# Patient Record
Sex: FEMALE | Race: WHITE | URBAN - METROPOLITAN AREA
[De-identification: names, ages, dates, MRNs, and addresses within clinical notes are randomized per-mention and may not be internally consistent; named-entity substitution may affect disease eponyms.]

---

## 2014-10-09 RX ORDER — FUROSEMIDE 40 MG
1 TABLET ORAL
Qty: 0 | Refills: 0 | COMMUNITY
Start: 2014-10-09

## 2014-10-09 RX ORDER — SPIRONOLACTONE 25 MG/1
1 TABLET, FILM COATED ORAL
Qty: 0 | Refills: 0 | COMMUNITY
Start: 2014-10-09

## 2017-02-17 NOTE — H&P ADULT - NSHPREVIEWOFSYSTEMS_GEN_ALL_CORE
sobREVIEW OF SYSTEMS:    CONSTITUTIONAL: No weakness, fevers or chills  EYES/ENT: No visual changes;  No vertigo or throat pain   NECK: No pain or stiffness  RESPIRATORY: No cough, wheezing, hemoptysis; SOB with exertion   CARDIOVASCULAR: No chest pain or palpitations  GASTROINTESTINAL: No abdominal or epigastric pain. No nausea, vomiting, or hematemesis; No diarrhea or constipation. No melena or hematochezia.  GENITOURINARY: No dysuria, frequency or hematuria  NEUROLOGICAL: No numbness or weakness  SKIN: No itching, burning, rashes, or lesions   All other review of systems is negative unless indicated above.

## 2017-02-17 NOTE — H&P ADULT - NSHPPHYSICALEXAM_GEN_ALL_CORE
PHYSICAL EXAM:    Constitutional: NAD, well-groomed, well-developed  Neuro: Alert and oriented x 3 Gait steady Speech clear No focal deficits  Neck: No JVD No bruit  Respiratory: CTAB  Cardiovascular: S1 and S2, Irregular Rhythm + radha 2/6   Gastrointestinal: BS+, soft, NT/ND  Extremities: No clubbing cyanosis + 1 edema , + varicose veins   Vascular: 2+ peripheral pulses  Psychiatric: Normal mood, normal affect  Musculoskeletal: 5/5 strength b/l upper and lower extremities

## 2017-02-17 NOTE — H&P ADULT - HISTORY OF PRESENT ILLNESS
89yr old female PMH HTN,  HLD, afib (on AC), CAD, chronic diastolic heart failure, T2DM, 89yr old female PMH HTN,  HLD, afib (on AC ELIquis ), CAD/ stent 10/11, chronic diastolic heart failure, T2DM,TAVR 10/14,pt reports increasing SOB with minimal exertion To Cardiologist Pet scan revealed moderate ischemia in inferolateral wall recommended to have a Cardiac cath  NAD at present

## 2017-02-17 NOTE — H&P ADULT - ASSESSMENT
A/P CAD with multiple risks factors and SOB with exertion     PRE-PROCEDURE ASSESSMENT    -Patient seen and examined  -Labs reviewed  -Pre-procedure teaching completed with patient and family( son at bedside )   -Informed consent witnessed  -Questions answered

## 2017-02-17 NOTE — H&P ADULT - PMH
Afib    CAD (coronary artery disease)    Diabetes Mellitus Type II    Dyslipidemia    GERD (Gastroesophageal Reflux Disease)    History of Atrial Fibrillation    History of Osteoarthritis    Hypertension

## 2017-02-21 ENCOUNTER — OUTPATIENT (OUTPATIENT)
Dept: OUTPATIENT SERVICES | Facility: HOSPITAL | Age: 82
LOS: 1 days | Discharge: HOME | End: 2017-02-21
Payer: MEDICARE

## 2017-02-21 VITALS
HEIGHT: 64 IN | WEIGHT: 179.02 LBS | TEMPERATURE: 97 F | SYSTOLIC BLOOD PRESSURE: 133 MMHG | RESPIRATION RATE: 16 BRPM | HEART RATE: 73 BPM | DIASTOLIC BLOOD PRESSURE: 80 MMHG | OXYGEN SATURATION: 98 %

## 2017-02-21 LAB
ALBUMIN SERPL ELPH-MCNC: 3.3 G/DL — SIGNIFICANT CHANGE UP (ref 3.3–5)
ALP SERPL-CCNC: 71 U/L — SIGNIFICANT CHANGE UP (ref 40–120)
ALT FLD-CCNC: 17 U/L — SIGNIFICANT CHANGE UP (ref 12–78)
ANION GAP SERPL CALC-SCNC: 7 MMOL/L — SIGNIFICANT CHANGE UP (ref 5–17)
AST SERPL-CCNC: 15 U/L — SIGNIFICANT CHANGE UP (ref 15–37)
BILIRUB SERPL-MCNC: 0.5 MG/DL — SIGNIFICANT CHANGE UP (ref 0.2–1.2)
BUN SERPL-MCNC: 12 MG/DL — SIGNIFICANT CHANGE UP (ref 7–23)
CALCIUM SERPL-MCNC: 9.2 MG/DL — SIGNIFICANT CHANGE UP (ref 8.5–10.1)
CHLORIDE SERPL-SCNC: 105 MMOL/L — SIGNIFICANT CHANGE UP (ref 96–108)
CO2 SERPL-SCNC: 28 MMOL/L — SIGNIFICANT CHANGE UP (ref 22–31)
CREAT SERPL-MCNC: 0.67 MG/DL — SIGNIFICANT CHANGE UP (ref 0.5–1.3)
GLUCOSE SERPL-MCNC: 117 MG/DL — HIGH (ref 70–99)
HCT VFR BLD CALC: 36.9 % — SIGNIFICANT CHANGE UP (ref 34.5–45)
HGB BLD-MCNC: 12.2 G/DL — SIGNIFICANT CHANGE UP (ref 11.5–15.5)
MCHC RBC-ENTMCNC: 30.7 PG — SIGNIFICANT CHANGE UP (ref 27–34)
MCHC RBC-ENTMCNC: 33.1 GM/DL — SIGNIFICANT CHANGE UP (ref 32–36)
MCV RBC AUTO: 92.7 FL — SIGNIFICANT CHANGE UP (ref 80–100)
PLATELET # BLD AUTO: 183 K/UL — SIGNIFICANT CHANGE UP (ref 150–400)
POTASSIUM SERPL-MCNC: 4.6 MMOL/L — SIGNIFICANT CHANGE UP (ref 3.5–5.3)
POTASSIUM SERPL-SCNC: 4.6 MMOL/L — SIGNIFICANT CHANGE UP (ref 3.5–5.3)
PROT SERPL-MCNC: 7.5 GM/DL — SIGNIFICANT CHANGE UP (ref 6–8.3)
RBC # BLD: 3.98 M/UL — SIGNIFICANT CHANGE UP (ref 3.8–5.2)
RBC # FLD: 14.6 % — HIGH (ref 10.3–14.5)
SODIUM SERPL-SCNC: 140 MMOL/L — SIGNIFICANT CHANGE UP (ref 135–145)
WBC # BLD: 7.8 K/UL — SIGNIFICANT CHANGE UP (ref 3.8–10.5)
WBC # FLD AUTO: 7.8 K/UL — SIGNIFICANT CHANGE UP (ref 3.8–10.5)

## 2017-02-21 PROCEDURE — 93010 ELECTROCARDIOGRAM REPORT: CPT

## 2017-02-21 RX ORDER — ACETAMINOPHEN 500 MG
650 TABLET ORAL EVERY 6 HOURS
Qty: 0 | Refills: 0 | Status: DISCONTINUED | OUTPATIENT
Start: 2017-02-21 | End: 2017-02-22

## 2017-02-21 RX ORDER — APIXABAN 2.5 MG/1
5 TABLET, FILM COATED ORAL
Qty: 0 | Refills: 0 | Status: DISCONTINUED | OUTPATIENT
Start: 2017-02-22 | End: 2017-02-22

## 2017-02-21 RX ORDER — GLUCAGON INJECTION, SOLUTION 0.5 MG/.1ML
1 INJECTION, SOLUTION SUBCUTANEOUS ONCE
Qty: 0 | Refills: 0 | Status: DISCONTINUED | OUTPATIENT
Start: 2017-02-21 | End: 2017-02-22

## 2017-02-21 RX ORDER — INSULIN LISPRO 100/ML
VIAL (ML) SUBCUTANEOUS AT BEDTIME
Qty: 0 | Refills: 0 | Status: DISCONTINUED | OUTPATIENT
Start: 2017-02-21 | End: 2017-02-22

## 2017-02-21 RX ORDER — DEXTROSE 50 % IN WATER 50 %
25 SYRINGE (ML) INTRAVENOUS ONCE
Qty: 0 | Refills: 0 | Status: DISCONTINUED | OUTPATIENT
Start: 2017-02-21 | End: 2017-02-22

## 2017-02-21 RX ORDER — INSULIN LISPRO 100/ML
VIAL (ML) SUBCUTANEOUS
Qty: 0 | Refills: 0 | Status: DISCONTINUED | OUTPATIENT
Start: 2017-02-21 | End: 2017-02-22

## 2017-02-21 RX ORDER — METOPROLOL TARTRATE 50 MG
25 TABLET ORAL DAILY
Qty: 0 | Refills: 0 | Status: DISCONTINUED | OUTPATIENT
Start: 2017-02-21 | End: 2017-02-22

## 2017-02-21 RX ORDER — DEXTROSE 50 % IN WATER 50 %
1 SYRINGE (ML) INTRAVENOUS ONCE
Qty: 0 | Refills: 0 | Status: DISCONTINUED | OUTPATIENT
Start: 2017-02-21 | End: 2017-02-22

## 2017-02-21 RX ORDER — SIMVASTATIN 20 MG/1
40 TABLET, FILM COATED ORAL AT BEDTIME
Qty: 0 | Refills: 0 | Status: DISCONTINUED | OUTPATIENT
Start: 2017-02-21 | End: 2017-02-22

## 2017-02-21 RX ORDER — DEXTROSE 50 % IN WATER 50 %
12.5 SYRINGE (ML) INTRAVENOUS ONCE
Qty: 0 | Refills: 0 | Status: DISCONTINUED | OUTPATIENT
Start: 2017-02-21 | End: 2017-02-22

## 2017-02-21 RX ORDER — ESCITALOPRAM OXALATE 10 MG/1
10 TABLET, FILM COATED ORAL DAILY
Qty: 0 | Refills: 0 | Status: DISCONTINUED | OUTPATIENT
Start: 2017-02-21 | End: 2017-02-22

## 2017-02-21 RX ORDER — FUROSEMIDE 40 MG
40 TABLET ORAL DAILY
Qty: 0 | Refills: 0 | Status: DISCONTINUED | OUTPATIENT
Start: 2017-02-21 | End: 2017-02-22

## 2017-02-21 RX ORDER — ASPIRIN/CALCIUM CARB/MAGNESIUM 324 MG
81 TABLET ORAL DAILY
Qty: 0 | Refills: 0 | Status: DISCONTINUED | OUTPATIENT
Start: 2017-02-21 | End: 2017-02-22

## 2017-02-21 RX ORDER — PANTOPRAZOLE SODIUM 20 MG/1
40 TABLET, DELAYED RELEASE ORAL
Qty: 0 | Refills: 0 | Status: DISCONTINUED | OUTPATIENT
Start: 2017-02-21 | End: 2017-02-22

## 2017-02-21 RX ORDER — DOCUSATE SODIUM 100 MG
100 CAPSULE ORAL THREE TIMES A DAY
Qty: 0 | Refills: 0 | Status: DISCONTINUED | OUTPATIENT
Start: 2017-02-21 | End: 2017-02-22

## 2017-02-21 RX ORDER — SODIUM CHLORIDE 9 MG/ML
1000 INJECTION, SOLUTION INTRAVENOUS
Qty: 0 | Refills: 0 | Status: DISCONTINUED | OUTPATIENT
Start: 2017-02-21 | End: 2017-02-22

## 2017-02-21 RX ORDER — SODIUM CHLORIDE 9 MG/ML
3 INJECTION INTRAMUSCULAR; INTRAVENOUS; SUBCUTANEOUS EVERY 8 HOURS
Qty: 0 | Refills: 0 | Status: DISCONTINUED | OUTPATIENT
Start: 2017-02-21 | End: 2017-02-22

## 2017-02-21 RX ADMIN — PANTOPRAZOLE SODIUM 40 MILLIGRAM(S): 20 TABLET, DELAYED RELEASE ORAL at 18:49

## 2017-02-21 RX ADMIN — SODIUM CHLORIDE 3 MILLILITER(S): 9 INJECTION INTRAMUSCULAR; INTRAVENOUS; SUBCUTANEOUS at 23:11

## 2017-02-21 RX ADMIN — Medication 40 MILLIGRAM(S): at 18:49

## 2017-02-21 RX ADMIN — Medication 100 MILLIGRAM(S): at 23:03

## 2017-02-21 RX ADMIN — Medication 25 MILLIGRAM(S): at 18:49

## 2017-02-21 RX ADMIN — ESCITALOPRAM OXALATE 10 MILLIGRAM(S): 10 TABLET, FILM COATED ORAL at 23:03

## 2017-02-21 RX ADMIN — Medication 81 MILLIGRAM(S): at 18:23

## 2017-02-21 RX ADMIN — SIMVASTATIN 40 MILLIGRAM(S): 20 TABLET, FILM COATED ORAL at 23:11

## 2017-02-21 NOTE — PATIENT PROFILE ADULT. - FALL HARM RISK
age(85 years old or older)/coagulation(Bleeding disorder R/T clinical cond/anti-coags)/bones(Osteoporosis,prev fx,steroid use,metastatic bone ca

## 2017-02-21 NOTE — ASU PREOP CHECKLIST - DNR CLARIFICATION FORM COMPLETED
Fever in Children 4 Years and Older: Care Instructions  Your Care Instructions    A fever is a high body temperature. Fever is the body's normal reaction to infection and other illnesses, both minor and serious. Fevers help the body fight infection. In most cases, fever means your child has a minor illness. Often you must look at your child's other symptoms to determine how serious the illness is. Children with a fever often have an infection caused by a virus, such as a cold or the flu. Infections caused by bacteria, such as strep throat or an ear infection, also can cause a fever. Follow-up care is a key part of your child's treatment and safety. Be sure to make and go to all appointments, and call your doctor if your child is having problems. It's also a good idea to know your child's test results and keep a list of the medicines your child takes. How can you care for your child at home? · Don't use temperature alone to  how sick your child is. Instead, look at how your child acts. Care at home is often all that is needed if your child is:  ¨ Comfortable and alert. ¨ Eating well. ¨ Drinking enough fluid. ¨ Urinating as usual.  ¨ Starting to feel better. · Give your child extra fluids or flavored ice pops to suck on. This will help prevent dehydration. · Dress your child in light clothes or pajamas. Don't wrap your child in blankets. · If your child has a fever and is uncomfortable, give an over-the-counter medicine such as acetaminophen (Tylenol) or ibuprofen (Advil, Motrin). Be safe with medicines. Read and follow all instructions on the label. Do not give aspirin to anyone younger than 20. It has been linked to Reye syndrome, a serious illness. · Be careful when giving your child over-the-counter cold or flu medicines and Tylenol at the same time. Many of these medicines have acetaminophen, which is Tylenol.  Read the labels to make sure that you are not giving your child more than the recommended dose. Too much acetaminophen (Tylenol) can be harmful. When should you call for help? Call 911 anytime you think your child may need emergency care. For example, call if:  · Your child seems very sick or is hard to wake up. Call your doctor now or seek immediate medical care if:  · Your child seems to be getting sicker. · The fever gets much higher. · There are new or worse symptoms along with the fever. These may include a cough, a rash, or ear pain. Watch closely for changes in your child's health, and be sure to contact your doctor if:  · The fever hasn't gone down after 48 hours. · Your child does not get better as expected. Where can you learn more? Go to http://woo-morgan.info/. Enter H618 in the search box to learn more about \"Fever in Children 4 Years and Older: Care Instructions. \"  Current as of: May 27, 2016  Content Version: 11.1  © 1024-6861 [employer]. Care instructions adapted under license by [employer] (which disclaims liability or warranty for this information). If you have questions about a medical condition or this instruction, always ask your healthcare professional. [address] any warranty or liability for your use of this information. n/a

## 2017-02-21 NOTE — PROGRESS NOTE ADULT - ASSESSMENT
s/p cath    admit to Tele   -observe overnight on tele  -vitals signs, labs, diet and activity per post procedure orders  -ambulate ad rebeca post bed rest  _ restart eliquis on 2/22 in am   -encouraged PO fluids  -plan of care discussed with patient, family and MD  -likely d/c in AM  -post procedure and d/c instructions reviewed  -follow up with MD in 1-2 weeks Dr Hernandez s/p cath    Normal LV systolic function. Estimated LV ejection fraction is 65 %.   One Vessel coronary artery disease (LAD) .   Patent bare metal stent in the mid LAD.   Elevated left ventricular end-diastolic pressure.   Hemodynamic status is abnormal.   Severe pulmonary artery hypertension.   No aortic valve stenosis.No gradient across Spaien Valve.   No aortic valve regurgitation.     Recommendations     Manage with medical therapy.   Consider Phosphodiesterase Inhibitors for PHT.    admit to Tele   -observe overnight on tele  -vitals signs, labs, diet and activity per post procedure orders  -ambulate ad rebeca post bed rest  _ restart eliquis on 2/22 in am   hold Janumet for 2 day post procedure   sliding scale   -encouraged PO fluids  -plan of care discussed with patient, family and MD  -likely d/c in AM  -post procedure and d/c instructions reviewed  -follow up with MD in 1-2 weeks Dr Perez

## 2017-02-21 NOTE — PROGRESS NOTE ADULT - SUBJECTIVE AND OBJECTIVE BOX
House Medicine NP    Patient is a 89y old  Female who presents with a chief complaint of shortness of breath (21 Feb 2017 11:39)      HPI:  89yr old female PMH HTN,  HLD, afib (on AC ELIquis ), CAD/ stent 10/11, chronic diastolic heart failure, T2DM,TAVR 10/14,pt reports increasing SOB with minimal exertion To Cardiologist Pet scan revealed moderate ischemia in inferolateral wall recommended to have a Cardiac cath  NAD at present   s/p Cath revealed     PAST MEDICAL & SURGICAL HISTORY:  Afib  CAD (coronary artery disease)  Hypertension  History of Osteoarthritis  History of Atrial Fibrillation  GERD (Gastroesophageal Reflux Disease)  Dyslipidemia  Diabetes Mellitus Type II  Chronic Atrial Fibrillation  H/O: Knee Surgery- right meniscus  H/O: Hysterectomy      MEDICATIONS  (STANDING):  aspirin enteric coated 81milliGRAM(s) Oral daily  escitalopram 10milliGRAM(s) Oral daily  furosemide    Tablet 40milliGRAM(s) Oral daily  metoprolol succinate ER 25milliGRAM(s) Oral daily  simvastatin 40milliGRAM(s) Oral at bedtime  docusate sodium 100milliGRAM(s) Oral three times a day  pantoprazole    Tablet 40milliGRAM(s) Oral before breakfast  sodium chloride 0.9% lock flush 3milliLiter(s) IV Push every 8 hours    MEDICATIONS  (PRN):  acetaminophen   Tablet 650milliGRAM(s) Oral every 6 hours PRN Mild Pain      Allergies    lactose (Other)  penicillin (Rash)  penicillins (Other)  sulfa drugs (Rash; Other)    Intolerances        REVIEW OF SYSTEMS:  CONSTITUTIONAL: No fever, weight loss, or fatigue  EYES: No eye pain, visual disturbances, or discharge  ENMT:  No difficulty hearing, tinnitus, vertigo; No sinus or throat pain  NECK: No pain or stiffness  BREASTS: No pain, masses, or nipple discharge  RESPIRATORY: No cough, wheezing, chills or hemoptysis; No shortness of breath  CARDIOVASCULAR: No chest pain, palpitations, dizziness, or leg swelling  GASTROINTESTINAL: No abdominal or epigastric pain. No nausea, vomiting, or hematemesis; No diarrhea or constipation. No melena or hematochezia.  GENITOURINARY: No dysuria, frequency, hematuria, or incontinence  NEUROLOGICAL: No headaches, memory loss, loss of strength, numbness, or tremors  SKIN: No itching, burning, rashes, or lesions   LYMPH NODES: No enlarged glands  ENDOCRINE: No heat or cold intolerance; No hair loss  MUSCULOSKELETAL: No joint pain or swelling; No muscle, back, or extremity pain  PSYCHIATRIC: No depression, anxiety, mood swings, or difficulty sleeping  HEME/LYMPH: No easy bruising, or bleeding gums  ALLERGY AND IMMUNOLOGIC: No hives or eczema    Vital Signs Last 24 Hrs  T(C): 36.2, Max: 36.2 (02-21 @ 14:30)  T(F): 97.2, Max: 97.2 (02-21 @ 14:30)  HR: 62 (62 - 75)  BP: 126/57 (116/59 - 146/63)  BP(mean): --  RR: 16 (13 - 16)  SpO2: 97% (93% - 98%)    PHYSICAL EXAM:  GENERAL: NAD, well-groomed, well-developed  HEAD:  Atraumatic, Normocephalic  EYES: EOMI, PERRLA, conjunctiva and sclera clear  ENMT: No tonsillar erythema, exudates, or enlargement; Moist mucous membranes, Good dentition, No lesions  NECK: Supple, No JVD, Normal thyroid  NERVOUS SYSTEM:  Alert & Oriented X3, Good concentration; Motor Strength 5/5 B/L upper and lower extremities; DTRs 2+ intact and symmetric  CHEST/LUNG: Clear to percussion bilaterally; No rales, rhonchi, wheezing, or rubs  HEART: Regular rate and rhythm; No murmurs, rubs, or gallops  ABDOMEN: Soft, Nontender, Nondistended; Bowel sounds present  EXTREMITIES:  2+ Peripheral Pulses, No clubbing, cyanosis, or edema  LYMPH: No lymphadenopathy noted  SKIN: No rashes or lesions    LABS:                        12.2   7.8   )-----------( 183      ( 21 Feb 2017 11:45 )             36.9     21 Feb 2017 11:45    140    |  105    |  12     ----------------------------<  117    4.6     |  28     |  0.67     Ca    9.2        21 Feb 2017 11:45    TPro  7.5    /  Alb  3.3    /  TBili  0.5    /  DBili  x      /  AST  15     /  ALT  17     /  AlkPhos  71     21 Feb 2017 11:45        CAPILLARY BLOOD GLUCOSE      RADIOLOGY & ADDITIONAL TESTS:    Assessment:       Plan:      Care Discussed with Consultants/Other Providers [ ] YES  [ ] NO HPI:  89yr old female PMH HTN,  HLD, afib (on AC ELIquis ), CAD/ stent 10/11, chronic diastolic heart failure, T2DM,TAVR 10/14,pt reports increasing SOB with minimal exertion To Cardiologist Pet scan revealed moderate ischemia in inferolateral wall recommended to have a Cardiac cath  NAD at present     s/p Cath revealed   Normal LV systolic function. Estimated LV ejection fraction is 65 %.   One Vessel coronary artery disease (LAD) .   Patent bare metal stent in the mid LAD.   Elevated left ventricular end-diastolic pressure.   Hemodynamic status is abnormal.   Severe pulmonary artery hypertension.   No aortic valve stenosis.No gradient across Spaien Valve.   No aortic valve regurgitation.     Recommendations     Manage with medical therapy.   Consider Phosphodiesterase Inhibitors for PHT.  admit for observation   monitor groin site vss I&O   medication adjust ment     PAST MEDICAL & SURGICAL HISTORY:  Afib  CAD (coronary artery disease)  Hypertension  History of Osteoarthritis  History of Atrial Fibrillation  GERD (Gastroesophageal Reflux Disease)  Dyslipidemia  Diabetes Mellitus Type II  Chronic Atrial Fibrillation  H/O: Knee Surgery- right meniscus  H/O: Hysterectomy      MEDICATIONS  (STANDING):  aspirin enteric coated 81milliGRAM(s) Oral daily  escitalopram 10milliGRAM(s) Oral daily  furosemide    Tablet 40milliGRAM(s) Oral daily  metoprolol succinate ER 25milliGRAM(s) Oral daily  simvastatin 40milliGRAM(s) Oral at bedtime  docusate sodium 100milliGRAM(s) Oral three times a day  pantoprazole    Tablet 40milliGRAM(s) Oral before breakfast  sodium chloride 0.9% lock flush 3milliLiter(s) IV Push every 8 hours    MEDICATIONS  (PRN):  acetaminophen   Tablet 650milliGRAM(s) Oral every 6 hours PRN Mild Pain      Allergies    lactose (Other)  penicillin (Rash)  penicillins (Other)  sulfa drugs (Rash; Other)    Intolerances        REVIEW OF SYSTEMS:  CONSTITUTIONAL: No fever, weight loss, or fatigue  EYES: No eye pain, visual disturbances, or discharge  ENMT:  No difficulty hearing, tinnitus, vertigo; No sinus or throat pain  NECK: No pain or stiffness  BREASTS: No pain, masses, or nipple discharge  RESPIRATORY: No cough, wheezing, chills or hemoptysis; No shortness of breath  CARDIOVASCULAR: No chest pain, palpitations, dizziness, or leg swelling  GASTROINTESTINAL: No abdominal or epigastric pain. No nausea, vomiting, or hematemesis; No diarrhea or constipation. No melena or hematochezia.  GENITOURINARY: No dysuria, frequency, hematuria, or incontinence  NEUROLOGICAL: No headaches, memory loss, loss of strength, numbness, or tremors  SKIN: No itching, burning, rashes, or lesions   LYMPH NODES: No enlarged glands  ENDOCRINE: No heat or cold intolerance; No hair loss  MUSCULOSKELETAL: No joint pain or swelling; No muscle, back, or extremity pain  PSYCHIATRIC: No depression, anxiety, mood swings, or difficulty sleeping  HEME/LYMPH: No easy bruising, or bleeding gums  ALLERGY AND IMMUNOLOGIC: No hives or eczema    Vital Signs Last 24 Hrs  T(C): 36.2, Max: 36.2 (02-21 @ 14:30)  T(F): 97.2, Max: 97.2 (02-21 @ 14:30)  HR: 62 (62 - 75)  BP: 126/57 (116/59 - 146/63)  BP(mean): --  RR: 16 (13 - 16)  SpO2: 97% (93% - 98%)                     12.2   7.8   )-----------( 183      ( 21 Feb 2017 11:45 )             36.9     21 Feb 2017 11:45    140    |  105    |  12     ----------------------------<  117    4.6     |  28     |  0.67     Ca    9.2        21 Feb 2017 11:45    TPro  7.5    /  Alb  3.3    /  TBili  0.5    /  DBili  x      /  AST  15     /  ALT  17     /  AlkPhos  71     21 Feb 2017 11:45        CAPILLARY BLOOD GLUCOSE      RADIOLOGY & ADDITIONAL TESTS:    Assessment:       Plan:      Care Discussed with Consultants/Other Providers [ ] YES  [ ] NO

## 2017-02-21 NOTE — PACU DISCHARGE NOTE - COMMENTS
pt to [facility].  report to lonnie newman by anshul monahan
pt verbalizes understanding of limitations in activity and discharge instructions/ Right groin without bleeding or hematoma

## 2017-02-22 ENCOUNTER — TRANSCRIPTION ENCOUNTER (OUTPATIENT)
Age: 82
End: 2017-02-22

## 2017-02-22 VITALS
DIASTOLIC BLOOD PRESSURE: 51 MMHG | OXYGEN SATURATION: 93 % | RESPIRATION RATE: 14 BRPM | HEART RATE: 73 BPM | SYSTOLIC BLOOD PRESSURE: 109 MMHG

## 2017-02-22 LAB
ALBUMIN SERPL ELPH-MCNC: 2.9 G/DL — LOW (ref 3.3–5)
ALP SERPL-CCNC: 65 U/L — SIGNIFICANT CHANGE UP (ref 40–120)
ALT FLD-CCNC: 8 U/L — LOW (ref 12–78)
ANION GAP SERPL CALC-SCNC: 8 MMOL/L — SIGNIFICANT CHANGE UP (ref 5–17)
AST SERPL-CCNC: 16 U/L — SIGNIFICANT CHANGE UP (ref 15–37)
BILIRUB SERPL-MCNC: 0.7 MG/DL — SIGNIFICANT CHANGE UP (ref 0.2–1.2)
BUN SERPL-MCNC: 13 MG/DL — SIGNIFICANT CHANGE UP (ref 7–23)
CALCIUM SERPL-MCNC: 8.7 MG/DL — SIGNIFICANT CHANGE UP (ref 8.5–10.1)
CHLORIDE SERPL-SCNC: 104 MMOL/L — SIGNIFICANT CHANGE UP (ref 96–108)
CO2 SERPL-SCNC: 29 MMOL/L — SIGNIFICANT CHANGE UP (ref 22–31)
CREAT SERPL-MCNC: 0.56 MG/DL — SIGNIFICANT CHANGE UP (ref 0.5–1.3)
GLUCOSE SERPL-MCNC: 122 MG/DL — HIGH (ref 70–99)
HCT VFR BLD CALC: 33.3 % — LOW (ref 34.5–45)
HCT VFR BLD CALC: 37.3 % — SIGNIFICANT CHANGE UP (ref 34.5–45)
HGB BLD-MCNC: 10.7 G/DL — LOW (ref 11.5–15.5)
HGB BLD-MCNC: 12.1 G/DL — SIGNIFICANT CHANGE UP (ref 11.5–15.5)
MCHC RBC-ENTMCNC: 30 PG — SIGNIFICANT CHANGE UP (ref 27–34)
MCHC RBC-ENTMCNC: 30.1 PG — SIGNIFICANT CHANGE UP (ref 27–34)
MCHC RBC-ENTMCNC: 32.2 GM/DL — SIGNIFICANT CHANGE UP (ref 32–36)
MCHC RBC-ENTMCNC: 32.5 GM/DL — SIGNIFICANT CHANGE UP (ref 32–36)
MCV RBC AUTO: 92.7 FL — SIGNIFICANT CHANGE UP (ref 80–100)
MCV RBC AUTO: 93.2 FL — SIGNIFICANT CHANGE UP (ref 80–100)
PLATELET # BLD AUTO: 168 K/UL — SIGNIFICANT CHANGE UP (ref 150–400)
PLATELET # BLD AUTO: 202 K/UL — SIGNIFICANT CHANGE UP (ref 150–400)
POTASSIUM SERPL-MCNC: 4.1 MMOL/L — SIGNIFICANT CHANGE UP (ref 3.5–5.3)
POTASSIUM SERPL-SCNC: 4.1 MMOL/L — SIGNIFICANT CHANGE UP (ref 3.5–5.3)
PROT SERPL-MCNC: 6.7 GM/DL — SIGNIFICANT CHANGE UP (ref 6–8.3)
RBC # BLD: 3.57 M/UL — LOW (ref 3.8–5.2)
RBC # BLD: 4.03 M/UL — SIGNIFICANT CHANGE UP (ref 3.8–5.2)
RBC # FLD: 14.4 % — SIGNIFICANT CHANGE UP (ref 10.3–14.5)
RBC # FLD: 14.7 % — HIGH (ref 10.3–14.5)
SODIUM SERPL-SCNC: 141 MMOL/L — SIGNIFICANT CHANGE UP (ref 135–145)
WBC # BLD: 7.3 K/UL — SIGNIFICANT CHANGE UP (ref 3.8–10.5)
WBC # BLD: 8.4 K/UL — SIGNIFICANT CHANGE UP (ref 3.8–10.5)
WBC # FLD AUTO: 7.3 K/UL — SIGNIFICANT CHANGE UP (ref 3.8–10.5)
WBC # FLD AUTO: 8.4 K/UL — SIGNIFICANT CHANGE UP (ref 3.8–10.5)

## 2017-02-22 PROCEDURE — 93010 ELECTROCARDIOGRAM REPORT: CPT

## 2017-02-22 RX ORDER — PANTOPRAZOLE SODIUM 20 MG/1
1 TABLET, DELAYED RELEASE ORAL
Qty: 0 | Refills: 0 | COMMUNITY
Start: 2017-02-22

## 2017-02-22 RX ORDER — SPIRONOLACTONE 25 MG/1
25 TABLET, FILM COATED ORAL
Qty: 0 | Refills: 0 | Status: DISCONTINUED | OUTPATIENT
Start: 2017-02-22 | End: 2017-02-22

## 2017-02-22 RX ADMIN — SPIRONOLACTONE 25 MILLIGRAM(S): 25 TABLET, FILM COATED ORAL at 11:51

## 2017-02-22 RX ADMIN — SODIUM CHLORIDE 3 MILLILITER(S): 9 INJECTION INTRAMUSCULAR; INTRAVENOUS; SUBCUTANEOUS at 06:14

## 2017-02-22 RX ADMIN — ESCITALOPRAM OXALATE 10 MILLIGRAM(S): 10 TABLET, FILM COATED ORAL at 11:52

## 2017-02-22 RX ADMIN — Medication 100 MILLIGRAM(S): at 06:57

## 2017-02-22 RX ADMIN — Medication 2: at 12:37

## 2017-02-22 RX ADMIN — Medication 100 MILLIGRAM(S): at 13:42

## 2017-02-22 RX ADMIN — Medication 25 MILLIGRAM(S): at 06:58

## 2017-02-22 RX ADMIN — Medication 40 MILLIGRAM(S): at 06:58

## 2017-02-22 RX ADMIN — Medication 81 MILLIGRAM(S): at 11:51

## 2017-02-22 RX ADMIN — APIXABAN 5 MILLIGRAM(S): 2.5 TABLET, FILM COATED ORAL at 06:58

## 2017-02-22 RX ADMIN — PANTOPRAZOLE SODIUM 40 MILLIGRAM(S): 20 TABLET, DELAYED RELEASE ORAL at 06:58

## 2017-02-22 NOTE — DISCHARGE NOTE ADULT - PLAN OF CARE
no symptoms of chest pain low salt diet daily weights   maintain one liter of fluids daily   if weight increases call your cardiologist maintain sugar between 80 -120 no concentrated sweets   check your glucose on a regular basis if runs too high or low notify your primary doctor maintain your cholesterol level low fat low cholesterol diet   have your blood tested on a regular basis free from palpitations and rapid heart rate continue your cardiac medication   continue your blood thinner as directed if any sign  of bleeding notify your cardiologist

## 2017-02-22 NOTE — DISCHARGE NOTE ADULT - HOSPITAL COURSE
HPI:  89yr old female PMH HTN,  HLD, afib (on AC ELIquis ), CAD/ stent 10/11, chronic diastolic heart failure, T2DM,TAVR 10/14,pt reports increasing SOB with minimal exertion To Cardiologist Pet scan revealed moderate ischemia in inferolateral wall recommended to have a Cardiac cath  NAD at present (17 Feb 2017 19:58)  s/p cardiac cath   Diagnostic Conclusions     Normal LV systolic function. Estimated LV ejection fraction is 65 %.   One Vessel coronary artery disease (LAD) .   Patent bare metal stent in the mid LAD.   Elevated left ventricular end-diastolic pressure.   Hemodynamic status is abnormal.   Severe pulmonary artery hypertension.   No aortic valve stenosis.   No gradient across Spaien Valve.   No aortic valve regurgitation.   Recommendations     Manage with medical therapy.   Consider Phosphodiesterase Inhibitors for PHT.  restart janumet on 2/24    discharge home today   spoke to both her sons about plan of care   follow up with Dr Hernandez 1 week   and agree with discharge

## 2017-02-22 NOTE — DISCHARGE NOTE ADULT - CARE PLAN
Principal Discharge DX:	CAD (coronary artery disease)  Goal:	no symptoms of chest pain  Instructions for follow-up, activity and diet:	low salt diet daily weights   maintain one liter of fluids daily   if weight increases call your cardiologist  Secondary Diagnosis:	Diabetes mellitus type II, controlled  Goal:	maintain sugar between 80 -120  Instructions for follow-up, activity and diet:	no concentrated sweets   check your glucose on a regular basis if runs too high or low notify your primary doctor  Secondary Diagnosis:	Dyslipidemia  Goal:	maintain your cholesterol level  Instructions for follow-up, activity and diet:	low fat low cholesterol diet   have your blood tested on a regular basis  Secondary Diagnosis:	History of atrial fibrillation  Goal:	free from palpitations and rapid heart rate  Instructions for follow-up, activity and diet:	continue your cardiac medication   continue your blood thinner as directed if any sign  of bleeding notify your cardiologist

## 2017-02-22 NOTE — DISCHARGE NOTE ADULT - MEDICATION SUMMARY - MEDICATIONS TO TAKE
I will START or STAY ON the medications listed below when I get home from the hospital:    spironolactone 25 mg oral tablet  -- 1 tab(s) by mouth 2 times a day  -- Indication: For water pill     aspirin 81 mg oral tablet  -- 1 tab(s) by mouth once a day  -- Indication: For Afib    acetaminophen 325 mg oral tablet  -- 2 tab(s) by mouth every 6 hours, As needed, Mild Pain  -- Indication: For pain     Eliquis 5 mg oral tablet  -- 1 tab(s) by mouth 2 times a day  -- Indication: For Afib    Lexapro 10 mg oral tablet  -- 1 tab(s) by mouth once a day  -- Indication: For mood    Janumet 50 mg-500 mg oral tablet  -- 1 tab(s) by mouth 2 times a day  -- Re-start on Frriday 2/24/17  -- Indication: For Diabetes mellitus type II, controlled    Zocor 40 mg oral tablet  -- 1 tab(s) by mouth once a day (at bedtime)  -- Indication: For Dyslipidemia    Toprol-XL 25 mg oral tablet, extended release  -- 25 milligram(s) by mouth 2 times a day  -- Indication: For Blood pressure     furosemide 40 mg oral tablet  -- 1 tab(s) by mouth once a day  -- Indication: For water pill     docusate sodium 100 mg oral capsule  -- 1 cap(s) by mouth 3 times a day  -- Indication: For stool softner    NexIUM 40 mg oral delayed release capsule  -- 1 cap(s) by mouth once a day  -- Indication: For stomach

## 2017-02-22 NOTE — DISCHARGE NOTE ADULT - PATIENT PORTAL LINK FT
“You can access the [facility] Patient Portal, offered by [facility], by registering with the following website: [email or web address]”

## 2017-02-22 NOTE — DISCHARGE NOTE ADULT - CARE PROVIDER_API CALL
Florentin Hernandez (AMINATA), Cardiovascular Disease; Critical Care Medicine; Internal Medicine; Interventional Cardiology  [address] [place] [address]  Phone: [phone]  Fax: [phone]

## 2017-02-25 DIAGNOSIS — Z95.2 PRESENCE OF PROSTHETIC HEART VALVE: ICD-10-CM

## 2017-02-25 DIAGNOSIS — I27.2 OTHER SECONDARY PULMONARY HYPERTENSION: ICD-10-CM

## 2017-02-25 DIAGNOSIS — I11.0 HYPERTENSIVE HEART DISEASE WITH HEART FAILURE: ICD-10-CM

## 2017-02-25 DIAGNOSIS — I50.32 CHRONIC DIASTOLIC (CONGESTIVE) HEART FAILURE: ICD-10-CM

## 2017-02-25 DIAGNOSIS — M19.90 UNSPECIFIED OSTEOARTHRITIS, UNSPECIFIED SITE: ICD-10-CM

## 2017-02-25 DIAGNOSIS — K21.9 GASTRO-ESOPHAGEAL REFLUX DISEASE WITHOUT ESOPHAGITIS: ICD-10-CM

## 2017-02-25 DIAGNOSIS — Z95.5 PRESENCE OF CORONARY ANGIOPLASTY IMPLANT AND GRAFT: ICD-10-CM

## 2017-02-25 DIAGNOSIS — I48.91 UNSPECIFIED ATRIAL FIBRILLATION: ICD-10-CM

## 2017-02-25 DIAGNOSIS — Z79.84 LONG TERM (CURRENT) USE OF ORAL HYPOGLYCEMIC DRUGS: ICD-10-CM

## 2017-02-25 DIAGNOSIS — E66.9 OBESITY, UNSPECIFIED: ICD-10-CM

## 2017-02-25 DIAGNOSIS — R06.02 SHORTNESS OF BREATH: ICD-10-CM

## 2017-02-25 DIAGNOSIS — E78.5 HYPERLIPIDEMIA, UNSPECIFIED: ICD-10-CM

## 2017-02-25 DIAGNOSIS — I25.10 ATHEROSCLEROTIC HEART DISEASE OF NATIVE CORONARY ARTERY WITHOUT ANGINA PECTORIS: ICD-10-CM

## 2017-02-25 DIAGNOSIS — E11.9 TYPE 2 DIABETES MELLITUS WITHOUT COMPLICATIONS: ICD-10-CM

## 2017-02-25 DIAGNOSIS — Z79.01 LONG TERM (CURRENT) USE OF ANTICOAGULANTS: ICD-10-CM

## 2017-03-23 PROBLEM — I48.91 UNSPECIFIED ATRIAL FIBRILLATION: Chronic | Status: ACTIVE | Noted: 2017-02-17

## 2017-03-23 PROBLEM — I25.10 ATHEROSCLEROTIC HEART DISEASE OF NATIVE CORONARY ARTERY WITHOUT ANGINA PECTORIS: Chronic | Status: ACTIVE | Noted: 2017-02-17

## 2017-04-05 ENCOUNTER — APPOINTMENT (OUTPATIENT)
Dept: INTERNAL MEDICINE | Facility: CLINIC | Age: 82
End: 2017-04-05

## 2017-04-13 ENCOUNTER — MEDICATION RENEWAL (OUTPATIENT)
Age: 82
End: 2017-04-13

## 2017-04-18 LAB
ANION GAP SERPL CALC-SCNC: 11 MMOL/L
BASOPHILS # BLD AUTO: 0.04 K/UL
BASOPHILS NFR BLD AUTO: 0.4 %
BUN SERPL-MCNC: 12 MG/DL
CALCIUM SERPL-MCNC: 8.6 MG/DL
CHLORIDE SERPL-SCNC: 97 MMOL/L
CO2 SERPL-SCNC: 22 MMOL/L
CREAT SERPL-MCNC: 0.69 MG/DL
EOSINOPHIL # BLD AUTO: 0.32 K/UL
EOSINOPHIL NFR BLD AUTO: 3.5 %
GLUCOSE SERPL-MCNC: 153 MG/DL
HCT VFR BLD CALC: 28.7 %
HGB BLD-MCNC: 9 G/DL
IMM GRANULOCYTES NFR BLD AUTO: 0.2 %
LYMPHOCYTES # BLD AUTO: 1.27 K/UL
LYMPHOCYTES NFR BLD AUTO: 13.9 %
MAN DIFF?: NORMAL
MCHC RBC-ENTMCNC: 29.3 PG
MCHC RBC-ENTMCNC: 31.4 GM/DL
MCV RBC AUTO: 93.5 FL
MONOCYTES # BLD AUTO: 1.34 K/UL
MONOCYTES NFR BLD AUTO: 14.6 %
NEUTROPHILS # BLD AUTO: 6.16 K/UL
NEUTROPHILS NFR BLD AUTO: 67.4 %
PLATELET # BLD AUTO: 226 K/UL
POTASSIUM SERPL-SCNC: 4.9 MMOL/L
RBC # BLD: 3.07 M/UL
RBC # FLD: 16.4 %
SODIUM SERPL-SCNC: 130 MMOL/L
WBC # FLD AUTO: 9.15 K/UL

## 2017-05-01 DIAGNOSIS — Z00.00 ENCOUNTER FOR GENERAL ADULT MEDICAL EXAMINATION W/OUT ABNORMAL FINDINGS: ICD-10-CM

## 2017-05-04 ENCOUNTER — OTHER (OUTPATIENT)
Age: 82
End: 2017-05-04

## 2017-05-12 LAB — GLUCOSE SERPL-MCNC: 174

## 2017-05-18 ENCOUNTER — OTHER (OUTPATIENT)
Age: 82
End: 2017-05-18

## 2017-05-18 DIAGNOSIS — R35.0 FREQUENCY OF MICTURITION: ICD-10-CM

## 2017-05-22 ENCOUNTER — MEDICATION RENEWAL (OUTPATIENT)
Age: 82
End: 2017-05-22

## 2017-05-22 ENCOUNTER — OTHER (OUTPATIENT)
Age: 82
End: 2017-05-22

## 2017-05-22 LAB — GLUCOSE SERPL-MCNC: 117

## 2017-05-24 ENCOUNTER — INPATIENT (INPATIENT)
Facility: HOSPITAL | Age: 82
LOS: 4 days | Discharge: HOME HEALTH | End: 2017-05-29
Attending: INTERNAL MEDICINE | Admitting: INTERNAL MEDICINE
Payer: MEDICARE

## 2017-05-24 VITALS
HEART RATE: 66 BPM | HEIGHT: 64 IN | WEIGHT: 134.92 LBS | SYSTOLIC BLOOD PRESSURE: 110 MMHG | RESPIRATION RATE: 16 BRPM | DIASTOLIC BLOOD PRESSURE: 60 MMHG | OXYGEN SATURATION: 100 % | TEMPERATURE: 98 F

## 2017-05-24 DIAGNOSIS — Z90.49 ACQUIRED ABSENCE OF OTHER SPECIFIED PARTS OF DIGESTIVE TRACT: Chronic | ICD-10-CM

## 2017-05-24 LAB
ADD ON TEST-SPECIMEN IN LAB: SIGNIFICANT CHANGE UP
ALBUMIN SERPL ELPH-MCNC: 3 G/DL — LOW (ref 3.3–5)
ALP SERPL-CCNC: 106 U/L — SIGNIFICANT CHANGE UP (ref 40–120)
ALT FLD-CCNC: 35 U/L — SIGNIFICANT CHANGE UP (ref 12–78)
ANION GAP SERPL CALC-SCNC: 9 MMOL/L — SIGNIFICANT CHANGE UP (ref 5–17)
APPEARANCE UR: CLEAR — SIGNIFICANT CHANGE UP
AST SERPL-CCNC: 39 U/L — HIGH (ref 15–37)
BASOPHILS # BLD AUTO: 0.1 K/UL — SIGNIFICANT CHANGE UP (ref 0–0.2)
BASOPHILS NFR BLD AUTO: 0.6 % — SIGNIFICANT CHANGE UP (ref 0–2)
BILIRUB SERPL-MCNC: 0.9 MG/DL — SIGNIFICANT CHANGE UP (ref 0.2–1.2)
BILIRUB UR-MCNC: NEGATIVE — SIGNIFICANT CHANGE UP
BUN SERPL-MCNC: 13 MG/DL — SIGNIFICANT CHANGE UP (ref 7–23)
CALCIUM SERPL-MCNC: 8.7 MG/DL — SIGNIFICANT CHANGE UP (ref 8.5–10.1)
CHLORIDE SERPL-SCNC: 101 MMOL/L — SIGNIFICANT CHANGE UP (ref 96–108)
CO2 SERPL-SCNC: 27 MMOL/L — SIGNIFICANT CHANGE UP (ref 22–31)
COLOR SPEC: YELLOW — SIGNIFICANT CHANGE UP
CREAT SERPL-MCNC: 0.72 MG/DL — SIGNIFICANT CHANGE UP (ref 0.5–1.3)
DIFF PNL FLD: NEGATIVE — SIGNIFICANT CHANGE UP
ELLIPTOCYTES BLD QL SMEAR: SLIGHT — SIGNIFICANT CHANGE UP
EOSINOPHIL # BLD AUTO: 0.2 K/UL — SIGNIFICANT CHANGE UP (ref 0–0.5)
EOSINOPHIL NFR BLD AUTO: 2 % — SIGNIFICANT CHANGE UP (ref 0–6)
GLUCOSE SERPL-MCNC: 123 MG/DL — HIGH (ref 70–99)
GLUCOSE UR QL: NEGATIVE MG/DL — SIGNIFICANT CHANGE UP
HCT VFR BLD CALC: 34.9 % — SIGNIFICANT CHANGE UP (ref 34.5–45)
HGB BLD-MCNC: 11.4 G/DL — LOW (ref 11.5–15.5)
KETONES UR-MCNC: NEGATIVE — SIGNIFICANT CHANGE UP
LACTATE SERPL-SCNC: 1.8 MMOL/L — SIGNIFICANT CHANGE UP (ref 0.7–2)
LEUKOCYTE ESTERASE UR-ACNC: NEGATIVE — SIGNIFICANT CHANGE UP
LIDOCAIN IGE QN: 1420 U/L — HIGH (ref 73–393)
LYMPHOCYTES # BLD AUTO: 1.2 K/UL — SIGNIFICANT CHANGE UP (ref 1–3.3)
LYMPHOCYTES # BLD AUTO: 12.2 % — LOW (ref 13–44)
MANUAL DIF COMMENT BLD-IMP: SIGNIFICANT CHANGE UP
MCHC RBC-ENTMCNC: 30.5 PG — SIGNIFICANT CHANGE UP (ref 27–34)
MCHC RBC-ENTMCNC: 32.8 GM/DL — SIGNIFICANT CHANGE UP (ref 32–36)
MCV RBC AUTO: 93.1 FL — SIGNIFICANT CHANGE UP (ref 80–100)
MONOCYTES # BLD AUTO: 1.3 K/UL — HIGH (ref 0–0.9)
MONOCYTES NFR BLD AUTO: 13 % — SIGNIFICANT CHANGE UP (ref 2–14)
NEUTROPHILS # BLD AUTO: 7 K/UL — SIGNIFICANT CHANGE UP (ref 1.8–7.4)
NEUTROPHILS NFR BLD AUTO: 72.1 % — SIGNIFICANT CHANGE UP (ref 43–77)
NITRITE UR-MCNC: NEGATIVE — SIGNIFICANT CHANGE UP
PH UR: 8 — SIGNIFICANT CHANGE UP (ref 5–8)
PLAT MORPH BLD: NORMAL — SIGNIFICANT CHANGE UP
PLATELET # BLD AUTO: 158 K/UL — SIGNIFICANT CHANGE UP (ref 150–400)
POIKILOCYTOSIS BLD QL AUTO: SLIGHT — SIGNIFICANT CHANGE UP
POTASSIUM SERPL-MCNC: 3.4 MMOL/L — LOW (ref 3.5–5.3)
POTASSIUM SERPL-SCNC: 3.4 MMOL/L — LOW (ref 3.5–5.3)
PROT SERPL-MCNC: 7.4 GM/DL — SIGNIFICANT CHANGE UP (ref 6–8.3)
PROT UR-MCNC: NEGATIVE MG/DL — SIGNIFICANT CHANGE UP
RBC # BLD: 3.75 M/UL — LOW (ref 3.8–5.2)
RBC # FLD: 14.6 % — HIGH (ref 10.3–14.5)
RBC BLD AUTO: SIGNIFICANT CHANGE UP
SODIUM SERPL-SCNC: 137 MMOL/L — SIGNIFICANT CHANGE UP (ref 135–145)
SP GR SPEC: 1.01 — SIGNIFICANT CHANGE UP (ref 1.01–1.02)
UROBILINOGEN FLD QL: NEGATIVE MG/DL — SIGNIFICANT CHANGE UP
WBC # BLD: 9.7 K/UL — SIGNIFICANT CHANGE UP (ref 3.8–10.5)
WBC # FLD AUTO: 9.7 K/UL — SIGNIFICANT CHANGE UP (ref 3.8–10.5)

## 2017-05-24 PROCEDURE — 99285 EMERGENCY DEPT VISIT HI MDM: CPT

## 2017-05-24 PROCEDURE — 93010 ELECTROCARDIOGRAM REPORT: CPT

## 2017-05-24 PROCEDURE — 74177 CT ABD & PELVIS W/CONTRAST: CPT | Mod: 26

## 2017-05-24 RX ORDER — CIPROFLOXACIN LACTATE 400MG/40ML
400 VIAL (ML) INTRAVENOUS EVERY 12 HOURS
Qty: 0 | Refills: 0 | Status: DISCONTINUED | OUTPATIENT
Start: 2017-05-25 | End: 2017-05-28

## 2017-05-24 RX ORDER — SODIUM CHLORIDE 9 MG/ML
1000 INJECTION INTRAMUSCULAR; INTRAVENOUS; SUBCUTANEOUS
Qty: 0 | Refills: 0 | Status: DISCONTINUED | OUTPATIENT
Start: 2017-05-24 | End: 2017-05-24

## 2017-05-24 RX ORDER — INSULIN LISPRO 100/ML
VIAL (ML) SUBCUTANEOUS EVERY 6 HOURS
Qty: 0 | Refills: 0 | Status: DISCONTINUED | OUTPATIENT
Start: 2017-05-24 | End: 2017-05-28

## 2017-05-24 RX ORDER — ESCITALOPRAM OXALATE 10 MG/1
10 TABLET, FILM COATED ORAL DAILY
Qty: 0 | Refills: 0 | Status: DISCONTINUED | OUTPATIENT
Start: 2017-05-24 | End: 2017-05-29

## 2017-05-24 RX ORDER — CIPROFLOXACIN LACTATE 400MG/40ML
VIAL (ML) INTRAVENOUS
Qty: 0 | Refills: 0 | Status: DISCONTINUED | OUTPATIENT
Start: 2017-05-24 | End: 2017-05-28

## 2017-05-24 RX ORDER — METOPROLOL TARTRATE 50 MG
25 TABLET ORAL
Qty: 0 | Refills: 0 | Status: DISCONTINUED | OUTPATIENT
Start: 2017-05-24 | End: 2017-05-29

## 2017-05-24 RX ORDER — ALPRAZOLAM 0.25 MG
0.25 TABLET ORAL ONCE
Qty: 0 | Refills: 0 | Status: DISCONTINUED | OUTPATIENT
Start: 2017-05-24 | End: 2017-05-24

## 2017-05-24 RX ORDER — MORPHINE SULFATE 50 MG/1
1 CAPSULE, EXTENDED RELEASE ORAL EVERY 4 HOURS
Qty: 0 | Refills: 0 | Status: DISCONTINUED | OUTPATIENT
Start: 2017-05-24 | End: 2017-05-29

## 2017-05-24 RX ORDER — ACETAMINOPHEN 500 MG
650 TABLET ORAL EVERY 6 HOURS
Qty: 0 | Refills: 0 | Status: DISCONTINUED | OUTPATIENT
Start: 2017-05-24 | End: 2017-05-29

## 2017-05-24 RX ORDER — GLUCAGON INJECTION, SOLUTION 0.5 MG/.1ML
1 INJECTION, SOLUTION SUBCUTANEOUS ONCE
Qty: 0 | Refills: 0 | Status: DISCONTINUED | OUTPATIENT
Start: 2017-05-24 | End: 2017-05-28

## 2017-05-24 RX ORDER — DEXTROSE 50 % IN WATER 50 %
12.5 SYRINGE (ML) INTRAVENOUS ONCE
Qty: 0 | Refills: 0 | Status: DISCONTINUED | OUTPATIENT
Start: 2017-05-24 | End: 2017-05-29

## 2017-05-24 RX ORDER — APIXABAN 2.5 MG/1
5 TABLET, FILM COATED ORAL
Qty: 0 | Refills: 0 | Status: DISCONTINUED | OUTPATIENT
Start: 2017-05-24 | End: 2017-05-29

## 2017-05-24 RX ORDER — DEXTROSE 50 % IN WATER 50 %
1 SYRINGE (ML) INTRAVENOUS ONCE
Qty: 0 | Refills: 0 | Status: DISCONTINUED | OUTPATIENT
Start: 2017-05-24 | End: 2017-05-29

## 2017-05-24 RX ORDER — POTASSIUM CHLORIDE 20 MEQ
40 PACKET (EA) ORAL ONCE
Qty: 0 | Refills: 0 | Status: COMPLETED | OUTPATIENT
Start: 2017-05-24 | End: 2017-05-24

## 2017-05-24 RX ORDER — SIMVASTATIN 20 MG/1
40 TABLET, FILM COATED ORAL AT BEDTIME
Qty: 0 | Refills: 0 | Status: DISCONTINUED | OUTPATIENT
Start: 2017-05-24 | End: 2017-05-29

## 2017-05-24 RX ORDER — CIPROFLOXACIN LACTATE 400MG/40ML
400 VIAL (ML) INTRAVENOUS ONCE
Qty: 0 | Refills: 0 | Status: COMPLETED | OUTPATIENT
Start: 2017-05-24 | End: 2017-05-24

## 2017-05-24 RX ORDER — SODIUM CHLORIDE 9 MG/ML
3 INJECTION INTRAMUSCULAR; INTRAVENOUS; SUBCUTANEOUS ONCE
Qty: 0 | Refills: 0 | Status: COMPLETED | OUTPATIENT
Start: 2017-05-24 | End: 2017-05-24

## 2017-05-24 RX ORDER — DEXTROSE 50 % IN WATER 50 %
25 SYRINGE (ML) INTRAVENOUS ONCE
Qty: 0 | Refills: 0 | Status: DISCONTINUED | OUTPATIENT
Start: 2017-05-24 | End: 2017-05-28

## 2017-05-24 RX ORDER — FUROSEMIDE 40 MG
40 TABLET ORAL ONCE
Qty: 0 | Refills: 0 | Status: COMPLETED | OUTPATIENT
Start: 2017-05-24 | End: 2017-05-24

## 2017-05-24 RX ORDER — PANTOPRAZOLE SODIUM 20 MG/1
40 TABLET, DELAYED RELEASE ORAL EVERY 12 HOURS
Qty: 0 | Refills: 0 | Status: DISCONTINUED | OUTPATIENT
Start: 2017-05-24 | End: 2017-05-29

## 2017-05-24 RX ORDER — METRONIDAZOLE 500 MG
500 TABLET ORAL ONCE
Qty: 0 | Refills: 0 | Status: COMPLETED | OUTPATIENT
Start: 2017-05-24 | End: 2017-05-24

## 2017-05-24 RX ORDER — UBIDECARENONE 100 MG
1 CAPSULE ORAL
Qty: 0 | Refills: 0 | COMMUNITY

## 2017-05-24 RX ORDER — ONDANSETRON 8 MG/1
4 TABLET, FILM COATED ORAL EVERY 6 HOURS
Qty: 0 | Refills: 0 | Status: DISCONTINUED | OUTPATIENT
Start: 2017-05-24 | End: 2017-05-29

## 2017-05-24 RX ORDER — SODIUM CHLORIDE 9 MG/ML
1000 INJECTION INTRAMUSCULAR; INTRAVENOUS; SUBCUTANEOUS
Qty: 0 | Refills: 0 | Status: DISCONTINUED | OUTPATIENT
Start: 2017-05-24 | End: 2017-05-25

## 2017-05-24 RX ORDER — METRONIDAZOLE 500 MG
500 TABLET ORAL EVERY 8 HOURS
Qty: 0 | Refills: 0 | Status: DISCONTINUED | OUTPATIENT
Start: 2017-05-24 | End: 2017-05-28

## 2017-05-24 RX ORDER — SODIUM CHLORIDE 9 MG/ML
1000 INJECTION, SOLUTION INTRAVENOUS
Qty: 0 | Refills: 0 | Status: DISCONTINUED | OUTPATIENT
Start: 2017-05-24 | End: 2017-05-28

## 2017-05-24 RX ORDER — SODIUM CHLORIDE 9 MG/ML
1000 INJECTION INTRAMUSCULAR; INTRAVENOUS; SUBCUTANEOUS ONCE
Qty: 0 | Refills: 0 | Status: COMPLETED | OUTPATIENT
Start: 2017-05-24 | End: 2017-05-24

## 2017-05-24 RX ORDER — METRONIDAZOLE 500 MG
TABLET ORAL
Qty: 0 | Refills: 0 | Status: DISCONTINUED | OUTPATIENT
Start: 2017-05-24 | End: 2017-05-28

## 2017-05-24 RX ADMIN — Medication 40 MILLIGRAM(S): at 15:32

## 2017-05-24 RX ADMIN — Medication 25 MILLIGRAM(S): at 18:01

## 2017-05-24 RX ADMIN — Medication 40 MILLIEQUIVALENT(S): at 18:00

## 2017-05-24 RX ADMIN — SODIUM CHLORIDE 1000 MILLILITER(S): 9 INJECTION INTRAMUSCULAR; INTRAVENOUS; SUBCUTANEOUS at 12:09

## 2017-05-24 RX ADMIN — Medication 100 MILLIGRAM(S): at 19:37

## 2017-05-24 RX ADMIN — Medication 200 MILLIGRAM(S): at 19:38

## 2017-05-24 RX ADMIN — Medication 0.25 MILLIGRAM(S): at 16:08

## 2017-05-24 RX ADMIN — APIXABAN 5 MILLIGRAM(S): 2.5 TABLET, FILM COATED ORAL at 18:01

## 2017-05-24 RX ADMIN — SIMVASTATIN 40 MILLIGRAM(S): 20 TABLET, FILM COATED ORAL at 22:16

## 2017-05-24 RX ADMIN — PANTOPRAZOLE SODIUM 40 MILLIGRAM(S): 20 TABLET, DELAYED RELEASE ORAL at 18:00

## 2017-05-24 RX ADMIN — SODIUM CHLORIDE 100 MILLILITER(S): 9 INJECTION INTRAMUSCULAR; INTRAVENOUS; SUBCUTANEOUS at 17:48

## 2017-05-24 RX ADMIN — Medication 20 MILLIGRAM(S): at 19:38

## 2017-05-24 RX ADMIN — SODIUM CHLORIDE 3 MILLILITER(S): 9 INJECTION INTRAMUSCULAR; INTRAVENOUS; SUBCUTANEOUS at 12:06

## 2017-05-24 RX ADMIN — Medication 100 MILLIGRAM(S): at 22:16

## 2017-05-24 NOTE — CONSULT NOTE ADULT - ASSESSMENT
Imp:  Presentation would be c/w pancreatitis based on moderately elevated lipase and location of pain but CT much more c/w diverticulitis at the hepatic flexure    Rec:  NPO  IV fluids  Abx - cipro and flagyl  Cont Apixaban for A-fib  Potentially start clears as pain subsides

## 2017-05-24 NOTE — ED ADULT NURSE REASSESSMENT NOTE - COMFORT CARE
wait time explained
plan of care explained/wait time explained/warm blanket provided/assisted to bathroom

## 2017-05-24 NOTE — ED PROVIDER NOTE - DETAILS:
I, Saeed Hi, performed the initial face to face bedside interview with this patient regarding history of present illness, review of symptoms and relevant past medical, social and family history.  I completed an independent physical examination.  I was the initial provider who evaluated this patient.  The history, relevant review of systems, past medical and surgical history, medical decision making, and physical examination was documented by the scribe in my presence and I attest to the accuracy of the documentation.

## 2017-05-24 NOTE — H&P ADULT - NSHPPHYSICALEXAM_GEN_ALL_CORE
HEENT:  pupils equal and reactive, EOMI, no oropharyngeal lesions, erythema, exudates, oral thrush    NECK:   supple, no carotid bruits, no palpable lymph nodes, no thyromegaly    CV:  +S1, +S2, regular, no murmurs or rubs    RESP:  lungs with bilateral rales at the bases, no wheezing or rhonchi    BREAST:  not examined    GI:  abdomen soft, + tenderness in the epigastric area, RUQ and right flank, non-distended, normal BS, no bruits, no abdominal masses, no palpable masses, + abdominal scars    RECTAL:  not examined    :  not examined    MSK:   normal muscle tone, no atrophy, no rigidity, no contractions    EXT:   1-2+ LE edema, + varicose veins, no cyanosis or clubbing    VASCULAR:  pulses equal and symmetric in the upper and lower extremities    NEURO:  AAOX3, no focal neurological deficits, follows all commands, able to move extremities spontaneously    SKIN:  no ulcers, lesions, rashes, no vesicles or rash over the RUQ or right flank area HEENT:  pupils equal and reactive, EOMI, no oropharyngeal lesions, erythema, exudates, oral thrush    NECK:   supple, no carotid bruits, no palpable lymph nodes, no thyromegaly    CV:  +S1, +S2, irregular, no murmurs or rubs    RESP:  lungs with bilateral rales at the bases, no wheezing or rhonchi    BREAST:  not examined    GI:  abdomen soft, + tenderness in the epigastric area, RUQ and right flank, non-distended, normal BS, no bruits, no abdominal masses, no palpable masses, + abdominal scars    RECTAL:  not examined    :  not examined    MSK:   normal muscle tone, no atrophy, no rigidity, no contractions    EXT:   1-2+ LE edema, + varicose veins, no cyanosis or clubbing    VASCULAR:  pulses equal and symmetric in the upper and lower extremities    NEURO:  AAOX3, no focal neurological deficits, follows all commands, able to move extremities spontaneously    SKIN:  no ulcers, lesions, rashes, no vesicles or rash over the RUQ or right flank area

## 2017-05-24 NOTE — ED ADULT NURSE NOTE - OBJECTIVE STATEMENT
Pt is a 89y female, A & O x 3, VSS, presents to ED w/ lower right abdominal pain, sx's began yesterday, pt denies nausea, vomiting, chest pain, SOB, fever, chills, diarrhea. Pt states last BM this morning, normal, + bilateral pitting edema lower extremities, pt in no apparent distress, will continue to monitor, bed rails up.

## 2017-05-24 NOTE — H&P ADULT - NSHPLABSRESULTS_GEN_ALL_CORE
11.4   9.7   )-----------( 158      ( 24 May 2017 11:39 )             34.9         137  |  101  |  13  ----------------------------<  123<H>  3.4<L>   |  27  |  0.72    Ca    8.7      24 May 2017 11:39    TPro  7.4  /  Alb  3.0<L>  /  TBili  0.9  /  DBili  x   /  AST  39<H>  /  ALT  35  /  AlkPhos  106      LIVER FUNCTIONS - ( 24 May 2017 11:39 )  Alb: 3.0 g/dL / Pro: 7.4 gm/dL / ALK PHOS: 106 U/L / ALT: 35 U/L / AST: 39 U/L / GGT: x           Urinalysis Basic - ( 24 May 2017 11:39 )    Color: Yellow / Appearance: Clear / S.010 / pH: x  Gluc: x / Ketone: Negative  / Bili: Negative / Urobili: Negative mg/dL   Blood: x / Protein: Negative mg/dL / Nitrite: Negative   Leuk Esterase: Negative / RBC: x / WBC x   Sq Epi: x / Non Sq Epi: x / Bacteria: x    Lactate, Blood: 1.8 mmol/L ( @ 11:39)    Blood, Urine: Negative ( @ 11:39)    Lipase - 1492    EKG -  Atrial Fibrillation at 67 bpm, LAD, IVCD, no acute ischemic changes, no change from prior EKG    RADIOLOGY:    CT A/P:  IMPRESSION:     Mild inflammatory stranding and fluid in the right upper quadrant   centered within the gallbladder fossa may be related to mild right-sided   diverticulitis versus biliary pathology. No extraluminal air or drainable   fluid collection. Correlation with LFTs is advised.    Cirrhotic morphology of the liver.

## 2017-05-24 NOTE — H&P ADULT - HISTORY OF PRESENT ILLNESS
89 F with Hx of CAD, s/p stent, Chronic Atrial Fibrillation on A/C with Eliquis, Chronic Diastolic CHF, Type 2 Diabetes Mellitus presents to the ED with the sudden onset of the epigastric pain in which started last evening.  The patient radiated to the lower abdominal quadrants and then radiated to the right upper quadrant.  Patient has a prior Hx of cholecystectomy.  No prior symptoms like this in the past.  No recent travel.  No associated nausea, vomiting, diarrhea.  No constipation.  No fevers or chills.  Pain worsened and she came to the ED.  In the ED, treated with IVFs.  Initial labs were essentially unremarkable except for a Lipase of 1492.  CT A/P was done which showed some mild inflammatory changes in the gallbladder fossa, no fluid collection and no radiographic evidence of acute pancreatitis.  There was a concern for possible right sided diverticulitis.  No recent trauma or injury to the right side.  She fell about 6 weeks ago and sustained a gluteal hematoma which is stable on CT.  She also has pleuritic pain on the same side and has difficulty laying flat due to the pain.    PAST MEDICAL HISTORY:  Chronic A-fib on A/C with Eliquis  HTN  GERD  Osteoarthritis  Type 2 Diabetes Mellitus  Hyperlipidemia  Pulmonary HTN  Chronic Diastolic CHF, last ECHO was in 2016 with EF 55-60%  CAD, s/p BMS to LAD 2011  Hx of Gluteal Hematoma after a fall  Varicose Veins    PAST SURGICAL HISTORY:  s/p appendectomy  s/p cholecystectomy  s/p left knee surgery  s/p hysterectomy  s/p TAVR    FAMILY HISTORY:   non-contributory to the patient's current presentation 89 F with Hx of CAD, s/p stent, Chronic Atrial Fibrillation on A/C with Eliquis, Chronic Diastolic CHF, Type 2 Diabetes Mellitus presents to the ED with the sudden onset of the epigastric pain in which started last evening.  The patient radiated to the lower abdominal quadrants and then radiated to the right upper quadrant.  Patient has a prior Hx of cholecystectomy.  No prior symptoms like this in the past.  No recent travel.  No associated nausea, vomiting, diarrhea.  No constipation.  No fevers or chills.  Pain worsened and she came to the ED.  In the ED, treated with IVFs.  Initial labs were essentially unremarkable except for a Lipase of 1492.  CT A/P was done which showed some mild inflammatory changes in the gallbladder fossa, no fluid collection and no radiographic evidence of acute pancreatitis.  There was a concern for possible right sided diverticulitis.  No recent trauma or injury to the right side.  She fell about 6 weeks ago and sustained a gluteal hematoma which is stable on CT.  She also has pleuritic pain on the same side and has difficulty laying flat due to the pain.  Pain also radiated to the right shoulder this morning.    PAST MEDICAL HISTORY:  Chronic A-fib on A/C with Eliquis  HTN  GERD  Osteoarthritis  Type 2 Diabetes Mellitus  Hyperlipidemia  Pulmonary HTN  Chronic Diastolic CHF, last ECHO was in 2016 with EF 55-60%  CAD, s/p BMS to LAD 2011  Hx of Gluteal Hematoma after a fall  Varicose Veins    PAST SURGICAL HISTORY:  s/p appendectomy  s/p cholecystectomy  s/p left knee surgery  s/p hysterectomy  s/p TAVR    FAMILY HISTORY:   non-contributory to the patient's current presentation

## 2017-05-24 NOTE — ED PROVIDER NOTE - OBJECTIVE STATEMENT
88 y/o M with PMHx of Afib, CAD, DM, HLD, GERD, HTN, stent was BIBA for diffuse abd pain x1 day. Last BM was normal this morning. Pt states she has trouble breathing secondary to pain. No fever, N/V/D, HA, CP, diaphoresis, BRBPR, constipation. Allergic to penicillin and sulfur.

## 2017-05-24 NOTE — ED PROVIDER NOTE - MUSCULOSKELETAL, MLM
Spine appears normal, range of motion is not limited, no muscle or joint tenderness. B/l LE pitting edema.

## 2017-05-24 NOTE — ED PROVIDER NOTE - NS ED MD SCRIBE ATTENDING SCRIBE SECTIONS
REVIEW OF SYSTEMS/PHYSICAL EXAM/DISPOSITION/PAST MEDICAL/SURGICAL/SOCIAL HISTORY/HISTORY OF PRESENT ILLNESS

## 2017-05-24 NOTE — ED PROVIDER NOTE - PSH
H/O: Hysterectomy    H/O: Knee Surgery- right meniscus    History of appendectomy    History of cholecystectomy

## 2017-05-24 NOTE — ED ADULT NURSE NOTE - CHPI ED SYMPTOMS NEG
no diarrhea/no blood in stool/no nausea/no vomiting/no chills/no hematuria/no dysuria/no fever/no burning urination

## 2017-05-24 NOTE — CONSULT NOTE ADULT - SUBJECTIVE AND OBJECTIVE BOX
HPI:  89 F with Hx of CAD, s/p stent, Chronic Atrial Fibrillation on A/C with Eliquis, Chronic Diastolic CHF, Type 2 Diabetes Mellitus presents to the ED with the sudden onset of the epigastric pain in which started last evening.  The patient radiated to the lower abdominal quadrants and then radiated to the right upper quadrant.  Patient has a prior Hx of cholecystectomy.  No prior symptoms like this in the past.  No recent travel.  No associated nausea, vomiting, diarrhea.  No constipation.  No fevers or chills.  Pain worsened and she came to the ED.  In the ED, treated with IVFs.  Initial labs were essentially unremarkable except for a Lipase of 1492.  CT A/P was done which showed some mild inflammatory changes in the gallbladder fossa, no fluid collection and no radiographic evidence of acute pancreatitis.  There was a concern for possible right sided diverticulitis.  No recent trauma or injury to the right side.    PAST MEDICAL HISTORY:  Chronic A-fib on A/C with Eliquis  HTN  GERD  Osteoarthritis  Type 2 Diabetes Mellitus  Hyperlipidemia  Pulmonary HTN  Chronic Diastolic CHF, last ECHO was in 2016 with EF 55-60%  CAD, s/p BMS to LAD 2011  Hx of Gluteal Hematoma after a fall  Varicose Veins    PAST SURGICAL HISTORY:  s/p appendectomy  s/p cholecystectomy  s/p left knee surgery  s/p hysterectomy  s/p TAVR    FAMILY HISTORY:   non-contributory to the patient's current presentation (24 May 2017 15:27)      PAST MEDICAL & SURGICAL HISTORY:  Afib  CAD (coronary artery disease)  Hypertension  History of Osteoarthritis  GERD (Gastroesophageal Reflux Disease)  Dyslipidemia  Diabetes Mellitus Type II  Chronic Atrial Fibrillation  History of appendectomy  History of cholecystectomy  H/O: Knee Surgery- right meniscus  H/O: Hysterectomy      MEDICATIONS  (STANDING):  sildenafil (REVATIO) 20milliGRAM(s) Oral two times a day  apixaban 5milliGRAM(s) Oral two times a day  escitalopram 10milliGRAM(s) Oral daily  simvastatin 40milliGRAM(s) Oral at bedtime  metoprolol succinate ER 25milliGRAM(s) Oral two times a day  insulin lispro (HumaLOG) corrective regimen sliding scale  SubCutaneous every 6 hours  dextrose 5%. 1000milliLiter(s) IV Continuous <Continuous>  dextrose 50% Injectable 12.5Gram(s) IV Push once  dextrose 50% Injectable 25Gram(s) IV Push once  dextrose 50% Injectable 25Gram(s) IV Push once  pantoprazole  Injectable 40milliGRAM(s) IV Push every 12 hours  potassium chloride    Tablet ER 40milliEquivalent(s) Oral once  sodium chloride 0.9%. 1000milliLiter(s) IV Continuous <Continuous>  metroNIDAZOLE  IVPB  IV Intermittent   ciprofloxacin   IVPB  IV Intermittent   ciprofloxacin   IVPB 400milliGRAM(s) IV Intermittent once  metroNIDAZOLE  IVPB 500milliGRAM(s) IV Intermittent once  metroNIDAZOLE  IVPB 500milliGRAM(s) IV Intermittent every 8 hours    MEDICATIONS  (PRN):  acetaminophen   Tablet 650milliGRAM(s) Oral every 6 hours PRN For Temp greater than 38 C (100.4 F)  ondansetron Injectable 4milliGRAM(s) IV Push every 6 hours PRN Nausea and/or Vomiting  dextrose Gel 1Dose(s) Oral once PRN Blood Glucose LESS THAN 70 milliGRAM(s)/deciliter  glucagon  Injectable 1milliGRAM(s) IntraMuscular once PRN Glucose LESS THAN 70 milligrams/deciliter  morphine  - Injectable 1milliGRAM(s) IV Push every 4 hours PRN Moderate Pain (4 - 6)      Allergies    lactose (Other)  penicillin (Rash)  penicillins (Other)  sulfa drugs (Rash; Other)      ROS  As above  Otherwise unremarkable    Vital Signs Last 24 Hrs  T(C): 36.4, Max: 36.8 (05-24 @ 10:53)  T(F): 97.6, Max: 98.3 (05-24 @ 10:53)  HR: 72 (65 - 72)  BP: 134/69 (110/60 - 134/69)  BP(mean): --  RR: 18 (16 - 18)  SpO2: 99% (96% - 100%)    Constitutional: NAD, well-developed  Respiratory: CTAB  Cardiovascular: irregular  Gastrointestinal: BS+, soft, mod epig-RUQ tenderness, no G/R  Extremities: No peripheral edema  Psychiatric: Normal mood, normal affect  Skin: No rashes    LABS:                        11.4   9.7   )-----------( 158      ( 24 May 2017 11:39 )             34.9     05-24    137  |  101  |  13  ----------------------------<  123<H>  3.4<L>   |  27  |  0.72    Ca    8.7      24 May 2017 11:39    TPro  7.4  /  Alb  3.0<L>  /  TBili  0.9  /  DBili  x   /  AST  39<H>  /  ALT  35  /  AlkPhos  106  05-24      LIVER FUNCTIONS - ( 24 May 2017 11:39 )  Alb: 3.0 g/dL / Pro: 7.4 gm/dL / ALK PHOS: 106 U/L / ALT: 35 U/L / AST: 39 U/L / GGT: x

## 2017-05-24 NOTE — H&P ADULT - ASSESSMENT
ACUTE EPIGASTRIC PAIN, RUQ PAIN AND RIGHT UPPER BACK PAIN WITHOUT NAUSEA OR VOMITING ASSOCIATED WITH PLEURITIC PAIN BUT NO BACK PAIN.  LIPASE IS ELEVATED SUGGESTED OF ACUTE PANCREATITIS, HOWEVER NO RADIOGRAPHIC EVIDENCE FOR PANCREATITIS.  DIFFERENTIAL INCLUDES POSSIBLE PUD, POSSIBLE RIGHT SIDED DIVERTICULITIS.  LOW SUSPICION FOR ACUTE PE ON ELIQUIS, NO EVIDENCE OF RASH TO SUGGEST SHINGLES, R/O CBD SLUDGE/STONES DESPITE CHOLECYSTECTOMY ALTHOUGH LIVER ENZYMES ARE NORMAL.  R/O OTHER CAUSES OF ACUTE PANCREATITIS SUCH AS HYPERTRIGLYCERIDEMIA, OR MEDICATION-INDUCED (JANUVIA).  ACUTE GI PATHOLOGY CAN RESULT IN MILDLY ELEVATED LIPASE LEVELS BUT USUALLY NOT TO THE EXTENT OF THE LEVEL UPON ADMISSION    CHRONIC DIASTOLIC CHF    TYPE 2 DIABETES MELLITUS    PULMONARY HTN    CAD, S/P BMS STENT    S/P TAVR    CHRONIC ATRIAL FIBRILLATION    HYPOKALEMIA      PLAN:  -  admit to inpatient, med-surg, hospitalist service  -  IVFs, NPO except meds, pain control  -  repeat labs in am  -  check MRCP  -  check triglyceride levels  -  check sugars q6 hours, Humalog SSI  -  d/c Metformin and Januvia  -  DVT prophylaxis - on venodynes and Eliquis  -  GI consult - Dr. Mobley  -  IV PPI  -  Review CT results with radiology  -  may consider initiation of IV ABXs after review   -  replete potassium  -  Full Code    d/w patient, Dr. Hernandez, Dr. Camacho, Dr. Mojica and patient's family at the bedside ACUTE EPIGASTRIC PAIN, RUQ PAIN AND RIGHT UPPER BACK PAIN WITHOUT NAUSEA OR VOMITING ASSOCIATED WITH PLEURITIC PAIN BUT NO BACK PAIN.  LIPASE IS ELEVATED SUGGESTIVE OF ACUTE PANCREATITIS, HOWEVER NO RADIOGRAPHIC EVIDENCE FOR PANCREATITIS.  CT DOES SHOW INFLAMMATORY CHANGES IN THE GALLBLADDER FOSSA RAISING THE CONCERN FOR POSSIBLE RIGHT SIDED DIVERTICULITIS.  DIFFERENTIAL ALSO INCLUDES POSSIBLE PUD, POSSIBLE CBD SLUDGE/STONES DESPITE CHOLECYSTECTOMY, ALTHOUGH LIVER ENZYMES ARE NORMAL.  LOW SUSPICION FOR ACUTE PE ON ELIQUIS, NO EVIDENCE OF RASH TO SUGGEST SHINGLES.  R/O OTHER CAUSES OF ACUTE PANCREATITIS SUCH AS HYPERTRIGLYCERIDEMIA, OR MEDICATION-INDUCED (JANUVIA).  ACUTE GI PATHOLOGY CAN RESULT IN MILDLY ELEVATED LIPASE LEVELS BUT USUALLY NOT TO THE EXTENT OF THE LEVEL SEEN HERE ON ADMISSION.    CHRONIC DIASTOLIC CHF    TYPE 2 DIABETES MELLITUS    PULMONARY HTN    CAD, S/P BMS STENT    S/P TAVR    CHRONIC ATRIAL FIBRILLATION    HYPOKALEMIA      PLAN:  -  admit to inpatient, med-surg, hospitalist service  -  IVFs, NPO except meds, pain control  -  repeat labs in am  -  check MRCP  -  check triglyceride levels  -  check sugars q6 hours, Humalog SSI  -  d/c Metformin and Januvia  -  DVT prophylaxis - on venodynes and Eliquis  -  GI consult - Dr. Mobley  -  IV PPI  -  review CT imaging with radiology  -  will start empiric ABXs to cover diverticulitis with Cipro and Flagyl (has PCN and sulfa allergies)  -  replete potassium  -  Full Code    d/w patient, Dr. Hernandez, Dr. Camacho, Dr. Mojica and patient's family at the bedside

## 2017-05-25 LAB
ALBUMIN SERPL ELPH-MCNC: 2.6 G/DL — LOW (ref 3.3–5)
ALP SERPL-CCNC: 122 U/L — HIGH (ref 40–120)
ALT FLD-CCNC: 33 U/L — SIGNIFICANT CHANGE UP (ref 12–78)
AMYLASE P1 CFR SERPL: 256 U/L — HIGH (ref 25–115)
ANION GAP SERPL CALC-SCNC: 7 MMOL/L — SIGNIFICANT CHANGE UP (ref 5–17)
AST SERPL-CCNC: 42 U/L — HIGH (ref 15–37)
BILIRUB DIRECT SERPL-MCNC: 0.4 MG/DL — HIGH (ref 0–0.2)
BILIRUB INDIRECT FLD-MCNC: 0.4 MG/DL — SIGNIFICANT CHANGE UP (ref 0.2–1)
BILIRUB SERPL-MCNC: 0.8 MG/DL — SIGNIFICANT CHANGE UP (ref 0.2–1.2)
BUN SERPL-MCNC: 10 MG/DL — SIGNIFICANT CHANGE UP (ref 7–23)
CALCIUM SERPL-MCNC: 8.3 MG/DL — LOW (ref 8.5–10.1)
CHLORIDE SERPL-SCNC: 106 MMOL/L — SIGNIFICANT CHANGE UP (ref 96–108)
CO2 SERPL-SCNC: 27 MMOL/L — SIGNIFICANT CHANGE UP (ref 22–31)
CREAT SERPL-MCNC: 0.6 MG/DL — SIGNIFICANT CHANGE UP (ref 0.5–1.3)
CULTURE RESULTS: NO GROWTH — SIGNIFICANT CHANGE UP
GLUCOSE SERPL-MCNC: 128 MG/DL — HIGH (ref 70–99)
HBA1C BLD-MCNC: 6.1 % — HIGH (ref 4–5.6)
HCT VFR BLD CALC: 32.9 % — LOW (ref 34.5–45)
HGB BLD-MCNC: 11 G/DL — LOW (ref 11.5–15.5)
LIDOCAIN IGE QN: 2632 U/L — HIGH (ref 73–393)
MAGNESIUM SERPL-MCNC: 1.9 MG/DL — SIGNIFICANT CHANGE UP (ref 1.6–2.6)
MCHC RBC-ENTMCNC: 31.4 PG — SIGNIFICANT CHANGE UP (ref 27–34)
MCHC RBC-ENTMCNC: 33.4 GM/DL — SIGNIFICANT CHANGE UP (ref 32–36)
MCV RBC AUTO: 94.1 FL — SIGNIFICANT CHANGE UP (ref 80–100)
PHOSPHATE SERPL-MCNC: 2.9 MG/DL — SIGNIFICANT CHANGE UP (ref 2.5–4.5)
PLATELET # BLD AUTO: 124 K/UL — LOW (ref 150–400)
POTASSIUM SERPL-MCNC: 4 MMOL/L — SIGNIFICANT CHANGE UP (ref 3.5–5.3)
POTASSIUM SERPL-SCNC: 4 MMOL/L — SIGNIFICANT CHANGE UP (ref 3.5–5.3)
PROT SERPL-MCNC: 6.7 GM/DL — SIGNIFICANT CHANGE UP (ref 6–8.3)
RBC # BLD: 3.49 M/UL — LOW (ref 3.8–5.2)
RBC # FLD: 14.8 % — HIGH (ref 10.3–14.5)
SODIUM SERPL-SCNC: 140 MMOL/L — SIGNIFICANT CHANGE UP (ref 135–145)
SPECIMEN SOURCE: SIGNIFICANT CHANGE UP
TRIGL SERPL-MCNC: 55 MG/DL — SIGNIFICANT CHANGE UP (ref 10–149)
WBC # BLD: 6.7 K/UL — SIGNIFICANT CHANGE UP (ref 3.8–10.5)
WBC # FLD AUTO: 6.7 K/UL — SIGNIFICANT CHANGE UP (ref 3.8–10.5)

## 2017-05-25 RX ORDER — SODIUM CHLORIDE 0.65 %
1 AEROSOL, SPRAY (ML) NASAL THREE TIMES A DAY
Qty: 0 | Refills: 0 | Status: DISCONTINUED | OUTPATIENT
Start: 2017-05-25 | End: 2017-05-29

## 2017-05-25 RX ORDER — SODIUM CHLORIDE 9 MG/ML
1000 INJECTION INTRAMUSCULAR; INTRAVENOUS; SUBCUTANEOUS
Qty: 0 | Refills: 0 | Status: DISCONTINUED | OUTPATIENT
Start: 2017-05-25 | End: 2017-05-25

## 2017-05-25 RX ORDER — SIMETHICONE 80 MG/1
80 TABLET, CHEWABLE ORAL THREE TIMES A DAY
Qty: 0 | Refills: 0 | Status: DISCONTINUED | OUTPATIENT
Start: 2017-05-25 | End: 2017-05-29

## 2017-05-25 RX ADMIN — Medication 100 MILLIGRAM(S): at 15:34

## 2017-05-25 RX ADMIN — ESCITALOPRAM OXALATE 10 MILLIGRAM(S): 10 TABLET, FILM COATED ORAL at 15:34

## 2017-05-25 RX ADMIN — Medication 100 MILLIGRAM(S): at 06:34

## 2017-05-25 RX ADMIN — APIXABAN 5 MILLIGRAM(S): 2.5 TABLET, FILM COATED ORAL at 18:00

## 2017-05-25 RX ADMIN — Medication 25 MILLIGRAM(S): at 18:00

## 2017-05-25 RX ADMIN — Medication 650 MILLIGRAM(S): at 01:56

## 2017-05-25 RX ADMIN — PANTOPRAZOLE SODIUM 40 MILLIGRAM(S): 20 TABLET, DELAYED RELEASE ORAL at 06:41

## 2017-05-25 RX ADMIN — Medication 100 MILLIGRAM(S): at 21:45

## 2017-05-25 RX ADMIN — PANTOPRAZOLE SODIUM 40 MILLIGRAM(S): 20 TABLET, DELAYED RELEASE ORAL at 18:00

## 2017-05-25 RX ADMIN — Medication 25 MILLIGRAM(S): at 06:31

## 2017-05-25 RX ADMIN — Medication 20 MILLIGRAM(S): at 18:00

## 2017-05-25 RX ADMIN — APIXABAN 5 MILLIGRAM(S): 2.5 TABLET, FILM COATED ORAL at 06:31

## 2017-05-25 RX ADMIN — Medication 20 MILLIGRAM(S): at 06:34

## 2017-05-25 RX ADMIN — Medication 1 SPRAY(S): at 21:44

## 2017-05-25 RX ADMIN — Medication 650 MILLIGRAM(S): at 08:34

## 2017-05-25 RX ADMIN — Medication 200 MILLIGRAM(S): at 18:00

## 2017-05-25 RX ADMIN — SIMVASTATIN 40 MILLIGRAM(S): 20 TABLET, FILM COATED ORAL at 21:44

## 2017-05-25 RX ADMIN — Medication 200 MILLIGRAM(S): at 06:34

## 2017-05-26 LAB
ADD ON TEST-SPECIMEN IN LAB: SIGNIFICANT CHANGE UP
AFP-TM SERPL-MCNC: 1.7 NG/ML — SIGNIFICANT CHANGE UP
ALBUMIN SERPL ELPH-MCNC: 2.9 G/DL — LOW (ref 3.3–5)
ALP SERPL-CCNC: 150 U/L — HIGH (ref 40–120)
ALT FLD-CCNC: 34 U/L — SIGNIFICANT CHANGE UP (ref 12–78)
AMYLASE P1 CFR SERPL: 219 U/L — HIGH (ref 25–115)
ANION GAP SERPL CALC-SCNC: 9 MMOL/L — SIGNIFICANT CHANGE UP (ref 5–17)
AST SERPL-CCNC: 36 U/L — SIGNIFICANT CHANGE UP (ref 15–37)
BILIRUB SERPL-MCNC: 0.8 MG/DL — SIGNIFICANT CHANGE UP (ref 0.2–1.2)
BUN SERPL-MCNC: 7 MG/DL — SIGNIFICANT CHANGE UP (ref 7–23)
CALCIUM SERPL-MCNC: 8.7 MG/DL — SIGNIFICANT CHANGE UP (ref 8.5–10.1)
CHLORIDE SERPL-SCNC: 104 MMOL/L — SIGNIFICANT CHANGE UP (ref 96–108)
CO2 SERPL-SCNC: 25 MMOL/L — SIGNIFICANT CHANGE UP (ref 22–31)
CREAT SERPL-MCNC: 0.61 MG/DL — SIGNIFICANT CHANGE UP (ref 0.5–1.3)
FERRITIN SERPL-MCNC: 82 NG/ML — SIGNIFICANT CHANGE UP (ref 15–150)
GLUCOSE SERPL-MCNC: 123 MG/DL — HIGH (ref 70–99)
HAV IGM SER-ACNC: SIGNIFICANT CHANGE UP
HBV CORE IGM SER-ACNC: SIGNIFICANT CHANGE UP
HBV SURFACE AG SER-ACNC: SIGNIFICANT CHANGE UP
HCV AB S/CO SERPL IA: 0.12 S/CO — SIGNIFICANT CHANGE UP
HCV AB SERPL-IMP: SIGNIFICANT CHANGE UP
IRON SATN MFR SERPL: 28 UG/DL — LOW (ref 30–160)
IRON SATN MFR SERPL: 9 % — LOW (ref 14–50)
LIDOCAIN IGE QN: 1925 U/L — HIGH (ref 73–393)
POTASSIUM SERPL-MCNC: 4.3 MMOL/L — SIGNIFICANT CHANGE UP (ref 3.5–5.3)
POTASSIUM SERPL-SCNC: 4.3 MMOL/L — SIGNIFICANT CHANGE UP (ref 3.5–5.3)
PROT SERPL-MCNC: 7.2 GM/DL — SIGNIFICANT CHANGE UP (ref 6–8.3)
SODIUM SERPL-SCNC: 138 MMOL/L — SIGNIFICANT CHANGE UP (ref 135–145)
TIBC SERPL-MCNC: 304 UG/DL — SIGNIFICANT CHANGE UP (ref 220–430)
UIBC SERPL-MCNC: 276 UG/DL — SIGNIFICANT CHANGE UP (ref 110–370)

## 2017-05-26 RX ADMIN — PANTOPRAZOLE SODIUM 40 MILLIGRAM(S): 20 TABLET, DELAYED RELEASE ORAL at 18:28

## 2017-05-26 RX ADMIN — Medication 25 MILLIGRAM(S): at 05:44

## 2017-05-26 RX ADMIN — Medication 20 MILLIGRAM(S): at 05:49

## 2017-05-26 RX ADMIN — Medication 200 MILLIGRAM(S): at 18:28

## 2017-05-26 RX ADMIN — APIXABAN 5 MILLIGRAM(S): 2.5 TABLET, FILM COATED ORAL at 18:28

## 2017-05-26 RX ADMIN — PANTOPRAZOLE SODIUM 40 MILLIGRAM(S): 20 TABLET, DELAYED RELEASE ORAL at 05:44

## 2017-05-26 RX ADMIN — ESCITALOPRAM OXALATE 10 MILLIGRAM(S): 10 TABLET, FILM COATED ORAL at 14:16

## 2017-05-26 RX ADMIN — SIMVASTATIN 40 MILLIGRAM(S): 20 TABLET, FILM COATED ORAL at 21:59

## 2017-05-26 RX ADMIN — Medication 100 MILLIGRAM(S): at 05:44

## 2017-05-26 RX ADMIN — Medication 100 MILLIGRAM(S): at 21:58

## 2017-05-26 RX ADMIN — Medication 20 MILLIGRAM(S): at 18:28

## 2017-05-26 RX ADMIN — APIXABAN 5 MILLIGRAM(S): 2.5 TABLET, FILM COATED ORAL at 05:44

## 2017-05-26 RX ADMIN — MORPHINE SULFATE 1 MILLIGRAM(S): 50 CAPSULE, EXTENDED RELEASE ORAL at 05:44

## 2017-05-26 RX ADMIN — MORPHINE SULFATE 1 MILLIGRAM(S): 50 CAPSULE, EXTENDED RELEASE ORAL at 06:46

## 2017-05-26 RX ADMIN — Medication 25 MILLIGRAM(S): at 18:28

## 2017-05-26 RX ADMIN — Medication 100 MILLIGRAM(S): at 14:16

## 2017-05-26 RX ADMIN — Medication 200 MILLIGRAM(S): at 05:44

## 2017-05-27 DIAGNOSIS — I27.2 OTHER SECONDARY PULMONARY HYPERTENSION: ICD-10-CM

## 2017-05-27 DIAGNOSIS — I48.2 CHRONIC ATRIAL FIBRILLATION: ICD-10-CM

## 2017-05-27 DIAGNOSIS — K57.92 DIVERTICULITIS OF INTESTINE, PART UNSPECIFIED, WITHOUT PERFORATION OR ABSCESS WITHOUT BLEEDING: ICD-10-CM

## 2017-05-27 DIAGNOSIS — K85.90 ACUTE PANCREATITIS WITHOUT NECROSIS OR INFECTION, UNSPECIFIED: ICD-10-CM

## 2017-05-27 DIAGNOSIS — E11.9 TYPE 2 DIABETES MELLITUS WITHOUT COMPLICATIONS: ICD-10-CM

## 2017-05-27 DIAGNOSIS — I10 ESSENTIAL (PRIMARY) HYPERTENSION: ICD-10-CM

## 2017-05-27 LAB
ALBUMIN SERPL ELPH-MCNC: 2.6 G/DL — LOW (ref 3.3–5)
ALP SERPL-CCNC: 119 U/L — SIGNIFICANT CHANGE UP (ref 40–120)
ALT FLD-CCNC: 25 U/L — SIGNIFICANT CHANGE UP (ref 12–78)
ANION GAP SERPL CALC-SCNC: 6 MMOL/L — SIGNIFICANT CHANGE UP (ref 5–17)
AST SERPL-CCNC: 23 U/L — SIGNIFICANT CHANGE UP (ref 15–37)
BILIRUB SERPL-MCNC: 0.5 MG/DL — SIGNIFICANT CHANGE UP (ref 0.2–1.2)
BUN SERPL-MCNC: 9 MG/DL — SIGNIFICANT CHANGE UP (ref 7–23)
CALCIUM SERPL-MCNC: 8.3 MG/DL — LOW (ref 8.5–10.1)
CHLORIDE SERPL-SCNC: 106 MMOL/L — SIGNIFICANT CHANGE UP (ref 96–108)
CO2 SERPL-SCNC: 25 MMOL/L — SIGNIFICANT CHANGE UP (ref 22–31)
CREAT SERPL-MCNC: 0.58 MG/DL — SIGNIFICANT CHANGE UP (ref 0.5–1.3)
GGT SERPL-CCNC: 69 U/L — HIGH (ref 8–40)
GLUCOSE SERPL-MCNC: 146 MG/DL — HIGH (ref 70–99)
LIDOCAIN IGE QN: 602 U/L — HIGH (ref 73–393)
POTASSIUM SERPL-MCNC: 3.5 MMOL/L — SIGNIFICANT CHANGE UP (ref 3.5–5.3)
POTASSIUM SERPL-SCNC: 3.5 MMOL/L — SIGNIFICANT CHANGE UP (ref 3.5–5.3)
PROT SERPL-MCNC: 6.4 GM/DL — SIGNIFICANT CHANGE UP (ref 6–8.3)
SODIUM SERPL-SCNC: 137 MMOL/L — SIGNIFICANT CHANGE UP (ref 135–145)

## 2017-05-27 RX ADMIN — Medication 200 MILLIGRAM(S): at 05:27

## 2017-05-27 RX ADMIN — Medication 20 MILLIGRAM(S): at 05:27

## 2017-05-27 RX ADMIN — PANTOPRAZOLE SODIUM 40 MILLIGRAM(S): 20 TABLET, DELAYED RELEASE ORAL at 18:16

## 2017-05-27 RX ADMIN — Medication 25 MILLIGRAM(S): at 05:27

## 2017-05-27 RX ADMIN — APIXABAN 5 MILLIGRAM(S): 2.5 TABLET, FILM COATED ORAL at 05:27

## 2017-05-27 RX ADMIN — Medication 20 MILLIGRAM(S): at 18:15

## 2017-05-27 RX ADMIN — APIXABAN 5 MILLIGRAM(S): 2.5 TABLET, FILM COATED ORAL at 18:15

## 2017-05-27 RX ADMIN — PANTOPRAZOLE SODIUM 40 MILLIGRAM(S): 20 TABLET, DELAYED RELEASE ORAL at 05:27

## 2017-05-27 RX ADMIN — SIMVASTATIN 40 MILLIGRAM(S): 20 TABLET, FILM COATED ORAL at 21:00

## 2017-05-27 RX ADMIN — ESCITALOPRAM OXALATE 10 MILLIGRAM(S): 10 TABLET, FILM COATED ORAL at 12:15

## 2017-05-27 RX ADMIN — Medication 25 MILLIGRAM(S): at 18:15

## 2017-05-27 RX ADMIN — Medication 100 MILLIGRAM(S): at 05:37

## 2017-05-27 RX ADMIN — Medication 100 MILLIGRAM(S): at 21:00

## 2017-05-27 RX ADMIN — Medication 2: at 12:15

## 2017-05-27 RX ADMIN — Medication 200 MILLIGRAM(S): at 18:16

## 2017-05-27 RX ADMIN — Medication 100 MILLIGRAM(S): at 14:10

## 2017-05-27 NOTE — CONSULT NOTE ADULT - ASSESSMENT
89F with extensive above hx p/w pancreatitis/diverticulitis.     1. Diverticulitis- as per CT- IVF, abx, as per GI.    2. Afib- pt rate controlled and asymptomatic. Cont eliquis for CVA proph.     3. CAD s/p PCI 2011- cont medical mgmt. Cont Bbl/statin. No active CP or signs of decompensation currently.     4. Chronic diastolic HF- may need to start low dose PO diuretics. Would hold for now in setting of mild pancreatitis, active diverticulitis. Will follow closely. Check daily wts, I/Os.     5. DVT proph, replete lytes as needed.

## 2017-05-27 NOTE — CONSULT NOTE ADULT - SUBJECTIVE AND OBJECTIVE BOX
Cardiology Consultation    HPI: 89 F with Hx of CAD, s/p stent, Chronic Atrial Fibrillation on A/C with Eliquis, Chronic Diastolic CHF, Type 2 Diabetes Mellitus presents to the ED with the sudden onset of the epigastric pain in which started last evening.  The patient radiated to the lower abdominal quadrants and then radiated to the right upper quadrant.  Patient has a prior Hx of cholecystectomy.  No prior symptoms like this in the past.  No recent travel.  No associated nausea, vomiting, diarrhea.  No constipation.  No fevers or chills.  Pain worsened and she came to the ED.  In the ED, treated with IVFs.  Initial labs were essentially unremarkable except for a Lipase of 1492.  CT A/P was done which showed some mild inflammatory changes in the gallbladder fossa, no fluid collection and no radiographic evidence of acute pancreatitis.  There was a concern for possible right sided diverticulitis.  No recent trauma or injury to the right side.  She fell about 6 weeks ago and sustained a gluteal hematoma which is stable on CT.  She also has pleuritic pain on the same side and has difficulty laying flat due to the pain.  Pain also radiated to the right shoulder this morning.    5/27- Not on tele. No CP/SOB. Mild, diffuse abdominal pain.     PAST MEDICAL HISTORY:  Chronic A-fib on A/C with Eliquis  HTN  GERD  Osteoarthritis  Type 2 Diabetes Mellitus  Hyperlipidemia  Pulmonary HTN  Chronic Diastolic CHF, last ECHO was in 2016 with EF 55-60%  CAD, s/p BMS to LAD 2011  Hx of Gluteal Hematoma after a fall  Varicose Veins    PAST SURGICAL HISTORY:  s/p appendectomy  s/p cholecystectomy  s/p left knee surgery  s/p hysterectomy  s/p TAVR    FAMILY HISTORY:   non-contributory to the patient's current presentation (24 May 2017 15:27)    Allergies    lactose (Other)  penicillin (Rash)  penicillins (Other)  sulfa drugs (Rash; Other)    SOCIAL HISTORY: Denies tobacco, etoh abuse or illicit drug use    MEDICATIONS  (STANDING):  sildenafil (REVATIO) 20milliGRAM(s) Oral two times a day  apixaban 5milliGRAM(s) Oral two times a day  escitalopram 10milliGRAM(s) Oral daily  simvastatin 40milliGRAM(s) Oral at bedtime  metoprolol succinate ER 25milliGRAM(s) Oral two times a day  insulin lispro (HumaLOG) corrective regimen sliding scale  SubCutaneous every 6 hours  dextrose 5%. 1000milliLiter(s) IV Continuous <Continuous>  dextrose 50% Injectable 12.5Gram(s) IV Push once  dextrose 50% Injectable 25Gram(s) IV Push once  dextrose 50% Injectable 25Gram(s) IV Push once  pantoprazole  Injectable 40milliGRAM(s) IV Push every 12 hours  metroNIDAZOLE  IVPB  IV Intermittent   ciprofloxacin   IVPB  IV Intermittent   ciprofloxacin   IVPB 400milliGRAM(s) IV Intermittent every 12 hours  metroNIDAZOLE  IVPB 500milliGRAM(s) IV Intermittent every 8 hours    MEDICATIONS  (PRN):  acetaminophen   Tablet 650milliGRAM(s) Oral every 6 hours PRN For Temp greater than 38 C (100.4 F)  ondansetron Injectable 4milliGRAM(s) IV Push every 6 hours PRN Nausea and/or Vomiting  dextrose Gel 1Dose(s) Oral once PRN Blood Glucose LESS THAN 70 milliGRAM(s)/deciliter  glucagon  Injectable 1milliGRAM(s) IntraMuscular once PRN Glucose LESS THAN 70 milligrams/deciliter  morphine  - Injectable 1milliGRAM(s) IV Push every 4 hours PRN Moderate Pain (4 - 6)  LORazepam     Tablet 1milliGRAM(s) Oral once PRN MRCP sedation  sodium chloride 0.65% Nasal 1Spray(s) Left Nostril three times a day PRN Congestion  simethicone 80milliGRAM(s) Chew three times a day PRN Gas    Vital Signs Last 24 Hrs  T(C): 36.3, Max: 36.9 (05-26 @ 17:12)  T(F): 97.3, Max: 98.5 (05-26 @ 17:12)  HR: 82 (70 - 82)  BP: 119/65 (105/58 - 119/65)  BP(mean): --  RR: --  SpO2: 92% (92% - 95%)    REVIEW OF SYSTEMS:    CONSTITUTIONAL:  As per HPI.  HEENT:  Eyes:  No diplopia or blurred vision. ENT:  No earache, sore throat or runny nose.  CARDIOVASCULAR:  No pressure, squeezing, strangling, tightness, heaviness or aching about the chest, neck, axilla or epigastrium.  RESPIRATORY:  No cough, shortness of breath, PND or orthopnea.  GASTROINTESTINAL:  No nausea, vomiting or diarrhea.  GENITOURINARY:  No dysuria, frequency or urgency.  MUSCULOSKELETAL:  As per HPI.  SKIN:  No change in skin, hair or nails.  NEUROLOGIC:  No paresthesias, fasciculations, seizures or weakness.  PSYCHIATRIC:  No disorder of thought or mood.  ENDOCRINE:  No heat or cold intolerance, polyuria or polydipsia.  HEMATOLOGICAL:  No easy bruising or bleedings.     PHYSICAL EXAMINATION:    GENERAL APPEARANCE:  Pt. is not currently dyspneic, in no distress. Pt. is alert, oriented, and pleasant.  HEENT:  Pupils are normal and react normally. No icterus. Mucous membranes well colored.  NECK:  Supple. No lymphadenopathy. Jugular venous pressure not elevated. Carotids equal.   HEART:   The cardiac impulse has a normal quality. There are no murmurs, rubs or gallops noted  CHEST:  Chest is clear to auscultation. Normal respiratory effort.  ABDOMEN:  Soft and nontender.   EXTREMITIES:  There is no edema.   SKIN:  No rash or significant lesions are noted.    LABS:    05-27    137  |  106  |  9   ----------------------------<  146<H>  3.5   |  25  |  0.58    Ca    8.3<L>      27 May 2017 06:54    TPro  6.4  /  Alb  2.6<L>  /  TBili  0.5  /  DBili  x   /  AST  23  /  ALT  25  /  AlkPhos  119  05-27    LIVER FUNCTIONS - ( 27 May 2017 06:54 )  Alb: 2.6 g/dL / Pro: 6.4 gm/dL / ALK PHOS: 119 U/L / ALT: 25 U/L / AST: 23 U/L / GGT: x           EKG: Afib @ 67.     TELEMETRY: Not on tele.     CARDIAC TESTS: pending    RADIOLOGY & ADDITIONAL STUDIES: CT A/P- Mild inflammatory stranding and fluid in the right upper quadrant   centered within the gallbladder fossa may be related to mild right-sided   diverticulitis versus biliary pathology. No extraluminal air or drainable   fluid collection. Correlation with LFTs is advised.  Cirrhotic morphology of the liver.    ASSESSMENT & PLAN:

## 2017-05-27 NOTE — PROVIDER CONTACT NOTE (OTHER) - SITUATION
called answering service, consulted by Dr. Mobley right heart failure
Acute Pancreatitis  Office aware
Service called, spoke with Cami.

## 2017-05-28 ENCOUNTER — TRANSCRIPTION ENCOUNTER (OUTPATIENT)
Age: 82
End: 2017-05-28

## 2017-05-28 DIAGNOSIS — I25.10 ATHEROSCLEROTIC HEART DISEASE OF NATIVE CORONARY ARTERY WITHOUT ANGINA PECTORIS: ICD-10-CM

## 2017-05-28 LAB
AMYLASE P1 CFR SERPL: 51 U/L — SIGNIFICANT CHANGE UP (ref 25–115)
ANION GAP SERPL CALC-SCNC: 5 MMOL/L — SIGNIFICANT CHANGE UP (ref 5–17)
BUN SERPL-MCNC: 10 MG/DL — SIGNIFICANT CHANGE UP (ref 7–23)
CALCIUM SERPL-MCNC: 8.2 MG/DL — LOW (ref 8.5–10.1)
CHLORIDE SERPL-SCNC: 107 MMOL/L — SIGNIFICANT CHANGE UP (ref 96–108)
CO2 SERPL-SCNC: 28 MMOL/L — SIGNIFICANT CHANGE UP (ref 22–31)
CREAT SERPL-MCNC: 0.58 MG/DL — SIGNIFICANT CHANGE UP (ref 0.5–1.3)
GLUCOSE SERPL-MCNC: 160 MG/DL — HIGH (ref 70–99)
HBA1C BLD-MCNC: 6.2 % — HIGH (ref 4–5.6)
LIDOCAIN IGE QN: 381 U/L — SIGNIFICANT CHANGE UP (ref 73–393)
POTASSIUM SERPL-MCNC: 3.5 MMOL/L — SIGNIFICANT CHANGE UP (ref 3.5–5.3)
POTASSIUM SERPL-SCNC: 3.5 MMOL/L — SIGNIFICANT CHANGE UP (ref 3.5–5.3)
SODIUM SERPL-SCNC: 140 MMOL/L — SIGNIFICANT CHANGE UP (ref 135–145)

## 2017-05-28 RX ORDER — LACTOBACILLUS ACIDOPHILUS 100MM CELL
1 CAPSULE ORAL
Qty: 0 | Refills: 0 | Status: DISCONTINUED | OUTPATIENT
Start: 2017-05-28 | End: 2017-05-29

## 2017-05-28 RX ORDER — ACETAMINOPHEN 500 MG
2 TABLET ORAL
Qty: 0 | Refills: 0 | COMMUNITY
Start: 2017-05-28

## 2017-05-28 RX ORDER — GLUCAGON INJECTION, SOLUTION 0.5 MG/.1ML
1 INJECTION, SOLUTION SUBCUTANEOUS ONCE
Qty: 0 | Refills: 0 | Status: DISCONTINUED | OUTPATIENT
Start: 2017-05-28 | End: 2017-05-29

## 2017-05-28 RX ORDER — BENZOCAINE AND MENTHOL 5; 1 G/100ML; G/100ML
1 LIQUID ORAL EVERY 6 HOURS
Qty: 0 | Refills: 0 | Status: DISCONTINUED | OUTPATIENT
Start: 2017-05-28 | End: 2017-05-29

## 2017-05-28 RX ORDER — INSULIN LISPRO 100/ML
VIAL (ML) SUBCUTANEOUS
Qty: 0 | Refills: 0 | Status: DISCONTINUED | OUTPATIENT
Start: 2017-05-28 | End: 2017-05-29

## 2017-05-28 RX ORDER — INSULIN LISPRO 100/ML
VIAL (ML) SUBCUTANEOUS AT BEDTIME
Qty: 0 | Refills: 0 | Status: DISCONTINUED | OUTPATIENT
Start: 2017-05-28 | End: 2017-05-29

## 2017-05-28 RX ORDER — METFORMIN HYDROCHLORIDE 850 MG/1
1 TABLET ORAL
Qty: 60 | Refills: 0 | OUTPATIENT
Start: 2017-05-28 | End: 2017-06-27

## 2017-05-28 RX ORDER — SODIUM CHLORIDE 9 MG/ML
1000 INJECTION, SOLUTION INTRAVENOUS
Qty: 0 | Refills: 0 | Status: DISCONTINUED | OUTPATIENT
Start: 2017-05-28 | End: 2017-05-29

## 2017-05-28 RX ADMIN — PANTOPRAZOLE SODIUM 40 MILLIGRAM(S): 20 TABLET, DELAYED RELEASE ORAL at 05:11

## 2017-05-28 RX ADMIN — Medication 20 MILLIGRAM(S): at 05:10

## 2017-05-28 RX ADMIN — Medication 1: at 12:06

## 2017-05-28 RX ADMIN — Medication 25 MILLIGRAM(S): at 17:52

## 2017-05-28 RX ADMIN — Medication 1 TABLET(S): at 18:04

## 2017-05-28 RX ADMIN — Medication 200 MILLIGRAM(S): at 05:22

## 2017-05-28 RX ADMIN — Medication 1: at 08:39

## 2017-05-28 RX ADMIN — Medication 100 MILLIGRAM(S): at 05:14

## 2017-05-28 RX ADMIN — SIMVASTATIN 40 MILLIGRAM(S): 20 TABLET, FILM COATED ORAL at 21:13

## 2017-05-28 RX ADMIN — Medication 20 MILLIGRAM(S): at 17:52

## 2017-05-28 RX ADMIN — Medication 1: at 17:51

## 2017-05-28 RX ADMIN — BENZOCAINE AND MENTHOL 1 LOZENGE: 5; 1 LIQUID ORAL at 02:48

## 2017-05-28 RX ADMIN — PANTOPRAZOLE SODIUM 40 MILLIGRAM(S): 20 TABLET, DELAYED RELEASE ORAL at 17:52

## 2017-05-28 RX ADMIN — BENZOCAINE AND MENTHOL 1 LOZENGE: 5; 1 LIQUID ORAL at 17:52

## 2017-05-28 RX ADMIN — Medication 25 MILLIGRAM(S): at 05:10

## 2017-05-28 RX ADMIN — APIXABAN 5 MILLIGRAM(S): 2.5 TABLET, FILM COATED ORAL at 17:52

## 2017-05-28 RX ADMIN — APIXABAN 5 MILLIGRAM(S): 2.5 TABLET, FILM COATED ORAL at 05:10

## 2017-05-28 RX ADMIN — ESCITALOPRAM OXALATE 10 MILLIGRAM(S): 10 TABLET, FILM COATED ORAL at 12:06

## 2017-05-28 NOTE — DISCHARGE NOTE ADULT - CARE PLAN
Principal Discharge DX:	Acute pancreatitis, unspecified complication status, unspecified pancreatitis type  Goal:	see Dr. Camacho  Instructions for follow-up, activity and diet:	diabetic diet  Secondary Diagnosis:	Diabetes mellitus, type II  Secondary Diagnosis:	Chronic atrial fibrillation  Secondary Diagnosis:	Diverticulitis  Secondary Diagnosis:	Pulmonary HTN  Secondary Diagnosis:	Essential hypertension

## 2017-05-28 NOTE — DISCHARGE NOTE ADULT - MEDICATION SUMMARY - MEDICATIONS TO TAKE
I will START or STAY ON the medications listed below when I get home from the hospital:    sildenafil 20 mg oral tablet  -- 1 tab(s) by mouth 2 times a day  -- Indication: For Pulmonary HTN    aspirin 81 mg oral tablet  -- 1 tab(s) by mouth once a day  -- Indication: For CAD (coronary artery disease)    acetaminophen 325 mg oral tablet  -- 2 tab(s) by mouth every 6 hours, As needed, For Temp greater than 38 C (100.4 F)  -- Indication: For Pain    Eliquis 5 mg oral tablet  -- 1 tab(s) by mouth 2 times a day  -- Indication: For Chronic atrial fibrillation    Lexapro 10 mg oral tablet  -- 1 tab(s) by mouth once a day (at bedtime)  -- Indication: For Acute pancreatitis, unspecified complication status, unspecified pancreatitis type    metFORMIN 500 mg oral tablet  -- 1 tab(s) by mouth 2 times a day  -- Check with your doctor before becoming pregnant.  Do not drink alcoholic beverages when taking this medication.  It is very important that you take or use this exactly as directed.  Do not skip doses or discontinue unless directed by your doctor.  Obtain medical advice before taking any non-prescription drugs as some may affect the action of this medication.  Take with food or milk.    -- Indication: For Diabetes mellitus, type II    Zocor 40 mg oral tablet  -- 1 tab(s) by mouth once a day (at bedtime)  -- Indication: For Cholesterol    Toprol-XL 25 mg oral tablet, extended release  -- 25 milligram(s) by mouth 2 times a day  -- Indication: For Hypertension    furosemide 40 mg oral tablet  -- 1 tab(s) by mouth once a day  -- Indication: For Hypertension    garlic oral tablet  --  by mouth   -- Indication: For Hypertension    docusate sodium 100 mg oral capsule  -- 2 cap(s) by mouth once a day (at bedtime)  -- Indication: For Constipation    potassium chloride 10 mEq oral tablet, extended release  -- 1 tab(s) by mouth once a day  -- TWO TABLETS ON WEDNESDAY WITH METOLAZONE  -- Indication: For CAD (coronary artery disease)    CoQ10 300 mg oral capsule  -- 1 cap(s) by mouth once a day (at bedtime)  -- Indication: For Cholesterol    glucosamine-chondroitin 250 mg-200 mg oral tablet  -- 3 tab(s) by mouth once a day  -- Indication: For Arthritis    Acidophilus oral capsule  -- 1 cap(s) by mouth once a day  -- Indication: For Acute pancreatitis, unspecified complication status, unspecified pancreatitis type    NexIUM 40 mg oral delayed release capsule  -- 1 cap(s) by mouth once a day  -- Indication: For GERD    PreserVision oral capsule  -- 2 cap(s) by mouth once a day  -- Indication: For eyes    multivitamin  --     -- Indication: For Home    cholecalciferol 1000 intl units oral tablet  -- 1 tab(s) by mouth once a day  -- Indication: For vitamin    biotin 10 mg oral tablet  -- 1 tab(s) by mouth once a day  -- Indication: For vitamin

## 2017-05-28 NOTE — DISCHARGE NOTE ADULT - SECONDARY DIAGNOSIS.
Diabetes mellitus, type II Chronic atrial fibrillation Diverticulitis Pulmonary HTN Essential hypertension

## 2017-05-28 NOTE — DISCHARGE NOTE ADULT - PATIENT PORTAL LINK FT
“You can access the [employer] Patient Portal, offered by [facility], by registering with the following website: [email or web address]”

## 2017-05-28 NOTE — DISCHARGE NOTE ADULT - CARE PROVIDER_API CALL
Samy Camacho), Internal Medicine; Pulmonary Disease  [address] [place] [address]  Phone: [phone]  Fax: [phone]

## 2017-05-29 VITALS
RESPIRATION RATE: 17 BRPM | HEART RATE: 79 BPM | SYSTOLIC BLOOD PRESSURE: 118 MMHG | TEMPERATURE: 97 F | OXYGEN SATURATION: 94 % | DIASTOLIC BLOOD PRESSURE: 62 MMHG

## 2017-05-29 RX ADMIN — Medication 4: at 12:05

## 2017-05-29 RX ADMIN — Medication 25 MILLIGRAM(S): at 06:14

## 2017-05-29 RX ADMIN — ESCITALOPRAM OXALATE 10 MILLIGRAM(S): 10 TABLET, FILM COATED ORAL at 12:07

## 2017-05-29 RX ADMIN — APIXABAN 5 MILLIGRAM(S): 2.5 TABLET, FILM COATED ORAL at 06:14

## 2017-05-29 RX ADMIN — Medication 1 TABLET(S): at 08:59

## 2017-05-29 RX ADMIN — PANTOPRAZOLE SODIUM 40 MILLIGRAM(S): 20 TABLET, DELAYED RELEASE ORAL at 06:22

## 2017-05-29 RX ADMIN — Medication 20 MILLIGRAM(S): at 06:14

## 2017-05-29 NOTE — PROGRESS NOTE ADULT - ASSESSMENT
Imp:  Hepatic flexure diverticulitis  Elevated lipase, but unlikely pancreatitis  Cirrhosis, probably MEZA related    Rec:  Clears  Drop fluids to 50 cc/hr  check hep b/c, iron studies and AFP
Imp:  Pancreatitis - perhaps metolazone or januvia induced - (vs diverticulitis) - improving    Rec:  Complete 10 days abx for ?diverticulitis  Stop januvia and metolazone  Dr. Mojica requests cardiology (Mary) and endocrine (Fernando) eval prior to d/c
Imp:  Upper abdominal pain. High lipase but imaging c/w diverticulitis. Disconnect between two is difficult to explain.    Rec:  Cont clears  Cont Abx  MRCP today will hopefully clarify above. If cannot tolerate, would consider pancreatic protocol CT of abdomen.
much improved  having diarrhea - will d/c abx  Janumet d/c - will take metformin at home until f/u with Dr. GARCIA d/w Dr. Nam  no metalozone  discharge med rec done    5/29:  no clinical change in past 24hrs  for discharge today
lipase coming down  GI/cardio f/u noted  tolerating diet  Endo eval re diabetes  on anticoag
much improved  having diarrhea - will d/c abx  Janumet d/c - will take metformin at home until f/u with Dr. GARCIA d/w Dr. Nam  no metalozone  discharge med rec done

## 2017-05-29 NOTE — PROGRESS NOTE ADULT - SUBJECTIVE AND OBJECTIVE BOX
CHIEF COMPLAINT:  abdominal pain    SUBJECTIVE:   admant about not having MRI without being "knocked out" tolerating liquids. ambulating with assistance. also with gas pains.    REVIEW OF SYSTEMS:  All other review of systems is negative unless indicated above    Vital Signs Last 24 Hrs  T(C): 36.7, Max: 36.7 (05-26 @ 10:20)  T(F): 98.1, Max: 98.1 (05-26 @ 10:20)  HR: 83 (72 - 83)  BP: 120/59 (120/59 - 140/77)  BP(mean): --  RR: 18 (18 - 18)  SpO2: 200% (99% - 200%)    I&O's Summary    I & Os for current day (as of 26 May 2017 17:07)  =============================================  IN: 400 ml / OUT: 0 ml / NET: 400 ml      CAPILLARY BLOOD GLUCOSE  110 (26 May 2017 12:19)  109 (26 May 2017 06:35)  108 (25 May 2017 22:19)      PHYSICAL EXAM:    Constitutional: NAD, awake and alert, well-developed  HEENT: PERR, EOMI, Normal Hearing, MMM  Neck: Soft and supple, No LAD, No JVD  Respiratory: Breath sounds are clear   Cardiovascular: S1 and S2, regular rate   Gastrointestinal: Bowel Sounds present, soft  Extremities:  peripheral edema  Neurological: A/O x 3, no focal deficits  Musculoskeletal:moves all extrem  Skin: No rashes    MEDICATIONS:  MEDICATIONS  (STANDING):  sildenafil (REVATIO) 20milliGRAM(s) Oral two times a day  apixaban 5milliGRAM(s) Oral two times a day  escitalopram 10milliGRAM(s) Oral daily  simvastatin 40milliGRAM(s) Oral at bedtime  metoprolol succinate ER 25milliGRAM(s) Oral two times a day  insulin lispro (HumaLOG) corrective regimen sliding scale  SubCutaneous every 6 hours  dextrose 5%. 1000milliLiter(s) IV Continuous <Continuous>  dextrose 50% Injectable 12.5Gram(s) IV Push once  dextrose 50% Injectable 25Gram(s) IV Push once  dextrose 50% Injectable 25Gram(s) IV Push once  pantoprazole  Injectable 40milliGRAM(s) IV Push every 12 hours  metroNIDAZOLE  IVPB  IV Intermittent   ciprofloxacin   IVPB  IV Intermittent   ciprofloxacin   IVPB 400milliGRAM(s) IV Intermittent every 12 hours  metroNIDAZOLE  IVPB 500milliGRAM(s) IV Intermittent every 8 hours      LABS: All Labs Reviewed:                        11.0   6.7   )-----------( 124      ( 25 May 2017 06:53 )             32.9     05-26    138  |  104  |  7   ----------------------------<  123<H>  4.3   |  25  |  0.61    Ca    8.7      26 May 2017 12:28  Phos  2.9     05-25  Mg     1.9     05-25    TPro  7.2  /  Alb  2.9<L>  /  TBili  0.8  /  DBili  x   /  AST  36  /  ALT  34  /  AlkPhos  150<H>  05-26        89 F with Hx of CAD, s/p stent, Chronic Atrial Fibrillation on A/C with Eliquis, Chronic Diastolic CHF, Type 2 Diabetes Mellitus presents to the ED with the sudden onset of the epigastric pain in which started last evening.  The patient radiated to the lower abdominal quadrants and then radiated to the right upper quadrant.  Patient has a prior Hx of cholecystectomy.  No prior symptoms like this in the past.  No recent travel.  No associated nausea, vomiting, diarrhea.  No constipation.  No fevers or chills.  Pain worsened and she came to the ED.  In the ED, treated with IVFs.  Initial labs were essentially unremarkable except for a Lipase of 1492.  CT A/P was done which showed some mild inflammatory changes in the gallbladder fossa, no fluid collection and no radiographic evidence of acute pancreatitis.  There was a concern for possible right sided diverticulitis.  No recent trauma or injury to the right side.  She fell about 6 weeks ago and sustained a gluteal hematoma which is stable on CT.  She also has pleuritic pain on the same side and has difficulty laying flat due to the pain.  Pain also radiated to the right shoulder this morning.    Pt was admitted for further evaluation of her abdominal pain. She was treated for R diverticulitis vs pancreatitis (drug induced). She was able to tolerate clears and her diet was advanced. MRCP was ordered, but pt unable to tolerate without sedation.     Anticipate discharge in AM if tolerating PO. Home care has been arranged.      Assessment and Plan:     ACUTE ABDOMINAL PAIN due to R diverticulitis vs pancreatitis  -CT with Mild inflammatory stranding and fluid in RUQ within the gallbladder fossa may be related to mild right-sided diverticulitis versus biliary pathology. No extraluminal air or drainable   fluid collection. Cirrhotic morphology of the liver.  -lipase 1426 on admission, then 2630, now down to 1925 today  -triglycerides WNL  -mild elevated AST on admission now WNL. GGT pending  -medication list reviewed for possible pancreatitis triggers: class 1a lasix, zocor. class III metolazone, metformin. Also on januvia.  -Tolerating clears, diet to be advanced today  -abx: cipro flagyl day 3  -MRCP under consideration, but pt claustrophobic and will require sedation. Also complicated by pulm HTN with PAP 70s per son by [facility]. Open MRI also under consideration, but pt does not drive and reports transportation is difficult. She reports her children take turns transporting her to appointments, but she can not get to an open MRI. Discussed private pay medical transport (ie [employer]) but patient reports she can not afford it and will not ask her children to pay for it. She will consider IV ativan after thinking on it tonight.  -appreciate GI help      TYPE 2 DIABETES MELLITUS  -A1C 6.2  -on metformin, januvia PTA  -may not need tight glycemic control given age and comorbidities. likely can stop all medication  -holding oral medications in house with glucose at medical goals    CHRONIC DIASTOLIC CHF  PULMONARY HTN  -PAP 70s  -revatio as PTA. currently diuresis on hold.  -no oxygen PTA per patient    CAD, S/P BMS STENT S/P TAVR  -statin, BBLocker    CHRONIC ATRIAL FIBRILLATION  -rate control with metoprol  -CHADS2=3. On eliquis for stroke risk reduction    HYPOKALEMIA  -replete    MEZA  -cirrhosis noted on CT  ACUTE EPIGASTRIC PAIN, RUQ PAIN AND RIGHT UPPER BACK PAIN WITHOUT NAUSEA OR VOMITING ASSOCIATED WITH PLEURITIC PAIN BUT NO BACK PAIN.  LIPASE IS ELEVATED SUGGESTED OF ACUTE PANCREATITIS, HOWEVER NO RADIOGRAPHIC EVIDENCE FOR PANCREATITIS.  DIFFERENTIAL INCLUDES POSSIBLE PUD, POSSIBLE RIGHT SIDED DIVERTICULITIS.  LOW SUSPICION FOR ACUTE PE ON ELIQUIS, NO EVIDENCE OF RASH TO SUGGEST SHINGLES, R/O CBD SLUDGE/STONES DESPITE CHOLECYSTECTOMY ALTHOUGH LIVER ENZYMES ARE NORMAL.  R/O OTHER CAUSES OF ACUTE PANCREATITIS SUCH AS HYPERTRIGLYCERIDEMIA, OR MEDICATION-INDUCED (JANUVIA).  ACUTE GI PATHOLOGY CAN RESULT IN MILDLY ELEVATED LIPASE LEVELS BUT USUALLY NOT TO THE EXTENT OF THE LEVEL UPON ADMISSION    CHRONIC DIASTOLIC CHF    TYPE 2 DIABETES MELLITUS    PULMONARY HTN    CAD, S/P BMS STENT    S/P TAVR    CHRONIC ATRIAL FIBRILLATION    HYPOKALEMIA      PLAN:  -  admit to inpatient, med-surg, hospitalist service  -  IVFs, NPO except meds, pain control  -  repeat labs in am  -  check MRCP  -  check triglyceride levels  -  check sugars q6 hours, Humalog SSI  -  d/c Metformin and Januvia  -  DVT prophylaxis - on venodynes and Eliquis  -  GI consult - Dr. Mobley  -  IV PPI  -  Review CT results with radiology  -  may consider initiation of IV ABXs after review   -  replete potassium  -  Full Code    d/w patient, Dr. Hernandez, Dr. Camacho, Dr. Mojica and patient's family at the bedside      Anticipate discharge in AM if pt continues to improve and tolerate advanced diet.
CHIEF COMPLAINT:  abdominal pain    SUBJECTIVE:   feels better today. tolerating clears. nose bleed on oxygen today.    REVIEW OF SYSTEMS:  All other review of systems is negative unless indicated above    Vital Signs Last 24 Hrs  T(C): 36.4, Max: 37.1 (05-25 @ 06:34)  T(F): 97.6, Max: 98.7 (05-25 @ 06:34)  HR: 69 (64 - 69)  BP: 118/59 (114/56 - 118/59)  BP(mean): --  RR: 18 (18 - 18)  SpO2: 99% (97% - 99%)    I&O's Summary      CAPILLARY BLOOD GLUCOSE  119 (25 May 2017 06:34)  144 (24 May 2017 23:03)  113 (24 May 2017 18:55)      PHYSICAL EXAM:    Constitutional: NAD, awake and alert, well-developed  HEENT: PERR, EOMI, Normal Hearing, MMM  Neck: Soft and supple, No LAD, No JVD  Respiratory: Breath sounds are clear bilaterally, No wheezing, rales or rhonchi  Cardiovascular: S1 and S2, regular rate and rhythm, no Murmurs, gallops or rubs  Gastrointestinal: Bowel Sounds present, soft, nontender, nondistended, no guarding, no rebound  Extremities: No peripheral edema  Vascular: 2+ peripheral pulses  Neurological: A/O x 3, no focal deficits  Musculoskeletal: 5/5 strength b/l upper and lower extremities  Skin: No rashes    MEDICATIONS:  MEDICATIONS  (STANDING):  sildenafil (REVATIO) 20milliGRAM(s) Oral two times a day  apixaban 5milliGRAM(s) Oral two times a day  escitalopram 10milliGRAM(s) Oral daily  simvastatin 40milliGRAM(s) Oral at bedtime  metoprolol succinate ER 25milliGRAM(s) Oral two times a day  insulin lispro (HumaLOG) corrective regimen sliding scale  SubCutaneous every 6 hours  dextrose 5%. 1000milliLiter(s) IV Continuous <Continuous>  dextrose 50% Injectable 12.5Gram(s) IV Push once  dextrose 50% Injectable 25Gram(s) IV Push once  dextrose 50% Injectable 25Gram(s) IV Push once  pantoprazole  Injectable 40milliGRAM(s) IV Push every 12 hours  metroNIDAZOLE  IVPB  IV Intermittent   ciprofloxacin   IVPB  IV Intermittent   ciprofloxacin   IVPB 400milliGRAM(s) IV Intermittent every 12 hours  metroNIDAZOLE  IVPB 500milliGRAM(s) IV Intermittent every 8 hours      LABS: All Labs Reviewed:                        11.0   6.7   )-----------( 124      ( 25 May 2017 06:53 )             32.9     05-25    140  |  106  |  10  ----------------------------<  128<H>  4.0   |  27  |  0.60    Ca    8.3<L>      25 May 2017 06:53  Phos  2.9     05-25  Mg     1.9     05-25    TPro  6.7  /  Alb  2.6<L>  /  TBili  0.8  /  DBili  0.4<H>  /  AST  42<H>  /  ALT  33  /  AlkPhos  122<H>  05-25      89 F with Hx of CAD, s/p stent, Chronic Atrial Fibrillation on A/C with Eliquis, Chronic Diastolic CHF, Type 2 Diabetes Mellitus presents to the ED with the sudden onset of the epigastric pain in which started last evening.  The patient radiated to the lower abdominal quadrants and then radiated to the right upper quadrant.  Patient has a prior Hx of cholecystectomy.  No prior symptoms like this in the past.  No recent travel.  No associated nausea, vomiting, diarrhea.  No constipation.  No fevers or chills.  Pain worsened and she came to the ED.  In the ED, treated with IVFs.  Initial labs were essentially unremarkable except for a Lipase of 1492.  CT A/P was done which showed some mild inflammatory changes in the gallbladder fossa, no fluid collection and no radiographic evidence of acute pancreatitis.  There was a concern for possible right sided diverticulitis.  No recent trauma or injury to the right side.  She fell about 6 weeks ago and sustained a gluteal hematoma which is stable on CT.  She also has pleuritic pain on the same side and has difficulty laying flat due to the pain.  Pain also radiated to the right shoulder this morning.      Assessment and Plan:     ACUTE ABDOMINAL PAIN due to suspected R diverticululitis  -CT with Mild inflammatory stranding and fluid in RUQ within the gallbladder fossa may be related to mild right-sided diverticulitis versus biliary pathology. No extraluminal air or drainable   fluid collection. Correlation with LFTs is advised. Cirrhotic morphology of the liver.  -lipase slowly trending up  -triglycerides WNL  -mild elevated LFTs  -Tolerating clears  -abx: cipro flagyl day 2  -appreciate GI help  -MRCP under consideration, but pt claustrophobic and will require sedation. Open MRI also under consideration as outpatient if she continues to improve. Complicated by Pulm HTN (unknown PAP, will need to chart review)    CHRONIC DIASTOLIC CHF    TYPE 2 DIABETES MELLITUS  -A1C 6.2  -on metformin, januvia PTA  -may not need tight glycemic control given age and comorbidities  -holding oral medications in house; may need to hold januvia permanently    PULMONARY HTN  -revatio  -no lasix, or oxygen PTA per patient    CAD, S/P BMS STENT S/P TAVR  -statin, BBLocker    CHRONIC ATRIAL FIBRILLATION  -rate control with metoprol  -CHADS2=3. On eliquis for stroke risk reduction    HYPOKALEMIA  -replete    MEZA  -cirrhosis noted on CT  -Follow LFTs as above  -May need to temporarily hold statin, but currently only mildly elevated  ACUTE EPIGASTRIC PAIN, RUQ PAIN AND RIGHT UPPER BACK PAIN WITHOUT NAUSEA OR VOMITING ASSOCIATED WITH PLEURITIC PAIN BUT NO BACK PAIN.  LIPASE IS ELEVATED SUGGESTED OF ACUTE PANCREATITIS, HOWEVER NO RADIOGRAPHIC EVIDENCE FOR PANCREATITIS.  DIFFERENTIAL INCLUDES POSSIBLE PUD, POSSIBLE RIGHT SIDED DIVERTICULITIS.  LOW SUSPICION FOR ACUTE PE ON ELIQUIS, NO EVIDENCE OF RASH TO SUGGEST SHINGLES, R/O CBD SLUDGE/STONES DESPITE CHOLECYSTECTOMY ALTHOUGH LIVER ENZYMES ARE NORMAL.  R/O OTHER CAUSES OF ACUTE PANCREATITIS SUCH AS HYPERTRIGLYCERIDEMIA, OR MEDICATION-INDUCED (JANUVIA).  ACUTE GI PATHOLOGY CAN RESULT IN MILDLY ELEVATED LIPASE LEVELS BUT USUALLY NOT TO THE EXTENT OF THE LEVEL UPON ADMISSION    CHRONIC DIASTOLIC CHF    TYPE 2 DIABETES MELLITUS    PULMONARY HTN    CAD, S/P BMS STENT    S/P TAVR    CHRONIC ATRIAL FIBRILLATION    HYPOKALEMIA      PLAN:  -  admit to inpatient, med-surg, hospitalist service  -  IVFs, NPO except meds, pain control  -  repeat labs in am  -  check MRCP  -  check triglyceride levels  -  check sugars q6 hours, Humalog SSI  -  d/c Metformin and Januvia  -  DVT prophylaxis - on venodynes and Eliquis  -  GI consult - Dr. Mobley  -  IV PPI  -  Review CT results with radiology  -  may consider initiation of IV ABXs after review   -  replete potassium  -  Full Code    d/w patient, Dr. Hernandez, Dr. Camacho, Dr. Mojica and patient's family at the bedside        Anticipate discharge in 1-2 days if pt continues to improve and tolerate advanced diet.
Patient is a 89y old  Female who presents with a chief complaint of Abdominal pain (24 May 2017 17:19)      Subective:  Feels good now but had epigastric pain last night. No N/V.    PAST MEDICAL & SURGICAL HISTORY:  Afib  CAD (coronary artery disease)  Hypertension  History of Osteoarthritis  History of Atrial Fibrillation  GERD (Gastroesophageal Reflux Disease)  Dyslipidemia  Diabetes Mellitus Type II  Chronic Atrial Fibrillation  History of appendectomy  History of cholecystectomy  H/O: Knee Surgery- right meniscus  H/O: Hysterectomy      MEDICATIONS  (STANDING):  sildenafil (REVATIO) 20milliGRAM(s) Oral two times a day  apixaban 5milliGRAM(s) Oral two times a day  escitalopram 10milliGRAM(s) Oral daily  simvastatin 40milliGRAM(s) Oral at bedtime  metoprolol succinate ER 25milliGRAM(s) Oral two times a day  insulin lispro (HumaLOG) corrective regimen sliding scale  SubCutaneous every 6 hours  dextrose 5%. 1000milliLiter(s) IV Continuous <Continuous>  dextrose 50% Injectable 12.5Gram(s) IV Push once  dextrose 50% Injectable 25Gram(s) IV Push once  dextrose 50% Injectable 25Gram(s) IV Push once  pantoprazole  Injectable 40milliGRAM(s) IV Push every 12 hours  metroNIDAZOLE  IVPB  IV Intermittent   ciprofloxacin   IVPB  IV Intermittent   ciprofloxacin   IVPB 400milliGRAM(s) IV Intermittent every 12 hours  metroNIDAZOLE  IVPB 500milliGRAM(s) IV Intermittent every 8 hours    MEDICATIONS  (PRN):  acetaminophen   Tablet 650milliGRAM(s) Oral every 6 hours PRN For Temp greater than 38 C (100.4 F)  ondansetron Injectable 4milliGRAM(s) IV Push every 6 hours PRN Nausea and/or Vomiting  dextrose Gel 1Dose(s) Oral once PRN Blood Glucose LESS THAN 70 milliGRAM(s)/deciliter  glucagon  Injectable 1milliGRAM(s) IntraMuscular once PRN Glucose LESS THAN 70 milligrams/deciliter  morphine  - Injectable 1milliGRAM(s) IV Push every 4 hours PRN Moderate Pain (4 - 6)  LORazepam     Tablet 1milliGRAM(s) Oral once PRN MRCP sedation  sodium chloride 0.65% Nasal 1Spray(s) Left Nostril three times a day PRN Congestion  simethicone 80milliGRAM(s) Chew three times a day PRN Gas      REVIEW OF SYSTEMS:    RESPIRATORY: No shortness of breath  CARDIOVASCULAR: No chest pain  All other review of systems is negative unless indicated above.    Vital Signs Last 24 Hrs  T(C): 36.3, Max: 36.4 (05-25 @ 10:49)  T(F): 97.4, Max: 97.6 (05-25 @ 10:49)  HR: 72 (69 - 74)  BP: 140/77 (118/59 - 140/77)  BP(mean): --  RR: 18 (18 - 18)  SpO2: 99% (95% - 99%)    PHYSICAL EXAM:    Constitutional: NAD, well-developed  Respiratory: CTAB  Cardiovascular: S1 and S2, RRR  Gastrointestinal: BS+, soft, minimal upper abdominal tenderness  Extremities: No peripheral edema  Psychiatric: Normal mood, normal affect    LABS:                        11.0   6.7   )-----------( 124      ( 25 May 2017 06:53 )             32.9     05-25    140  |  106  |  10  ----------------------------<  128<H>  4.0   |  27  |  0.60    Ca    8.3<L>      25 May 2017 06:53  Phos  2.9     05-25  Mg     1.9     05-25    TPro  6.7  /  Alb  2.6<L>  /  TBili  0.8  /  DBili  0.4<H>  /  AST  42<H>  /  ALT  33  /  AlkPhos  122<H>  05-25      LIVER FUNCTIONS - ( 25 May 2017 06:53 )  Alb: 2.6 g/dL / Pro: 6.7 gm/dL / ALK PHOS: 122 U/L / ALT: 33 U/L / AST: 42 U/L / GGT: x
Patient is a 89y old  Female who presents with a chief complaint of Abdominal pain (24 May 2017 17:19)      Subective:  Feels good. No pain. However, complains of LE edema    PAST MEDICAL & SURGICAL HISTORY:  Afib  CAD (coronary artery disease)  Hypertension  History of Osteoarthritis  History of Atrial Fibrillation  GERD (Gastroesophageal Reflux Disease)  Dyslipidemia  Diabetes Mellitus Type II  Chronic Atrial Fibrillation  History of appendectomy  History of cholecystectomy  H/O: Knee Surgery- right meniscus  H/O: Hysterectomy      MEDICATIONS  (STANDING):  sildenafil (REVATIO) 20milliGRAM(s) Oral two times a day  apixaban 5milliGRAM(s) Oral two times a day  escitalopram 10milliGRAM(s) Oral daily  simvastatin 40milliGRAM(s) Oral at bedtime  metoprolol succinate ER 25milliGRAM(s) Oral two times a day  insulin lispro (HumaLOG) corrective regimen sliding scale  SubCutaneous every 6 hours  dextrose 5%. 1000milliLiter(s) IV Continuous <Continuous>  dextrose 50% Injectable 12.5Gram(s) IV Push once  dextrose 50% Injectable 25Gram(s) IV Push once  dextrose 50% Injectable 25Gram(s) IV Push once  pantoprazole  Injectable 40milliGRAM(s) IV Push every 12 hours  metroNIDAZOLE  IVPB  IV Intermittent   ciprofloxacin   IVPB  IV Intermittent   ciprofloxacin   IVPB 400milliGRAM(s) IV Intermittent every 12 hours  metroNIDAZOLE  IVPB 500milliGRAM(s) IV Intermittent every 8 hours    MEDICATIONS  (PRN):  acetaminophen   Tablet 650milliGRAM(s) Oral every 6 hours PRN For Temp greater than 38 C (100.4 F)  ondansetron Injectable 4milliGRAM(s) IV Push every 6 hours PRN Nausea and/or Vomiting  dextrose Gel 1Dose(s) Oral once PRN Blood Glucose LESS THAN 70 milliGRAM(s)/deciliter  glucagon  Injectable 1milliGRAM(s) IntraMuscular once PRN Glucose LESS THAN 70 milligrams/deciliter  morphine  - Injectable 1milliGRAM(s) IV Push every 4 hours PRN Moderate Pain (4 - 6)  LORazepam     Tablet 1milliGRAM(s) Oral once PRN MRCP sedation  sodium chloride 0.65% Nasal 1Spray(s) Left Nostril three times a day PRN Congestion  simethicone 80milliGRAM(s) Chew three times a day PRN Gas      REVIEW OF SYSTEMS:    RESPIRATORY: No shortness of breath  CARDIOVASCULAR: No chest pain  All other review of systems is negative unless indicated above.    Vital Signs Last 24 Hrs  T(C): 36.3, Max: 36.9 (05-26 @ 17:12)  T(F): 97.3, Max: 98.5 (05-26 @ 17:12)  HR: 82 (70 - 83)  BP: 119/65 (105/58 - 120/59)  BP(mean): --  RR: 18 (18 - 18)  SpO2: 92% (92% - 200%)    PHYSICAL EXAM:    Constitutional: NAD, well-developed  Respiratory: CTAB  Cardiovascular: S1 and S2, RRR  Gastrointestinal: BS+, soft, NT/ND  Extremities: Mod B LE edema  Psychiatric: Normal mood, normal affect    LABS:    05-27    137  |  106  |  9   ----------------------------<  146<H>  3.5   |  25  |  0.58    Ca    8.3<L>      27 May 2017 06:54    TPro  6.4  /  Alb  2.6<L>  /  TBili  0.5  /  DBili  x   /  AST  23  /  ALT  25  /  AlkPhos  119  05-27      LIVER FUNCTIONS - ( 27 May 2017 06:54 )  Alb: 2.6 g/dL / Pro: 6.4 gm/dL / ALK PHOS: 119 U/L / ALT: 25 U/L / AST: 23 U/L / GGT: x
Subjective:  no distress   tolerating diet  no abdominal pain      MEDICATIONS  (STANDING):  sildenafil (REVATIO) 20milliGRAM(s) Oral two times a day  apixaban 5milliGRAM(s) Oral two times a day  escitalopram 10milliGRAM(s) Oral daily  simvastatin 40milliGRAM(s) Oral at bedtime  metoprolol succinate ER 25milliGRAM(s) Oral two times a day  insulin lispro (HumaLOG) corrective regimen sliding scale  SubCutaneous every 6 hours  dextrose 5%. 1000milliLiter(s) IV Continuous <Continuous>  dextrose 50% Injectable 12.5Gram(s) IV Push once  dextrose 50% Injectable 25Gram(s) IV Push once  dextrose 50% Injectable 25Gram(s) IV Push once  pantoprazole  Injectable 40milliGRAM(s) IV Push every 12 hours  metroNIDAZOLE  IVPB  IV Intermittent   ciprofloxacin   IVPB  IV Intermittent   ciprofloxacin   IVPB 400milliGRAM(s) IV Intermittent every 12 hours  metroNIDAZOLE  IVPB 500milliGRAM(s) IV Intermittent every 8 hours    MEDICATIONS  (PRN):  acetaminophen   Tablet 650milliGRAM(s) Oral every 6 hours PRN For Temp greater than 38 C (100.4 F)  ondansetron Injectable 4milliGRAM(s) IV Push every 6 hours PRN Nausea and/or Vomiting  dextrose Gel 1Dose(s) Oral once PRN Blood Glucose LESS THAN 70 milliGRAM(s)/deciliter  glucagon  Injectable 1milliGRAM(s) IntraMuscular once PRN Glucose LESS THAN 70 milligrams/deciliter  morphine  - Injectable 1milliGRAM(s) IV Push every 4 hours PRN Moderate Pain (4 - 6)  LORazepam     Tablet 1milliGRAM(s) Oral once PRN MRCP sedation  sodium chloride 0.65% Nasal 1Spray(s) Left Nostril three times a day PRN Congestion  simethicone 80milliGRAM(s) Chew three times a day PRN Gas  benzocaine 15 mG/menthol 3.6 mG Lozenge 1Lozenge Oral every 6 hours PRN Sore Throat      Allergies    lactose (Other)  penicillin (Rash)  penicillins (Other)  sulfa drugs (Rash; Other)    Intolerances        REVIEW OF SYSTEMS:    CONSTITUTIONAL:  As per HPI.  HEENT:  Eyes:  No diplopia or blurred vision. ENT:  No earache, sore throat or runny nose.  CARDIOVASCULAR:  No pressure, squeezing, tightness, heaviness or aching about the chest, neck, axilla or epigastrium.  RESPIRATORY:  No cough, shortness of breath, PND or orthopnea.  GASTROINTESTINAL:  No nausea, vomiting or diarrhea.  GENITOURINARY:  No dysuria, frequency or urgency.  MUSCULOSKELETAL:  no joint pain, deformity, tenderness  EXTREMITIES: no clubbing cyanosis,edema  SKIN:  No change in skin, hair or nails.  NEUROLOGIC:  No paresthesias, fasciculations, seizures or weakness.  PSYCHIATRIC:  No disorder of thought or mood.  ENDOCRINE:  No heat or cold intolerance, polyuria or polydipsia.  HEMATOLOGICAL:  No easy bruising or bleedings:    Vital Signs Last 24 Hrs  T(C): 36.4, Max: 36.7 (05-27 @ 16:49)  T(F): 97.5, Max: 98 (05-27 @ 16:49)  HR: 73 (73 - 88)  BP: 116/59 (110/60 - 124/67)  BP(mean): --  RR: 19 (17 - 19)  SpO2: 97% (97% - 100%)    PHYSICAL EXAMINATION:  SKIN: no rashes  HEAD: NC/AT  EYES: PERRLA, EOMI  EARS: TM's intact  NOSE: no abnormalities  NECK:  Supple. No lymphadenopathy. Jugular venous pressure not elevated. Carotids equal.   HEART:   The cardiac impulse has a normal quality. Reg., Nl S1 and S2.  2/6 systolic murmur  CHEST:  Chest is clear to auscultation. Normal respiratory effort.  ABDOMEN:  Soft and nontender.   EXTREMITIES:  no C/C/E  NEURO: AAO x 3, no focal deficts       LABS:    05-28    140  |  107  |  10  ----------------------------<  160<H>  3.5   |  28  |  0.58    Ca    8.2<L>      28 May 2017 07:11    TPro  6.4  /  Alb  2.6<L>  /  TBili  0.5  /  DBili  x   /  AST  23  /  ALT  25  /  AlkPhos  119  05-27          RADIOLOGY & ADDITIONAL TESTS:
Subjective:  no distress or abdominal pain  tolerating diet      MEDICATIONS  (STANDING):  sildenafil (REVATIO) 20milliGRAM(s) Oral two times a day  apixaban 5milliGRAM(s) Oral two times a day  escitalopram 10milliGRAM(s) Oral daily  simvastatin 40milliGRAM(s) Oral at bedtime  metoprolol succinate ER 25milliGRAM(s) Oral two times a day  insulin lispro (HumaLOG) corrective regimen sliding scale  SubCutaneous every 6 hours  dextrose 5%. 1000milliLiter(s) IV Continuous <Continuous>  dextrose 50% Injectable 12.5Gram(s) IV Push once  dextrose 50% Injectable 25Gram(s) IV Push once  dextrose 50% Injectable 25Gram(s) IV Push once  pantoprazole  Injectable 40milliGRAM(s) IV Push every 12 hours  metroNIDAZOLE  IVPB  IV Intermittent   ciprofloxacin   IVPB  IV Intermittent   ciprofloxacin   IVPB 400milliGRAM(s) IV Intermittent every 12 hours  metroNIDAZOLE  IVPB 500milliGRAM(s) IV Intermittent every 8 hours    MEDICATIONS  (PRN):  acetaminophen   Tablet 650milliGRAM(s) Oral every 6 hours PRN For Temp greater than 38 C (100.4 F)  ondansetron Injectable 4milliGRAM(s) IV Push every 6 hours PRN Nausea and/or Vomiting  dextrose Gel 1Dose(s) Oral once PRN Blood Glucose LESS THAN 70 milliGRAM(s)/deciliter  glucagon  Injectable 1milliGRAM(s) IntraMuscular once PRN Glucose LESS THAN 70 milligrams/deciliter  morphine  - Injectable 1milliGRAM(s) IV Push every 4 hours PRN Moderate Pain (4 - 6)  LORazepam     Tablet 1milliGRAM(s) Oral once PRN MRCP sedation  sodium chloride 0.65% Nasal 1Spray(s) Left Nostril three times a day PRN Congestion  simethicone 80milliGRAM(s) Chew three times a day PRN Gas      Allergies    lactose (Other)  penicillin (Rash)  penicillins (Other)  sulfa drugs (Rash; Other)    Intolerances        REVIEW OF SYSTEMS:    CONSTITUTIONAL:  As per HPI.  HEENT:  Eyes:  No diplopia or blurred vision. ENT:  No earache, sore throat or runny nose.  CARDIOVASCULAR:  No pressure, squeezing, tightness, heaviness or aching about the chest, neck, axilla or epigastrium.  RESPIRATORY:  No cough, shortness of breath, PND or orthopnea.  GASTROINTESTINAL:  No nausea, vomiting or diarrhea.  GENITOURINARY:  No dysuria, frequency or urgency.  MUSCULOSKELETAL:  no joint pain, deformity, tenderness  EXTREMITIES: no clubbing cyanosis,edema  SKIN:  No change in skin, hair or nails.  NEUROLOGIC:  No paresthesias, fasciculations, seizures or weakness.  PSYCHIATRIC:  No disorder of thought or mood.  ENDOCRINE:  No heat or cold intolerance, polyuria or polydipsia.  HEMATOLOGICAL:  No easy bruising or bleedings:    Vital Signs Last 24 Hrs  T(C): 36.4, Max: 36.9 (05-26 @ 17:12)  T(F): 97.5, Max: 98.5 (05-26 @ 17:12)  HR: 77 (70 - 82)  BP: 101/57 (101/57 - 119/65)  BP(mean): --  RR: 18 (18 - 18)  SpO2: 94% (92% - 95%)    PHYSICAL EXAMINATION:  SKIN: no rashes  HEAD: NC/AT  EYES: PERRLA, EOMI  EARS: TM's intact  NOSE: no abnormalities  NECK:  Supple. No lymphadenopathy. Jugular venous pressure not elevated. Carotids equal.   HEART:   The cardiac impulse has a normal quality. Reg., Nl S1 and S2. 2/6 systolic murmur  CHEST:  Chest is clear to auscultation. Normal respiratory effort.  ABDOMEN:  Soft and nontender.   EXTREMITIES:  no C/C/E  NEURO: AAO x 3, no focal deficts       LABS:    05-27    137  |  106  |  9   ----------------------------<  146<H>  3.5   |  25  |  0.58    Ca    8.3<L>      27 May 2017 06:54    TPro  6.4  /  Alb  2.6<L>  /  TBili  0.5  /  DBili  x   /  AST  23  /  ALT  25  /  AlkPhos  119  05-27          RADIOLOGY & ADDITIONAL TESTS:
Subjective:  no distress tolerating diet  no abdominal pain      MEDICATIONS  (STANDING):  sildenafil (REVATIO) 20milliGRAM(s) Oral two times a day  apixaban 5milliGRAM(s) Oral two times a day  escitalopram 10milliGRAM(s) Oral daily  simvastatin 40milliGRAM(s) Oral at bedtime  metoprolol succinate ER 25milliGRAM(s) Oral two times a day  insulin lispro (HumaLOG) corrective regimen sliding scale  SubCutaneous every 6 hours  dextrose 5%. 1000milliLiter(s) IV Continuous <Continuous>  dextrose 50% Injectable 12.5Gram(s) IV Push once  dextrose 50% Injectable 25Gram(s) IV Push once  dextrose 50% Injectable 25Gram(s) IV Push once  pantoprazole  Injectable 40milliGRAM(s) IV Push every 12 hours  metroNIDAZOLE  IVPB  IV Intermittent   ciprofloxacin   IVPB  IV Intermittent   ciprofloxacin   IVPB 400milliGRAM(s) IV Intermittent every 12 hours  metroNIDAZOLE  IVPB 500milliGRAM(s) IV Intermittent every 8 hours    MEDICATIONS  (PRN):  acetaminophen   Tablet 650milliGRAM(s) Oral every 6 hours PRN For Temp greater than 38 C (100.4 F)  ondansetron Injectable 4milliGRAM(s) IV Push every 6 hours PRN Nausea and/or Vomiting  dextrose Gel 1Dose(s) Oral once PRN Blood Glucose LESS THAN 70 milliGRAM(s)/deciliter  glucagon  Injectable 1milliGRAM(s) IntraMuscular once PRN Glucose LESS THAN 70 milligrams/deciliter  morphine  - Injectable 1milliGRAM(s) IV Push every 4 hours PRN Moderate Pain (4 - 6)  LORazepam     Tablet 1milliGRAM(s) Oral once PRN MRCP sedation  sodium chloride 0.65% Nasal 1Spray(s) Left Nostril three times a day PRN Congestion  simethicone 80milliGRAM(s) Chew three times a day PRN Gas  benzocaine 15 mG/menthol 3.6 mG Lozenge 1Lozenge Oral every 6 hours PRN Sore Throat      Allergies    lactose (Other)  penicillin (Rash)  penicillins (Other)  sulfa drugs (Rash; Other)    Intolerances        REVIEW OF SYSTEMS:    CONSTITUTIONAL:  As per HPI.  HEENT:  Eyes:  No diplopia or blurred vision. ENT:  No earache, sore throat or runny nose.  CARDIOVASCULAR:  No pressure, squeezing, tightness, heaviness or aching about the chest, neck, axilla or epigastrium.  RESPIRATORY:  No cough, shortness of breath, PND or orthopnea.  GASTROINTESTINAL:  No nausea, vomiting or diarrhea.  GENITOURINARY:  No dysuria, frequency or urgency.  MUSCULOSKELETAL:  no joint pain, deformity, tenderness  EXTREMITIES: no clubbing cyanosis,edema  SKIN:  No change in skin, hair or nails.  NEUROLOGIC:  No paresthesias, fasciculations, seizures or weakness.  PSYCHIATRIC:  No disorder of thought or mood.  ENDOCRINE:  No heat or cold intolerance, polyuria or polydipsia.  HEMATOLOGICAL:  No easy bruising or bleedings:    Vital Signs Last 24 Hrs  T(C): 36.4, Max: 36.7 (05-27 @ 16:49)  T(F): 97.5, Max: 98 (05-27 @ 16:49)  HR: 73 (73 - 88)  BP: 116/59 (110/60 - 124/67)  BP(mean): --  RR: 19 (17 - 19)  SpO2: 97% (97% - 100%)    PHYSICAL EXAMINATION:  SKIN: no rashes  HEAD: NC/AT  EYES: PERRLA, EOMI  EARS: TM's intact  NOSE: no abnormalities  NECK:  Supple. No lymphadenopathy. Jugular venous pressure not elevated. Carotids equal.   HEART:   The cardiac impulse has a normal quality. Reg., Nl S1 and S2.  2/6 systolic murmur  CHEST:  Chest is clear to auscultation. Normal respiratory effort.  ABDOMEN:  Soft and nontender.   EXTREMITIES:  no C/C/E  NEURO: AAO x 3, no focal deficts       LABS:    05-28    140  |  107  |  10  ----------------------------<  160<H>  3.5   |  28  |  0.58    Ca    8.2<L>      28 May 2017 07:11    TPro  6.4  /  Alb  2.6<L>  /  TBili  0.5  /  DBili  x   /  AST  23  /  ALT  25  /  AlkPhos  119  05-27          RADIOLOGY & ADDITIONAL TESTS:
Patient is a 89y old  Female who presents with a chief complaint of Abdominal pain (24 May 2017 17:19)      Subective:  Feels much better. No pain. Breathing comfortable.    PAST MEDICAL & SURGICAL HISTORY:  Afib  CAD (coronary artery disease)  Hypertension  History of Osteoarthritis  History of Atrial Fibrillation  GERD (Gastroesophageal Reflux Disease)  Dyslipidemia  Diabetes Mellitus Type II  Chronic Atrial Fibrillation  History of appendectomy  History of cholecystectomy  H/O: Knee Surgery- right meniscus  H/O: Hysterectomy      MEDICATIONS  (STANDING):  sildenafil (REVATIO) 20milliGRAM(s) Oral two times a day  apixaban 5milliGRAM(s) Oral two times a day  escitalopram 10milliGRAM(s) Oral daily  simvastatin 40milliGRAM(s) Oral at bedtime  metoprolol succinate ER 25milliGRAM(s) Oral two times a day  insulin lispro (HumaLOG) corrective regimen sliding scale  SubCutaneous every 6 hours  dextrose 5%. 1000milliLiter(s) IV Continuous <Continuous>  dextrose 50% Injectable 12.5Gram(s) IV Push once  dextrose 50% Injectable 25Gram(s) IV Push once  dextrose 50% Injectable 25Gram(s) IV Push once  pantoprazole  Injectable 40milliGRAM(s) IV Push every 12 hours  metroNIDAZOLE  IVPB  IV Intermittent   ciprofloxacin   IVPB  IV Intermittent   ciprofloxacin   IVPB 400milliGRAM(s) IV Intermittent every 12 hours  metroNIDAZOLE  IVPB 500milliGRAM(s) IV Intermittent every 8 hours  sodium chloride 0.9%. 1000milliLiter(s) IV Continuous <Continuous>    MEDICATIONS  (PRN):  acetaminophen   Tablet 650milliGRAM(s) Oral every 6 hours PRN For Temp greater than 38 C (100.4 F)  ondansetron Injectable 4milliGRAM(s) IV Push every 6 hours PRN Nausea and/or Vomiting  dextrose Gel 1Dose(s) Oral once PRN Blood Glucose LESS THAN 70 milliGRAM(s)/deciliter  glucagon  Injectable 1milliGRAM(s) IntraMuscular once PRN Glucose LESS THAN 70 milligrams/deciliter  morphine  - Injectable 1milliGRAM(s) IV Push every 4 hours PRN Moderate Pain (4 - 6)      REVIEW OF SYSTEMS:    RESPIRATORY: No shortness of breath  CARDIOVASCULAR: No chest pain  All other review of systems is negative unless indicated above.    Vital Signs Last 24 Hrs  T(C): 37.1, Max: 37.1 (05-25 @ 06:34)  T(F): 98.7, Max: 98.7 (05-25 @ 06:34)  HR: 67 (64 - 72)  BP: 117/64 (110/60 - 134/69)  BP(mean): --  RR: 18 (16 - 18)  SpO2: 98% (96% - 100%)    PHYSICAL EXAM:    Constitutional: NAD, well-developed  Respiratory: CTAB  Cardiovascular: S1 and S2, RRR  Gastrointestinal: BS+, soft, NT/ND  Psychiatric: Normal mood, normal affect    LABS:                        11.4   9.7   )-----------( 158      ( 24 May 2017 11:39 )             34.9     05-24    137  |  101  |  13  ----------------------------<  123<H>  3.4<L>   |  27  |  0.72    Ca    8.7      24 May 2017 11:39    TPro  7.4  /  Alb  3.0<L>  /  TBili  0.9  /  DBili  x   /  AST  39<H>  /  ALT  35  /  AlkPhos  106  05-24      LIVER FUNCTIONS - ( 24 May 2017 11:39 )  Alb: 3.0 g/dL / Pro: 7.4 gm/dL / ALK PHOS: 106 U/L / ALT: 35 U/L / AST: 39 U/L / GGT: x

## 2017-05-29 NOTE — PROGRESS NOTE ADULT - PROBLEM SELECTOR PROBLEM 1
Acute pancreatitis, unspecified complication status, unspecified pancreatitis type

## 2017-05-30 ENCOUNTER — OTHER (OUTPATIENT)
Age: 82
End: 2017-05-30

## 2017-05-30 DIAGNOSIS — K85.30 DRUG INDUCED ACUTE PANCREATITIS WITHOUT NECROSIS OR INFECTION: ICD-10-CM

## 2017-05-31 ENCOUNTER — RECORD ABSTRACTING (OUTPATIENT)
Age: 82
End: 2017-05-31

## 2017-05-31 ENCOUNTER — INPATIENT (INPATIENT)
Facility: HOSPITAL | Age: 82
LOS: 2 days | Discharge: HOME HEALTH | End: 2017-06-03
Attending: HOSPITALIST | Admitting: INTERNAL MEDICINE
Payer: MEDICARE

## 2017-05-31 VITALS
DIASTOLIC BLOOD PRESSURE: 72 MMHG | WEIGHT: 179.02 LBS | HEART RATE: 81 BPM | SYSTOLIC BLOOD PRESSURE: 133 MMHG | HEIGHT: 64 IN | TEMPERATURE: 98 F | RESPIRATION RATE: 16 BRPM | OXYGEN SATURATION: 95 %

## 2017-05-31 DIAGNOSIS — Z92.89 PERSONAL HISTORY OF OTHER MEDICAL TREATMENT: ICD-10-CM

## 2017-05-31 DIAGNOSIS — H91.93 UNSPECIFIED HEARING LOSS, BILATERAL: ICD-10-CM

## 2017-05-31 DIAGNOSIS — Z63.4 DISAPPEARANCE AND DEATH OF FAMILY MEMBER: ICD-10-CM

## 2017-05-31 DIAGNOSIS — Z90.49 ACQUIRED ABSENCE OF OTHER SPECIFIED PARTS OF DIGESTIVE TRACT: Chronic | ICD-10-CM

## 2017-05-31 DIAGNOSIS — I95.9 HYPOTENSION, UNSPECIFIED: ICD-10-CM

## 2017-05-31 DIAGNOSIS — M85.80 OTHER SPECIFIED DISORDERS OF BONE DENSITY AND STRUCTURE, UNSPECIFIED SITE: ICD-10-CM

## 2017-05-31 DIAGNOSIS — R09.82 POSTNASAL DRIP: ICD-10-CM

## 2017-05-31 DIAGNOSIS — R04.2 HEMOPTYSIS: ICD-10-CM

## 2017-05-31 DIAGNOSIS — E86.0 DEHYDRATION: ICD-10-CM

## 2017-05-31 DIAGNOSIS — I44.7 LEFT BUNDLE-BRANCH BLOCK, UNSPECIFIED: ICD-10-CM

## 2017-05-31 DIAGNOSIS — Z72.3 LACK OF PHYSICAL EXERCISE: ICD-10-CM

## 2017-05-31 DIAGNOSIS — R42 DIZZINESS AND GIDDINESS: ICD-10-CM

## 2017-05-31 DIAGNOSIS — Z80.0 FAMILY HISTORY OF MALIGNANT NEOPLASM OF DIGESTIVE ORGANS: ICD-10-CM

## 2017-05-31 DIAGNOSIS — K59.00 CONSTIPATION, UNSPECIFIED: ICD-10-CM

## 2017-05-31 DIAGNOSIS — M17.12 UNILATERAL PRIMARY OSTEOARTHRITIS, LEFT KNEE: ICD-10-CM

## 2017-05-31 DIAGNOSIS — S80.10XA CONTUSION OF UNSPECIFIED LOWER LEG, INITIAL ENCOUNTER: ICD-10-CM

## 2017-05-31 DIAGNOSIS — I65.29 OCCLUSION AND STENOSIS OF UNSPECIFIED CAROTID ARTERY: ICD-10-CM

## 2017-05-31 LAB
ADD ON TEST-SPECIMEN IN LAB: SIGNIFICANT CHANGE UP
ADD ON TEST-SPECIMEN IN LAB: SIGNIFICANT CHANGE UP
ALBUMIN SERPL ELPH-MCNC: 2.8 G/DL — LOW (ref 3.3–5)
ALP SERPL-CCNC: 104 U/L — SIGNIFICANT CHANGE UP (ref 40–120)
ALT FLD-CCNC: 19 U/L — SIGNIFICANT CHANGE UP (ref 12–78)
ANION GAP SERPL CALC-SCNC: 5 MMOL/L — SIGNIFICANT CHANGE UP (ref 5–17)
APPEARANCE UR: CLEAR — SIGNIFICANT CHANGE UP
APTT BLD: 36 SEC — SIGNIFICANT CHANGE UP (ref 27.5–37.4)
AST SERPL-CCNC: 20 U/L — SIGNIFICANT CHANGE UP (ref 15–37)
BASOPHILS # BLD AUTO: 0.1 K/UL — SIGNIFICANT CHANGE UP (ref 0–0.2)
BASOPHILS NFR BLD AUTO: 1 % — SIGNIFICANT CHANGE UP (ref 0–2)
BILIRUB SERPL-MCNC: 0.4 MG/DL — SIGNIFICANT CHANGE UP (ref 0.2–1.2)
BILIRUB UR-MCNC: NEGATIVE — SIGNIFICANT CHANGE UP
BUN SERPL-MCNC: 6 MG/DL — LOW (ref 7–23)
CALCIUM SERPL-MCNC: 8.7 MG/DL — SIGNIFICANT CHANGE UP (ref 8.5–10.1)
CHLORIDE SERPL-SCNC: 104 MMOL/L — SIGNIFICANT CHANGE UP (ref 96–108)
CO2 SERPL-SCNC: 30 MMOL/L — SIGNIFICANT CHANGE UP (ref 22–31)
COLOR SPEC: YELLOW — SIGNIFICANT CHANGE UP
CREAT SERPL-MCNC: 0.63 MG/DL — SIGNIFICANT CHANGE UP (ref 0.5–1.3)
DIFF PNL FLD: NEGATIVE — SIGNIFICANT CHANGE UP
EOSINOPHIL # BLD AUTO: 0.4 K/UL — SIGNIFICANT CHANGE UP (ref 0–0.5)
EOSINOPHIL NFR BLD AUTO: 6 % — SIGNIFICANT CHANGE UP (ref 0–6)
GLUCOSE SERPL-MCNC: 161 MG/DL — HIGH (ref 70–99)
GLUCOSE UR QL: NEGATIVE MG/DL — SIGNIFICANT CHANGE UP
HCT VFR BLD CALC: 36 % — SIGNIFICANT CHANGE UP (ref 34.5–45)
HGB BLD-MCNC: 11.8 G/DL — SIGNIFICANT CHANGE UP (ref 11.5–15.5)
INR BLD: 2.1 RATIO — HIGH (ref 0.88–1.16)
KETONES UR-MCNC: NEGATIVE — SIGNIFICANT CHANGE UP
LEUKOCYTE ESTERASE UR-ACNC: NEGATIVE — SIGNIFICANT CHANGE UP
LIDOCAIN IGE QN: 313 U/L — SIGNIFICANT CHANGE UP (ref 73–393)
LYMPHOCYTES # BLD AUTO: 0.9 K/UL — LOW (ref 1–3.3)
LYMPHOCYTES # BLD AUTO: 15 % — SIGNIFICANT CHANGE UP (ref 13–44)
MAGNESIUM SERPL-MCNC: 1.6 MG/DL — SIGNIFICANT CHANGE UP (ref 1.6–2.6)
MANUAL DIF COMMENT BLD-IMP: SIGNIFICANT CHANGE UP
MCHC RBC-ENTMCNC: 30.8 PG — SIGNIFICANT CHANGE UP (ref 27–34)
MCHC RBC-ENTMCNC: 32.8 GM/DL — SIGNIFICANT CHANGE UP (ref 32–36)
MCV RBC AUTO: 94 FL — SIGNIFICANT CHANGE UP (ref 80–100)
MONOCYTES # BLD AUTO: 0.7 K/UL — SIGNIFICANT CHANGE UP (ref 0–0.9)
MONOCYTES NFR BLD AUTO: 11 % — SIGNIFICANT CHANGE UP (ref 2–14)
NEUTROPHILS # BLD AUTO: 3.4 K/UL — SIGNIFICANT CHANGE UP (ref 1.8–7.4)
NEUTROPHILS NFR BLD AUTO: 67 % — SIGNIFICANT CHANGE UP (ref 43–77)
NITRITE UR-MCNC: NEGATIVE — SIGNIFICANT CHANGE UP
NT-PROBNP SERPL-SCNC: 3626 PG/ML — HIGH (ref 0–450)
PH UR: 6.5 — SIGNIFICANT CHANGE UP (ref 5–8)
PLAT MORPH BLD: NORMAL — SIGNIFICANT CHANGE UP
PLATELET # BLD AUTO: 169 K/UL — SIGNIFICANT CHANGE UP (ref 150–400)
POTASSIUM SERPL-MCNC: 4 MMOL/L — SIGNIFICANT CHANGE UP (ref 3.5–5.3)
POTASSIUM SERPL-SCNC: 4 MMOL/L — SIGNIFICANT CHANGE UP (ref 3.5–5.3)
PROT SERPL-MCNC: 7.2 GM/DL — SIGNIFICANT CHANGE UP (ref 6–8.3)
PROT UR-MCNC: NEGATIVE MG/DL — SIGNIFICANT CHANGE UP
PROTHROM AB SERPL-ACNC: 23 SEC — HIGH (ref 9.8–12.7)
RBC # BLD: 3.82 M/UL — SIGNIFICANT CHANGE UP (ref 3.8–5.2)
RBC # FLD: 14.6 % — HIGH (ref 10.3–14.5)
RBC BLD AUTO: SIGNIFICANT CHANGE UP
SODIUM SERPL-SCNC: 139 MMOL/L — SIGNIFICANT CHANGE UP (ref 135–145)
SP GR SPEC: 1 — LOW (ref 1.01–1.02)
T4 FREE SERPL-MCNC: 1.07 NG/DL — SIGNIFICANT CHANGE UP (ref 0.76–1.46)
TROPONIN I SERPL-MCNC: <0.015 NG/ML — SIGNIFICANT CHANGE UP (ref 0.01–0.04)
TSH SERPL-MCNC: 6.38 UIU/ML — HIGH (ref 0.36–3.74)
UROBILINOGEN FLD QL: NEGATIVE MG/DL — SIGNIFICANT CHANGE UP
VARIANT LYMPHS # BLD: 1 % — SIGNIFICANT CHANGE UP (ref 0–6)
WBC # BLD: 5.4 K/UL — SIGNIFICANT CHANGE UP (ref 3.8–10.5)
WBC # FLD AUTO: 5.4 K/UL — SIGNIFICANT CHANGE UP (ref 3.8–10.5)

## 2017-05-31 PROCEDURE — 71010: CPT | Mod: 26

## 2017-05-31 PROCEDURE — 74177 CT ABD & PELVIS W/CONTRAST: CPT | Mod: 26

## 2017-05-31 PROCEDURE — 99285 EMERGENCY DEPT VISIT HI MDM: CPT

## 2017-05-31 PROCEDURE — 93010 ELECTROCARDIOGRAM REPORT: CPT

## 2017-05-31 RX ORDER — METRONIDAZOLE 500 MG
500 TABLET ORAL ONCE
Qty: 0 | Refills: 0 | Status: COMPLETED | OUTPATIENT
Start: 2017-05-31 | End: 2017-05-31

## 2017-05-31 RX ORDER — DEXTROSE 50 % IN WATER 50 %
12.5 SYRINGE (ML) INTRAVENOUS ONCE
Qty: 0 | Refills: 0 | Status: DISCONTINUED | OUTPATIENT
Start: 2017-05-31 | End: 2017-06-03

## 2017-05-31 RX ORDER — DEXTROSE 50 % IN WATER 50 %
1 SYRINGE (ML) INTRAVENOUS ONCE
Qty: 0 | Refills: 0 | Status: DISCONTINUED | OUTPATIENT
Start: 2017-05-31 | End: 2017-06-03

## 2017-05-31 RX ORDER — SIMVASTATIN 20 MG/1
40 TABLET, FILM COATED ORAL AT BEDTIME
Qty: 0 | Refills: 0 | Status: DISCONTINUED | OUTPATIENT
Start: 2017-05-31 | End: 2017-06-03

## 2017-05-31 RX ORDER — SODIUM CHLORIDE 9 MG/ML
1000 INJECTION, SOLUTION INTRAVENOUS
Qty: 0 | Refills: 0 | Status: DISCONTINUED | OUTPATIENT
Start: 2017-05-31 | End: 2017-06-03

## 2017-05-31 RX ORDER — INSULIN LISPRO 100/ML
VIAL (ML) SUBCUTANEOUS
Qty: 0 | Refills: 0 | Status: DISCONTINUED | OUTPATIENT
Start: 2017-05-31 | End: 2017-06-03

## 2017-05-31 RX ORDER — METOPROLOL TARTRATE 50 MG
25 TABLET ORAL
Qty: 0 | Refills: 0 | Status: DISCONTINUED | OUTPATIENT
Start: 2017-05-31 | End: 2017-06-03

## 2017-05-31 RX ORDER — METRONIDAZOLE 500 MG
TABLET ORAL
Qty: 0 | Refills: 0 | Status: DISCONTINUED | OUTPATIENT
Start: 2017-05-31 | End: 2017-05-31

## 2017-05-31 RX ORDER — ESCITALOPRAM OXALATE 10 MG/1
10 TABLET, FILM COATED ORAL AT BEDTIME
Qty: 0 | Refills: 0 | Status: DISCONTINUED | OUTPATIENT
Start: 2017-05-31 | End: 2017-06-03

## 2017-05-31 RX ORDER — INSULIN LISPRO 100/ML
VIAL (ML) SUBCUTANEOUS AT BEDTIME
Qty: 0 | Refills: 0 | Status: DISCONTINUED | OUTPATIENT
Start: 2017-05-31 | End: 2017-06-03

## 2017-05-31 RX ORDER — SODIUM CHLORIDE 9 MG/ML
3 INJECTION INTRAMUSCULAR; INTRAVENOUS; SUBCUTANEOUS ONCE
Qty: 0 | Refills: 0 | Status: COMPLETED | OUTPATIENT
Start: 2017-05-31 | End: 2017-05-31

## 2017-05-31 RX ORDER — VANCOMYCIN HCL 1 G
125 VIAL (EA) INTRAVENOUS EVERY 6 HOURS
Qty: 0 | Refills: 0 | Status: DISCONTINUED | OUTPATIENT
Start: 2017-05-31 | End: 2017-06-01

## 2017-05-31 RX ORDER — DEXTROSE 50 % IN WATER 50 %
25 SYRINGE (ML) INTRAVENOUS ONCE
Qty: 0 | Refills: 0 | Status: DISCONTINUED | OUTPATIENT
Start: 2017-05-31 | End: 2017-06-03

## 2017-05-31 RX ORDER — CHOLECALCIFEROL (VITAMIN D3) 125 MCG
1000 CAPSULE ORAL DAILY
Qty: 0 | Refills: 0 | Status: DISCONTINUED | OUTPATIENT
Start: 2017-05-31 | End: 2017-06-03

## 2017-05-31 RX ORDER — FUROSEMIDE 40 MG
40 TABLET ORAL DAILY
Qty: 0 | Refills: 0 | Status: DISCONTINUED | OUTPATIENT
Start: 2017-05-31 | End: 2017-06-01

## 2017-05-31 RX ORDER — GLUCAGON INJECTION, SOLUTION 0.5 MG/.1ML
1 INJECTION, SOLUTION SUBCUTANEOUS ONCE
Qty: 0 | Refills: 0 | Status: DISCONTINUED | OUTPATIENT
Start: 2017-05-31 | End: 2017-06-03

## 2017-05-31 RX ORDER — METRONIDAZOLE 500 MG
500 TABLET ORAL EVERY 8 HOURS
Qty: 0 | Refills: 0 | Status: DISCONTINUED | OUTPATIENT
Start: 2017-05-31 | End: 2017-05-31

## 2017-05-31 RX ORDER — APIXABAN 2.5 MG/1
5 TABLET, FILM COATED ORAL
Qty: 0 | Refills: 0 | Status: DISCONTINUED | OUTPATIENT
Start: 2017-05-31 | End: 2017-06-03

## 2017-05-31 RX ORDER — SODIUM CHLORIDE 9 MG/ML
1000 INJECTION INTRAMUSCULAR; INTRAVENOUS; SUBCUTANEOUS ONCE
Qty: 0 | Refills: 0 | Status: COMPLETED | OUTPATIENT
Start: 2017-05-31 | End: 2017-05-31

## 2017-05-31 RX ORDER — PANTOPRAZOLE SODIUM 20 MG/1
40 TABLET, DELAYED RELEASE ORAL
Qty: 0 | Refills: 0 | Status: DISCONTINUED | OUTPATIENT
Start: 2017-05-31 | End: 2017-06-03

## 2017-05-31 RX ADMIN — SODIUM CHLORIDE 3 MILLILITER(S): 9 INJECTION INTRAMUSCULAR; INTRAVENOUS; SUBCUTANEOUS at 14:07

## 2017-05-31 RX ADMIN — Medication 1000 UNIT(S): at 22:49

## 2017-05-31 RX ADMIN — Medication 125 MILLIGRAM(S): at 23:04

## 2017-05-31 RX ADMIN — SIMVASTATIN 40 MILLIGRAM(S): 20 TABLET, FILM COATED ORAL at 22:49

## 2017-05-31 RX ADMIN — Medication 100 MILLIGRAM(S): at 14:07

## 2017-05-31 RX ADMIN — Medication 20 MILLIGRAM(S): at 22:48

## 2017-05-31 RX ADMIN — ESCITALOPRAM OXALATE 10 MILLIGRAM(S): 10 TABLET, FILM COATED ORAL at 22:49

## 2017-05-31 RX ADMIN — APIXABAN 5 MILLIGRAM(S): 2.5 TABLET, FILM COATED ORAL at 22:48

## 2017-05-31 RX ADMIN — Medication 125 MILLIGRAM(S): at 15:44

## 2017-05-31 SDOH — SOCIAL STABILITY - SOCIAL INSECURITY: DISSAPEARANCE AND DEATH OF FAMILY MEMBER: Z63.4

## 2017-05-31 NOTE — H&P ADULT - ASSESSMENT
ACUTE AND NEW ONSET DIARRHEA IN PATIENT WHO WAS RECENTLY HOSPITALIZED AND TREATED WITH ABXs.  CLINICAL CONCERN IS FOR C.DIFF COLITIS.  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 Chew  -  GI consult - Dr. Mobley  -  check daily weights, Is/Os  -  IV Lasix  -  d/c Metformin  -  ADA diet, Humalog SSI, check sugars qAC and HS  -  check free T4 level  -  DVT prophylaxis - venodynes and Eliquis  -  Full Code  -  repeat labs in am    d/w patient, Dr. Hernandez, Dr. Mobley, Dr. Mojica

## 2017-05-31 NOTE — H&P ADULT - NSHPPHYSICALEXAM_GEN_ALL_CORE
HEENT:  pupils equal and reactive, EOMI, no oropharyngeal lesions, erythema, exudates, oral thrush    NECK:   supple, no carotid bruits, no palpable lymph nodes, no thyromegaly    CV:  +S1, +S2, regular, no murmurs or rubs    RESP:   lungs with rales 1/2 up on both sides, no wheezing, no rhonchi    BREAST:  not examined    GI:  abdomen soft, non-tender, non-distended, normal BS, no bruits, no abdominal masses, no palpable masses    RECTAL:  not examined    :  not examined    MSK:   normal muscle tone, no atrophy, no rigidity, no contractions    EXT:   no clubbing, no cyanosis, 2-3+ LE edema, no calf pain, swelling or erythema    VASCULAR:  pulses equal and symmetric in the upper and lower extremities    NEURO:  AAOX3, no focal neurological deficits, follows all commands, able to move extremities spontaneously    SKIN:  no ulcers, lesions or rashes

## 2017-05-31 NOTE — ED PROVIDER NOTE - NS ED MD SCRIBE ATTENDING SCRIBE SECTIONS
PROGRESS NOTE/PAST MEDICAL/SURGICAL/SOCIAL HISTORY/REVIEW OF SYSTEMS/HISTORY OF PRESENT ILLNESS/PHYSICAL EXAM/RESULTS/DISPOSITION

## 2017-05-31 NOTE — CONSULT NOTE ADULT - ASSESSMENT
Imp:  Acute diarrhea after recent admission for diverticulitis and/or pancreatitis. C. diff most obvious but could be non-c. diff related antibiotic induced diarrhea.    Rec:  Check C. diff  Needs diuresis but cautiously in setting of diarrhea  Empiric abx for c. diff reasonable pending stool PCR

## 2017-05-31 NOTE — ED PROVIDER NOTE - MEDICAL DECISION MAKING DETAILS
Elderly female presenting with diarrhea s/p abx treatment for acute pancreatitis. Will obtain labs, CT, and reassess.

## 2017-05-31 NOTE — ED ADULT NURSE NOTE - OBJECTIVE STATEMENT
Pt presents to ED c/o 1 large episode of diarrhea last night.  Pt states she was on long term antibiotics. no c/o abd pain, N/V.  Pt noted to have +3 LE edema.

## 2017-05-31 NOTE — H&P ADULT - HISTORY OF PRESENT ILLNESS
89 F with Hx of Chronic A-fib on A/C with Eliquis, Chronic Diastolic CHF, Type 2 Diabetes Mellitus, Pulmonary HTN was recently admitted to  from 5/24-5/29 with abdominal pain which was due to acute right sided diverticulitis.  She was treated with 5 days of IV Cipro and Flagyl with good clinical improvement.  She was also found to have an elevated lipase level of 1400 which was felt to be due to medication-induced pancreatitis.  She improved and was discharged home on 5/29.  Since being home, she describes the development of diarrhea, going to the bathroom several times a day, diarrhea was non-bloody and not associated with abdominal pain, nausea, vomiting.  She reports subjective chills but denies any fevers.  She reports having completed antibiotics.  She denies any recent travel or sick contacts.  In addition, she reports increased weight gain, leg swelling and dfifficulty putting her shoes own as her shoes were fitting tight.  She has had to sleep on the chair and has difficulty laying flat due to shortness of breath.  She denies any palpitations, chest pain, cough, fever, or purulent sputum.     In the ED, she had a CT of the A/P which showed no acute pathology.  Labs were essentially normal.  CXR showed bilateral pulm vascular congestion.  Stool for C.diff was sent and she was given a dose of PO Vanco and IV Flagyl    PAST MEDICAL HISTORY:  Chronic A-fib on A/C with Eliquis  HTN  GERD  Osteoarthritis  Type 2 Diabetes Mellitus  Hyperlipidemia  Severe Pulmonary HTN on Sildenafil  Chronic Diastolic CHF, last ECHO was in 2016 with EF 55-60%  CAD, s/p BMS to LAD 2011  Hx of Gluteal Hematoma after a fall  Varicose Veins  Recent right sided Diverticulitis 5/24-5/29    PAST SURGICAL HISTORY:  s/p appendectomy  s/p cholecystectomy  s/p left knee surgery  s/p hysterectomy  s/p TAVR    FAMILY HISTORY:   non-contributory to the patient's current presentation

## 2017-05-31 NOTE — ED PROVIDER NOTE - OBJECTIVE STATEMENT
88 yo F h/o afib, cad, chf, dm, htn, hld, cardiac stent, s/p appendectomy, cholecystectomy, hysterectomy, TAVR, recently discharged from hospital with acute pancreatitis 2 days ago, presents with diarrhea for several days. Denies abd pain, nausea, vomiting.

## 2017-05-31 NOTE — H&P ADULT - NSHPLABSRESULTS_GEN_ALL_CORE
11.8   5.4   )-----------( 169      ( 31 May 2017 11:45 )             36.0         139  |  104  |  6<L>  ----------------------------<  161<H>  4.0   |  30  |  0.63    Ca    8.7      31 May 2017 11:45  Mg     1.6         TPro  7.2  /  Alb  2.8<L>  /  TBili  0.4  /  DBili  x   /  AST  20  /  ALT  19  /  AlkPhos  104      CARDIAC MARKERS ( 31 May 2017 14:30 )  <0.015 ng/mL / x     / x     / x     / x      CARDIAC MARKERS ( 31 May 2017 11:45 )  <0.015 ng/mL / x     / x     / x     / x        LIVER FUNCTIONS - ( 31 May 2017 11:45 )  Alb: 2.8 g/dL / Pro: 7.2 gm/dL / ALK PHOS: 104 U/L / ALT: 19 U/L / AST: 20 U/L / GGT: x           PT/INR - ( 31 May 2017 12:54 )   PT: 23.0 sec;   INR: 2.10 ratio      PTT - ( 31 May 2017 12:54 )  PTT:36.0 sec  Urinalysis Basic - ( 31 May 2017 12:11 )    Color: Yellow / Appearance: Clear / S.005 / pH: x  Gluc: x / Ketone: Negative  / Bili: Negative / Urobili: Negative mg/dL   Blood: x / Protein: Negative mg/dL / Nitrite: Negative   Leuk Esterase: Negative / RBC: x / WBC x   Sq Epi: x / Non Sq Epi: x / Bacteria: x    Blood, Urine: Negative ( @ 12:11)    CXR - bilateral pulm vascular congestion and small left pleural effusion, findings new compared to CXR from     CT A/P:  IMPRESSION: No acute abdominal pathology. Resolution of previously seen   right sided abdominal inflammatory changes.

## 2017-05-31 NOTE — ED PROVIDER NOTE - PROGRESS NOTE DETAILS
pt was not given aggressive IV fluids 30 cc/kg ns because of concern of fluid over load with crakles on exam and biilateral lower ext edema

## 2017-06-01 ENCOUNTER — APPOINTMENT (OUTPATIENT)
Dept: INTERNAL MEDICINE | Facility: CLINIC | Age: 82
End: 2017-06-01

## 2017-06-01 DIAGNOSIS — K57.92 DIVERTICULITIS OF INTESTINE, PART UNSPECIFIED, WITHOUT PERFORATION OR ABSCESS WITHOUT BLEEDING: ICD-10-CM

## 2017-06-01 DIAGNOSIS — E87.6 HYPOKALEMIA: ICD-10-CM

## 2017-06-01 DIAGNOSIS — T50.2X5A ADVERSE EFFECT OF CARBONIC-ANHYDRASE INHIBITORS, BENZOTHIADIAZIDES AND OTHER DIURETICS, INITIAL ENCOUNTER: ICD-10-CM

## 2017-06-01 DIAGNOSIS — I50.32 CHRONIC DIASTOLIC (CONGESTIVE) HEART FAILURE: ICD-10-CM

## 2017-06-01 DIAGNOSIS — K75.81 NONALCOHOLIC STEATOHEPATITIS (NASH): ICD-10-CM

## 2017-06-01 DIAGNOSIS — A09 INFECTIOUS GASTROENTERITIS AND COLITIS, UNSPECIFIED: ICD-10-CM

## 2017-06-01 DIAGNOSIS — I48.2 CHRONIC ATRIAL FIBRILLATION: ICD-10-CM

## 2017-06-01 DIAGNOSIS — I50.33 ACUTE ON CHRONIC DIASTOLIC (CONGESTIVE) HEART FAILURE: ICD-10-CM

## 2017-06-01 DIAGNOSIS — K85.90 ACUTE PANCREATITIS WITHOUT NECROSIS OR INFECTION, UNSPECIFIED: ICD-10-CM

## 2017-06-01 DIAGNOSIS — I25.10 ATHEROSCLEROTIC HEART DISEASE OF NATIVE CORONARY ARTERY WITHOUT ANGINA PECTORIS: ICD-10-CM

## 2017-06-01 DIAGNOSIS — Z79.01 LONG TERM (CURRENT) USE OF ANTICOAGULANTS: ICD-10-CM

## 2017-06-01 DIAGNOSIS — T38.3X5A ADVERSE EFFECT OF INSULIN AND ORAL HYPOGLYCEMIC [ANTIDIABETIC] DRUGS, INITIAL ENCOUNTER: ICD-10-CM

## 2017-06-01 DIAGNOSIS — Z95.5 PRESENCE OF CORONARY ANGIOPLASTY IMPLANT AND GRAFT: ICD-10-CM

## 2017-06-01 DIAGNOSIS — I27.2 OTHER SECONDARY PULMONARY HYPERTENSION: ICD-10-CM

## 2017-06-01 DIAGNOSIS — E11.9 TYPE 2 DIABETES MELLITUS WITHOUT COMPLICATIONS: ICD-10-CM

## 2017-06-01 DIAGNOSIS — I11.0 HYPERTENSIVE HEART DISEASE WITH HEART FAILURE: ICD-10-CM

## 2017-06-01 DIAGNOSIS — R10.9 UNSPECIFIED ABDOMINAL PAIN: ICD-10-CM

## 2017-06-01 DIAGNOSIS — Z95.2 PRESENCE OF PROSTHETIC HEART VALVE: ICD-10-CM

## 2017-06-01 DIAGNOSIS — I10 ESSENTIAL (PRIMARY) HYPERTENSION: ICD-10-CM

## 2017-06-01 DIAGNOSIS — R06.00 DYSPNEA, UNSPECIFIED: ICD-10-CM

## 2017-06-01 LAB
ANION GAP SERPL CALC-SCNC: 8 MMOL/L — SIGNIFICANT CHANGE UP (ref 5–17)
BUN SERPL-MCNC: 6 MG/DL — LOW (ref 7–23)
C DIFF BY PCR RESULT: SIGNIFICANT CHANGE UP
C DIFF TOX GENS STL QL NAA+PROBE: SIGNIFICANT CHANGE UP
CALCIUM SERPL-MCNC: 9 MG/DL — SIGNIFICANT CHANGE UP (ref 8.5–10.1)
CHLORIDE SERPL-SCNC: 101 MMOL/L — SIGNIFICANT CHANGE UP (ref 96–108)
CO2 SERPL-SCNC: 30 MMOL/L — SIGNIFICANT CHANGE UP (ref 22–31)
CREAT SERPL-MCNC: 0.62 MG/DL — SIGNIFICANT CHANGE UP (ref 0.5–1.3)
CULTURE RESULTS: NO GROWTH — SIGNIFICANT CHANGE UP
GLUCOSE SERPL-MCNC: 133 MG/DL — HIGH (ref 70–99)
HCT VFR BLD CALC: 36.5 % — SIGNIFICANT CHANGE UP (ref 34.5–45)
HGB BLD-MCNC: 11.8 G/DL — SIGNIFICANT CHANGE UP (ref 11.5–15.5)
MAGNESIUM SERPL-MCNC: 1.7 MG/DL — SIGNIFICANT CHANGE UP (ref 1.6–2.6)
MCHC RBC-ENTMCNC: 30.1 PG — SIGNIFICANT CHANGE UP (ref 27–34)
MCHC RBC-ENTMCNC: 32.2 GM/DL — SIGNIFICANT CHANGE UP (ref 32–36)
MCV RBC AUTO: 93.5 FL — SIGNIFICANT CHANGE UP (ref 80–100)
PLATELET # BLD AUTO: 171 K/UL — SIGNIFICANT CHANGE UP (ref 150–400)
POTASSIUM SERPL-MCNC: 3.6 MMOL/L — SIGNIFICANT CHANGE UP (ref 3.5–5.3)
POTASSIUM SERPL-SCNC: 3.6 MMOL/L — SIGNIFICANT CHANGE UP (ref 3.5–5.3)
RBC # BLD: 3.9 M/UL — SIGNIFICANT CHANGE UP (ref 3.8–5.2)
RBC # FLD: 14.7 % — HIGH (ref 10.3–14.5)
SODIUM SERPL-SCNC: 139 MMOL/L — SIGNIFICANT CHANGE UP (ref 135–145)
SPECIMEN SOURCE: SIGNIFICANT CHANGE UP
WBC # BLD: 6.7 K/UL — SIGNIFICANT CHANGE UP (ref 3.8–10.5)
WBC # FLD AUTO: 6.7 K/UL — SIGNIFICANT CHANGE UP (ref 3.8–10.5)

## 2017-06-01 PROCEDURE — 99223 1ST HOSP IP/OBS HIGH 75: CPT

## 2017-06-01 RX ORDER — LORATADINE 10 MG/1
10 TABLET ORAL DAILY
Qty: 0 | Refills: 0 | Status: DISCONTINUED | OUTPATIENT
Start: 2017-06-01 | End: 2017-06-03

## 2017-06-01 RX ORDER — FLUTICASONE PROPIONATE 50 MCG
1 SPRAY, SUSPENSION NASAL
Qty: 0 | Refills: 0 | Status: DISCONTINUED | OUTPATIENT
Start: 2017-06-01 | End: 2017-06-03

## 2017-06-01 RX ORDER — FUROSEMIDE 40 MG
40 TABLET ORAL EVERY 12 HOURS
Qty: 0 | Refills: 0 | Status: DISCONTINUED | OUTPATIENT
Start: 2017-06-01 | End: 2017-06-03

## 2017-06-01 RX ADMIN — Medication 2: at 12:55

## 2017-06-01 RX ADMIN — Medication 1000 UNIT(S): at 12:54

## 2017-06-01 RX ADMIN — SIMVASTATIN 40 MILLIGRAM(S): 20 TABLET, FILM COATED ORAL at 21:58

## 2017-06-01 RX ADMIN — Medication 20 MILLIGRAM(S): at 17:18

## 2017-06-01 RX ADMIN — ESCITALOPRAM OXALATE 10 MILLIGRAM(S): 10 TABLET, FILM COATED ORAL at 21:58

## 2017-06-01 RX ADMIN — PANTOPRAZOLE SODIUM 40 MILLIGRAM(S): 20 TABLET, DELAYED RELEASE ORAL at 05:23

## 2017-06-01 RX ADMIN — Medication 1 SPRAY(S): at 17:21

## 2017-06-01 RX ADMIN — Medication 40 MILLIGRAM(S): at 17:19

## 2017-06-01 RX ADMIN — APIXABAN 5 MILLIGRAM(S): 2.5 TABLET, FILM COATED ORAL at 17:17

## 2017-06-01 RX ADMIN — Medication 40 MILLIGRAM(S): at 05:19

## 2017-06-01 RX ADMIN — Medication 25 MILLIGRAM(S): at 05:21

## 2017-06-01 RX ADMIN — Medication 125 MILLIGRAM(S): at 05:22

## 2017-06-01 RX ADMIN — Medication 125 MILLIGRAM(S): at 12:54

## 2017-06-01 RX ADMIN — Medication 20 MILLIGRAM(S): at 05:21

## 2017-06-01 RX ADMIN — APIXABAN 5 MILLIGRAM(S): 2.5 TABLET, FILM COATED ORAL at 05:21

## 2017-06-01 RX ADMIN — Medication 1 TABLET(S): at 12:54

## 2017-06-01 RX ADMIN — Medication 25 MILLIGRAM(S): at 17:21

## 2017-06-01 NOTE — CONSULT NOTE ADULT - SUBJECTIVE AND OBJECTIVE BOX
HPI:  89 F with Hx of Chronic A-fib on A/C with Eliquis, Chronic Diastolic CHF, Type 2 Diabetes Mellitus, Pulmonary HTN was recently admitted to  from - with abdominal pain which was presumably due to acute right sided diverticulitis.  She was treated with 5 days of IV Cipro and Flagyl with good clinical improvement.  She was also found to have an elevated lipase level of 1400 which was felt to be due to medication-induced pancreatitis.  She improved and was discharged home on .  Since being home, she describes the development of diarrhea, going to the bathroom several times a day, diarrhea was non-bloody and not associated with abdominal pain, nausea, vomiting.  She reports subjective chills but denies any fevers.  She reports having completed antibiotics.  She denies any recent travel or sick contacts.  In addition, she reports increased weight gain, leg swelling and dfifficulty putting her shoes own as her shoes were fitting tight.  She has had to sleep on the chair and has difficulty laying flat due to shortness of breath.  She denies any palpitations, chest pain, cough, fever, or purulent sputum.     In the ED, she had a CT of the A/P which showed no acute pathology.  Labs were essentially normal.  CXR showed bilateral pulm vascular congestion.  Stool for C.diff was sent and she was given a dose of PO Vanco and IV Flagyl.    At home the patient also complained of shortness of breath and significant swelling of her lower extremities. She was spending the majority of her day in the reclining chair. She also had to sleep in an upright position. She denied cough or sputum production.    PAST MEDICAL HISTORY:  Chronic A-fib on A/C with Eliquis  HTN  GERD  Osteoarthritis  Type 2 Diabetes Mellitus  Hyperlipidemia  Severe Pulmonary HTN on Sildenafil  Chronic Diastolic CHF, last ECHO was in 2016 with EF 55-60%  CAD, s/p BMS to LAD   Hx of Gluteal Hematoma after a fall  Varicose Veins  Recent right sided Diverticulitis -    PAST SURGICAL HISTORY:  s/p appendectomy  s/p cholecystectomy  s/p left knee surgery  s/p hysterectomy  s/p TAVR    FAMILY HISTORY:   non-contributory to the patient's current presentation (31 May 2017 17:22)      PAST MEDICAL & SURGICAL HISTORY:  Afib  CAD (coronary artery disease)  Hypertension  History of Osteoarthritis  History of Atrial Fibrillation  GERD (Gastroesophageal Reflux Disease)  Dyslipidemia  Diabetes Mellitus Type II  Chronic Atrial Fibrillation  History of appendectomy  History of cholecystectomy  H/O: Knee Surgery- right meniscus  H/O: Hysterectomy      MEDICATIONS  (STANDING):  vancomycin    Solution 125milliGRAM(s) Oral every 6 hours  insulin lispro (HumaLOG) corrective regimen sliding scale  SubCutaneous three times a day before meals  insulin lispro (HumaLOG) corrective regimen sliding scale  SubCutaneous at bedtime  dextrose 5%. 1000milliLiter(s) IV Continuous <Continuous>  dextrose 50% Injectable 12.5Gram(s) IV Push once  dextrose 50% Injectable 25Gram(s) IV Push once  dextrose 50% Injectable 25Gram(s) IV Push once  sildenafil (REVATIO) 20milliGRAM(s) Oral two times a day  apixaban 5milliGRAM(s) Oral two times a day  escitalopram 10milliGRAM(s) Oral at bedtime  simvastatin 40milliGRAM(s) Oral at bedtime  metoprolol succinate ER 25milliGRAM(s) Oral two times a day  pantoprazole    Tablet 40milliGRAM(s) Oral before breakfast  multivitamin 1Tablet(s) Oral daily  cholecalciferol 1000Unit(s) Oral daily  furosemide   Injectable 40milliGRAM(s) IV Push every 12 hours    MEDICATIONS  (PRN):  dextrose Gel 1Dose(s) Oral once PRN Blood Glucose LESS THAN 70 milliGRAM(s)/deciliter  glucagon  Injectable 1milliGRAM(s) IntraMuscular once PRN Glucose LESS THAN 70 milligrams/deciliter      Allergies    lactose (Other)  penicillin (Rash)  penicillins (Other)  sulfa drugs (Rash; Other)    Intolerances        SOCIAL HISTORY: Denies tobacco, etoh abuse or illicit drug use    FAMILY HISTORY:      Vital Signs Last 24 Hrs  T(C): 36.4, Max: 36.8 (05-31 @ 22:45)  T(F): 97.5, Max: 98.3 (05-31 @ 22:45)  HR: 81 (73 - 81)  BP: 127/65 (102/64 - 142/91)  BP(mean): --  RR: 18 (16 - 21)  SpO2: 99% (95% - 99%)    REVIEW OF SYSTEMS:    CONSTITUTIONAL:  As per HPI.  HEENT:  Eyes:  No diplopia or blurred vision. ENT:  No earache, sore throat or runny nose.  CARDIOVASCULAR:  No pressure, squeezing, tightness, heaviness or aching about the chest, neck, axilla or epigastrium.  RESPIRATORY:  Shortness of breath with exertion, mild PND and orthopnea.  GASTROINTESTINAL:  No nausea, vomiting. +diarrhea.  GENITOURINARY:  No dysuria, frequency or urgency.  MUSCULOSKELETAL:  As per HPI.  SKIN:  No change in skin, hair or nails.  NEUROLOGIC:  No paresthesias, fasciculations, seizures or weakness.  PSYCHIATRIC:  No disorder of thought or mood.  ENDOCRINE:  No heat or cold intolerance, polyuria or polydipsia.  HEMATOLOGICAL:  No easy bruising or bleedings:  .     PHYSICAL EXAMINATION:    GENERAL APPEARANCE:  Pt. is not currently dyspneic, in no distress. Pt. is alert, oriented, and pleasant.  HEENT:  Pupils are normal and react normally. No icterus. Mucous membranes well colored.  NECK:  Supple. No lymphadenopathy. Jugular venous pressure not elevated. Carotids equal.   HEART:   The cardiac impulse has a normal quality. Regular. Normal S1 and S2. There are no murmurs, rubs or gallops noted  CHEST:  Chest is clear to auscultation. Normal respiratory effort.  ABDOMEN:  Soft and nontender.   EXTREMITIES:  There is no cyanosis, clubbing or edema.   SKIN:  No rash or significant lesions are noted.    LABS:                        11.8   6.7   )-----------( 171      ( 2017 07:07 )             36.5     06-    139  |  101  |  6<L>  ----------------------------<  133<H>  3.6   |  30  |  0.62    Ca    9.0      2017 07:07  Mg     1.7     06-    TPro  7.2  /  Alb  2.8<L>  /  TBili  0.4  /  DBili  x   /  AST  20  /  ALT  19  /  AlkPhos  104  05-    LIVER FUNCTIONS - ( 31 May 2017 11:45 )  Alb: 2.8 g/dL / Pro: 7.2 gm/dL / ALK PHOS: 104 U/L / ALT: 19 U/L / AST: 20 U/L / GGT: x           PT/INR - ( 31 May 2017 12:54 )   PT: 23.0 sec;   INR: 2.10 ratio         PTT - ( 31 May 2017 12:54 )  PTT:36.0 sec  CARDIAC MARKERS ( 31 May 2017 17:46 )  <0.015 ng/mL / x     / x     / x     / x      CARDIAC MARKERS ( 31 May 2017 14:30 )  <0.015 ng/mL / x     / x     / x     / x      CARDIAC MARKERS ( 31 May 2017 11:45 )  <0.015 ng/mL / x     / x     / x     / x          Urinalysis Basic - ( 31 May 2017 12:11 )    Color: Yellow / Appearance: Clear / S.005 / pH: x  Gluc: x / Ketone: Negative  / Bili: Negative / Urobili: Negative mg/dL   Blood: x / Protein: Negative mg/dL / Nitrite: Negative   Leuk Esterase: Negative / RBC: x / WBC x   Sq Epi: x / Non Sq Epi: x / Bacteria: x          RADIOLOGY & ADDITIONAL STUDIES: AP chest radiograph from 1209 hours:    COMPARISON:  May 20, 2016    FINDINGS:  Transcatheter aorticvalve replacement. Cardiac and   mediastinal contours not well evaluated due to AP technique, however are   unchanged.  Mild pulmonary vascular congestion. Probable small left pleural effusion.   No pneumothorax.    IMPRESSION:    Pulmonary vascular congestion. Small left pleural effusion.    FINDINGS:    LOWER CHEST: Status post transcatheter aortic valve replacement. Trace   right pleural effusion.     LIVER: Nodule surface of the liver compatible with cirrhosis. No focal   hepatic lesion.  SPLEEN: Within normal limits.  PANCREAS: Within normal limits.  GALLBLADDER: Cholecystectomy.  BILE DUCTS: Mild common duct dilatation, unchanged. No intrahepatic   ductal dilatation.  ADRENALS: Within normal limits.  KIDNEYS/URETERS: Bilateral renal cysts.    RETROPERITONEUM: No lymphadenopathy.    VESSELS:  Severe diffuse atherosclerotic calcification. Normal caliber   aorta.    BOWEL: No bowel obstruction. Appendix not identified. No inflammatory   change or bowel wall thickening noted. Resolution of previously seen   inflammatory changes and fluid in the right upper quadrant, near the   ascending colon. Large diverticulum arising from either the fourth   portion of the duodenum or proximal jejunum.  PERITONEUM: No ascites.    REPRODUCTIVE ORGANS: Hysterectomy. No adnexal mass.  BLADDER: Within normal limits.    ABDOMINAL WALL: Decrease in size of old hematoma in the subcutaneous   tissues lateral to the right hip  BONES: L3 vertebral body hemangioma. No acute abnormality.    IMPRESSION: No acute abdominal pathology. Resolution of previously seen   right sided abdominal inflammatory changes.
HPI:  89 F with Hx of Chronic A-fib on A/C with Eliquis, Chronic Diastolic CHF, Type 2 Diabetes Mellitus, Pulmonary HTN was recently admitted to  from 5/24-5/29 with abdominal pain which was due to acute right sided diverticulitis.\\ She was treated with 5 days of IV Cipro and Flagyl with good clinical improvement.  She was also found to have an elevated lipase level of 1400 which was felt to be due to medication-induced pancreatitis.  She improved and was discharged home on 5/29.  Early this morning, she developed acute of diarrhea, going to the bathroom several times a day, diarrhea was non-bloody and not associated with abdominal pain, nausea, vomiting.  She reports subjective chills but denies any fevers.  She reports having completed antibiotics.  She denies any recent travel or sick contacts.  In addition, she reports increased weight gain, leg swelling and dfifficulty putting her shoes own as her shoes were fitting tight.  She has had to sleep on the chair and has difficulty laying flat due to shortness of breath.  She denies any palpitations, chest pain, cough, fever, or purulent sputum.     In the ED, she had a CT of the A/P which showed no acute pathology.  Labs were essentially normal.  CXR showed bilateral pulm vascular congestion.  Stool for C.diff was sent and she was given a dose of PO Vanco and IV Flagyl    PAST MEDICAL HISTORY:  Chronic A-fib on A/C with Eliquis  HTN  GERD  Osteoarthritis  Type 2 Diabetes Mellitus  Hyperlipidemia  Severe Pulmonary HTN on Sildenafil  Chronic Diastolic CHF, last ECHO was in 2016 with EF 55-60%  CAD, s/p BMS to LAD 2011  Hx of Gluteal Hematoma after a fall  Varicose Veins  Recent right sided Diverticulitis 5/24-5/29    PAST SURGICAL HISTORY:  s/p appendectomy  s/p cholecystectomy  s/p left knee surgery  s/p hysterectomy  s/p TAVR    FAMILY HISTORY:   non-contributory to the patient's current presentation (31 May 2017 17:22)      PAST MEDICAL & SURGICAL HISTORY:  Afib  CAD (coronary artery disease)  Hypertension  History of Osteoarthritis  History of Atrial Fibrillation  GERD (Gastroesophageal Reflux Disease)  Dyslipidemia  Diabetes Mellitus Type II  Chronic Atrial Fibrillation  History of appendectomy  History of cholecystectomy  H/O: Knee Surgery- right meniscus  H/O: Hysterectomy      MEDICATIONS  (STANDING):  vancomycin    Solution 125milliGRAM(s) Oral every 6 hours  insulin lispro (HumaLOG) corrective regimen sliding scale  SubCutaneous three times a day before meals  insulin lispro (HumaLOG) corrective regimen sliding scale  SubCutaneous at bedtime  furosemide   Injectable 40milliGRAM(s) IV Push daily  sildenafil (REVATIO) 20milliGRAM(s) Oral two times a day  apixaban 5milliGRAM(s) Oral two times a day  escitalopram 10milliGRAM(s) Oral at bedtime  simvastatin 40milliGRAM(s) Oral at bedtime  metoprolol succinate ER 25milliGRAM(s) Oral two times a day  pantoprazole    Tablet 40milliGRAM(s) Oral before breakfast  multivitamin 1Tablet(s) Oral daily  cholecalciferol 1000Unit(s) Oral daily    MEDICATIONS  (PRN):  dextrose Gel 1Dose(s) Oral once PRN Blood Glucose LESS THAN 70 milliGRAM(s)/deciliter  glucagon  Injectable 1milliGRAM(s) IntraMuscular once PRN Glucose LESS THAN 70 milligrams/deciliter      Allergies    lactose (Other)  penicillin (Rash)  penicillins (Other)  sulfa drugs (Rash; Other)      ROS  As above  Otherwise unremarkable    Vital Signs Last 24 Hrs  T(C): 36.4, Max: 36.6 (05-31 @ 17:25)  T(F): 97.6, Max: 97.9 (05-31 @ 17:25)  HR: 77 (76 - 81)  BP: 131/65 (131/65 - 142/91)  BP(mean): --  RR: 18 (16 - 21)  SpO2: 95% (95% - 97%)    Constitutional: NAD, well-developed  Respiratory: CTAB  Cardiovascular: S1 and S2, irregular  Gastrointestinal: BS+, soft, NT/ND  Extremities: 3+ Bilat LE edema  Psychiatric: Normal mood, normal affect  Skin: No rashes    LABS:                        11.8   5.4   )-----------( 169      ( 31 May 2017 11:45 )             36.0     05-31    139  |  104  |  6<L>  ----------------------------<  161<H>  4.0   |  30  |  0.63    Ca    8.7      31 May 2017 11:45  Mg     1.6     05-31    TPro  7.2  /  Alb  2.8<L>  /  TBili  0.4  /  DBili  x   /  AST  20  /  ALT  19  /  AlkPhos  104  05-31    PT/INR - ( 31 May 2017 12:54 )   PT: 23.0 sec;   INR: 2.10 ratio         PTT - ( 31 May 2017 12:54 )  PTT:36.0 sec  LIVER FUNCTIONS - ( 31 May 2017 11:45 )  Alb: 2.8 g/dL / Pro: 7.2 gm/dL / ALK PHOS: 104 U/L / ALT: 19 U/L / AST: 20 U/L / GGT: x
HPI:  89 F with Hx of Chronic A-fib on A/C with Eliquis, gerd, hypertension, hyperlipidemia, s/p hysterectomy, cholecystectomy, appendectomy, tavr,  Chronic Diastolic CHF, Type 2 Diabetes Mellitus, Pulmonary HTN was recently admitted to  from - with abdominal pain which was due to acute right sided diverticulitis.  She was treated with 5 days of IV Cipro and Flagyl with good clinical improvement.  She was also found to have an elevated lipase level of 1400 which was felt to be due to medication-induced pancreatitis.  She improved and was discharged home on . She is now admitted on  for evaluation of acute onset of diarrhea, not associated with abdominal pain or vomiting. In addition she notes shortness of breath especially while lying flat and increased lower extremity edema. Of note today  the diarrhea has resolved              PMH: as above  PSH: as above  Meds: per reconcilation sheet, noted below  MEDICATIONS  (STANDING):  insulin lispro (HumaLOG) corrective regimen sliding scale  SubCutaneous three times a day before meals  insulin lispro (HumaLOG) corrective regimen sliding scale  SubCutaneous at bedtime  dextrose 5%. 1000milliLiter(s) IV Continuous <Continuous>  dextrose 50% Injectable 12.5Gram(s) IV Push once  dextrose 50% Injectable 25Gram(s) IV Push once  dextrose 50% Injectable 25Gram(s) IV Push once  sildenafil (REVATIO) 20milliGRAM(s) Oral two times a day  apixaban 5milliGRAM(s) Oral two times a day  escitalopram 10milliGRAM(s) Oral at bedtime  simvastatin 40milliGRAM(s) Oral at bedtime  metoprolol succinate ER 25milliGRAM(s) Oral two times a day  pantoprazole    Tablet 40milliGRAM(s) Oral before breakfast  multivitamin 1Tablet(s) Oral daily  cholecalciferol 1000Unit(s) Oral daily  furosemide   Injectable 40milliGRAM(s) IV Push every 12 hours    MEDICATIONS  (PRN):  dextrose Gel 1Dose(s) Oral once PRN Blood Glucose LESS THAN 70 milliGRAM(s)/deciliter  glucagon  Injectable 1milliGRAM(s) IntraMuscular once PRN Glucose LESS THAN 70 milligrams/deciliter    Allergies    penicillin (Rash)  penicillins (Other)  sulfa drugs (Rash; Other)    Intolerances      Social: no smoking, no alcohol, no illegal drugs; no recent travel, no exposure to TB  FAMILY HISTORY:    ROS: the patient has no fever, no chills, no HA, no dizziness, no sore throat, no blurry vision, no CP, no palpitations, no abdominal pain,  no N/V, no dysuria, no leg pain, no claudication, no rash, no joint aches, no rectal pain or bleeding, no night sweats  Vital Signs Last 24 Hrs  T(C): 36.4, Max: 36.8 ( @ 22:45)  T(F): 97.6, Max: 98.3 ( @ 22:45)  HR: 75 (73 - 81)  BP: 120/89 (102/64 - 131/68)  BP(mean): --  RR: 18 (18 - 21)  SpO2: 93% (93% - 99%)  Daily     Daily Weight in k.6 (2017 05:18)  Constitutional: frail looking  HEENT: NC/AT, EOMI, PERRLA  Neck: supple  Respiratory: clear, no r/r/w  Cardiovascular: S1S2 regular, no murmurs  Abdomen: soft, not tender, not distended, positive BS  Genitourinary: deferred  Rectal: deferred  Musculoskeletal: no muscle tenderness, no joint swelling or tenderness  Neurological: AxOx3, moving all extremities, no focal deficits  Skin: no rashes                          11.8   6.7   )-----------( 171      ( 2017 07:07 )             36.5     06-01    139  |  101  |  6<L>  ----------------------------<  133<H>  3.6   |  30  |  0.62    Ca    9.0      2017 07:07  Mg     1.7     06-    TPro  7.2  /  Alb  2.8<L>  /  TBili  0.4  /  DBili  x   /  AST  20  /  ALT  19  /  AlkPhos  104       LIVER FUNCTIONS - ( 31 May 2017 11:45 )  Alb: 2.8 g/dL / Pro: 7.2 gm/dL / ALK PHOS: 104 U/L / ALT: 19 U/L / AST: 20 U/L / GGT: x           Urinalysis Basic - ( 31 May 2017 12:11 )    Color: Yellow / Appearance: Clear / S.005 / pH: x  Gluc: x / Ketone: Negative  / Bili: Negative / Urobili: Negative mg/dL   Blood: x / Protein: Negative mg/dL / Nitrite: Negative   Leuk Esterase: Negative / RBC: x / WBC x   Sq Epi: x / Non Sq Epi: x / Bacteria: x    Cdiff negative      Radiology:EXAM:  CT ABDOMEN AND PELVIS IC                            PROCEDURE DATE:  2017        INTERPRETATION:  Clinical information: Rule out colitis    COMPARISON: May 24, 2017    PROCEDURE:   CT of the Abdomen and Pelvis was performed with intravenous contrast.   Intravenous contrast: 90 ml Omnipaque 350. 10 ml discarded.  Oral contrast: positive contrast was administered.  Sagittal and coronal reformats were performed.    FINDINGS:    LOWER CHEST: Status post transcatheter aortic valve replacement. Trace   right pleural effusion.     LIVER: Nodule surface of the liver compatible with cirrhosis. No focal   hepatic lesion.  SPLEEN: Within normal limits.  PANCREAS: Within normal limits.  GALLBLADDER: Cholecystectomy.  BILE DUCTS: Mild common duct dilatation, unchanged. No intrahepatic   ductal dilatation.  ADRENALS: Within normal limits.  KIDNEYS/URETERS: Bilateral renal cysts.    RETROPERITONEUM: No lymphadenopathy.    VESSELS:  Severe diffuse atherosclerotic calcification. Normal caliber   aorta.    BOWEL: No bowel obstruction. Appendix not identified. No inflammatory   change or bowel wall thickening noted. Resolution of previously seen   inflammatory changes and fluid in the right upper quadrant, near the   ascending colon. Large diverticulum arising from either the fourth   portion of the duodenum or proximal jejunum.  PERITONEUM: No ascites.    REPRODUCTIVE ORGANS: Hysterectomy. No adnexal mass.  BLADDER: Within normal limits.    ABDOMINAL WALL: Decrease in size of old hematoma in the subcutaneous   tissues lateral to the right hip  BONES: L3 vertebral body hemangioma. No acute abnormality.    IMPRESSION: No acute abdominal pathology. Resolution of previously seen   right sided abdominal inflammatory changes.          Advanced directive addressed: full resuscitation

## 2017-06-01 NOTE — CONSULT NOTE ADULT - ASSESSMENT
89 F with Hx of Chronic A-fib on A/C with Eliquis, gerd, hypertension, hyperlipidemia, s/p hysterectomy, cholecystectomy, appendectomy, tavr,  Chronic Diastolic CHF, Type 2 Diabetes Mellitus, Pulmonary HTN was recently admitted to  from 5/24-5/29 with abdominal pain which was due to acute right sided diverticulitis.  She was treated with 5 days of IV Cipro and Flagyl with good clinical improvement.  She was also found to have an elevated lipase level of 1400 which was felt to be due to medication-induced pancreatitis.  She improved and was discharged home on 5/29. She is now admitted on 5/31 for evaluation of acute onset of diarrhea, not associated with abdominal pain or vomiting. In addition she notes shortness of breath especially while lying flat and increased lower extremity edema. Of note today 6/1 the diarrhea has resolved  1. Patient admitted with chf exacerbation  -diuretics per medicine  2. Diarrhea resolved, cdiff negative  -will stop oral vancomycin  -discontinue isolation  3. other issues: A-fib on A/C with Eliquis, gerd, hypertension, hyperlipidemia, s/p hysterectomy, cholecystectomy, appendectomy, tavr,  Chronic Diastolic CHF, Type 2 Diabetes Mellitus, Pulmonary HTN  - per medicine

## 2017-06-01 NOTE — PROGRESS NOTE ADULT - SUBJECTIVE AND OBJECTIVE BOX
Patient is a 89y old  Female who presents with a chief complaint of diarrhea (31 May 2017 23:24)      Subective:  Feels good. No pain. No diarrhea overnight.    PAST MEDICAL & SURGICAL HISTORY:  Afib  CAD (coronary artery disease)  Hypertension  History of Osteoarthritis  History of Atrial Fibrillation  GERD (Gastroesophageal Reflux Disease)  Dyslipidemia  Diabetes Mellitus Type II  Chronic Atrial Fibrillation  History of appendectomy  History of cholecystectomy  H/O: Knee Surgery- right meniscus  H/O: Hysterectomy      MEDICATIONS  (STANDING):  vancomycin    Solution 125milliGRAM(s) Oral every 6 hours  insulin lispro (HumaLOG) corrective regimen sliding scale  SubCutaneous three times a day before meals  insulin lispro (HumaLOG) corrective regimen sliding scale  SubCutaneous at bedtime  dextrose 5%. 1000milliLiter(s) IV Continuous <Continuous>  dextrose 50% Injectable 12.5Gram(s) IV Push once  dextrose 50% Injectable 25Gram(s) IV Push once  dextrose 50% Injectable 25Gram(s) IV Push once  furosemide   Injectable 40milliGRAM(s) IV Push daily  sildenafil (REVATIO) 20milliGRAM(s) Oral two times a day  apixaban 5milliGRAM(s) Oral two times a day  escitalopram 10milliGRAM(s) Oral at bedtime  simvastatin 40milliGRAM(s) Oral at bedtime  metoprolol succinate ER 25milliGRAM(s) Oral two times a day  pantoprazole    Tablet 40milliGRAM(s) Oral before breakfast  multivitamin 1Tablet(s) Oral daily  cholecalciferol 1000Unit(s) Oral daily    MEDICATIONS  (PRN):  dextrose Gel 1Dose(s) Oral once PRN Blood Glucose LESS THAN 70 milliGRAM(s)/deciliter  glucagon  Injectable 1milliGRAM(s) IntraMuscular once PRN Glucose LESS THAN 70 milligrams/deciliter      REVIEW OF SYSTEMS:    RESPIRATORY: No shortness of breath  CARDIOVASCULAR: No chest pain  + edema    Vital Signs Last 24 Hrs  T(C): 36.4, Max: 36.8 (05-31 @ 22:45)  T(F): 97.5, Max: 98.3 (05-31 @ 22:45)  HR: 81 (73 - 81)  BP: 127/65 (102/64 - 142/91)  BP(mean): --  RR: 18 (16 - 21)  SpO2: 99% (95% - 99%)    PHYSICAL EXAM:    Constitutional: NAD, well-developed  Respiratory: CTAB  Cardiovascular: S1 and S2, irregular  Gastrointestinal: BS+, soft, NT/ND  Extremities: 3+ B LE edema  Psychiatric: Normal mood, normal affect    LABS:                        11.8   5.4   )-----------( 169      ( 31 May 2017 11:45 )             36.0     06-01    139  |  101  |  6<L>  ----------------------------<  133<H>  3.6   |  30  |  0.62    Ca    9.0      01 Jun 2017 07:07  Mg     1.7     06-01    TPro  7.2  /  Alb  2.8<L>  /  TBili  0.4  /  DBili  x   /  AST  20  /  ALT  19  /  AlkPhos  104  05-31    PT/INR - ( 31 May 2017 12:54 )   PT: 23.0 sec;   INR: 2.10 ratio         PTT - ( 31 May 2017 12:54 )  PTT:36.0 sec  LIVER FUNCTIONS - ( 31 May 2017 11:45 )  Alb: 2.8 g/dL / Pro: 7.2 gm/dL / ALK PHOS: 104 U/L / ALT: 19 U/L / AST: 20 U/L / GGT: x             RADIOLOGY & ADDITIONAL STUDIES:

## 2017-06-01 NOTE — PROGRESS NOTE ADULT - SUBJECTIVE AND OBJECTIVE BOX
CHIEF COMPLAINT:  leg swelling    SUBJECTIVE:   diarrhea resolved. tolerating po. OOB to chair.     REVIEW OF SYSTEMS:  All other review of systems is negative unless indicated above    Vital Signs Last 24 Hrs  T(C): 36.4, Max: 36.8 (05-31 @ 22:45)  T(F): 97.6, Max: 98.3 (05-31 @ 22:45)  HR: 75 (73 - 81)  BP: 120/89 (102/64 - 131/68)  BP(mean): --  RR: 18 (18 - 21)  SpO2: 93% (93% - 99%)    I&O's Summary      CAPILLARY BLOOD GLUCOSE  144 (01 Jun 2017 08:39)  190 (31 May 2017 22:45)      PHYSICAL EXAM:    Constitutional: NAD, awake and alert, well-developed  HEENT: PERR, EOMI, Normal Hearing, MMM  Neck: Soft and supple, No LAD, No JVD  Respiratory: Breath sounds are clear bilaterally, No wheezing, rales or rhonchi  Cardiovascular: S1 and S2, regular rate and rhythm, no Murmurs, gallops or rubs  Gastrointestinal: Bowel Sounds present, soft, nontender, nondistended, no guarding, no rebound  Extremities: 2+ peripheral edema  Vascular: 2+ peripheral pulses  Neurological: A/O x 3, no focal deficits  Musculoskeletal: moves all extrem  Skin: No rashes    MEDICATIONS:  MEDICATIONS  (STANDING):  insulin lispro (HumaLOG) corrective regimen sliding scale  SubCutaneous three times a day before meals  insulin lispro (HumaLOG) corrective regimen sliding scale  SubCutaneous at bedtime  dextrose 5%. 1000milliLiter(s) IV Continuous <Continuous>  dextrose 50% Injectable 12.5Gram(s) IV Push once  dextrose 50% Injectable 25Gram(s) IV Push once  dextrose 50% Injectable 25Gram(s) IV Push once  sildenafil (REVATIO) 20milliGRAM(s) Oral two times a day  apixaban 5milliGRAM(s) Oral two times a day  escitalopram 10milliGRAM(s) Oral at bedtime  simvastatin 40milliGRAM(s) Oral at bedtime  metoprolol succinate ER 25milliGRAM(s) Oral two times a day  pantoprazole    Tablet 40milliGRAM(s) Oral before breakfast  multivitamin 1Tablet(s) Oral daily  cholecalciferol 1000Unit(s) Oral daily  furosemide   Injectable 40milliGRAM(s) IV Push every 12 hours      LABS: All Labs Reviewed:                        11.8   6.7   )-----------( 171      ( 01 Jun 2017 07:07 )             36.5     06-01    139  |  101  |  6<L>  ----------------------------<  133<H>  3.6   |  30  |  0.62    Ca    9.0      01 Jun 2017 07:07  Mg     1.7     06-01    TPro  7.2  /  Alb  2.8<L>  /  TBili  0.4  /  DBili  x   /  AST  20  /  ALT  19  /  AlkPhos  104  05-31    PT/INR - ( 31 May 2017 12:54 )   PT: 23.0 sec;   INR: 2.10 ratio         PTT - ( 31 May 2017 12:54 )  PTT:36.0 sec  CARDIAC MARKERS ( 31 May 2017 17:46 )  <0.015 ng/mL / x     / x     / x     / x      CARDIAC MARKERS ( 31 May 2017 14:30 )  <0.015 ng/mL / x     / x     / x     / x      CARDIAC MARKERS ( 31 May 2017 11:45 )  <0.015 ng/mL / x     / x     / x     / x        89 F with Hx of Chronic A-fib on A/C with Eliquis, Chronic Diastolic CHF, Type 2 Diabetes Mellitus, Pulmonary HTN was recently admitted to  from 5/24-5/29 with abdominal pain which was due to acute right sided diverticulitis.  She was treated with 5 days of IV Cipro and Flagyl with good clinical improvement.  She was also found to have an elevated lipase level of 1400 which was felt to be due to medication-induced pancreatitis.  She improved and was discharged home on 5/29.  Since being home, she describes the development of diarrhea, going to the bathroom several times a day, diarrhea was non-bloody and not associated with abdominal pain, nausea, vomiting.  She reports subjective chills but denies any fevers.  She reports having completed antibiotics.  She denies any recent travel or sick contacts.  In addition, she reports increased weight gain, leg swelling and dfifficulty putting her shoes own as her shoes were fitting tight.  She has had to sleep on the chair and has difficulty laying flat due to shortness of breath.  She denies any palpitations, chest pain, cough, fever, or purulent sputum.     In the ED, she had a CT of the A/P which showed no acute pathology.  Labs were essentially normal.  CXR showed bilateral pulm vascular congestion.  Stool for C.diff was sent and she was given a dose of PO Vanco and IV Flagyl      Assessment and Plan:     DIARRHEA  -CT without acute process  -C Diff PCR negative  -diarrhea now resolved  -electrolytes stable    ACUTE ON CHRONIC DIASTOLIC CHF EXACERBATION WITH pulmonary edema and L pleural effusion associated with Pulmonary HTn  =-recent IVF + holding of lasix in setting of possible drug induced pancreatitis  -now with agressive diuresis  -revatio  -cards evaluation in ED    CAD, S/P BMS STENT S/P TAVR  -statin, BBLocker    CHRONIC ATRIAL FIBRILLATION  -rate control with metoprol  -CHADS2=3. On eliquis for stroke risk reduction    TYPE 2 DIABETES MELLITUS  -A1C last admission 6.1  -holding oral hypoglycemics in house  -on discharge, Janumet d/c - will take metformin at home until f/u with Dr. GARCIA per discussion between Dr Zheng and Dr. 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  -repeat in 4-6 weeks    RESOLVED PANCREATITIS, LIPASE LEVEL IS NOW NORMAL    Hearing Loss  -pt feels congested  -flonase, claritin  -outpatient ENT

## 2017-06-01 NOTE — PROGRESS NOTE ADULT - ASSESSMENT
Imp:  diarrhea after recent course of Abx -> resolved  Fluid overload with pulm HTN    Rec:  Diuretics per cardiology and primary team  Await stool c. diff

## 2017-06-02 LAB
ANION GAP SERPL CALC-SCNC: 4 MMOL/L — LOW (ref 5–17)
BUN SERPL-MCNC: 9 MG/DL — SIGNIFICANT CHANGE UP (ref 7–23)
CALCIUM SERPL-MCNC: 8.8 MG/DL — SIGNIFICANT CHANGE UP (ref 8.5–10.1)
CHLORIDE SERPL-SCNC: 100 MMOL/L — SIGNIFICANT CHANGE UP (ref 96–108)
CO2 SERPL-SCNC: 34 MMOL/L — HIGH (ref 22–31)
CREAT SERPL-MCNC: 0.7 MG/DL — SIGNIFICANT CHANGE UP (ref 0.5–1.3)
GLUCOSE SERPL-MCNC: 140 MG/DL — HIGH (ref 70–99)
POTASSIUM SERPL-MCNC: 3.4 MMOL/L — LOW (ref 3.5–5.3)
POTASSIUM SERPL-SCNC: 3.4 MMOL/L — LOW (ref 3.5–5.3)
SODIUM SERPL-SCNC: 138 MMOL/L — SIGNIFICANT CHANGE UP (ref 135–145)

## 2017-06-02 PROCEDURE — 99232 SBSQ HOSP IP/OBS MODERATE 35: CPT

## 2017-06-02 RX ORDER — POTASSIUM CHLORIDE 20 MEQ
40 PACKET (EA) ORAL EVERY 4 HOURS
Qty: 0 | Refills: 0 | Status: COMPLETED | OUTPATIENT
Start: 2017-06-02 | End: 2017-06-02

## 2017-06-02 RX ADMIN — Medication 40 MILLIEQUIVALENT(S): at 15:17

## 2017-06-02 RX ADMIN — SIMVASTATIN 40 MILLIGRAM(S): 20 TABLET, FILM COATED ORAL at 21:36

## 2017-06-02 RX ADMIN — Medication 25 MILLIGRAM(S): at 05:58

## 2017-06-02 RX ADMIN — Medication 1000 UNIT(S): at 11:34

## 2017-06-02 RX ADMIN — Medication 1 TABLET(S): at 11:34

## 2017-06-02 RX ADMIN — Medication 1 SPRAY(S): at 05:57

## 2017-06-02 RX ADMIN — Medication 25 MILLIGRAM(S): at 17:39

## 2017-06-02 RX ADMIN — PANTOPRAZOLE SODIUM 40 MILLIGRAM(S): 20 TABLET, DELAYED RELEASE ORAL at 05:58

## 2017-06-02 RX ADMIN — ESCITALOPRAM OXALATE 10 MILLIGRAM(S): 10 TABLET, FILM COATED ORAL at 21:36

## 2017-06-02 RX ADMIN — Medication 1: at 09:02

## 2017-06-02 RX ADMIN — Medication 20 MILLIGRAM(S): at 05:57

## 2017-06-02 RX ADMIN — Medication 40 MILLIEQUIVALENT(S): at 11:31

## 2017-06-02 RX ADMIN — Medication 40 MILLIGRAM(S): at 17:39

## 2017-06-02 RX ADMIN — Medication 20 MILLIGRAM(S): at 17:39

## 2017-06-02 RX ADMIN — APIXABAN 5 MILLIGRAM(S): 2.5 TABLET, FILM COATED ORAL at 05:57

## 2017-06-02 RX ADMIN — LORATADINE 10 MILLIGRAM(S): 10 TABLET ORAL at 11:34

## 2017-06-02 RX ADMIN — Medication 40 MILLIGRAM(S): at 05:58

## 2017-06-02 RX ADMIN — Medication 1 SPRAY(S): at 17:39

## 2017-06-02 RX ADMIN — APIXABAN 5 MILLIGRAM(S): 2.5 TABLET, FILM COATED ORAL at 17:39

## 2017-06-02 RX ADMIN — Medication 1: at 11:37

## 2017-06-02 NOTE — PROGRESS NOTE ADULT - SUBJECTIVE AND OBJECTIVE BOX
CHIEF COMPLAINT:  leg swelling    SUBJECTIVE:   diarrhea resolved. tolerating po. OOB to chair. able to ambulate better today. no cp, sob     REVIEW OF SYSTEMS:  All other review of systems is negative unless indicated above    ICU Vital Signs Last 24 Hrs  T(C): 36.6, Max: 36.6 (06-02 @ 12:16)  T(F): 97.8, Max: 97.8 (06-02 @ 12:16)  HR: 78 (78 - 84)  BP: 116/58 (116/58 - 134/66)  BP(mean): --  ABP: --  ABP(mean): --  RR: 18 (17 - 18)  SpO2: 95% (95% - 99%)      PHYSICAL EXAM:    Constitutional: NAD, awake and alert, well-developed  HEENT: PERR, EOMI, Normal Hearing, MMM  Neck: Soft and supple, No LAD, No JVD  Respiratory: Breath sounds are clear bilaterally, No wheezing, rales or rhonchi  Cardiovascular: S1 and S2, regular rate and rhythm, no Murmurs, gallops or rubs  Gastrointestinal: Bowel Sounds present, soft, nontender, nondistended, no guarding, no rebound  Extremities: 1+ peripheral edema  Vascular: 2+ peripheral pulses  Neurological: A/O x 3, no focal deficits  Musculoskeletal: moves all extrem  Skin: No rashes    MEDICATIONS  (STANDING):  insulin lispro (HumaLOG) corrective regimen sliding scale  SubCutaneous three times a day before meals  insulin lispro (HumaLOG) corrective regimen sliding scale  SubCutaneous at bedtime  dextrose 5%. 1000milliLiter(s) IV Continuous <Continuous>  dextrose 50% Injectable 12.5Gram(s) IV Push once  dextrose 50% Injectable 25Gram(s) IV Push once  dextrose 50% Injectable 25Gram(s) IV Push once  sildenafil (REVATIO) 20milliGRAM(s) Oral two times a day  apixaban 5milliGRAM(s) Oral two times a day  escitalopram 10milliGRAM(s) Oral at bedtime  simvastatin 40milliGRAM(s) Oral at bedtime  metoprolol succinate ER 25milliGRAM(s) Oral two times a day  pantoprazole    Tablet 40milliGRAM(s) Oral before breakfast  multivitamin 1Tablet(s) Oral daily  cholecalciferol 1000Unit(s) Oral daily  furosemide   Injectable 40milliGRAM(s) IV Push every 12 hours  loratadine 10milliGRAM(s) Oral daily  fluticasone propionate 50 MICROgram(s)/spray Nasal Spray 1Spray(s) Both Nostrils two times a day    MEDICATIONS  (PRN):  dextrose Gel 1Dose(s) Oral once PRN Blood Glucose LESS THAN 70 milliGRAM(s)/deciliter  glucagon  Injectable 1milliGRAM(s) IntraMuscular once PRN Glucose LESS THAN 70 milligrams/deciliter      LABS: All Labs Reviewed:                                 11.8   6.7   )-----------( 171      ( 01 Jun 2017 07:07 )             36.5     06-02    138  |  100  |  9   ----------------------------<  140<H>  3.4<L>   |  34<H>  |  0.70    Ca    8.8      02 Jun 2017 07:47  Mg     1.7     06-01      CARDIAC MARKERS ( 31 May 2017 17:46 )  <0.015 ng/mL / x     / x     / x     / x          89 F with Hx of Chronic A-fib on A/C with Eliquis, Chronic Diastolic CHF, Type 2 Diabetes Mellitus, Pulmonary HTN was recently admitted to  from 5/24-5/29 with abdominal pain which was due to acute right sided diverticulitis.  She was treated with 5 days of IV Cipro and Flagyl with good clinical improvement.  She was also found to have an elevated lipase level of 1400 which was felt to be due to medication-induced pancreatitis.  She improved and was discharged home on 5/29.  Since being home, she describes the development of diarrhea, going to the bathroom several times a day, diarrhea was non-bloody and not associated with abdominal pain, nausea, vomiting.  She reports subjective chills but denies any fevers.  She reports having completed antibiotics.  She denies any recent travel or sick contacts.  In addition, she reports increased weight gain, leg swelling and dfifficulty putting her shoes own as her shoes were fitting tight.  She has had to sleep on the chair and has difficulty laying flat due to shortness of breath.  She denies any palpitations, chest pain, cough, fever, or purulent sputum.     In the ED, she had a CT of the A/P which showed no acute pathology.  Labs were essentially normal.  CXR showed bilateral pulm vascular congestion.  Stool for C.diff was sent and she was given a dose of PO Vanco and IV Flagyl      Assessment and Plan:     DIARRHEA - resolved  -CT without acute process  -C Diff PCR negative  -diarrhea now resolved  -electrolytes stable    ACUTE ON CHRONIC DIASTOLIC CHF EXACERBATION WITH pulmonary edema and L pleural effusion associated with Pulmonary HTn  =-recent IVF + holding of lasix in setting of possible drug induced pancreatitis  -now with agressive diuresis and continue lasix bid and change to po tomorrow   -revatio  -cards evaluation in ED, pulm input appreciated     CAD, S/P BMS STENT S/P TAVR  -statin, BBLocker    CHRONIC ATRIAL FIBRILLATION  -rate control with metoprol  -CHADS2=3. On eliquis for stroke risk reduction    TYPE 2 DIABETES MELLITUS  -A1C last admission 6.1  -holding oral hypoglycemics in house  -on discharge, Janumet d/c - will take metformin at home until f/u with Dr. GARCIA per discussion between Dr Zheng and Dr. 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  -repeat in 4-6 weeks    RESOLVED PANCREATITIS, LIPASE LEVEL IS NOW NORMAL - possibly drug induced and plan to stop zaroxlyn and januvia     Hearing Loss  -pt feels congested  -flonase, claritin  -outpatient ENT    dispo - case d/w dr harrell and tres and plan to d/c home tomorrow

## 2017-06-02 NOTE — PROGRESS NOTE ADULT - SUBJECTIVE AND OBJECTIVE BOX
Subjective:  Awake, alert. Ambulated with walker this AM--no dyspnea. No cough, chest pain or sputum production. Formed BM this AM    MEDICATIONS  (STANDING):  insulin lispro (HumaLOG) corrective regimen sliding scale  SubCutaneous three times a day before meals  insulin lispro (HumaLOG) corrective regimen sliding scale  SubCutaneous at bedtime  dextrose 5%. 1000milliLiter(s) IV Continuous <Continuous>  dextrose 50% Injectable 12.5Gram(s) IV Push once  dextrose 50% Injectable 25Gram(s) IV Push once  dextrose 50% Injectable 25Gram(s) IV Push once  sildenafil (REVATIO) 20milliGRAM(s) Oral two times a day  apixaban 5milliGRAM(s) Oral two times a day  escitalopram 10milliGRAM(s) Oral at bedtime  simvastatin 40milliGRAM(s) Oral at bedtime  metoprolol succinate ER 25milliGRAM(s) Oral two times a day  pantoprazole    Tablet 40milliGRAM(s) Oral before breakfast  multivitamin 1Tablet(s) Oral daily  cholecalciferol 1000Unit(s) Oral daily  furosemide   Injectable 40milliGRAM(s) IV Push every 12 hours  loratadine 10milliGRAM(s) Oral daily  fluticasone propionate 50 MICROgram(s)/spray Nasal Spray 1Spray(s) Both Nostrils two times a day    MEDICATIONS  (PRN):  dextrose Gel 1Dose(s) Oral once PRN Blood Glucose LESS THAN 70 milliGRAM(s)/deciliter  glucagon  Injectable 1milliGRAM(s) IntraMuscular once PRN Glucose LESS THAN 70 milligrams/deciliter      Allergies    penicillin (Rash)  penicillins (Other)  sulfa drugs (Rash; Other)    Intolerances        Vital Signs Last 24 Hrs  T(C): 36.4, Max: 36.5 (- @ 16:59)  T(F): 97.6, Max: 97.7 (- @ 16:59)  HR: 84 (75 - 84)  BP: 134/62 (120/89 - 134/66)  BP(mean): --  RR: 17 (17 - 18)  SpO2: 99% (93% - 99%)    PHYSICAL EXAMINATION:    NECK:  Supple. No lymphadenopathy. Jugular venous pressure not elevated. Carotids equal.   HEART:   The cardiac impulse has a normal quality. Reg., Nl S1 and S2.  There are no murmurs, rubs or gallops noted  CHEST:  Chest with bibasilar crackles-improved from . Normal respiratory effort.  ABDOMEN:  Soft and nontender.   EXTREMITIES:  There is decreased edema.       LABS:                        11.8   6.7   )-----------( 171      ( 2017 07:07 )             36.5     06-02    138  |  100  |  9   ----------------------------<  140<H>  3.4<L>   |  34<H>  |  0.70    Ca    8.8      2017 07:47  Mg     1.7     06-01    TPro  7.2  /  Alb  2.8<L>  /  TBili  0.4  /  DBili  x   /  AST  20  /  ALT  19  /  AlkPhos  104  05-    PT/INR - ( 31 May 2017 12:54 )   PT: 23.0 sec;   INR: 2.10 ratio         PTT - ( 31 May 2017 12:54 )  PTT:36.0 sec  Urinalysis Basic - ( 31 May 2017 12:11 )    Color: Yellow / Appearance: Clear / S.005 / pH: x  Gluc: x / Ketone: Negative  / Bili: Negative / Urobili: Negative mg/dL   Blood: x / Protein: Negative mg/dL / Nitrite: Negative   Leuk Esterase: Negative / RBC: x / WBC x   Sq Epi: x / Non Sq Epi: x / Bacteria: x        RADIOLOGY & ADDITIONAL TESTS:      Assessment and Recommendation:   Problem/Recommendation - 1:  Problem: Diarrhea, infectious, adult. Recommendation: C. Dif-negative  ID eval. noted    Problem/Recommendation - 2:  ·  Problem: Chronic atrial fibrillation.  Recommendation: Maintain Eliquis.     Problem/Recommendation - 3:  ·  Problem: Acute on chronic diastolic heart failure.  Recommendation: Diurese--Maintain Lasix at 40mg Q12H  Monitor Daily weights  Serial Lytes.     Problem/Recommendation - 4:  ·  Problem: Pulmonary hypertension.  Recommendation: Revatio.     Problem/Recommendation - 5:  ·  Problem: Dyspnea, unspecified type.     Problem/Recommendation - 6:  Problem: Essential hypertension. Recommendation: Follow BP carefully.

## 2017-06-03 VITALS — WEIGHT: 173.06 LBS

## 2017-06-03 LAB
ANION GAP SERPL CALC-SCNC: 8 MMOL/L — SIGNIFICANT CHANGE UP (ref 5–17)
BUN SERPL-MCNC: 8 MG/DL — SIGNIFICANT CHANGE UP (ref 7–23)
CALCIUM SERPL-MCNC: 8.8 MG/DL — SIGNIFICANT CHANGE UP (ref 8.5–10.1)
CHLORIDE SERPL-SCNC: 100 MMOL/L — SIGNIFICANT CHANGE UP (ref 96–108)
CO2 SERPL-SCNC: 32 MMOL/L — HIGH (ref 22–31)
CREAT SERPL-MCNC: 0.7 MG/DL — SIGNIFICANT CHANGE UP (ref 0.5–1.3)
GLUCOSE SERPL-MCNC: 133 MG/DL — HIGH (ref 70–99)
POTASSIUM SERPL-MCNC: 3.3 MMOL/L — LOW (ref 3.5–5.3)
POTASSIUM SERPL-SCNC: 3.3 MMOL/L — LOW (ref 3.5–5.3)
SODIUM SERPL-SCNC: 140 MMOL/L — SIGNIFICANT CHANGE UP (ref 135–145)

## 2017-06-03 RX ORDER — POTASSIUM CHLORIDE 20 MEQ
1 PACKET (EA) ORAL
Qty: 0 | Refills: 0 | COMMUNITY

## 2017-06-03 RX ORDER — LORATADINE 10 MG/1
1 TABLET ORAL
Qty: 30 | Refills: 0 | OUTPATIENT
Start: 2017-06-03

## 2017-06-03 RX ORDER — FLUTICASONE PROPIONATE 50 MCG
1 SPRAY, SUSPENSION NASAL
Qty: 1 | Refills: 0 | OUTPATIENT
Start: 2017-06-03

## 2017-06-03 RX ORDER — POTASSIUM CHLORIDE 20 MEQ
40 PACKET (EA) ORAL EVERY 4 HOURS
Qty: 0 | Refills: 0 | Status: DISCONTINUED | OUTPATIENT
Start: 2017-06-03 | End: 2017-06-03

## 2017-06-03 RX ORDER — METFORMIN HYDROCHLORIDE 850 MG/1
1 TABLET ORAL
Qty: 60 | Refills: 0 | OUTPATIENT
Start: 2017-06-03 | End: 2017-07-03

## 2017-06-03 RX ADMIN — Medication 25 MILLIGRAM(S): at 05:34

## 2017-06-03 RX ADMIN — Medication 1 TABLET(S): at 11:16

## 2017-06-03 RX ADMIN — LORATADINE 10 MILLIGRAM(S): 10 TABLET ORAL at 11:16

## 2017-06-03 RX ADMIN — PANTOPRAZOLE SODIUM 40 MILLIGRAM(S): 20 TABLET, DELAYED RELEASE ORAL at 05:34

## 2017-06-03 RX ADMIN — Medication 1000 UNIT(S): at 11:16

## 2017-06-03 RX ADMIN — APIXABAN 5 MILLIGRAM(S): 2.5 TABLET, FILM COATED ORAL at 05:34

## 2017-06-03 RX ADMIN — Medication 1 SPRAY(S): at 05:34

## 2017-06-03 RX ADMIN — Medication 20 MILLIGRAM(S): at 05:34

## 2017-06-03 RX ADMIN — Medication 1: at 12:09

## 2017-06-03 RX ADMIN — Medication 40 MILLIEQUIVALENT(S): at 10:54

## 2017-06-03 RX ADMIN — Medication 40 MILLIGRAM(S): at 05:35

## 2017-06-03 NOTE — DISCHARGE NOTE ADULT - CARE PROVIDERS DIRECT ADDRESSES
[email or web address],[email or web address]

## 2017-06-03 NOTE — DISCHARGE NOTE ADULT - PATIENT PORTAL LINK FT
“You can access the [facility] Patient Portal, offered by [facility], by registering with the following website: [email or web address]”

## 2017-06-03 NOTE — DISCHARGE NOTE ADULT - MEDICATION SUMMARY - MEDICATIONS TO TAKE
I will START or STAY ON the medications listed below when I get home from the hospital:    sildenafil 20 mg oral tablet  -- 1 tab(s) by mouth 2 times a day  -- Indication: For Pulmonary hypertension    aspirin 81 mg oral tablet  -- 1 tab(s) by mouth once a day  -- Indication: For Acute on chronic diastolic heart failure    Eliquis 5 mg oral tablet  -- 1 tab(s) by mouth 2 times a day  -- Indication: For Chronic atrial fibrillation    Lexapro 10 mg oral tablet  -- 1 tab(s) by mouth once a day (at bedtime)  -- Indication: For Depresssion    metFORMIN 500 mg oral tablet  -- 1 tab(s) by mouth 2 times a day  -- Check with your doctor before becoming pregnant.  Do not drink alcoholic beverages when taking this medication.  It is very important that you take or use this exactly as directed.  Do not skip doses or discontinue unless directed by your doctor.  Obtain medical advice before taking any non-prescription drugs as some may affect the action of this medication.  Take with food or milk.    -- Indication: For DIAbetes    loratadine 10 mg oral tablet  -- 1 tab(s) by mouth once a day  -- Indication: For Allergies    Zocor 40 mg oral tablet  -- 1 tab(s) by mouth once a day (at bedtime)  -- Indication: For high choelsterol    Toprol-XL 25 mg oral tablet, extended release  -- 25 milligram(s) by mouth 2 times a day  -- Indication: For Chronic atrial fibrillation    furosemide 40 mg oral tablet  -- 1 tab(s) by mouth 2 times a day  -- Indication: For water pill    garlic oral tablet  --  by mouth   -- Indication: For vitamin    docusate sodium 100 mg oral capsule  -- 2 cap(s) by mouth once a day (at bedtime)  -- Indication: For stool softner    potassium chloride 10 mEq oral tablet, extended release  -- 2 tab(s) by mouth 2 times a day  -- TWO TABLETS ON WEDNESDAY WITH METOLAZONE  -- Indication: For supplement    fluticasone 50 mcg/inh nasal spray  -- 1 spray(s) into nose 2 times a day  -- Indication: For Allergies    glucosamine-chondroitin 250 mg-200 mg oral tablet  -- 3 tab(s) by mouth once a day  -- Indication: For vitamin    CoQ10 300 mg oral capsule  -- 1 cap(s) by mouth once a day (at bedtime)  -- Indication: For vitamin    Acidophilus oral capsule  -- 1 cap(s) by mouth once a day  -- Indication: For Probiotic    NexIUM 40 mg oral delayed release capsule  -- 1 cap(s) by mouth once a day  -- Indication: For Acid reducer    PreserVision oral capsule  -- 2 cap(s) by mouth once a day  -- Indication: For vitamin    multivitamin  --     -- Indication: For vitmain    cholecalciferol 1000 intl units oral tablet  -- 1 tab(s) by mouth once a day  -- Indication: For vitamin    biotin 10 mg oral tablet  -- 1 tab(s) by mouth once a day  -- Indication: For vitamin

## 2017-06-03 NOTE — DISCHARGE NOTE ADULT - HOSPITAL COURSE
89 F with Hx of Chronic A-fib on A/C with Eliquis, Chronic Diastolic CHF, Type 2 Diabetes Mellitus, Pulmonary HTN was recently admitted to  from 5/24-5/29 with abdominal pain which was due to acute right sided diverticulitis.  She was treated with 5 days of IV Cipro and Flagyl with good clinical improvement.  She was also found to have an elevated lipase level of 1400 which was felt to be due to medication-induced pancreatitis.  She improved and was discharged home on 5/29.  Since being home, she describes the development of diarrhea, going to the bathroom several times a day, diarrhea was non-bloody and not associated with abdominal pain, nausea, vomiting.  She reports subjective chills but denies any fevers.  She reports having completed antibiotics.  She denies any recent travel or sick contacts.  In addition, she reports increased weight gain, leg swelling and dfifficulty putting her shoes own as her shoes were fitting tight.  She has had to sleep on the chair and has difficulty laying flat due to shortness of breath.   pt noted to have acute on chronic diastolic CHF and diuresed on IV lasix with significant improvement in LE edema and will be discharged on increased dose of po lasix.  taken off metolazone and januvia since there was concern for drug induced pancreatitis.      for her diabetes she will only be continued on metformin til she sees here outpt endocrine dr. ramirez at [facility] as outpt.  pt also to f/u [facility] as outt for audiology testing.      pt noted to have diarrhea which resolved and seen by dr. castelan and completed short course of cipro flagyl for possible early diverticulitis and stool was negative for c.diff on this admission. pt to f/u with dr castelan after discharge.      CAD, S/P BMS STENT S/P TAVR  -statin, BBLocker    CHRONIC ATRIAL FIBRILLATION  -rate control with metoprol  -CHADS2=3. On eliquis for stroke risk reduction    TYPE 2 DIABETES MELLITUS  -A1C last admission 6.1  -holding oral hypoglycemics in house  -on discharge, Janumet d/c - will take metformin at home until f/u with Dr. RAMIREZ. per discussion between Dr Zheng and Dr. 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  -repeat in 4-6 weeks 89 F with Hx of Chronic A-fib on A/C with Eliquis, Chronic Diastolic CHF, Type 2 Diabetes Mellitus, Pulmonary HTN was recently admitted to  from 5/24-5/29 with abdominal pain which was due to acute right sided diverticulitis.  She was treated with 5 days of IV Cipro and Flagyl with good clinical improvement.  She was also found to have an elevated lipase level of 1400 which was felt to be due to medication-induced pancreatitis.  She improved and was discharged home on 5/29.  Since being home, she describes the development of diarrhea, going to the bathroom several times a day, diarrhea was non-bloody and not associated with abdominal pain, nausea, vomiting.  She reports subjective chills but denies any fevers.  She reports having completed antibiotics.  She denies any recent travel or sick contacts.  In addition, she reports increased weight gain, leg swelling and dfifficulty putting her shoes own as her shoes were fitting tight.  She has had to sleep on the chair and has difficulty laying flat due to shortness of breath.   pt noted to have acute on chronic diastolic CHF and diuresed on IV lasix with significant improvement in LE edema and will be discharged on increased dose of po lasix.  taken off metolazone and januvia since there was concern for drug induced pancreatitis.      for her diabetes she will only be continued on metformin til she sees here outpt endocrine dr. fontenot at [facility] group as outpt.  pt also to f/u [facility]NT as outt for audiology testing.      pt noted to have diarrhea which resolved and seen by dr. castelan and completed short course of cipro flagyl for possible early diverticulitis and stool was negative for c.diff on this admission. pt to f/u with dr castelan after discharge.      ICU Vital Signs Last 24 Hrs  T(C): 36.4, Max: 36.6 (06-02 @ 12:16)  T(F): 97.5, Max: 97.9 (06-02 @ 16:58)  HR: 74 (74 - 83)  BP: 118/44 (111/55 - 134/63)  BP(mean): --  ABP: --  ABP(mean): --  RR: 17 (17 - 18)  SpO2: 95% (94% - 98%)  gen- lying in bed, NAD  HEENT:   pupils equal and reactive, EOMI  CV:  +S1, +S2, irregularly irregular  RESP:   lungs clear to auscultation bilaterally, no wheezing, rales, rhonchi, good air entry bilaterally  BREAST:  not examined  GI:  abdomen soft, non-tender, non-distended, normal BS  RECTAL:  not examined  :  not examined  MSK:   normal muscle tone  EXT:  edmea much imrpoved 1+ edema  NEURO:  AAOX3, no focal neurological deficits, follows all commands, able to move extremities spontaneously  SKIN:  no ulcers, lesions or rashes    CAD, S/P BMS STENT S/P TAVR  -statin, BBLocker    CHRONIC ATRIAL FIBRILLATION  -rate control with metoprol  -CHADS2=3. On eliquis for stroke risk reduction    TYPE 2 DIABETES MELLITUS  -A1C last admission 6.1  -holding oral hypoglycemics in house  -on discharge, Janumet d/c - will take metformin at home until f/u with Dr. GARCIA per discussion between Dr Zheng and Dr. 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  -repeat in 4-6 weeks    Hypokalemia - repleted po   total time spent on d/c 40 mins

## 2017-06-03 NOTE — DISCHARGE NOTE ADULT - PLAN OF CARE
improve breathing dose of your water pill has been increased along with your dose of potassium supplements

## 2017-06-03 NOTE — DISCHARGE NOTE ADULT - CARE PLAN
Principal Discharge DX:	Acute on chronic diastolic heart failure  Goal:	improve breathing  Instructions for follow-up, activity and diet:	dose of your water pill has been increased along with your dose of potassium supplements

## 2017-06-03 NOTE — DISCHARGE NOTE ADULT - MEDICATION SUMMARY - MEDICATIONS TO CHANGE
I will SWITCH the dose or number of times a day I take the medications listed below when I get home from the hospital:    furosemide 40 mg oral tablet  -- 1 tab(s) by mouth once a day    potassium chloride 10 mEq oral tablet, extended release  -- 1 tab(s) by mouth once a day  -- TWO TABLETS ON WEDNESDAY WITH METOLAZONE

## 2017-06-03 NOTE — DISCHARGE NOTE ADULT - CARE PROVIDER_API CALL
Samy Camacho), Internal Medicine; Pulmonary Disease  [address] [place] [address]  Phone: [phone]  Fax: [phone]    Reji Delacruz), Medicine  [address] [place] [address]  Phone: [phone]  Fax: [phone]    Florentin Hernandez (AMINATA), Cardiovascular Disease; Critical Care Medicine; Internal Medicine; Interventional Cardiology  [address] [place] [address]  Phone: [phone]  Fax: [phone]    Stewart Mobley), Gastroenterology; Internal Medicine  [address] [place] [address]  Phone: [phone]  Fax: [phone]

## 2017-06-07 DIAGNOSIS — Z79.01 LONG TERM (CURRENT) USE OF ANTICOAGULANTS: ICD-10-CM

## 2017-06-07 DIAGNOSIS — Z95.5 PRESENCE OF CORONARY ANGIOPLASTY IMPLANT AND GRAFT: ICD-10-CM

## 2017-06-07 DIAGNOSIS — M19.90 UNSPECIFIED OSTEOARTHRITIS, UNSPECIFIED SITE: ICD-10-CM

## 2017-06-07 DIAGNOSIS — R19.7 DIARRHEA, UNSPECIFIED: ICD-10-CM

## 2017-06-07 DIAGNOSIS — I48.2 CHRONIC ATRIAL FIBRILLATION: ICD-10-CM

## 2017-06-07 DIAGNOSIS — I25.10 ATHEROSCLEROTIC HEART DISEASE OF NATIVE CORONARY ARTERY WITHOUT ANGINA PECTORIS: ICD-10-CM

## 2017-06-07 DIAGNOSIS — E11.9 TYPE 2 DIABETES MELLITUS WITHOUT COMPLICATIONS: ICD-10-CM

## 2017-06-07 DIAGNOSIS — E02 SUBCLINICAL IODINE-DEFICIENCY HYPOTHYROIDISM: ICD-10-CM

## 2017-06-07 DIAGNOSIS — Z79.84 LONG TERM (CURRENT) USE OF ORAL HYPOGLYCEMIC DRUGS: ICD-10-CM

## 2017-06-07 DIAGNOSIS — E88.09 OTHER DISORDERS OF PLASMA-PROTEIN METABOLISM, NOT ELSEWHERE CLASSIFIED: ICD-10-CM

## 2017-06-07 DIAGNOSIS — K52.1 TOXIC GASTROENTERITIS AND COLITIS: ICD-10-CM

## 2017-06-07 DIAGNOSIS — I11.0 HYPERTENSIVE HEART DISEASE WITH HEART FAILURE: ICD-10-CM

## 2017-06-07 DIAGNOSIS — K21.9 GASTRO-ESOPHAGEAL REFLUX DISEASE WITHOUT ESOPHAGITIS: ICD-10-CM

## 2017-06-07 DIAGNOSIS — Z95.2 PRESENCE OF PROSTHETIC HEART VALVE: ICD-10-CM

## 2017-06-07 DIAGNOSIS — I27.2 OTHER SECONDARY PULMONARY HYPERTENSION: ICD-10-CM

## 2017-06-07 DIAGNOSIS — E78.5 HYPERLIPIDEMIA, UNSPECIFIED: ICD-10-CM

## 2017-06-07 DIAGNOSIS — T36.95XA ADVERSE EFFECT OF UNSPECIFIED SYSTEMIC ANTIBIOTIC, INITIAL ENCOUNTER: ICD-10-CM

## 2017-06-07 DIAGNOSIS — I50.33 ACUTE ON CHRONIC DIASTOLIC (CONGESTIVE) HEART FAILURE: ICD-10-CM

## 2017-06-08 ENCOUNTER — OTHER (OUTPATIENT)
Age: 82
End: 2017-06-08

## 2017-06-18 LAB
GLUCOSE SERPL-MCNC: 126
HBA1C MFR BLD HPLC: 6.4

## 2017-07-11 ENCOUNTER — OTHER (OUTPATIENT)
Age: 82
End: 2017-07-11

## 2017-07-19 LAB — GLUCOSE SERPL-MCNC: 146

## 2017-08-03 ENCOUNTER — RX RENEWAL (OUTPATIENT)
Age: 82
End: 2017-08-03

## 2017-08-03 DIAGNOSIS — K21.9 GASTRO-ESOPHAGEAL REFLUX DISEASE W/OUT ESOPHAGITIS: ICD-10-CM

## 2017-08-21 ENCOUNTER — APPOINTMENT (OUTPATIENT)
Dept: INTERNAL MEDICINE | Facility: CLINIC | Age: 82
End: 2017-08-21
Payer: MEDICARE

## 2017-08-21 VITALS
OXYGEN SATURATION: 96 % | BODY MASS INDEX: 29.53 KG/M2 | HEIGHT: 64 IN | TEMPERATURE: 97.5 F | HEART RATE: 76 BPM | WEIGHT: 173 LBS | DIASTOLIC BLOOD PRESSURE: 80 MMHG | SYSTOLIC BLOOD PRESSURE: 120 MMHG | RESPIRATION RATE: 20 BRPM

## 2017-08-21 DIAGNOSIS — I38 ENDOCARDITIS, VALVE UNSPECIFIED: ICD-10-CM

## 2017-08-21 DIAGNOSIS — K21.9 GASTRO-ESOPHAGEAL REFLUX DISEASE W/OUT ESOPHAGITIS: ICD-10-CM

## 2017-08-21 PROCEDURE — 99215 OFFICE O/P EST HI 40 MIN: CPT | Mod: 25

## 2017-08-21 PROCEDURE — 94729 DIFFUSING CAPACITY: CPT

## 2017-08-21 PROCEDURE — 94060 EVALUATION OF WHEEZING: CPT

## 2017-08-21 PROCEDURE — G0008: CPT

## 2017-08-21 PROCEDURE — 94727 GAS DIL/WSHOT DETER LNG VOL: CPT

## 2017-08-21 PROCEDURE — 90662 IIV NO PRSV INCREASED AG IM: CPT | Mod: GA

## 2017-08-24 ENCOUNTER — OTHER (OUTPATIENT)
Age: 82
End: 2017-08-24

## 2017-08-28 ENCOUNTER — MEDICATION RENEWAL (OUTPATIENT)
Age: 82
End: 2017-08-28

## 2017-08-28 DIAGNOSIS — M19.90 UNSPECIFIED OSTEOARTHRITIS, UNSPECIFIED SITE: ICD-10-CM

## 2017-08-28 DIAGNOSIS — F41.8 OTHER SPECIFIED ANXIETY DISORDERS: ICD-10-CM

## 2017-08-28 DIAGNOSIS — J30.89 OTHER ALLERGIC RHINITIS: ICD-10-CM

## 2017-08-28 DIAGNOSIS — J30.2 OTHER ALLERGIC RHINITIS: ICD-10-CM

## 2017-09-13 ENCOUNTER — RX RENEWAL (OUTPATIENT)
Age: 82
End: 2017-09-13

## 2017-10-24 ENCOUNTER — RX RENEWAL (OUTPATIENT)
Age: 82
End: 2017-10-24

## 2017-11-09 ENCOUNTER — OTHER (OUTPATIENT)
Age: 82
End: 2017-11-09

## 2017-12-13 ENCOUNTER — INPATIENT (INPATIENT)
Facility: HOSPITAL | Age: 82
LOS: 9 days | Discharge: HOME HEALTH | End: 2017-12-23
Attending: FAMILY MEDICINE | Admitting: FAMILY MEDICINE
Payer: MEDICARE

## 2017-12-13 VITALS
DIASTOLIC BLOOD PRESSURE: 43 MMHG | HEART RATE: 76 BPM | SYSTOLIC BLOOD PRESSURE: 134 MMHG | HEIGHT: 64 IN | RESPIRATION RATE: 96 BRPM | WEIGHT: 175.05 LBS | TEMPERATURE: 98 F | OXYGEN SATURATION: 96 %

## 2017-12-13 DIAGNOSIS — Z90.49 ACQUIRED ABSENCE OF OTHER SPECIFIED PARTS OF DIGESTIVE TRACT: Chronic | ICD-10-CM

## 2017-12-13 LAB
ALBUMIN SERPL ELPH-MCNC: 3.2 G/DL — LOW (ref 3.3–5)
ALP SERPL-CCNC: 84 U/L — SIGNIFICANT CHANGE UP (ref 40–120)
ALT FLD-CCNC: 21 U/L — SIGNIFICANT CHANGE UP (ref 12–78)
ANION GAP SERPL CALC-SCNC: 7 MMOL/L — SIGNIFICANT CHANGE UP (ref 5–17)
APPEARANCE UR: CLEAR — SIGNIFICANT CHANGE UP
APTT BLD: 35.5 SEC — SIGNIFICANT CHANGE UP (ref 27.5–37.4)
AST SERPL-CCNC: 29 U/L — SIGNIFICANT CHANGE UP (ref 15–37)
BACTERIA # UR AUTO: (no result)
BASOPHILS # BLD AUTO: 0.2 K/UL — SIGNIFICANT CHANGE UP (ref 0–0.2)
BASOPHILS NFR BLD AUTO: 1 % — SIGNIFICANT CHANGE UP (ref 0–2)
BILIRUB SERPL-MCNC: 0.8 MG/DL — SIGNIFICANT CHANGE UP (ref 0.2–1.2)
BILIRUB UR-MCNC: NEGATIVE — SIGNIFICANT CHANGE UP
BUN SERPL-MCNC: 17 MG/DL — SIGNIFICANT CHANGE UP (ref 7–23)
CALCIUM SERPL-MCNC: 9.1 MG/DL — SIGNIFICANT CHANGE UP (ref 8.5–10.1)
CHLORIDE SERPL-SCNC: 99 MMOL/L — SIGNIFICANT CHANGE UP (ref 96–108)
CO2 SERPL-SCNC: 28 MMOL/L — SIGNIFICANT CHANGE UP (ref 22–31)
COLOR SPEC: YELLOW — SIGNIFICANT CHANGE UP
CREAT SERPL-MCNC: 0.77 MG/DL — SIGNIFICANT CHANGE UP (ref 0.5–1.3)
DIFF PNL FLD: (no result)
EOSINOPHIL # BLD AUTO: 0.4 K/UL — SIGNIFICANT CHANGE UP (ref 0–0.5)
EOSINOPHIL NFR BLD AUTO: 6 % — SIGNIFICANT CHANGE UP (ref 0–6)
EPI CELLS # UR: SIGNIFICANT CHANGE UP
GLUCOSE SERPL-MCNC: 169 MG/DL — HIGH (ref 70–99)
GLUCOSE UR QL: NEGATIVE MG/DL — SIGNIFICANT CHANGE UP
HCT VFR BLD CALC: 36.2 % — SIGNIFICANT CHANGE UP (ref 34.5–45)
HGB BLD-MCNC: 11.8 G/DL — SIGNIFICANT CHANGE UP (ref 11.5–15.5)
INR BLD: 2 RATIO — HIGH (ref 0.88–1.16)
KETONES UR-MCNC: NEGATIVE — SIGNIFICANT CHANGE UP
LACTATE SERPL-SCNC: 1.9 MMOL/L — SIGNIFICANT CHANGE UP (ref 0.7–2)
LEUKOCYTE ESTERASE UR-ACNC: NEGATIVE — SIGNIFICANT CHANGE UP
LIDOCAIN IGE QN: HIGH U/L (ref 73–393)
LYMPHOCYTES # BLD AUTO: 0.8 K/UL — LOW (ref 1–3.3)
LYMPHOCYTES # BLD AUTO: 8 % — LOW (ref 13–44)
MANUAL DIF COMMENT BLD-IMP: SIGNIFICANT CHANGE UP
MCHC RBC-ENTMCNC: 30.5 PG — SIGNIFICANT CHANGE UP (ref 27–34)
MCHC RBC-ENTMCNC: 32.6 GM/DL — SIGNIFICANT CHANGE UP (ref 32–36)
MCV RBC AUTO: 93.4 FL — SIGNIFICANT CHANGE UP (ref 80–100)
MONOCYTES # BLD AUTO: 0.9 K/UL — SIGNIFICANT CHANGE UP (ref 0–0.9)
MONOCYTES NFR BLD AUTO: 6 % — SIGNIFICANT CHANGE UP (ref 2–14)
NEUTROPHILS # BLD AUTO: 6.3 K/UL — SIGNIFICANT CHANGE UP (ref 1.8–7.4)
NEUTROPHILS NFR BLD AUTO: 78 % — HIGH (ref 43–77)
NITRITE UR-MCNC: NEGATIVE — SIGNIFICANT CHANGE UP
PH UR: 5 — SIGNIFICANT CHANGE UP (ref 5–8)
PLAT MORPH BLD: NORMAL — SIGNIFICANT CHANGE UP
PLATELET # BLD AUTO: 173 K/UL — SIGNIFICANT CHANGE UP (ref 150–400)
POIKILOCYTOSIS BLD QL AUTO: SLIGHT — SIGNIFICANT CHANGE UP
POLYCHROMASIA BLD QL SMEAR: SLIGHT — SIGNIFICANT CHANGE UP
POTASSIUM SERPL-MCNC: 4 MMOL/L — SIGNIFICANT CHANGE UP (ref 3.5–5.3)
POTASSIUM SERPL-SCNC: 4 MMOL/L — SIGNIFICANT CHANGE UP (ref 3.5–5.3)
PROT SERPL-MCNC: 7.4 GM/DL — SIGNIFICANT CHANGE UP (ref 6–8.3)
PROT UR-MCNC: NEGATIVE MG/DL — SIGNIFICANT CHANGE UP
PROTHROM AB SERPL-ACNC: 21.9 SEC — HIGH (ref 9.8–12.7)
RBC # BLD: 3.88 M/UL — SIGNIFICANT CHANGE UP (ref 3.8–5.2)
RBC # FLD: 14.3 % — SIGNIFICANT CHANGE UP (ref 10.3–14.5)
RBC BLD AUTO: SIGNIFICANT CHANGE UP
RBC CASTS # UR COMP ASSIST: (no result) /HPF (ref 0–4)
ROULEAUX BLD QL SMEAR: PRESENT — SIGNIFICANT CHANGE UP
SODIUM SERPL-SCNC: 134 MMOL/L — LOW (ref 135–145)
SP GR SPEC: 1.01 — SIGNIFICANT CHANGE UP (ref 1.01–1.02)
TROPONIN I SERPL-MCNC: <0.015 NG/ML — SIGNIFICANT CHANGE UP (ref 0.01–0.04)
UROBILINOGEN FLD QL: NEGATIVE MG/DL — SIGNIFICANT CHANGE UP
VARIANT LYMPHS # BLD: 1 % — SIGNIFICANT CHANGE UP (ref 0–6)
WBC # BLD: 8.6 K/UL — SIGNIFICANT CHANGE UP (ref 3.8–10.5)
WBC # FLD AUTO: 8.6 K/UL — SIGNIFICANT CHANGE UP (ref 3.8–10.5)
WBC UR QL: SIGNIFICANT CHANGE UP

## 2017-12-13 PROCEDURE — 71010: CPT | Mod: 26

## 2017-12-13 PROCEDURE — 74177 CT ABD & PELVIS W/CONTRAST: CPT | Mod: 26

## 2017-12-13 PROCEDURE — 99285 EMERGENCY DEPT VISIT HI MDM: CPT

## 2017-12-13 PROCEDURE — 93010 ELECTROCARDIOGRAM REPORT: CPT

## 2017-12-13 RX ORDER — CHOLECALCIFEROL (VITAMIN D3) 125 MCG
1 CAPSULE ORAL
Qty: 0 | Refills: 0 | COMMUNITY

## 2017-12-13 RX ORDER — MULTIVIT-MIN/FERROUS GLUCONATE 9 MG/15 ML
2 LIQUID (ML) ORAL
Qty: 0 | Refills: 0 | COMMUNITY

## 2017-12-13 RX ORDER — SODIUM CHLORIDE 9 MG/ML
1000 INJECTION, SOLUTION INTRAVENOUS ONCE
Qty: 0 | Refills: 0 | Status: COMPLETED | OUTPATIENT
Start: 2017-12-13 | End: 2017-12-13

## 2017-12-13 RX ORDER — MORPHINE SULFATE 50 MG/1
4 CAPSULE, EXTENDED RELEASE ORAL ONCE
Qty: 0 | Refills: 0 | Status: DISCONTINUED | OUTPATIENT
Start: 2017-12-13 | End: 2017-12-13

## 2017-12-13 RX ORDER — SODIUM CHLORIDE 9 MG/ML
3 INJECTION INTRAMUSCULAR; INTRAVENOUS; SUBCUTANEOUS ONCE
Qty: 0 | Refills: 0 | Status: COMPLETED | OUTPATIENT
Start: 2017-12-13 | End: 2017-12-13

## 2017-12-13 RX ADMIN — SODIUM CHLORIDE 3 MILLILITER(S): 9 INJECTION INTRAMUSCULAR; INTRAVENOUS; SUBCUTANEOUS at 22:00

## 2017-12-13 RX ADMIN — SODIUM CHLORIDE 1000 MILLILITER(S): 9 INJECTION, SOLUTION INTRAVENOUS at 22:35

## 2017-12-13 RX ADMIN — MORPHINE SULFATE 4 MILLIGRAM(S): 50 CAPSULE, EXTENDED RELEASE ORAL at 21:25

## 2017-12-13 RX ADMIN — MORPHINE SULFATE 4 MILLIGRAM(S): 50 CAPSULE, EXTENDED RELEASE ORAL at 21:45

## 2017-12-13 NOTE — ED PROVIDER NOTE - OBJECTIVE STATEMENT
89 y/o female with a PMHx of Afib, CHF- Eliquis and Lasix, Valve Replacement x2 years ago, HTN, DM, GERD, HLD, hypothyroid c/o of mild epigastric, RUQ tenderness and belching that started this afternoon then worsened at 8:30pm. Pt was hospitalized in March for Pancreatitis. Per son states pt is taking a medication once a day for hypothyroid.   Denies N/V/D/CP/SOB.

## 2017-12-13 NOTE — ED ADULT NURSE NOTE - CHPI ED SYMPTOMS NEG
no nausea/no dysuria/no vomiting/no hematuria/no burning urination/no chills/no fever/no blood in stool/no diarrhea

## 2017-12-13 NOTE — ED PROVIDER NOTE - PMH
Afib    CAD (coronary artery disease)    Diabetes Mellitus Type II    Dyslipidemia    GERD (Gastroesophageal Reflux Disease)    History of Atrial Fibrillation    History of Osteoarthritis    HLD (hyperlipidemia)    Hypertension    Hypothyroid

## 2017-12-14 LAB
ANION GAP SERPL CALC-SCNC: 7 MMOL/L — SIGNIFICANT CHANGE UP (ref 5–17)
BASOPHILS # BLD AUTO: 0 K/UL — SIGNIFICANT CHANGE UP (ref 0–0.2)
BASOPHILS NFR BLD AUTO: 0.1 % — SIGNIFICANT CHANGE UP (ref 0–2)
BUN SERPL-MCNC: 15 MG/DL — SIGNIFICANT CHANGE UP (ref 7–23)
CALCIUM SERPL-MCNC: 8.4 MG/DL — LOW (ref 8.5–10.1)
CHLORIDE SERPL-SCNC: 102 MMOL/L — SIGNIFICANT CHANGE UP (ref 96–108)
CHOLEST SERPL-MCNC: 112 MG/DL — SIGNIFICANT CHANGE UP (ref 10–199)
CHOLEST SERPL-MCNC: 90 MG/DL — SIGNIFICANT CHANGE UP (ref 10–199)
CO2 SERPL-SCNC: 28 MMOL/L — SIGNIFICANT CHANGE UP (ref 22–31)
CREAT SERPL-MCNC: 0.65 MG/DL — SIGNIFICANT CHANGE UP (ref 0.5–1.3)
EOSINOPHIL # BLD AUTO: 0 K/UL — SIGNIFICANT CHANGE UP (ref 0–0.5)
EOSINOPHIL NFR BLD AUTO: 0.4 % — SIGNIFICANT CHANGE UP (ref 0–6)
GLUCOSE BLDC GLUCOMTR-MCNC: 103 MG/DL — HIGH (ref 70–99)
GLUCOSE BLDC GLUCOMTR-MCNC: 114 MG/DL — HIGH (ref 70–99)
GLUCOSE BLDC GLUCOMTR-MCNC: 121 MG/DL — HIGH (ref 70–99)
GLUCOSE BLDC GLUCOMTR-MCNC: 146 MG/DL — HIGH (ref 70–99)
GLUCOSE SERPL-MCNC: 179 MG/DL — HIGH (ref 70–99)
HBA1C BLD-MCNC: 6.4 % — HIGH (ref 4–5.6)
HCT VFR BLD CALC: 33.9 % — LOW (ref 34.5–45)
HDLC SERPL-MCNC: 42 MG/DL — SIGNIFICANT CHANGE UP (ref 40–125)
HDLC SERPL-MCNC: 44 MG/DL — SIGNIFICANT CHANGE UP (ref 40–125)
HGB BLD-MCNC: 10.9 G/DL — LOW (ref 11.5–15.5)
LIDOCAIN IGE QN: 4422 U/L — HIGH (ref 73–393)
LIDOCAIN IGE QN: 5284 U/L — HIGH (ref 73–393)
LIPID PNL WITH DIRECT LDL SERPL: 38 MG/DL — SIGNIFICANT CHANGE UP
LIPID PNL WITH DIRECT LDL SERPL: 57 MG/DL — SIGNIFICANT CHANGE UP
LYMPHOCYTES # BLD AUTO: 0.7 K/UL — LOW (ref 1–3.3)
LYMPHOCYTES # BLD AUTO: 5.1 % — LOW (ref 13–44)
MCHC RBC-ENTMCNC: 30.1 PG — SIGNIFICANT CHANGE UP (ref 27–34)
MCHC RBC-ENTMCNC: 32.1 GM/DL — SIGNIFICANT CHANGE UP (ref 32–36)
MCV RBC AUTO: 93.7 FL — SIGNIFICANT CHANGE UP (ref 80–100)
MONOCYTES # BLD AUTO: 1 K/UL — HIGH (ref 0–0.9)
MONOCYTES NFR BLD AUTO: 7.4 % — SIGNIFICANT CHANGE UP (ref 2–14)
NEUTROPHILS # BLD AUTO: 11.2 K/UL — HIGH (ref 1.8–7.4)
NEUTROPHILS NFR BLD AUTO: 87 % — HIGH (ref 43–77)
PLATELET # BLD AUTO: 157 K/UL — SIGNIFICANT CHANGE UP (ref 150–400)
POTASSIUM SERPL-MCNC: 4.1 MMOL/L — SIGNIFICANT CHANGE UP (ref 3.5–5.3)
POTASSIUM SERPL-SCNC: 4.1 MMOL/L — SIGNIFICANT CHANGE UP (ref 3.5–5.3)
RBC # BLD: 3.62 M/UL — LOW (ref 3.8–5.2)
RBC # FLD: 14.5 % — SIGNIFICANT CHANGE UP (ref 10.3–14.5)
SODIUM SERPL-SCNC: 137 MMOL/L — SIGNIFICANT CHANGE UP (ref 135–145)
TOTAL CHOLESTEROL/HDL RATIO MEASUREMENT: 2 RATIO — LOW (ref 3.3–7.1)
TOTAL CHOLESTEROL/HDL RATIO MEASUREMENT: 2.7 RATIO — LOW (ref 3.3–7.1)
TRIGL SERPL-MCNC: 41 MG/DL — SIGNIFICANT CHANGE UP (ref 10–149)
TRIGL SERPL-MCNC: 67 MG/DL — SIGNIFICANT CHANGE UP (ref 10–149)
TROPONIN I SERPL-MCNC: <0.015 NG/ML — SIGNIFICANT CHANGE UP (ref 0.01–0.04)
WBC # BLD: 12.9 K/UL — HIGH (ref 3.8–10.5)
WBC # FLD AUTO: 12.9 K/UL — HIGH (ref 3.8–10.5)

## 2017-12-14 PROCEDURE — 74181 MRI ABDOMEN W/O CONTRAST: CPT | Mod: 26

## 2017-12-14 RX ORDER — INSULIN LISPRO 100/ML
VIAL (ML) SUBCUTANEOUS
Qty: 0 | Refills: 0 | Status: DISCONTINUED | OUTPATIENT
Start: 2017-12-14 | End: 2017-12-14

## 2017-12-14 RX ORDER — APIXABAN 2.5 MG/1
5 TABLET, FILM COATED ORAL EVERY 12 HOURS
Qty: 0 | Refills: 0 | Status: DISCONTINUED | OUTPATIENT
Start: 2017-12-14 | End: 2017-12-23

## 2017-12-14 RX ORDER — ALPRAZOLAM 0.25 MG
0.5 TABLET ORAL ONCE
Qty: 0 | Refills: 0 | Status: DISCONTINUED | OUTPATIENT
Start: 2017-12-14 | End: 2017-12-14

## 2017-12-14 RX ORDER — LEVOTHYROXINE SODIUM 125 MCG
25 TABLET ORAL DAILY
Qty: 0 | Refills: 0 | Status: DISCONTINUED | OUTPATIENT
Start: 2017-12-14 | End: 2017-12-23

## 2017-12-14 RX ORDER — METOPROLOL TARTRATE 50 MG
25 TABLET ORAL
Qty: 0 | Refills: 0 | Status: DISCONTINUED | OUTPATIENT
Start: 2017-12-14 | End: 2017-12-23

## 2017-12-14 RX ORDER — INSULIN LISPRO 100/ML
VIAL (ML) SUBCUTANEOUS
Qty: 0 | Refills: 0 | Status: DISCONTINUED | OUTPATIENT
Start: 2017-12-14 | End: 2017-12-23

## 2017-12-14 RX ORDER — ASPIRIN/CALCIUM CARB/MAGNESIUM 324 MG
81 TABLET ORAL DAILY
Qty: 0 | Refills: 0 | Status: DISCONTINUED | OUTPATIENT
Start: 2017-12-14 | End: 2017-12-23

## 2017-12-14 RX ORDER — ACETAMINOPHEN 500 MG
650 TABLET ORAL EVERY 6 HOURS
Qty: 0 | Refills: 0 | Status: DISCONTINUED | OUTPATIENT
Start: 2017-12-14 | End: 2017-12-23

## 2017-12-14 RX ORDER — DEXTROSE 50 % IN WATER 50 %
25 SYRINGE (ML) INTRAVENOUS ONCE
Qty: 0 | Refills: 0 | Status: DISCONTINUED | OUTPATIENT
Start: 2017-12-14 | End: 2017-12-23

## 2017-12-14 RX ORDER — DEXTROSE 50 % IN WATER 50 %
1 SYRINGE (ML) INTRAVENOUS ONCE
Qty: 0 | Refills: 0 | Status: DISCONTINUED | OUTPATIENT
Start: 2017-12-14 | End: 2017-12-23

## 2017-12-14 RX ORDER — ESCITALOPRAM OXALATE 10 MG/1
1 TABLET, FILM COATED ORAL
Qty: 0 | Refills: 0 | COMMUNITY

## 2017-12-14 RX ORDER — TRAMADOL HYDROCHLORIDE 50 MG/1
25 TABLET ORAL ONCE
Qty: 0 | Refills: 0 | Status: DISCONTINUED | OUTPATIENT
Start: 2017-12-14 | End: 2017-12-15

## 2017-12-14 RX ORDER — DEXTROSE 50 % IN WATER 50 %
12.5 SYRINGE (ML) INTRAVENOUS ONCE
Qty: 0 | Refills: 0 | Status: DISCONTINUED | OUTPATIENT
Start: 2017-12-14 | End: 2017-12-23

## 2017-12-14 RX ORDER — MORPHINE SULFATE 50 MG/1
4 CAPSULE, EXTENDED RELEASE ORAL ONCE
Qty: 0 | Refills: 0 | Status: DISCONTINUED | OUTPATIENT
Start: 2017-12-14 | End: 2017-12-14

## 2017-12-14 RX ORDER — SODIUM CHLORIDE 9 MG/ML
1000 INJECTION, SOLUTION INTRAVENOUS
Qty: 0 | Refills: 0 | Status: DISCONTINUED | OUTPATIENT
Start: 2017-12-14 | End: 2017-12-17

## 2017-12-14 RX ORDER — SODIUM CHLORIDE 9 MG/ML
1000 INJECTION, SOLUTION INTRAVENOUS
Qty: 0 | Refills: 0 | Status: DISCONTINUED | OUTPATIENT
Start: 2017-12-14 | End: 2017-12-16

## 2017-12-14 RX ORDER — GLUCAGON INJECTION, SOLUTION 0.5 MG/.1ML
1 INJECTION, SOLUTION SUBCUTANEOUS ONCE
Qty: 0 | Refills: 0 | Status: DISCONTINUED | OUTPATIENT
Start: 2017-12-14 | End: 2017-12-23

## 2017-12-14 RX ADMIN — APIXABAN 5 MILLIGRAM(S): 2.5 TABLET, FILM COATED ORAL at 17:57

## 2017-12-14 RX ADMIN — Medication 81 MILLIGRAM(S): at 17:57

## 2017-12-14 RX ADMIN — Medication 650 MILLIGRAM(S): at 21:41

## 2017-12-14 RX ADMIN — Medication 25 MILLIGRAM(S): at 17:58

## 2017-12-14 RX ADMIN — Medication 20 MILLIGRAM(S): at 21:40

## 2017-12-14 RX ADMIN — SODIUM CHLORIDE 80 MILLILITER(S): 9 INJECTION, SOLUTION INTRAVENOUS at 21:15

## 2017-12-14 RX ADMIN — SODIUM CHLORIDE 80 MILLILITER(S): 9 INJECTION, SOLUTION INTRAVENOUS at 07:41

## 2017-12-14 RX ADMIN — Medication 0.5 MILLIGRAM(S): at 13:13

## 2017-12-14 RX ADMIN — MORPHINE SULFATE 4 MILLIGRAM(S): 50 CAPSULE, EXTENDED RELEASE ORAL at 00:14

## 2017-12-14 NOTE — CONSULT NOTE ADULT - SUBJECTIVE AND OBJECTIVE BOX
89year old female with past medical history of CAD s/p stent, Atrial Fibrillation, HFpEF, T2DM, HLD, PHTN, presenting with abdominal pain x 1 day. Abdominal pain started suddenly in AM, located in epigastric area without radiation, intermittent, dull  became worse after eating this evening  Pt with + Belching  no  N/V, fever/chills, diarrhea/constipation. Nl BM in AM. Patient was admitted for presumed drug-induced pancreatitis in May 2017  thought to be related to  Metalozone and Januvia which were discontinued at that time. 89year old female with past medical history of CAD s/p stent, Atrial Fibrillation, HFpEF, T2DM, HLD, PHTN, presenting with abdominal pain x 1 day. Abdominal pain started suddenly in AM, located in epigastric area ,  intermittent, dull  became worse after eating this evening  Pain now greatest in LUQ.  Pt with + Belching  no  N/V, fever/chills, diarrhea/constipation. Nl BM in AM. Patient was admitted for presumed drug-induced pancreatitis in May 2017  thought to be related to  Metalozone and Januvia which were discontinued at that time.                    Allergies:  	penicillin: Drug, Rash  	sulfa drugs: Drug Category, Rash, Other, rash  	penicillins: Drug Category, Other, Originally Entered as [see Comment: pt arms swell up] reaction to [PENICILLINS]                   Medications:  · 	metFORMIN 500 mg oral tablet: 1 tab(s) orally 2 times a day, Last Dose Taken:    · 	furosemide 40 mg oral tablet: 1 tab(s) orally 2 times a day, Last Dose Taken:    · 	Eliquis 5 mg oral tablet: 1 tab(s) orally 2 times a day, Last Dose Taken:    · 	aspirin 81 mg oral tablet: 1 tab(s) orally once a day, Last Dose Taken:    · 	NexIUM 40 mg oral delayed release capsule: 1 cap(s) orally once a day, Last Dose Taken:    · 	Zocor 40 mg oral tablet: 1 tab(s) orally once a day (at bedtime), Last Dose Taken:    · 	garlic oral tablet:  orally , Last Dose Taken:    · 	biotin 10 mg oral tablet: 1 tab(s) orally once a day, Last Dose Taken:    · 	glucosamine-chondroitin 250 mg-200 mg oral tablet: 3 tab(s) orally once a day, Last Dose Taken:    · 	Acidophilus oral capsule: 1 cap(s) orally once a day, Last Dose Taken:    · 	docusate sodium 100 mg oral capsule: 2 cap(s) orally once a day (at bedtime), Last Dose Taken:    · 	Lexapro 10 mg oral tablet: 1 tab(s) orally once a day (at bedtime), Last Dose Taken:    · 	sildenafil 20 mg oral tablet: 1 tab(s) orally 2 times a day, Last Dose Taken:    · 	multivitamin:   , Last Dose Taken:    · 	CoQ10 300 mg oral capsule: 1 cap(s) orally once a day (at bedtime), Last Dose Taken:    · 	potassium chloride 10 mEq oral tablet, extended release: 2 tab(s) orally 2 times a day, Last Dose Taken:    · 	metoprolol tartrate 25 mg oral tablet: 1 tab(s) orally 2 times a day, Last Dose Taken:    · 	levothyroxine 25 mcg (0.025 mg) oral capsule: 1 cap(s) orally once a day, Last Dose Taken:    · 	levothyroxine 25 mcg (0.025 mg) oral tablet: 1 tab(s) orally once a day    .  PMH:  Afib    CAD (coronary artery disease)    Diabetes Mellitus Type II    Dyslipidemia    GERD (Gastroesophageal Reflux Disease)    History of Atrial Fibrillation    History of Osteoarthritis    HLD (hyperlipidemia)    Hypertension    Hypothyroid.    PSH:  H/O: Hysterectomy    H/O: Knee Surgery- right meniscus    History of appendectomy    History of cholecystectomy.     Social History:  Social History : No Tobacco, ETOH, drug use/History    PE:    Gen:  Pt is a Wn/WD female in [facility], resting on stretcher      Vital Signs Last 24 Hrs  T(C): 36.5 (13 Dec 2017 21:11), Max: 36.5 (13 Dec 2017 21:11)  T(F): 97.7 (13 Dec 2017 21:11), Max: 97.7 (13 Dec 2017 21:11)  HR: 79 (13 Dec 2017 22:30) (76 - 93)  BP: 122/54 (13 Dec 2017 22:30) (103/44 - 134/43)  BP(mean): --  RR: 20 (13 Dec 2017 22:30) (20 - 96)  SpO2: 98% (13 Dec 2017 22:30) (90% - 98%)      Heart: S1S2 irregular, rate 80    Lungs: CTA B/L    ABD: ND, + Hypoactive BS, ND, + tender to palpation of mid Abd 89year old female with past medical history of CAD s/p stent, Atrial Fibrillation, HFpEF, T2DM, HLD, PHTN, presenting with abdominal pain x 1 day. Abdominal pain started suddenly in AM, located in epigastric area ,  intermittent, dull  became worse after eating this evening  Pain now greatest in LUQ.  Pt with + Belching  no  N/V, fever/chills, diarrhea/constipation. Nl BM in AM. Patient was admitted for presumed drug-induced pancreatitis in May 2017  thought to be related to  Metalozone and Januvia which were discontinued at that time.                    Allergies:  	penicillin: Drug, Rash  	sulfa drugs: Drug Category, Rash, Other, rash  	penicillins: Drug Category, Other, Originally Entered as [see Comment: pt arms swell up] reaction to [PENICILLINS]                   Medications:  · 	metFORMIN 500 mg oral tablet: 1 tab(s) orally 2 times a day, Last Dose Taken:    · 	furosemide 40 mg oral tablet: 1 tab(s) orally 2 times a day, Last Dose Taken:    · 	Eliquis 5 mg oral tablet: 1 tab(s) orally 2 times a day, Last Dose Taken:    · 	aspirin 81 mg oral tablet: 1 tab(s) orally once a day, Last Dose Taken:    · 	NexIUM 40 mg oral delayed release capsule: 1 cap(s) orally once a day, Last Dose Taken:    · 	Zocor 40 mg oral tablet: 1 tab(s) orally once a day (at bedtime), Last Dose Taken:    · 	garlic oral tablet:  orally , Last Dose Taken:    · 	biotin 10 mg oral tablet: 1 tab(s) orally once a day, Last Dose Taken:    · 	glucosamine-chondroitin 250 mg-200 mg oral tablet: 3 tab(s) orally once a day, Last Dose Taken:    · 	Acidophilus oral capsule: 1 cap(s) orally once a day, Last Dose Taken:    · 	docusate sodium 100 mg oral capsule: 2 cap(s) orally once a day (at bedtime), Last Dose Taken:    · 	Lexapro 10 mg oral tablet: 1 tab(s) orally once a day (at bedtime), Last Dose Taken:    · 	sildenafil 20 mg oral tablet: 1 tab(s) orally 2 times a day, Last Dose Taken:    · 	multivitamin:   , Last Dose Taken:    · 	CoQ10 300 mg oral capsule: 1 cap(s) orally once a day (at bedtime), Last Dose Taken:    · 	potassium chloride 10 mEq oral tablet, extended release: 2 tab(s) orally 2 times a day, Last Dose Taken:    · 	metoprolol tartrate 25 mg oral tablet: 1 tab(s) orally 2 times a day, Last Dose Taken:    · 	levothyroxine 25 mcg (0.025 mg) oral capsule: 1 cap(s) orally once a day, Last Dose Taken:    · 	levothyroxine 25 mcg (0.025 mg) oral tablet: 1 tab(s) orally once a day    .  PMH:  Afib    CAD (coronary artery disease)    Diabetes Mellitus Type II    Dyslipidemia    GERD (Gastroesophageal Reflux Disease)    History of Atrial Fibrillation    History of Osteoarthritis    HLD (hyperlipidemia)    Hypertension    Hypothyroid.    PSH:  H/O: Hysterectomy    H/O: Knee Surgery- right meniscus    History of appendectomy    History of cholecystectomy.     Social History:  Social History : No Tobacco, ETOH, drug use/History    PE:    Gen:  Pt is a Wn/WD female in [facility], resting on stretcher  AAOx3      Vital Signs Last 24 Hrs  T(C): 36.5 (13 Dec 2017 21:11), Max: 36.5 (13 Dec 2017 21:11)  T(F): 97.7 (13 Dec 2017 21:11), Max: 97.7 (13 Dec 2017 21:11)  HR: 79 (13 Dec 2017 22:30) (76 - 93)  BP: 122/54 (13 Dec 2017 22:30) (103/44 - 134/43)  BP(mean): --  RR: 20 (13 Dec 2017 22:30) (20 - 96)  SpO2: 98% (13 Dec 2017 22:30) (90% - 98%)      Heart: S1S2 irregular, rate 80    Lungs: CTA B/L    ABD: ND, + Hypoactive BS, ND, + mild diffuse tenderness  to palpation greatest in LUQ, no rebound or guarding      A/P:   Acute Pancreatitis  Admitted to Medicine  NPO, IVF, Pain control  PT seen with and Labs/CT scan reviewed with Dr Sanders  He does not appreciate a volvulus  Repeat AM labs                               11.8   8.6   )-----------( 173      ( 13 Dec 2017 21:14 )             36.2     12-13    134<L>  |  99  |  17  ----------------------------<  169<H>  4.0   |  28  |  0.77    Ca    9.1      13 Dec 2017 21:14    TPro  7.4  /  Alb  3.2<L>  /  TBili  0.8  /  DBili  x   /  AST  29  /  ALT  21  /  AlkPhos  84  12-    CARDIAC MARKERS ( 13 Dec 2017 21:14 )  <0.015 ng/mL / x     / x     / x     / x          LIVER FUNCTIONS - ( 13 Dec 2017 21:14 )  Alb: 3.2 g/dL / Pro: 7.4 gm/dL / ALK PHOS: 84 U/L / ALT: 21 U/L / AST: 29 U/L / GGT: x           PT/INR - ( 13 Dec 2017 21:14 )   PT: 21.9 sec;   INR: 2.00 ratio         PTT - ( 13 Dec 2017 21:14 )  PTT:35.5 sec  Urinalysis Basic - ( 13 Dec 2017 22:15 )    Color: Yellow / Appearance: Clear / S.015 / pH: x  Gluc: x / Ketone: Negative  / Bili: Negative / Urobili: Negative mg/dL   Blood: x / Protein: Negative mg/dL / Nitrite: Negative   Leuk Esterase: Negative / RBC: 3-5 /HPF / WBC 0-2   Sq Epi: x / Non Sq Epi: Few / Bacteria: Few        Lactate, Blood: 1.9 mmol/L ( @ 22:09)    Blood, Urine: Trace ( @ 22:15)                            EKG:     RADIOLOGY STUDIES: 89year old female with past medical history of CAD s/p stent, Atrial Fibrillation, HFpEF, T2DM, HLD, PHTN, presenting with abdominal pain x 1 day. Abdominal pain started suddenly in AM, located in epigastric area ,  intermittent, dull  became worse after eating this evening  Pain now greatest in LUQ.  Pt with + Belching  no  N/V, fever/chills, diarrhea/constipation. Nl BM in AM. Patient was admitted for presumed drug-induced pancreatitis in May 2017  thought to be related to  Metalozone and Januvia which were discontinued at that time.                    Allergies:  	penicillin: Drug, Rash  	sulfa drugs: Drug Category, Rash, Other, rash  	penicillins: Drug Category, Other, Originally Entered as [see Comment: pt arms swell up] reaction to [PENICILLINS]                   Medications:  · 	metFORMIN 500 mg oral tablet: 1 tab(s) orally 2 times a day, Last Dose Taken:    · 	furosemide 40 mg oral tablet: 1 tab(s) orally 2 times a day, Last Dose Taken:    · 	Eliquis 5 mg oral tablet: 1 tab(s) orally 2 times a day, Last Dose Taken:    · 	aspirin 81 mg oral tablet: 1 tab(s) orally once a day, Last Dose Taken:    · 	NexIUM 40 mg oral delayed release capsule: 1 cap(s) orally once a day, Last Dose Taken:    · 	Zocor 40 mg oral tablet: 1 tab(s) orally once a day (at bedtime), Last Dose Taken:    · 	garlic oral tablet:  orally , Last Dose Taken:    · 	biotin 10 mg oral tablet: 1 tab(s) orally once a day, Last Dose Taken:    · 	glucosamine-chondroitin 250 mg-200 mg oral tablet: 3 tab(s) orally once a day, Last Dose Taken:    · 	Acidophilus oral capsule: 1 cap(s) orally once a day, Last Dose Taken:    · 	docusate sodium 100 mg oral capsule: 2 cap(s) orally once a day (at bedtime), Last Dose Taken:    · 	Lexapro 10 mg oral tablet: 1 tab(s) orally once a day (at bedtime), Last Dose Taken:    · 	sildenafil 20 mg oral tablet: 1 tab(s) orally 2 times a day, Last Dose Taken:    · 	multivitamin:   , Last Dose Taken:    · 	CoQ10 300 mg oral capsule: 1 cap(s) orally once a day (at bedtime), Last Dose Taken:    · 	potassium chloride 10 mEq oral tablet, extended release: 2 tab(s) orally 2 times a day, Last Dose Taken:    · 	metoprolol tartrate 25 mg oral tablet: 1 tab(s) orally 2 times a day, Last Dose Taken:    · 	levothyroxine 25 mcg (0.025 mg) oral capsule: 1 cap(s) orally once a day, Last Dose Taken:    · 	levothyroxine 25 mcg (0.025 mg) oral tablet: 1 tab(s) orally once a day    .  PMH:  Afib    CAD (coronary artery disease)    Diabetes Mellitus Type II    Dyslipidemia    GERD (Gastroesophageal Reflux Disease)    History of Atrial Fibrillation    History of Osteoarthritis    HLD (hyperlipidemia)    Hypertension    Hypothyroid.    PSH:  H/O: Hysterectomy    H/O: Knee Surgery- right meniscus    History of appendectomy    History of cholecystectomy.     Social History:  Social History : No Tobacco, ETOH, drug use/History    PE:    Gen:  Pt is a Wn/WD female in [facility], resting on stretcher  AAOx3      Vital Signs Last 24 Hrs  T(C): 36.5 (13 Dec 2017 21:11), Max: 36.5 (13 Dec 2017 21:11)  T(F): 97.7 (13 Dec 2017 21:11), Max: 97.7 (13 Dec 2017 21:11)  HR: 79 (13 Dec 2017 22:30) (76 - 93)  BP: 122/54 (13 Dec 2017 22:30) (103/44 - 134/43)  BP(mean): --  RR: 20 (13 Dec 2017 22:30) (20 - 96)  SpO2: 98% (13 Dec 2017 22:30) (90% - 98%)      Heart: S1S2 irregular, rate 80  Lungs: CTA B/L  ABD: ND, + Hypoactive BS, ND, + mild diffuse tenderness  to palpation greatest in LUQ, no rebound or guarding  EXT: Non tender    A/P:   Acute Pancreatitis  Admitted to Medicine  NPO, IVF, Pain control  PT seen with and Labs/CT scan reviewed with Dr Sanders  He does not appreciate a volvulus  Repeat AM labs                               11.8   8.6   )-----------( 173      ( 13 Dec 2017 21:14 )             36.2     12-13    134<L>  |  99  |  17  ----------------------------<  169<H>  4.0   |  28  |  0.77    Ca    9.1      13 Dec 2017 21:14    TPro  7.4  /  Alb  3.2<L>  /  TBili  0.8  /  DBili  x   /  AST  29  /  ALT  21  /  AlkPhos  84  12-13    CARDIAC MARKERS ( 13 Dec 2017 21:14 )  <0.015 ng/mL / x     / x     / x     / x          LIVER FUNCTIONS - ( 13 Dec 2017 21:14 )  Alb: 3.2 g/dL / Pro: 7.4 gm/dL / ALK PHOS: 84 U/L / ALT: 21 U/L / AST: 29 U/L / GGT: x           PT/INR - ( 13 Dec 2017 21:14 )   PT: 21.9 sec;   INR: 2.00 ratio         PTT - ( 13 Dec 2017 21:14 )  PTT:35.5 sec  Urinalysis Basic - ( 13 Dec 2017 22:15 )    Color: Yellow / Appearance: Clear / S.015 / pH: x  Gluc: x / Ketone: Negative  / Bili: Negative / Urobili: Negative mg/dL   Blood: x / Protein: Negative mg/dL / Nitrite: Negative   Leuk Esterase: Negative / RBC: 3-5 /HPF / WBC 0-2   Sq Epi: x / Non Sq Epi: Few / Bacteria: Few        Lactate, Blood: 1.9 mmol/L ( @ 22:09)    Blood, Urine: Trace ( @ 22:15)                            EKG:     RADIOLOGY STUDIES:

## 2017-12-14 NOTE — CONSULT NOTE ADULT - SUBJECTIVE AND OBJECTIVE BOX
HPI:  89 year old female with past medical history of CAD s/p stent, Atrial Fibrillation, HFpEF, T2DM, HLD, PHTN, presenting with abdominal pain x 1 day. Abdominal pain started suddenly in AM, located in epigastric area without radiation, described as dull, with episodes occurring intermittently throughout the day. Associated symptoms include burping. No exposure to new foods, N/V, change in BM. Patient was admitted for presumed drug-induced pancreatitis in May 2017. The offending agents were thought to be Metalozone and Januvia which were discontinued upon discharge. Patient was recently started on Levothyroxine about 3 weeks ago.    In the ED, [place] [id number] and CT ABD positive for acute pancreatitis. (14 Dec 2017 00:48)    Feels better now with pain medication.      PAST MEDICAL & SURGICAL HISTORY:  HLD (hyperlipidemia)  Hypothyroid  Afib  CAD (coronary artery disease)  Hypertension  History of Osteoarthritis  History of Atrial Fibrillation  GERD (Gastroesophageal Reflux Disease)  Dyslipidemia  Diabetes Mellitus Type II  History of appendectomy  History of cholecystectomy  H/O: Knee Surgery- right meniscus  H/O: Hysterectomy      MEDICATIONS  (STANDING):  apixaban 5 milliGRAM(s) Oral every 12 hours  aspirin  chewable 81 milliGRAM(s) Oral daily  dextrose 5%. 1000 milliLiter(s) (50 mL/Hr) IV Continuous <Continuous>  dextrose 50% Injectable 12.5 Gram(s) IV Push once  dextrose 50% Injectable 25 Gram(s) IV Push once  dextrose 50% Injectable 25 Gram(s) IV Push once  insulin lispro (HumaLOG) corrective regimen sliding scale   SubCutaneous three times a day before meals  lactated ringers. 1000 milliLiter(s) (80 mL/Hr) IV Continuous <Continuous>  levothyroxine 25 MICROGram(s) Oral daily  metoprolol     tartrate 25 milliGRAM(s) Oral two times a day  sildenafil (REVATIO) 20 milliGRAM(s) Oral two times a day    MEDICATIONS  (PRN):  acetaminophen   Tablet. 650 milliGRAM(s) Oral every 6 hours PRN Mild Pain (1 - 3)  dextrose Gel 1 Dose(s) Oral once PRN Blood Glucose LESS THAN 70 milliGRAM(s)/deciliter  glucagon  Injectable 1 milliGRAM(s) IntraMuscular once PRN Glucose LESS THAN 70 milligrams/deciliter      Allergies    penicillin (Rash)  penicillins (Other)  sulfa drugs (Rash; Other)    Intolerances        SOCIAL HISTORY:  No tobacco or EtOH    FAMILY HISTORY:  No pertinent family history in first degree relatives      ROS  As above  Otherwise unremarkable    Vital Signs Last 24 Hrs  T(C): 36.7 (14 Dec 2017 08:43), Max: 36.7 (14 Dec 2017 08:43)  T(F): 98.1 (14 Dec 2017 08:43), Max: 98.1 (14 Dec 2017 08:43)  HR: 89 (14 Dec 2017 08:43) (76 - 110)  BP: 100/53 (14 Dec 2017 08:43) (100/53 - 134/43)  BP(mean): --  RR: 18 (14 Dec 2017 08:43) (17 - 96)  SpO2: 98% (14 Dec 2017 08:43) (90% - 99%)    Constitutional: NAD, well-developed  Respiratory: CTAB  Cardiovascular: S1 and S2, RRR  Gastrointestinal: BS+, soft, NT/ND  Extremities: No peripheral edema  Psychiatric: Normal mood, normal affect  Skin: No rashes    LABS:                        10.9   12.9  )-----------( 157      ( 14 Dec 2017 05:35 )             33.9     12-14    137  |  102  |  15  ----------------------------<  179<H>  4.1   |  28  |  0.65    Ca    8.4<L>      14 Dec 2017 05:35    TPro  7.4  /  Alb  3.2<L>  /  TBili  0.8  /  DBili  x   /  AST  29  /  ALT  21  /  AlkPhos  84  12-13    PT/INR - ( 13 Dec 2017 21:14 )   PT: 21.9 sec;   INR: 2.00 ratio         PTT - ( 13 Dec 2017 21:14 )  PTT:35.5 sec  LIVER FUNCTIONS - ( 13 Dec 2017 21:14 )  Alb: 3.2 g/dL / Pro: 7.4 gm/dL / ALK PHOS: 84 U/L / ALT: 21 U/L / AST: 29 U/L / GGT: x             RADIOLOGY & ADDITIONAL STUDIES:

## 2017-12-14 NOTE — H&P ADULT - NSHPREVIEWOFSYSTEMS_GEN_ALL_CORE
REVIEW OF SYSTEMS:  CONSTITUTIONAL: No weakness, fevers or chills  EYES/ENT: No visual changes;  No vertigo or throat pain   NECK: No pain or stiffness  RESPIRATORY: No cough, wheezing, hemoptysis; No shortness of breath  CARDIOVASCULAR: No chest pain or palpitations  GASTROINTESTINAL: No abdominal or epigastric pain. No nausea, vomiting, or hematemesis; No diarrhea or constipation. No melena or hematochezia.  GENITOURINARY: No dysuria, frequency or hematuria  NEUROLOGICAL: No numbness or weakness  SKIN: No itching, burning, rashes, or lesions   All other review of systems is negative unless indicated above.

## 2017-12-14 NOTE — H&P ADULT - NSHPPHYSICALEXAM_GEN_ALL_CORE
T(C): 36.5 (12-13-17 @ 21:11), Max: 36.5 (12-13-17 @ 21:11)  HR: 79 (12-13-17 @ 22:30) (76 - 93)  BP: 122/54 (12-13-17 @ 22:30) (103/44 - 134/43)  RR: 20 (12-13-17 @ 22:30) (20 - 96)  SpO2: 98% (12-13-17 @ 22:30) (90% - 98%)  Wt(kg): --    PHYSICAL EXAM:  GENERAL: NAD, well-groomed, well-developed  HEAD:  NC/AT  EYES: EOMI, PERRLA, no scleral icterus  HEENT: Moist mucous membranes  NECK: Supple, No JVD  CNS:  Alert & Oriented X3, Motor Strength 5/5 B/L upper and lower extremities; DTRs 2+ intact   LUNG: Clear to auscultation bilaterally; No rales, rhonchi, wheezing, or rubs  HEART: RRR; No murmurs, rubs, or gallops  ABDOMEN: +BS, ST/ND/NT  GENITOURINARY- Voiding, Bladder not distended  EXTREMITIES:  2+ Peripheral Pulses, No clubbing, cyanosis, or edema  MUSCULOSKELTAL- Joints normal ROM, no Muscle or joint tenderness T(C): 36.5 (12-13-17 @ 21:11), Max: 36.5 (12-13-17 @ 21:11)  HR: 79 (12-13-17 @ 22:30) (76 - 93)  BP: 122/54 (12-13-17 @ 22:30) (103/44 - 134/43)  RR: 20 (12-13-17 @ 22:30) (20 - 96)  SpO2: 98% (12-13-17 @ 22:30) (90% - 98%)  Wt(kg): --    PHYSICAL EXAM:  GENERAL: NAD, Elderly  HEAD:  NC/AT  EYES: EOMI, PERRL, no scleral icterus  HEENT: Moist mucous membranes  NECK: Supple, No JVD  CNS:  Alert & Oriented X3,  LUNG: Rales ausculated, predominantly at the bases  HEART: Irregular Rhythm, Regular Rate; No murmurs, rubs, or gallops  ABDOMEN: +BS, TTP in the Epigastric and Lower Abdomen, No guarding, +Rigidity,   EXTREMITIES:  Palbable Peripheral Pulses, No clubbing, cyanosis, 1+ Pitting edema  MUSCULOSKELTAL- Joints normal ROM, no Muscle or joint tenderness

## 2017-12-14 NOTE — H&P ADULT - HISTORY OF PRESENT ILLNESS
89 year old female with past medical history of CAD s/p stent, Atrial Fibrillation, HFpEF, T2DM, HLD, PHTN, presenting with abdominal pain x 1 day. Abdominal pain started suddenly in AM, located in epigastric area without radiation, described as dull, with episodes occurring intermittently throughout the day. Associated symptoms include burping. No exposure to new foods, N/V, change in BM. Patient was admitted for presumed drug-induced pancreatitis in May 2017. The offending agents were thought to be Metalozone and Januvia which were discontinued upon discharge. Patient was recently started on Levothyroxine about 3 weeks ago.    In the ED, [place]ase [place] be [id number] and CT ABD positive for acute pancreatitis.

## 2017-12-14 NOTE — PROGRESS NOTE ADULT - SUBJECTIVE AND OBJECTIVE BOX
89year old female with past medical history of CAD s/p stent, Atrial Fibrillation, HFpEF, T2DM, HLD, PHTN, presenting with abdominal pain x 1 day. Abdominal pain started suddenly in AM, located in epigastric area ,  intermittent, dull  became worse after eating this evening  Pain now greatest in LUQ.  Pt with + Belching  no  N/V, fever/chills, diarrhea/constipation. Nl BM in AM. Patient was admitted for presumed drug-induced pancreatitis in May 2017  thought to be related to  Metalozone and Januvia which were discontinued at that time.       REVIEW OF SYSTEMS:  General: NAD, hemodynamically stable, (-)  fever, (-) chills, (-) weakness  HEENT:  Eyes:  No visual loss, blurred vision, double vision or yellow sclerae. Ears, Nose, Throat:  No hearing loss, sneezing, congestion, runny nose or sore throat.  SKIN:  No rash or itching.  CARDIOVASCULAR:  No chest pain, chest pressure or chest discomfort. No palpitations or edema.  RESPIRATORY:  No shortness of breath, cough or sputum.  GASTROINTESTINAL:  No anorexia, nausea, vomiting or diarrhea. No abdominal pain or blood.  NEUROLOGICAL:  No headache, dizziness, syncope, paralysis, ataxia, numbness or tingling in the extremities. No change in bowel or bladder control.  MUSCULOSKELETAL:  No muscle, back pain, joint pain or stiffness.  HEMATOLOGIC:  No anemia, bleeding or bruising.  LYMPHATICS:  No enlarged nodes. No history of splenectomy.  ENDOCRINOLOGIC:  No reports of sweating, cold or heat intolerance. No polyuria or polydipsia.  ALLERGIES:  No history of asthma, hives, eczema or rhinitis.    PE:  ICU Vital Signs Last 24 Hrs  T(C): 36.8 (14 Dec 2017 09:56), Max: 36.8 (14 Dec 2017 09:56)  T(F): 98.3 (14 Dec 2017 09:56), Max: 98.3 (14 Dec 2017 09:56)  HR: 85 (14 Dec 2017 09:56) (76 - 110)  BP: 107/60 (14 Dec 2017 09:56) (100/53 - 134/43)  RR: 18 (14 Dec 2017 09:56) (17 - 96)  SpO2: 97% (14 Dec 2017 09:56) (90% - 99%)  Heart: S1S2 irregular, rate 80  Lungs: CTA B/L  ABD: ND, + Hypoactive BS, ND, + mild diffuse tenderness  to palpation greatest in LUQ, no rebound or guarding  EXT: Non tender    Labs:   CBC Full  -  ( 14 Dec 2017 05:35 )  WBC Count : 12.9 K/uL  Hemoglobin : 10.9 g/dL  Hematocrit : 33.9 %  Platelet Count - Automated : 157 K/uL    PT/INR - ( 13 Dec 2017 21:14 )   PT: 21.9 sec;   INR: 2.00 ratio         PTT - ( 13 Dec 2017 21:14 )  PTT:35.5 sec  Urinalysis Basic - ( 13 Dec 2017 22:15 )    Color: Yellow / Appearance: Clear / S.015 / pH: x  Gluc: x / Ketone: Negative  / Bili: Negative / Urobili: Negative mg/dL   Blood: x / Protein: Negative mg/dL / Nitrite: Negative   Leuk Esterase: Negative / RBC: 3-5 /HPF / WBC 0-2   Sq Epi: x / Non Sq Epi: Few / Bacteria: Few      -    137  |  102  |  15  ----------------------------<  179<H>  4.1   |  28  |  0.65    Ca    8.4<L>      14 Dec 2017 05:35    TPro  7.4  /  Alb  3.2<L>  /  TBili  0.8  /  DBili  x   /  AST  29  /  ALT  21  /  AlkPhos  84  12-13    CT of the abdomen:   Consistent with acute pancreatitis with extensive   intraperitoneal and retroperitoneal fat stranding.    Diffuse diverticulosis without diverticulitis.    Possible small mesenteric volvulus in the mid lower abdomen.    Cirrhotic liver morphology without interval change.    # acute Pancreatitis  - History of presumed drug-induced pancreatitis secondary to Metalozone and Januvia  - s/p Cholecystectomy and denies ETOH use  - Hold Lexapro, Zocor, Lasix, and Nexium secondary to possibility of triggering agents  - Start IVF; will start at lower rate given history of HFpEF  - GI Consult - Dr. Mobley    #CAD s/p Stent  - Stable  - Continue ASA, Metoprolol  - Hold Zocor for now    #HFpEF hx of chronic diastolic heart failure  - Cardiac Cath (): Normal LV, EF 65%, CAD, Severe PHTN  - Hold Lasix; while on IVF  - Monitor for volume overload closely   - admit to tele  - cardio consult    #Atrial Fibrillation  - Continue Metoprolol for rate-control  - Continue Eliquis for AC  - [id number]    #T2DM  - Hold oral meds  - Start ISS  - Diabetic Diet    #HLD  - Lipid profile for AM  - Hold Zocor    #Pulmonary Hypertension  - Stable  - Continue Sildenafil 89year old female with past medical history of CAD s/p stent, Atrial Fibrillation, HFpEF, T2DM, HLD, PHTN, presenting with abdominal pain x 1 day. Abdominal pain started suddenly in AM, located in epigastric area ,  intermittent, dull  became worse after eating this evening  Pain now greatest in LUQ.  Pt with + Belching  no  N/V, fever/chills, diarrhea/constipation. Nl BM in AM. Patient was admitted for presumed drug-induced pancreatitis in May 2017  thought to be related to  Metalozone and Januvia which were discontinued at that time.     : admitting hospitalist admission note reviewed and agreed. Care discussed with Dr. Camacho. effort was made to communicate with patient's son  Fuentes. Patient is agreeable to receive MRCP if given xanax. Denies any HA, CP, SOB. Will closely monitor for any SOB.       REVIEW OF SYSTEMS:  General: NAD, hemodynamically stable, (-)  fever, (-) chills, (+) weakness  HEENT:  Eyes:  No visual loss, blurred vision, double vision or yellow sclerae. Ears, Nose, Throat:  No hearing loss, sneezing, congestion, runny nose or sore throat.  SKIN:  No rash or itching.  CARDIOVASCULAR: No chest pain  RESPIRATORY: No SOB  GASTROINTESTINAL: Still with some abdominal pain  NEUROLOGICAL:  No headache, dizziness, syncope, paralysis, ataxia, numbness or tingling in the extremities. No change in bowel or bladder control.  MUSCULOSKELETAL:  No muscle, back pain, joint pain or stiffness.  HEMATOLOGIC:  No anemia, bleeding or bruising.  LYMPHATICS:  No enlarged nodes. No history of splenectomy.  ENDOCRINOLOGIC:  No reports of sweating, cold or heat intolerance. No polyuria or polydipsia.  ALLERGIES:  No history of asthma, hives, eczema or rhinitis.    PE:  ICU Vital Signs Last 24 Hrs  T(C): 36.8 (14 Dec 2017 09:56), Max: 36.8 (14 Dec 2017 09:56)  T(F): 98.3 (14 Dec 2017 09:56), Max: 98.3 (14 Dec 2017 09:56)  HR: 85 (14 Dec 2017 09:56) (76 - 110)  BP: 107/60 (14 Dec 2017 09:56) (100/53 - 134/43)  RR: 18 (14 Dec 2017 09:56) (17 - 96)  SpO2: 97% (14 Dec 2017 09:56) (90% - 99%)  General: elderly, frail, deconditioned, very nice lady  Heart: S1S2 irregular, rate 80  Lungs: CTA B/L  ABD: ND, + Hypoactive BS, ND, + mild diffuse tenderness  to palpation greatest in LUQ, no rebound or guarding  EXT: Non tender    Labs:   CBC Full  -  ( 14 Dec 2017 05:35 )  WBC Count : 12.9 K/uL  Hemoglobin : 10.9 g/dL  Hematocrit : 33.9 %  Platelet Count - Automated : 157 K/uL    PT/INR - ( 13 Dec 2017 21:14 )   PT: 21.9 sec;   INR: 2.00 ratio         PTT - ( 13 Dec 2017 21:14 )  PTT:35.5 sec  Urinalysis Basic - ( 13 Dec 2017 22:15 )    Color: Yellow / Appearance: Clear / S.015 / pH: x  Gluc: x / Ketone: Negative  / Bili: Negative / Urobili: Negative mg/dL   Blood: x / Protein: Negative mg/dL / Nitrite: Negative   Leuk Esterase: Negative / RBC: 3-5 /HPF / WBC 0-2   Sq Epi: x / Non Sq Epi: Few / Bacteria: Few      -    137  |  102  |  15  ----------------------------<  179<H>  4.1   |  28  |  0.65    Ca    8.4<L>      14 Dec 2017 05:35    TPro  7.4  /  Alb  3.2<L>  /  TBili  0.8  /  DBili  x   /  AST  29  /  ALT  21  /  AlkPhos  84  12-13    CT of the abdomen:   Consistent with acute pancreatitis with extensive   intraperitoneal and retroperitoneal fat stranding.    Diffuse diverticulosis without diverticulitis.    Possible small mesenteric volvulus in the mid lower abdomen.    Cirrhotic liver morphology without interval change.    # acute Pancreatitis  - History of presumed drug-induced pancreatitis secondary to Metalozone and Januvia  - s/p Cholecystectomy and denies ETOH use  - Hold Lexapro, Zocor, Lasix, and Nexium secondary to possibility of triggering agents  - Start IVF; will start at lower rate given history of HFpEF  - GI Consult - Dr. Mobley    #CAD s/p Stent  - Stable  - Continue ASA, Metoprolol  - Hold Zocor for now    #HFpEF hx of chronic diastolic heart failure  - Cardiac Cath (): Normal LV, EF 65%, CAD, Severe PHTN  - Hold Lasix; while on IVF  - Monitor for volume overload closely   - admit to tele  - cardio consult    #Atrial Fibrillation  - Continue Metoprolol for rate-control  - Continue Eliquis for AC  - [id number]    #T2DM  - Hold oral meds  - Start ISS  - Diabetic Diet    #HLD  - Lipid profile for AM  - Hold Zocor    #Pulmonary Hypertension  - Stable  - Continue Sildenafil

## 2017-12-15 DIAGNOSIS — I50.30 UNSPECIFIED DIASTOLIC (CONGESTIVE) HEART FAILURE: ICD-10-CM

## 2017-12-15 DIAGNOSIS — K85.00 IDIOPATHIC ACUTE PANCREATITIS WITHOUT NECROSIS OR INFECTION: ICD-10-CM

## 2017-12-15 DIAGNOSIS — I48.91 UNSPECIFIED ATRIAL FIBRILLATION: ICD-10-CM

## 2017-12-15 DIAGNOSIS — E78.5 HYPERLIPIDEMIA, UNSPECIFIED: ICD-10-CM

## 2017-12-15 DIAGNOSIS — I25.10 ATHEROSCLEROTIC HEART DISEASE OF NATIVE CORONARY ARTERY WITHOUT ANGINA PECTORIS: ICD-10-CM

## 2017-12-15 DIAGNOSIS — I27.20 PULMONARY HYPERTENSION, UNSPECIFIED: ICD-10-CM

## 2017-12-15 DIAGNOSIS — E11.9 TYPE 2 DIABETES MELLITUS WITHOUT COMPLICATIONS: ICD-10-CM

## 2017-12-15 LAB
ALBUMIN SERPL ELPH-MCNC: 2.7 G/DL — LOW (ref 3.3–5)
ALP SERPL-CCNC: 85 U/L — SIGNIFICANT CHANGE UP (ref 40–120)
ALT FLD-CCNC: 20 U/L — SIGNIFICANT CHANGE UP (ref 12–78)
ANION GAP SERPL CALC-SCNC: 5 MMOL/L — SIGNIFICANT CHANGE UP (ref 5–17)
AST SERPL-CCNC: 20 U/L — SIGNIFICANT CHANGE UP (ref 15–37)
BILIRUB SERPL-MCNC: 1 MG/DL — SIGNIFICANT CHANGE UP (ref 0.2–1.2)
BUN SERPL-MCNC: 16 MG/DL — SIGNIFICANT CHANGE UP (ref 7–23)
CALCIUM SERPL-MCNC: 8.8 MG/DL — SIGNIFICANT CHANGE UP (ref 8.5–10.1)
CHLORIDE SERPL-SCNC: 102 MMOL/L — SIGNIFICANT CHANGE UP (ref 96–108)
CO2 SERPL-SCNC: 28 MMOL/L — SIGNIFICANT CHANGE UP (ref 22–31)
CREAT SERPL-MCNC: 0.67 MG/DL — SIGNIFICANT CHANGE UP (ref 0.5–1.3)
CULTURE RESULTS: SIGNIFICANT CHANGE UP
GLUCOSE BLDC GLUCOMTR-MCNC: 105 MG/DL — HIGH (ref 70–99)
GLUCOSE BLDC GLUCOMTR-MCNC: 105 MG/DL — HIGH (ref 70–99)
GLUCOSE BLDC GLUCOMTR-MCNC: 93 MG/DL — SIGNIFICANT CHANGE UP (ref 70–99)
GLUCOSE SERPL-MCNC: 98 MG/DL — SIGNIFICANT CHANGE UP (ref 70–99)
HCT VFR BLD CALC: 33.5 % — LOW (ref 34.5–45)
HGB BLD-MCNC: 10.9 G/DL — LOW (ref 11.5–15.5)
MAGNESIUM SERPL-MCNC: 2 MG/DL — SIGNIFICANT CHANGE UP (ref 1.6–2.6)
MCHC RBC-ENTMCNC: 30.8 PG — SIGNIFICANT CHANGE UP (ref 27–34)
MCHC RBC-ENTMCNC: 32.4 GM/DL — SIGNIFICANT CHANGE UP (ref 32–36)
MCV RBC AUTO: 95 FL — SIGNIFICANT CHANGE UP (ref 80–100)
PLATELET # BLD AUTO: 138 K/UL — LOW (ref 150–400)
POTASSIUM SERPL-MCNC: 3.9 MMOL/L — SIGNIFICANT CHANGE UP (ref 3.5–5.3)
POTASSIUM SERPL-SCNC: 3.9 MMOL/L — SIGNIFICANT CHANGE UP (ref 3.5–5.3)
PROT SERPL-MCNC: 6.9 GM/DL — SIGNIFICANT CHANGE UP (ref 6–8.3)
RBC # BLD: 3.53 M/UL — LOW (ref 3.8–5.2)
RBC # FLD: 14.7 % — HIGH (ref 10.3–14.5)
SODIUM SERPL-SCNC: 135 MMOL/L — SIGNIFICANT CHANGE UP (ref 135–145)
SPECIMEN SOURCE: SIGNIFICANT CHANGE UP
WBC # BLD: 10.8 K/UL — HIGH (ref 3.8–10.5)
WBC # FLD AUTO: 10.8 K/UL — HIGH (ref 3.8–10.5)

## 2017-12-15 RX ORDER — TRAMADOL HYDROCHLORIDE 50 MG/1
25 TABLET ORAL ONCE
Qty: 0 | Refills: 0 | Status: DISCONTINUED | OUTPATIENT
Start: 2017-12-15 | End: 2017-12-15

## 2017-12-15 RX ORDER — DOCUSATE SODIUM 100 MG
100 CAPSULE ORAL THREE TIMES A DAY
Qty: 0 | Refills: 0 | Status: DISCONTINUED | OUTPATIENT
Start: 2017-12-15 | End: 2017-12-23

## 2017-12-15 RX ADMIN — Medication 20 MILLIGRAM(S): at 05:43

## 2017-12-15 RX ADMIN — SODIUM CHLORIDE 80 MILLILITER(S): 9 INJECTION, SOLUTION INTRAVENOUS at 19:08

## 2017-12-15 RX ADMIN — Medication 25 MILLIGRAM(S): at 05:43

## 2017-12-15 RX ADMIN — Medication 650 MILLIGRAM(S): at 13:18

## 2017-12-15 RX ADMIN — TRAMADOL HYDROCHLORIDE 25 MILLIGRAM(S): 50 TABLET ORAL at 07:02

## 2017-12-15 RX ADMIN — Medication 100 MILLIGRAM(S): at 22:13

## 2017-12-15 RX ADMIN — TRAMADOL HYDROCHLORIDE 25 MILLIGRAM(S): 50 TABLET ORAL at 05:43

## 2017-12-15 RX ADMIN — Medication 100 MILLIGRAM(S): at 12:00

## 2017-12-15 RX ADMIN — Medication 650 MILLIGRAM(S): at 23:00

## 2017-12-15 RX ADMIN — Medication 25 MICROGRAM(S): at 05:43

## 2017-12-15 RX ADMIN — Medication 81 MILLIGRAM(S): at 12:00

## 2017-12-15 RX ADMIN — Medication 20 MILLIGRAM(S): at 17:08

## 2017-12-15 RX ADMIN — Medication 650 MILLIGRAM(S): at 00:03

## 2017-12-15 RX ADMIN — Medication 25 MILLIGRAM(S): at 17:10

## 2017-12-15 RX ADMIN — TRAMADOL HYDROCHLORIDE 25 MILLIGRAM(S): 50 TABLET ORAL at 00:12

## 2017-12-15 RX ADMIN — Medication 650 MILLIGRAM(S): at 22:13

## 2017-12-15 RX ADMIN — APIXABAN 5 MILLIGRAM(S): 2.5 TABLET, FILM COATED ORAL at 17:07

## 2017-12-15 RX ADMIN — Medication 650 MILLIGRAM(S): at 14:18

## 2017-12-15 RX ADMIN — APIXABAN 5 MILLIGRAM(S): 2.5 TABLET, FILM COATED ORAL at 05:43

## 2017-12-15 RX ADMIN — TRAMADOL HYDROCHLORIDE 25 MILLIGRAM(S): 50 TABLET ORAL at 01:00

## 2017-12-15 NOTE — PROGRESS NOTE ADULT - PROBLEM SELECTOR PLAN 4
- continue metoprolol 25 mg BID for rate control  - continue Eliquis 5 mg BID for AC  - CHA2D2 VASC 6

## 2017-12-15 NOTE — PROGRESS NOTE ADULT - SUBJECTIVE AND OBJECTIVE BOX
89year old female with past medical history of CAD s/p stent, Atrial Fibrillation, HFpEF, T2DM, HLD, PHTN, presenting with abdominal pain x 1 day. Abdominal pain started suddenly in AM, located in epigastric area ,  intermittent, dull  became worse after eating this evening  Pain now greatest in LUQ.  Pt with + Belching  no  N/V, fever/chills, diarrhea/constipation. Nl BM in AM. Patient was admitted for presumed drug-induced pancreatitis in May 2017  thought to be related to  Metalozone and Januvia which were discontinued at that time.     : admitting hospitalist admission note reviewed and agreed. Care discussed with Dr. Camacho. effort was made to communicate with patient's son  Aurelioectorrivera. Patient is agreeable to receive MRCP if given xanax. Denies any HA, CP, SOB. Will closely monitor for any SOB.     12/15: seen and eval. hemodynamically stable. Denies any HA, CP, SOB. Soarness around the abdominal region. Labs and vitals reviewed. more energy at this point wants to get out of the bed and ambulate. lipid panel reviewed. Care discussed with Dr. Dill.       REVIEW OF SYSTEMS:  General: NAD, hemodynamically stable, (-)  fever, (-) chills, (+) weakness  HEENT:  Eyes:  No visual loss, blurred vision, double vision or yellow sclerae. Ears, Nose, Throat:  No hearing loss, sneezing, congestion, runny nose or sore throat.  SKIN:  No rash or itching.  CARDIOVASCULAR: No chest pain  RESPIRATORY: No SOB  GASTROINTESTINAL: Still with some abdominal pain  NEUROLOGICAL:  No headache, dizziness, syncope, paralysis, ataxia, numbness or tingling in the extremities. No change in bowel or bladder control.  MUSCULOSKELETAL:  No muscle, back pain, joint pain or stiffness.  HEMATOLOGIC:  No anemia, bleeding or bruising.  LYMPHATICS:  No enlarged nodes. No history of splenectomy.  ENDOCRINOLOGIC:  No reports of sweating, cold or heat intolerance. No polyuria or polydipsia.  ALLERGIES:  No history of asthma, hives, eczema or rhinitis.    PE:  T(C): 36.8 (15 Dec 2017 10:30), Max: 36.8 (14 Dec 2017 20:32)  T(F): 98.2 (15 Dec 2017 10:30), Max: 98.3 (14 Dec 2017 20:32)  HR: 81 (15 Dec 2017 10:30) (73 - 81)  BP: 128/50 (15 Dec 2017 10:30) (106/56 - 128/50)  RR: 18 (15 Dec 2017 10:30) (18 - 18)  SpO2: 96% (15 Dec 2017 10:30) (96% - 100%)  General: elderly, frail, deconditioned, very nice lady  Heart: S1S2 irregular, rate 80  Lungs: CTA B/L  ABD: ND, + Hypoactive BS, ND, + mild diffuse tenderness  to palpation greatest in LUQ, no rebound or guarding  EXT: Non tender    Labs:   CBC Full  -  ( 15 Dec 2017 06:00 )  WBC Count : 10.8 K/uL  Hemoglobin : 10.9 g/dL  Hematocrit : 33.5 %  Platelet Count - Automated : 138 K/uL    PT/INR - ( 13 Dec 2017 21:14 )   PT: 21.9 sec;   INR: 2.00 ratio       PTT - ( 13 Dec 2017 21:14 )  PTT:35.5 sec  Urinalysis Basic - ( 13 Dec 2017 22:15 )    Color: Yellow / Appearance: Clear / S.015 / pH: x  Gluc: x / Ketone: Negative  / Bili: Negative / Urobili: Negative mg/dL   Blood: x / Protein: Negative mg/dL / Nitrite: Negative   Leuk Esterase: Negative / RBC: 3-5 /HPF / WBC 0-2   Sq Epi: x / Non Sq Epi: Few / Bacteria: Few    135  |  102  |  16  ----------------------------<  98  3.9   |  28  |  0.67    Ca    8.8      15 Dec 2017 06:00  Mg     2.0     12-15    TPro  6.9  /  Alb  2.7<L>  /  TBili  1.0  /  DBili  x   /  AST  20  /  ALT  20  /  AlkPhos  85  12-15      CT of the abdomen:   Consistent with acute pancreatitis with extensive   intraperitoneal and retroperitoneal fat stranding.    Diffuse diverticulosis without diverticulitis.    Possible small mesenteric volvulus in the mid lower abdomen.    Cirrhotic liver morphology without interval change.    # Acute Idiopathic Pancreatitis  - MRCP results reviewed, without CBD store  - History of presumed drug-induced pancreatitis secondary to Metalozone and Januvia  - s/p Cholecystectomy and denies ETOH use  - Hold Lexapro, Zocor, Lasix, and Nexium secondary to possibility of triggering agents  - Start IVF; will start at lower rate given history of HFpEF  - GI Consult - Dr. Mobley    # CAD s/p Stent  - Stable  - Continue ASA, Metoprolol  - Hold Zocor for now    #HFpEF hx of chronic diastolic heart failure  - Cardiac Cath (): Normal LV, EF 65%, CAD, Severe PHTN  - Hold Lasix; while on IVF  - Monitor for volume overload closely   - admit to tele  - cardio consult    #Atrial Fibrillation  - Continue Metoprolol for rate-control  - Continue Eliquis for AC  - [id number]    #T2DM  - Hold oral meds  - Start ISS  - Diabetic Diet    #HLD  - Lipid profile for AM  - Hold Zocor    #Pulmonary Hypertension  - Stable  - Continue Sildenafil

## 2017-12-15 NOTE — PROGRESS NOTE ADULT - PROBLEM SELECTOR PLAN 1
- gentle hydration  - NPO until pain improves  - pain control  - MRCP results noted above  - hold Lexapro, Zocor, Nexium and lasix for now as they may be precipitating agents  - GI consult appreciated

## 2017-12-15 NOTE — CONSULT NOTE ADULT - SUBJECTIVE AND OBJECTIVE BOX
Cardiology Consultation    HPI: 89 year old female with past medical history of CAD s/p stent, Atrial Fibrillation, HFpEF, T2DM, HLD, PHTN, presenting with abdominal pain x 1 day. Abdominal pain started suddenly in AM, located in epigastric area without radiation, described as dull, with episodes occurring intermittently throughout the day. Associated symptoms include burping. No exposure to new foods, N/V, change in BM. Patient was admitted for presumed drug-induced pancreatitis in May 2017. The offending agents were thought to be Metalozone and Januvia which were discontinued upon discharge. Patient was recently started on Levothyroxine about 3 weeks ago. In the ED, [place] [id number] and CT ABD positive for acute pancreatitis. (14 Dec 2017 00:48)    12/15-     PAST MEDICAL & SURGICAL HISTORY:  HLD (hyperlipidemia)  Hypothyroid  Afib  CAD (coronary artery disease)  Hypertension  History of Osteoarthritis  History of Atrial Fibrillation  GERD (Gastroesophageal Reflux Disease)  Dyslipidemia  Diabetes Mellitus Type II  History of appendectomy  History of cholecystectomy  H/O: Knee Surgery- right meniscus  H/O: Hysterectomy    Allergies  penicillin (Rash)  penicillins (Other)  sulfa drugs (Rash; Other)    SOCIAL HISTORY: Denies tobacco, etoh abuse or illicit drug use    FAMILY HISTORY: No pertinent family history in first degree relatives    MEDICATIONS  (STANDING):  apixaban 5 milliGRAM(s) Oral every 12 hours  aspirin  chewable 81 milliGRAM(s) Oral daily  dextrose 5%. 1000 milliLiter(s) (50 mL/Hr) IV Continuous <Continuous>  dextrose 50% Injectable 12.5 Gram(s) IV Push once  dextrose 50% Injectable 25 Gram(s) IV Push once  dextrose 50% Injectable 25 Gram(s) IV Push once  insulin lispro (HumaLOG) corrective regimen sliding scale   SubCutaneous three times a day before meals  lactated ringers. 1000 milliLiter(s) (80 mL/Hr) IV Continuous <Continuous>  levothyroxine 25 MICROGram(s) Oral daily  metoprolol     tartrate 25 milliGRAM(s) Oral two times a day  sildenafil (REVATIO) 20 milliGRAM(s) Oral two times a day    MEDICATIONS  (PRN):  acetaminophen   Tablet. 650 milliGRAM(s) Oral every 6 hours PRN Mild Pain (1 - 3)  dextrose Gel 1 Dose(s) Oral once PRN Blood Glucose LESS THAN 70 milliGRAM(s)/deciliter  glucagon  Injectable 1 milliGRAM(s) IntraMuscular once PRN Glucose LESS THAN 70 milligrams/deciliter    Vital Signs Last 24 Hrs  T(C): 36.8 (15 Dec 2017 04:43), Max: 36.8 (14 Dec 2017 09:56)  T(F): 98.2 (15 Dec 2017 04:43), Max: 98.3 (14 Dec 2017 09:56)  HR: 73 (15 Dec 2017 04:43) (73 - 85)  BP: 106/56 (15 Dec 2017 04:43) (106/56 - 109/44)  BP(mean): --  RR: 18 (14 Dec 2017 20:32) (18 - 18)  SpO2: 96% (15 Dec 2017 04:43) (96% - 100%)    REVIEW OF SYSTEMS:    CONSTITUTIONAL:  As per HPI.  HEENT:  Eyes:  No diplopia or blurred vision. ENT:  No earache, sore throat or runny nose.  CARDIOVASCULAR:  No pressure, squeezing, strangling, tightness, heaviness or aching about the chest, neck, axilla or epigastrium.  RESPIRATORY:  No cough, shortness of breath, PND or orthopnea.  GASTROINTESTINAL:  No nausea, vomiting or diarrhea. +Diffuse abd pain.  GENITOURINARY:  No dysuria, frequency or urgency.  MUSCULOSKELETAL:  As per HPI.  NEUROLOGIC:  No paresthesias, fasciculations, seizures or weakness.    PHYSICAL EXAMINATION:    GENERAL APPEARANCE:  Pt. is not currently dyspneic, in no distress. Pt. is alert, oriented, and pleasant.  HEENT:  Pupils are normal and react normally. No icterus. Mucous membranes well colored.  NECK:  Supple. No lymphadenopathy. Jugular venous pressure not elevated. Carotids equal.   HEART:   The cardiac impulse has a normal quality. There are no murmurs, rubs or gallops noted  CHEST:  Chest is clear to auscultation. Normal respiratory effort.  ABDOMEN:  Soft and nontender.   EXTREMITIES:  There is no edema.     LABS:                        10.9   10.8  )-----------( 138      ( 15 Dec 2017 06:00 )             33.5     12-15    135  |  102  |  16  ----------------------------<  98  3.9   |  28  |  0.67    Ca    8.8      15 Dec 2017 06:00  Mg     2.0     12-15    TPro  6.9  /  Alb  2.7<L>  /  TBili  1.0  /  DBili  x   /  AST  20  /  ALT  20  /  AlkPhos  85  12-15    LIVER FUNCTIONS - ( 15 Dec 2017 06:00 )  Alb: 2.7 g/dL / Pro: 6.9 gm/dL / ALK PHOS: 85 U/L / ALT: 20 U/L / AST: 20 U/L / GGT: x           PT/INR - ( 13 Dec 2017 21:14 )   PT: 21.9 sec;   INR: 2.00 ratio       PTT - ( 13 Dec 2017 21:14 )  PTT:35.5 sec  CARDIAC MARKERS ( 14 Dec 2017 03:46 )  <0.015 ng/mL / x     / x     / x     / x      CARDIAC MARKERS ( 13 Dec 2017 21:14 )  <0.015 ng/mL / x     / x     / x     / x        Urinalysis Basic - ( 13 Dec 2017 22:15 )    Color: Yellow / Appearance: Clear / S.015 / pH: x  Gluc: x / Ketone: Negative  / Bili: Negative / Urobili: Negative mg/dL   Blood: x / Protein: Negative mg/dL / Nitrite: Negative   Leuk Esterase: Negative / RBC: 3-5 /HPF / WBC 0-2   Sq Epi: x / Non Sq Epi: Few / Bacteria: Few    EKG: < from: 12 Lead ECG (17 @ 22:22) >  Atrial fibrillation  Left axis deviation  Non-specific intra-ventricular conduction block    TELEMETRY:     CARDIAC TESTS: pending < from: Cardiac Cath Lab - Adult (17 @ 13:38) >  Normal LV systolic function. Estimated LV ejection fraction is 65 %.   One Vessel coronary artery disease (LAD) .   Patent bare metal stent in the mid LAD.   Elevated left ventricular end-diastolic pressure.   Hemodynamic status is abnormal.   Severe pulmonary artery hypertension.   No aortic valve stenosis.No gradient across Spaien Valve.   No aortic valve regurgitation.    < end of copied text >      RADIOLOGY & ADDITIONAL STUDIES: < from: MR Abdomen No Cont (17 @ 14:41) >  IMPRESSION: Dilatation of the common duct to 16 mm but no evidence of   choledocholithiasis.  Small amount of ill-defined fluid in the right subhepatic space and left   upper quadrant could be due to pancreatitis. Correlation with laboratory   values is necessary. No drainable fluid collection.  Morphologic changes of the liver compatible with cirrhosis.    ASSESSMENT & PLAN:

## 2017-12-15 NOTE — CONSULT NOTE ADULT - ASSESSMENT
Imp:  Recurrent idiopathic acute pancreatitis, although improving    Rec:  Cont NPO and IVF  Agree with relatively low IVF rate due to h/o pulm HTN  Follow electrolytes  Check IgG subset
89 year old female with past medical history of CAD s/p stent, Atrial Fibrillation, HFpEF, T2DM, HLD, PHTN, presenting with abdominal pain x 1 day and found to have acute pancreatitis    1. Acute Pancreatitis- - History of presumed drug-induced pancreatitis secondary to Metalozone and Januvia. S/p Cholecystectomy and denies ETOH use. Hold Lexapro, Zocor, Lasix, and Nexium secondary to possibility of triggering agents as per medicine team. Lipid panel pending. MRCP pending. Cont IVF- watch for signs of fluid overload. Pt appears euvolemic at this time and is lying flat in bed.     2. CAD s/p PCI with BMS to mLAD- cont medical mgmt, no CP. Statin held for now. Hold on further cardiac studies for now.    3. HFpEF hx of chronic diastolic heart failure- Cardiac Cath (02/17)- report above, normal LV fxn. Can hold lasix for now while on IVF for pancreatitis. Cont outpt medical mgmt.     4. Atrial Fibrillation- rate controlled with Bbl, continue Eliquis for AC. [id number].    5. Dyslipidemia- statin on hold for now.     6. DVT proph.
A/P:   Acute Pancreatitis  Admitted to Medicine  NPO, IVF, Pain control  PT seen with and Labs/CT scan reviewed with Dr Sanders  He does not appreciate a volvulus  Repeat AM labs

## 2017-12-15 NOTE — PROGRESS NOTE ADULT - SUBJECTIVE AND OBJECTIVE BOX
Patient is a 89y old  Female who presents with a chief complaint of diarrhea (14 Dec 2017 10:05)      Subective:  Persistent epigastric pain, although a little better  No N/V    PAST MEDICAL & SURGICAL HISTORY:  HLD (hyperlipidemia)  Hypothyroid  Afib  CAD (coronary artery disease)  Hypertension  History of Osteoarthritis  History of Atrial Fibrillation  GERD (Gastroesophageal Reflux Disease)  Dyslipidemia  Diabetes Mellitus Type II  History of appendectomy  History of cholecystectomy  H/O: Knee Surgery- right meniscus  H/O: Hysterectomy      MEDICATIONS  (STANDING):  apixaban 5 milliGRAM(s) Oral every 12 hours  aspirin  chewable 81 milliGRAM(s) Oral daily  dextrose 5%. 1000 milliLiter(s) (50 mL/Hr) IV Continuous <Continuous>  dextrose 50% Injectable 12.5 Gram(s) IV Push once  dextrose 50% Injectable 25 Gram(s) IV Push once  dextrose 50% Injectable 25 Gram(s) IV Push once  insulin lispro (HumaLOG) corrective regimen sliding scale   SubCutaneous three times a day before meals  lactated ringers. 1000 milliLiter(s) (80 mL/Hr) IV Continuous <Continuous>  levothyroxine 25 MICROGram(s) Oral daily  metoprolol     tartrate 25 milliGRAM(s) Oral two times a day  sildenafil (REVATIO) 20 milliGRAM(s) Oral two times a day    MEDICATIONS  (PRN):  acetaminophen   Tablet. 650 milliGRAM(s) Oral every 6 hours PRN Mild Pain (1 - 3)  dextrose Gel 1 Dose(s) Oral once PRN Blood Glucose LESS THAN 70 milliGRAM(s)/deciliter  glucagon  Injectable 1 milliGRAM(s) IntraMuscular once PRN Glucose LESS THAN 70 milligrams/deciliter      REVIEW OF SYSTEMS:    RESPIRATORY: No shortness of breath  CARDIOVASCULAR: No chest pain  All other review of systems is negative unless indicated above.    Vital Signs Last 24 Hrs  T(C): 36.8 (15 Dec 2017 04:43), Max: 36.8 (14 Dec 2017 09:56)  T(F): 98.2 (15 Dec 2017 04:43), Max: 98.3 (14 Dec 2017 09:56)  HR: 73 (15 Dec 2017 04:43) (73 - 89)  BP: 106/56 (15 Dec 2017 04:43) (100/53 - 109/44)  BP(mean): --  RR: 18 (14 Dec 2017 20:32) (18 - 18)  SpO2: 96% (15 Dec 2017 04:43) (96% - 100%)    PHYSICAL EXAM:    Constitutional: NAD, well-developed  Respiratory: CTAB  Cardiovascular: S1 and S2, RRR  Gastrointestinal: BS+, soft, mild epig tenderness  Extremities: No peripheral edema  Psychiatric: Normal mood, normal affect    LABS:                        10.9   10.8  )-----------( 138      ( 15 Dec 2017 06:00 )             33.5     12-15    135  |  102  |  16  ----------------------------<  98  3.9   |  28  |  0.67    Ca    8.8      15 Dec 2017 06:00  Mg     2.0     12-15    TPro  6.9  /  Alb  2.7<L>  /  TBili  1.0  /  DBili  x   /  AST  20  /  ALT  20  /  AlkPhos  85  12-15    PT/INR - ( 13 Dec 2017 21:14 )   PT: 21.9 sec;   INR: 2.00 ratio         PTT - ( 13 Dec 2017 21:14 )  PTT:35.5 sec  LIVER FUNCTIONS - ( 15 Dec 2017 06:00 )  Alb: 2.7 g/dL / Pro: 6.9 gm/dL / ALK PHOS: 85 U/L / ALT: 20 U/L / AST: 20 U/L / GGT: x             RADIOLOGY & ADDITIONAL STUDIES:  < from: MR Abdomen No Cont (12.14.17 @ 14:41) >    EXAM:  MR ABDOMEN                            PROCEDURE DATE:  12/14/2017          INTERPRETATION:  Clinical information: Pancreatitis    TECHNIQUE: Magnetic resonance imaging of the abdomen was performed   utilizing multiplanar multisequential imaging both without the use of   intravenous contrast. The patient could not stay in the magnet for the   duration of the study, therefore contrast enhanced sequences were not   obtained.    COMPARISON: CT abdomen/pelvis December 13, 2017    FINDINGS:    LIVER: Subtle contour nodularity of the liver and posterior notch sign,   findings suggestive of cirrhosis. No mass visualized within the   limitations of this study.  SPLEEN: Unremarkable.  PANCREAS: No mass or ductal dilatation. Small amount of fluid in the   right subhepatic space and in the left upper quadrant. No discrete fluid   collection.  GALLBLADDER: Cholecystectomy.  BILE DUCTS: Dilatation of the common duct to 16 mm. No intraductal   filling defect identified. No intrahepatic biliary ductal dilatation.   ADRENALS: Within normal limits.  KIDNEYS/URETERS: Small bilateral renal cysts. No hydronephrosis.    RETROPERITONEUM: No upper abdominal or retroperitoneal  lymphadenopathy.    VESSELS:  Within normal limits.  VISUALIZED BOWEL: The visualized bowel is unremarkable.   PERITONEUM: No ascites.    LOWER CHEST: Within normal limits.  ABDOMINAL WALL: Within normal limits.  BONES: No acute abnormality    IMPRESSION: Dilatation of the common duct to 16 mm but no evidence of   choledocholithiasis.    Small amount of ill-defined fluid in the right subhepatic space and left   upper quadrant could be due to pancreatitis. Correlation with laboratory   values is necessary. No drainable fluid collection.    Morphologic changes of the liver compatible with cirrhosis.                PEPE FINE   This document has been electronically signed. Dec 14 2017  5:02PM                < end of copied text >

## 2017-12-15 NOTE — PROGRESS NOTE ADULT - SUBJECTIVE AND OBJECTIVE BOX
CHIEF COMPLAINT:    SUBJECTIVE:     REVIEW OF SYSTEMS:    CONSTITUTIONAL: No weakness, fevers or chills  EYES/ENT: No visual changes;  No vertigo or throat pain   NECK: No pain or stiffness  RESPIRATORY: No cough, wheezing, hemoptysis; No shortness of breath  CARDIOVASCULAR: No chest pain or palpitations  GASTROINTESTINAL: No abdominal or epigastric pain. No nausea, vomiting, or hematemesis; No diarrhea or constipation. No melena or hematochezia.  GENITOURINARY: No dysuria, frequency or hematuria  NEUROLOGICAL: No numbness or weakness  SKIN: No itching, burning, rashes, or lesions   All other review of systems is negative unless indicated above    Vital Signs Last 24 Hrs  T(C): 36.6 (15 Dec 2017 16:11), Max: 36.8 (14 Dec 2017 20:32)  T(F): 97.9 (15 Dec 2017 16:11), Max: 98.3 (14 Dec 2017 20:32)  HR: 74 (15 Dec 2017 16:11) (73 - 81)  BP: 109/56 (15 Dec 2017 16:11) (106/56 - 128/50)  BP(mean): --  RR: 18 (15 Dec 2017 16:11) (18 - 18)  SpO2: 100% (15 Dec 2017 16:11) (96% - 100%)    I&O's Summary      CAPILLARY BLOOD GLUCOSE      POCT Blood Glucose.: 93 mg/dL (15 Dec 2017 17:12)  POCT Blood Glucose.: 105 mg/dL (15 Dec 2017 13:14)  POCT Blood Glucose.: 105 mg/dL (15 Dec 2017 07:00)  POCT Blood Glucose.: 114 mg/dL (14 Dec 2017 21:53)      PHYSICAL EXAM:    Constitutional: NAD, awake and alert, well-developed  HEENT: PERR, EOMI, Normal Hearing, MMM  Neck: Soft and supple, No LAD, No JVD  Respiratory: Breath sounds are clear bilaterally, No wheezing, rales or rhonchi  Cardiovascular: S1 and S2, regular rate and rhythm, no Murmurs, gallops or rubs  Gastrointestinal: Bowel Sounds present, soft, nontender, nondistended, no guarding, no rebound  Extremities: No peripheral edema  Vascular: 2+ peripheral pulses  Neurological: A/O x 3, no focal deficits  Musculoskeletal: 5/5 strength b/l upper and lower extremities  Skin: No rashes    MEDICATIONS:  MEDICATIONS  (STANDING):  apixaban 5 milliGRAM(s) Oral every 12 hours  aspirin  chewable 81 milliGRAM(s) Oral daily  dextrose 5%. 1000 milliLiter(s) (50 mL/Hr) IV Continuous <Continuous>  dextrose 50% Injectable 12.5 Gram(s) IV Push once  dextrose 50% Injectable 25 Gram(s) IV Push once  dextrose 50% Injectable 25 Gram(s) IV Push once  docusate sodium 100 milliGRAM(s) Oral three times a day  insulin lispro (HumaLOG) corrective regimen sliding scale   SubCutaneous three times a day before meals  lactated ringers. 1000 milliLiter(s) (80 mL/Hr) IV Continuous <Continuous>  levothyroxine 25 MICROGram(s) Oral daily  metoprolol     tartrate 25 milliGRAM(s) Oral two times a day  sildenafil (REVATIO) 20 milliGRAM(s) Oral two times a day      LABS: All Labs Reviewed:                        10.9   10.8  )-----------( 138      ( 15 Dec 2017 06:00 )             33.5     12-15    135  |  102  |  16  ----------------------------<  98  3.9   |  28  |  0.67    Ca    8.8      15 Dec 2017 06:00  Mg     2.0     12-15    TPro  6.9  /  Alb  2.7<L>  /  TBili  1.0  /  DBili  x   /  AST  20  /  ALT  20  /  AlkPhos  85  12-15    PT/INR - ( 13 Dec 2017 21:14 )   PT: 21.9 sec;   INR: 2.00 ratio         PTT - ( 13 Dec 2017 21:14 )  PTT:35.5 sec  CARDIAC MARKERS ( 14 Dec 2017 03:46 )  <0.015 ng/mL / x     / x     / x     / x      CARDIAC MARKERS ( 13 Dec 2017 21:14 )  <0.015 ng/mL / x     / x     / x     / x          Blood Culture: 12-13 @ 22:15  Organism --  Gram Stain Blood -- Gram Stain --  Specimen Source .Urine None  Culture-Blood --        RADIOLOGY/EKG:    DVT PPX:    ADVANCED DIRECTIVE:    DISPOSITION: CHIEF COMPLAINT: abdominal pain    SUBJECTIVE:     REVIEW OF SYSTEMS:    CONSTITUTIONAL: No weakness, fevers or chills  EYES/ENT: No visual changes;  No vertigo or throat pain   NECK: No pain or stiffness  RESPIRATORY: No cough, wheezing, hemoptysis; No shortness of breath  CARDIOVASCULAR: No chest pain or palpitations  GASTROINTESTINAL: + diffuse abdominal pain. No nausea, vomiting, or hematemesis; No diarrhea or constipation. No melena or hematochezia.  GENITOURINARY: No dysuria, frequency or hematuria  NEUROLOGICAL: No numbness or weakness  SKIN: No itching, burning, rashes, or lesions   All other review of systems is negative unless indicated above    Vital Signs Last 24 Hrs  T(C): 36.6 (15 Dec 2017 16:11), Max: 36.8 (14 Dec 2017 20:32)  T(F): 97.9 (15 Dec 2017 16:11), Max: 98.3 (14 Dec 2017 20:32)  HR: 74 (15 Dec 2017 16:11) (73 - 81)  BP: 109/56 (15 Dec 2017 16:11) (106/56 - 128/50)  BP(mean): --  RR: 18 (15 Dec 2017 16:11) (18 - 18)  SpO2: 100% (15 Dec 2017 16:11) (96% - 100%)    I&O's Summary      CAPILLARY BLOOD GLUCOSE      POCT Blood Glucose.: 93 mg/dL (15 Dec 2017 17:12)  POCT Blood Glucose.: 105 mg/dL (15 Dec 2017 13:14)  POCT Blood Glucose.: 105 mg/dL (15 Dec 2017 07:00)  POCT Blood Glucose.: 114 mg/dL (14 Dec 2017 21:53)      PHYSICAL EXAM:    Constitutional: NAD, awake and alert, well-developed  HEENT: PERR, EOMI, Normal Hearing, MMM  Neck: Soft and supple, No LAD, No JVD  Respiratory: Breath sounds are clear bilaterally, No wheezing, rales or rhonchi  Cardiovascular: S1 and S2, regular rate and rhythm, no Murmurs, gallops or rubs  Gastrointestinal: Bowel Sounds present, soft, nontender, nondistended, no guarding, no rebound  Extremities: No peripheral edema  Vascular: 2+ peripheral pulses  Neurological: A/O x 3, no focal deficits  Musculoskeletal: 5/5 strength b/l upper and lower extremities  Skin: No rashes    MEDICATIONS:  MEDICATIONS  (STANDING):  apixaban 5 milliGRAM(s) Oral every 12 hours  aspirin  chewable 81 milliGRAM(s) Oral daily  dextrose 5%. 1000 milliLiter(s) (50 mL/Hr) IV Continuous <Continuous>  dextrose 50% Injectable 12.5 Gram(s) IV Push once  dextrose 50% Injectable 25 Gram(s) IV Push once  dextrose 50% Injectable 25 Gram(s) IV Push once  docusate sodium 100 milliGRAM(s) Oral three times a day  insulin lispro (HumaLOG) corrective regimen sliding scale   SubCutaneous three times a day before meals  lactated ringers. 1000 milliLiter(s) (80 mL/Hr) IV Continuous <Continuous>  levothyroxine 25 MICROGram(s) Oral daily  metoprolol     tartrate 25 milliGRAM(s) Oral two times a day  sildenafil (REVATIO) 20 milliGRAM(s) Oral two times a day      LABS: All Labs Reviewed:                        10.9   10.8  )-----------( 138      ( 15 Dec 2017 06:00 )             33.5     12-15    135  |  102  |  16  ----------------------------<  98  3.9   |  28  |  0.67    Ca    8.8      15 Dec 2017 06:00  Mg     2.0     12-15    TPro  6.9  /  Alb  2.7<L>  /  TBili  1.0  /  DBili  x   /  AST  20  /  ALT  20  /  AlkPhos  85  12-15    PT/INR - ( 13 Dec 2017 21:14 )   PT: 21.9 sec;   INR: 2.00 ratio         PTT - ( 13 Dec 2017 21:14 )  PTT:35.5 sec  CARDIAC MARKERS ( 14 Dec 2017 03:46 )  <0.015 ng/mL / x     / x     / x     / x      CARDIAC MARKERS ( 13 Dec 2017 21:14 )  <0.015 ng/mL / x     / x     / x     / x          Blood Culture: 12-13 @ 22:15  Organism --  Gram Stain Blood -- Gram Stain --  Specimen Source .Urine None  Culture-Blood --        RADIOLOGY/EKG:    DVT PPX:    ADVANCED DIRECTIVE:    DISPOSITION:

## 2017-12-15 NOTE — PROGRESS NOTE ADULT - PROBLEM SELECTOR PLAN 3
- stable   - cardiac cath in Feb 2017  - normal LV, EF 65%  - hold lasix for now  - monitor for volume overload closely  - follow up cardiology consult

## 2017-12-15 NOTE — PROGRESS NOTE ADULT - SUBJECTIVE AND OBJECTIVE BOX
ABIGAIL DIEZ  [id number]  Female      HPI:  89 year old female with past medical history of CAD s/p stent, Atrial Fibrillation, HFpEF, T2DM, HLD, PHTN, presenting with abdominal pain x 1 day. Abdominal pain started suddenly in AM, located in epigastric area without radiation, described as dull, with episodes occurring intermittently throughout the day. Associated symptoms include burping. No exposure to new foods, N/V, change in BM. Patient was admitted for presumed drug-induced pancreatitis in May 2017. The offending agents were thought to be Metalozone and Januvia which were discontinued upon discharge. Patient was recently started on Levothyroxine about 3 weeks ago.    In the ED, [place] [address]30 and CT ABD positive for acute pancreatitis. (14 Dec 2017 00:48)      Daily     Daily Weight in k.5 (14 Dec 2017 09:56)    Physical Exam:    Pt is AAOx3  General: Well developed, in no acute distress.   Chest: Lungs clear, no rales, no rhonchi, no wheezes.   Heart: RR, no murmurs, no rubs, no gallops.   Abdomen: Soft, mild LUQ tenderness, no masses, BS normal.    Back: Normal curvature, no tenderness.   Neuro: Physiological, no localizing findings.   Skin: Normal, no rashes, no lesions noted.   Extremities: Warm, well perfused, no edema, Pulses intact    I&O's Summary                            10.9   10.8  )-----------( 138      ( 15 Dec 2017 06:00 )             33.5       12-15    135  |  102  |  16  ----------------------------<  98  3.9   |  28  |  0.67    Ca    8.8      15 Dec 2017 06:00  Mg     2.0     12-15    TPro  6.9  /  Alb  2.7<L>  /  TBili  1.0  /  DBili  x   /  AST  20  /  ALT  20  /  AlkPhos  85  12-15    Lipase improving      MRI IMPRESSION: Dilatation of the common duct to 16 mm but no evidence of   choledocholithiasis.    Small amount of ill-defined fluid in the right subhepatic space and left   upper quadrant could be due to pancreatitis. Correlation with laboratory   values is necessary. No drainable fluid collection.    Morphologic changes of the liver compatible with cirrhosis.    HEALTH ISSUES - PROBLEM Dx:

## 2017-12-16 LAB
ANION GAP SERPL CALC-SCNC: 7 MMOL/L — SIGNIFICANT CHANGE UP (ref 5–17)
BUN SERPL-MCNC: 13 MG/DL — SIGNIFICANT CHANGE UP (ref 7–23)
CALCIUM SERPL-MCNC: 8.6 MG/DL — SIGNIFICANT CHANGE UP (ref 8.5–10.1)
CHLORIDE SERPL-SCNC: 102 MMOL/L — SIGNIFICANT CHANGE UP (ref 96–108)
CO2 SERPL-SCNC: 26 MMOL/L — SIGNIFICANT CHANGE UP (ref 22–31)
CREAT SERPL-MCNC: 0.46 MG/DL — LOW (ref 0.5–1.3)
GLUCOSE BLDC GLUCOMTR-MCNC: 154 MG/DL — HIGH (ref 70–99)
GLUCOSE BLDC GLUCOMTR-MCNC: 270 MG/DL — HIGH (ref 70–99)
GLUCOSE BLDC GLUCOMTR-MCNC: 89 MG/DL — SIGNIFICANT CHANGE UP (ref 70–99)
GLUCOSE SERPL-MCNC: 74 MG/DL — SIGNIFICANT CHANGE UP (ref 70–99)
HCT VFR BLD CALC: 32.6 % — LOW (ref 34.5–45)
HGB BLD-MCNC: 10.6 G/DL — LOW (ref 11.5–15.5)
IGG SERPL-MCNC: 1200 MG/DL — SIGNIFICANT CHANGE UP (ref 767–1590)
IGG1 SER-MCNC: 933 MG/DL — HIGH (ref 341–894)
IGG2 SER-MCNC: 139 MG/DL — LOW (ref 171–632)
IGG3 SER-MCNC: 93.5 MG/DL — SIGNIFICANT CHANGE UP (ref 18.4–106)
IGG4 SER-MCNC: 79.6 MG/DL — SIGNIFICANT CHANGE UP (ref 2.4–121)
LIDOCAIN IGE QN: 764 U/L — HIGH (ref 73–393)
MAGNESIUM SERPL-MCNC: 2 MG/DL — SIGNIFICANT CHANGE UP (ref 1.6–2.6)
MCHC RBC-ENTMCNC: 30.6 PG — SIGNIFICANT CHANGE UP (ref 27–34)
MCHC RBC-ENTMCNC: 32.4 GM/DL — SIGNIFICANT CHANGE UP (ref 32–36)
MCV RBC AUTO: 94.4 FL — SIGNIFICANT CHANGE UP (ref 80–100)
PHOSPHATE SERPL-MCNC: 2.9 MG/DL — SIGNIFICANT CHANGE UP (ref 2.5–4.5)
PLATELET # BLD AUTO: 140 K/UL — LOW (ref 150–400)
POTASSIUM SERPL-MCNC: 3.8 MMOL/L — SIGNIFICANT CHANGE UP (ref 3.5–5.3)
POTASSIUM SERPL-SCNC: 3.8 MMOL/L — SIGNIFICANT CHANGE UP (ref 3.5–5.3)
RBC # BLD: 3.45 M/UL — LOW (ref 3.8–5.2)
RBC # FLD: 14.5 % — SIGNIFICANT CHANGE UP (ref 10.3–14.5)
SODIUM SERPL-SCNC: 135 MMOL/L — SIGNIFICANT CHANGE UP (ref 135–145)
WBC # BLD: 7.5 K/UL — SIGNIFICANT CHANGE UP (ref 3.8–10.5)
WBC # FLD AUTO: 7.5 K/UL — SIGNIFICANT CHANGE UP (ref 3.8–10.5)

## 2017-12-16 RX ORDER — LANOLIN ALCOHOL/MO/W.PET/CERES
3 CREAM (GRAM) TOPICAL AT BEDTIME
Qty: 0 | Refills: 0 | Status: DISCONTINUED | OUTPATIENT
Start: 2017-12-16 | End: 2017-12-23

## 2017-12-16 RX ORDER — TRAMADOL HYDROCHLORIDE 50 MG/1
25 TABLET ORAL ONCE
Qty: 0 | Refills: 0 | Status: DISCONTINUED | OUTPATIENT
Start: 2017-12-16 | End: 2017-12-16

## 2017-12-16 RX ADMIN — APIXABAN 5 MILLIGRAM(S): 2.5 TABLET, FILM COATED ORAL at 06:08

## 2017-12-16 RX ADMIN — TRAMADOL HYDROCHLORIDE 25 MILLIGRAM(S): 50 TABLET ORAL at 05:09

## 2017-12-16 RX ADMIN — Medication 650 MILLIGRAM(S): at 04:45

## 2017-12-16 RX ADMIN — APIXABAN 5 MILLIGRAM(S): 2.5 TABLET, FILM COATED ORAL at 17:38

## 2017-12-16 RX ADMIN — Medication 100 MILLIGRAM(S): at 15:18

## 2017-12-16 RX ADMIN — Medication 20 MILLIGRAM(S): at 17:38

## 2017-12-16 RX ADMIN — Medication 25 MILLIGRAM(S): at 06:08

## 2017-12-16 RX ADMIN — Medication 25 MICROGRAM(S): at 06:08

## 2017-12-16 RX ADMIN — TRAMADOL HYDROCHLORIDE 25 MILLIGRAM(S): 50 TABLET ORAL at 06:08

## 2017-12-16 RX ADMIN — Medication 3 MILLIGRAM(S): at 23:54

## 2017-12-16 RX ADMIN — Medication 25 MILLIGRAM(S): at 17:38

## 2017-12-16 RX ADMIN — Medication 3: at 17:41

## 2017-12-16 RX ADMIN — Medication 100 MILLIGRAM(S): at 23:57

## 2017-12-16 RX ADMIN — Medication 81 MILLIGRAM(S): at 15:18

## 2017-12-16 RX ADMIN — Medication 100 MILLIGRAM(S): at 06:08

## 2017-12-16 RX ADMIN — Medication 20 MILLIGRAM(S): at 06:08

## 2017-12-16 RX ADMIN — Medication 650 MILLIGRAM(S): at 05:09

## 2017-12-16 NOTE — PROGRESS NOTE ADULT - PROBLEM SELECTOR PLAN 1
Stable, lipase trending down. No further trending necessary.    DC IVF  Start CLD today  Pain management

## 2017-12-16 NOTE — PROGRESS NOTE ADULT - SUBJECTIVE AND OBJECTIVE BOX
Patient is a 89y old  Female who presents with a chief complaint of diarrhea.      HPI:  89 year old female with past medical history of CAD s/p stent, Atrial Fibrillation, HFpEF, T2DM, HLD, PHTN, presenting with abdominal pain x 1 day. Abdominal pain started suddenly in AM, located in epigastric area without radiation, described as dull, with episodes occurring intermittently throughout the day. Associated symptoms include burping. No exposure to new foods, N/V, change in BM. Patient was admitted for presumed drug-induced pancreatitis in May 2017. The offending agents were thought to be Metalozone and Januvia per primary team.  In the ED, Lipase found [place] be [id number] and CT ABD positive for acute pancreatitis.     Pt was being treated for pancreatitis with IVF and NPO.  Pt denies any CP or SOB at rest.       PAST MEDICAL & SURGICAL HISTORY:  HLD (hyperlipidemia)  Hypothyroid  Afib  CAD (coronary artery disease)  Hypertension  History of Osteoarthritis  History of Atrial Fibrillation  GERD (Gastroesophageal Reflux Disease)  Dyslipidemia  Diabetes Mellitus Type II  History of appendectomy  History of cholecystectomy  H/O: Knee Surgery- right meniscus  H/O: Hysterectomy      MEDICATIONS  (STANDING):  apixaban 5 milliGRAM(s) Oral every 12 hours  aspirin  chewable 81 milliGRAM(s) Oral daily  dextrose 5%. 1000 milliLiter(s) (50 mL/Hr) IV Continuous <Continuous>  dextrose 50% Injectable 12.5 Gram(s) IV Push once  dextrose 50% Injectable 25 Gram(s) IV Push once  dextrose 50% Injectable 25 Gram(s) IV Push once  docusate sodium 100 milliGRAM(s) Oral three times a day  insulin lispro (HumaLOG) corrective regimen sliding scale   SubCutaneous three times a day before meals  lactated ringers. 1000 milliLiter(s) (80 mL/Hr) IV Continuous <Continuous>  levothyroxine 25 MICROGram(s) Oral daily  metoprolol     tartrate 25 milliGRAM(s) Oral two times a day  sildenafil (REVATIO) 20 milliGRAM(s) Oral two times a day    MEDICATIONS  (PRN):  acetaminophen   Tablet. 650 milliGRAM(s) Oral every 6 hours PRN Mild Pain (1 - 3)  dextrose Gel 1 Dose(s) Oral once PRN Blood Glucose LESS THAN 70 milliGRAM(s)/deciliter  glucagon  Injectable 1 milliGRAM(s) IntraMuscular once PRN Glucose LESS THAN 70 milligrams/deciliter      FAMILY HISTORY:  No pertinent family history in first degree relatives      SOCIAL HISTORY:  non smoker, no alcohol use     REVIEW OF SYSTEMS:  CONSTITUTIONAL:  No night sweats.  No fatigue, malaise, lethargy.  No fever or chills.  HEENT:  Eyes:  No visual changes.  No eye pain.      ENT:  No runny nose.  No epistaxis.  No sinus pain.  No sore throat.  No odynophagia.  No ear pain.  No congestion.  RESPIRATORY:  No cough.  No wheeze.  No hemoptysis.  No shortness of breath.  CARDIOVASCULAR:  No chest pains.  No palpitations. No shortness of breath, No orthopnea or PND.  GASTROINTESTINAL:  c/o abdominal pain.  No nausea or vomiting.  No diarrhea or constipation.  No hematemesis.  No hematochezia.  No melena.  GENITOURINARY:  No urgency.  No frequency.  No dysuria.  No hematuria.  No obstructive symptoms.  No discharge.  No pain.  No significant abnormal bleeding.  MUSCULOSKELETAL:  No musculoskeletal pain.  No joint swelling.  No arthritis.  NEUROLOGICAL:  No tingling or numbness or weakness.  PSYCHIATRIC:  No confusion  SKIN:  No rashes.  No lesions.  No wounds.  ENDOCRINE:  No unexplained weight loss.  No polydipsia.  No polyuria.  No polyphagia.  HEMATOLOGIC:  No anemia.  No purpura.  No petechiae.  No prolonged or excessive bleeding.   ALLERGIC AND IMMUNOLOGIC:  No pruritus.  No swelling.         Vital Signs Last 24 Hrs  T(C): 36.3 (16 Dec 2017 05:10), Max: 36.8 (15 Dec 2017 10:30)  T(F): 97.3 (16 Dec 2017 05:10), Max: 98.2 (15 Dec 2017 10:30)  HR: 88 (16 Dec 2017 05:10) (74 - 88)  BP: 111/65 (16 Dec 2017 05:10) (109/56 - 128/50)  BP(mean): 74 (16 Dec 2017 05:10) (74 - 74)  RR: 18 (16 Dec 2017 05:10) (18 - 18)  SpO2: 97% (16 Dec 2017 05:10) (96% - 100%)    PHYSICAL EXAM-    Constitutional: The patient appears to be normal, well developed, well nourished and alert and oriented to time, place and person. The patient does not appear acutely ill.     Head: Head is normocephalic and atraumatic.      Neck: The patient's neck is supple without enlargement, has no palpable thyromegaly nor thyroid nodules and has no jugular venous distention. No audible carotid bruits. There are strong carotid pulses bilaterally. No JVD.     Cardiovascular: Regular rate and rhythm without S3, S4. No murmurs or rubs are appreciated.      Respiratory: Crackles b/l L >R.   Abdomen: Soft, nontender, nondistended with positive bowel sounds.      Extremity: No tenderness. No  pitting edema No skin discoloration No clubbing No cyanosis.     Neurologic: The patient is alert and oriented.      Skin: No rash, no obvious lesions noted.      Psychiatric: The patient appears to be emotionally stable.      INTERPRETATION OF TELEMETRY: Atrial fibrillation, 70-90/min.    ECG:    I&O's Detail    15 Dec 2017 07:01  -  16 Dec 2017 06:33  --------------------------------------------------------  IN:    lactated ringers.: 1000 mL  Total IN: 1000 mL    OUT:  Total OUT: 0 mL    Total NET: 1000 mL          LABS:                        10.9   10.8  )-----------( 138      ( 15 Dec 2017 06:00 )             33.5     12-15    135  |  102  |  16  ----------------------------<  98  3.9   |  28  |  0.67    Ca    8.8      15 Dec 2017 06:00  Mg     2.0     12-15    TPro  6.9  /  Alb  2.7<L>  /  TBili  1.0  /  DBili  x   /  AST  20  /  ALT  20  /  AlkPhos  85  12-15            I&O's Summary    15 Dec 2017 07:01  -  16 Dec 2017 06:33  --------------------------------------------------------  IN: 1000 mL / OUT: 0 mL / NET: 1000 mL      BNP  RADIOLOGY & ADDITIONAL STUDIES:

## 2017-12-16 NOTE — PROGRESS NOTE ADULT - SUBJECTIVE AND OBJECTIVE BOX
89year old female with past medical history of CAD s/p stent, Atrial Fibrillation, HFpEF, T2DM, HLD, PHTN, presenting with abdominal pain x 1 day. Abdominal pain started suddenly in AM, located in epigastric area ,  intermittent, dull  became worse after eating this evening  Pain now greatest in LUQ.  Pt with + Belching  no  N/V, fever/chills, diarrhea/constipation. Nl BM in AM. Patient was admitted for presumed drug-induced pancreatitis in May 2017  thought to be related to  Metalozone and Januvia which were discontinued at that time.     12/14: admitting hospitalist admission note reviewed and agreed. Care discussed with Dr. Camacho. effort was made to communicate with patient's son  Aurelioectorrivera. Patient is agreeable to receive MRCP if given xanax. Denies any HA, CP, SOB. Will closely monitor for any SOB.     12/15: seen and eval. hemodynamically stable. Denies any HA, CP, SOB. Soarness around the abdominal region. Labs and vitals reviewed. more energy at this point wants to get out of the bed and ambulate. lipid panel reviewed. Care discussed with Dr. Dill.     12/16: abdominal pain free. some headache. D/C IV fluids, clear liquids.       REVIEW OF SYSTEMS:  General: NAD, hemodynamically stable, (-)  fever, (-) chills, (+) weakness  HEENT:  Eyes:  No visual loss, blurred vision, double vision or yellow sclerae. Ears, Nose, Throat:  No hearing loss, sneezing, congestion, runny nose or sore throat.  SKIN:  No rash or itching.  CARDIOVASCULAR: No chest pain  RESPIRATORY: No SOB  GASTROINTESTINAL: abdomnal pain overnight  NEUROLOGICAL:  No headache, dizziness, syncope, paralysis, ataxia, numbness or tingling in the extremities. No change in bowel or bladder control.  MUSCULOSKELETAL:  No muscle, back pain, joint pain or stiffness.  HEMATOLOGIC:  No anemia, bleeding or bruising.  LYMPHATICS:  No enlarged nodes. No history of splenectomy.  ENDOCRINOLOGIC:  No reports of sweating, cold or heat intolerance. No polyuria or polydipsia.  ALLERGIES:  No history of asthma, hives, eczema or rhinitis.    PE:  ICU Vital Signs Last 24 Hrs  T(C): 36.3 (16 Dec 2017 05:10), Max: 36.8 (15 Dec 2017 10:30)  T(F): 97.3 (16 Dec 2017 05:10), Max: 98.2 (15 Dec 2017 10:30)  HR: 88 (16 Dec 2017 05:10) (74 - 88)  BP: 111/65 (16 Dec 2017 05:10) (109/56 - 128/50)  BP(mean): 74 (16 Dec 2017 05:10) (74 - 74)  RR: 18 (16 Dec 2017 05:10) (18 - 18)  SpO2: 97% (16 Dec 2017 05:10) (96% - 100%)    General: elderly, frail, deconditioned, very nice lady  Heart: S1S2 irregular, rate 80  Lungs: CTA B/L  ABD: ND, + Hypoactive BS, ND, + mild diffuse tenderness  to palpation greatest in LUQ, no rebound or guarding  EXT: Non tender    Labs:     CBC Full  -  ( 16 Dec 2017 06:56 )  WBC Count : 7.5 K/uL  Hemoglobin : 10.6 g/dL  Hematocrit : 32.6 %  Platelet Count - Automated : 140 K/uL    135  |  102  |  13  ----------------------------<  74  3.8   |  26  |  0.46<L>    Ca    8.6      16 Dec 2017 06:56  Phos  2.9     12-16  Mg     2.0     12-16    TPro  6.9  /  Alb  2.7<L>  /  TBili  1.0  /  DBili  x   /  AST  20  /  ALT  20  /  AlkPhos  85  12-15      CT of the abdomen:   Consistent with acute pancreatitis with extensive   intraperitoneal and retroperitoneal fat stranding.    Diffuse diverticulosis without diverticulitis.    Possible small mesenteric volvulus in the mid lower abdomen.    Cirrhotic liver morphology without interval change.    # Acute Idiopathic Pancreatitis  - MRCP results reviewed, without CBD store  - History of presumed drug-induced pancreatitis secondary to Metalozone and Januvia  - s/p Cholecystectomy and denies ETOH use  - Hold Lexapro, Zocor, Lasix, and Nexium secondary to possibility of triggering agents  - d/c IV fluids  - start clear liquids  - GI Consult - Dr. Mobley    # CAD s/p Stent  - Stable  - Continue ASA, Metoprolol  - Hold Zocor for now    #HFpEF hx of chronic diastolic heart failure  - Cardiac Cath (02/17): Normal LV, EF 65%, CAD, Severe PHTN  - Hold Lasix; while on IVF  - Monitor for volume overload closely   - admit to tele  - cardio consult    #Atrial Fibrillation  - Continue Metoprolol for rate-control  - Continue Eliquis for AC  - [id number]    #T2DM  - Hold oral meds  - Start ISS  - Diabetic Diet    #HLD  - Lipid profile for AM  - Hold Zocor    #Pulmonary Hypertension  - Stable  - Continue Sildenafil

## 2017-12-16 NOTE — PROGRESS NOTE ADULT - SUBJECTIVE AND OBJECTIVE BOX
12/16/17: Pt seen and examined at bedside. Patient has been NPO and reports last having abdominal pain @ 4AM, currently has mild headache, otherwise feeling well. Wants to eat.    REVIEW OF SYSTEMS:    CONSTITUTIONAL: No weakness, fevers or chills  EYES/ENT: No visual changes;  No vertigo or throat pain   NECK: No pain or stiffness  RESPIRATORY: No cough, wheezing, hemoptysis; No shortness of breath  CARDIOVASCULAR: No chest pain or palpitations  GASTROINTESTINAL: No abd pain, nausea, vomiting, or hematemesis; No diarrhea or constipation. No melena or hematochezia.  GENITOURINARY: No dysuria, frequency or hematuria  NEUROLOGICAL: No numbness or weakness  SKIN: No itching, burning, rashes, or lesions   All other review of systems is negative unless indicated above    Vital Signs Last 24 Hrs  T(C): 36.3 (12-16-17 @ 05:10)  T(F): 97.3 (12-16-17 @ 05:10), Max: 98.2 (12-15-17 @ 10:30)  HR: 88 (12-16-17 @ 05:10) (74 - 88)  BP: 111/65 (12-16-17 @ 05:10)  BP(mean): 74 (12-16-17 @ 05:10) (74 - 74)  RR: 18 (12-16-17 @ 05:10) (18 - 18)  SpO2: 97% (12-16-17 @ 05:10) (96% - 100%)  Wt(kg): --    12-15 @ 07:01  -  12-16 @ 07:00  --------------------------------------------------------  IN: 1000 mL / OUT: 0 mL / NET: 1000 mL    PHYSICAL EXAM:    Constitutional: NAD, awake and alert, well-developed  HEENT: PERR, EOMI, Normal Hearing, MMM  Neck: Soft and supple, No LAD, No JVD  Respiratory: Breath sounds are clear bilaterally, No wheezing, rales or rhonchi  Cardiovascular: S1 and S2, regular rate and rhythm, no Murmurs, gallops or rubs  Gastrointestinal: soft, non-distended, non-tender  Extremities: No peripheral edema  Vascular: 2+ peripheral pulses  Neurological: A/O x 3, no focal deficits  Musculoskeletal: 5/5 strength b/l upper and lower extremities  Skin: No rashes    Lab Results:                                            10.6                  Neurophils% (auto):   x      (12-16 @ 06:56):    7.5  )-----------(140          Lymphocytes% (auto):  x                                             32.6                   Eosinphils% (auto):   x        Manual%: Neutrophils x    ; Lymphocytes x    ; Eosinophils x    ; Bands%: x    ; Blasts x         Differential:	[] Automated		[] Manual    12-16    135  |  102  |  13  ----------------------------<  74  3.8   |  26  |  0.46<L>    Ca    8.6      16 Dec 2017 06:56  Phos  2.9     12-16  Mg     2.0     12-16    TPro  6.9  /  Alb  2.7<L>  /  TBili  1.0  /  DBili  x   /  AST  20  /  ALT  20  /  AlkPhos  85  12-15    RADIOLOGY    < from: CT Abdomen and Pelvis w/ IV Cont (12.13.17 @ 23:41) >  EXAM:  CT ABDOMEN AND PELVIS IC                            PROCEDURE DATE:  12/13/2017  IMPRESSION: Consistent with acute pancreatitis with extensive   intraperitoneal and retroperitoneal fat stranding.    Diffuse diverticulosis without diverticulitis.    Possible small mesenteric volvulus in the mid lower abdomen.    Cirrhotic liver morphology without interval change.    < end of copied text >    < from: MR Abdomen No Cont (12.14.17 @ 14:41) >  EXAM:  MR ABDOMEN                            PROCEDURE DATE:  12/14/2017  IMPRESSION: Dilatation of the common duct to 16 mm but no evidence of   choledocholithiasis.    Small amount of ill-defined fluid in the right subhepatic space and left   upper quadrant could be due to pancreatitis. Correlation with laboratory   values is necessary. No drainable fluid collection.    Morphologic changes of the liver compatible with cirrhosis.    < end of copied text >

## 2017-12-16 NOTE — PROGRESS NOTE ADULT - SUBJECTIVE AND OBJECTIVE BOX
Patient is a 89y old  Female who presents with a chief complaint of diarrhea (14 Dec 2017 10:05)      HPI:  89 year old female with past medical history of CAD s/p stent, Atrial Fibrillation, HFpEF, T2DM, HLD, PHTN, presenting with abdominal pain x 1 day. Abdominal pain started suddenly in AM, located in epigastric area without radiation, described as dull, with episodes occurring intermittently throughout the day. Associated symptoms include burping. No exposure to new foods, N/V, change in BM. Patient was admitted for presumed drug-induced pancreatitis in May 2017. The offending agents were thought to be Metalozone and Januvia which were discontinued upon discharge. Patient was recently started on Levothyroxine about 3 weeks ago.    In the ED, Lip[place] 06783 and CT ABD positive for acute pancreatitis. (14 Dec 2017 00:48)    Patient appears improved. Less complaints of abdominal pain. Main complaint of headache. Lipase is down.  No nausea or vomiting.       PAST MEDICAL & SURGICAL HISTORY:  HLD (hyperlipidemia)  Hypothyroid  Afib  CAD (coronary artery disease)  Hypertension  History of Osteoarthritis  History of Atrial Fibrillation  GERD (Gastroesophageal Reflux Disease)  Dyslipidemia  Diabetes Mellitus Type II  History of appendectomy  History of cholecystectomy  H/O: Knee Surgery- right meniscus  H/O: Hysterectomy      MEDICATIONS  (STANDING):  apixaban 5 milliGRAM(s) Oral every 12 hours  aspirin  chewable 81 milliGRAM(s) Oral daily  dextrose 5%. 1000 milliLiter(s) (50 mL/Hr) IV Continuous <Continuous>  dextrose 50% Injectable 12.5 Gram(s) IV Push once  dextrose 50% Injectable 25 Gram(s) IV Push once  dextrose 50% Injectable 25 Gram(s) IV Push once  docusate sodium 100 milliGRAM(s) Oral three times a day  insulin lispro (HumaLOG) corrective regimen sliding scale   SubCutaneous three times a day before meals  lactated ringers. 1000 milliLiter(s) (80 mL/Hr) IV Continuous <Continuous>  levothyroxine 25 MICROGram(s) Oral daily  metoprolol     tartrate 25 milliGRAM(s) Oral two times a day  sildenafil (REVATIO) 20 milliGRAM(s) Oral two times a day    MEDICATIONS  (PRN):  acetaminophen   Tablet. 650 milliGRAM(s) Oral every 6 hours PRN Mild Pain (1 - 3)  dextrose Gel 1 Dose(s) Oral once PRN Blood Glucose LESS THAN 70 milliGRAM(s)/deciliter  glucagon  Injectable 1 milliGRAM(s) IntraMuscular once PRN Glucose LESS THAN 70 milligrams/deciliter      Allergies    penicillin (Rash)  penicillins (Other)  sulfa drugs (Rash; Other)    Intolerances        REVIEW OF SYSTEMS:    CONSTITUTIONAL: No weakness, fevers or chills  RESPIRATORY: No cough, wheezing, hemoptysis; No shortness of breath  CARDIOVASCULAR: No chest pain or palpitations  GASTROINTESTINAL: No abdominal or epigastric pain. No nausea, vomiting, or hematemesis; No diarrhea or constipation. No melena or hematochezia.  All other review of systems is negative unless indicated above.    Vital Signs Last 24 Hrs  T(C): 36.3 (16 Dec 2017 05:10), Max: 36.8 (15 Dec 2017 10:30)  T(F): 97.3 (16 Dec 2017 05:10), Max: 98.2 (15 Dec 2017 10:30)  HR: 88 (16 Dec 2017 05:10) (74 - 88)  BP: 111/65 (16 Dec 2017 05:10) (109/56 - 128/50)  BP(mean): 74 (16 Dec 2017 05:10) (74 - 74)  RR: 18 (16 Dec 2017 05:10) (18 - 18)  SpO2: 97% (16 Dec 2017 05:10) (96% - 100%)    PHYSICAL EXAM:    Constitutional: NAD, well-developed  Respiratory: clear  Cardiovascular: irregular  Gastrointestinal: BS+, soft, NT/ND      LABS:                        10.6   7.5   )-----------( 140      ( 16 Dec 2017 06:56 )             32.6     12-16    135  |  102  |  13  ----------------------------<  74  3.8   |  26  |  0.46<L>    Ca    8.6      16 Dec 2017 06:56  Phos  2.9     12-16  Mg     2.0     12-16    TPro  6.9  /  Alb  2.7<L>  /  TBili  1.0  /  DBili  x   /  AST  20  /  ALT  20  /  AlkPhos  85  12-15      LIVER FUNCTIONS - ( 15 Dec 2017 06:00 )  Alb: 2.7 g/dL / Pro: 6.9 gm/dL / ALK PHOS: 85 U/L / ALT: 20 U/L / AST: 20 U/L / GGT: x             RADIOLOGY & ADDITIONAL STUDIES:

## 2017-12-16 NOTE — PROGRESS NOTE ADULT - PROBLEM SELECTOR PLAN 3
- stable   - cardiac cath in Feb 2017  - normal LV, EF 65%  - hold lasix for now  Will consider restarting metolazone given that it is unlikely cause of pancreatitis (remote history of using metolazone).  - monitor for volume overload closely  Card rec appreciated - DCed IVF, will need to restart diuretic soon

## 2017-12-17 LAB
ANION GAP SERPL CALC-SCNC: 7 MMOL/L — SIGNIFICANT CHANGE UP (ref 5–17)
BUN SERPL-MCNC: 9 MG/DL — SIGNIFICANT CHANGE UP (ref 7–23)
CALCIUM SERPL-MCNC: 8.6 MG/DL — SIGNIFICANT CHANGE UP (ref 8.5–10.1)
CHLORIDE SERPL-SCNC: 101 MMOL/L — SIGNIFICANT CHANGE UP (ref 96–108)
CO2 SERPL-SCNC: 28 MMOL/L — SIGNIFICANT CHANGE UP (ref 22–31)
CREAT SERPL-MCNC: 0.45 MG/DL — LOW (ref 0.5–1.3)
GLUCOSE BLDC GLUCOMTR-MCNC: 120 MG/DL — HIGH (ref 70–99)
GLUCOSE BLDC GLUCOMTR-MCNC: 174 MG/DL — HIGH (ref 70–99)
GLUCOSE BLDC GLUCOMTR-MCNC: 195 MG/DL — HIGH (ref 70–99)
GLUCOSE SERPL-MCNC: 124 MG/DL — HIGH (ref 70–99)
HCT VFR BLD CALC: 34.4 % — LOW (ref 34.5–45)
HGB BLD-MCNC: 11 G/DL — LOW (ref 11.5–15.5)
MCHC RBC-ENTMCNC: 30.2 PG — SIGNIFICANT CHANGE UP (ref 27–34)
MCHC RBC-ENTMCNC: 32.1 GM/DL — SIGNIFICANT CHANGE UP (ref 32–36)
MCV RBC AUTO: 94 FL — SIGNIFICANT CHANGE UP (ref 80–100)
PLATELET # BLD AUTO: 164 K/UL — SIGNIFICANT CHANGE UP (ref 150–400)
POTASSIUM SERPL-MCNC: 3.7 MMOL/L — SIGNIFICANT CHANGE UP (ref 3.5–5.3)
POTASSIUM SERPL-SCNC: 3.7 MMOL/L — SIGNIFICANT CHANGE UP (ref 3.5–5.3)
RBC # BLD: 3.66 M/UL — LOW (ref 3.8–5.2)
RBC # FLD: 14.1 % — SIGNIFICANT CHANGE UP (ref 10.3–14.5)
SODIUM SERPL-SCNC: 136 MMOL/L — SIGNIFICANT CHANGE UP (ref 135–145)
WBC # BLD: 6.8 K/UL — SIGNIFICANT CHANGE UP (ref 3.8–10.5)
WBC # FLD AUTO: 6.8 K/UL — SIGNIFICANT CHANGE UP (ref 3.8–10.5)

## 2017-12-17 RX ORDER — FUROSEMIDE 40 MG
40 TABLET ORAL DAILY
Qty: 0 | Refills: 0 | Status: DISCONTINUED | OUTPATIENT
Start: 2017-12-17 | End: 2017-12-21

## 2017-12-17 RX ADMIN — Medication 1: at 17:34

## 2017-12-17 RX ADMIN — Medication 20 MILLIGRAM(S): at 17:34

## 2017-12-17 RX ADMIN — APIXABAN 5 MILLIGRAM(S): 2.5 TABLET, FILM COATED ORAL at 06:14

## 2017-12-17 RX ADMIN — Medication 40 MILLIGRAM(S): at 12:01

## 2017-12-17 RX ADMIN — APIXABAN 5 MILLIGRAM(S): 2.5 TABLET, FILM COATED ORAL at 17:34

## 2017-12-17 RX ADMIN — Medication 100 MILLIGRAM(S): at 22:11

## 2017-12-17 RX ADMIN — Medication 650 MILLIGRAM(S): at 12:06

## 2017-12-17 RX ADMIN — Medication 20 MILLIGRAM(S): at 06:14

## 2017-12-17 RX ADMIN — Medication 100 MILLIGRAM(S): at 06:14

## 2017-12-17 RX ADMIN — Medication 25 MICROGRAM(S): at 06:14

## 2017-12-17 RX ADMIN — Medication 25 MILLIGRAM(S): at 17:34

## 2017-12-17 RX ADMIN — Medication 1: at 12:07

## 2017-12-17 RX ADMIN — Medication 81 MILLIGRAM(S): at 12:01

## 2017-12-17 RX ADMIN — Medication 25 MILLIGRAM(S): at 06:14

## 2017-12-17 RX ADMIN — Medication 100 MILLIGRAM(S): at 12:01

## 2017-12-17 NOTE — PROGRESS NOTE ADULT - PROBLEM SELECTOR PLAN 1
Stable, lipase trending down. No further trending necessary.  IVF were discontinued 12/16  -Tolerating clear liquid diet, Advance to full liquid, then DASH diet as tolerated.   -Pain management Stable, lipase trending down. No further trending necessary.  IVF discontinued 12/16  -Tolerating clear liquid diet, Advance to full liquid, then Diabetic/DASH diet as tolerated.   -Pain management

## 2017-12-17 NOTE — PROGRESS NOTE ADULT - PROBLEM SELECTOR PLAN 3
- stable   - cardiac cath in Feb 2017  - normal LV, EF 65%  - IV Lasix 40  -Will consider restarting metolazone given that it is unlikely cause of pancreatitis (remote history of using metolazone).  - monitor for volume overload closely  Card rec appreciated - DCed IVF, will need to restart diuretic soon - stable   - cardiac cath in Feb 2017  - normal LV, EF 65%  - IV Lasix 40  -Will consider restarting metolazone given that it is unlikely cause of pancreatitis (remote history of using metolazone).  - monitor for volume overload closely - stable   - cardiac cath in Feb 2017  - normal LV, EF 65%  - IV Lasix 40 daily started 12/17  - Will consider restarting metolazone given that it is unlikely cause of pancreatitis (remote history of using metolazone).  - monitor for volume overload closely  - Strict I &O - stable   - cardiac cath in Feb 2017  - normal LV, EF 65%  - IV Lasix 40 daily started 12/17  - Will consider restarting metolazone given that it is unlikely cause of pancreatitis (remote history of using metolazone).  - monitor for volume overload closely  - Strict I &O  - Daily weights

## 2017-12-17 NOTE — PROGRESS NOTE ADULT - SUBJECTIVE AND OBJECTIVE BOX
Patient is a 89y old  Female who presents with a chief complaint of diarrhea.      HPI:  89 year old female with past medical history of CAD s/p stent, Atrial Fibrillation, HFpEF, T2DM, HLD, PHTN, presenting with abdominal pain x 1 day. Abdominal pain started suddenly in AM, located in epigastric area without radiation, described as dull, with episodes occurring intermittently throughout the day. Associated symptoms include burping. No exposure to new foods, N/V, change in BM. Patient was admitted for presumed drug-induced pancreatitis in May 2017. The offending agents were thought to be Metalozone and Januvia per primary team.  In the ED, [place] be [id number] and CT ABD positive for acute pancreatitis.     Pt was being treated for pancreatitis with IVF and NPO.  Pt denies any CP or SOB at rest.     12/17- Pt seen and examined by me today. Pt denies any SOB at rest.       PAST MEDICAL & SURGICAL HISTORY:  HLD (hyperlipidemia)  Hypothyroid  Afib  CAD (coronary artery disease)  Hypertension  History of Osteoarthritis  History of Atrial Fibrillation  GERD (Gastroesophageal Reflux Disease)  Dyslipidemia  Diabetes Mellitus Type II  History of appendectomy  History of cholecystectomy  H/O: Knee Surgery- right meniscus  H/O: Hysterectomy      MEDICATIONS  (STANDING):  apixaban 5 milliGRAM(s) Oral every 12 hours  aspirin  chewable 81 milliGRAM(s) Oral daily  dextrose 5%. 1000 milliLiter(s) (50 mL/Hr) IV Continuous <Continuous>  dextrose 50% Injectable 12.5 Gram(s) IV Push once  dextrose 50% Injectable 25 Gram(s) IV Push once  dextrose 50% Injectable 25 Gram(s) IV Push once  docusate sodium 100 milliGRAM(s) Oral three times a day  insulin lispro (HumaLOG) corrective regimen sliding scale   SubCutaneous three times a day before meals  lactated ringers. 1000 milliLiter(s) (80 mL/Hr) IV Continuous <Continuous>  levothyroxine 25 MICROGram(s) Oral daily  metoprolol     tartrate 25 milliGRAM(s) Oral two times a day  sildenafil (REVATIO) 20 milliGRAM(s) Oral two times a day    MEDICATIONS  (PRN):  acetaminophen   Tablet. 650 milliGRAM(s) Oral every 6 hours PRN Mild Pain (1 - 3)  dextrose Gel 1 Dose(s) Oral once PRN Blood Glucose LESS THAN 70 milliGRAM(s)/deciliter  glucagon  Injectable 1 milliGRAM(s) IntraMuscular once PRN Glucose LESS THAN 70 milligrams/deciliter      FAMILY HISTORY:  No pertinent family history in first degree relatives      SOCIAL HISTORY:  non smoker, no alcohol use     REVIEW OF SYSTEMS:  CONSTITUTIONAL:  No night sweats.  No fatigue, malaise, lethargy.  No fever or chills.  HEENT:  Eyes:  No visual changes.  No eye pain.      ENT:  No runny nose.  No epistaxis.  No sinus pain.  No sore throat.  No odynophagia.  No ear pain.  No congestion.  RESPIRATORY:  No cough.  No wheeze.  No hemoptysis.  No shortness of breath.  CARDIOVASCULAR:  No chest pains.  No palpitations. No shortness of breath, No orthopnea or PND.  GASTROINTESTINAL:  c/o abdominal pain.  No nausea or vomiting.  No diarrhea or constipation.  No hematemesis.  No hematochezia.  No melena.  GENITOURINARY:  No urgency.  No frequency.  No dysuria.  No hematuria.  No obstructive symptoms.  No discharge.  No pain.  No significant abnormal bleeding.  MUSCULOSKELETAL:  No musculoskeletal pain.  No joint swelling.  No arthritis.  NEUROLOGICAL:  No tingling or numbness or weakness.  PSYCHIATRIC:  No confusion  SKIN:  No rashes.  No lesions.  No wounds.  ENDOCRINE:  No unexplained weight loss.  No polydipsia.  No polyuria.  No polyphagia.  HEMATOLOGIC:  No anemia.  No purpura.  No petechiae.  No prolonged or excessive bleeding.   ALLERGIC AND IMMUNOLOGIC:  No pruritus.  No swelling.         Vital Signs Last 24 Hrs  T(C): 36.3 (16 Dec 2017 05:10), Max: 36.8 (15 Dec 2017 10:30)  T(F): 97.3 (16 Dec 2017 05:10), Max: 98.2 (15 Dec 2017 10:30)  HR: 88 (16 Dec 2017 05:10) (74 - 88)  BP: 111/65 (16 Dec 2017 05:10) (109/56 - 128/50)  BP(mean): 74 (16 Dec 2017 05:10) (74 - 74)  RR: 18 (16 Dec 2017 05:10) (18 - 18)  SpO2: 97% (16 Dec 2017 05:10) (96% - 100%)    PHYSICAL EXAM-    Constitutional: The patient appears to be normal, well developed, well nourished and alert and oriented to time, place and person. The patient does not appear acutely ill.     Head: Head is normocephalic and atraumatic.      Neck: The patient's neck is supple without enlargement, has no palpable thyromegaly nor thyroid nodules and has no jugular venous distention. No audible carotid bruits. There are strong carotid pulses bilaterally. No JVD.     Cardiovascular: Regular rate and rhythm without S3, S4. No murmurs or rubs are appreciated.      Respiratory: Crackles b/l L >R.   Abdomen: Soft, nontender, nondistended with positive bowel sounds.      Extremity: No tenderness. No  pitting edema No skin discoloration No clubbing No cyanosis.     Neurologic: The patient is alert and oriented.      Skin: No rash, no obvious lesions noted.      Psychiatric: The patient appears to be emotionally stable.      INTERPRETATION OF TELEMETRY: Atrial fibrillation, 70-90/min.    ECG:    I&O's Detail    15 Dec 2017 07:01  -  16 Dec 2017 06:33  --------------------------------------------------------  IN:    lactated ringers.: 1000 mL  Total IN: 1000 mL    OUT:  Total OUT: 0 mL    Total NET: 1000 mL          LABS:                        10.9   10.8  )-----------( 138      ( 15 Dec 2017 06:00 )             33.5     12-15    135  |  102  |  16  ----------------------------<  98  3.9   |  28  |  0.67    Ca    8.8      15 Dec 2017 06:00  Mg     2.0     12-15    TPro  6.9  /  Alb  2.7<L>  /  TBili  1.0  /  DBili  x   /  AST  20  /  ALT  20  /  AlkPhos  85  12-15            I&O's Summary    15 Dec 2017 07:01  -  16 Dec 2017 06:33  --------------------------------------------------------  IN: 1000 mL / OUT: 0 mL / NET: 1000 mL      BNP  RADIOLOGY & ADDITIONAL STUDIES:

## 2017-12-17 NOTE — PROGRESS NOTE ADULT - SUBJECTIVE AND OBJECTIVE BOX
Patient is a 89y old  Female who presents with a chief complaint of diarrhea (14 Dec 2017 10:05)      HPI:  pt feeling better with less abd pain  she notes increased edema    PAST MEDICAL & SURGICAL HISTORY:  HLD (hyperlipidemia)  Hypothyroid  Afib  CAD (coronary artery disease)  Hypertension  History of Osteoarthritis  History of Atrial Fibrillation  GERD (Gastroesophageal Reflux Disease)  Dyslipidemia  Diabetes Mellitus Type II  History of appendectomy  History of cholecystectomy  H/O: Knee Surgery- right meniscus  H/O: Hysterectomy    MEDICATIONS  (STANDING):  apixaban 5 milliGRAM(s) Oral every 12 hours  aspirin  chewable 81 milliGRAM(s) Oral daily  dextrose 50% Injectable 12.5 Gram(s) IV Push once  dextrose 50% Injectable 25 Gram(s) IV Push once  dextrose 50% Injectable 25 Gram(s) IV Push once  docusate sodium 100 milliGRAM(s) Oral three times a day  furosemide   Injectable 40 milliGRAM(s) IV Push daily  insulin lispro (HumaLOG) corrective regimen sliding scale   SubCutaneous three times a day before meals  levothyroxine 25 MICROGram(s) Oral daily  metoprolol     tartrate 25 milliGRAM(s) Oral two times a day  sildenafil (REVATIO) 20 milliGRAM(s) Oral two times a day    MEDICATIONS  (PRN):  acetaminophen   Tablet. 650 milliGRAM(s) Oral every 6 hours PRN Mild Pain (1 - 3)  dextrose Gel 1 Dose(s) Oral once PRN Blood Glucose LESS THAN 70 milliGRAM(s)/deciliter  glucagon  Injectable 1 milliGRAM(s) IntraMuscular once PRN Glucose LESS THAN 70 milligrams/deciliter  melatonin 3 milliGRAM(s) Oral at bedtime PRN Insomnia    Allergies  penicillin (Rash)  penicillins (Other)  sulfa drugs (Rash; Other)    REVIEW OF SYSTEMS:    CONSTITUTIONAL: No weakness, fevers or chills  RESPIRATORY: No cough, wheezing, hemoptysis; No shortness of breath  CARDIOVASCULAR: No chest pain or palpitations  GASTROINTESTINALabd pain improved  All other review of systems is negative unless indicated above.    Vital Signs Last 24 Hrs  T(C): 36.5 (17 Dec 2017 10:24), Max: 36.7 (16 Dec 2017 21:21)  T(F): 97.7 (17 Dec 2017 10:24), Max: 98.1 (17 Dec 2017 05:05)  HR: 73 (17 Dec 2017 10:24) (60 - 78)  BP: 111/58 (17 Dec 2017 10:24) (93/50 - 126/58)  BP(mean): 74 (17 Dec 2017 05:05) (74 - 74)  RR: 17 (17 Dec 2017 10:24) (16 - 17)  SpO2: 93% (17 Dec 2017 10:24) (93% - 99%)    PHYSICAL EXAM:    Constitutional: NAD, well-developed  Respiratory: CTA  Cardiovascular: S1 and S2  Gastrointestinal: BS+, soft, NT/ND  Extremities: signficant edema  Psychiatric: Normal mood, normal affect  Skin: No rashes    LABS:                        11.0   6.8   )-----------( 164      ( 17 Dec 2017 07:15 )             34.4     12-17    136  |  101  |  9   ----------------------------<  124<H>  3.7   |  28  |  0.45<L>    Ca    8.6      17 Dec 2017 07:15  Phos  2.9     12-16  Mg     2.0     12-16            RADIOLOGY & ADDITIONAL STUDIES:

## 2017-12-17 NOTE — PROGRESS NOTE ADULT - SUBJECTIVE AND OBJECTIVE BOX
89year old female with past medical history of CAD s/p stent, Atrial Fibrillation, HFpEF, T2DM, HLD, PHTN, presenting with abdominal pain x 1 day. Abdominal pain started suddenly in AM, located in epigastric area ,  intermittent, dull  became worse after eating this evening  Pain now greatest in LUQ.  Pt with + Belching  no  N/V, fever/chills, diarrhea/constipation. Nl BM in AM. Patient was admitted for presumed drug-induced pancreatitis in May 2017  thought to be related to  Metalozone and Januvia which were discontinued at that time.     12/14: admitting hospitalist admission note reviewed and agreed. Care discussed with Dr. Camacho. effort was made to communicate with patient's son  Aureliorenee. Patient is agreeable to receive MRCP if given xanax. Denies any HA, CP, SOB. Will closely monitor for any SOB.     12/15: seen and eval. hemodynamically stable. Denies any HA, CP, SOB. Soarness around the abdominal region. Labs and vitals reviewed. more energy at this point wants to get out of the bed and ambulate. lipid panel reviewed. Care discussed with Dr. Dill.     12/16: abdominal pain free. some headache. D/C IV fluids, clear liquids.   12/17: patient has been seen at the bedside. she is abdominal pain free. No N/V/D, but patient does state that she was burping overnight. Patient states that she is hungry. has walked yesterday. labs and vitals reviewed. will cotninue to advance diet. care discussed with Dr. Hwang -  On IV lasix.       REVIEW OF SYSTEMS:  General: NAD, hemodynamically stable, (-)  fever, (-) chills, (+) weakness  HEENT:  Eyes:  No visual loss, blurred vision, double vision or yellow sclerae. Ears, Nose, Throat:  No hearing loss, sneezing, congestion, runny nose or sore throat.  SKIN:  No rash or itching.  CARDIOVASCULAR: No chest pain  RESPIRATORY: No SOB  GASTROINTESTINAL: abdomnal pain overnight  NEUROLOGICAL:  No headache, dizziness, syncope, paralysis, ataxia, numbness or tingling in the extremities. No change in bowel or bladder control.  MUSCULOSKELETAL:  No muscle, back pain, joint pain or stiffness.  HEMATOLOGIC:  No anemia, bleeding or bruising.  LYMPHATICS:  No enlarged nodes. No history of splenectomy.  ENDOCRINOLOGIC:  No reports of sweating, cold or heat intolerance. No polyuria or polydipsia.  ALLERGIES:  No history of asthma, hives, eczema or rhinitis.    PE:  T(C): 36.5 (17 Dec 2017 10:24), Max: 36.7 (16 Dec 2017 11:07)  T(F): 97.7 (17 Dec 2017 10:24), Max: 98.1 (16 Dec 2017 11:07)  HR: 73 (17 Dec 2017 10:24) (60 - 78)  BP: 111/58 (17 Dec 2017 10:24) (93/50 - 126/58)  BP(mean): 74 (17 Dec 2017 05:05) (74 - 74)  RR: 17 (17 Dec 2017 10:24) (16 - 18)  SpO2: 93% (17 Dec 2017 10:24) (92% - 99%)  General: elderly, frail, deconditioned, very nice lady  Heart: S1S2 irregular, rate 80  Lungs: patient has noted crackles b/l basis  ABD: ND, + Hypoactive BS, ND, + mild diffuse tenderness  to palpation greatest in LUQ, no rebound or guarding  EXT: Non tender    Labs:       CBC Full  -  ( 17 Dec 2017 07:15 )  WBC Count : 6.8 K/uL  Hemoglobin : 11.0 g/dL  Hematocrit : 34.4 %  Platelet Count - Automated : 164 K/uL    136  |  101  |  9   ----------------------------<  124<H>  3.7   |  28  |  0.45<L>    Ca    8.6      17 Dec 2017 07:15  Phos  2.9     12-16  Mg     2.0     12-16          CT of the abdomen:   Consistent with acute pancreatitis with extensive   intraperitoneal and retroperitoneal fat stranding.    Diffuse diverticulosis without diverticulitis.    Possible small mesenteric volvulus in the mid lower abdomen.    Cirrhotic liver morphology without interval change.    # Acute Idiopathic Pancreatitis  - MRCP results reviewed, without CBD store  - History of presumed drug-induced pancreatitis secondary to Metalozone / Januvia  - s/p Cholecystectomy / absolutely no ETOH use  - Hold Lexapro, Zocor, and Nexium secondary to possibility of triggering agents  - not on IV fluids anymore since 12/15, started IV lasix 12/17 (care discussed with Dr. Hwang)  - patient now on full liquids    # CAD s/p Stent  - Stable  - Continue ASA, Metoprolol  - Hold Zocor for now    #HFpEF hx of chronic diastolic heart failure  - Cardiac Cath (02/17): Normal LV, EF 65%, CAD, Severe PHTN  - Hold Lasix; while on IVF  - Monitor for volume overload closely   - admit to tele  - cardio consult    #Atrial Fibrillation  - Continue Metoprolol for rate-control  - Continue Eliquis, - [id number]    #T2DM  - Hold oral meds  - Start ISS  - Diabetic Diet    #HLD  - lipid profile reviewed  - Hold Zocor    #Pulmonary Hypertension  - Stable  - Continue Sildenafil

## 2017-12-17 NOTE — PROGRESS NOTE ADULT - SUBJECTIVE AND OBJECTIVE BOX
CHIEF COMPLAINT: Epigastric Pain     SUBJECTIVE: 89 year old female with past medical history of CAD s/p stent, Atrial Fibrillation, HFpEF, T2DM, HLD, PHTN, presenting with abdominal pain x 1 day. Abdominal pain started suddenly in AM, located in epigastric area without radiation, described as dull, with episodes occurring intermittently throughout the day. Associated symptoms include burping. No exposure to new foods, N/V, change in BM. Patient was admitted for presumed drug-induced pancreatitis in May 2017. The offending agents were thought to be Metalozone and Januvia which were discontinued upon discharge. Patient was recently started on Levothyroxine about 3 weeks ago.    In the ED, [place] 8[address]38 and CT ABD positive for acute pancreatitis.     12/17: Pt was seen and examined at bedside. She has tolerated clear liquid diet and currently has desire to eat more food. She denies an abdominal pain, nausea, vomiting or changes to her bowels. She reports belching frequently.     REVIEW OF SYSTEMS:    CONSTITUTIONAL: No weakness, fevers or chills  EYES/ENT: No visual changes;  No vertigo or throat pain   NECK: No pain or stiffness  RESPIRATORY: No cough, wheezing, hemoptysis; No shortness of breath  CARDIOVASCULAR: No chest pain or palpitations  GASTROINTESTINAL: +Belching' No abdominal or epigastric pain. No nausea, vomiting, or hematemesis; No diarrhea or constipation. No melena or hematochezia.  GENITOURINARY: No dysuria, frequency or hematuria  NEUROLOGICAL: No numbness or weakness  SKIN: No itching, burning, rashes, or lesions   All other review of systems is negative unless indicated above    Vital Signs Last 24 Hrs  T(C): 36.5 (17 Dec 2017 10:24), Max: 36.7 (16 Dec 2017 11:07)  T(F): 97.7 (17 Dec 2017 10:24), Max: 98.1 (16 Dec 2017 11:07)  HR: 73 (17 Dec 2017 10:24) (60 - 78)  BP: 111/58 (17 Dec 2017 10:24) (93/50 - 126/58)  BP(mean): 74 (17 Dec 2017 05:05) (74 - 74)  RR: 17 (17 Dec 2017 10:24) (16 - 18)  SpO2: 93% (17 Dec 2017 10:24) (92% - 99%)        CAPILLARY BLOOD GLUCOSE  POCT Blood Glucose.: 154 mg/dL (16 Dec 2017 23:33)  POCT Blood Glucose.: 270 mg/dL (16 Dec 2017 17:37)      PHYSICAL EXAM:    Constitutional: NAD, awake and alert, well-developed  HEENT: PERR, EOMI, Normal Hearing, MMM  Neck: Soft and supple, No LAD, No JVD  Respiratory: Good air entry; bibasilar crackles  Cardiovascular: S1 and S2, regular rate and rhythm, no Murmurs, gallops or rubs  Gastrointestinal: Bowel Sounds present, soft, nontender, nondistended, no guarding, no rebound  Extremities: Lower extremities propped on pillow; No pitting edema  Vascular: 2+ peripheral pulses  Neurological: A/O x 3, no focal deficits  Musculoskeletal: 5/5 strength b/l upper and lower extremities  Skin: No rashes    MEDICATIONS:  MEDICATIONS  (STANDING):  apixaban 5 milliGRAM(s) Oral every 12 hours  aspirin  chewable 81 milliGRAM(s) Oral daily  dextrose 5%. 1000 milliLiter(s) (50 mL/Hr) IV Continuous <Continuous>  dextrose 50% Injectable 12.5 Gram(s) IV Push once  dextrose 50% Injectable 25 Gram(s) IV Push once  dextrose 50% Injectable 25 Gram(s) IV Push once  docusate sodium 100 milliGRAM(s) Oral three times a day  furosemide   Injectable 40 milliGRAM(s) IV Push daily  insulin lispro (HumaLOG) corrective regimen sliding scale   SubCutaneous three times a day before meals  levothyroxine 25 MICROGram(s) Oral daily  metoprolol     tartrate 25 milliGRAM(s) Oral two times a day  sildenafil (REVATIO) 20 milliGRAM(s) Oral two times a day      LABS: All Labs Reviewed:                        11.0   6.8   )-----------( 164      ( 17 Dec 2017 07:15 )             34.4     12-17    136  |  101  |  9   ----------------------------<  124<H>  3.7   |  28  |  0.45<L>    Ca    8.6      17 Dec 2017 07:15  Phos  2.9     12-16  Mg     2.0     12-16            Blood Culture: 12-13 @ 22:15  Organism --  Gram Stain Blood -- Gram Stain --  Specimen Source .Urine None  Culture-Blood --        RADIOLOGY/EKG:    DVT PPX:    ADVANCED DIRECTIVE:    DISPOSITION:

## 2017-12-18 DIAGNOSIS — M79.604 PAIN IN RIGHT LEG: ICD-10-CM

## 2017-12-18 LAB
ANION GAP SERPL CALC-SCNC: 10 MMOL/L — SIGNIFICANT CHANGE UP (ref 5–17)
BUN SERPL-MCNC: 7 MG/DL — SIGNIFICANT CHANGE UP (ref 7–23)
CALCIUM SERPL-MCNC: 8.8 MG/DL — SIGNIFICANT CHANGE UP (ref 8.5–10.1)
CHLORIDE SERPL-SCNC: 101 MMOL/L — SIGNIFICANT CHANGE UP (ref 96–108)
CO2 SERPL-SCNC: 26 MMOL/L — SIGNIFICANT CHANGE UP (ref 22–31)
CREAT SERPL-MCNC: 0.6 MG/DL — SIGNIFICANT CHANGE UP (ref 0.5–1.3)
GLUCOSE BLDC GLUCOMTR-MCNC: 133 MG/DL — HIGH (ref 70–99)
GLUCOSE BLDC GLUCOMTR-MCNC: 139 MG/DL — HIGH (ref 70–99)
GLUCOSE BLDC GLUCOMTR-MCNC: 211 MG/DL — HIGH (ref 70–99)
GLUCOSE SERPL-MCNC: 144 MG/DL — HIGH (ref 70–99)
HCT VFR BLD CALC: 35 % — SIGNIFICANT CHANGE UP (ref 34.5–45)
HGB BLD-MCNC: 11.4 G/DL — LOW (ref 11.5–15.5)
MCHC RBC-ENTMCNC: 30.5 PG — SIGNIFICANT CHANGE UP (ref 27–34)
MCHC RBC-ENTMCNC: 32.5 GM/DL — SIGNIFICANT CHANGE UP (ref 32–36)
MCV RBC AUTO: 93.8 FL — SIGNIFICANT CHANGE UP (ref 80–100)
PLATELET # BLD AUTO: 178 K/UL — SIGNIFICANT CHANGE UP (ref 150–400)
POTASSIUM SERPL-MCNC: 3.8 MMOL/L — SIGNIFICANT CHANGE UP (ref 3.5–5.3)
POTASSIUM SERPL-SCNC: 3.8 MMOL/L — SIGNIFICANT CHANGE UP (ref 3.5–5.3)
RBC # BLD: 3.74 M/UL — LOW (ref 3.8–5.2)
RBC # FLD: 14 % — SIGNIFICANT CHANGE UP (ref 10.3–14.5)
SODIUM SERPL-SCNC: 137 MMOL/L — SIGNIFICANT CHANGE UP (ref 135–145)
WBC # BLD: 6.1 K/UL — SIGNIFICANT CHANGE UP (ref 3.8–10.5)
WBC # FLD AUTO: 6.1 K/UL — SIGNIFICANT CHANGE UP (ref 3.8–10.5)

## 2017-12-18 PROCEDURE — 93970 EXTREMITY STUDY: CPT | Mod: 26

## 2017-12-18 RX ORDER — FUROSEMIDE 40 MG
40 TABLET ORAL ONCE
Qty: 0 | Refills: 0 | Status: COMPLETED | OUTPATIENT
Start: 2017-12-18 | End: 2017-12-18

## 2017-12-18 RX ADMIN — Medication 40 MILLIGRAM(S): at 05:28

## 2017-12-18 RX ADMIN — APIXABAN 5 MILLIGRAM(S): 2.5 TABLET, FILM COATED ORAL at 05:28

## 2017-12-18 RX ADMIN — Medication 20 MILLIGRAM(S): at 18:51

## 2017-12-18 RX ADMIN — Medication 650 MILLIGRAM(S): at 08:11

## 2017-12-18 RX ADMIN — Medication 650 MILLIGRAM(S): at 21:00

## 2017-12-18 RX ADMIN — APIXABAN 5 MILLIGRAM(S): 2.5 TABLET, FILM COATED ORAL at 18:51

## 2017-12-18 RX ADMIN — Medication 100 MILLIGRAM(S): at 05:28

## 2017-12-18 RX ADMIN — Medication 25 MILLIGRAM(S): at 05:27

## 2017-12-18 RX ADMIN — Medication 100 MILLIGRAM(S): at 23:10

## 2017-12-18 RX ADMIN — Medication 40 MILLIGRAM(S): at 18:50

## 2017-12-18 RX ADMIN — Medication 3 MILLIGRAM(S): at 23:10

## 2017-12-18 RX ADMIN — Medication 20 MILLIGRAM(S): at 05:27

## 2017-12-18 RX ADMIN — Medication 25 MICROGRAM(S): at 05:28

## 2017-12-18 RX ADMIN — Medication 2: at 18:50

## 2017-12-18 RX ADMIN — Medication 25 MILLIGRAM(S): at 18:51

## 2017-12-18 NOTE — PROGRESS NOTE ADULT - SUBJECTIVE AND OBJECTIVE BOX
Patient is a 89y old  Female who presents with a chief complaint of pancreatitis     Subective:  Pain resolved  C/O swelling in legs and right knee    PAST MEDICAL & SURGICAL HISTORY:  HLD (hyperlipidemia)  Hypothyroid  Afib  CAD (coronary artery disease)  Hypertension  History of Osteoarthritis  History of Atrial Fibrillation  GERD (Gastroesophageal Reflux Disease)  Dyslipidemia  Diabetes Mellitus Type II  History of appendectomy  History of cholecystectomy  H/O: Knee Surgery- right meniscus  H/O: Hysterectomy      MEDICATIONS  (STANDING):  apixaban 5 milliGRAM(s) Oral every 12 hours  aspirin  chewable 81 milliGRAM(s) Oral daily  dextrose 50% Injectable 12.5 Gram(s) IV Push once  dextrose 50% Injectable 25 Gram(s) IV Push once  dextrose 50% Injectable 25 Gram(s) IV Push once  docusate sodium 100 milliGRAM(s) Oral three times a day  furosemide   Injectable 40 milliGRAM(s) IV Push daily  furosemide   Injectable 40 milliGRAM(s) IV Push once  insulin lispro (HumaLOG) corrective regimen sliding scale   SubCutaneous three times a day before meals  levothyroxine 25 MICROGram(s) Oral daily  metoprolol     tartrate 25 milliGRAM(s) Oral two times a day  sildenafil (REVATIO) 20 milliGRAM(s) Oral two times a day    MEDICATIONS  (PRN):  acetaminophen   Tablet. 650 milliGRAM(s) Oral every 6 hours PRN Mild Pain (1 - 3)  dextrose Gel 1 Dose(s) Oral once PRN Blood Glucose LESS THAN 70 milliGRAM(s)/deciliter  glucagon  Injectable 1 milliGRAM(s) IntraMuscular once PRN Glucose LESS THAN 70 milligrams/deciliter  melatonin 3 milliGRAM(s) Oral at bedtime PRN Insomnia      REVIEW OF SYSTEMS:    RESPIRATORY: No shortness of breath  CARDIOVASCULAR: No chest pain  All other review of systems is negative unless indicated above.    Vital Signs Last 24 Hrs  T(C): 36.5 (18 Dec 2017 05:06), Max: 36.5 (17 Dec 2017 10:24)  T(F): 97.7 (18 Dec 2017 05:06), Max: 97.7 (17 Dec 2017 10:24)  HR: 81 (18 Dec 2017 05:06) (73 - 81)  BP: 116/68 (18 Dec 2017 05:06) (111/58 - 116/68)  BP(mean): --  RR: 17 (17 Dec 2017 10:24) (17 - 17)  SpO2: 99% (18 Dec 2017 05:06) (93% - 99%)    PHYSICAL EXAM:    Constitutional: NAD, well-developed  Respiratory: CTAB  Cardiovascular: S1 and S2, RRR  Gastrointestinal: BS+, soft, NT/ND  Extremities: swollen right knee, not red. Mild B LE edema  Psychiatric: Normal mood, normal affect    LABS:                        11.4   6.1   )-----------( 178      ( 18 Dec 2017 06:48 )             35.0     12-18    137  |  101  |  7   ----------------------------<  144<H>  3.8   |  26  |  0.60    Ca    8.8      18 Dec 2017 06:48            RADIOLOGY & ADDITIONAL STUDIES:

## 2017-12-18 NOTE — PHYSICAL THERAPY INITIAL EVALUATION ADULT - ADDITIONAL COMMENTS
Pt. resides alone in a one level apartment w/ no steps to ambulate. Pt. ambulates w/ a rollator at baseline independently.

## 2017-12-18 NOTE — PROGRESS NOTE ADULT - SUBJECTIVE AND OBJECTIVE BOX
Patient is a 89y old  Female who presents with a chief complaint of diarrhea.      HPI:  89 year old female with past medical history of CAD s/p stent, Atrial Fibrillation, HFpEF, T2DM, HLD, PHTN, presenting with abdominal pain x 1 day. Abdominal pain started suddenly in AM, located in epigastric area without radiation, described as dull, with episodes occurring intermittently throughout the day. Associated symptoms include burping. No exposure to new foods, N/V, change in BM. Patient was admitted for presumed drug-induced pancreatitis in May 2017. The offending agents were thought to be Metalozone and Januvia per primary team.  In the ED, [place] [id number] and CT ABD positive for acute pancreatitis.     Pt was being treated for pancreatitis with IVF and NPO.  Pt denies any CP or SOB at rest.     12/17- Pt seen and examined by me today. Pt denies any SOB at rest.    12/18- pt seen and examined by me today.  Pt denies any CP but last night c/o SOB.       PAST MEDICAL & SURGICAL HISTORY:  HLD (hyperlipidemia)  Hypothyroid  Afib  CAD (coronary artery disease)  Hypertension  History of Osteoarthritis  History of Atrial Fibrillation  GERD (Gastroesophageal Reflux Disease)  Dyslipidemia  Diabetes Mellitus Type II  History of appendectomy  History of cholecystectomy  H/O: Knee Surgery- right meniscus  H/O: Hysterectomy      MEDICATIONS  (STANDING):  apixaban 5 milliGRAM(s) Oral every 12 hours  aspirin  chewable 81 milliGRAM(s) Oral daily  dextrose 5%. 1000 milliLiter(s) (50 mL/Hr) IV Continuous <Continuous>  dextrose 50% Injectable 12.5 Gram(s) IV Push once  dextrose 50% Injectable 25 Gram(s) IV Push once  dextrose 50% Injectable 25 Gram(s) IV Push once  docusate sodium 100 milliGRAM(s) Oral three times a day  insulin lispro (HumaLOG) corrective regimen sliding scale   SubCutaneous three times a day before meals  lactated ringers. 1000 milliLiter(s) (80 mL/Hr) IV Continuous <Continuous>  levothyroxine 25 MICROGram(s) Oral daily  metoprolol     tartrate 25 milliGRAM(s) Oral two times a day  sildenafil (REVATIO) 20 milliGRAM(s) Oral two times a day    MEDICATIONS  (PRN):  acetaminophen   Tablet. 650 milliGRAM(s) Oral every 6 hours PRN Mild Pain (1 - 3)  dextrose Gel 1 Dose(s) Oral once PRN Blood Glucose LESS THAN 70 milliGRAM(s)/deciliter  glucagon  Injectable 1 milliGRAM(s) IntraMuscular once PRN Glucose LESS THAN 70 milligrams/deciliter      FAMILY HISTORY:  No pertinent family history in first degree relatives      SOCIAL HISTORY:  non smoker, no alcohol use     REVIEW OF SYSTEMS:  CONSTITUTIONAL:  No night sweats.  No fatigue, malaise, lethargy.  No fever or chills.  HEENT:  Eyes:  No visual changes.  No eye pain.      ENT:  No runny nose.  No epistaxis.  No sinus pain.  No sore throat.  No odynophagia.  No ear pain.  No congestion.  RESPIRATORY:  No cough.  No wheeze.  No hemoptysis.  No shortness of breath.  CARDIOVASCULAR:  No chest pains.  No palpitations. No shortness of breath, No orthopnea or PND.  GASTROINTESTINAL:  c/o abdominal pain.  No nausea or vomiting.  No diarrhea or constipation.  No hematemesis.  No hematochezia.  No melena.  GENITOURINARY:  No urgency.  No frequency.  No dysuria.  No hematuria.  No obstructive symptoms.  No discharge.  No pain.  No significant abnormal bleeding.  MUSCULOSKELETAL:  No musculoskeletal pain.  No joint swelling.  No arthritis.  NEUROLOGICAL:  No tingling or numbness or weakness.  PSYCHIATRIC:  No confusion  SKIN:  No rashes.  No lesions.  No wounds.  ENDOCRINE:  No unexplained weight loss.  No polydipsia.  No polyuria.  No polyphagia.  HEMATOLOGIC:  No anemia.  No purpura.  No petechiae.  No prolonged or excessive bleeding.   ALLERGIC AND IMMUNOLOGIC:  No pruritus.  No swelling.         Vital Signs Last 24 Hrs  T(C): 36.3 (16 Dec 2017 05:10), Max: 36.8 (15 Dec 2017 10:30)  T(F): 97.3 (16 Dec 2017 05:10), Max: 98.2 (15 Dec 2017 10:30)  HR: 88 (16 Dec 2017 05:10) (74 - 88)  BP: 111/65 (16 Dec 2017 05:10) (109/56 - 128/50)  BP(mean): 74 (16 Dec 2017 05:10) (74 - 74)  RR: 18 (16 Dec 2017 05:10) (18 - 18)  SpO2: 97% (16 Dec 2017 05:10) (96% - 100%)    PHYSICAL EXAM-    Constitutional: The patient appears to be normal, well developed, well nourished and alert and oriented to time, place and person. The patient does not appear acutely ill.     Head: Head is normocephalic and atraumatic.      Neck: The patient's neck is supple without enlargement, has no palpable thyromegaly nor thyroid nodules and has no jugular venous distention. No audible carotid bruits. There are strong carotid pulses bilaterally. No JVD.     Cardiovascular: Regular rate and rhythm without S3, S4. No murmurs or rubs are appreciated.      Respiratory: Crackles b/l L >R.   Abdomen: Soft, nontender, nondistended with positive bowel sounds.      Extremity: No tenderness. No  pitting edema No skin discoloration No clubbing No cyanosis.     Neurologic: The patient is alert and oriented.      Skin: No rash, no obvious lesions noted.      Psychiatric: The patient appears to be emotionally stable.      INTERPRETATION OF TELEMETRY: Atrial fibrillation, 70-90/min.    ECG:    I&O's Detail    15 Dec 2017 07:01  -  16 Dec 2017 06:33  --------------------------------------------------------  IN:    lactated ringers.: 1000 mL  Total IN: 1000 mL    OUT:  Total OUT: 0 mL    Total NET: 1000 mL          LABS:                        10.9   10.8  )-----------( 138      ( 15 Dec 2017 06:00 )             33.5     12-15    135  |  102  |  16  ----------------------------<  98  3.9   |  28  |  0.67    Ca    8.8      15 Dec 2017 06:00  Mg     2.0     12-15    TPro  6.9  /  Alb  2.7<L>  /  TBili  1.0  /  DBili  x   /  AST  20  /  ALT  20  /  AlkPhos  85  12-15            I&O's Summary    15 Dec 2017 07:01  -  16 Dec 2017 06:33  --------------------------------------------------------  IN: 1000 mL / OUT: 0 mL / NET: 1000 mL      BNP  RADIOLOGY & ADDITIONAL STUDIES:

## 2017-12-18 NOTE — PROGRESS NOTE ADULT - SUBJECTIVE AND OBJECTIVE BOX
89year old female with past medical history of CAD s/p stent, Atrial Fibrillation, HFpEF, T2DM, HLD, PHTN, presenting with abdominal pain x 1 day. Abdominal pain started suddenly in AM, located in epigastric area ,  intermittent, dull  became worse after eating this evening  Pain now greatest in LUQ.  Pt with + Belching  no  N/V, fever/chills, diarrhea/constipation. Nl BM in AM. Patient was admitted for presumed drug-induced pancreatitis in May 2017  thought to be related to  Metalozone and Januvia which were discontinued at that time.     12/14: admitting hospitalist admission note reviewed and agreed. Care discussed with Dr. Camacho. effort was made to communicate with patient's son dr. Dill. Patient is agreeable to receive MRCP if given xanax. Denies any HA, CP, SOB. Will closely monitor for any SOB.     12/15: seen and eval. hemodynamically stable. Denies any HA, CP, SOB. Soarness around the abdominal region. Labs and vitals reviewed. more energy at this point wants to get out of the bed and ambulate. lipid panel reviewed. Care discussed with Dr. Dill.     12/16: abdominal pain free. some headache. D/C IV fluids, clear liquids.   12/17: patient has been seen at the bedside. she is abdominal pain free. No N/V/D, but patient does state that she was burping overnight. Patient states that she is hungry. has walked yesterday. labs and vitals reviewed. will cotninue to advance diet. care discussed with Dr. Hwang -  On IV lasix.     12/18: seen and eval. hemodynamically stable. tolerating po diet. aggressive physical therapy.       REVIEW OF SYSTEMS:  General: NAD, hemodynamically stable, (-)  fever, (-) chills, (+) weakness  HEENT:  Eyes:  No visual loss, blurred vision, double vision or yellow sclerae. Ears, Nose, Throat:  No hearing loss, sneezing, congestion, runny nose or sore throat.  SKIN:  No rash or itching.  CARDIOVASCULAR: No chest pain  RESPIRATORY: No SOB  GASTROINTESTINAL: abdomnal pain overnight  NEUROLOGICAL:  No headache, dizziness, syncope, paralysis, ataxia, numbness or tingling in the extremities. No change in bowel or bladder control.  MUSCULOSKELETAL:  No muscle, back pain, joint pain or stiffness.  HEMATOLOGIC:  No anemia, bleeding or bruising.  LYMPHATICS:  No enlarged nodes. No history of splenectomy.  ENDOCRINOLOGIC:  No reports of sweating, cold or heat intolerance. No polyuria or polydipsia.  ALLERGIES:  No history of asthma, hives, eczema or rhinitis.    PE:  GENERAL APPEARANCE:  elderly, deconditioned.   T(C): 36.6 (12-18-17 @ 10:00), Max: 36.6 (12-18-17 @ 10:00)  HR: 81 (12-18-17 @ 10:00) (81 - 81)  BP: 117/58 (12-18-17 @ 10:00) (116/68 - 117/58)  RR: 17 (12-18-17 @ 10:00) (17 - 17)  SpO2: 95% (12-18-17 @ 10:00) (95% - 99%)  HEENT:  Head is normocephalic    Skin:  Warm and dry without any rash   NECK:  Supple without lymphadenopathy.   HEART:  Regular rate and rhythm. normal S1 and S2, No M/R/G  LUNGS:  Good ins/exp effort, no W/R/R/C  ABDOMEN:  Soft, nontender, nondistended with good bowel sounds heard  EXTREMITIES:  Without cyanosis, clubbing or edema.   NEUROLOGICAL:  Gross nonfocal   General: elderly, frail, deconditioned, very nice lady  Heart: S1S2 irregular, rate 80  Lungs: patient has noted crackles b/l basis  ABD: ND, + Hypoactive BS, ND, + mild diffuse tenderness  to palpation greatest in LUQ, no rebound or guarding  EXT: Non tender    Labs:     CBC Full  -  ( 18 Dec 2017 06:48 )  WBC Count : 6.1 K/uL  Hemoglobin : 11.4 g/dL  Hematocrit : 35.0 %  Platelet Count - Automated : 178 K/uL    137  |  101  |  7   ----------------------------<  144<H>  3.8   |  26  |  0.60    Ca    8.8      18 Dec 2017 06:48            CT of the abdomen:   Consistent with acute pancreatitis with extensive   intraperitoneal and retroperitoneal fat stranding.    Diffuse diverticulosis without diverticulitis.    Possible small mesenteric volvulus in the mid lower abdomen.    Cirrhotic liver morphology without interval change.    # Acute Idiopathic Pancreatitis  - regular diet  - MRCP results reviewed, without CBD store  - History of presumed drug-induced pancreatitis secondary to Metalozone / Januvia  - s/p Cholecystectomy / absolutely no ETOH use  - Hold Lexapro, Zocor, and Nexium secondary to possibility of triggering agents  - not on IV fluids anymore since 12/15, started IV lasix 12/17 (care discussed with Dr. Hwang)  - patient now on full liquids    # CAD s/p Stent  - Stable  - Continue ASA, Metoprolol  - Hold Zocor for now    #HFpEF hx of chronic diastolic heart failure  - Cardiac Cath (02/17): Normal LV, EF 65%, CAD, Severe PHTN  - continue with lasix.   - Monitor for volume overload closely   - admit to tele  - cardio consult    #Atrial Fibrillation  - Continue Metoprolol for rate-control  - Continue Eliquis, - [id number]    #T2DM  - Hold oral meds  - Start ISS  - Diabetic Diet    #HLD  - lipid profile reviewed  - Hold Zocor    #Pulmonary Hypertension  - Stable  - Continue Sildenafil

## 2017-12-18 NOTE — PROGRESS NOTE ADULT - PROBLEM SELECTOR PLAN 1
Stable, lipase trending down. No further trending necessary.  IVF discontinued 12/16  -Tolerating  Diabetic/DASH diet.   -Pain management as needed  Pancreatitis resolved

## 2017-12-18 NOTE — PROGRESS NOTE ADULT - SUBJECTIVE AND OBJECTIVE BOX
CHIEF COMPLAINT: Epigastric Pain     SUBJECTIVE: 89 year old female with past medical history of CAD s/p stent, Atrial Fibrillation, HFpEF, T2DM, HLD, PHTN, presenting with abdominal pain x 1 day. Abdominal pain started suddenly in AM, located in epigastric area without radiation, described as dull, with episodes occurring intermittently throughout the day. Associated symptoms include burping. No exposure to new foods, N/V, change in BM. Patient was admitted for presumed drug-induced pancreatitis in May 2017. The offending agents were thought to be Metalozone and Januvia which were discontinued upon discharge. Patient was recently started on Levothyroxine about 3 weeks ago.    In the ED, [place] [address] and CT ABD positive for acute pancreatitis.     12/17: Pt was seen and examined at bedside. She has tolerated clear liquid diet and currently has desire to eat more food. She denies an abdominal pain, nausea, vomiting or changes to her bowels. She reports belching frequently.     12/18: Pt was seen and examined at bedside. She is tolerating full diet. She denies any abdominal pain, nausea, or vomiting. She feels bloated and notices a new right knee pain that began this morning. She noted she had difficulty walking to the bathroom this morning. Pt noted her calf was tender. Dopplers were performed to r/o DVT.    REVIEW OF SYSTEMS:    CONSTITUTIONAL: No weakness, fevers or chills  EYES/ENT: No visual changes;  No vertigo or throat pain   NECK: No pain or stiffness  RESPIRATORY: No cough, wheezing, hemoptysis; No shortness of breath  CARDIOVASCULAR: No chest pain or palpitations  GASTROINTESTINAL: +Belching, bloating; No abdominal or epigastric pain. No nausea, vomiting, or hematemesis; No diarrhea or constipation. No melena or hematochezia.  GENITOURINARY: No dysuria, frequency or hematuria  NEUROLOGICAL: No numbness or weakness  SKIN: No itching, burning, rashes, or lesions   MUSCULOSKELETAL: +Right knee pain  All other review of systems is negative unless indicated above    Vital Signs Last 24 Hrs  Vital Signs Last 24 Hrs  T(C): 37.5 (18 Dec 2017 17:28), Max: 37.5 (18 Dec 2017 17:28)  T(F): 99.5 (18 Dec 2017 17:28), Max: 99.5 (18 Dec 2017 17:28)  HR: 100 (18 Dec 2017 17:28) (81 - 100)  BP: 134/79 (18 Dec 2017 17:28) (116/68 - 142/81)  RR: 17 (18 Dec 2017 17:28) (17 - 17)  SpO2: 96% (18 Dec 2017 17:28) (95% - 99%)        CAPILLARY BLOOD GLUCOSE  POCT Blood Glucose.: 154 mg/dL (16 Dec 2017 23:33)  POCT Blood Glucose.: 270 mg/dL (16 Dec 2017 17:37)      PHYSICAL EXAM:    Constitutional: NAD, awake and alert, well-developed  HEENT: PERR, EOMI, Normal Hearing, MMM  Neck: Soft and supple, No LAD, No JVD  Respiratory: Good air entry; Lungs sound much improved from yesterday  Cardiovascular: S1 and S2, irregular rate and rhythm, no Murmurs, gallops or rubs  Gastrointestinal: Bowel Sounds present, soft, nontender, nondistended, no guarding, no rebound  Extremities: Lower extremities propped on pillow; No pitting edema; +Homans sign  Vascular: 2+ peripheral pulses  Neurological: A/O x 3, no focal deficits  Musculoskeletal: 5/5 strength b/l upper and lower extremities  Skin: No rashes    MEDICATIONS:  MEDICATIONS  (STANDING):  apixaban 5 milliGRAM(s) Oral every 12 hours  aspirin  chewable 81 milliGRAM(s) Oral daily  dextrose 5%. 1000 milliLiter(s) (50 mL/Hr) IV Continuous <Continuous>  dextrose 50% Injectable 12.5 Gram(s) IV Push once  dextrose 50% Injectable 25 Gram(s) IV Push once  dextrose 50% Injectable 25 Gram(s) IV Push once  docusate sodium 100 milliGRAM(s) Oral three times a day  furosemide   Injectable 40 milliGRAM(s) IV Push daily  insulin lispro (HumaLOG) corrective regimen sliding scale   SubCutaneous three times a day before meals  levothyroxine 25 MICROGram(s) Oral daily  metoprolol     tartrate 25 milliGRAM(s) Oral two times a day  sildenafil (REVATIO) 20 milliGRAM(s) Oral two times a day      LABS: All Labs Reviewed:                        11.0   6.8   )-----------( 164      ( 17 Dec 2017 07:15 )             34.4     12-17    136  |  101  |  9   ----------------------------<  124<H>  3.7   |  28  |  0.45<L>    Ca    8.6      17 Dec 2017 07:15  Phos  2.9     12-16  Mg     2.0     12-16            Blood Culture: 12-13 @ 22:15  Organism --  Gram Stain Blood -- Gram Stain --  Specimen Source .Urine None  Culture-Blood --        RADIOLOGY/EKG:    DVT PPX: On Eliquis    ADVANCED DIRECTIVE:    DISPOSITION:

## 2017-12-18 NOTE — PROGRESS NOTE ADULT - PROBLEM SELECTOR PLAN 4
- stable   - cardiac cath in Feb 2017  - normal LV, EF 65%  - IV Lasix 40 daily started 12/17  - Will consider restarting metolazone given that it is unlikely cause of pancreatitis (remote history of using metolazone).  - monitor for volume overload closely  - Strict I &O  - Daily weights  - Additional dose of IV Lasix administered today at 3PM as per Cardio

## 2017-12-19 LAB
ANION GAP SERPL CALC-SCNC: 9 MMOL/L — SIGNIFICANT CHANGE UP (ref 5–17)
BUN SERPL-MCNC: 10 MG/DL — SIGNIFICANT CHANGE UP (ref 7–23)
CALCIUM SERPL-MCNC: 8.8 MG/DL — SIGNIFICANT CHANGE UP (ref 8.5–10.1)
CHLORIDE SERPL-SCNC: 100 MMOL/L — SIGNIFICANT CHANGE UP (ref 96–108)
CO2 SERPL-SCNC: 27 MMOL/L — SIGNIFICANT CHANGE UP (ref 22–31)
CREAT SERPL-MCNC: 0.59 MG/DL — SIGNIFICANT CHANGE UP (ref 0.5–1.3)
GLUCOSE BLDC GLUCOMTR-MCNC: 145 MG/DL — HIGH (ref 70–99)
GLUCOSE BLDC GLUCOMTR-MCNC: 145 MG/DL — HIGH (ref 70–99)
GLUCOSE BLDC GLUCOMTR-MCNC: 185 MG/DL — HIGH (ref 70–99)
GLUCOSE SERPL-MCNC: 137 MG/DL — HIGH (ref 70–99)
HCT VFR BLD CALC: 33.3 % — LOW (ref 34.5–45)
HGB BLD-MCNC: 11.1 G/DL — LOW (ref 11.5–15.5)
LIDOCAIN IGE QN: 455 U/L — HIGH (ref 73–393)
MCHC RBC-ENTMCNC: 30.9 PG — SIGNIFICANT CHANGE UP (ref 27–34)
MCHC RBC-ENTMCNC: 33.2 GM/DL — SIGNIFICANT CHANGE UP (ref 32–36)
MCV RBC AUTO: 93 FL — SIGNIFICANT CHANGE UP (ref 80–100)
PLATELET # BLD AUTO: 182 K/UL — SIGNIFICANT CHANGE UP (ref 150–400)
POTASSIUM SERPL-MCNC: 3.6 MMOL/L — SIGNIFICANT CHANGE UP (ref 3.5–5.3)
POTASSIUM SERPL-SCNC: 3.6 MMOL/L — SIGNIFICANT CHANGE UP (ref 3.5–5.3)
RBC # BLD: 3.58 M/UL — LOW (ref 3.8–5.2)
RBC # FLD: 13.8 % — SIGNIFICANT CHANGE UP (ref 10.3–14.5)
SODIUM SERPL-SCNC: 136 MMOL/L — SIGNIFICANT CHANGE UP (ref 135–145)
WBC # BLD: 6.3 K/UL — SIGNIFICANT CHANGE UP (ref 3.8–10.5)
WBC # FLD AUTO: 6.3 K/UL — SIGNIFICANT CHANGE UP (ref 3.8–10.5)

## 2017-12-19 PROCEDURE — 71010: CPT | Mod: 26

## 2017-12-19 RX ORDER — FUROSEMIDE 40 MG
40 TABLET ORAL ONCE
Qty: 0 | Refills: 0 | Status: COMPLETED | OUTPATIENT
Start: 2017-12-19 | End: 2017-12-19

## 2017-12-19 RX ORDER — ESCITALOPRAM OXALATE 10 MG/1
10 TABLET, FILM COATED ORAL DAILY
Qty: 0 | Refills: 0 | Status: DISCONTINUED | OUTPATIENT
Start: 2017-12-19 | End: 2017-12-23

## 2017-12-19 RX ORDER — ACETAMINOPHEN 500 MG
650 TABLET ORAL ONCE
Qty: 0 | Refills: 0 | Status: COMPLETED | OUTPATIENT
Start: 2017-12-19 | End: 2017-12-19

## 2017-12-19 RX ORDER — POTASSIUM CHLORIDE 20 MEQ
40 PACKET (EA) ORAL EVERY 4 HOURS
Qty: 0 | Refills: 0 | Status: COMPLETED | OUTPATIENT
Start: 2017-12-19 | End: 2017-12-19

## 2017-12-19 RX ADMIN — Medication 25 MILLIGRAM(S): at 06:32

## 2017-12-19 RX ADMIN — APIXABAN 5 MILLIGRAM(S): 2.5 TABLET, FILM COATED ORAL at 06:32

## 2017-12-19 RX ADMIN — Medication 20 MILLIGRAM(S): at 06:32

## 2017-12-19 RX ADMIN — Medication 650 MILLIGRAM(S): at 22:40

## 2017-12-19 RX ADMIN — APIXABAN 5 MILLIGRAM(S): 2.5 TABLET, FILM COATED ORAL at 17:38

## 2017-12-19 RX ADMIN — Medication 1: at 17:41

## 2017-12-19 RX ADMIN — Medication 40 MILLIGRAM(S): at 21:38

## 2017-12-19 RX ADMIN — ESCITALOPRAM OXALATE 10 MILLIGRAM(S): 10 TABLET, FILM COATED ORAL at 17:38

## 2017-12-19 RX ADMIN — Medication 40 MILLIEQUIVALENT(S): at 22:42

## 2017-12-19 RX ADMIN — Medication 25 MILLIGRAM(S): at 17:38

## 2017-12-19 RX ADMIN — Medication 40 MILLIEQUIVALENT(S): at 21:39

## 2017-12-19 RX ADMIN — Medication 81 MILLIGRAM(S): at 11:18

## 2017-12-19 RX ADMIN — Medication 40 MILLIGRAM(S): at 06:31

## 2017-12-19 RX ADMIN — Medication 20 MILLIGRAM(S): at 17:38

## 2017-12-19 RX ADMIN — Medication 100 MILLIGRAM(S): at 21:39

## 2017-12-19 RX ADMIN — Medication 100 MILLIGRAM(S): at 06:32

## 2017-12-19 RX ADMIN — Medication 25 MICROGRAM(S): at 06:32

## 2017-12-19 NOTE — PROGRESS NOTE ADULT - SUBJECTIVE AND OBJECTIVE BOX
CHIEF COMPLAINT: Epigastric Pain     SUBJECTIVE: 89 year old female with past medical history of CAD s/p stent, Atrial Fibrillation, HFpEF, T2DM, HLD, PHTN, presenting with abdominal pain x 1 day. Abdominal pain started suddenly in AM, located in epigastric area without radiation, described as dull, with episodes occurring intermittently throughout the day. Associated symptoms include burping. No exposure to new foods, N/V, change in BM. Patient was admitted for presumed drug-induced pancreatitis in May 2017. The offending agents were thought to be Metalozone and Januvia which were discontinued upon discharge. Patient was recently started on Levothyroxine about 3 weeks ago.    In the ED, [place] be 248[address] and CT ABD positive for acute pancreatitis.     12/17: Pt was seen and examined at bedside. She has tolerated clear liquid diet and currently has desire to eat more food. She denies an abdominal pain, nausea, vomiting or changes to her bowels. She reports belching frequently.     12/18: Pt was seen and examined at bedside. She is tolerating full diet. She denies any abdominal pain, nausea, or vomiting. She feels bloated and notices a new right knee pain that began this morning. She noted she had difficulty walking to the bathroom this morning. Pt noted her calf was tender. Dopplers were performed to r/o DVT.    12/19: Pt was seen and examined at bedside. She  continues to tolerate full diet. She denies any HA, CP, SOB, abdominal pain, nausea or vomiting. The right knee pain is improved but still occurs with ambulation.    REVIEW OF SYSTEMS:    CONSTITUTIONAL: No weakness, No fevers or chills  EYES/ENT: No visual changes;  No vertigo or throat pain   NECK: No pain or stiffness  RESPIRATORY: No cough, wheezing, hemoptysis; No shortness of breath  CARDIOVASCULAR: No chest pain or palpitations  GASTROINTESTINAL: +Belching, bloating; No abdominal or epigastric pain. No nausea, vomiting, or hematemesis; No diarrhea or constipation. No melena or hematochezia.  GENITOURINARY: No dysuria, frequency or hematuria  NEUROLOGICAL: No numbness or weakness  SKIN: No itching, burning, rashes, or lesions   MUSCULOSKELETAL: +Right knee pain  All other review of systems is negative unless indicated above    Vital Signs Last 24 Hrs  Vital Signs Last 24 Hrs  T(C): 36.7 (19 Dec 2017 10:31), Max: 37.5 (18 Dec 2017 17:28)  T(F): 98.1 (19 Dec 2017 10:31), Max: 99.5 (18 Dec 2017 17:28)  HR: 76 (19 Dec 2017 10:31) (76 - 100)  BP: 109/42 (19 Dec 2017 10:31) (105/53 - 142/81)  RR: 18 (19 Dec 2017 10:31) (16 - 18)  SpO2: 99% (19 Dec 2017 10:31) (91% - 99%)      CAPILLARY BLOOD GLUCOSE    POCT Blood Glucose.: 145 mg/dL (19 Dec 2017 11:24)  POCT Blood Glucose.: 145 mg/dL (19 Dec 2017 08:10)  POCT Blood Glucose.: 133 mg/dL (18 Dec 2017 23:09)  POCT Blood Glucose.: 211 mg/dL (18 Dec 2017 18:47)    PHYSICAL EXAM:    Constitutional: NAD, awake and alert, well-developed  HEENT: PERR, EOMI, Normal Hearing, MMM  Neck: Soft and supple, No LAD, No JVD  Respiratory: Good air entry; Lungs sound much improved from yesterday  Cardiovascular: S1 and S2, irregular rate and rhythm, no Murmurs, gallops or rubs  Gastrointestinal: Bowel Sounds present, soft, nontender, nondistended, no guarding, no rebound  Extremities: Lower extremities propped on pillow; No pitting edema  Vascular: 2+ peripheral pulses  Neurological: A/O x 3, no focal deficits  Musculoskeletal: 5/5 strength b/l upper and lower extremities  Skin: No rashes      MEDICATIONS:  MEDICATIONS  (STANDING):  apixaban 5 milliGRAM(s) Oral every 12 hours  aspirin  chewable 81 milliGRAM(s) Oral daily  dextrose 50% Injectable 12.5 Gram(s) IV Push once  dextrose 50% Injectable 25 Gram(s) IV Push once  dextrose 50% Injectable 25 Gram(s) IV Push once  docusate sodium 100 milliGRAM(s) Oral three times a day  escitalopram 10 milliGRAM(s) Oral daily  furosemide   Injectable 40 milliGRAM(s) IV Push daily  insulin lispro (HumaLOG) corrective regimen sliding scale   SubCutaneous three times a day before meals  levothyroxine 25 MICROGram(s) Oral daily  metoprolol     tartrate 25 milliGRAM(s) Oral two times a day  sildenafil (REVATIO) 20 milliGRAM(s) Oral two times a day    MEDICATIONS  (PRN):  acetaminophen   Tablet. 650 milliGRAM(s) Oral every 6 hours PRN Mild Pain (1 - 3)  dextrose Gel 1 Dose(s) Oral once PRN Blood Glucose LESS THAN 70 milliGRAM(s)/deciliter  glucagon  Injectable 1 milliGRAM(s) IntraMuscular once PRN Glucose LESS THAN 70 milligrams/deciliter  melatonin 3 milliGRAM(s) Oral at bedtime PRN Insomnia    LABS: All Labs Reviewed:                        11.1   6.3   )-----------( 182      ( 19 Dec 2017 06:48 )             33.3                        19 Dec 2017 06:48    136    |  100    |  10     ----------------------------<  137    3.6     |  27     |  0.59     Ca    8.8        19 Dec 2017 06:48  Phos  2.9     12-16  Mg     2.0     12-16            Blood Culture: 12-13 @ 22:15  Organism --  Gram Stain Blood -- Gram Stain --  Specimen Source .Urine None  Culture-Blood --        RADIOLOGY/EKG:    DVT PPX: On Eliquis    ADVANCED DIRECTIVE:    DISPOSITION:

## 2017-12-19 NOTE — PROGRESS NOTE ADULT - SUBJECTIVE AND OBJECTIVE BOX
89year old female with past medical history of CAD s/p stent, Atrial Fibrillation, HFpEF, T2DM, HLD, PHTN, presenting with abdominal pain x 1 day. Abdominal pain started suddenly in AM, located in epigastric area ,  intermittent, dull  became worse after eating this evening  Pain now greatest in LUQ.  Pt with + Belching  no  N/V, fever/chills, diarrhea/constipation. Nl BM in AM. Patient was admitted for presumed drug-induced pancreatitis in May 2017  thought to be related to  Metalozone and Januvia which were discontinued at that time.     12/14: admitting hospitalist admission note reviewed and agreed. Care discussed with Dr. Camacho. effort was made to communicate with patient's son dr. Dill. Patient is agreeable to receive MRCP if given xanax. Denies any HA, CP, SOB. Will closely monitor for any SOB.     12/15: seen and eval. hemodynamically stable. Denies any HA, CP, SOB. Soarness around the abdominal region. Labs and vitals reviewed. more energy at this point wants to get out of the bed and ambulate. lipid panel reviewed. Care discussed with Dr. Dill.     12/16: abdominal pain free. some headache. D/C IV fluids, clear liquids.   12/17: patient has been seen at the bedside. she is abdominal pain free. No N/V/D, but patient does state that she was burping overnight. Patient states that she is hungry. has walked yesterday. labs and vitals reviewed. will cotninue to advance diet. care discussed with Dr. Hwang -  On IV lasix.     12/19: seen and eval, hemodynamically stable. Denies any HA, CP, SOB. No fevers, chills or shakes. sitting up. patient is having R knee pain after ambulation today. Care discussed with Dr. Dill.       REVIEW OF SYSTEMS:  General: NAD, hemodynamically stable, (-)  fever, (-) chills, (+) weakness  HEENT:  Eyes:  No visual loss, blurred vision, double vision or yellow sclerae. Ears, Nose, Throat:  No hearing loss, sneezing, congestion, runny nose or sore throat.  SKIN:  No rash or itching.  CARDIOVASCULAR: No chest pain  RESPIRATORY: No SOB  GASTROINTESTINAL: abdomnal pain overnight  NEUROLOGICAL:  No headache, dizziness, syncope, paralysis, ataxia, numbness or tingling in the extremities. No change in bowel or bladder control.  MUSCULOSKELETAL:  No muscle, back pain, joint pain or stiffness.  HEMATOLOGIC:  No anemia, bleeding or bruising.  LYMPHATICS:  No enlarged nodes. No history of splenectomy.  ENDOCRINOLOGIC:  No reports of sweating, cold or heat intolerance. No polyuria or polydipsia.  ALLERGIES:  No history of asthma, hives, eczema or rhinitis.    PE:  GENERAL APPEARANCE:  elderly, deconditioned.   ICU Vital Signs Last 24 Hrs  T(C): 36.7 (19 Dec 2017 10:31), Max: 37.5 (18 Dec 2017 17:28)  T(F): 98.1 (19 Dec 2017 10:31), Max: 99.5 (18 Dec 2017 17:28)  HR: 76 (19 Dec 2017 10:31) (76 - 100)  BP: 109/42 (19 Dec 2017 10:31) (105/53 - 142/81)  RR: 18 (19 Dec 2017 10:31) (16 - 18)  SpO2: 99% (19 Dec 2017 10:31) (91% - 99%)  HEENT:  Head is normocephalic    Skin:  Warm and dry without any rash   NECK:  Supple without lymphadenopathy.   HEART:  Regular rate and rhythm. normal S1 and S2, No M/R/G  LUNGS:  lower base crackles, improved past 2-3 days  ABDOMEN:  Soft, nontender, nondistended with good bowel sounds heard  EXTREMITIES: b/l lower extremity edema  NEUROLOGICAL:  Gross nonfocal   General: elderly, frail, deconditioned, very nice lady  Heart: S1S2 irregular, rate 80  Lungs: patient has noted crackles b/l basis  ABD: ND, + Hypoactive BS, ND, + mild diffuse tenderness  to palpation greatest in LUQ, no rebound or guarding  EXT: Non tender    Labs:       CBC Full  -  ( 19 Dec 2017 06:48 )  WBC Count : 6.3 K/uL  Hemoglobin : 11.1 g/dL  Hematocrit : 33.3 %  Platelet Count - Automated : 182 K/uL    136  |  100  |  10  ----------------------------<  137<H>  3.6   |  27  |  0.59    Ca    8.8      19 Dec 2017 06:48    CT of the abdomen:   Consistent with acute pancreatitis with extensive   intraperitoneal and retroperitoneal fat stranding.    Diffuse diverticulosis without diverticulitis.    Possible small mesenteric volvulus in the mid lower abdomen.    Cirrhotic liver morphology without interval change.    # Acute Idiopathic Pancreatitis  - regular diet  - MRCP results reviewed, without CBD store  - History of presumed drug-induced pancreatitis secondary to Metalozone / Januvia  - s/p Cholecystectomy / absolutely no ETOH use  - Hold Lexapro, Zocor, and Nexium secondary to possibility of triggering agents - will touch base with pharmacy to confirm patient's medications.   - not on IV fluids anymore since 12/15, started IV lasix 12/17 (care discussed with Dr. Hwang)    # CAD s/p Stent  - Stable  - Continue ASA, Metoprolol  - Hold Zocor for now    #HFpEF hx of chronic diastolic heart failure  - Cardiac Cath (02/17): Normal LV, EF 65%, CAD, Severe PHTN  - continue with lasix.   - Monitor for volume overload closely   - cardio consult    #Atrial Fibrillation  - Continue Metoprolol for rate-control  - Continue Eliquis, [id number]    #T2DM  - Hold oral meds  - Start ISS  - Diabetic Diet    #HLD  - lipid profile reviewed  - Hold Zocor    #Pulmonary Hypertension  - Stable  - Continue Sildenafil

## 2017-12-19 NOTE — CHART NOTE - NSCHARTNOTEFT_GEN_A_CORE
Notified by RN re: pt's temp 102F    89 year old female with past medical history of CAD s/p stent, Atrial Fibrillation, HFpEF, T2DM, HLD, PHTN, presenting with abdominal pain x 1 day.  [place] be [address] and CT ABD positive for acute pancreatitis.    Pt seen and examined at bedside. Denies any HA, cough, n/v/d, CP, SOB, pain with urination- although increased freq in urination in the setting of diuretic. States her room was very hot and hence, she believes the fever subsided after shutting off the heater. She has not complaints at this time.    Vital Signs Last 24 Hrs  T(F): 102.1   HR: 85   BP: 124/69   RR: 18   SpO2: 97%     PHYSICAL EXAM:  Constitutional: NAD, awake and alert, well-developed  Respiratory: Bibasilar crackles  Cardiovascular: S1 and S2, regular rate and irregularly irregular rhythm  Gastrointestinal: Bowel Sounds present, soft, nontender, nondistended, no guarding, no rebound    TELE: HR=85; A. Fib    Assessment  89 year old female with past medical history of CAD s/p stent, Atrial Fibrillation, HFpEF, T2DM, HLD, PHTN, presenting with abdominal pain x 1 day. Lipase found to be [address] and CT ABD positive for acute pancreatitis. Now with fever.    Plan:  - Tylenol 640mg   - CBC  - Bcx  - UA  - CXR  - RN aware of plan and will call MD with further concerns    Discussed w/Dr. Beharry-Mohan

## 2017-12-19 NOTE — PROGRESS NOTE ADULT - SUBJECTIVE AND OBJECTIVE BOX
Patient is a 89y old  Female who presents with a chief complaint of diarrhea.      HPI:  89 year old female with past medical history of CAD s/p stent, Atrial Fibrillation, HFpEF, T2DM, HLD, PHTN, presenting with abdominal pain x 1 day. Abdominal pain started suddenly in AM, located in epigastric area without radiation, described as dull, with episodes occurring intermittently throughout the day. Associated symptoms include burping. No exposure to new foods, N/V, change in BM. Patient was admitted for presumed drug-induced pancreatitis in May 2017. The offending agents were thought to be Metalozone and Januvia per primary team.  In the ED, [place] be [address] and CT ABD positive for acute pancreatitis.     Pt was being treated for pancreatitis with IVF and NPO.  Pt denies any CP or SOB at rest.     12/17- Pt seen and examined by me today. Pt denies any SOB at rest.    12/18- pt seen and examined by me today.  Pt denies any CP but last night c/o SOB.   12/19- Pt seen and examined by me today. Pt still c/o SOB and pedal edema.       PAST MEDICAL & SURGICAL HISTORY:  HLD (hyperlipidemia)  Hypothyroid  Afib  CAD (coronary artery disease)  Hypertension  History of Osteoarthritis  History of Atrial Fibrillation  GERD (Gastroesophageal Reflux Disease)  Dyslipidemia  Diabetes Mellitus Type II  History of appendectomy  History of cholecystectomy  H/O: Knee Surgery- right meniscus  H/O: Hysterectomy      MEDICATIONS  (STANDING):  apixaban 5 milliGRAM(s) Oral every 12 hours  aspirin  chewable 81 milliGRAM(s) Oral daily  dextrose 5%. 1000 milliLiter(s) (50 mL/Hr) IV Continuous <Continuous>  dextrose 50% Injectable 12.5 Gram(s) IV Push once  dextrose 50% Injectable 25 Gram(s) IV Push once  dextrose 50% Injectable 25 Gram(s) IV Push once  docusate sodium 100 milliGRAM(s) Oral three times a day  insulin lispro (HumaLOG) corrective regimen sliding scale   SubCutaneous three times a day before meals  lactated ringers. 1000 milliLiter(s) (80 mL/Hr) IV Continuous <Continuous>  levothyroxine 25 MICROGram(s) Oral daily  metoprolol     tartrate 25 milliGRAM(s) Oral two times a day  sildenafil (REVATIO) 20 milliGRAM(s) Oral two times a day    MEDICATIONS  (PRN):  acetaminophen   Tablet. 650 milliGRAM(s) Oral every 6 hours PRN Mild Pain (1 - 3)  dextrose Gel 1 Dose(s) Oral once PRN Blood Glucose LESS THAN 70 milliGRAM(s)/deciliter  glucagon  Injectable 1 milliGRAM(s) IntraMuscular once PRN Glucose LESS THAN 70 milligrams/deciliter      FAMILY HISTORY:  No pertinent family history in first degree relatives      SOCIAL HISTORY:  non smoker, no alcohol use     REVIEW OF SYSTEMS:  CONSTITUTIONAL:  No night sweats.  No fatigue, malaise, lethargy.  No fever or chills.  HEENT:  Eyes:  No visual changes.  No eye pain.      ENT:  No runny nose.  No epistaxis.  No sinus pain.  No sore throat.  No odynophagia.  No ear pain.  No congestion.  RESPIRATORY:  No cough.  No wheeze.  No hemoptysis.  c/o shortness of breath.  CARDIOVASCULAR:  No chest pains.  No palpitations. c/o shortness of breath, No orthopnea or PND.  GASTROINTESTINAL:  no abdominal pain.  No nausea or vomiting.  No diarrhea or constipation.  No hematemesis.  No hematochezia.  No melena.  GENITOURINARY:  No urgency.  No frequency.  No dysuria.  No hematuria.  No obstructive symptoms.  No discharge.  No pain.  No significant abnormal bleeding.  MUSCULOSKELETAL:  No musculoskeletal pain.  No joint swelling.  No arthritis.  NEUROLOGICAL:  No tingling or numbness or weakness.  PSYCHIATRIC:  No confusion  SKIN:  No rashes.  No lesions.  No wounds.  ENDOCRINE:  No unexplained weight loss.  No polydipsia.  No polyuria.  No polyphagia.  HEMATOLOGIC:  No anemia.  No purpura.  No petechiae.  No prolonged or excessive bleeding.   ALLERGIC AND IMMUNOLOGIC:  No pruritus.  No swelling.         Vital Signs Last 24 Hrs  T(C): 36.3 (16 Dec 2017 05:10), Max: 36.8 (15 Dec 2017 10:30)  T(F): 97.3 (16 Dec 2017 05:10), Max: 98.2 (15 Dec 2017 10:30)  HR: 88 (16 Dec 2017 05:10) (74 - 88)  BP: 111/65 (16 Dec 2017 05:10) (109/56 - 128/50)  BP(mean): 74 (16 Dec 2017 05:10) (74 - 74)  RR: 18 (16 Dec 2017 05:10) (18 - 18)  SpO2: 97% (16 Dec 2017 05:10) (96% - 100%)    PHYSICAL EXAM-    Constitutional: The patient appears to be normal, well developed, well nourished and alert and oriented to time, place and person. The patient does not appear acutely ill.     Head: Head is normocephalic and atraumatic.      Neck: The patient's neck is supple without enlargement, has no palpable thyromegaly nor thyroid nodules and has no jugular venous distention. No audible carotid bruits. There are strong carotid pulses bilaterally. No JVD.     Cardiovascular: Regular rate and rhythm without S3, S4. No murmurs or rubs are appreciated.      Respiratory: Crackles b/l L >R.   Abdomen: Soft, nontender, nondistended with positive bowel sounds.      Extremity: No tenderness. No  pitting edema No skin discoloration No clubbing No cyanosis.     Neurologic: The patient is alert and oriented.      Skin: No rash, no obvious lesions noted.      Psychiatric: The patient appears to be emotionally stable.      INTERPRETATION OF TELEMETRY: Atrial fibrillation, 70-90/min.    ECG:    I&O's Detail    15 Dec 2017 07:01  -  16 Dec 2017 06:33  --------------------------------------------------------  IN:    lactated ringers.: 1000 mL  Total IN: 1000 mL    OUT:  Total OUT: 0 mL    Total NET: 1000 mL          LABS:                        10.9   10.8  )-----------( 138      ( 15 Dec 2017 06:00 )             33.5     12-15    135  |  102  |  16  ----------------------------<  98  3.9   |  28  |  0.67    Ca    8.8      15 Dec 2017 06:00  Mg     2.0     12-15    TPro  6.9  /  Alb  2.7<L>  /  TBili  1.0  /  DBili  x   /  AST  20  /  ALT  20  /  AlkPhos  85  12-15            I&O's Summary    15 Dec 2017 07:01  -  16 Dec 2017 06:33  --------------------------------------------------------  IN: 1000 mL / OUT: 0 mL / NET: 1000 mL      BNP  RADIOLOGY & ADDITIONAL STUDIES:

## 2017-12-20 LAB
ANION GAP SERPL CALC-SCNC: 7 MMOL/L — SIGNIFICANT CHANGE UP (ref 5–17)
ANISOCYTOSIS BLD QL: SLIGHT — SIGNIFICANT CHANGE UP
APPEARANCE UR: CLEAR — SIGNIFICANT CHANGE UP
BACTERIA # UR AUTO: (no result)
BASO STIPL BLD QL SMEAR: SLIGHT — SIGNIFICANT CHANGE UP
BILIRUB UR-MCNC: NEGATIVE — SIGNIFICANT CHANGE UP
BUN SERPL-MCNC: 11 MG/DL — SIGNIFICANT CHANGE UP (ref 7–23)
CALCIUM SERPL-MCNC: 8.9 MG/DL — SIGNIFICANT CHANGE UP (ref 8.5–10.1)
CHLORIDE SERPL-SCNC: 101 MMOL/L — SIGNIFICANT CHANGE UP (ref 96–108)
CO2 SERPL-SCNC: 27 MMOL/L — SIGNIFICANT CHANGE UP (ref 22–31)
COLOR SPEC: YELLOW — SIGNIFICANT CHANGE UP
CREAT SERPL-MCNC: 0.51 MG/DL — SIGNIFICANT CHANGE UP (ref 0.5–1.3)
DIFF PNL FLD: (no result)
ELLIPTOCYTES BLD QL SMEAR: SLIGHT — SIGNIFICANT CHANGE UP
EOSINOPHIL NFR BLD AUTO: 3 % — SIGNIFICANT CHANGE UP (ref 0–6)
EPI CELLS # UR: NEGATIVE — SIGNIFICANT CHANGE UP
GIANT PLATELETS BLD QL SMEAR: PRESENT — SIGNIFICANT CHANGE UP
GLUCOSE BLDC GLUCOMTR-MCNC: 127 MG/DL — HIGH (ref 70–99)
GLUCOSE BLDC GLUCOMTR-MCNC: 171 MG/DL — HIGH (ref 70–99)
GLUCOSE BLDC GLUCOMTR-MCNC: 172 MG/DL — HIGH (ref 70–99)
GLUCOSE SERPL-MCNC: 131 MG/DL — HIGH (ref 70–99)
GLUCOSE UR QL: NEGATIVE MG/DL — SIGNIFICANT CHANGE UP
HCT VFR BLD CALC: 32.7 % — LOW (ref 34.5–45)
HCT VFR BLD CALC: 33.4 % — LOW (ref 34.5–45)
HGB BLD-MCNC: 10.6 G/DL — LOW (ref 11.5–15.5)
HGB BLD-MCNC: 10.8 G/DL — LOW (ref 11.5–15.5)
HYPOCHROMIA BLD QL: SLIGHT — SIGNIFICANT CHANGE UP
KETONES UR-MCNC: (no result)
LEUKOCYTE ESTERASE UR-ACNC: (no result)
LYMPHOCYTES # BLD AUTO: 13 % — SIGNIFICANT CHANGE UP (ref 13–44)
MACROCYTES BLD QL: SLIGHT — SIGNIFICANT CHANGE UP
MANUAL DIF COMMENT BLD-IMP: SIGNIFICANT CHANGE UP
MANUAL SMEAR VERIFICATION: SIGNIFICANT CHANGE UP
MCHC RBC-ENTMCNC: 30 PG — SIGNIFICANT CHANGE UP (ref 27–34)
MCHC RBC-ENTMCNC: 30.2 PG — SIGNIFICANT CHANGE UP (ref 27–34)
MCHC RBC-ENTMCNC: 32.4 GM/DL — SIGNIFICANT CHANGE UP (ref 32–36)
MCHC RBC-ENTMCNC: 32.5 GM/DL — SIGNIFICANT CHANGE UP (ref 32–36)
MCV RBC AUTO: 92.3 FL — SIGNIFICANT CHANGE UP (ref 80–100)
MCV RBC AUTO: 93.3 FL — SIGNIFICANT CHANGE UP (ref 80–100)
MONOCYTES NFR BLD AUTO: 15 % — HIGH (ref 2–14)
NEUTROPHILS NFR BLD AUTO: 69 % — SIGNIFICANT CHANGE UP (ref 43–77)
NITRITE UR-MCNC: NEGATIVE — SIGNIFICANT CHANGE UP
OVALOCYTES BLD QL SMEAR: SLIGHT — SIGNIFICANT CHANGE UP
PH UR: 5 — SIGNIFICANT CHANGE UP (ref 5–8)
PLAT MORPH BLD: (no result)
PLATELET # BLD AUTO: 182 K/UL — SIGNIFICANT CHANGE UP (ref 150–400)
PLATELET # BLD AUTO: 187 K/UL — SIGNIFICANT CHANGE UP (ref 150–400)
POIKILOCYTOSIS BLD QL AUTO: SIGNIFICANT CHANGE UP
POLYCHROMASIA BLD QL SMEAR: SIGNIFICANT CHANGE UP
POTASSIUM SERPL-MCNC: 3.7 MMOL/L — SIGNIFICANT CHANGE UP (ref 3.5–5.3)
POTASSIUM SERPL-SCNC: 3.7 MMOL/L — SIGNIFICANT CHANGE UP (ref 3.5–5.3)
PROT UR-MCNC: 100 MG/DL
RBC # BLD: 3.54 M/UL — LOW (ref 3.8–5.2)
RBC # BLD: 3.58 M/UL — LOW (ref 3.8–5.2)
RBC # FLD: 14.2 % — SIGNIFICANT CHANGE UP (ref 10.3–14.5)
RBC # FLD: 14.2 % — SIGNIFICANT CHANGE UP (ref 10.3–14.5)
RBC BLD AUTO: (no result)
RBC CASTS # UR COMP ASSIST: (no result) /HPF (ref 0–4)
ROULEAUX BLD QL SMEAR: PRESENT — SIGNIFICANT CHANGE UP
SODIUM SERPL-SCNC: 135 MMOL/L — SIGNIFICANT CHANGE UP (ref 135–145)
SP GR SPEC: 1.02 — SIGNIFICANT CHANGE UP (ref 1.01–1.02)
TARGETS BLD QL SMEAR: SLIGHT — SIGNIFICANT CHANGE UP
UROBILINOGEN FLD QL: NEGATIVE MG/DL — SIGNIFICANT CHANGE UP
WBC # BLD: 6.2 K/UL — SIGNIFICANT CHANGE UP (ref 3.8–10.5)
WBC # BLD: 7.2 K/UL — SIGNIFICANT CHANGE UP (ref 3.8–10.5)
WBC # FLD AUTO: 6.2 K/UL — SIGNIFICANT CHANGE UP (ref 3.8–10.5)
WBC # FLD AUTO: 7.2 K/UL — SIGNIFICANT CHANGE UP (ref 3.8–10.5)
WBC UR QL: SIGNIFICANT CHANGE UP

## 2017-12-20 PROCEDURE — 74160 CT ABDOMEN W/CONTRAST: CPT | Mod: 26

## 2017-12-20 RX ORDER — POLYETHYLENE GLYCOL 3350 17 G/17G
17 POWDER, FOR SOLUTION ORAL DAILY
Qty: 0 | Refills: 0 | Status: DISCONTINUED | OUTPATIENT
Start: 2017-12-20 | End: 2017-12-23

## 2017-12-20 RX ORDER — LACTULOSE 10 G/15ML
10 SOLUTION ORAL ONCE
Qty: 0 | Refills: 0 | Status: COMPLETED | OUTPATIENT
Start: 2017-12-20 | End: 2017-12-21

## 2017-12-20 RX ORDER — SENNA PLUS 8.6 MG/1
2 TABLET ORAL AT BEDTIME
Qty: 0 | Refills: 0 | Status: DISCONTINUED | OUTPATIENT
Start: 2017-12-20 | End: 2017-12-23

## 2017-12-20 RX ADMIN — Medication 25 MICROGRAM(S): at 05:58

## 2017-12-20 RX ADMIN — Medication 1: at 17:21

## 2017-12-20 RX ADMIN — Medication 100 MILLIGRAM(S): at 12:34

## 2017-12-20 RX ADMIN — Medication 25 MILLIGRAM(S): at 17:22

## 2017-12-20 RX ADMIN — APIXABAN 5 MILLIGRAM(S): 2.5 TABLET, FILM COATED ORAL at 06:00

## 2017-12-20 RX ADMIN — ESCITALOPRAM OXALATE 10 MILLIGRAM(S): 10 TABLET, FILM COATED ORAL at 12:34

## 2017-12-20 RX ADMIN — Medication 40 MILLIGRAM(S): at 05:58

## 2017-12-20 RX ADMIN — Medication 20 MILLIGRAM(S): at 05:58

## 2017-12-20 RX ADMIN — Medication 81 MILLIGRAM(S): at 12:34

## 2017-12-20 RX ADMIN — Medication 25 MILLIGRAM(S): at 05:59

## 2017-12-20 RX ADMIN — APIXABAN 5 MILLIGRAM(S): 2.5 TABLET, FILM COATED ORAL at 17:22

## 2017-12-20 RX ADMIN — Medication 1: at 12:33

## 2017-12-20 RX ADMIN — Medication 20 MILLIGRAM(S): at 17:22

## 2017-12-20 RX ADMIN — Medication 100 MILLIGRAM(S): at 06:00

## 2017-12-20 RX ADMIN — Medication 100 MILLIGRAM(S): at 22:03

## 2017-12-20 RX ADMIN — POLYETHYLENE GLYCOL 3350 17 GRAM(S): 17 POWDER, FOR SOLUTION ORAL at 17:22

## 2017-12-20 RX ADMIN — SENNA PLUS 2 TABLET(S): 8.6 TABLET ORAL at 22:03

## 2017-12-20 NOTE — DIETITIAN INITIAL EVALUATION ADULT. - ENERGY NEEDS
Ht.  64  "        Wt. 79.5  kg       30  BMI                  IBW  54.5 kg               Pt is at   146 %  IBW

## 2017-12-20 NOTE — PROGRESS NOTE ADULT - SUBJECTIVE AND OBJECTIVE BOX
Progress Note  Patient is a 89y old  Female who presents with a chief complaint of diarrhea.    HPI: 89 year old female with past medical history of CAD s/p stent, Atrial Fibrillation, HFpEF, T2DM, HLD, PHTN, presenting with abdominal pain x 1 day. Abdominal pain started suddenly in AM, located in epigastric area without radiation, described as dull, with episodes occurring intermittently throughout the day. Associated symptoms include burping. No exposure to new foods, N/V, change in BM. Patient was admitted for presumed drug-induced pancreatitis in May 2017. The offending agents were thought to be Metalozone and Januvia per primary team.  In the ED, [place] [address] and CT ABD positive for acute pancreatitis. Pt was being treated for pancreatitis with IVF and NPO.    12/20- Pt denies CP. +SOB. +Fevers last pm. Feels weak/tired. Tele- afib 60-90.      PAST MEDICAL & SURGICAL HISTORY:  HLD (hyperlipidemia)  Hypothyroid  Afib  CAD (coronary artery disease)  Hypertension  History of Osteoarthritis  History of Atrial Fibrillation  GERD (Gastroesophageal Reflux Disease)  Dyslipidemia  Diabetes Mellitus Type II  History of appendectomy  History of cholecystectomy  H/O: Knee Surgery- right meniscus  H/O: Hysterectomy      MEDICATIONS  (STANDING):  apixaban 5 milliGRAM(s) Oral every 12 hours  aspirin  chewable 81 milliGRAM(s) Oral daily  dextrose 5%. 1000 milliLiter(s) (50 mL/Hr) IV Continuous <Continuous>  dextrose 50% Injectable 12.5 Gram(s) IV Push once  dextrose 50% Injectable 25 Gram(s) IV Push once  dextrose 50% Injectable 25 Gram(s) IV Push once  docusate sodium 100 milliGRAM(s) Oral three times a day  insulin lispro (HumaLOG) corrective regimen sliding scale   SubCutaneous three times a day before meals  lactated ringers. 1000 milliLiter(s) (80 mL/Hr) IV Continuous <Continuous>  levothyroxine 25 MICROGram(s) Oral daily  metoprolol     tartrate 25 milliGRAM(s) Oral two times a day  sildenafil (REVATIO) 20 milliGRAM(s) Oral two times a day    MEDICATIONS  (PRN):  acetaminophen   Tablet. 650 milliGRAM(s) Oral every 6 hours PRN Mild Pain (1 - 3)  dextrose Gel 1 Dose(s) Oral once PRN Blood Glucose LESS THAN 70 milliGRAM(s)/deciliter  glucagon  Injectable 1 milliGRAM(s) IntraMuscular once PRN Glucose LESS THAN 70 milligrams/deciliter      FAMILY HISTORY:  No pertinent family history in first degree relatives      SOCIAL HISTORY:  non smoker, no alcohol use     REVIEW OF SYSTEMS:  CONSTITUTIONAL:  No night sweats.  No fatigue, malaise, lethargy.  No fever or chills.  HEENT:  Eyes:  No visual changes.  No eye pain.      ENT:  No runny nose.  No epistaxis.  No sinus pain.  No sore throat.  No odynophagia.  No ear pain.  No congestion.  RESPIRATORY:  No cough.  No wheeze.  No hemoptysis.  c/o shortness of breath.  CARDIOVASCULAR:  No chest pains.  No palpitations. c/o shortness of breath, No orthopnea or PND.  GASTROINTESTINAL:  no abdominal pain.  No nausea or vomiting.  No diarrhea or constipation.  No hematemesis.  No hematochezia.  No melena.  GENITOURINARY:  No urgency.  No frequency.  No dysuria.  No hematuria.  No obstructive symptoms.  No discharge.  No pain.  No significant abnormal bleeding.  MUSCULOSKELETAL:  No musculoskeletal pain.  No joint swelling.  No arthritis.  NEUROLOGICAL:  No tingling or numbness or weakness.  PSYCHIATRIC:  No confusion  SKIN:  No rashes.  No lesions.  No wounds.  ENDOCRINE:  No unexplained weight loss.  No polydipsia.  No polyuria.  No polyphagia.  HEMATOLOGIC:  No anemia.  No purpura.  No petechiae.  No prolonged or excessive bleeding.   ALLERGIC AND IMMUNOLOGIC:  No pruritus.  No swelling.         Vital Signs Last 24 Hrs  T(C): 36.3 (16 Dec 2017 05:10), Max: 36.8 (15 Dec 2017 10:30)  T(F): 97.3 (16 Dec 2017 05:10), Max: 98.2 (15 Dec 2017 10:30)  HR: 88 (16 Dec 2017 05:10) (74 - 88)  BP: 111/65 (16 Dec 2017 05:10) (109/56 - 128/50)  BP(mean): 74 (16 Dec 2017 05:10) (74 - 74)  RR: 18 (16 Dec 2017 05:10) (18 - 18)  SpO2: 97% (16 Dec 2017 05:10) (96% - 100%)    PHYSICAL EXAM-    Constitutional: The patient appears to be normal, well developed, well nourished and alert and oriented to time, place and person. The patient does not appear acutely ill.     Head: Head is normocephalic and atraumatic.      Neck: The patient's neck is supple without enlargement, has no palpable thyromegaly nor thyroid nodules and has no jugular venous distention. No audible carotid bruits. There are strong carotid pulses bilaterally. No JVD.     Cardiovascular: Regular rate and rhythm without S3, S4. No murmurs or rubs are appreciated.      Respiratory: Crackles b/l L >R.   Abdomen: Soft, nontender, nondistended with positive bowel sounds.      Extremity: No tenderness. No  pitting edema No skin discoloration No clubbing No cyanosis.     Neurologic: The patient is alert and oriented.      Skin: No rash, no obvious lesions noted.      Psychiatric: The patient appears to be emotionally stable.      INTERPRETATION OF TELEMETRY: Atrial fibrillation, 70-90/min.    ECG:    I&O's Detail    15 Dec 2017 07:01  -  16 Dec 2017 06:33  --------------------------------------------------------  IN:    lactated ringers.: 1000 mL  Total IN: 1000 mL    OUT:  Total OUT: 0 mL    Total NET: 1000 mL          LABS:                        10.9   10.8  )-----------( 138      ( 15 Dec 2017 06:00 )             33.5     12-15    135  |  102  |  16  ----------------------------<  98  3.9   |  28  |  0.67    Ca    8.8      15 Dec 2017 06:00  Mg     2.0     12-15    TPro  6.9  /  Alb  2.7<L>  /  TBili  1.0  /  DBili  x   /  AST  20  /  ALT  20  /  AlkPhos  85  12-15            I&O's Summary    15 Dec 2017 07:01  -  16 Dec 2017 06:33  --------------------------------------------------------  IN: 1000 mL / OUT: 0 mL / NET: 1000 mL      BNP  RADIOLOGY & ADDITIONAL STUDIES:

## 2017-12-20 NOTE — PROGRESS NOTE ADULT - ATTENDING COMMENTS
Patient seen and examined with Dr. Hannah Alberto, Maryann Malagon and Bernabe Little on the Family Medicine Teaching Service.  Agree with history, physical, labs and plan which were reviewed in detail. Patient seen and examined with Dr. Hannah Alberto, Maryann Malagon and Bernabe Little on the Family Medicine Teaching Service.  Agree with history, physical, labs and plan which were reviewed in detail. Spoke with son  Galina at length.

## 2017-12-20 NOTE — DIETITIAN INITIAL EVALUATION ADULT. - OTHER INFO
89yo female from community with PMH of CAD s/p stent, Afib, HF, T2DM, HLD, HTN p/w abdominal pain x 1 day 2.2 drug induced pancreatitis s/p MRCP.  Upon visit, pt with mild temporal muscle wasting likely 2/2 aging.  Pt reports good appetite with good intake PTA likely meeting nutr needs as current recorded wt is same as pt reported normal wt.  Pt follows low sodium, low sugar, low fat diet at home; information on the above diets was reviewed with pt with emphasis on importance of adequate PO intake; pt was receptive and verbalized understanding.  Pt is now tolerating PO diet well with good PO intake; however pt has been mostly NPO/Clear liquid until 12/17 (4 days) of inadequate PO intake.  Pt pending d/c.  Current diet Rx remains appropriate..

## 2017-12-20 NOTE — PROGRESS NOTE ADULT - SUBJECTIVE AND OBJECTIVE BOX
Patient is a 89y old  Female who presents with a chief complaint of diarrhea (14 Dec 2017 10:05)      Subective:  Feels good. Still some LE edema but breathing comfortable. No abdominal ain.    PAST MEDICAL & SURGICAL HISTORY:  HLD (hyperlipidemia)  Hypothyroid  Afib  CAD (coronary artery disease)  Hypertension  History of Osteoarthritis  History of Atrial Fibrillation  GERD (Gastroesophageal Reflux Disease)  Dyslipidemia  Diabetes Mellitus Type II  History of appendectomy  History of cholecystectomy  H/O: Knee Surgery- right meniscus  H/O: Hysterectomy      MEDICATIONS  (STANDING):  apixaban 5 milliGRAM(s) Oral every 12 hours  aspirin  chewable 81 milliGRAM(s) Oral daily  dextrose 50% Injectable 12.5 Gram(s) IV Push once  dextrose 50% Injectable 25 Gram(s) IV Push once  dextrose 50% Injectable 25 Gram(s) IV Push once  docusate sodium 100 milliGRAM(s) Oral three times a day  escitalopram 10 milliGRAM(s) Oral daily  furosemide   Injectable 40 milliGRAM(s) IV Push daily  insulin lispro (HumaLOG) corrective regimen sliding scale   SubCutaneous three times a day before meals  lactulose Syrup 10 Gram(s) Oral once  levothyroxine 25 MICROGram(s) Oral daily  metoprolol     tartrate 25 milliGRAM(s) Oral two times a day  senna 2 Tablet(s) Oral at bedtime  sildenafil (REVATIO) 20 milliGRAM(s) Oral two times a day    MEDICATIONS  (PRN):  acetaminophen   Tablet. 650 milliGRAM(s) Oral every 6 hours PRN Mild Pain (1 - 3)  dextrose Gel 1 Dose(s) Oral once PRN Blood Glucose LESS THAN 70 milliGRAM(s)/deciliter  glucagon  Injectable 1 milliGRAM(s) IntraMuscular once PRN Glucose LESS THAN 70 milligrams/deciliter  melatonin 3 milliGRAM(s) Oral at bedtime PRN Insomnia  polyethylene glycol 3350 17 Gram(s) Oral daily PRN Constipation      REVIEW OF SYSTEMS:    RESPIRATORY: No shortness of breath  CARDIOVASCULAR: No chest pain  All other review of systems is negative unless indicated above.    Vital Signs Last 24 Hrs  T(C): 36.4 (20 Dec 2017 09:36), Max: 38.9 (19 Dec 2017 22:28)  T(F): 97.5 (20 Dec 2017 09:36), Max: 102.1 (19 Dec 2017 22:28)  HR: 84 (20 Dec 2017 09:36) (70 - 85)  BP: 124/54 (20 Dec 2017 09:36) (116/63 - 124/69)  BP(mean): 76 (20 Dec 2017 04:45) (76 - 76)  RR: 18 (20 Dec 2017 09:36) (18 - 18)  SpO2: 97% (20 Dec 2017 09:36) (96% - 97%)    PHYSICAL EXAM:    Constitutional: NAD, well-developed  Respiratory: CTAB  Cardiovascular: S1 and S2, RRR  Gastrointestinal: BS+, soft, NT/ND  Psychiatric: Normal mood, normal affect    LABS:                        10.8   6.2   )-----------( 182      ( 20 Dec 2017 07:01 )             33.4     12-20    135  |  101  |  11  ----------------------------<  131<H>  3.7   |  27  |  0.51    Ca    8.9      20 Dec 2017 07:01            RADIOLOGY & ADDITIONAL STUDIES:

## 2017-12-20 NOTE — PROGRESS NOTE ADULT - SUBJECTIVE AND OBJECTIVE BOX
CHIEF COMPLAINT: Epigastric Pain     SUBJECTIVE: 89 year old female with past medical history of CAD s/p stent, Atrial Fibrillation, HFpEF, T2DM, HLD, PHTN, presenting with abdominal pain x 1 day. Abdominal pain started suddenly in AM, located in epigastric area without radiation, described as dull, with episodes occurring intermittently throughout the day. Associated symptoms include burping. No exposure to new foods, N/V, change in BM. Patient was admitted for presumed drug-induced pancreatitis in May 2017. The offending agents were thought to be Metalozone and Januvia which were discontinued upon discharge. Patient was recently started on Levothyroxine about 3 weeks ago.    In the ED, [place] be [address] and CT ABD positive for acute pancreatitis.     : Pt was seen and examined at bedside. She has tolerated clear liquid diet and currently has desire to eat more food. She denies an abdominal pain, nausea, vomiting or changes to her bowels. She reports belching frequently.     : Pt was seen and examined at bedside. She is tolerating full diet. She denies any abdominal pain, nausea, or vomiting. She feels bloated and notices a new right knee pain that began this morning. She noted she had difficulty walking to the bathroom this morning. Pt noted her calf was tender. Dopplers were performed to r/o DVT.    : Pt was seen and examined at bedside. She  continues to tolerate full diet. She denies any HA, CP, SOB, abdominal pain, nausea or vomiting. The right knee pain is improved but still occurs with ambulation.    : Pt was seen and examined at bedside. She spiked a fever to 102 overnight.  CT Abd/Pelvis was performed this morning to rule out abscesses given her recent history of pancreatitis. She continues to tolerate full diet. Pt reports feeling weak. She denies any HA, CP, SOB, abdominal pain, nausea or vomiting. The right knee pain is improved but still occurs with ambulation.      REVIEW OF SYSTEMS:    CONSTITUTIONAL: +Weakness, No fevers or chills  EYES/ENT: No visual changes;  No vertigo or throat pain   NECK: No pain or stiffness  RESPIRATORY: No cough, wheezing, hemoptysis; No shortness of breath  CARDIOVASCULAR: No chest pain or palpitations  GASTROINTESTINAL: +Belching, bloating; No abdominal or epigastric pain. No nausea, vomiting, or hematemesis; No diarrhea or constipation. No melena or hematochezia.  GENITOURINARY: No dysuria, frequency or hematuria  NEUROLOGICAL: No numbness or weakness  SKIN: No itching, burning, rashes, or lesions   MUSCULOSKELETAL: +Right knee pain  All other review of systems is negative unless indicated above    Vital Signs Last 24 Hrs  Vital Signs Last 24 Hrs  T(C): 36.4 (20 Dec 2017 09:36), Max: 38.9 (19 Dec 2017 22:28)  T(F): 97.5 (20 Dec 2017 09:36), Max: 102.1 (19 Dec 2017 22:28)  HR: 84 (20 Dec 2017 09:36) (70 - 85)  BP: 124/54 (20 Dec 2017 09:36) (116/63 - 124/69)  BP(mean): 76 (20 Dec 2017 04:45) (76 - 76)  RR: 18 (20 Dec 2017 09:36) (18 - 18)  SpO2: 97% (20 Dec 2017 09:36) (96% - 97%)  CAPILLARY BLOOD GLUCOSE  POCT Blood Glucose.: 171 mg/dL (20 Dec 2017 12:17)  POCT Blood Glucose.: 127 mg/dL (20 Dec 2017 08:38)  POCT Blood Glucose.: 185 mg/dL (19 Dec 2017 17:35)    PHYSICAL EXAM:    Constitutional: NAD, awake and alert, well-developed  HEENT: PERR, EOMI, Normal Hearing, MMM  Neck: Soft and supple, No LAD, No JVD  Respiratory: Good air entry; bibasilar crackles at lung bases bu lungs sound much improved from yesterday  Cardiovascular: S1 and S2, irregular rate and rhythm, no Murmurs, gallops or rubs  Gastrointestinal: Bowel Sounds present, soft, nontender, nondistended, no guarding, no rebound  Extremities: Lower extremities propped on pillow; No pitting edema  Vascular: 2+ peripheral pulses  Neurological: A/O x 3, no focal deficits  Musculoskeletal: 5/5 strength b/l upper and lower extremities  Skin: No rashes      MEDICATIONS:  MEDICATIONS  (STANDING):  apixaban 5 milliGRAM(s) Oral every 12 hours  aspirin  chewable 81 milliGRAM(s) Oral daily  dextrose 50% Injectable 12.5 Gram(s) IV Push once  dextrose 50% Injectable 25 Gram(s) IV Push once  dextrose 50% Injectable 25 Gram(s) IV Push once  docusate sodium 100 milliGRAM(s) Oral three times a day  escitalopram 10 milliGRAM(s) Oral daily  furosemide   Injectable 40 milliGRAM(s) IV Push daily  insulin lispro (HumaLOG) corrective regimen sliding scale   SubCutaneous three times a day before meals  levothyroxine 25 MICROGram(s) Oral daily  metoprolol     tartrate 25 milliGRAM(s) Oral two times a day  sildenafil (REVATIO) 20 milliGRAM(s) Oral two times a day    MEDICATIONS  (PRN):  acetaminophen   Tablet. 650 milliGRAM(s) Oral every 6 hours PRN Mild Pain (1 - 3)  dextrose Gel 1 Dose(s) Oral once PRN Blood Glucose LESS THAN 70 milliGRAM(s)/deciliter  glucagon  Injectable 1 milliGRAM(s) IntraMuscular once PRN Glucose LESS THAN 70 milligrams/deciliter  melatonin 3 milliGRAM(s) Oral at bedtime PRN Insomnia    LABS: All Labs Reviewed:                                   10.8   6.2   )-----------( 182      ( 20 Dec 2017 07:01 )             33.4     20 Dec 2017 07:01    135    |  101    |  11     ----------------------------<  131    3.7     |  27     |  0.51     Ca    8.9        20 Dec 2017 07:01          Urinalysis Basic - ( 19 Dec 2017 06:20 )    Color: Yellow / Appearance: Clear / S.025 / pH: x  Gluc: x / Ketone: Trace  / Bili: Negative / Urobili: Negative mg/dL   Blood: x / Protein: 100 mg/dL / Nitrite: Negative   Leuk Esterase: Small / RBC: 6-10 /HPF / WBC 0-2   Sq Epi: x / Non Sq Epi: Negative / Bacteria: Occasional                      Blood Culture: 12-13 @ 22:15  Organism --  Gram Stain Blood -- Gram Stain --  Specimen Source .Urine None  Culture-Blood --        RADIOLOGY/EKG:    DVT PPX: On Eliquis    ADVANCED DIRECTIVE:    DISPOSITION:

## 2017-12-21 LAB
ANION GAP SERPL CALC-SCNC: 8 MMOL/L — SIGNIFICANT CHANGE UP (ref 5–17)
BUN SERPL-MCNC: 9 MG/DL — SIGNIFICANT CHANGE UP (ref 7–23)
CALCIUM SERPL-MCNC: 8.7 MG/DL — SIGNIFICANT CHANGE UP (ref 8.5–10.1)
CHLORIDE SERPL-SCNC: 100 MMOL/L — SIGNIFICANT CHANGE UP (ref 96–108)
CO2 SERPL-SCNC: 27 MMOL/L — SIGNIFICANT CHANGE UP (ref 22–31)
CREAT SERPL-MCNC: 0.66 MG/DL — SIGNIFICANT CHANGE UP (ref 0.5–1.3)
GLUCOSE BLDC GLUCOMTR-MCNC: 160 MG/DL — HIGH (ref 70–99)
GLUCOSE BLDC GLUCOMTR-MCNC: 164 MG/DL — HIGH (ref 70–99)
GLUCOSE BLDC GLUCOMTR-MCNC: 166 MG/DL — HIGH (ref 70–99)
GLUCOSE BLDC GLUCOMTR-MCNC: 236 MG/DL — HIGH (ref 70–99)
GLUCOSE SERPL-MCNC: 148 MG/DL — HIGH (ref 70–99)
HCT VFR BLD CALC: 35.1 % — SIGNIFICANT CHANGE UP (ref 34.5–45)
HGB BLD-MCNC: 11.3 G/DL — LOW (ref 11.5–15.5)
MCHC RBC-ENTMCNC: 30.2 PG — SIGNIFICANT CHANGE UP (ref 27–34)
MCHC RBC-ENTMCNC: 32.3 GM/DL — SIGNIFICANT CHANGE UP (ref 32–36)
MCV RBC AUTO: 93.4 FL — SIGNIFICANT CHANGE UP (ref 80–100)
PLATELET # BLD AUTO: 230 K/UL — SIGNIFICANT CHANGE UP (ref 150–400)
POTASSIUM SERPL-MCNC: 3.8 MMOL/L — SIGNIFICANT CHANGE UP (ref 3.5–5.3)
POTASSIUM SERPL-SCNC: 3.8 MMOL/L — SIGNIFICANT CHANGE UP (ref 3.5–5.3)
RBC # BLD: 3.75 M/UL — LOW (ref 3.8–5.2)
RBC # FLD: 14.2 % — SIGNIFICANT CHANGE UP (ref 10.3–14.5)
SODIUM SERPL-SCNC: 135 MMOL/L — SIGNIFICANT CHANGE UP (ref 135–145)
WBC # BLD: 6.5 K/UL — SIGNIFICANT CHANGE UP (ref 3.8–10.5)
WBC # FLD AUTO: 6.5 K/UL — SIGNIFICANT CHANGE UP (ref 3.8–10.5)

## 2017-12-21 RX ORDER — POTASSIUM CHLORIDE 20 MEQ
40 PACKET (EA) ORAL
Qty: 0 | Refills: 0 | Status: COMPLETED | OUTPATIENT
Start: 2017-12-21 | End: 2017-12-22

## 2017-12-21 RX ORDER — FUROSEMIDE 40 MG
40 TABLET ORAL
Qty: 0 | Refills: 0 | Status: DISCONTINUED | OUTPATIENT
Start: 2017-12-21 | End: 2017-12-22

## 2017-12-21 RX ADMIN — Medication 25 MICROGRAM(S): at 05:18

## 2017-12-21 RX ADMIN — ESCITALOPRAM OXALATE 10 MILLIGRAM(S): 10 TABLET, FILM COATED ORAL at 12:11

## 2017-12-21 RX ADMIN — Medication 40 MILLIGRAM(S): at 05:17

## 2017-12-21 RX ADMIN — LACTULOSE 10 GRAM(S): 10 SOLUTION ORAL at 05:17

## 2017-12-21 RX ADMIN — Medication 4: at 12:10

## 2017-12-21 RX ADMIN — Medication 40 MILLIEQUIVALENT(S): at 17:06

## 2017-12-21 RX ADMIN — Medication 20 MILLIGRAM(S): at 05:18

## 2017-12-21 RX ADMIN — Medication 100 MILLIGRAM(S): at 21:47

## 2017-12-21 RX ADMIN — Medication 3 MILLIGRAM(S): at 01:21

## 2017-12-21 RX ADMIN — SENNA PLUS 2 TABLET(S): 8.6 TABLET ORAL at 21:47

## 2017-12-21 RX ADMIN — APIXABAN 5 MILLIGRAM(S): 2.5 TABLET, FILM COATED ORAL at 05:19

## 2017-12-21 RX ADMIN — Medication 20 MILLIGRAM(S): at 17:05

## 2017-12-21 RX ADMIN — APIXABAN 5 MILLIGRAM(S): 2.5 TABLET, FILM COATED ORAL at 17:05

## 2017-12-21 RX ADMIN — Medication 1: at 17:04

## 2017-12-21 RX ADMIN — Medication 40 MILLIGRAM(S): at 17:06

## 2017-12-21 RX ADMIN — Medication 81 MILLIGRAM(S): at 12:11

## 2017-12-21 RX ADMIN — Medication 25 MILLIGRAM(S): at 05:19

## 2017-12-21 RX ADMIN — Medication 100 MILLIGRAM(S): at 12:11

## 2017-12-21 RX ADMIN — Medication 25 MILLIGRAM(S): at 17:06

## 2017-12-21 RX ADMIN — Medication 100 MILLIGRAM(S): at 05:18

## 2017-12-21 RX ADMIN — Medication 3: at 09:09

## 2017-12-21 NOTE — PROGRESS NOTE ADULT - PROBLEM SELECTOR PLAN 1
Stable, lipase trending down. No further trending necessary.  IVF discontinued 12/16  -Tolerating  Diabetic/DASH diet.   - Pain management as needed  Pancreatitis resolved

## 2017-12-21 NOTE — PROGRESS NOTE ADULT - SUBJECTIVE AND OBJECTIVE BOX
CHIEF COMPLAINT: Epigastric Pain     SUBJECTIVE: 89 year old female with past medical history of CAD s/p stent, Atrial Fibrillation, HFpEF, T2DM, HLD, PHTN, presenting with abdominal pain x 1 day. Abdominal pain started suddenly in AM, located in epigastric area without radiation, described as dull, with episodes occurring intermittently throughout the day. Associated symptoms include burping. No exposure to new foods, N/V, change in BM. Patient was admitted for presumed drug-induced pancreatitis in May 2017. The offending agents were thought to be Metalozone and Januvia which were discontinued upon discharge. Patient was recently started on Levothyroxine about 3 weeks ago.    In the ED, [place] be [address] and CT ABD positive for acute pancreatitis.     : Pt was seen and examined at bedside. She has tolerated clear liquid diet and currently has desire to eat more food. She denies an abdominal pain, nausea, vomiting or changes to her bowels. She reports belching frequently.     : Pt was seen and examined at bedside. She is tolerating full diet. She denies any abdominal pain, nausea, or vomiting. She feels bloated and notices a new right knee pain that began this morning. She noted she had difficulty walking to the bathroom this morning. Pt noted her calf was tender. Dopplers were performed to r/o DVT.    : Pt was seen and examined at bedside. She  continues to tolerate full diet. She denies any HA, CP, SOB, abdominal pain, nausea or vomiting. The right knee pain is improved but still occurs with ambulation.    : Pt was seen and examined at bedside. She spiked a fever to 102 overnight.  CT Abd/Pelvis was performed this morning to rule out abscesses given her recent history of pancreatitis. She continues to tolerate full diet. Pt reports feeling weak. She denies any HA, CP, SOB, abdominal pain, nausea or vomiting. The right knee pain is improved but still occurs with ambulation.    : Pt was seen and examined at bedside. Her breathing has improved. She remained afebrile overnight. CT Abd/Pelvis showed complete resolution of acute pancreatitis. She continues to tolerate full diet. She still feels weak. She denies any HA, CP, abdominal pain, nausea or vomiting. She had a normal bowel movement yesterday.      REVIEW OF SYSTEMS:    CONSTITUTIONAL: +Weakness, No fevers or chills  EYES/ENT: No visual changes;  No vertigo or throat pain   NECK: No pain or stiffness  RESPIRATORY: No cough, wheezing, hemoptysis; No shortness of breath  CARDIOVASCULAR: No chest pain or palpitations  GASTROINTESTINAL: +Belching, bloating; No abdominal or epigastric pain. No nausea, vomiting, or hematemesis; No diarrhea or constipation. No melena or hematochezia.  GENITOURINARY: No dysuria, frequency or hematuria  NEUROLOGICAL: No numbness or weakness  SKIN: No itching, burning, rashes, or lesions   MUSCULOSKELETAL: +Right knee pain  All other review of systems is negative unless indicated above    Vital Signs Last 24 Hrs  Vital Signs Last 24 Hrs  T(C): 36.4 (21 Dec 2017 09:50), Max: 37.1 (20 Dec 2017 21:02)  T(F): 97.6 (21 Dec 2017 09:50), Max: 98.7 (20 Dec 2017 21:02)  HR: 75 (21 Dec 2017 09:50) (72 - 89)  BP: 112/57 (21 Dec 2017 05:03) (112/57 - 120/73)  BP(mean): 69 (21 Dec 2017 05:03) (69 - 69)  RR: 18 (21 Dec 2017 05:03) (18 - 18)  SpO2: 93% (21 Dec 2017 05:03) (93% - 94%)    CAPILLARY BLOOD GLUCOSE  POCT Blood Glucose.: 160 mg/dL (21 Dec 2017 07:58)  POCT Blood Glucose.: 172 mg/dL (20 Dec 2017 17:10)  POCT Blood Glucose.: 171 mg/dL (20 Dec 2017 12:17)    PHYSICAL EXAM:    Constitutional: NAD, awake and alert, well-developed  HEENT: PERR, EOMI, Normal Hearing, MMM  Neck: Soft and supple, No LAD, No JVD  Respiratory: Good air entry; bibasilar crackles at lung bases but lungs sounds much improved from yesterday  Cardiovascular: S1 and S2, irregular rate and rhythm, no Murmurs, gallops or rubs  Gastrointestinal: Bowel Sounds present, soft, nontender, nondistended, no guarding, no rebound  Extremities: Lower extremities propped on pillow; 1+pitting edema  Vascular: 2+ peripheral pulses  Neurological: A/O x 3, no focal deficits  Musculoskeletal: 5/5 strength b/l upper and lower extremities  Skin: No rashes      MEDICATIONS:  MEDICATIONS  (STANDING):  apixaban 5 milliGRAM(s) Oral every 12 hours  aspirin  chewable 81 milliGRAM(s) Oral daily  dextrose 50% Injectable 12.5 Gram(s) IV Push once  dextrose 50% Injectable 25 Gram(s) IV Push once  dextrose 50% Injectable 25 Gram(s) IV Push once  docusate sodium 100 milliGRAM(s) Oral three times a day  escitalopram 10 milliGRAM(s) Oral daily  furosemide   Injectable 40 milliGRAM(s) IV Push two times a day  insulin lispro (HumaLOG) corrective regimen sliding scale   SubCutaneous three times a day before meals  levothyroxine 25 MICROGram(s) Oral daily  metoprolol     tartrate 25 milliGRAM(s) Oral two times a day  potassium chloride    Tablet ER 40 milliEquivalent(s) Oral two times a day  senna 2 Tablet(s) Oral at bedtime  sildenafil (REVATIO) 20 milliGRAM(s) Oral two times a day    MEDICATIONS  (PRN):  acetaminophen   Tablet. 650 milliGRAM(s) Oral every 6 hours PRN Mild Pain (1 - 3)  dextrose Gel 1 Dose(s) Oral once PRN Blood Glucose LESS THAN 70 milliGRAM(s)/deciliter  glucagon  Injectable 1 milliGRAM(s) IntraMuscular once PRN Glucose LESS THAN 70 milligrams/deciliter  melatonin 3 milliGRAM(s) Oral at bedtime PRN Insomnia  polyethylene glycol 3350 17 Gram(s) Oral daily PRN Constipation    LABS: All Labs Reviewed:                          11.3   6.5   )-----------( 230      ( 21 Dec 2017 06:42 )             35.1     21 Dec 2017 06:42    135    |  100    |  9      ----------------------------<  148    3.8     |  27     |  0.66     Ca    8.7        21 Dec 2017 06:42      Urinalysis Basic - ( 19 Dec 2017 06:20 )    Color: Yellow / Appearance: Clear / S.025 / pH: x  Gluc: x / Ketone: Trace  / Bili: Negative / Urobili: Negative mg/dL   Blood: x / Protein: 100 mg/dL / Nitrite: Negative   Leuk Esterase: Small / RBC: 6-10 /HPF / WBC 0-2   Sq Epi: x / Non Sq Epi: Negative / Bacteria: Occasional      Blood Culture: - @ 22:15  Organism --  Gram Stain Blood -- Gram Stain --  Specimen Source .Urine None  Culture-Blood --        RADIOLOGY/EKG: < from: CT Abdomen w/ IV Cont (17 @ 11:34) >  IMPRESSION:   Resolution of acute pancreatitis with no residual stranding or fluid   visualized.    Cirrhosis.    DVT PPX: On Eliquis    ADVANCED DIRECTIVE:    DISPOSITION:

## 2017-12-21 NOTE — PROGRESS NOTE ADULT - PROBLEM SELECTOR PLAN 4
- stable   - cardiac cath in Feb 2017  - normal LV, EF 65%  - IV Lasix 40 daily started 12/17  - Will consider restarting metolazone given that it is unlikely cause of pancreatitis (remote history of using metolazone).  - monitor for volume overload closely  - Strict I &O  - Daily weights - stable   - cardiac cath in Feb 2017  - normal LV, EF 65%  - IV Lasix 40 BID  - monitor for volume overload closely  - Strict I &O  - Daily weights  - Fluid restriction  - Low sodium diet

## 2017-12-21 NOTE — PROGRESS NOTE ADULT - ATTENDING COMMENTS
Patient seen and examined with Dr. Hannah Alberto, Maryann Malagon and Bernabe Little on the Family Medicine Teaching Service.  Agree with history, physical, labs and plan which were reviewed in detail.

## 2017-12-21 NOTE — PROGRESS NOTE ADULT - SUBJECTIVE AND OBJECTIVE BOX
Patient is a 89y old  Female who presents with a chief complaint of diarrhea.      HPI:  89 year old female with past medical history of CAD s/p stent, Atrial Fibrillation, HFpEF, T2DM, HLD, PHTN, presenting with abdominal pain x 1 day. Abdominal pain started suddenly in AM, located in epigastric area without radiation, described as dull, with episodes occurring intermittently throughout the day. Associated symptoms include burping. No exposure to new foods, N/V, change in BM. Patient was admitted for presumed drug-induced pancreatitis in May 2017. The offending agents were thought to be Metalozone and Januvia per primary team.  In the ED, [place] [address]77 and CT ABD positive for acute pancreatitis.     Pt was being treated for pancreatitis with IVF and NPO.  Pt denies any CP or SOB at rest.     12/17- Pt seen and examined by me today. Pt denies any SOB at rest.    12/18- pt seen and examined by me today.  Pt denies any CP but last night c/o SOB.   12/19- Pt seen and examined by me today. Pt still c/o SOB and pedal edema.   12/21- Pt seen and examined by me today. Still c/o pedal edema.       PAST MEDICAL & SURGICAL HISTORY:  HLD (hyperlipidemia)  Hypothyroid  Afib  CAD (coronary artery disease)  Hypertension  History of Osteoarthritis  History of Atrial Fibrillation  GERD (Gastroesophageal Reflux Disease)  Dyslipidemia  Diabetes Mellitus Type II  History of appendectomy  History of cholecystectomy  H/O: Knee Surgery- right meniscus  H/O: Hysterectomy      MEDICATIONS  (STANDING):  apixaban 5 milliGRAM(s) Oral every 12 hours  aspirin  chewable 81 milliGRAM(s) Oral daily  dextrose 5%. 1000 milliLiter(s) (50 mL/Hr) IV Continuous <Continuous>  dextrose 50% Injectable 12.5 Gram(s) IV Push once  dextrose 50% Injectable 25 Gram(s) IV Push once  dextrose 50% Injectable 25 Gram(s) IV Push once  docusate sodium 100 milliGRAM(s) Oral three times a day  insulin lispro (HumaLOG) corrective regimen sliding scale   SubCutaneous three times a day before meals  lactated ringers. 1000 milliLiter(s) (80 mL/Hr) IV Continuous <Continuous>  levothyroxine 25 MICROGram(s) Oral daily  metoprolol     tartrate 25 milliGRAM(s) Oral two times a day  sildenafil (REVATIO) 20 milliGRAM(s) Oral two times a day    MEDICATIONS  (PRN):  acetaminophen   Tablet. 650 milliGRAM(s) Oral every 6 hours PRN Mild Pain (1 - 3)  dextrose Gel 1 Dose(s) Oral once PRN Blood Glucose LESS THAN 70 milliGRAM(s)/deciliter  glucagon  Injectable 1 milliGRAM(s) IntraMuscular once PRN Glucose LESS THAN 70 milligrams/deciliter      FAMILY HISTORY:  No pertinent family history in first degree relatives      SOCIAL HISTORY:  non smoker, no alcohol use     REVIEW OF SYSTEMS:  CONSTITUTIONAL:  No night sweats.  No fatigue, malaise, lethargy.  No fever or chills.  HEENT:  Eyes:  No visual changes.  No eye pain.      ENT:  No runny nose.  No epistaxis.  No sinus pain.  No sore throat.  No odynophagia.  No ear pain.  No congestion.  RESPIRATORY:  No cough.  No wheeze.  No hemoptysis.  c/o shortness of breath.  CARDIOVASCULAR:  No chest pains.  No palpitations. c/o shortness of breath, No orthopnea or PND. c/o pedal edema  GASTROINTESTINAL:  no abdominal pain.  No nausea or vomiting.  No diarrhea or constipation.  No hematemesis.  No hematochezia.  No melena.  GENITOURINARY:  No urgency.  No frequency.  No dysuria.  No hematuria.  No obstructive symptoms.  No discharge.  No pain.  No significant abnormal bleeding.  MUSCULOSKELETAL:  No musculoskeletal pain.  No joint swelling.  No arthritis.  NEUROLOGICAL:  No tingling or numbness or weakness.  PSYCHIATRIC:  No confusion  SKIN:  No rashes.  No lesions.  No wounds.  ENDOCRINE:  No unexplained weight loss.  No polydipsia.  No polyuria.  No polyphagia.  HEMATOLOGIC:  No anemia.  No purpura.  No petechiae.  No prolonged or excessive bleeding.   ALLERGIC AND IMMUNOLOGIC:  No pruritus.  No swelling.         Vital Signs Last 24 Hrs  T(C): 36.3 (16 Dec 2017 05:10), Max: 36.8 (15 Dec 2017 10:30)  T(F): 97.3 (16 Dec 2017 05:10), Max: 98.2 (15 Dec 2017 10:30)  HR: 88 (16 Dec 2017 05:10) (74 - 88)  BP: 111/65 (16 Dec 2017 05:10) (109/56 - 128/50)  BP(mean): 74 (16 Dec 2017 05:10) (74 - 74)  RR: 18 (16 Dec 2017 05:10) (18 - 18)  SpO2: 97% (16 Dec 2017 05:10) (96% - 100%)    PHYSICAL EXAM-    Constitutional: The patient appears to be normal, well developed, well nourished and alert and oriented to time, place and person. The patient does not appear acutely ill.     Head: Head is normocephalic and atraumatic.      Neck: The patient's neck is supple without enlargement, has no palpable thyromegaly nor thyroid nodules and has no jugular venous distention. No audible carotid bruits. There are strong carotid pulses bilaterally. positive hepatojugular reflux.     Cardiovascular: irregular rate and rhythm without S3, S4. No murmurs or rubs are appreciated.      Respiratory: Crackles b/l basal  Abdomen: Soft, nontender, nondistended with positive bowel sounds.      Extremity: No tenderness. 1+pedal pitting  edema b/l . No skin discoloration No clubbing No cyanosis.     Neurologic: The patient is alert and oriented.      Skin: No rash, no obvious lesions noted.      Psychiatric: The patient appears to be emotionally stable.      INTERPRETATION OF TELEMETRY: Atrial fibrillation, 70-90/min.    ECG:    I&O's Detail    15 Dec 2017 07:01  -  16 Dec 2017 06:33  --------------------------------------------------------  IN:    lactated ringers.: 1000 mL  Total IN: 1000 mL    OUT:  Total OUT: 0 mL    Total NET: 1000 mL          LABS:                        10.9   10.8  )-----------( 138      ( 15 Dec 2017 06:00 )             33.5     12-15    135  |  102  |  16  ----------------------------<  98  3.9   |  28  |  0.67    Ca    8.8      15 Dec 2017 06:00  Mg     2.0     12-15    TPro  6.9  /  Alb  2.7<L>  /  TBili  1.0  /  DBili  x   /  AST  20  /  ALT  20  /  AlkPhos  85  12-15            I&O's Summary    15 Dec 2017 07:01  -  16 Dec 2017 06:33  --------------------------------------------------------  IN: 1000 mL / OUT: 0 mL / NET: 1000 mL      BNP  RADIOLOGY & ADDITIONAL STUDIES:

## 2017-12-22 ENCOUNTER — TRANSCRIPTION ENCOUNTER (OUTPATIENT)
Age: 82
End: 2017-12-22

## 2017-12-22 LAB
ANION GAP SERPL CALC-SCNC: 8 MMOL/L — SIGNIFICANT CHANGE UP (ref 5–17)
BUN SERPL-MCNC: 11 MG/DL — SIGNIFICANT CHANGE UP (ref 7–23)
CALCIUM SERPL-MCNC: 8.9 MG/DL — SIGNIFICANT CHANGE UP (ref 8.5–10.1)
CHLORIDE SERPL-SCNC: 100 MMOL/L — SIGNIFICANT CHANGE UP (ref 96–108)
CO2 SERPL-SCNC: 28 MMOL/L — SIGNIFICANT CHANGE UP (ref 22–31)
CREAT SERPL-MCNC: 0.67 MG/DL — SIGNIFICANT CHANGE UP (ref 0.5–1.3)
GLUCOSE BLDC GLUCOMTR-MCNC: 145 MG/DL — HIGH (ref 70–99)
GLUCOSE BLDC GLUCOMTR-MCNC: 155 MG/DL — HIGH (ref 70–99)
GLUCOSE BLDC GLUCOMTR-MCNC: 159 MG/DL — HIGH (ref 70–99)
GLUCOSE BLDC GLUCOMTR-MCNC: 178 MG/DL — HIGH (ref 70–99)
GLUCOSE SERPL-MCNC: 145 MG/DL — HIGH (ref 70–99)
HCT VFR BLD CALC: 34 % — LOW (ref 34.5–45)
HGB BLD-MCNC: 10.9 G/DL — LOW (ref 11.5–15.5)
MCHC RBC-ENTMCNC: 30.2 PG — SIGNIFICANT CHANGE UP (ref 27–34)
MCHC RBC-ENTMCNC: 32.1 GM/DL — SIGNIFICANT CHANGE UP (ref 32–36)
MCV RBC AUTO: 94.2 FL — SIGNIFICANT CHANGE UP (ref 80–100)
PLATELET # BLD AUTO: 230 K/UL — SIGNIFICANT CHANGE UP (ref 150–400)
POTASSIUM SERPL-MCNC: 3.9 MMOL/L — SIGNIFICANT CHANGE UP (ref 3.5–5.3)
POTASSIUM SERPL-SCNC: 3.9 MMOL/L — SIGNIFICANT CHANGE UP (ref 3.5–5.3)
RBC # BLD: 3.61 M/UL — LOW (ref 3.8–5.2)
RBC # FLD: 13.7 % — SIGNIFICANT CHANGE UP (ref 10.3–14.5)
SODIUM SERPL-SCNC: 136 MMOL/L — SIGNIFICANT CHANGE UP (ref 135–145)
WBC # BLD: 6 K/UL — SIGNIFICANT CHANGE UP (ref 3.8–10.5)
WBC # FLD AUTO: 6 K/UL — SIGNIFICANT CHANGE UP (ref 3.8–10.5)

## 2017-12-22 RX ORDER — PANTOPRAZOLE SODIUM 20 MG/1
40 TABLET, DELAYED RELEASE ORAL
Qty: 0 | Refills: 0 | Status: DISCONTINUED | OUTPATIENT
Start: 2017-12-22 | End: 2017-12-23

## 2017-12-22 RX ORDER — ESCITALOPRAM OXALATE 10 MG/1
1 TABLET, FILM COATED ORAL
Qty: 0 | Refills: 0 | COMMUNITY
Start: 2017-12-22

## 2017-12-22 RX ORDER — LANOLIN ALCOHOL/MO/W.PET/CERES
1 CREAM (GRAM) TOPICAL
Qty: 0 | Refills: 0 | DISCHARGE
Start: 2017-12-22

## 2017-12-22 RX ORDER — POLYETHYLENE GLYCOL 3350 17 G/17G
17 POWDER, FOR SOLUTION ORAL
Qty: 0 | Refills: 0 | DISCHARGE
Start: 2017-12-22

## 2017-12-22 RX ORDER — FUROSEMIDE 40 MG
40 TABLET ORAL
Qty: 0 | Refills: 0 | Status: DISCONTINUED | OUTPATIENT
Start: 2017-12-22 | End: 2017-12-23

## 2017-12-22 RX ORDER — ACETAMINOPHEN 500 MG
2 TABLET ORAL
Qty: 0 | Refills: 0 | COMMUNITY
Start: 2017-12-22

## 2017-12-22 RX ORDER — FUROSEMIDE 40 MG
1 TABLET ORAL
Qty: 30 | Refills: 0 | OUTPATIENT
Start: 2017-12-22 | End: 2018-01-05

## 2017-12-22 RX ADMIN — Medication 100 MILLIGRAM(S): at 05:58

## 2017-12-22 RX ADMIN — Medication 1: at 08:22

## 2017-12-22 RX ADMIN — Medication 2: at 12:32

## 2017-12-22 RX ADMIN — ESCITALOPRAM OXALATE 10 MILLIGRAM(S): 10 TABLET, FILM COATED ORAL at 12:32

## 2017-12-22 RX ADMIN — Medication 81 MILLIGRAM(S): at 12:32

## 2017-12-22 RX ADMIN — Medication 40 MILLIEQUIVALENT(S): at 17:20

## 2017-12-22 RX ADMIN — APIXABAN 5 MILLIGRAM(S): 2.5 TABLET, FILM COATED ORAL at 05:58

## 2017-12-22 RX ADMIN — Medication 20 MILLIGRAM(S): at 05:57

## 2017-12-22 RX ADMIN — PANTOPRAZOLE SODIUM 40 MILLIGRAM(S): 20 TABLET, DELAYED RELEASE ORAL at 17:21

## 2017-12-22 RX ADMIN — Medication 40 MILLIEQUIVALENT(S): at 05:58

## 2017-12-22 RX ADMIN — Medication 20 MILLIGRAM(S): at 17:20

## 2017-12-22 RX ADMIN — Medication 25 MILLIGRAM(S): at 05:57

## 2017-12-22 RX ADMIN — Medication 100 MILLIGRAM(S): at 14:16

## 2017-12-22 RX ADMIN — Medication 100 MILLIGRAM(S): at 21:11

## 2017-12-22 RX ADMIN — Medication 40 MILLIGRAM(S): at 17:21

## 2017-12-22 RX ADMIN — Medication 25 MICROGRAM(S): at 05:58

## 2017-12-22 RX ADMIN — APIXABAN 5 MILLIGRAM(S): 2.5 TABLET, FILM COATED ORAL at 17:20

## 2017-12-22 RX ADMIN — Medication 40 MILLIGRAM(S): at 05:58

## 2017-12-22 RX ADMIN — Medication 25 MILLIGRAM(S): at 17:21

## 2017-12-22 RX ADMIN — Medication 3 MILLIGRAM(S): at 21:11

## 2017-12-22 RX ADMIN — SENNA PLUS 2 TABLET(S): 8.6 TABLET ORAL at 21:11

## 2017-12-22 NOTE — DISCHARGE NOTE ADULT - MEDICATION SUMMARY - MEDICATIONS TO TAKE
I will START or STAY ON the medications listed below when I get home from the hospital:    sildenafil 20 mg oral tablet  -- 1 tab(s) by mouth 2 times a day  -- Indication: For Pulmonary hypertension    acetaminophen 325 mg oral tablet  -- 2 tab(s) by mouth every 6 hours, As needed, Mild Pain (1 - 3)  -- Indication: For Right leg pain    aspirin 81 mg oral tablet  -- 1 tab(s) by mouth once a day  -- Indication: For CAD (coronary artery disease)    Eliquis 5 mg oral tablet  -- 1 tab(s) by mouth 2 times a day  -- Indication: For Afib    escitalopram 10 mg oral tablet  -- 1 tab(s) by mouth once a day  -- Indication: For Depression    metFORMIN 500 mg oral tablet  -- 1 tab(s) by mouth 2 times a day  -- Check with your doctor before becoming pregnant.  Do not drink alcoholic beverages when taking this medication.  It is very important that you take or use this exactly as directed.  Do not skip doses or discontinue unless directed by your doctor.  Obtain medical advice before taking any non-prescription drugs as some may affect the action of this medication.  Take with food or milk.    -- Indication: For Diabetes mellitus    Zocor 40 mg oral tablet  -- 1 tab(s) by mouth once a day (at bedtime)  -- Indication: For Dyslipidemia    metoprolol tartrate 25 mg oral tablet  -- 1 tab(s) by mouth 2 times a day  -- Indication: For Afib    furosemide 40 mg oral tablet  -- 1 tab(s) by mouth 2 times a day  -- Indication: For Heart failure with preserved ejection fraction    garlic oral tablet  --  by mouth   -- Indication: For supplement    docusate sodium 100 mg oral capsule  -- 2 cap(s) by mouth once a day (at bedtime)  -- Indication: For Constipation    polyethylene glycol 3350 oral powder for reconstitution  -- 17 gram(s) by mouth once a day, As needed, Constipation  -- Indication: For Constipation    potassium chloride 10 mEq oral tablet, extended release  -- 2 tab(s) by mouth 2 times a day  -- TWO TABLETS ON WEDNESDAY WITH METOLAZONE  -- Indication: For Hypokalemia    melatonin 3 mg oral tablet  -- 1 tab(s) by mouth once a day (at bedtime), As needed, Insomnia  -- Indication: For Insomnia    glucosamine-chondroitin 250 mg-200 mg oral tablet  -- 3 tab(s) by mouth once a day  -- Indication: For Arthritis    CoQ10 300 mg oral capsule  -- 1 cap(s) by mouth once a day (at bedtime)  -- Indication: For Supplement    Acidophilus oral capsule  -- 1 cap(s) by mouth once a day  -- Indication: For Supplement    NexIUM 40 mg oral delayed release capsule  -- 1 cap(s) by mouth once a day  -- Indication: For GERD    levothyroxine 25 mcg (0.025 mg) oral capsule  -- 1 cap(s) by mouth once a day  -- Indication: For Hypothyroid    levothyroxine 25 mcg (0.025 mg) oral tablet  -- 1 tab(s) by mouth once a day  -- Indication: For Hypothyroid    multivitamin  --     -- Indication: For supplement    biotin 10 mg oral tablet  -- 1 tab(s) by mouth once a day  -- Indication: For supplement

## 2017-12-22 NOTE — DISCHARGE NOTE ADULT - CARE PLAN
Principal Discharge DX:	Acute pancreatitis  Secondary Diagnosis:	Afib  Secondary Diagnosis:	CAD (coronary artery disease)  Secondary Diagnosis:	Diabetes mellitus type II, controlled  Secondary Diagnosis:	Heart failure with preserved ejection fraction Principal Discharge DX:	Acute pancreatitis  Goal:	To treat pancreatitis  Instructions for follow-up, activity and diet:	You were admitted for acute pancreatitis. You were treated with IV fluids and were without food initially to resolve the inflamed pancreas. Imaging and workup were unable to identify a cause of the pancreatitis.  The nausea, vomiting and abdominal pain resolved and you were able to tolerate food. Abdominal CT w/ contrast performed following fever on 12/20 reflected complete resolution of pancreatitis and did not show any abscesses.  Secondary Diagnosis:	Afib  Instructions for follow-up, activity and diet:	- Please continue metoprolol 25 mg twice daily for rate control  - Please continue Eliquis 5 mg twice daily  - Please follow up with your Cardiologist (Dr. Hernandez)  Secondary Diagnosis:	CAD (coronary artery disease)  Instructions for follow-up, activity and diet:	- Please continue aspirin 81mg daily  - Please continue metoprolol 25 mg twice daily  - Please continue statin at bedtime  Secondary Diagnosis:	Diabetes mellitus type II, controlled  Instructions for follow-up, activity and diet:	- Please continue metformin 500 mg twice daily.  Secondary Diagnosis:	Heart failure with preserved ejection fraction  Instructions for follow-up, activity and diet:	-Please continue Lasix 40 mg twice daily.  - Please make sure not to drink more than 2L of fluid daily   Low sodium diet  - F/u with cardiology.  Secondary Diagnosis:	Pulmonary hypertension  Instructions for follow-up, activity and diet:	-Continue sildenafil 20 mg twice daily Principal Discharge DX:	Acute pancreatitis  Goal:	To treat pancreatitis  Instructions for follow-up, activity and diet:	You were admitted for acute pancreatitis. You were treated with IV fluids and were without food initially to resolve the inflamed pancreas. Imaging and workup were unable to identify a cause of the pancreatitis.  The nausea, vomiting and abdominal pain resolved and you were able to tolerate food. Abdominal CT w/ contrast performed following fever on 12/20 reflected complete resolution of pancreatitis and did not show any abscesses.  Secondary Diagnosis:	Afib  Instructions for follow-up, activity and diet:	- Please continue metoprolol 25 mg twice daily for rate control  - Please continue Eliquis 5 mg twice daily  - Please follow up with your PCP and cardiologist.  - Please follow up with your Cardiologist (Dr. Hernandez)  Secondary Diagnosis:	CAD (coronary artery disease)  Instructions for follow-up, activity and diet:	- Please continue aspirin 81mg daily  - Please continue metoprolol 25 mg twice daily  - Please continue statin at bedtime  Secondary Diagnosis:	Diabetes mellitus type II, controlled  Instructions for follow-up, activity and diet:	- Please continue metformin 500 mg twice daily.  Secondary Diagnosis:	Heart failure with preserved ejection fraction  Instructions for follow-up, activity and diet:	-Please continue Lasix 40 mg twice daily.  - Please make sure not to drink more than 2L of fluid daily   Low sodium diet  - F/u with cardiology.  Secondary Diagnosis:	Pulmonary hypertension  Instructions for follow-up, activity and diet:	-Continue sildenafil 20 mg twice daily

## 2017-12-22 NOTE — PROGRESS NOTE ADULT - SUBJECTIVE AND OBJECTIVE BOX
Cardiology Progess Note  Patient is a 89y old  Female who presents with a chief complaint of diarrhea.    HPI: 89 year old female with past medical history of CAD s/p stent, Atrial Fibrillation, HFpEF, T2DM, HLD, PHTN, presenting with abdominal pain x 1 day. Abdominal pain started suddenly in AM, located in epigastric area without radiation, described as dull, with episodes occurring intermittently throughout the day. Associated symptoms include burping. No exposure to new foods, N/V, change in BM. Patient was admitted for presumed drug-induced pancreatitis in May 2017. The offending agents were thought to be Metalozone and Januvia per primary team.  In the ED, [place] be [id number] and CT ABD positive for acute pancreatitis.     12/22- No CP, SOB improved. Feels tired/weak. Not on tele.     PAST MEDICAL & SURGICAL HISTORY:  HLD (hyperlipidemia)  Hypothyroid  Afib  CAD (coronary artery disease)  Hypertension  History of Osteoarthritis  History of Atrial Fibrillation  GERD (Gastroesophageal Reflux Disease)  Dyslipidemia  Diabetes Mellitus Type II  History of appendectomy  History of cholecystectomy  H/O: Knee Surgery- right meniscus  H/O: Hysterectomy      MEDICATIONS  (STANDING):  apixaban 5 milliGRAM(s) Oral every 12 hours  aspirin  chewable 81 milliGRAM(s) Oral daily  dextrose 5%. 1000 milliLiter(s) (50 mL/Hr) IV Continuous <Continuous>  dextrose 50% Injectable 12.5 Gram(s) IV Push once  dextrose 50% Injectable 25 Gram(s) IV Push once  dextrose 50% Injectable 25 Gram(s) IV Push once  docusate sodium 100 milliGRAM(s) Oral three times a day  insulin lispro (HumaLOG) corrective regimen sliding scale   SubCutaneous three times a day before meals  lactated ringers. 1000 milliLiter(s) (80 mL/Hr) IV Continuous <Continuous>  levothyroxine 25 MICROGram(s) Oral daily  metoprolol     tartrate 25 milliGRAM(s) Oral two times a day  sildenafil (REVATIO) 20 milliGRAM(s) Oral two times a day    MEDICATIONS  (PRN):  acetaminophen   Tablet. 650 milliGRAM(s) Oral every 6 hours PRN Mild Pain (1 - 3)  dextrose Gel 1 Dose(s) Oral once PRN Blood Glucose LESS THAN 70 milliGRAM(s)/deciliter  glucagon  Injectable 1 milliGRAM(s) IntraMuscular once PRN Glucose LESS THAN 70 milligrams/deciliter      FAMILY HISTORY:  No pertinent family history in first degree relatives      SOCIAL HISTORY:  non smoker, no alcohol use     REVIEW OF SYSTEMS:  CONSTITUTIONAL:  No night sweats.  No fatigue, malaise, lethargy.  No fever or chills.  HEENT:  Eyes:  No visual changes.  No eye pain.      ENT:  No runny nose.  No epistaxis.  No sinus pain.  No sore throat.  No odynophagia.  No ear pain.  No congestion.  RESPIRATORY:  No cough.  No wheeze.  No hemoptysis.  c/o shortness of breath.  CARDIOVASCULAR:  No chest pains.  No palpitations. c/o shortness of breath, No orthopnea or PND. c/o pedal edema  GASTROINTESTINAL:  no abdominal pain.  No nausea or vomiting.  No diarrhea or constipation.  No hematemesis.  No hematochezia.  No melena.  GENITOURINARY:  No urgency.  No frequency.  No dysuria.  No hematuria.  No obstructive symptoms.  No discharge.  No pain.  No significant abnormal bleeding.  MUSCULOSKELETAL:  No musculoskeletal pain.  No joint swelling.  No arthritis.  NEUROLOGICAL:  No tingling or numbness or weakness.  PSYCHIATRIC:  No confusion  SKIN:  No rashes.  No lesions.  No wounds.  ENDOCRINE:  No unexplained weight loss.  No polydipsia.  No polyuria.  No polyphagia.  HEMATOLOGIC:  No anemia.  No purpura.  No petechiae.  No prolonged or excessive bleeding.   ALLERGIC AND IMMUNOLOGIC:  No pruritus.  No swelling.         Vital Signs Last 24 Hrs  T(C): 36.3 (16 Dec 2017 05:10), Max: 36.8 (15 Dec 2017 10:30)  T(F): 97.3 (16 Dec 2017 05:10), Max: 98.2 (15 Dec 2017 10:30)  HR: 88 (16 Dec 2017 05:10) (74 - 88)  BP: 111/65 (16 Dec 2017 05:10) (109/56 - 128/50)  BP(mean): 74 (16 Dec 2017 05:10) (74 - 74)  RR: 18 (16 Dec 2017 05:10) (18 - 18)  SpO2: 97% (16 Dec 2017 05:10) (96% - 100%)    PHYSICAL EXAM-    Constitutional: The patient appears to be normal, well developed, well nourished and alert and oriented to time, place and person. The patient does not appear acutely ill.     Head: Head is normocephalic and atraumatic.      Neck: The patient's neck is supple without enlargement, has no palpable thyromegaly nor thyroid nodules and has no jugular venous distention. No audible carotid bruits. There are strong carotid pulses bilaterally. positive hepatojugular reflux.     Cardiovascular: irregular rate and rhythm without S3, S4. No murmurs or rubs are appreciated.      Respiratory: Crackles b/l basal  Abdomen: Soft, nontender, nondistended with positive bowel sounds.      Extremity: No tenderness. 1+pedal pitting  edema b/l . No skin discoloration No clubbing No cyanosis.     Neurologic: The patient is alert and oriented.      Skin: No rash, no obvious lesions noted.      Psychiatric: The patient appears to be emotionally stable.      INTERPRETATION OF TELEMETRY: Atrial fibrillation, 70-90/min.    ECG:    I&O's Detail    15 Dec 2017 07:01  -  16 Dec 2017 06:33  --------------------------------------------------------  IN:    lactated ringers.: 1000 mL  Total IN: 1000 mL    OUT:  Total OUT: 0 mL    Total NET: 1000 mL          LABS:                        10.9   10.8  )-----------( 138      ( 15 Dec 2017 06:00 )             33.5     12-15    135  |  102  |  16  ----------------------------<  98  3.9   |  28  |  0.67    Ca    8.8      15 Dec 2017 06:00  Mg     2.0     12-15    TPro  6.9  /  Alb  2.7<L>  /  TBili  1.0  /  DBili  x   /  AST  20  /  ALT  20  /  AlkPhos  85  12-15            I&O's Summary    15 Dec 2017 07:01  -  16 Dec 2017 06:33  --------------------------------------------------------  IN: 1000 mL / OUT: 0 mL / NET: 1000 mL      BNP  RADIOLOGY & ADDITIONAL STUDIES:

## 2017-12-22 NOTE — DISCHARGE NOTE ADULT - PLAN OF CARE
To treat pancreatitis You were admitted for acute pancreatitis. You were treated with IV fluids and were without food initially to resolve the inflamed pancreas. Imaging and workup were unable to identify a cause of the pancreatitis.  The nausea, vomiting and abdominal pain resolved and you were able to tolerate food. Abdominal CT w/ contrast performed following fever on 12/20 reflected complete resolution of pancreatitis and did not show any abscesses. - Please continue metoprolol 25 mg twice daily for rate control  - Please continue Eliquis 5 mg twice daily  - Please follow up with your Cardiologist (Dr. Hernandez) - Please continue aspirin 81mg daily  - Please continue metoprolol 25 mg twice daily  - Please continue statin at bedtime - Please continue metformin 500 mg twice daily. -Please continue Lasix 40 mg twice daily.  - Please make sure not to drink more than 2L of fluid daily   Low sodium diet  - F/u with cardiology. -Continue sildenafil 20 mg twice daily - Please continue metoprolol 25 mg twice daily for rate control  - Please continue Eliquis 5 mg twice daily  - Please follow up with your PCP and cardiologist.  - Please follow up with your Cardiologist (Dr. Hernandez)

## 2017-12-22 NOTE — PROGRESS NOTE ADULT - SUBJECTIVE AND OBJECTIVE BOX
CHIEF COMPLAINT: Epigastric Pain     SUBJECTIVE: 89 year old female with past medical history of CAD s/p stent, Atrial Fibrillation, HFpEF, T2DM, HLD, PHTN, presenting with abdominal pain x 1 day. Abdominal pain started suddenly in AM, located in epigastric area without radiation, described as dull, with episodes occurring intermittently throughout the day. Associated symptoms include burping. No exposure to new foods, N/V, change in BM. Patient was admitted for presumed drug-induced pancreatitis in May 2017. The offending agents were thought to be Metalozone and Januvia which were discontinued upon discharge. Patient was recently started on Levothyroxine about 3 weeks ago.    In the ED, [place] 16435 and CT ABD positive for acute pancreatitis.     : Pt was seen and examined at bedside. She has tolerated clear liquid diet and currently has desire to eat more food. She denies an abdominal pain, nausea, vomiting or changes to her bowels. She reports belching frequently.     : Pt was seen and examined at bedside. She is tolerating full diet. She denies any abdominal pain, nausea, or vomiting. She feels bloated and notices a new right knee pain that began this morning. She noted she had difficulty walking to the bathroom this morning. Pt noted her calf was tender. Dopplers were performed to r/o DVT.    : Pt was seen and examined at bedside. She  continues to tolerate full diet. She denies any HA, CP, SOB, abdominal pain, nausea or vomiting. The right knee pain is improved but still occurs with ambulation.    : Pt was seen and examined at bedside. She spiked a fever to 102 overnight.  CT Abd/Pelvis was performed this morning to rule out abscesses given her recent history of pancreatitis. She continues to tolerate full diet. Pt reports feeling weak. She denies any HA, CP, SOB, abdominal pain, nausea or vomiting. The right knee pain is improved but still occurs with ambulation.    : Pt was seen and examined at bedside. Her breathing has improved. She remained afebrile overnight. CT Abd/Pelvis showed complete resolution of acute pancreatitis. She continues to tolerate full diet. She still feels weak. She denies any HA, CP, abdominal pain, nausea or vomiting. She had a normal bowel movement yesterday.    : Pt seen and examined at bedside.  Patient is upright and speaking in full sentences.  Patient has been afebrile and reports no abdominal pain, nausea, vomiting, diarrhea, headache, chest pain or dizziness.  Patient has been tolerating her full diet and was able to ambulate with physical therapy today with no difficulty or shortness of breath.    REVIEW OF SYSTEMS:    CONSTITUTIONAL: +Weakness, No fevers or chills  EYES/ENT: No visual changes;  No vertigo or throat pain   NECK: No pain or stiffness  RESPIRATORY: No cough, wheezing, hemoptysis; No shortness of breath  CARDIOVASCULAR: No chest pain or palpitations  GASTROINTESTINAL: No abdominal or epigastric pain. No nausea, vomiting, or hematemesis; No diarrhea or constipation. No melena or hematochezia.  GENITOURINARY: No dysuria, frequency or hematuria  NEUROLOGICAL: No numbness or weakness  SKIN: No itching, burning, rashes, or lesions   MUSCULOSKELETAL: +Minimal right knee pain  All other review of systems is negative unless indicated above    Vital Signs Last 24 Hrs  ICU Vital Signs Last 24 Hrs  T(C): 36.4 (22 Dec 2017 11:08), Max: 36.9 (21 Dec 2017 17:10)  T(F): 97.5 (22 Dec 2017 11:08), Max: 98.5 (21 Dec 2017 17:10)  HR: 81 (22 Dec 2017 11:08) (74 - 82)  BP: 96/51 (22 Dec 2017 11:08) (96/51 - 125/55)  BP(mean): --  ABP: --  ABP(mean): --  RR: 18 (22 Dec 2017 11:08) (18 - 20)  SpO2: 99% (22 Dec 2017 11:08) (93% - 99%)    CAPILLARY BLOOD GLUCOSE  POCT Blood Glucose.: 178 mg/dL (22 Dec 2017 12:29)  POCT Blood Glucose.: 160 mg/dL (21 Dec 2017 07:58)  POCT Blood Glucose.: 172 mg/dL (20 Dec 2017 17:10)  POCT Blood Glucose.: 171 mg/dL (20 Dec 2017 12:17)    PHYSICAL EXAM:    Constitutional: NAD, awake and alert, well-developed  HEENT: PERR, EOMI, Normal Hearing, MMM  Neck: Soft and supple, No LAD, No JVD  Respiratory: Good air entry; scattered crackles at lung bases but improving breath sounds overall  Cardiovascular: S1 and S2, irregular rate and rhythm, no Murmurs, gallops or rubs  Gastrointestinal: Bowel Sounds present, soft, nontender, nondistended, no guarding, no rebound  Extremities: Lower extremities propped on pillow; trace edema  Vascular: 2+ peripheral pulses  Neurological: A/O x 3, no focal deficits  Musculoskeletal: 5/5 strength b/l upper and lower extremities  Skin: No rashes      MEDICATIONS:  MEDICATIONS  (STANDING):  apixaban 5 milliGRAM(s) Oral every 12 hours  aspirin  chewable 81 milliGRAM(s) Oral daily  dextrose 50% Injectable 12.5 Gram(s) IV Push once  dextrose 50% Injectable 25 Gram(s) IV Push once  dextrose 50% Injectable 25 Gram(s) IV Push once  docusate sodium 100 milliGRAM(s) Oral three times a day  escitalopram 10 milliGRAM(s) Oral daily  furosemide   Injectable 40 milliGRAM(s) IV Push two times a day  insulin lispro (HumaLOG) corrective regimen sliding scale   SubCutaneous three times a day before meals  levothyroxine 25 MICROGram(s) Oral daily  metoprolol     tartrate 25 milliGRAM(s) Oral two times a day  potassium chloride    Tablet ER 40 milliEquivalent(s) Oral two times a day  senna 2 Tablet(s) Oral at bedtime  sildenafil (REVATIO) 20 milliGRAM(s) Oral two times a day    MEDICATIONS  (PRN):  acetaminophen   Tablet. 650 milliGRAM(s) Oral every 6 hours PRN Mild Pain (1 - 3)  dextrose Gel 1 Dose(s) Oral once PRN Blood Glucose LESS THAN 70 milliGRAM(s)/deciliter  glucagon  Injectable 1 milliGRAM(s) IntraMuscular once PRN Glucose LESS THAN 70 milligrams/deciliter  melatonin 3 milliGRAM(s) Oral at bedtime PRN Insomnia  polyethylene glycol 3350 17 Gram(s) Oral daily PRN Constipation    LABS: All Labs Reviewed:  T(C): 36.4 (17 @ 11:08), Max: 36.9 (17 @ 17:10)  HR: 81 (17 @ 11:08) (74 - 82)  BP: 96/51 (17 @ 11:08) (96/51 - 125/55)  RR: 18 (17 @ 11:08) (18 - 20)  SpO2: 99% (17 @ 11:08) (93% - 99%)  Wt(kg): --                              10.9   6.0   )-----------( 230      ( 22 Dec 2017 06:25 )             34.0     22 Dec 2017 06:25    136    |  100    |  11     ----------------------------<  145    3.9     |  28     |  0.67     Ca    8.9        22 Dec 2017 06:25      Urinalysis Basic - ( 19 Dec 2017 06:20 )    Color: Yellow / Appearance: Clear / S.025 / pH: x  Gluc: x / Ketone: Trace  / Bili: Negative / Urobili: Negative mg/dL   Blood: x / Protein: 100 mg/dL / Nitrite: Negative   Leuk Esterase: Small / RBC: 6-10 /HPF / WBC 0-2   Sq Epi: x / Non Sq Epi: Negative / Bacteria: Occasional    Blood Culture:  @ 22:15  Organism --  Gram Stain Blood -- Gram Stain --  Specimen Source .Urine None  Culture-Blood --    RADIOLOGY/EKG: < from: CT Abdomen w/ IV Cont (17 @ 11:34) >  IMPRESSION:   Resolution of acute pancreatitis with no residual stranding or fluid   visualized.    Cirrhosis.    DVT PPX: On Eliquis    ADVANCED DIRECTIVE:    DISPOSITION: CHIEF COMPLAINT: Epigastric Pain     SUBJECTIVE: 89 year old female with past medical history of CAD s/p stent, Atrial Fibrillation, HFpEF, T2DM, HLD, PHTN, presenting with abdominal pain x 1 day. Abdominal pain started suddenly in AM, located in epigastric area without radiation, described as dull, with episodes occurring intermittently throughout the day. Associated symptoms include burping. No exposure to new foods, N/V, change in BM. Patient was admitted for presumed drug-induced pancreatitis in May 2017. The offending agents were thought to be Metalozone and Januvia which were discontinued upon discharge. Patient was recently started on Levothyroxine about 3 weeks ago.    In the ED, [place] be [address] and CT ABD positive for acute pancreatitis.     : Pt was seen and examined at bedside. She has tolerated clear liquid diet and currently has desire to eat more food. She denies an abdominal pain, nausea, vomiting or changes to her bowels. She reports belching frequently.     : Pt was seen and examined at bedside. She is tolerating full diet. She denies any abdominal pain, nausea, or vomiting. She feels bloated and notices a new right knee pain that began this morning. She noted she had difficulty walking to the bathroom this morning. Pt noted her calf was tender. Dopplers were performed to r/o DVT.    : Pt was seen and examined at bedside. She  continues to tolerate full diet. She denies any HA, CP, SOB, abdominal pain, nausea or vomiting. The right knee pain is improved but still occurs with ambulation.    : Pt was seen and examined at bedside. She spiked a fever to 102 overnight.  CT Abd/Pelvis was performed this morning to rule out abscesses given her recent history of pancreatitis. She continues to tolerate full diet. Pt reports feeling weak. She denies any HA, CP, SOB, abdominal pain, nausea or vomiting. The right knee pain is improved but still occurs with ambulation.    : Pt was seen and examined at bedside. Her breathing has improved. She remained afebrile overnight. CT Abd/Pelvis showed complete resolution of acute pancreatitis. She continues to tolerate full diet. She still feels weak. She denies any HA, CP, abdominal pain, nausea or vomiting. She had a normal bowel movement yesterday.    : Pt seen and examined at bedside.  Patient is upright and speaking in full sentences.  Patient has been afebrile and reports no abdominal pain, nausea, vomiting, diarrhea, headache, chest pain or dizziness.  Patient has been tolerating her full diet and was able to ambulate with physical therapy today with no difficulty or shortness of breath. Pt is medically cleared for discharge. Her PCP (Dr. Camacho) who has been following pt during admission is aware of discharge and need for outpatient follow up.    REVIEW OF SYSTEMS:    CONSTITUTIONAL: +Weakness, No fevers or chills  EYES/ENT: No visual changes;  No vertigo or throat pain   NECK: No pain or stiffness  RESPIRATORY: No cough, wheezing, hemoptysis; No shortness of breath  CARDIOVASCULAR: No chest pain or palpitations  GASTROINTESTINAL: No abdominal or epigastric pain. No nausea, vomiting, or hematemesis; No diarrhea or constipation. No melena or hematochezia.  GENITOURINARY: No dysuria, frequency or hematuria  NEUROLOGICAL: No numbness or weakness  SKIN: No itching, burning, rashes, or lesions   MUSCULOSKELETAL: +Minimal right knee pain  All other review of systems is negative unless indicated above    Vital Signs Last 24 Hrs  T(C): 36.4 (22 Dec 2017 11:08), Max: 36.9 (21 Dec 2017 17:10)  T(F): 97.5 (22 Dec 2017 11:08), Max: 98.5 (21 Dec 2017 17:10)  HR: 81 (22 Dec 2017 11:08) (74 - 82)  BP: 96/51 (22 Dec 2017 11:08) (96/51 - 125/55)  RR: 18 (22 Dec 2017 11:08) (18 - 20)  SpO2: 99% (22 Dec 2017 11:08) (93% - 99%)    CAPILLARY BLOOD GLUCOSE  POCT Blood Glucose.: 178 mg/dL (22 Dec 2017 12:29)  POCT Blood Glucose.: 160 mg/dL (21 Dec 2017 07:58)  POCT Blood Glucose.: 172 mg/dL (20 Dec 2017 17:10)  POCT Blood Glucose.: 171 mg/dL (20 Dec 2017 12:17)    PHYSICAL EXAM:    Constitutional: NAD, awake and alert, well-developed  HEENT: PERR, EOMI, Normal Hearing, MMM  Neck: Soft and supple, No LAD, No JVD  Respiratory: Good air entry; scattered crackles at lung bases but improving breath sounds overall  Cardiovascular: S1 and S2, irregular rate and rhythm, no Murmurs, gallops or rubs  Gastrointestinal: Bowel Sounds present, soft, nontender, nondistended, no guarding, no rebound  Extremities: Lower extremities propped on pillow; trace edema  Vascular: 2+ peripheral pulses  Neurological: A/O x 3, no focal deficits  Musculoskeletal: 5/5 strength b/l upper and lower extremities  Skin: No rashes      MEDICATIONS:  MEDICATIONS  (STANDING):  apixaban 5 milliGRAM(s) Oral every 12 hours  aspirin  chewable 81 milliGRAM(s) Oral daily  dextrose 50% Injectable 12.5 Gram(s) IV Push once  dextrose 50% Injectable 25 Gram(s) IV Push once  dextrose 50% Injectable 25 Gram(s) IV Push once  docusate sodium 100 milliGRAM(s) Oral three times a day  escitalopram 10 milliGRAM(s) Oral daily  furosemide    Tablet 40 milliGRAM(s) Oral two times a day  insulin lispro (HumaLOG) corrective regimen sliding scale   SubCutaneous three times a day before meals  levothyroxine 25 MICROGram(s) Oral daily  metoprolol     tartrate 25 milliGRAM(s) Oral two times a day  pantoprazole    Tablet 40 milliGRAM(s) Oral before dinner  potassium chloride    Tablet ER 40 milliEquivalent(s) Oral two times a day  senna 2 Tablet(s) Oral at bedtime  sildenafil (REVATIO) 20 milliGRAM(s) Oral two times a day    MEDICATIONS  (PRN):  acetaminophen   Tablet. 650 milliGRAM(s) Oral every 6 hours PRN Mild Pain (1 - 3)  dextrose Gel 1 Dose(s) Oral once PRN Blood Glucose LESS THAN 70 milliGRAM(s)/deciliter  glucagon  Injectable 1 milliGRAM(s) IntraMuscular once PRN Glucose LESS THAN 70 milligrams/deciliter  melatonin 3 milliGRAM(s) Oral at bedtime PRN Insomnia  polyethylene glycol 3350 17 Gram(s) Oral daily PRN Constipation      LABS: All Labs Reviewed:                       10.9   6.0   )-----------( 230      ( 22 Dec 2017 06:25 )             34.0     22 Dec 2017 06:25    136    |  100    |  11     ----------------------------<  145    3.9     |  28     |  0.67     Ca    8.9        22 Dec 2017 06:25      Urinalysis Basic - ( 19 Dec 2017 06:20 )    Color: Yellow / Appearance: Clear / S.025 / pH: x  Gluc: x / Ketone: Trace  / Bili: Negative / Urobili: Negative mg/dL   Blood: x / Protein: 100 mg/dL / Nitrite: Negative   Leuk Esterase: Small / RBC: 6-10 /HPF / WBC 0-2   Sq Epi: x / Non Sq Epi: Negative / Bacteria: Occasional    Blood Culture: - @ 22:15  Organism --  Gram Stain Blood -- Gram Stain --  Specimen Source .Urine None  Culture-Blood --    RADIOLOGY/EKG: < from: CT Abdomen w/ IV Cont (17 @ 11:34) >  IMPRESSION:   Resolution of acute pancreatitis with no residual stranding or fluid   visualized.    Cirrhosis.    DVT PPX: On Eliquis    ADVANCED DIRECTIVE:    DISPOSITION: Home with home PT

## 2017-12-22 NOTE — DISCHARGE NOTE ADULT - HOSPITAL COURSE
HPI:  89 year old female with past medical history of CAD s/p stent, Atrial Fibrillation, HFpEF, T2DM, HLD, PHTN, presenting with abdominal pain x 1 day. Abdominal pain started suddenly in AM, located in epigastric area without radiation, described as dull, with episodes occurring intermittently throughout the day. Associated symptoms include burping. No exposure to new foods, N/V, change in BM. Patient was admitted for presumed drug-induced pancreatitis in May 2017. The offending agents were thought to be Metolazone and Januvia which were discontinued upon discharge. Patient was recently started on Levothyroxine about 3 weeks ago.    In the ED, [place] [address] and CT ABD positive for acute pancreatitis. (14 Dec 2017 00:48)    Ms. Mojica spiked a one-time fever on 12/20/17. All labs and imaging performed at that time were negative for any infection. Repeat CT Abd w/contrast showed complete resolution of pancreatitis. No abscesses were evident on imaging.

## 2017-12-22 NOTE — DISCHARGE NOTE ADULT - ADDITIONAL INSTRUCTIONS
PCP (Dr. Camacho) has been following patient daily throughout this admission. He was contacted and is aware of her discharge and need for follow-up appointment.

## 2017-12-22 NOTE — PROGRESS NOTE ADULT - PROBLEM SELECTOR PLAN 3
- stable  - continue ASA 81, metoprolol 25 mg BID  - resume other home meds - stable  - continue ASA 81, metoprolol 25 mg BID

## 2017-12-22 NOTE — PROGRESS NOTE ADULT - ATTENDING COMMENTS
Patient seen and examined with Carlos Alberto, Maryann Malagon, Bernabe Little and TRISTAN Rodriguez on the Family Medicine Teaching Service.  Agree with history, physical, labs and plan which were reviewed in detail. Spoke with son  Fuentes and updated him on plan of care.

## 2017-12-22 NOTE — DISCHARGE NOTE ADULT - INSTRUCTIONS
low salt and cholesterol diet low salt and cholesterol diet,fluid intake about 2 litres for day low salt and cholesterol diet, fluid intake about 2 litres for day low salt and cholesterol diet, fluid intake about 2 liters per day

## 2017-12-22 NOTE — DISCHARGE NOTE ADULT - PATIENT PORTAL LINK FT
“You can access the Follow[facility] Patient Portal, offered by [facility], by registering with the following website: [email or web address]”

## 2017-12-22 NOTE — DISCHARGE NOTE ADULT - CARE PROVIDERS DIRECT ADDRESSES
[email or web address]

## 2017-12-22 NOTE — PROGRESS NOTE ADULT - PROBLEM SELECTOR PLAN 1
Stable, lipase levels normal. No further trending necessary.  IVF discontinued 12/16  -Tolerating  Diabetic/DASH diet.   Pancreatitis resolved

## 2017-12-22 NOTE — DISCHARGE NOTE ADULT - CARE PROVIDER_API CALL
Samy Camacho), Internal Medicine; Pulmonary Disease  [address] [place] [address]  Phone: [phone]  Fax: [phone]    Florentin Hernandez (AMINATA), Cardiovascular Disease; Critical Care Medicine; Internal Medicine; Interventional Cardiology  [address] [place] [address]  Phone: [phone]  Fax: [phone]

## 2017-12-22 NOTE — DISCHARGE NOTE ADULT - SECONDARY DIAGNOSIS.
Afib CAD (coronary artery disease) Diabetes mellitus type II, controlled Heart failure with preserved ejection fraction Pulmonary hypertension

## 2017-12-22 NOTE — PROGRESS NOTE ADULT - PROBLEM SELECTOR PLAN 4
- stable   - cardiac cath in Feb 2017  - normal LV, EF 65%  - Lasix 40 mg BID PO as discussed with Dr. Epps  - Fluid restriction  - Low sodium diet  - F/u with cardiology - stable   - cardiac cath in Feb 2017  - normal LV, EF 65%  - Lasix 40 mg BID PO as discussed with Dr. Epps  - Fluid restriction  - Low sodium diet  - Cardiology consult appreciated. - stable   - cardiac cath in Feb 2017  - normal LV, EF 65%  - IV Lasix discontinued today 12/22. Lasix 40 mg BID PO as discussed with Dr. Epps  - Fluid restriction  - Low sodium diet  - Cardiology consult appreciated.

## 2017-12-23 ENCOUNTER — INPATIENT (INPATIENT)
Facility: HOSPITAL | Age: 82
LOS: 2 days | Discharge: ANOTHER HOSPITAL | End: 2017-12-26
Attending: INTERNAL MEDICINE | Admitting: INTERNAL MEDICINE
Payer: MEDICARE

## 2017-12-23 VITALS
TEMPERATURE: 98 F | OXYGEN SATURATION: 99 % | SYSTOLIC BLOOD PRESSURE: 125 MMHG | HEART RATE: 75 BPM | RESPIRATION RATE: 18 BRPM | DIASTOLIC BLOOD PRESSURE: 60 MMHG

## 2017-12-23 VITALS
TEMPERATURE: 98 F | RESPIRATION RATE: 17 BRPM | WEIGHT: 169.09 LBS | OXYGEN SATURATION: 100 % | SYSTOLIC BLOOD PRESSURE: 145 MMHG | HEIGHT: 64 IN | DIASTOLIC BLOOD PRESSURE: 73 MMHG | HEART RATE: 91 BPM

## 2017-12-23 DIAGNOSIS — I48.91 UNSPECIFIED ATRIAL FIBRILLATION: ICD-10-CM

## 2017-12-23 DIAGNOSIS — I50.30 UNSPECIFIED DIASTOLIC (CONGESTIVE) HEART FAILURE: ICD-10-CM

## 2017-12-23 DIAGNOSIS — Z90.49 ACQUIRED ABSENCE OF OTHER SPECIFIED PARTS OF DIGESTIVE TRACT: Chronic | ICD-10-CM

## 2017-12-23 DIAGNOSIS — I10 ESSENTIAL (PRIMARY) HYPERTENSION: ICD-10-CM

## 2017-12-23 DIAGNOSIS — I25.10 ATHEROSCLEROTIC HEART DISEASE OF NATIVE CORONARY ARTERY WITHOUT ANGINA PECTORIS: ICD-10-CM

## 2017-12-23 DIAGNOSIS — K85.90 ACUTE PANCREATITIS WITHOUT NECROSIS OR INFECTION, UNSPECIFIED: ICD-10-CM

## 2017-12-23 DIAGNOSIS — Z29.9 ENCOUNTER FOR PROPHYLACTIC MEASURES, UNSPECIFIED: ICD-10-CM

## 2017-12-23 DIAGNOSIS — E78.5 HYPERLIPIDEMIA, UNSPECIFIED: ICD-10-CM

## 2017-12-23 DIAGNOSIS — E03.9 HYPOTHYROIDISM, UNSPECIFIED: ICD-10-CM

## 2017-12-23 LAB
ALBUMIN SERPL ELPH-MCNC: 2.8 G/DL — LOW (ref 3.3–5)
ALP SERPL-CCNC: 232 U/L — HIGH (ref 40–120)
ALT FLD-CCNC: 47 U/L — SIGNIFICANT CHANGE UP (ref 12–78)
ANION GAP SERPL CALC-SCNC: 7 MMOL/L — SIGNIFICANT CHANGE UP (ref 5–17)
ANION GAP SERPL CALC-SCNC: 9 MMOL/L — SIGNIFICANT CHANGE UP (ref 5–17)
ANISOCYTOSIS BLD QL: SLIGHT — SIGNIFICANT CHANGE UP
AST SERPL-CCNC: 96 U/L — HIGH (ref 15–37)
BASOPHILS # BLD AUTO: 0.1 K/UL — SIGNIFICANT CHANGE UP (ref 0–0.2)
BASOPHILS NFR BLD AUTO: 3 % — HIGH (ref 0–2)
BILIRUB SERPL-MCNC: 1 MG/DL — SIGNIFICANT CHANGE UP (ref 0.2–1.2)
BUN SERPL-MCNC: 10 MG/DL — SIGNIFICANT CHANGE UP (ref 7–23)
BUN SERPL-MCNC: 11 MG/DL — SIGNIFICANT CHANGE UP (ref 7–23)
CALCIUM SERPL-MCNC: 8.7 MG/DL — SIGNIFICANT CHANGE UP (ref 8.5–10.1)
CALCIUM SERPL-MCNC: 9 MG/DL — SIGNIFICANT CHANGE UP (ref 8.5–10.1)
CHLORIDE SERPL-SCNC: 100 MMOL/L — SIGNIFICANT CHANGE UP (ref 96–108)
CHLORIDE SERPL-SCNC: 104 MMOL/L — SIGNIFICANT CHANGE UP (ref 96–108)
CO2 SERPL-SCNC: 26 MMOL/L — SIGNIFICANT CHANGE UP (ref 22–31)
CO2 SERPL-SCNC: 27 MMOL/L — SIGNIFICANT CHANGE UP (ref 22–31)
CREAT SERPL-MCNC: 0.57 MG/DL — SIGNIFICANT CHANGE UP (ref 0.5–1.3)
CREAT SERPL-MCNC: 0.72 MG/DL — SIGNIFICANT CHANGE UP (ref 0.5–1.3)
ELLIPTOCYTES BLD QL SMEAR: SLIGHT — SIGNIFICANT CHANGE UP
EOSINOPHIL # BLD AUTO: 0.6 K/UL — HIGH (ref 0–0.5)
EOSINOPHIL NFR BLD AUTO: 4 % — SIGNIFICANT CHANGE UP (ref 0–6)
GLUCOSE BLDC GLUCOMTR-MCNC: 146 MG/DL — HIGH (ref 70–99)
GLUCOSE SERPL-MCNC: 150 MG/DL — HIGH (ref 70–99)
GLUCOSE SERPL-MCNC: 196 MG/DL — HIGH (ref 70–99)
HCT VFR BLD CALC: 33.7 % — LOW (ref 34.5–45)
HCT VFR BLD CALC: 37.2 % — SIGNIFICANT CHANGE UP (ref 34.5–45)
HGB BLD-MCNC: 10.9 G/DL — LOW (ref 11.5–15.5)
HGB BLD-MCNC: 11.9 G/DL — SIGNIFICANT CHANGE UP (ref 11.5–15.5)
LACTATE SERPL-SCNC: 1.7 MMOL/L — SIGNIFICANT CHANGE UP (ref 0.7–2)
LACTATE SERPL-SCNC: 2.1 MMOL/L — HIGH (ref 0.7–2)
LIDOCAIN IGE QN: HIGH U/L (ref 73–393)
LYMPHOCYTES # BLD AUTO: 0.5 K/UL — LOW (ref 1–3.3)
LYMPHOCYTES # BLD AUTO: 7 % — LOW (ref 13–44)
MACROCYTES BLD QL: SLIGHT — SIGNIFICANT CHANGE UP
MANUAL DIF COMMENT BLD-IMP: SIGNIFICANT CHANGE UP
MCHC RBC-ENTMCNC: 29.5 PG — SIGNIFICANT CHANGE UP (ref 27–34)
MCHC RBC-ENTMCNC: 30.5 PG — SIGNIFICANT CHANGE UP (ref 27–34)
MCHC RBC-ENTMCNC: 31.8 GM/DL — LOW (ref 32–36)
MCHC RBC-ENTMCNC: 32.4 GM/DL — SIGNIFICANT CHANGE UP (ref 32–36)
MCV RBC AUTO: 92.6 FL — SIGNIFICANT CHANGE UP (ref 80–100)
MCV RBC AUTO: 94 FL — SIGNIFICANT CHANGE UP (ref 80–100)
MONOCYTES # BLD AUTO: 0.9 K/UL — SIGNIFICANT CHANGE UP (ref 0–0.9)
MONOCYTES NFR BLD AUTO: 6 % — SIGNIFICANT CHANGE UP (ref 2–14)
NEUTROPHILS # BLD AUTO: 7.5 K/UL — HIGH (ref 1.8–7.4)
NEUTROPHILS NFR BLD AUTO: 76 % — SIGNIFICANT CHANGE UP (ref 43–77)
NEUTS BAND # BLD: 2 % — SIGNIFICANT CHANGE UP (ref 0–8)
OVALOCYTES BLD QL SMEAR: SLIGHT — SIGNIFICANT CHANGE UP
PLAT MORPH BLD: NORMAL — SIGNIFICANT CHANGE UP
PLATELET # BLD AUTO: 233 K/UL — SIGNIFICANT CHANGE UP (ref 150–400)
PLATELET # BLD AUTO: 284 K/UL — SIGNIFICANT CHANGE UP (ref 150–400)
POIKILOCYTOSIS BLD QL AUTO: SLIGHT — SIGNIFICANT CHANGE UP
POTASSIUM SERPL-MCNC: 3.8 MMOL/L — SIGNIFICANT CHANGE UP (ref 3.5–5.3)
POTASSIUM SERPL-MCNC: 4 MMOL/L — SIGNIFICANT CHANGE UP (ref 3.5–5.3)
POTASSIUM SERPL-SCNC: 3.8 MMOL/L — SIGNIFICANT CHANGE UP (ref 3.5–5.3)
POTASSIUM SERPL-SCNC: 4 MMOL/L — SIGNIFICANT CHANGE UP (ref 3.5–5.3)
PROT SERPL-MCNC: 7.5 GM/DL — SIGNIFICANT CHANGE UP (ref 6–8.3)
RBC # BLD: 3.58 M/UL — LOW (ref 3.8–5.2)
RBC # BLD: 4.02 M/UL — SIGNIFICANT CHANGE UP (ref 3.8–5.2)
RBC # FLD: 14 % — SIGNIFICANT CHANGE UP (ref 10.3–14.5)
RBC # FLD: 14 % — SIGNIFICANT CHANGE UP (ref 10.3–14.5)
RBC BLD AUTO: (no result)
SODIUM SERPL-SCNC: 135 MMOL/L — SIGNIFICANT CHANGE UP (ref 135–145)
SODIUM SERPL-SCNC: 138 MMOL/L — SIGNIFICANT CHANGE UP (ref 135–145)
TROPONIN I SERPL-MCNC: <0.015 NG/ML — SIGNIFICANT CHANGE UP (ref 0.01–0.04)
VARIANT LYMPHS # BLD: 2 % — SIGNIFICANT CHANGE UP (ref 0–6)
WBC # BLD: 10.1 K/UL — SIGNIFICANT CHANGE UP (ref 3.8–10.5)
WBC # BLD: 5.7 K/UL — SIGNIFICANT CHANGE UP (ref 3.8–10.5)
WBC # FLD AUTO: 10.1 K/UL — SIGNIFICANT CHANGE UP (ref 3.8–10.5)
WBC # FLD AUTO: 5.7 K/UL — SIGNIFICANT CHANGE UP (ref 3.8–10.5)

## 2017-12-23 PROCEDURE — 99285 EMERGENCY DEPT VISIT HI MDM: CPT

## 2017-12-23 PROCEDURE — 71010: CPT | Mod: 26

## 2017-12-23 PROCEDURE — 93010 ELECTROCARDIOGRAM REPORT: CPT

## 2017-12-23 RX ORDER — SODIUM CHLORIDE 9 MG/ML
1000 INJECTION, SOLUTION INTRAVENOUS
Qty: 0 | Refills: 0 | Status: DISCONTINUED | OUTPATIENT
Start: 2017-12-23 | End: 2017-12-28

## 2017-12-23 RX ORDER — INSULIN LISPRO 100/ML
VIAL (ML) SUBCUTANEOUS AT BEDTIME
Qty: 0 | Refills: 0 | Status: DISCONTINUED | OUTPATIENT
Start: 2017-12-23 | End: 2017-12-23

## 2017-12-23 RX ORDER — SODIUM CHLORIDE 9 MG/ML
3 INJECTION INTRAMUSCULAR; INTRAVENOUS; SUBCUTANEOUS ONCE
Qty: 0 | Refills: 0 | Status: COMPLETED | OUTPATIENT
Start: 2017-12-23 | End: 2017-12-23

## 2017-12-23 RX ORDER — LEVOTHYROXINE SODIUM 125 MCG
25 TABLET ORAL DAILY
Qty: 0 | Refills: 0 | Status: DISCONTINUED | OUTPATIENT
Start: 2017-12-23 | End: 2017-12-28

## 2017-12-23 RX ORDER — HYDROMORPHONE HYDROCHLORIDE 2 MG/ML
0.5 INJECTION INTRAMUSCULAR; INTRAVENOUS; SUBCUTANEOUS ONCE
Qty: 0 | Refills: 0 | Status: DISCONTINUED | OUTPATIENT
Start: 2017-12-23 | End: 2017-12-23

## 2017-12-23 RX ORDER — SODIUM CHLORIDE 9 MG/ML
2000 INJECTION INTRAMUSCULAR; INTRAVENOUS; SUBCUTANEOUS
Qty: 0 | Refills: 0 | Status: DISCONTINUED | OUTPATIENT
Start: 2017-12-23 | End: 2017-12-24

## 2017-12-23 RX ORDER — APIXABAN 2.5 MG/1
5 TABLET, FILM COATED ORAL EVERY 12 HOURS
Qty: 0 | Refills: 0 | Status: DISCONTINUED | OUTPATIENT
Start: 2017-12-23 | End: 2017-12-25

## 2017-12-23 RX ORDER — DEXTROSE 50 % IN WATER 50 %
1 SYRINGE (ML) INTRAVENOUS ONCE
Qty: 0 | Refills: 0 | Status: DISCONTINUED | OUTPATIENT
Start: 2017-12-23 | End: 2017-12-28

## 2017-12-23 RX ORDER — HYDROMORPHONE HYDROCHLORIDE 2 MG/ML
0.5 INJECTION INTRAMUSCULAR; INTRAVENOUS; SUBCUTANEOUS EVERY 4 HOURS
Qty: 0 | Refills: 0 | Status: DISCONTINUED | OUTPATIENT
Start: 2017-12-23 | End: 2017-12-28

## 2017-12-23 RX ORDER — LANOLIN ALCOHOL/MO/W.PET/CERES
3 CREAM (GRAM) TOPICAL AT BEDTIME
Qty: 0 | Refills: 0 | Status: DISCONTINUED | OUTPATIENT
Start: 2017-12-23 | End: 2017-12-28

## 2017-12-23 RX ORDER — FUROSEMIDE 40 MG
40 TABLET ORAL DAILY
Qty: 0 | Refills: 0 | Status: DISCONTINUED | OUTPATIENT
Start: 2017-12-23 | End: 2017-12-28

## 2017-12-23 RX ORDER — ASPIRIN/CALCIUM CARB/MAGNESIUM 324 MG
81 TABLET ORAL DAILY
Qty: 0 | Refills: 0 | Status: DISCONTINUED | OUTPATIENT
Start: 2017-12-23 | End: 2017-12-28

## 2017-12-23 RX ORDER — DEXTROSE 50 % IN WATER 50 %
25 SYRINGE (ML) INTRAVENOUS ONCE
Qty: 0 | Refills: 0 | Status: DISCONTINUED | OUTPATIENT
Start: 2017-12-23 | End: 2017-12-28

## 2017-12-23 RX ORDER — POLYETHYLENE GLYCOL 3350 17 G/17G
17 POWDER, FOR SOLUTION ORAL DAILY
Qty: 0 | Refills: 0 | Status: DISCONTINUED | OUTPATIENT
Start: 2017-12-23 | End: 2017-12-28

## 2017-12-23 RX ORDER — METOPROLOL TARTRATE 50 MG
25 TABLET ORAL
Qty: 0 | Refills: 0 | Status: DISCONTINUED | OUTPATIENT
Start: 2017-12-23 | End: 2017-12-23

## 2017-12-23 RX ORDER — METOPROLOL TARTRATE 50 MG
25 TABLET ORAL
Qty: 0 | Refills: 0 | Status: DISCONTINUED | OUTPATIENT
Start: 2017-12-23 | End: 2017-12-28

## 2017-12-23 RX ORDER — POTASSIUM CHLORIDE 20 MEQ
20 PACKET (EA) ORAL
Qty: 0 | Refills: 0 | Status: DISCONTINUED | OUTPATIENT
Start: 2017-12-23 | End: 2017-12-28

## 2017-12-23 RX ORDER — DEXTROSE 50 % IN WATER 50 %
12.5 SYRINGE (ML) INTRAVENOUS ONCE
Qty: 0 | Refills: 0 | Status: DISCONTINUED | OUTPATIENT
Start: 2017-12-23 | End: 2017-12-28

## 2017-12-23 RX ORDER — SODIUM CHLORIDE 9 MG/ML
1000 INJECTION, SOLUTION INTRAVENOUS ONCE
Qty: 0 | Refills: 0 | Status: COMPLETED | OUTPATIENT
Start: 2017-12-23 | End: 2017-12-23

## 2017-12-23 RX ORDER — INSULIN LISPRO 100/ML
VIAL (ML) SUBCUTANEOUS
Qty: 0 | Refills: 0 | Status: DISCONTINUED | OUTPATIENT
Start: 2017-12-23 | End: 2017-12-23

## 2017-12-23 RX ORDER — GLUCAGON INJECTION, SOLUTION 0.5 MG/.1ML
1 INJECTION, SOLUTION SUBCUTANEOUS ONCE
Qty: 0 | Refills: 0 | Status: DISCONTINUED | OUTPATIENT
Start: 2017-12-23 | End: 2017-12-28

## 2017-12-23 RX ORDER — SIMVASTATIN 20 MG/1
40 TABLET, FILM COATED ORAL AT BEDTIME
Qty: 0 | Refills: 0 | Status: DISCONTINUED | OUTPATIENT
Start: 2017-12-23 | End: 2017-12-28

## 2017-12-23 RX ORDER — ONDANSETRON 8 MG/1
4 TABLET, FILM COATED ORAL ONCE
Qty: 0 | Refills: 0 | Status: DISCONTINUED | OUTPATIENT
Start: 2017-12-23 | End: 2017-12-28

## 2017-12-23 RX ADMIN — Medication 81 MILLIGRAM(S): at 10:24

## 2017-12-23 RX ADMIN — Medication 25 MICROGRAM(S): at 06:20

## 2017-12-23 RX ADMIN — Medication 20 MILLIGRAM(S): at 06:26

## 2017-12-23 RX ADMIN — SODIUM CHLORIDE 100 MILLILITER(S): 9 INJECTION INTRAMUSCULAR; INTRAVENOUS; SUBCUTANEOUS at 23:18

## 2017-12-23 RX ADMIN — Medication 40 MILLIGRAM(S): at 06:20

## 2017-12-23 RX ADMIN — SIMVASTATIN 40 MILLIGRAM(S): 20 TABLET, FILM COATED ORAL at 23:14

## 2017-12-23 RX ADMIN — HYDROMORPHONE HYDROCHLORIDE 0.5 MILLIGRAM(S): 2 INJECTION INTRAMUSCULAR; INTRAVENOUS; SUBCUTANEOUS at 17:48

## 2017-12-23 RX ADMIN — SODIUM CHLORIDE 1000 MILLILITER(S): 9 INJECTION, SOLUTION INTRAVENOUS at 17:50

## 2017-12-23 RX ADMIN — SODIUM CHLORIDE 3 MILLILITER(S): 9 INJECTION INTRAMUSCULAR; INTRAVENOUS; SUBCUTANEOUS at 17:37

## 2017-12-23 RX ADMIN — APIXABAN 5 MILLIGRAM(S): 2.5 TABLET, FILM COATED ORAL at 06:20

## 2017-12-23 RX ADMIN — ESCITALOPRAM OXALATE 10 MILLIGRAM(S): 10 TABLET, FILM COATED ORAL at 10:24

## 2017-12-23 RX ADMIN — Medication 100 MILLIGRAM(S): at 06:20

## 2017-12-23 RX ADMIN — APIXABAN 5 MILLIGRAM(S): 2.5 TABLET, FILM COATED ORAL at 23:14

## 2017-12-23 NOTE — PROGRESS NOTE ADULT - PROBLEM SELECTOR PROBLEM 3
CAD (coronary artery disease)
Heart failure with preserved ejection fraction
CAD (coronary artery disease)
Heart failure with preserved ejection fraction
CAD (coronary artery disease)
Heart failure with preserved ejection fraction
CAD (coronary artery disease)

## 2017-12-23 NOTE — INPATIENT CERTIFICATION FOR MEDICARE PATIENTS - RISKS OF ADVERSE EVENTS
Concern for cardiopulmonary deterioration/Concern for worsening infectious process/Concern for delay in diagnosis and treatment

## 2017-12-23 NOTE — H&P ADULT - PROBLEM SELECTOR PLAN 2
-continue the Metoprolol  -CHADVASC>3-continue the Eliquis -continue the Metoprolol  -CHADVASC>6-continue the Eliquis -continue Metoprolol  -CHADVASC>6-continue  Eliquis

## 2017-12-23 NOTE — PROGRESS NOTE ADULT - PROBLEM SELECTOR PLAN 5
- continue metoprolol 25 mg BID for rate control  - continue Eliquis 5 mg BID for AC  - CHA2D2 VASC 6
- hold oral meds  - ISS  - diabetic diet when restarting on diet
- continue metoprolol 25 mg BID for rate control  - continue Eliquis 5 mg BID for AC  - CHA2D2 VASC 6
- hold oral meds  - ISS  - diabetic diet
- continue metoprolol 25 mg BID for rate control  - continue Eliquis 5 mg BID for AC  - CHA2D2 VASC 6
- hold oral meds  - ISS  - diabetic diet when restarting on diet
- continue metoprolol 25 mg BID for rate control  - continue Eliquis 5 mg BID for AC  - CHA2D2 VASC 6
- continue metoprolol 25 mg BID for rate control  - continue Eliquis 5 mg BID for AC  - CHA2D2 VASC 6
- hold oral meds  - ISS  - Diabetic diet

## 2017-12-23 NOTE — ED PROVIDER NOTE - OBJECTIVE STATEMENT
89 y/o F with Hx of AFib, DM2, CAD, HLD, pancreatitis d/c'd today from  presents to ED for evaluation of RUQ abd pain. Pt was d/c'd from  today after treatment for acute pancreatitis, she got home she developed the pain. She states the pain was similar to what brought her to the hospital earlier this month, she called her son who is a physician who referred her to the ED. She also reports increased belching which also occurred earlier this mornth. No fever or chills.

## 2017-12-23 NOTE — PROGRESS NOTE ADULT - PROBLEM SELECTOR PROBLEM 8
Pulmonary hypertension
Right leg pain

## 2017-12-23 NOTE — H&P ADULT - PROBLEM SELECTOR PROBLEM 5
Hypertension Hypothyroid R/O Type 2 diabetes mellitus without complication, without long-term current use of insulin

## 2017-12-23 NOTE — PROGRESS NOTE ADULT - PROBLEM SELECTOR PLAN 6
- hold oral meds  - ISS  - Diabetic diet
- lipid profile within normal limits  - hold Zocor
- hold oral meds  - ISS  - Diabetic diet
- hold oral meds  - ISS  - Diabetic diet
- lipid profile within normal limits  - hold Zocor
- hold oral meds  - ISS  - Diabetic diet
- hold oral meds  - ISS  - diabetic diet when restarting on diet
- lipid profile within normal limits  -DASH/TLC Diet
- lipid profile within normal limits  - hold Zocor

## 2017-12-23 NOTE — PROGRESS NOTE ADULT - PROBLEM SELECTOR PROBLEM 2
Right leg pain
CAD (coronary artery disease)
CAD (coronary artery disease)
Right leg pain
Heart failure with preserved ejection fraction
Right leg pain
Right leg pain
CAD (coronary artery disease)
Right leg pain

## 2017-12-23 NOTE — H&P ADULT - HISTORY OF PRESENT ILLNESS
89 year old female with past medical history of CAD s/p stent, Atrial Fibrillation, HFpEF, T2DM, Hyperlipidemia , Pulmonary HTN discharged this AM with the diagnosis of the Idiopathic pancreatitis comes with the CC of the abdominal pain for couple of hours.   Patient states that she went home had a bannana and milk soon after she started severe pain bought to the ED by her son. Patient states feels like could be ulcer pain.    In the ED she received 1l bolus and dilaudid 89 year old female with past medical history of CAD s/p stent, Atrial Fibrillation, HFpEF, T2DM, Hyperlipidemia , Pulmonary HTN discharged this AM with the diagnosis of the Idiopathic pancreatitis comes with the CC of the abdominal pain for couple of hours.  Patient states that she went home and felt like hunger pain soon after a bannana she started severe pain similar kind of the pain which he had in the previous admission. No nausea ,vomiting, diarrhoea, fever, chills, chest pain or SOB, palpitations.    In the ED she received 1l bolus and Dilaudid felt better after the Dilaudid    h/o of the attack of the pancreatitis with discharge diagnosis secondary to the Januvia and Metolazone 89 year old female with past medical history of CAD s/p stent, Atrial Fibrillation, HFpEF, TAVR, T2DM, Hyperlipidemia , Pulmonary HTN discharged this AM with the diagnosis of the Idiopathic pancreatitis comes with the CC of the abdominal pain for couple of hours.  Patient states that she went home and felt like hunger pain soon after a bannana she started severe pain similar kind of the pain which he had in the previous admission. No nausea ,vomiting, diarrhoea, fever, chills, chest pain or SOB, palpitations.    In the ED she received 1l bolus and Dilaudid felt better after the Dilaudid    h/o of the attack of the pancreatitis with discharge diagnosis secondary to the Januvia and Metolazone

## 2017-12-23 NOTE — PROGRESS NOTE ADULT - PROBLEM SELECTOR PROBLEM 7
Dyslipidemia
Pulmonary hypertension
Dyslipidemia
Pulmonary hypertension
Dyslipidemia
Pulmonary hypertension
Dyslipidemia
Dyslipidemia
Pulmonary hypertension

## 2017-12-23 NOTE — PROGRESS NOTE ADULT - ASSESSMENT
89F w/ PM as above admitted with acute onset of abdominal pain due to recurrent acute pancreatitis. Appears to be idiopathic. Afebrile with no signs of jaundice.
89F w/ PM as above admitted with acute onset of abdominal pain due to recurrent acute pancreatitis. Appears to be idiopathic. Afebrile with no signs of jaundice.
Abdominal pain- pancreatitis -   Management pre primary team.       Acute on chronic HFpEF- last echo 10/16 LVEF 55%.  Mild hypervolemia noted.  Close monitoring of the volume status.  Currently NPO.  close monitoring of the renal function and electrolytes.  Goal potassium of 4 and magnesium of 2.   ALthough metolazone was mentioned as likely culprit for pancreatitis, I did not see that listed on her outpt meds or in med list from [facility].    Please do not restart metolazone solely.  It is usually used in combination with loop diuretics.  Pt at this time will not need metolazone and I think she can be diuresed with lasix iv if no contraindications exist.      Chronic Afib- rate controlled.  Continue current meds.  Continue apixaban.    s/p bioprosthetic AVR- stable per last echo.    Other medical issues- Management per primary team.   Thank you for allowing me to participate in the care of this patient. Please feel free to contact me with any questions.
1. Abdominal pain- pancreatitis - appears resolved on CT A/p. GI following.     2. Acute on chronic HFpEF- last echo 10/16 LVEF 55%.  NYHA class III. Mildly hypervolemic. Continue IV diuresis- BUN/Cr WNL. Close monitoring of the renal function and electrolytes.  Goal potassium of 4 and magnesium of 2. Strict I/O and daily wt checks. Low sodium diet. Nutrition education.     3. Chronic Afib- rate controlled. Continue current meds. Continue apixaban for CVA proph.    3. S/p bioprosthetic AVR- stable per last echo.    4. DVT proph, replete lytes as needed.
1. Abdominal pain- pancreatitis- resolving- further management pre primary team.     2. Acute on chronic HFpEF- last echo 10/16 LVEF 55%. NYHA class III. Remains hypervolemic- would continue IV diuresis another 24hrs. Continue close monitoring of the renal function and electrolytes.  Goal potassium of 4 and magnesium of 2. Strict I/O and daily wt checks. Low sodium diet. Nutrition education.     3. Fever- 102 last pm- cx pending, w/u ongoing as per primary team.     4. Chronic Afib- rate controlled. Continue current meds. Continue apixaban for LTA.    5. S/p bioprosthetic AVR- stable per last echo.    6. DVT proph, replete lytes as needed.
89 yo female with multiple medical issues, admitted with pancreatitis. Improved today. Will start clear liquids and observe for now.
89F w/ PM as above admitted with acute onset of abdominal pain due to recurrent acute pancreatitis. Appears to be idiopathic. Afebrile with no signs of jaundice.
89yo female with pancreatitis  it is slowly improving   will advance diet  pt with increased edema likely secondary to IV fluids given while NPO
A/P:   Acute Pancreatitis  Admitted to Medicine  NPO, IVF, Pain control  Repeat AM labs  Supportive care
Abdominal pain- pancreatitis -   Management pre primary team.       Acute on chronic HFpEF- last echo 10/16 LVEF 55%.  Hypervolemic.  Continue lasix iv daily.  Will give additional lasix today 3pm.   close monitoring of the renal function and electrolytes.  Goal potassium of 4 and magnesium of 2.   Strict I/O and daily wt checks. Low sodium diet. Nutrition education.     Please do not restart metolazone solely.  It is usually used in combination with loop diuretics.  Pt at this time will not need metolazone and I think she can be diuresed with lasix iv if no contraindications exist.      Chronic Afib- rate controlled.  Continue current meds.  Continue apixaban.    s/p bioprosthetic AVR- stable per last echo.    Other medical issues- Management per primary team.   Thank you for allowing me to participate in the care of this patient. Please feel free to contact me with any questions.
Abdominal pain- pancreatitis -   Management pre primary team.       Acute on chronic HFpEF- last echo 10/16 LVEF 55%.  NYHA class III.   Hypervolemic.  Increase lasix to 40mg iv BID.  I ordered one dose now along with potassium supplementation.  Please do not discharge the patient tomorrow since she is still congested and will need further diuresis iv with diuretics.   close monitoring of the renal function and electrolytes.  Goal potassium of 4 and magnesium of 2.   Strict I/O and daily wt checks. Low sodium diet. Nutrition education.   D/W hospitalist team and pt and her son Igor Dill.  She is a candidate for implantable pulmonary pressure monitor.  Will do further evaluation after discharge.      Chronic Afib- rate controlled.  Continue current meds.  Continue apixaban.    s/p bioprosthetic AVR- stable per last echo.    Other medical issues- Management per primary team.   Thank you for allowing me to participate in the care of this patient. Please feel free to contact me with any questions.
Abdominal pain- pancreatitis -   Management pre primary team.       Acute on chronic HFpEF- last echo 10/16 LVEF 55%.  NYHA class III.   Hypervolemic.  Increase lasix to 40mg iv BID.  PO potassium 40mg po BID today.   close monitoring of the renal function and electrolytes.  Goal potassium of 4 and magnesium of 2.   Strict I/O and daily wt checks. Low sodium diet. Nutrition education.     She is a candidate for implantable pulmonary pressure monitor.  Will do further evaluation after discharge.      Chronic Afib- rate controlled.  Continue current meds.  Continue apixaban.    s/p bioprosthetic AVR- stable per last echo.    Other medical issues- Management per primary team.   Thank you for allowing me to participate in the care of this patient. Please feel free to contact me with any questions.
Abdominal pain- pancreatitis - on IVF at this time.   Management pre primary team.       Acute on chronic HFpEF- last echo 10/16 LVEF 55%.  Mild hypervolemia noted.  Recommended decreasing the NSS from 80cc/hr to 50cc/hr.  Currently NPO.  close monitoring of the renal function and electrolytes.  Goal potassium of 4 and magnesium of 2.   ALthough metolazone was mentioned as likely culprit for pancreatitis, I did not see that listed on her outpt meds or in med list from [facility].  Pt will soon be needing diuretics and we need to figure out if metolazone or lasix was the culprit.  No SOB at rest and so hold off on diuretics for now.  Reasses respiratory status later in the day.  Currently off O2 as well.    Chronic Afib- rate controlled.  Continue current meds.  Continue apixaban.    s/p bioprosthetic AVR- stable per last echo.    Other medical issues- Management per primary team.   Thank you for allowing me to participate in the care of this patient. Please feel free to contact me with any questions.
Imp:  Acute idiopathic pancreatitis    Rec:  Cont gentle hydration  Cont NPO  pain control
Imp:  Acute pancreatitis, resolved    Rec:  Fluids stopped  Cont regular diet  Optimize diuretics in preparation for d/c
Imp:  Recurrent idiopathic pancreatitis, resolved    Rec:  Cont POs.  Reconsult prn
89F w/ PM as above admitted with acute onset of abdominal pain due to recurrent acute pancreatitis. Appears to be idiopathic. Afebrile with no signs of jaundice.
89F w/ PM as above admitted with acute onset of abdominal pain due to recurrent acute pancreatitis. Appears to be idiopathic. Afebrile with no signs of jaundice.
89 yo F with a history of pancreatitis admitted with acute onset of abdominal pain. Likely secondary to a recurrence of pancreatitis. Appears to be idiopathic Afebrile with no signs of jaundice.
89F w/ PM as above admitted with acute onset of abdominal pain due to recurrent acute pancreatitis. Appears to be idiopathic. Afebrile with no signs of jaundice.

## 2017-12-23 NOTE — PROGRESS NOTE ADULT - PROBLEM SELECTOR PROBLEM 1
Idiopathic acute pancreatitis, unspecified complication status

## 2017-12-23 NOTE — PROGRESS NOTE ADULT - PROBLEM SELECTOR PLAN 2
- stable  - continue ASA 81, metoprolol 25 mg BID  - holding Zocor for now
Doppler of RLE: negative for DVT
Doppler of RLE: negative for DVT
- stable  - continue ASA 81, metoprolol 25 mg BID  - holding Zocor for now
- stable  - continue ASA 81, metoprolol 25 mg BID  - holding Zocor for now
Doppler of RLE: negative for DVT
- Pulmonary edema complication secondary to the fluids for the pancreatitis  - cardiac cath in Feb 2017  - normal LV, EF 65%  - Lasix 40 mg BID PO home medication   - Fluid restriction  - Low sodium diet  - Cardiology consult appreciated.
Doppler of RLE: negative for DVT
Doppler of RLE: negative for DVT

## 2017-12-23 NOTE — PROGRESS NOTE ADULT - SUBJECTIVE AND OBJECTIVE BOX
CHIEF COMPLAINT: Epigastric Pain     89 year old female with past medical history of CAD s/p stent, Atrial Fibrillation, HFpEF, T2DM, HLD, PHTN, presenting with abdominal pain x 1 day. Abdominal pain started suddenly in AM, located in epigastric area without radiation, described as dull, with episodes occurring intermittently throughout the day. Associated symptoms include burping. No exposure to new foods, N/V, change in BM. Patient was admitted for presumed drug-induced pancreatitis in May 2017. The offending agents were thought to be Metalozone and Januvia which were discontinued upon discharge. Patient was recently started on Levothyroxine about 3 weeks ago.    In the ED, [place] [address] and CT ABD positive for acute pancreatitis.     12/23:Patient seen and examined sitting in a chair .No overnight events. SOB and abdominal pain resolved .Doing well eager to go home. Labs reviewed stressed patient compliance of the medication. D/W with patient son Dr Estrada plan of care and stable for discharge follow up with the PCP Dr Camacho who have been following in the hospital.    ROS:    CONSTITUTIONAL: No fevers or chills  EYES/ENT: No visual changes;  No vertigo or throat pain   NECK: No pain or stiffness  RESPIRATORY: No cough, wheezing, hemoptysis; No shortness of breath  CARDIOVASCULAR: No chest pain or palpitations  GASTROINTESTINAL: No abdominal or epigastric pain. No nausea, vomiting, or hematemesis; No diarrhea or constipation. No melena or hematochezia.  GENITOURINARY: No dysuria, frequency or hematuria  NEUROLOGICAL: No numbness or weakness  SKIN: No itching, burning, rashes, or lesions   MUSCULOSKELETAL:none  All other review of systems is negative unless indicated above    Vital Signs Last 24 Hrs  T(C): 36.7 (23 Dec 2017 11:31), Max: 36.9 (22 Dec 2017 16:33)  T(F): 98 (23 Dec 2017 11:31), Max: 98.5 (22 Dec 2017 16:33)  HR: 75 (23 Dec 2017 11:31) (75 - 91)  BP: 125/60 (23 Dec 2017 11:31) (99/25 - 133/88)  BP(mean): --  RR: 18 (23 Dec 2017 11:31) (18 - 20)  SpO2: 99% (23 Dec 2017 11:31) (94% - 99%)      PHYSICAL EXAM:    Constitutional: NAD, awake and alert, well-developed  HEENT: PERR, EOMI, Normal Hearing, MMM  Neck: Soft and supple, No LAD, No JVD  Respiratory: Good air entry; scattered crackles at lung bases but improving breath sounds overall  Cardiovascular: S1 and S2, irregular rate and rhythm, no Murmurs, gallops or rubs  Gastrointestinal: Bowel Sounds present, soft, nontender, nondistended, no guarding, no rebound  Extremities: Lower extremities propped on pillow; trace edema  Vascular: 2+ peripheral pulses  Neurological: A/O x 3, no focal deficits  Musculoskeletal: 5/5 strength b/l upper and lower extremities  Skin: No rashes      MEDICATIONS  (STANDING):  apixaban 5 milliGRAM(s) Oral every 12 hours  aspirin  chewable 81 milliGRAM(s) Oral daily  dextrose 50% Injectable 12.5 Gram(s) IV Push once  dextrose 50% Injectable 25 Gram(s) IV Push once  dextrose 50% Injectable 25 Gram(s) IV Push once  docusate sodium 100 milliGRAM(s) Oral three times a day  escitalopram 10 milliGRAM(s) Oral daily  furosemide    Tablet 40 milliGRAM(s) Oral two times a day  insulin lispro (HumaLOG) corrective regimen sliding scale   SubCutaneous three times a day before meals  levothyroxine 25 MICROGram(s) Oral daily  metoprolol     tartrate 25 milliGRAM(s) Oral two times a day  pantoprazole    Tablet 40 milliGRAM(s) Oral before dinner  senna 2 Tablet(s) Oral at bedtime  sildenafil (REVATIO) 20 milliGRAM(s) Oral two times a day    MEDICATIONS  (PRN):  acetaminophen   Tablet. 650 milliGRAM(s) Oral every 6 hours PRN Mild Pain (1 - 3)  dextrose Gel 1 Dose(s) Oral once PRN Blood Glucose LESS THAN 70 milliGRAM(s)/deciliter  glucagon  Injectable 1 milliGRAM(s) IntraMuscular once PRN Glucose LESS THAN 70 milligrams/deciliter  melatonin 3 milliGRAM(s) Oral at bedtime PRN Insomnia  polyethylene glycol 3350 17 Gram(s) Oral daily PRN Constipation    Vital Signs Last 24 Hrs  T(C): 36.7 (23 Dec 2017 11:31), Max: 36.9 (22 Dec 2017 16:33)  T(F): 98 (23 Dec 2017 11:31), Max: 98.5 (22 Dec 2017 16:33)  HR: 75 (23 Dec 2017 11:31) (75 - 91)  BP: 125/60 (23 Dec 2017 11:31) (99/25 - 133/88)  BP(mean): --  RR: 18 (23 Dec 2017 11:31) (18 - 20)  SpO2: 99% (23 Dec 2017 11:31) (94% - 99%)                          10.9   5.7   )-----------( 233      ( 23 Dec 2017 07:55 )             33.7     CBC Full  -  ( 23 Dec 2017 07:55 )  WBC Count : 5.7 K/uL  Hemoglobin : 10.9 g/dL  Hematocrit : 33.7 %  Platelet Count - Automated : 233 K/uL  Mean Cell Volume : 94.0 fl  Mean Cell Hemoglobin : 30.5 pg  Mean Cell Hemoglobin Concentration : 32.4 gm/dL  Auto Neutrophil # : x  Auto Lymphocyte # : x  Auto Monocyte # : x  Auto Eosinophil # : x  Auto Basophil # : x  Auto Neutrophil % : x  Auto Lymphocyte % : x  Auto Monocyte % : x  Auto Eosinophil % : x  Auto Basophil % : x    12-23    138  |  104  |  11  ----------------------------<  150<H>  4.0   |  27  |  0.57    Ca    8.7      23 Dec 2017 07:55              CAPILLARY BLOOD GLUCOSE      POCT Blood Glucose.: 146 mg/dL (23 Dec 2017 07:58)          I&O's Detail    23 Dec 2017 07:01  -  23 Dec 2017 14:05  --------------------------------------------------------  IN:    Oral Fluid: 360 mL  Total IN: 360 mL    OUT:  Total OUT: 0 mL    Total NET: 360 mL        I&O's Summary    23 Dec 2017 07:01  -  23 Dec 2017 14:05  --------------------------------------------------------  IN: 360 mL / OUT: 0 mL / NET: 360 mL            Cirrhosis.    DVT PPX: On Eliquis    ADVANCED DIRECTIVE:    DISPOSITION: Home with home PT

## 2017-12-23 NOTE — H&P ADULT - PROBLEM SELECTOR PLAN 3
-previous admission of exacerbation on the IVF for the pancreatitis.  -Continue the Lasix 40 mg IV  bid  -ECHO- 10/16 LVEF 55%.  -strict In and outs  -daily weight check -previous admission of exacerbation on the IVF for the pancreatitis.  -Continue the Lasix 40 mg IV  once a day for now.  -ECHO- 10/16 LVEF 55%.  -strict In and outs  -daily weight check -previous admission of exacerbation on the IVF for pancreatitis.  -Continue the Lasix 40 mg IV  once a day for now to limit development of lung edema  -ECHO- 10/16 LVEF 55%.  -strict In and outs  -daily weight check  IV lasix once daily, follow exam

## 2017-12-23 NOTE — PROGRESS NOTE ADULT - PROVIDER SPECIALTY LIST ADULT
Cardiology
Family Medicine
Gastroenterology
Hospitalist
Surgery
Hospitalist
Cardiology
Hospitalist
Family Medicine

## 2017-12-23 NOTE — H&P ADULT - ATTENDING COMMENTS
Pt interviewed and exam.  Hx, exam, A/P as outlined above      Was home for 5-6 hours w sudden onset of abd pain without radiation.

## 2017-12-23 NOTE — ED ADULT NURSE NOTE - CHPI ED SYMPTOMS NEG
no diarrhea/no blood in stool/no burning urination/no vomiting/no chills/no fever/no dysuria/no hematuria

## 2017-12-23 NOTE — PROGRESS NOTE ADULT - PROBLEM SELECTOR PLAN 8
- stable  - continue sildenafil 20 mg BID
Doppler of RLE: negative for DVT likely secondary to OA  -on eliquis

## 2017-12-23 NOTE — ED ADULT NURSE NOTE - ED STAT RN HANDOFF DETAILS 2
Received report from RN ___ and reassessed patient. Agree with the previous RN assessment. Received report from RN Zoie APPIAH and reassessed patient. Agree with the previous RN assessment. Patient states that her pain was completely relieved by Dilaudid administration, now at 0/10. Patient is awaiting remainder of lab results as well as further ED MD assessment/disposition. Patient provided with lip balm and toothett as she is NPO. Patient

## 2017-12-23 NOTE — H&P ADULT - PROBLEM SELECTOR PLAN 1
Recurrent episodes with the elevated LFT -r/o choledocholithiasis ,h/o of the pancreatitis be Metolazone and Januvia which were discontinued upon discharge.   NPO except for the medication.  -Lipase -22,524  -IVF-100 ml/hrs  -pain control  -GI recommendations-Endoscopic US and stop lexapro  -LFT- AST/ALT-96/47 follow up with the GGT  -Alkaline phosphatase elevated   -MRI-12/14:dilatation of the common duct to 16 mm but no evidence of   choledocholithiasis. Small amount of ill-defined fluid in the right subhepatic space and left upper quadrant could be due to pancreatitis.  -Repeat CT scan-12/20:Resolution of acute pancreatitis with no residual stranding or fluid visualized. Recurrent episodes with the elevated LFT -r/o choledocholithiasis ,h/o of the pancreatitis be Metolazone and Januvia which were discontinued upon discharge.   NPO except medication.  -Lipase -22,524  -IVF-100 ml/hrs  dilaudid+ zofran prn  likely endoscopic ultrasound  -GGT, repeat lipase in AM  -GI recommendations-Endoscopic US and stop lexapro  -LFT- AST/ALT-96/47 follow up with the GGT  -Alkaline phosphatase elevated   -MRI-12/14:dilatation of the common duct to 16 mm but no evidence of   choledocholithiasis. Small amount of ill-defined fluid in the right subhepatic space and left upper quadrant could be due to pancreatitis.  -Repeat CT scan-12/20:Resolution of acute pancreatitis with no residual stranding or fluid visualized.

## 2017-12-23 NOTE — ED ADULT NURSE REASSESSMENT NOTE - NS ED NURSE REASSESS COMMENT FT1
2100: Nurse Rounding: Patient is sleeping at time of rounding, no apparent distress, complaints, or comfort measures needed at this time. Awaiting completion of admission orders and bed placement. Will continue to monitor/reassess and ensure comfort/safety.

## 2017-12-23 NOTE — ED PROVIDER NOTE - PROGRESS NOTE DETAILS
Pt presenting with recurrent pancreatitis, pain well controlled and IVF initiated. Spoke with GI. Will admit for further management

## 2017-12-23 NOTE — PROGRESS NOTE ADULT - ATTENDING COMMENTS
Patient seen and examined with Carlos Alberto and Maryann Malagon on the Family Medicine Teaching Service.  Agree with history, physical, labs and plan which were reviewed in detail.

## 2017-12-23 NOTE — PROGRESS NOTE ADULT - PROBLEM SELECTOR PROBLEM 6
Diabetes mellitus
Dyslipidemia
Diabetes mellitus
Dyslipidemia
Diabetes mellitus
Dyslipidemia
Diabetes mellitus
Diabetes mellitus
Dyslipidemia

## 2017-12-23 NOTE — ED ADULT NURSE NOTE - OBJECTIVE STATEMENT
Pt is a 89y female, A & O x 3, VSS, presents to ED w/ epigastric pain, pt was discharged from  this morning for same sx's, was comfortable, then had sudden onset of pain prior to arrival. Pt denies chest pain, SOB, nausea, vomiting or diarrhea. IV lock #20 placed, EKG obtained, family at bedside, will continue to monitor.

## 2017-12-23 NOTE — PROGRESS NOTE ADULT - PROBLEM SELECTOR PLAN 1
Stable, lipase levels normal.  -h/o of similar attack of pancreatitis while on metolazone and Januvia   -follow up CT abdomen-resolving acute pancreatitis  -Tolerating  regualr diet  Pancreatitis resolved

## 2017-12-23 NOTE — H&P ADULT - ASSESSMENT
89 year old female with past medical history of CAD s/p stent, Atrial Fibrillation, HFpEF, T2DM, Hyperlipidemia , Pulmonary HTN discharged this AM with the diagnosis of the Idiopathic pancreatitis comes with the CC of the abdominal pain for couple of hours.  Patient states that she went home and felt like hunger pain soon after a bannana she started severe pain similar kind of the pain which he had in the previous admission. No nausea ,vomiting, diarrhoea, fever, chills, chest pain or SOB, palpitations. 89 year old female with past medical history of CAD s/p stent, Atrial Fibrillation, HFpEF, T2DM, Hyperlipidemia , Pulmonary HTN discharged this AM with the diagnosis of the Idiopathic pancreatitis comes with the CC of the abdominal pain for couple of hours.  Patient states that she went home and felt like hunger pain soon after a banana she started severe pain similar kind of the pain which he had in the previous admission. No nausea ,vomiting, diarrhoea, fever, chills, chest pain or SOB, palpitations.

## 2017-12-23 NOTE — H&P ADULT - NSHPPHYSICALEXAM_GEN_ALL_CORE
Vital Signs Last 24 Hrs  T(C): 36.6 (23 Dec 2017 23:12), Max: 36.8 (23 Dec 2017 17:25)  T(F): 97.9 (23 Dec 2017 23:12), Max: 98.2 (23 Dec 2017 17:25)  HR: 111 (23 Dec 2017 23:12) (75 - 111)  BP: 122/70 (23 Dec 2017 23:12) (99/25 - 145/73)  RR: 18 (23 Dec 2017 23:12) (17 - 18)  SpO2: 100% (23 Dec 2017 23:12) (94% - 100%)

## 2017-12-23 NOTE — PROGRESS NOTE ADULT - PROBLEM SELECTOR PROBLEM 4
Afib
Heart failure with preserved ejection fraction
Heart failure with preserved ejection fraction
Afib
Afib
Heart failure with preserved ejection fraction
Afib
Heart failure with preserved ejection fraction
Heart failure with preserved ejection fraction

## 2017-12-23 NOTE — PROGRESS NOTE ADULT - PROBLEM SELECTOR PLAN 7
- lipid profile within normal limits  - hold Zocor  -DASH/TLC Diet
- stable  - continue sildenafil 20 mg BID
- lipid profile within normal limits  - hold Zocor  -DASH/TLC Diet
- lipid profile within normal limits  -DASH/TLC Diet
- stable  - continue sildenafil 20 mg BID
- lipid profile within normal limits  - hold Zocor
- lipid profile within normal limits  - hold Zocor  -DASH/TLC Diet
- stable  - continue sildenafil 20 mg BID
- stable  - continue sildenafil 20 mg BID

## 2017-12-24 PROBLEM — E03.9 HYPOTHYROIDISM, UNSPECIFIED: Chronic | Status: ACTIVE | Noted: 2017-12-13

## 2017-12-24 PROBLEM — E78.5 HYPERLIPIDEMIA, UNSPECIFIED: Chronic | Status: ACTIVE | Noted: 2017-12-13

## 2017-12-24 LAB
ANION GAP SERPL CALC-SCNC: 8 MMOL/L — SIGNIFICANT CHANGE UP (ref 5–17)
APPEARANCE UR: CLEAR — SIGNIFICANT CHANGE UP
BILIRUB UR-MCNC: NEGATIVE — SIGNIFICANT CHANGE UP
BUN SERPL-MCNC: 8 MG/DL — SIGNIFICANT CHANGE UP (ref 7–23)
CALCIUM SERPL-MCNC: 8.3 MG/DL — LOW (ref 8.5–10.1)
CHLORIDE SERPL-SCNC: 102 MMOL/L — SIGNIFICANT CHANGE UP (ref 96–108)
CO2 SERPL-SCNC: 24 MMOL/L — SIGNIFICANT CHANGE UP (ref 22–31)
COLOR SPEC: YELLOW — SIGNIFICANT CHANGE UP
CREAT SERPL-MCNC: 0.65 MG/DL — SIGNIFICANT CHANGE UP (ref 0.5–1.3)
DIFF PNL FLD: NEGATIVE — SIGNIFICANT CHANGE UP
GGT SERPL-CCNC: 128 U/L — HIGH (ref 8–40)
GLUCOSE BLDC GLUCOMTR-MCNC: 119 MG/DL — HIGH (ref 70–99)
GLUCOSE BLDC GLUCOMTR-MCNC: 146 MG/DL — HIGH (ref 70–99)
GLUCOSE BLDC GLUCOMTR-MCNC: 151 MG/DL — HIGH (ref 70–99)
GLUCOSE SERPL-MCNC: 150 MG/DL — HIGH (ref 70–99)
GLUCOSE UR QL: NEGATIVE MG/DL — SIGNIFICANT CHANGE UP
HCT VFR BLD CALC: 32.9 % — LOW (ref 34.5–45)
HGB BLD-MCNC: 10.3 G/DL — LOW (ref 11.5–15.5)
KETONES UR-MCNC: NEGATIVE — SIGNIFICANT CHANGE UP
LACTATE SERPL-SCNC: 1.8 MMOL/L — SIGNIFICANT CHANGE UP (ref 0.7–2)
LEUKOCYTE ESTERASE UR-ACNC: NEGATIVE — SIGNIFICANT CHANGE UP
LIDOCAIN IGE QN: 2428 U/L — HIGH (ref 73–393)
MAGNESIUM SERPL-MCNC: 1.9 MG/DL — SIGNIFICANT CHANGE UP (ref 1.6–2.6)
MCHC RBC-ENTMCNC: 29 PG — SIGNIFICANT CHANGE UP (ref 27–34)
MCHC RBC-ENTMCNC: 31.3 GM/DL — LOW (ref 32–36)
MCV RBC AUTO: 92.7 FL — SIGNIFICANT CHANGE UP (ref 80–100)
NITRITE UR-MCNC: NEGATIVE — SIGNIFICANT CHANGE UP
PH UR: 7 — SIGNIFICANT CHANGE UP (ref 5–8)
PHOSPHATE SERPL-MCNC: 2.6 MG/DL — SIGNIFICANT CHANGE UP (ref 2.5–4.5)
PLATELET # BLD AUTO: 228 K/UL — SIGNIFICANT CHANGE UP (ref 150–400)
POTASSIUM SERPL-MCNC: 4 MMOL/L — SIGNIFICANT CHANGE UP (ref 3.5–5.3)
POTASSIUM SERPL-SCNC: 4 MMOL/L — SIGNIFICANT CHANGE UP (ref 3.5–5.3)
PROT UR-MCNC: NEGATIVE MG/DL — SIGNIFICANT CHANGE UP
RBC # BLD: 3.55 M/UL — LOW (ref 3.8–5.2)
RBC # FLD: 14.3 % — SIGNIFICANT CHANGE UP (ref 10.3–14.5)
SODIUM SERPL-SCNC: 134 MMOL/L — LOW (ref 135–145)
SP GR SPEC: 1.01 — SIGNIFICANT CHANGE UP (ref 1.01–1.02)
UROBILINOGEN FLD QL: NEGATIVE MG/DL — SIGNIFICANT CHANGE UP
WBC # BLD: 8.3 K/UL — SIGNIFICANT CHANGE UP (ref 3.8–10.5)
WBC # FLD AUTO: 8.3 K/UL — SIGNIFICANT CHANGE UP (ref 3.8–10.5)

## 2017-12-24 RX ORDER — SODIUM CHLORIDE 9 MG/ML
1000 INJECTION INTRAMUSCULAR; INTRAVENOUS; SUBCUTANEOUS
Qty: 0 | Refills: 0 | Status: DISCONTINUED | OUTPATIENT
Start: 2017-12-24 | End: 2017-12-28

## 2017-12-24 RX ADMIN — SIMVASTATIN 40 MILLIGRAM(S): 20 TABLET, FILM COATED ORAL at 21:43

## 2017-12-24 RX ADMIN — Medication 20 MILLIGRAM(S): at 06:25

## 2017-12-24 RX ADMIN — HYDROMORPHONE HYDROCHLORIDE 0.5 MILLIGRAM(S): 2 INJECTION INTRAMUSCULAR; INTRAVENOUS; SUBCUTANEOUS at 21:45

## 2017-12-24 RX ADMIN — Medication 81 MILLIGRAM(S): at 11:53

## 2017-12-24 RX ADMIN — HYDROMORPHONE HYDROCHLORIDE 0.5 MILLIGRAM(S): 2 INJECTION INTRAMUSCULAR; INTRAVENOUS; SUBCUTANEOUS at 09:29

## 2017-12-24 RX ADMIN — Medication 20 MILLIEQUIVALENT(S): at 18:03

## 2017-12-24 RX ADMIN — Medication 25 MICROGRAM(S): at 06:24

## 2017-12-24 RX ADMIN — Medication 25 MILLIGRAM(S): at 06:25

## 2017-12-24 RX ADMIN — APIXABAN 5 MILLIGRAM(S): 2.5 TABLET, FILM COATED ORAL at 18:03

## 2017-12-24 RX ADMIN — Medication 25 MILLIGRAM(S): at 18:03

## 2017-12-24 RX ADMIN — APIXABAN 5 MILLIGRAM(S): 2.5 TABLET, FILM COATED ORAL at 06:24

## 2017-12-24 RX ADMIN — Medication 40 MILLIGRAM(S): at 06:24

## 2017-12-24 RX ADMIN — SODIUM CHLORIDE 100 MILLILITER(S): 9 INJECTION INTRAMUSCULAR; INTRAVENOUS; SUBCUTANEOUS at 11:53

## 2017-12-24 RX ADMIN — Medication 20 MILLIEQUIVALENT(S): at 06:25

## 2017-12-24 RX ADMIN — Medication 20 MILLIGRAM(S): at 18:04

## 2017-12-24 NOTE — PROGRESS NOTE ADULT - SUBJECTIVE AND OBJECTIVE BOX
CHIEF COMPLAINT: Epigastric pain    SUBJECTIVE:  HPI:  89 year old female with past medical history of CAD s/p stent, Atrial Fibrillation, HFpEF, TAVR, T2DM, Hyperlipidemia , Pulmonary HTN discharged this AM with the diagnosis of the Idiopathic pancreatitis comes with the CC of the abdominal pain for couple of hours.  Patient states that she went home and felt like hunger pain soon after a bannana she started severe pain similar kind of the pain which he had in the previous admission. No nausea ,vomiting, diarrhoea, fever, chills, chest pain or SOB, palpitations.    In the ED she received 1l bolus and Dilaudid felt better after the Dilaudid    h/o of the attack of the pancreatitis with discharge diagnosis secondary to the Januvia and Metolazone (23 Dec 2017 21:25)    12/24: Pt seen and examined at bedside. Pts care was also discussed with her son  Fuentes. She denies any pain currently, but reports an excruciating pain following consumption of a banana after discharge.       REVIEW OF SYSTEMS:  CONSTITUTIONAL: No weakness, fevers or chills  EYES/ENT: No visual changes;  No vertigo or throat pain   NECK: No pain or stiffness  RESPIRATORY: No cough, wheezing, hemoptysis; No shortness of breath  CARDIOVASCULAR: No chest pain or palpitations  GASTROINTESTINAL: No abdominal or epigastric pain. No nausea, vomiting, or hematemesis; No diarrhea or constipation. No melena or hematochezia.  GENITOURINARY: No dysuria, frequency or hematuria  NEUROLOGICAL: No numbness or weakness  SKIN: No itching, burning, rashes, or lesions   All other review of systems is negative unless indicated above    Vital Signs Last 24 Hrs  T(C): 37.1 (24 Dec 2017 17:13), Max: 37.2 (24 Dec 2017 04:20)  T(F): 98.7 (24 Dec 2017 17:13), Max: 98.9 (24 Dec 2017 04:20)  HR: 84 (24 Dec 2017 17:13) (52 - 111)  BP: 121/59 (24 Dec 2017 17:13) (110/67 - 141/68)  RR: 17 (24 Dec 2017 17:13) (17 - 18)  SpO2: 92% (24 Dec 2017 17:13) (92% - 100%)    CAPILLARY BLOOD GLUCOSE  POCT Blood Glucose.: 119 mg/dL (24 Dec 2017 17:06)  POCT Blood Glucose.: 146 mg/dL (24 Dec 2017 11:27)  POCT Blood Glucose.: 151 mg/dL (24 Dec 2017 08:02)      PHYSICAL EXAM:  Constitutional: NAD, awake and alert, well-developed  HEENT: PERR, EOMI, Normal Hearing, MMM  Neck: Soft and supple, No LAD, No JVD  Respiratory: Breath sounds are clear bilaterally, No wheezing, rales or rhonchi  Cardiovascular: S1 and S2, irregular rate and rhythm, no Murmurs, gallops or rubs  Gastrointestinal: Bowel Sounds present, soft, nontender, nondistended, no guarding, no rebound  Extremities: No peripheral edema  Vascular: 2+ peripheral pulses  Neurological: A/O x 3, no focal deficits  Musculoskeletal: 5/5 strength b/l upper and lower extremities  Skin: No rashes    MEDICATIONS:  MEDICATIONS  (STANDING):  apixaban 5 milliGRAM(s) Oral every 12 hours  aspirin  chewable 81 milliGRAM(s) Oral daily  dextrose 5%. 1000 milliLiter(s) (50 mL/Hr) IV Continuous <Continuous>  dextrose 50% Injectable 12.5 Gram(s) IV Push once  dextrose 50% Injectable 25 Gram(s) IV Push once  dextrose 50% Injectable 25 Gram(s) IV Push once  furosemide   Injectable 40 milliGRAM(s) IV Push daily  levothyroxine 25 MICROGram(s) Oral daily  metoprolol     tartrate 25 milliGRAM(s) Oral two times a day  ondansetron Injectable 4 milliGRAM(s) IV Push once  potassium chloride    Tablet ER 20 milliEquivalent(s) Oral two times a day  sildenafil (REVATIO) 20 milliGRAM(s) Oral two times a day  simvastatin 40 milliGRAM(s) Oral at bedtime  sodium chloride 0.9%. 1000 milliLiter(s) (75 mL/Hr) IV Continuous <Continuous>    MEDICATIONS  (PRN):  dextrose Gel 1 Dose(s) Oral once PRN Blood Glucose LESS THAN 70 milliGRAM(s)/deciliter  glucagon  Injectable 1 milliGRAM(s) IntraMuscular once PRN Glucose LESS THAN 70 milligrams/deciliter  HYDROmorphone  Injectable 0.5 milliGRAM(s) IV Push every 4 hours PRN Severe Pain (7 - 10)  melatonin Oral Tab/Cap - Peds 3 milliGRAM(s) Oral at bedtime PRN Insomnia  polyethylene glycol 3350 17 Gram(s) Oral daily PRN Constipation        LABS: All Labs Reviewed:                        10.3   8.3   )-----------( 228      ( 24 Dec 2017 08:33 )             32.9     12-24    134<L>  |  102  |  8   ----------------------------<  150<H>  4.0   |  24  |  0.65    Ca    8.3<L>      24 Dec 2017 08:33  Phos  2.6     12-24  Mg     1.9     12-24    TPro  7.5  /  Alb  2.8<L>  /  TBili  1.0  /  DBili  x   /  AST  96<H>  /  ALT  47  /  AlkPhos  232<H>  12-23      CARDIAC MARKERS ( 23 Dec 2017 17:57 )  <0.015 ng/mL / x     / x     / x     / x          Blood Culture: 12-20 @ 00:23  Organism --  Gram Stain Blood -- Gram Stain --  Specimen Source .Blood None  Culture-Blood --        RADIOLOGY/EKG: < from: Xray Chest 1 View AP/PA. (12.23.17 @ 18:42) >  Impression:Mild vascular congestion. Mild cardiomegaly. Aortic valve   prosthesis.      DVT PPX: On Eliquis    ADVANCED DIRECTIVE:    DISPOSITION: CHIEF COMPLAINT: Epigastric pain    SUBJECTIVE:  HPI:  89 year old female with past medical history of CAD s/p stent, Atrial Fibrillation, HFpEF, TAVR, T2DM, Hyperlipidemia , Pulmonary HTN discharged this AM with the diagnosis of the Idiopathic pancreatitis comes with the CC of the abdominal pain for couple of hours.  Patient states that she went home and felt like hunger pain soon after a bannana she started severe pain similar kind of the pain which he had in the previous admission. No nausea ,vomiting, diarrhoea, fever, chills, chest pain or SOB, palpitations.    In the ED she received 1l bolus and Dilaudid felt better after the Dilaudid    h/o of the attack of the pancreatitis with discharge diagnosis secondary to the Januvia and Metolazone (23 Dec 2017 21:25)    12/24: Pt seen and examined at bedside. Pts care was also discussed with her son  Fuentes. She denies any pain currently, but reports an excruciating pain following consumption of a banana after discharge yesterday. She denies any nausea, vomiting, CP, or SOB.        REVIEW OF SYSTEMS:  CONSTITUTIONAL: No weakness, fevers or chills  EYES/ENT: No visual changes;  No vertigo or throat pain   NECK: No pain or stiffness  RESPIRATORY: No cough, wheezing, hemoptysis; No shortness of breath  CARDIOVASCULAR: No chest pain or palpitations  GASTROINTESTINAL: No abdominal or epigastric pain. No nausea, vomiting, or hematemesis; No diarrhea or constipation. No melena or hematochezia.  GENITOURINARY: No dysuria, frequency or hematuria  NEUROLOGICAL: No numbness or weakness  SKIN: No itching, burning, rashes, or lesions   All other review of systems is negative unless indicated above    Vital Signs Last 24 Hrs  T(C): 37.1 (24 Dec 2017 17:13), Max: 37.2 (24 Dec 2017 04:20)  T(F): 98.7 (24 Dec 2017 17:13), Max: 98.9 (24 Dec 2017 04:20)  HR: 84 (24 Dec 2017 17:13) (52 - 111)  BP: 121/59 (24 Dec 2017 17:13) (110/67 - 141/68)  RR: 17 (24 Dec 2017 17:13) (17 - 18)  SpO2: 92% (24 Dec 2017 17:13) (92% - 100%)    CAPILLARY BLOOD GLUCOSE  POCT Blood Glucose.: 119 mg/dL (24 Dec 2017 17:06)  POCT Blood Glucose.: 146 mg/dL (24 Dec 2017 11:27)  POCT Blood Glucose.: 151 mg/dL (24 Dec 2017 08:02)      PHYSICAL EXAM:  Constitutional: NAD, awake and alert, well-developed  HEENT: PERR, EOMI, Normal Hearing, MMM  Neck: Soft and supple, No LAD, No JVD  Respiratory: Mild crackles heard at lung bases  Cardiovascular: S1 and S2, irregular rate and rhythm, no Murmurs, gallops or rubs  Gastrointestinal: Bowel Sounds present, soft, nontender, nondistended, no guarding, no rebound  Extremities: Slight peripheral edema  Vascular: 2+ peripheral pulses  Neurological: A/O x 3, no focal deficits  Musculoskeletal: 5/5 strength b/l upper and lower extremities  Skin: No rashes    MEDICATIONS:  MEDICATIONS  (STANDING):  apixaban 5 milliGRAM(s) Oral every 12 hours  aspirin  chewable 81 milliGRAM(s) Oral daily  dextrose 5%. 1000 milliLiter(s) (50 mL/Hr) IV Continuous <Continuous>  dextrose 50% Injectable 12.5 Gram(s) IV Push once  dextrose 50% Injectable 25 Gram(s) IV Push once  dextrose 50% Injectable 25 Gram(s) IV Push once  furosemide   Injectable 40 milliGRAM(s) IV Push daily  levothyroxine 25 MICROGram(s) Oral daily  metoprolol     tartrate 25 milliGRAM(s) Oral two times a day  ondansetron Injectable 4 milliGRAM(s) IV Push once  potassium chloride    Tablet ER 20 milliEquivalent(s) Oral two times a day  sildenafil (REVATIO) 20 milliGRAM(s) Oral two times a day  simvastatin 40 milliGRAM(s) Oral at bedtime  sodium chloride 0.9%. 1000 milliLiter(s) (75 mL/Hr) IV Continuous <Continuous>    MEDICATIONS  (PRN):  dextrose Gel 1 Dose(s) Oral once PRN Blood Glucose LESS THAN 70 milliGRAM(s)/deciliter  glucagon  Injectable 1 milliGRAM(s) IntraMuscular once PRN Glucose LESS THAN 70 milligrams/deciliter  HYDROmorphone  Injectable 0.5 milliGRAM(s) IV Push every 4 hours PRN Severe Pain (7 - 10)  melatonin Oral Tab/Cap - Peds 3 milliGRAM(s) Oral at bedtime PRN Insomnia  polyethylene glycol 3350 17 Gram(s) Oral daily PRN Constipation        LABS: All Labs Reviewed:                        10.3   8.3   )-----------( 228      ( 24 Dec 2017 08:33 )             32.9     12-24    134<L>  |  102  |  8   ----------------------------<  150<H>  4.0   |  24  |  0.65    Ca    8.3<L>      24 Dec 2017 08:33  Phos  2.6     12-24  Mg     1.9     12-24    TPro  7.5  /  Alb  2.8<L>  /  TBili  1.0  /  DBili  x   /  AST  96<H>  /  ALT  47  /  AlkPhos  232<H>  12-23      CARDIAC MARKERS ( 23 Dec 2017 17:57 )  <0.015 ng/mL / x     / x     / x     / x          Blood Culture: 12-20 @ 00:23  Organism --  Gram Stain Blood -- Gram Stain --  Specimen Source .Blood None  Culture-Blood --        RADIOLOGY/EKG: < from: Xray Chest 1 View AP/PA. (12.23.17 @ 18:42) >  Impression:Mild vascular congestion. Mild cardiomegaly. Aortic valve   prosthesis.      DVT PPX: On Eliquis    ADVANCED DIRECTIVE:    DISPOSITION:

## 2017-12-24 NOTE — CONSULT NOTE ADULT - SUBJECTIVE AND OBJECTIVE BOX
Patient is a 89y old  Female who presents with a chief complaint of diarrhea, shortness of breath (23 Dec 2017 21:40)      HPI:  89 year old female with past medical history of CAD s/p stent, Atrial Fibrillation, HFpEF, TAVR, T2DM, Hyperlipidemia , Pulmonary HTN discharged this AM with the diagnosis of the Idiopathic pancreatitis comes with the CC of the abdominal pain for couple of hours.  Patient states that she went home and felt like hunger pain soon after a bannana she started severe pain similar kind of the pain which he had in the previous admission. No nausea ,vomiting, diarrhoea, fever, chills, chest pain or SOB, palpitations.    In the ED she received 1l bolus and Dilaudid felt better after the Dilaudid    h/o of the attack of the pancreatitis with discharge diagnosis secondary to the Januvia and Metolazone (23 Dec 2017 21:25)      abd pain that was in mid abd, sharp and crampy and now improved  pain was moderte  neg CP      PAST MEDICAL & SURGICAL HISTORY:  HLD (hyperlipidemia)  Hypothyroid  Afib  CAD (coronary artery disease)  Hypertension  History of Osteoarthritis  History of Atrial Fibrillation  GERD (Gastroesophageal Reflux Disease)  Dyslipidemia  Diabetes Mellitus Type II  History of appendectomy  History of cholecystectomy  H/O: Knee Surgery- right meniscus  H/O: Hysterectomy      MEDICATIONS  (STANDING):  apixaban 5 milliGRAM(s) Oral every 12 hours  aspirin  chewable 81 milliGRAM(s) Oral daily  dextrose 5%. 1000 milliLiter(s) (50 mL/Hr) IV Continuous <Continuous>  dextrose 50% Injectable 12.5 Gram(s) IV Push once  dextrose 50% Injectable 25 Gram(s) IV Push once  dextrose 50% Injectable 25 Gram(s) IV Push once  furosemide   Injectable 40 milliGRAM(s) IV Push daily  levothyroxine 25 MICROGram(s) Oral daily  metoprolol     tartrate 25 milliGRAM(s) Oral two times a day  ondansetron Injectable 4 milliGRAM(s) IV Push once  potassium chloride    Tablet ER 20 milliEquivalent(s) Oral two times a day  sildenafil (REVATIO) 20 milliGRAM(s) Oral two times a day  simvastatin 40 milliGRAM(s) Oral at bedtime  sodium chloride 0.9%. 2000 milliLiter(s) (100 mL/Hr) IV Continuous <Continuous>    MEDICATIONS  (PRN):  dextrose Gel 1 Dose(s) Oral once PRN Blood Glucose LESS THAN 70 milliGRAM(s)/deciliter  glucagon  Injectable 1 milliGRAM(s) IntraMuscular once PRN Glucose LESS THAN 70 milligrams/deciliter  HYDROmorphone  Injectable 0.5 milliGRAM(s) IV Push every 4 hours PRN Severe Pain (7 - 10)  melatonin Oral Tab/Cap - Peds 3 milliGRAM(s) Oral at bedtime PRN Insomnia  polyethylene glycol 3350 17 Gram(s) Oral daily PRN Constipation      Allergies    penicillin (Rash)  penicillins (Other)  sulfa drugs (Rash; Other)    Intolerances        SOCIAL HISTORY:neg drugs    FAMILY HISTORY:  No pertinent family history in first degree relatives      REVIEW OF SYSTEMS:    CONSTITUTIONAL: No weakness, fevers or chills  EYES/ENT: No visual changes;  No vertigo or throat pain   NECK: No pain or stiffness  RESPIRATORY: No cough, wheezing, hemoptysis; No shortness of breath  CARDIOVASCULAR: No chest pain or palpitations  GENITOURINARY: No dysuria, frequency or hematuria  NEUROLOGICAL: No numbness or weakness  SKIN: No itching, burning, rashes, or lesions   All other review of systems is negative unless indicated above.    Vital Signs Last 24 Hrs  T(C): 36.3 (24 Dec 2017 11:20), Max: 37.2 (24 Dec 2017 04:20)  T(F): 97.4 (24 Dec 2017 11:20), Max: 98.9 (24 Dec 2017 04:20)  HR: 52 (24 Dec 2017 11:20) (52 - 111)  BP: 110/67 (24 Dec 2017 11:20) (110/67 - 145/73)  BP(mean): --  RR: 17 (24 Dec 2017 11:20) (17 - 18)  SpO2: 96% (24 Dec 2017 11:20) (93% - 100%)    PHYSICAL EXAM:    Constitutional: NAD, well-developed  HEENT: EOMI, throat clear  Neck: No LAD, supple  Respiratory: CTA and P  Cardiovascular: S1 and S2, RRR, no M  Gastrointestinal: BS+, soft, mild mid abd tend/ND, neg HSM,  Extremities: No peripheral edema, neg clubing, cyanosis  Vascular: 2+ peripheral pulses  Neurological: A/O x 3, no focal deficits  Psychiatric: Normal mood, normal affect  Skin: No rashes    LABS:  CBC Full  -  ( 24 Dec 2017 08:33 )  WBC Count : 8.3 K/uL  Hemoglobin : 10.3 g/dL  Hematocrit : 32.9 %  Platelet Count - Automated : 228 K/uL  Mean Cell Volume : 92.7 fl  Mean Cell Hemoglobin : 29.0 pg  Mean Cell Hemoglobin Concentration : 31.3 gm/dL  Auto Neutrophil # : x  Auto Lymphocyte # : x  Auto Monocyte # : x  Auto Eosinophil # : x  Auto Basophil # : x  Auto Neutrophil % : x  Auto Lymphocyte % : x  Auto Monocyte % : x  Auto Eosinophil % : x  Auto Basophil % : x    12-24    134<L>  |  102  |  8   ----------------------------<  150<H>  4.0   |  24  |  0.65    Ca    8.3<L>      24 Dec 2017 08:33  Phos  2.6     12-24  Mg     1.9     12-24    TPro  7.5  /  Alb  2.8<L>  /  TBili  1.0  /  DBili  x   /  AST  96<H>  /  ALT  47  /  AlkPhos  232<H>  12-23            RADIOLOGY & ADDITIONAL STUDIES:  < from: CT Abdomen w/ IV Cont (12.20.17 @ 11:34) >  EXAM:  CT ABDOMEN ONLY IC                            PROCEDURE DATE:  12/20/2017          INTERPRETATION:  Clinical information: Acute pancreatitis with fever    COMPARISON: December 13, 2017    PROCEDURE:   CT of the Abdomen only was performed with intravenous contrast.   Intravenous contrast: 90 ml Omnipaque 350. 10 ml discarded.  Oral contrast: positive contrast was administered.  Sagittal and coronal reformats were performed.    FINDINGS:    LOWER CHEST: Cardiomegaly. Transcatheter aortic valve replacements.   Coronary artery calcifications.    LIVER: Nodular liver contour and posterior hepatic notch compatible with   cirrhosis. No focal liver lesion.  SPLEEN: No mass and not enlarged.  PANCREAS: Unremarkable. No edema and resolution ofperipancreatic   inflammation.  GALLBLADDER: Cholecystectomy.  BILE DUCTS: Normal caliber.  ADRENALS: Within normal limits.  KIDNEYS/URETERS: Multiple bilateral renal cysts.    RETROPERITONEUM: No lymphadenopathy.    VESSELS:  Atherosclerotic arterial calcifications. Normal caliber aorta.   Patent celiac and superior mesenteric arteries. Patent splenic, superior   mesenteric and portal veins.    BOWEL/PERITONEUM: Colonic diverticulosis. No evidence of bowel   obstruction. No abdominal free fluid or free intraperitoneal air.    ABDOMINAL WALL: Within normal limits.  BONES: No acute bony abnormality. L3 vertebral body hemangioma.    IMPRESSION:   Resolution of acute pancreatitis with no residual stranding or fluid   visualized.    Cirrhosis.            < end of copied text >  < from: MR Abdomen No Cont (12.14.17 @ 14:41) >  EXAM:  MR ABDOMEN                            PROCEDURE DATE:  12/14/2017          INTERPRETATION:  Clinical information: Pancreatitis    TECHNIQUE: Magnetic resonance imaging of the abdomen was performed   utilizing multiplanar multisequential imaging both without the use of   intravenous contrast. The patient could not stay in the magnet for the   duration of the study, therefore contrast enhanced sequences were not   obtained.    COMPARISON: CT abdomen/pelvis December 13, 2017    FINDINGS:    LIVER: Subtle contour nodularity of the liver and posterior notch sign,   findings suggestive of cirrhosis. No mass visualized within the   limitations of this study.  SPLEEN: Unremarkable.  PANCREAS: No mass or ductal dilatation. Small amount of fluid in the   right subhepatic space and in the left upper quadrant. No discrete fluid   collection.  GALLBLADDER: Cholecystectomy.  BILE DUCTS: Dilatation of the common duct to 16 mm. No intraductal   filling defect identified. No intrahepatic biliary ductal dilatation.   ADRENALS: Within normal limits.  KIDNEYS/URETERS: Small bilateral renal cysts. No hydronephrosis.    RETROPERITONEUM: No upper abdominal or retroperitoneal  lymphadenopathy.    VESSELS:  Within normal limits.  VISUALIZED BOWEL: The visualized bowel is unremarkable.   PERITONEUM: No ascites.    LOWER CHEST: Within normal limits.  ABDOMINAL WALL: Within normal limits.  BONES: No acute abnormality    IMPRESSION: Dilatation of the common duct to 16 mm but no evidence of   choledocholithiasis.    Small amount of ill-defined fluid in the right subhepatic space and left   upper quadrant could be due to pancreatitis. Correlation with laboratory   values is necessary. No drainable fluid collection.    Morphologic changes of the liver compatible with cirrhosis.        < end of copied text >

## 2017-12-24 NOTE — CHART NOTE - NSCHARTNOTEFT_GEN_A_CORE
DW Pts son  Dr Mojica  plan transfer for EUS/ERCP  likely [facility]    please obtain cardiac clearance from Dr ace for above procedures

## 2017-12-24 NOTE — CONSULT NOTE ADULT - ASSESSMENT
89 year old female with past medical history of CAD s/p stent, Atrial Fibrillation, HFpEF, T2DM, Hyperlipidemia , Pulmonary HTN discharged this AM with the diagnosis of the Idiopathic pancreatitis comes with the CC of the abdominal pain for couple of hours.  Patient states that she went home and felt like hunger pain soon after a banana she started severe pain similar kind of the pain which he had in the previous admission. No nausea ,vomiting, diarrhoea, fever, chills, chest pain or SOB, palpitations.      Problem/Plan - 1:  ·  Problem: Acute pancreatitis.  Plan: Recurrent episodes with the elevated LFT -r/o choledocholithiasis ,h/o of the pancreatitis felt to be Metolazone and Januvia which were discontinued upon discharge.   NPO except medication.  -Lipase -22,524  -IVF-100 ml/hrs  dilaudid+ zofran prn  dialted CBD and inc AP      -GI recommendations-Endoscopic US to assess for pancretico ricarda dx and consider CCX  -LFT- AST/ALT-96/47 follow up with the GGT  -Alkaline phosphatase elevated   -MRI-12/14:dilatation of the common duct to 16 mm but no evidence of   choledocholithiasis. Small amount of ill-defined fluid in the right subhepatic space and left upper quadrant could be due to pancreatitis.  -Repeat CT scan-12/20:Resolution of acute pancreatitis with no residual stranding or fluid visualized.    Problem/Plan - 2:  ·  Problem: Afib.  Plan: -continue Metoprolol  -CHADVASC>6-continue  Eliquis.    Problem/Plan - 3:  ·  Problem: Heart failure, diastolic.  Plan: -previous admission of exacerbation on the IVF for pancreatitis.

## 2017-12-25 DIAGNOSIS — I27.20 PULMONARY HYPERTENSION, UNSPECIFIED: ICD-10-CM

## 2017-12-25 LAB
ANION GAP SERPL CALC-SCNC: 9 MMOL/L — SIGNIFICANT CHANGE UP (ref 5–17)
BUN SERPL-MCNC: 9 MG/DL — SIGNIFICANT CHANGE UP (ref 7–23)
CALCIUM SERPL-MCNC: 8.4 MG/DL — LOW (ref 8.5–10.1)
CHLORIDE SERPL-SCNC: 104 MMOL/L — SIGNIFICANT CHANGE UP (ref 96–108)
CO2 SERPL-SCNC: 21 MMOL/L — LOW (ref 22–31)
CREAT SERPL-MCNC: 0.69 MG/DL — SIGNIFICANT CHANGE UP (ref 0.5–1.3)
CULTURE RESULTS: SIGNIFICANT CHANGE UP
GLUCOSE BLDC GLUCOMTR-MCNC: 107 MG/DL — HIGH (ref 70–99)
GLUCOSE BLDC GLUCOMTR-MCNC: 121 MG/DL — HIGH (ref 70–99)
GLUCOSE SERPL-MCNC: 126 MG/DL — HIGH (ref 70–99)
HCT VFR BLD CALC: 36.2 % — SIGNIFICANT CHANGE UP (ref 34.5–45)
HGB BLD-MCNC: 11.2 G/DL — LOW (ref 11.5–15.5)
MCHC RBC-ENTMCNC: 29.2 PG — SIGNIFICANT CHANGE UP (ref 27–34)
MCHC RBC-ENTMCNC: 31.1 GM/DL — LOW (ref 32–36)
MCV RBC AUTO: 94 FL — SIGNIFICANT CHANGE UP (ref 80–100)
PLATELET # BLD AUTO: 242 K/UL — SIGNIFICANT CHANGE UP (ref 150–400)
POTASSIUM SERPL-MCNC: 3.8 MMOL/L — SIGNIFICANT CHANGE UP (ref 3.5–5.3)
POTASSIUM SERPL-SCNC: 3.8 MMOL/L — SIGNIFICANT CHANGE UP (ref 3.5–5.3)
RBC # BLD: 3.85 M/UL — SIGNIFICANT CHANGE UP (ref 3.8–5.2)
RBC # FLD: 14.4 % — SIGNIFICANT CHANGE UP (ref 10.3–14.5)
SODIUM SERPL-SCNC: 134 MMOL/L — LOW (ref 135–145)
SPECIMEN SOURCE: SIGNIFICANT CHANGE UP
WBC # BLD: 8.5 K/UL — SIGNIFICANT CHANGE UP (ref 3.8–10.5)
WBC # FLD AUTO: 8.5 K/UL — SIGNIFICANT CHANGE UP (ref 3.8–10.5)

## 2017-12-25 RX ORDER — PANTOPRAZOLE SODIUM 20 MG/1
40 TABLET, DELAYED RELEASE ORAL DAILY
Qty: 0 | Refills: 0 | Status: DISCONTINUED | OUTPATIENT
Start: 2017-12-25 | End: 2017-12-28

## 2017-12-25 RX ADMIN — Medication 20 MILLIEQUIVALENT(S): at 09:44

## 2017-12-25 RX ADMIN — Medication 25 MILLIGRAM(S): at 09:42

## 2017-12-25 RX ADMIN — SIMVASTATIN 40 MILLIGRAM(S): 20 TABLET, FILM COATED ORAL at 22:28

## 2017-12-25 RX ADMIN — Medication 25 MICROGRAM(S): at 09:44

## 2017-12-25 RX ADMIN — PANTOPRAZOLE SODIUM 40 MILLIGRAM(S): 20 TABLET, DELAYED RELEASE ORAL at 18:09

## 2017-12-25 RX ADMIN — Medication 20 MILLIGRAM(S): at 18:10

## 2017-12-25 RX ADMIN — Medication 40 MILLIGRAM(S): at 09:44

## 2017-12-25 RX ADMIN — Medication 20 MILLIEQUIVALENT(S): at 18:09

## 2017-12-25 RX ADMIN — HYDROMORPHONE HYDROCHLORIDE 0.5 MILLIGRAM(S): 2 INJECTION INTRAMUSCULAR; INTRAVENOUS; SUBCUTANEOUS at 22:26

## 2017-12-25 RX ADMIN — Medication 20 MILLIGRAM(S): at 09:45

## 2017-12-25 RX ADMIN — SODIUM CHLORIDE 75 MILLILITER(S): 9 INJECTION INTRAMUSCULAR; INTRAVENOUS; SUBCUTANEOUS at 02:00

## 2017-12-25 RX ADMIN — Medication 25 MILLIGRAM(S): at 18:10

## 2017-12-25 RX ADMIN — Medication 81 MILLIGRAM(S): at 13:11

## 2017-12-25 RX ADMIN — SODIUM CHLORIDE 75 MILLILITER(S): 9 INJECTION INTRAMUSCULAR; INTRAVENOUS; SUBCUTANEOUS at 18:13

## 2017-12-25 NOTE — CHART NOTE - NSCHARTNOTEFT_GEN_A_CORE
Spoke with Dr. Clifford re: transfer of patient to [facility] for EUS.  Dr. Nichols accepted patient for possible procedure 12/26.  Will hold [employer]. Spoke with Dr. Clifford re: transfer of patient to [facility] for EUS.  Dr. Elder accepted patient for possible procedure 12/26.  Will hold [email or web address].

## 2017-12-25 NOTE — PROGRESS NOTE ADULT - SUBJECTIVE AND OBJECTIVE BOX
Patient is a 89y old  Female who presents with a chief complaint of diarrhea, shortness of breath (23 Dec 2017 21:40)      HPI:  89 year old female with past medical history of CAD s/p stent, Atrial Fibrillation, HFpEF, TAVR, T2DM, Hyperlipidemia , Pulmonary HTN discharged this AM with the diagnosis of the Idiopathic pancreatitis comes with the CC of the abdominal pain for couple of hours.  Patient states that she went home and felt like hunger pain soon after a bannana she started severe pain similar kind of the pain which he had in the previous admission. No nausea ,vomiting, diarrhoea, fever, chills, chest pain or SOB, palpitations.    In the ED she received 1l bolus and Dilaudid felt better after the Dilaudid    h/o of the attack of the pancreatitis with discharge diagnosis secondary to the Januvia and Metolazone (23 Dec 2017 21:25)    had inc belching last night and epig discomfort after liquids and diet changed to NPO  DW son and had similar sx with pancreatitis in past  neg N V  pos flatus      PAST MEDICAL & SURGICAL HISTORY:  HLD (hyperlipidemia)  Hypothyroid  Afib  CAD (coronary artery disease)  Hypertension  History of Osteoarthritis  History of Atrial Fibrillation  GERD (Gastroesophageal Reflux Disease)  Dyslipidemia  Diabetes Mellitus Type II  History of appendectomy  History of cholecystectomy  H/O: Knee Surgery- right meniscus  H/O: Hysterectomy      MEDICATIONS  (STANDING):  aspirin  chewable 81 milliGRAM(s) Oral daily  dextrose 5%. 1000 milliLiter(s) (50 mL/Hr) IV Continuous <Continuous>  dextrose 50% Injectable 12.5 Gram(s) IV Push once  dextrose 50% Injectable 25 Gram(s) IV Push once  dextrose 50% Injectable 25 Gram(s) IV Push once  furosemide   Injectable 40 milliGRAM(s) IV Push daily  levothyroxine 25 MICROGram(s) Oral daily  metoprolol     tartrate 25 milliGRAM(s) Oral two times a day  ondansetron Injectable 4 milliGRAM(s) IV Push once  pantoprazole  Injectable 40 milliGRAM(s) IV Push daily  potassium chloride    Tablet ER 20 milliEquivalent(s) Oral two times a day  sildenafil (REVATIO) 20 milliGRAM(s) Oral two times a day  simvastatin 40 milliGRAM(s) Oral at bedtime  sodium chloride 0.9%. 1000 milliLiter(s) (75 mL/Hr) IV Continuous <Continuous>    MEDICATIONS  (PRN):  dextrose Gel 1 Dose(s) Oral once PRN Blood Glucose LESS THAN 70 milliGRAM(s)/deciliter  glucagon  Injectable 1 milliGRAM(s) IntraMuscular once PRN Glucose LESS THAN 70 milligrams/deciliter  HYDROmorphone  Injectable 0.5 milliGRAM(s) IV Push every 4 hours PRN Severe Pain (7 - 10)  melatonin Oral Tab/Cap - Peds 3 milliGRAM(s) Oral at bedtime PRN Insomnia  polyethylene glycol 3350 17 Gram(s) Oral daily PRN Constipation      Allergies    penicillin (Rash)  penicillins (Other)  sulfa drugs (Rash; Other)    Intolerances        SOCIAL HISTORY:NC    FAMILY HISTORY:  No pertinent family history in first degree relatives      REVIEW OF SYSTEMS:    CONSTITUTIONAL: No weakness, fevers or chills  EYES/ENT: No visual changes;  No vertigo or throat pain   NECK: No pain or stiffness  RESPIRATORY: No cough, wheezing, hemoptysis; No shortness of breath  CARDIOVASCULAR: No chest pain or palpitations  GENITOURINARY: No dysuria, frequency or hematuria  NEUROLOGICAL: No numbness or weakness  SKIN: No itching, burning, rashes, or lesions   All other review of systems is negative unless indicated above.    Vital Signs Last 24 Hrs  T(C): 36.4 (25 Dec 2017 11:22), Max: 37.1 (24 Dec 2017 17:13)  T(F): 97.6 (25 Dec 2017 11:22), Max: 98.7 (24 Dec 2017 17:13)  HR: 96 (25 Dec 2017 11:22) (76 - 96)  BP: 96/63 (25 Dec 2017 11:22) (96/63 - 127/64)  BP(mean): --  RR: 18 (25 Dec 2017 11:22) (17 - 20)  SpO2: 95% (25 Dec 2017 11:22) (92% - 98%)    PHYSICAL EXAM:    Constitutional: NAD, well-developed  HEENT: EOMI, throat clear  Neck: No LAD, supple  Respiratory: CTA and P  Cardiovascular: S1 and S2, RRR, no M  Gastrointestinal: BS+, soft, mild epig tend/ND, neg HSM,  Extremities: No peripheral edema, neg clubing, cyanosis  Vascular: 2+ peripheral pulses  Neurological: A/O x 3, no focal deficits  Psychiatric: Normal mood, normal affect  Skin: No rashes    LABS:  CBC Full  -  ( 25 Dec 2017 11:23 )  WBC Count : 8.5 K/uL  Hemoglobin : 11.2 g/dL  Hematocrit : 36.2 %  Platelet Count - Automated : 242 K/uL  Mean Cell Volume : 94.0 fl  Mean Cell Hemoglobin : 29.2 pg  Mean Cell Hemoglobin Concentration : 31.1 gm/dL  Auto Neutrophil # : x  Auto Lymphocyte # : x  Auto Monocyte # : x  Auto Eosinophil # : x  Auto Basophil # : x  Auto Neutrophil % : x  Auto Lymphocyte % : x  Auto Monocyte % : x  Auto Eosinophil % : x  Auto Basophil % : x    12-25    134<L>  |  104  |  9   ----------------------------<  126<H>  3.8   |  21<L>  |  0.69    Ca    8.4<L>      25 Dec 2017 11:23  Phos  2.6     12-24  Mg     1.9     12-24    TPro  7.5  /  Alb  2.8<L>  /  TBili  1.0  /  DBili  x   /  AST  96<H>  /  ALT  47  /  AlkPhos  232<H>  12-23            RADIOLOGY & ADDITIONAL STUDIES:

## 2017-12-25 NOTE — PROGRESS NOTE ADULT - ATTENDING COMMENTS
Patient seen and examined with Carlos Etienne and Bernabe Little on the Family Medicine Teaching Service.  Agree with history, physical, labs and plan which were reviewed in detail.
Patient seen and examined with Carlos Etienne and Maryann Malagon on the Family Medicine Teaching Service.  Agree with history, physical, labs and plan which were reviewed in detail.

## 2017-12-26 LAB
ALBUMIN SERPL ELPH-MCNC: 2.4 G/DL — LOW (ref 3.3–5)
ALP SERPL-CCNC: 187 U/L — HIGH (ref 40–120)
ALT FLD-CCNC: 50 U/L — SIGNIFICANT CHANGE UP (ref 12–78)
ANION GAP SERPL CALC-SCNC: 12 MMOL/L — SIGNIFICANT CHANGE UP (ref 5–17)
AST SERPL-CCNC: 41 U/L — HIGH (ref 15–37)
BILIRUB SERPL-MCNC: 1.3 MG/DL — HIGH (ref 0.2–1.2)
BUN SERPL-MCNC: 8 MG/DL — SIGNIFICANT CHANGE UP (ref 7–23)
CALCIUM SERPL-MCNC: 8.2 MG/DL — LOW (ref 8.5–10.1)
CHLORIDE SERPL-SCNC: 104 MMOL/L — SIGNIFICANT CHANGE UP (ref 96–108)
CO2 SERPL-SCNC: 21 MMOL/L — LOW (ref 22–31)
CREAT SERPL-MCNC: 0.55 MG/DL — SIGNIFICANT CHANGE UP (ref 0.5–1.3)
GLUCOSE BLDC GLUCOMTR-MCNC: 97 MG/DL — SIGNIFICANT CHANGE UP (ref 70–99)
GLUCOSE SERPL-MCNC: 105 MG/DL — HIGH (ref 70–99)
HCT VFR BLD CALC: 31.1 % — LOW (ref 34.5–45)
HGB BLD-MCNC: 10.1 G/DL — LOW (ref 11.5–15.5)
LIDOCAIN IGE QN: 252 U/L — SIGNIFICANT CHANGE UP (ref 73–393)
MCHC RBC-ENTMCNC: 30.6 PG — SIGNIFICANT CHANGE UP (ref 27–34)
MCHC RBC-ENTMCNC: 32.5 GM/DL — SIGNIFICANT CHANGE UP (ref 32–36)
MCV RBC AUTO: 94.1 FL — SIGNIFICANT CHANGE UP (ref 80–100)
PLATELET # BLD AUTO: 244 K/UL — SIGNIFICANT CHANGE UP (ref 150–400)
POTASSIUM SERPL-MCNC: 3.6 MMOL/L — SIGNIFICANT CHANGE UP (ref 3.5–5.3)
POTASSIUM SERPL-SCNC: 3.6 MMOL/L — SIGNIFICANT CHANGE UP (ref 3.5–5.3)
PROT SERPL-MCNC: 6.6 GM/DL — SIGNIFICANT CHANGE UP (ref 6–8.3)
RBC # BLD: 3.31 M/UL — LOW (ref 3.8–5.2)
RBC # FLD: 14.6 % — HIGH (ref 10.3–14.5)
SODIUM SERPL-SCNC: 137 MMOL/L — SIGNIFICANT CHANGE UP (ref 135–145)
WBC # BLD: 7.6 K/UL — SIGNIFICANT CHANGE UP (ref 3.8–10.5)
WBC # FLD AUTO: 7.6 K/UL — SIGNIFICANT CHANGE UP (ref 3.8–10.5)

## 2017-12-26 RX ORDER — POTASSIUM CHLORIDE 20 MEQ
40 PACKET (EA) ORAL ONCE
Qty: 0 | Refills: 0 | Status: COMPLETED | OUTPATIENT
Start: 2017-12-26 | End: 2017-12-26

## 2017-12-26 RX ADMIN — Medication 25 MILLIGRAM(S): at 05:40

## 2017-12-26 RX ADMIN — Medication 20 MILLIEQUIVALENT(S): at 05:40

## 2017-12-26 RX ADMIN — Medication 40 MILLIGRAM(S): at 05:40

## 2017-12-26 RX ADMIN — Medication 20 MILLIGRAM(S): at 05:41

## 2017-12-26 RX ADMIN — HYDROMORPHONE HYDROCHLORIDE 0.5 MILLIGRAM(S): 2 INJECTION INTRAMUSCULAR; INTRAVENOUS; SUBCUTANEOUS at 15:21

## 2017-12-26 RX ADMIN — Medication 81 MILLIGRAM(S): at 12:38

## 2017-12-26 RX ADMIN — SODIUM CHLORIDE 75 MILLILITER(S): 9 INJECTION INTRAMUSCULAR; INTRAVENOUS; SUBCUTANEOUS at 09:38

## 2017-12-26 RX ADMIN — Medication 40 MILLIEQUIVALENT(S): at 09:32

## 2017-12-26 RX ADMIN — Medication 25 MICROGRAM(S): at 05:40

## 2017-12-26 RX ADMIN — PANTOPRAZOLE SODIUM 40 MILLIGRAM(S): 20 TABLET, DELAYED RELEASE ORAL at 12:38

## 2017-12-26 NOTE — PROGRESS NOTE ADULT - PROBLEM SELECTOR PLAN 5
ISS  Hold metformin  - Diabetic diet once diet resumed
- ISS  - Hold metformin  - Diabetic diet once diet resumed
ISS  Hold metformin  - Diabetic diet once diet resumed
ISS  Hold metformin  - Diabetic diet once diet resumed

## 2017-12-26 NOTE — PROGRESS NOTE ADULT - PROBLEM SELECTOR PLAN 4
-continue the ASA, betablocker  continue simvastatin
- continue ASA, betablocker  - continue simvastatin
-continue ASA, betablocker  - continue simvastatin
-continue the ASA, betablocker  continue statin for now and reassess

## 2017-12-26 NOTE — PROGRESS NOTE ADULT - PROBLEM SELECTOR PLAN 8
-continue simvastatin 40 mg HS
- continue simvastatin 40 mg HS
-continue simvastatin 40 mg HS
-continue Lipitor

## 2017-12-26 NOTE — PROGRESS NOTE ADULT - PROVIDER SPECIALTY LIST ADULT
Family Medicine
Gastroenterology
Hospitalist

## 2017-12-26 NOTE — PROGRESS NOTE ADULT - PROBLEM SELECTOR PLAN 9
cont with sildenafil 20 mg PO BID   stable. Denied any dyspnea on exertion
- cont with sildenafil 20 mg PO BID   stable. Denied any dyspnea on exertion
-cont with sildenafil 20 mg PO BID   stable. Denied any dyspnea on exertion
DVT PPx: On Eliquis

## 2017-12-26 NOTE — PROGRESS NOTE ADULT - PROBLEM SELECTOR PLAN 1
- Recurrent episodes acute pancreatitis with elevated LFT - r/o choledocholithiasis   - NPO except medication   - Lipase -22,524 o->2428 in less than 24hrs.  - GGT also elevated with Alk phos suggesting liver as the source   - GI recommendations appreciated - Endoscopic US and stop Lexapro and hold Eliquis for possible procedure on 12/27. Transfer to [place] to occur today 12/26. receiving GI physician- Dr. Elder.  - F/U preprocedure cardiac clearance (Dr. Hernandez)  -MRI-12/14:dilatation of the common duct to 16 mm but no evidence of choledocholithiasis.   - Repeat CT scan-12/20 : Resolution of acute pancreatitis with no residual stranding or fluid visualized on last admission  - anticipated transfer to [facility] for EUS/ERCP
Recurrent episodes acute pancreatitis with elevated LFT -r/o choledocholithiasis ,h/o pancreatitis induced by possibly Metolazone and Januvia which were discontinued upon discharge.   after d/c and present admission. pt denied any new meds or herbal substance. She only ate a banana  No imaging on this admission. Diagnosis made based on epigastric pain and lipase >71417, repeat lipase down to 2000    - NPO except medication. Advance diet as tolerated.  - Lipase -22,524 on admission. Trended down to 2428 in less than 24hrs.  - IVF NS @ 75mL/hr, monitor closely for SOB and CHF , at this time breathing is good.  - Dilaudid+ zofran prn  -GGT also elevated with Alk phos suggesting liver as the source   -GI recommendations appreciated-Endoscopic US and stop Lexapro and hold Eliquis for possible procedure on 12/27. Transfer to [place] to occur today 12/26. receiving GI physician- Dr. Elder.  - F/U preprocedure cardiac clearance (Dr. Hernandez)  -MRI-12/14:dilatation of the common duct to 16 mm but no evidence of choledocholithiasis.   Small amount of ill-defined fluid in the right subhepatic space and left upper quadrant could be due to pancreatitis.  -Repeat CT scan-12/20:Resolution of acute pancreatitis with no residual stranding or fluid visualized on last admission
Recurrent episodes with elevated LFT -r/o choledocholithiasis ,h/o pancreatitis induced by Metolazone and Januvia which were discontinued upon discharge.   - NPO except medication. Advance diet as tolerated.  - Lipase -22,524 on admission. Trended down to 2428 in less than 24hrs.  - IVF NS @ 75mL/hr  - Dilaudid+ zofran prn   F/U GI regarding endoscopic ultrasound  -GGT, repeat lipase in AM  -GI recommendations-Endoscopic US and stop lexapro  -LFT- AST/ALT-96/47 follow up with the GGT  -Alkaline phosphatase elevated   -MRI-12/14:dilatation of the common duct to 16 mm but no evidence of   choledocholithiasis. Small amount of ill-defined fluid in the right subhepatic space and left upper quadrant could be due to pancreatitis.  -Repeat CT scan-12/20:Resolution of acute pancreatitis with no residual stranding or fluid visualized.
Recurrent episodes acute pancreatitis with elevated LFT -r/o choledocholithiasis ,h/o pancreatitis induced by possibly Metolazone and Januvia which were discontinued upon discharge.   after d/c and present admission. pt denied any new meds or herbal substance. She only ate a banana  No imaging on this admission. Diagnosis made based on epigastric pain and lipase >65136, repeat lipase down to 2000    - NPO except medication. Advance diet as tolerated.  - Lipase -22,524 on admission. Trended down to 2428 in less than 24hrs.  - IVF NS @ 75mL/hr, monitor closely for SOB and CHF , at this time breathing is good.  - Dilaudid+ zofran prn   F/U GI regarding endoscopic ultrasound, Gogo Leonardo spoke to Dr. Elder at [place] who has accepted the pt. Possible transfer for further care/ procedure only brigida.   -GGT also elevated with Alk phos suggesting liver as the source   -GI recommendations-Endoscopic US and stop lexapro and hold eleiquis for possible procedure brigida    -MRI-12/14:dilatation of the common duct to 16 mm but no evidence of choledocholithiasis.   Small amount of ill-defined fluid in the right subhepatic space and left upper quadrant could be due to pancreatitis.  -Repeat CT scan-12/20:Resolution of acute pancreatitis with no residual stranding or fluid visualized on last admission

## 2017-12-26 NOTE — PROGRESS NOTE ADULT - SUBJECTIVE AND OBJECTIVE BOX
Patient is a 89y old  Female who presents with a chief complaint of diarrhea, shortness of breath (23 Dec 2017 21:40)      HPI:  89 year old female with past medical history of CAD s/p stent, Atrial Fibrillation, HFpEF, TAVR, T2DM, Hyperlipidemia , Pulmonary HTN discharged this AM with the diagnosis of the Idiopathic pancreatitis comes with the CC of the abdominal pain for couple of hours.  Patient states that she went home and felt like hunger pain soon after a bannana she started severe pain similar kind of the pain which he had in the previous admission. No nausea ,vomiting, diarrhoea, fever, chills, chest pain or SOB, palpitations.    In the ED she received 1l bolus and Dilaudid felt better after the Dilaudid    h/o of the attack of the pancreatitis with discharge diagnosis secondary to the Januvia and Metolazone (23 Dec 2017 21:25)    Patient is resting comfortably. No complaints of pain. Awaiting transfer to [place] for EUS.       PAST MEDICAL & SURGICAL HISTORY:  HLD (hyperlipidemia)  Hypothyroid  Afib  CAD (coronary artery disease)  Hypertension  History of Osteoarthritis  History of Atrial Fibrillation  GERD (Gastroesophageal Reflux Disease)  Dyslipidemia  Diabetes Mellitus Type II  History of appendectomy  History of cholecystectomy  H/O: Knee Surgery- right meniscus  H/O: Hysterectomy      MEDICATIONS  (STANDING):  aspirin  chewable 81 milliGRAM(s) Oral daily  dextrose 5%. 1000 milliLiter(s) (50 mL/Hr) IV Continuous <Continuous>  dextrose 50% Injectable 12.5 Gram(s) IV Push once  dextrose 50% Injectable 25 Gram(s) IV Push once  dextrose 50% Injectable 25 Gram(s) IV Push once  furosemide   Injectable 40 milliGRAM(s) IV Push daily  levothyroxine 25 MICROGram(s) Oral daily  metoprolol     tartrate 25 milliGRAM(s) Oral two times a day  ondansetron Injectable 4 milliGRAM(s) IV Push once  pantoprazole  Injectable 40 milliGRAM(s) IV Push daily  potassium chloride    Tablet ER 20 milliEquivalent(s) Oral two times a day  sildenafil (REVATIO) 20 milliGRAM(s) Oral two times a day  simvastatin 40 milliGRAM(s) Oral at bedtime  sodium chloride 0.9%. 1000 milliLiter(s) (75 mL/Hr) IV Continuous <Continuous>    MEDICATIONS  (PRN):  dextrose Gel 1 Dose(s) Oral once PRN Blood Glucose LESS THAN 70 milliGRAM(s)/deciliter  glucagon  Injectable 1 milliGRAM(s) IntraMuscular once PRN Glucose LESS THAN 70 milligrams/deciliter  HYDROmorphone  Injectable 0.5 milliGRAM(s) IV Push every 4 hours PRN Severe Pain (7 - 10)  melatonin Oral Tab/Cap - Peds 3 milliGRAM(s) Oral at bedtime PRN Insomnia  polyethylene glycol 3350 17 Gram(s) Oral daily PRN Constipation      Allergies    penicillin (Rash)  penicillins (Other)  sulfa drugs (Rash; Other)    Intolerances        REVIEW OF SYSTEMS:    CONSTITUTIONAL: No weakness, fevers or chills  RESPIRATORY: No cough, wheezing, hemoptysis; No shortness of breath  CARDIOVASCULAR: No chest pain or palpitations  GASTROINTESTINAL: No abdominal or epigastric pain. No nausea, vomiting, or hematemesis; No diarrhea or constipation. No melena or hematochezia.  All other review of systems is negative unless indicated above.    Vital Signs Last 24 Hrs  T(C): 36.7 (26 Dec 2017 11:05), Max: 36.7 (25 Dec 2017 16:37)  T(F): 98 (26 Dec 2017 11:05), Max: 98.1 (26 Dec 2017 05:38)  HR: 78 (26 Dec 2017 11:05) (78 - 86)  BP: 121/45 (26 Dec 2017 11:05) (116/57 - 122/50)  BP(mean): --  RR: 17 (26 Dec 2017 11:05) (17 - 17)  SpO2: 97% (26 Dec 2017 11:05) (91% - 97%)    PHYSICAL EXAM:    Constitutional: NAD, well-developed  Respiratory: CTAB  Cardiovascular: S1 and S2, RRR, no M/G/R  Gastrointestinal: BS+, soft, NT/ND    LABS:                        10.1   7.6   )-----------( 244      ( 26 Dec 2017 07:17 )             31.1     12-26    137  |  104  |  8   ----------------------------<  105<H>  3.6   |  21<L>  |  0.55    Ca    8.2<L>      26 Dec 2017 07:17    TPro  6.6  /  Alb  2.4<L>  /  TBili  1.3<H>  /  DBili  x   /  AST  41<H>  /  ALT  50  /  AlkPhos  187<H>  12-26      LIVER FUNCTIONS - ( 26 Dec 2017 07:17 )  Alb: 2.4 g/dL / Pro: 6.6 gm/dL / ALK PHOS: 187 U/L / ALT: 50 U/L / AST: 41 U/L / GGT: x             RADIOLOGY & ADDITIONAL STUDIES:

## 2017-12-26 NOTE — PROGRESS NOTE ADULT - SUBJECTIVE AND OBJECTIVE BOX
CHIEF COMPLAINT: Epigastric pain    SUBJECTIVE:  HPI:  89 year old female with past medical history of CAD s/p stent, Atrial Fibrillation, HFpEF, TAVR, T2DM, Hyperlipidemia , Pulmonary HTN discharged this AM with the diagnosis of the Idiopathic pancreatitis comes with the CC of the abdominal pain for couple of hours.  Patient states that she went home and felt like hunger pain soon after a bannana she started severe pain similar kind of the pain which he had in the previous admission. No nausea ,vomiting, diarrhoea, fever, chills, chest pain or SOB, palpitations.    In the ED she received 1l bolus and Dilaudid felt better after the Dilaudid    h/o of the attack of the pancreatitis with discharge diagnosis secondary to the Januvia and Metolazone (23 Dec 2017 21:25)    12/24: Pt seen and examined at bedside. Pts care was also discussed with her son Dr. Dill. She denies any pain currently, but reports an excruciating pain following consumption of a banana after discharge yesterday. She denies any nausea, vomiting, CP, or SOB.      122/5 : pt seen and examined at bedside. No complaints. Unable to tolerate PO diet yest clear liquids. Mainly c/o belching. No abdominal pain. placed back on NPO. No pain at this time. No chest pain or SOB     12/26: Pt seen and examined at bedside. She was sitting comfortably in chair. She currently has no complaints. She denies any abdominal pain, nausea, vomiting, CP, or SOB. She was made NPO for GI procedure. Pt is awaiting transfer to [facility] this afternoon for EUS/ERCP by Dr. Elder.  [facility] was contacted and confirmed pickup time from  (3PM on 12/26)  and notes that procedure will take place tomorrow 12/27. Son Dr. Mojica called and was updated on transfer status. Dr. Heranndez (Cardio) was contacted regarding preprocedure cardiac clearance. Dr. Hernandez will assess pt prior to transfer this afternoon.    REVIEW OF SYSTEMS:  CONSTITUTIONAL: No weakness, fevers or chills  EYES/ENT: No visual changes;  No vertigo or throat pain   NECK: No pain or stiffness  RESPIRATORY: No cough, wheezing, hemoptysis; No shortness of breath  CARDIOVASCULAR: No chest pain or palpitations  GASTROINTESTINAL: No abdominal or epigastric pain. No nausea, vomiting, or hematemesis; No diarrhea or constipation. No melena or hematochezia.  GENITOURINARY: No dysuria, frequency or hematuria  NEUROLOGICAL: No numbness or weakness  SKIN: No itching, burning, rashes, or lesions   All other review of systems is negative unless indicated above    VITALS:   Vital Signs Last 24 Hrs  T(C): 36.7 (26 Dec 2017 11:05), Max: 36.7 (25 Dec 2017 16:37)  T(F): 98 (26 Dec 2017 11:05), Max: 98.1 (26 Dec 2017 05:38)  HR: 78 (26 Dec 2017 11:05) (78 - 86)  BP: 121/45 (26 Dec 2017 11:05) (116/57 - 122/50)  RR: 17 (26 Dec 2017 11:05) (17 - 17)  SpO2: 97% (26 Dec 2017 11:05) (91% - 97%)    PHYSICAL EXAM:  Constitutional: NAD, awake and alert, well-developed  HEENT: PERR, EOMI, Normal Hearing, MMM  Neck: Soft and supple, No LAD, No JVD  Respiratory: Mild crackles heard at lung bases but good air entry   Cardiovascular: S1 and S2, irregular rate and rhythm, no Murmurs, gallops or rubs  Gastrointestinal: Bowel Sounds present, soft, nontender, nondistended, no guarding, no rebound  Extremities: Slight peripheral edema  Vascular: 2+ peripheral pulses  Neurological: A/O x 3, no focal deficits  Musculoskeletal: 5/5 strength b/l upper and lower extremities  Skin: No rashes      Labs: All Labs Reviewed.                                  10.1   7.6   )-----------( 244      ( 26 Dec 2017 07:17 )             31.1     26 Dec 2017 07:17    137    |  104    |  8      ----------------------------<  105    3.6     |  21     |  0.55     Ca    8.2        26 Dec 2017 07:17    TPro  6.6    /  Alb  2.4    /  TBili  1.3    /  DBili  x      /  AST  41     /  ALT  50     /  AlkPhos  187    26 Dec 2017 07:17    Lipase, Serum in AM (12.26.17 @ 07:17)    Lipase, Serum: 252 U/L      CAPILLARY BLOOD GLUCOSE  POCT Blood Glucose.: 97 mg/dL (26 Dec 2017 12:27)  POCT Blood Glucose.: 107 mg/dL (25 Dec 2017 18:15)    LIVER FUNCTIONS - ( 26 Dec 2017 07:17 )  Alb: 2.4 g/dL / Pro: 6.6 gm/dL / ALK PHOS: 187 U/L / ALT: 50 U/L / AST: 41 U/L / GGT: x           MEDICATIONS  MEDICATIONS  (STANDING):  aspirin  chewable 81 milliGRAM(s) Oral daily  dextrose 5%. 1000 milliLiter(s) (50 mL/Hr) IV Continuous <Continuous>  dextrose 50% Injectable 12.5 Gram(s) IV Push once  dextrose 50% Injectable 25 Gram(s) IV Push once  dextrose 50% Injectable 25 Gram(s) IV Push once  furosemide   Injectable 40 milliGRAM(s) IV Push daily  levothyroxine 25 MICROGram(s) Oral daily  metoprolol     tartrate 25 milliGRAM(s) Oral two times a day  ondansetron Injectable 4 milliGRAM(s) IV Push once  pantoprazole  Injectable 40 milliGRAM(s) IV Push daily  potassium chloride    Tablet ER 20 milliEquivalent(s) Oral two times a day  sildenafil (REVATIO) 20 milliGRAM(s) Oral two times a day  simvastatin 40 milliGRAM(s) Oral at bedtime  sodium chloride 0.9%. 1000 milliLiter(s) (75 mL/Hr) IV Continuous <Continuous>    MEDICATIONS  (PRN):  dextrose Gel 1 Dose(s) Oral once PRN Blood Glucose LESS THAN 70 milliGRAM(s)/deciliter  glucagon  Injectable 1 milliGRAM(s) IntraMuscular once PRN Glucose LESS THAN 70 milligrams/deciliter  HYDROmorphone  Injectable 0.5 milliGRAM(s) IV Push every 4 hours PRN Severe Pain (7 - 10)  melatonin Oral Tab/Cap - Peds 3 milliGRAM(s) Oral at bedtime PRN Insomnia  polyethylene glycol 3350 17 Gram(s) Oral daily PRN Constipation          RADIOLOGY/EKG: < from: Xray Chest 1 View AP/PA. (12.23.17 @ 18:42) >  Impression:Mild vascular congestion. Mild cardiomegaly. Aortic valve   prosthesis.    Urine cx : normal abel       DVT PPX: On Eliquis    ADVANCED DIRECTIVE:    DISPOSITION:

## 2017-12-26 NOTE — PROGRESS NOTE ADULT - PROBLEM SELECTOR PROBLEM 5
R/O Type 2 diabetes mellitus without complication, without long-term current use of insulin

## 2017-12-26 NOTE — PROGRESS NOTE ADULT - PROBLEM/PLAN-3
The patient is a 34y Male complaining of facial pain.
DISPLAY PLAN FREE TEXT

## 2017-12-26 NOTE — PROGRESS NOTE ADULT - PROBLEM SELECTOR PLAN 3
Previous admission of exacerbation on the IVF for pancreatitis.  -Continue the Lasix 40 mg IV  once a day   -ECHO- 10/16 LVEF 55%.  -strict In and outs  -daily weight check  cotn with Lopressor
- Previous admission of exacerbation on the IVF for pancreatitis.  - Continue the Lasix 40 mg IV  once a day   - ECHO- 10/16 LVEF 55%.  - Strict In and outs  - daily weight check  - continue with Lopressor
Previous admission of exacerbation on the IVF for pancreatitis.  -Continue the Lasix 40 mg IV  once a day   -ECHO- 10/16 LVEF 55%.  -Strict In and outs  -daily weight check  -continue with Lopressor
Previous admission of exacerbation on the IVF for pancreatitis.  -Continue the Lasix 40 mg IV  once a day for now to limit development of lung edema  -ECHO- 10/16 LVEF 55%.  -strict In and outs  -daily weight check

## 2017-12-26 NOTE — PROGRESS NOTE ADULT - SUBJECTIVE AND OBJECTIVE BOX
Patient is a 89y old  Female who presents with a chief complaint of diarrhea, shortness of breath (23 Dec 2017 21:40)      HPI:  89 year old female with past medical history of CAD s/p stent, Atrial Fibrillation, HFpEF, TAVR, T2DM, Hyperlipidemia , Pulmonary HTN discharged this AM with the diagnosis of the Idiopathic pancreatitis comes with the CC of the abdominal pain for couple of hours.  Patient states that she went home and felt like hunger pain soon after a bannana she started severe pain similar kind of the pain which he had in the previous admission. No nausea ,vomiting, diarrhoea, fever, chills, chest pain or SOB, palpitations.    In the ED she received 1l bolus and Dilaudid felt better after the Dilaudid    h/o of the attack of the pancreatitis with discharge diagnosis secondary to the Januvia and Metolazone (23 Dec 2017 21:25)      pt comf  had inc belching and heartbrun last night and now resolved  neg abd pain  anxious        PAST MEDICAL & SURGICAL HISTORY:  HLD (hyperlipidemia)  Hypothyroid  Afib  CAD (coronary artery disease)  Hypertension  History of Osteoarthritis  History of Atrial Fibrillation  GERD (Gastroesophageal Reflux Disease)  Dyslipidemia  Diabetes Mellitus Type II  History of appendectomy  History of cholecystectomy  H/O: Knee Surgery- right meniscus  H/O: Hysterectomy      MEDICATIONS  (STANDING):  aspirin  chewable 81 milliGRAM(s) Oral daily  dextrose 5%. 1000 milliLiter(s) (50 mL/Hr) IV Continuous <Continuous>  dextrose 50% Injectable 12.5 Gram(s) IV Push once  dextrose 50% Injectable 25 Gram(s) IV Push once  dextrose 50% Injectable 25 Gram(s) IV Push once  furosemide   Injectable 40 milliGRAM(s) IV Push daily  levothyroxine 25 MICROGram(s) Oral daily  metoprolol     tartrate 25 milliGRAM(s) Oral two times a day  ondansetron Injectable 4 milliGRAM(s) IV Push once  pantoprazole  Injectable 40 milliGRAM(s) IV Push daily  potassium chloride    Tablet ER 20 milliEquivalent(s) Oral two times a day  sildenafil (REVATIO) 20 milliGRAM(s) Oral two times a day  simvastatin 40 milliGRAM(s) Oral at bedtime  sodium chloride 0.9%. 1000 milliLiter(s) (75 mL/Hr) IV Continuous <Continuous>    MEDICATIONS  (PRN):  dextrose Gel 1 Dose(s) Oral once PRN Blood Glucose LESS THAN 70 milliGRAM(s)/deciliter  glucagon  Injectable 1 milliGRAM(s) IntraMuscular once PRN Glucose LESS THAN 70 milligrams/deciliter  HYDROmorphone  Injectable 0.5 milliGRAM(s) IV Push every 4 hours PRN Severe Pain (7 - 10)  melatonin Oral Tab/Cap - Peds 3 milliGRAM(s) Oral at bedtime PRN Insomnia  polyethylene glycol 3350 17 Gram(s) Oral daily PRN Constipation      Allergies    penicillin (Rash)  penicillins (Other)  sulfa drugs (Rash; Other)    Intolerances        SOCIAL HISTORY:NC    FAMILY HISTORY:  No pertinent family history in first degree relatives      REVIEW OF SYSTEMS:    CONSTITUTIONAL: No weakness, fevers or chills  EYES/ENT: No visual changes;  No vertigo or throat pain   NECK: No pain or stiffness  RESPIRATORY: No cough, wheezing, hemoptysis; No shortness of breath  CARDIOVASCULAR: No chest pain or palpitations  GENITOURINARY: No dysuria, frequency or hematuria  NEUROLOGICAL: No numbness or weakness  SKIN: No itching, burning, rashes, or lesions   All other review of systems is negative unless indicated above.    Vital Signs Last 24 Hrs  T(C): 36.7 (26 Dec 2017 05:38), Max: 36.7 (25 Dec 2017 16:37)  T(F): 98.1 (26 Dec 2017 05:38), Max: 98.1 (26 Dec 2017 05:38)  HR: 79 (26 Dec 2017 05:38) (79 - 96)  BP: 116/57 (26 Dec 2017 05:38) (96/63 - 123/68)  BP(mean): --  RR: 17 (26 Dec 2017 05:38) (17 - 20)  SpO2: 91% (26 Dec 2017 05:38) (91% - 95%)    PHYSICAL EXAM:    Constitutional: NAD, well-developed  HEENT: EOMI, throat clear  Neck: No LAD, supple  Respiratory: CTA and P  Cardiovascular: S1 and S2, RRR, no M  Gastrointestinal: BS+, soft, mild epig tend/ND, neg HSM,  Extremities: No peripheral edema, neg clubing, cyanosis  Vascular: 2+ peripheral pulses  Neurological: A/O x 3, no focal deficits  Psychiatric: Normal mood, normal affect  Skin: No rashes    LABS:  CBC Full  -  ( 26 Dec 2017 07:17 )  WBC Count : 7.6 K/uL  Hemoglobin : 10.1 g/dL  Hematocrit : 31.1 %  Platelet Count - Automated : 244 K/uL  Mean Cell Volume : 94.1 fl  Mean Cell Hemoglobin : 30.6 pg  Mean Cell Hemoglobin Concentration : 32.5 gm/dL  Auto Neutrophil # : x  Auto Lymphocyte # : x  Auto Monocyte # : x  Auto Eosinophil # : x  Auto Basophil # : x  Auto Neutrophil % : x  Auto Lymphocyte % : x  Auto Monocyte % : x  Auto Eosinophil % : x  Auto Basophil % : x    12-25    134<L>  |  104  |  9   ----------------------------<  126<H>  3.8   |  21<L>  |  0.69    Ca    8.4<L>      25 Dec 2017 11:23  Phos  2.6     12-24  Mg     1.9     12-24              RADIOLOGY & ADDITIONAL STUDIES:

## 2017-12-26 NOTE — PROGRESS NOTE ADULT - PROBLEM SELECTOR PLAN 10
DVT PPx: was On Eliquis which is held for now   ambulate with PT

## 2017-12-26 NOTE — PROGRESS NOTE ADULT - PROBLEM SELECTOR PLAN 7
-stable  -continue  metoprolol
- stable  - continue  metoprolol
-stable  -continue  metoprolol
-stable  -continue  metoprolol

## 2017-12-26 NOTE — PROGRESS NOTE ADULT - PROBLEM SELECTOR PLAN 2
-stable and rate controlled with lopressor 25 mg BID   on eliquis 5 mg BID , but being held now for a possible procedure brigida   CHADS2 score is 4 , need not bridge with heparin while off eliquis
- CHADS2 score is 4 , need not bridge with heparin while off eliquis  - stable / rate controlled with lopressor 25 mg BID   - on eliquis 5 mg BID , but being held now for GI procedure tomorrow
-stable and rate controlled with lopressor 25 mg BID   on eliquis 5 mg BID , but being held now for GI procedure brigida   CHADS2 score is 4 , need not bridge with heparin while off eliquis
-continue Metoprolol  - CHADVASC>6-continue  Eliquis

## 2017-12-26 NOTE — PROGRESS NOTE ADULT - ASSESSMENT
89 year old F recently discharged from  with a resolved idiopathic pancreatitis presents with recurrent acute pancreatitis.
89 year old female with past medical history of CAD s/p stent, Atrial Fibrillation, HFpEF, T2DM, Hyperlipidemia , Pulmonary HTN discharged this AM with the diagnosis of the Idiopathic pancreatitis comes with the CC of the abdominal pain for couple of hours.  Patient states that she went home and felt like hunger pain soon after a banana she started severe pain similar kind of the pain which he had in the previous admission. No nausea ,vomiting, diarrhoea, fever, chills, chest pain or SOB, palpitations.      Problem/Plan - 1:  ·  Problem: Acute pancreatitis.  Plan: Recurrent episodes with the elevated LFT -r/o choledocholithiasis ,h/o of the pancreatitis felt to be Metolazone and Januvia which were discontinued upon discharge.   NPO except medication.  -Lipase -22,524    dialted CBD and inc AP      -Endoscopic US to assess for pancretico ricarda dx   DW team and son, would like transfer to [place] for EUS and ERCP  hold NOAC  DW Dr Elder, accepting MD, await bed availability and DW hospitalist  pt will either be transferred ot will go there for procedure and then return after procedure  -LFT- AST/ALT-96/47 follow up with the GGT  -Alkaline phosphatase elevated   -MRI-12/14:dilatation of the common duct to 16 mm but no evidence of   choledocholithiasis. Small amount of ill-defined fluid in the right subhepatic space and left upper quadrant could be due to pancreatitis.  -Repeat CT scan-12/20:Resolution of acute pancreatitis with no residual stranding or fluid visualized.    Problem/Plan - 2:  ·  Problem: Afib.  Plan: -continue Metoprolol  -CHADVASC>6-continue  Eliquis.    Problem/Plan - 3:  ·  Problem: Heart failure, diastolic.  Plan: -previous admission of exacerbation on the IVF for pancreatitis.
89 year old female with past medical history of CAD s/p stent, Atrial Fibrillation, HFpEF, T2DM, Hyperlipidemia , Pulmonary HTN discharged this AM with the diagnosis of the Idiopathic pancreatitis comes with the CC of the abdominal pain for couple of hours.  Patient states that she went home and felt like hunger pain soon after a banana she started severe pain similar kind of the pain which he had in the previous admission. No nausea ,vomiting, diarrhoea, fever, chills, chest pain or SOB, palpitations.      Problem/Plan - 1:  ·  Problem: Acute pancreatitis.  Plan: Recurrent episodes with the elevated LFT -r/o choledocholithiasis ,h/o of the pancreatitis felt to be Metolazone and Januvia which were discontinued upon discharge.   NPO except medication.  -Lipase -22,524    dialted CBD and inc AP      -GI recommendations-Endoscopic US to assess for pancretico ricarda dx and consider CCX  DW team and son, would like transfer to [place] for EUS and ERCP  hold NOAC  DW Dr Elder, accepting MD  pt will either be transferred ot will go there for procedure and then return after procedure  -LFT- AST/ALT-96/47 follow up with the GGT  -Alkaline phosphatase elevated   -MRI-12/14:dilatation of the common duct to 16 mm but no evidence of   choledocholithiasis. Small amount of ill-defined fluid in the right subhepatic space and left upper quadrant could be due to pancreatitis.  -Repeat CT scan-12/20:Resolution of acute pancreatitis with no residual stranding or fluid visualized.    Problem/Plan - 2:  ·  Problem: Afib.  Plan: -continue Metoprolol  -CHADVASC>6-continue  Eliquis.    Problem/Plan - 3:  ·  Problem: Heart failure, diastolic.  Plan: -previous admission of exacerbation on the IVF for pancreatitis.
89 yo female with recurrent pancreatitis ?etiology. Awaiting EUS at [place].
89 year old F recently discharged from  with a resolved idiopathic pancreatitis presents with recurrent acute pancreatitis.
89 year old F recently discharged from  with a resolved idiopathic pancreatitis presents with recurrent acute pancreatitis.

## 2017-12-26 NOTE — CHART NOTE - NSCHARTNOTEFT_GEN_A_CORE
Ms. Mojica was transferred this afternoon (12/26) to [facility] to have Endoscopic Ultrasound/ERCP done by accepting GI physician, Dr. Elder. The procedure is scheduled to take place tomorrow 12/27. IVF (NS @ 75 mL/hr) are to be continued for pancreatitis management. NOAC was held prior to procedure and  pt has been NPO. She was cleared by Cardiology (Dr. Hernandez) for the procedure prior to transfer.  Son Dr. Mojica was informed and aware of today's transfer. Ms. Mojica was transferred this afternoon (12/26) to [facility] to have Endoscopic Ultrasound/ERCP done by accepting GI physician, Dr. Elder. The procedure is scheduled to take place tomorrow 12/27. IVF (NS @ 75 mL/hr) are to be continued for pancreatitis management. Pt has been on IV Lasix 40 mg daily given her history of HFpEF. NOAC was held prior to procedure and  pt has been NPO. She was cleared by Cardiology (Dr. Hernandez) for the procedure prior to transfer.  Son Dr. Mojica was informed and aware of today's transfer.

## 2017-12-27 VITALS
TEMPERATURE: 97 F | OXYGEN SATURATION: 97 % | HEART RATE: 72 BPM | DIASTOLIC BLOOD PRESSURE: 54 MMHG | SYSTOLIC BLOOD PRESSURE: 140 MMHG | RESPIRATION RATE: 17 BRPM

## 2017-12-29 DIAGNOSIS — I27.20 PULMONARY HYPERTENSION, UNSPECIFIED: ICD-10-CM

## 2017-12-29 DIAGNOSIS — M19.90 UNSPECIFIED OSTEOARTHRITIS, UNSPECIFIED SITE: ICD-10-CM

## 2017-12-29 DIAGNOSIS — M25.561 PAIN IN RIGHT KNEE: ICD-10-CM

## 2017-12-29 DIAGNOSIS — I48.2 CHRONIC ATRIAL FIBRILLATION: ICD-10-CM

## 2017-12-29 DIAGNOSIS — I50.32 CHRONIC DIASTOLIC (CONGESTIVE) HEART FAILURE: ICD-10-CM

## 2017-12-29 DIAGNOSIS — R19.7 DIARRHEA, UNSPECIFIED: ICD-10-CM

## 2017-12-29 DIAGNOSIS — E11.9 TYPE 2 DIABETES MELLITUS WITHOUT COMPLICATIONS: ICD-10-CM

## 2017-12-29 DIAGNOSIS — I25.10 ATHEROSCLEROTIC HEART DISEASE OF NATIVE CORONARY ARTERY WITHOUT ANGINA PECTORIS: ICD-10-CM

## 2017-12-29 DIAGNOSIS — K21.9 GASTRO-ESOPHAGEAL REFLUX DISEASE WITHOUT ESOPHAGITIS: ICD-10-CM

## 2017-12-29 DIAGNOSIS — K85.90 ACUTE PANCREATITIS WITHOUT NECROSIS OR INFECTION, UNSPECIFIED: ICD-10-CM

## 2017-12-29 DIAGNOSIS — I50.33 ACUTE ON CHRONIC DIASTOLIC (CONGESTIVE) HEART FAILURE: ICD-10-CM

## 2017-12-29 DIAGNOSIS — Z79.84 LONG TERM (CURRENT) USE OF ORAL HYPOGLYCEMIC DRUGS: ICD-10-CM

## 2017-12-29 DIAGNOSIS — R50.9 FEVER, UNSPECIFIED: ICD-10-CM

## 2017-12-29 DIAGNOSIS — E03.9 HYPOTHYROIDISM, UNSPECIFIED: ICD-10-CM

## 2017-12-29 DIAGNOSIS — K74.60 UNSPECIFIED CIRRHOSIS OF LIVER: ICD-10-CM

## 2017-12-29 DIAGNOSIS — Z95.3 PRESENCE OF XENOGENIC HEART VALVE: ICD-10-CM

## 2017-12-29 DIAGNOSIS — Z90.710 ACQUIRED ABSENCE OF BOTH CERVIX AND UTERUS: ICD-10-CM

## 2017-12-29 DIAGNOSIS — Z79.01 LONG TERM (CURRENT) USE OF ANTICOAGULANTS: ICD-10-CM

## 2017-12-29 DIAGNOSIS — Z95.5 PRESENCE OF CORONARY ANGIOPLASTY IMPLANT AND GRAFT: ICD-10-CM

## 2017-12-29 DIAGNOSIS — Z90.49 ACQUIRED ABSENCE OF OTHER SPECIFIED PARTS OF DIGESTIVE TRACT: ICD-10-CM

## 2017-12-29 DIAGNOSIS — K57.90 DIVERTICULOSIS OF INTESTINE, PART UNSPECIFIED, WITHOUT PERFORATION OR ABSCESS WITHOUT BLEEDING: ICD-10-CM

## 2018-01-04 ENCOUNTER — APPOINTMENT (OUTPATIENT)
Dept: INTERNAL MEDICINE | Facility: CLINIC | Age: 83
End: 2018-01-04

## 2018-01-05 DIAGNOSIS — K85.90 ACUTE PANCREATITIS WITHOUT NECROSIS OR INFECTION, UNSPECIFIED: ICD-10-CM

## 2018-01-05 DIAGNOSIS — I11.0 HYPERTENSIVE HEART DISEASE WITH HEART FAILURE: ICD-10-CM

## 2018-01-05 DIAGNOSIS — Z95.2 PRESENCE OF PROSTHETIC HEART VALVE: ICD-10-CM

## 2018-01-05 DIAGNOSIS — M19.90 UNSPECIFIED OSTEOARTHRITIS, UNSPECIFIED SITE: ICD-10-CM

## 2018-01-05 DIAGNOSIS — Z90.710 ACQUIRED ABSENCE OF BOTH CERVIX AND UTERUS: ICD-10-CM

## 2018-01-05 DIAGNOSIS — I50.32 CHRONIC DIASTOLIC (CONGESTIVE) HEART FAILURE: ICD-10-CM

## 2018-01-05 DIAGNOSIS — I25.10 ATHEROSCLEROTIC HEART DISEASE OF NATIVE CORONARY ARTERY WITHOUT ANGINA PECTORIS: ICD-10-CM

## 2018-01-05 DIAGNOSIS — I48.91 UNSPECIFIED ATRIAL FIBRILLATION: ICD-10-CM

## 2018-01-05 DIAGNOSIS — R10.11 RIGHT UPPER QUADRANT PAIN: ICD-10-CM

## 2018-01-05 DIAGNOSIS — E11.9 TYPE 2 DIABETES MELLITUS WITHOUT COMPLICATIONS: ICD-10-CM

## 2018-01-05 DIAGNOSIS — I27.20 PULMONARY HYPERTENSION, UNSPECIFIED: ICD-10-CM

## 2018-01-05 DIAGNOSIS — E03.9 HYPOTHYROIDISM, UNSPECIFIED: ICD-10-CM

## 2018-01-05 DIAGNOSIS — Z95.5 PRESENCE OF CORONARY ANGIOPLASTY IMPLANT AND GRAFT: ICD-10-CM

## 2018-01-05 DIAGNOSIS — Z79.84 LONG TERM (CURRENT) USE OF ORAL HYPOGLYCEMIC DRUGS: ICD-10-CM

## 2018-01-05 DIAGNOSIS — K21.9 GASTRO-ESOPHAGEAL REFLUX DISEASE WITHOUT ESOPHAGITIS: ICD-10-CM

## 2018-01-05 DIAGNOSIS — E78.5 HYPERLIPIDEMIA, UNSPECIFIED: ICD-10-CM

## 2018-01-05 DIAGNOSIS — Z79.01 LONG TERM (CURRENT) USE OF ANTICOAGULANTS: ICD-10-CM

## 2018-01-09 ENCOUNTER — RECORD ABSTRACTING (OUTPATIENT)
Age: 83
End: 2018-01-09

## 2018-01-09 RX ORDER — FUROSEMIDE 40 MG/1
40 TABLET ORAL TWICE DAILY
Qty: 180 | Refills: 1 | Status: DISCONTINUED | COMMUNITY
Start: 2017-09-13 | End: 2018-01-09

## 2018-01-09 RX ORDER — POTASSIUM CHLORIDE 750 MG/1
10 CAPSULE, EXTENDED RELEASE ORAL TWICE DAILY
Qty: 120 | Refills: 11 | Status: DISCONTINUED | COMMUNITY
Start: 2017-04-13 | End: 2018-01-09

## 2018-01-09 RX ORDER — ASPIRIN 81 MG
81 TABLET, DELAYED RELEASE (ENTERIC COATED) ORAL DAILY
Refills: 0 | Status: ACTIVE | COMMUNITY

## 2018-01-09 RX ORDER — SILDENAFIL CITRATE 20 MG/1
20 TABLET, FILM COATED ORAL
Refills: 0 | Status: DISCONTINUED | COMMUNITY
End: 2018-01-09

## 2018-01-09 RX ORDER — SIMVASTATIN 40 MG/1
40 TABLET, FILM COATED ORAL
Qty: 90 | Refills: 3 | Status: DISCONTINUED | COMMUNITY
Start: 2017-08-03 | End: 2018-01-09

## 2018-01-09 RX ORDER — FLUTICASONE PROPIONATE 50 UG/1
50 SPRAY, METERED NASAL
Qty: 1 | Refills: 5 | Status: DISCONTINUED | COMMUNITY
End: 2018-01-09

## 2018-01-09 RX ORDER — POTASSIUM CHLORIDE 750 MG/1
10 TABLET, FILM COATED, EXTENDED RELEASE ORAL
Qty: 360 | Refills: 1 | Status: DISCONTINUED | COMMUNITY
Start: 2017-09-13 | End: 2018-01-09

## 2018-01-16 ENCOUNTER — OTHER (OUTPATIENT)
Age: 83
End: 2018-01-16

## 2018-02-01 ENCOUNTER — APPOINTMENT (OUTPATIENT)
Dept: INTERNAL MEDICINE | Facility: CLINIC | Age: 83
End: 2018-02-01

## 2018-02-04 NOTE — ED ADULT NURSE NOTE - CHIEF COMPLAINT QUOTE
Enteritis R/O Clostridium difficile diarrhea abd pain that began at 8pm, denies nausea, vomiting, fevers, chills

## 2018-02-08 ENCOUNTER — RX RENEWAL (OUTPATIENT)
Age: 83
End: 2018-02-08

## 2018-02-12 RX ORDER — FUROSEMIDE 20 MG/1
20 TABLET ORAL
Qty: 90 | Refills: 1 | Status: COMPLETED | COMMUNITY
End: 2018-02-12

## 2018-02-14 ENCOUNTER — OTHER (OUTPATIENT)
Age: 83
End: 2018-02-14

## 2018-03-03 ENCOUNTER — INPATIENT (INPATIENT)
Facility: HOSPITAL | Age: 83
LOS: 10 days | Discharge: HOME HEALTH | End: 2018-03-14
Attending: HOSPITALIST
Payer: MEDICARE

## 2018-03-03 VITALS — HEIGHT: 63 IN | WEIGHT: 162.92 LBS

## 2018-03-03 DIAGNOSIS — Z90.49 ACQUIRED ABSENCE OF OTHER SPECIFIED PARTS OF DIGESTIVE TRACT: Chronic | ICD-10-CM

## 2018-03-03 LAB
ALBUMIN SERPL ELPH-MCNC: 3.2 G/DL — LOW (ref 3.3–5)
ALP SERPL-CCNC: 82 U/L — SIGNIFICANT CHANGE UP (ref 40–120)
ALT FLD-CCNC: 11 U/L — LOW (ref 12–78)
ANION GAP SERPL CALC-SCNC: 5 MMOL/L — SIGNIFICANT CHANGE UP (ref 5–17)
ANISOCYTOSIS BLD QL: SLIGHT — SIGNIFICANT CHANGE UP
APTT BLD: 193.3 SEC — CRITICAL HIGH (ref 27.5–37.4)
APTT BLD: 33.1 SEC — SIGNIFICANT CHANGE UP (ref 27.5–37.4)
AST SERPL-CCNC: 20 U/L — SIGNIFICANT CHANGE UP (ref 15–37)
BASO STIPL BLD QL SMEAR: SLIGHT — SIGNIFICANT CHANGE UP
BASOPHILS # BLD AUTO: SIGNIFICANT CHANGE UP K/UL (ref 0–0.2)
BILIRUB SERPL-MCNC: 0.7 MG/DL — SIGNIFICANT CHANGE UP (ref 0.2–1.2)
BLD GP AB SCN SERPL QL: SIGNIFICANT CHANGE UP
BUN SERPL-MCNC: 13 MG/DL — SIGNIFICANT CHANGE UP (ref 7–23)
CALCIUM SERPL-MCNC: 9 MG/DL — SIGNIFICANT CHANGE UP (ref 8.5–10.1)
CHLORIDE SERPL-SCNC: 103 MMOL/L — SIGNIFICANT CHANGE UP (ref 96–108)
CO2 SERPL-SCNC: 30 MMOL/L — SIGNIFICANT CHANGE UP (ref 22–31)
CREAT SERPL-MCNC: 0.77 MG/DL — SIGNIFICANT CHANGE UP (ref 0.5–1.3)
DACRYOCYTES BLD QL SMEAR: SLIGHT — SIGNIFICANT CHANGE UP
ELLIPTOCYTES BLD QL SMEAR: SLIGHT — SIGNIFICANT CHANGE UP
EOSINOPHIL # BLD AUTO: SIGNIFICANT CHANGE UP K/UL (ref 0–0.5)
EOSINOPHIL NFR BLD AUTO: 4 % — SIGNIFICANT CHANGE UP (ref 0–6)
GLUCOSE SERPL-MCNC: 121 MG/DL — HIGH (ref 70–99)
HCT VFR BLD CALC: 30.7 % — LOW (ref 34.5–45)
HCT VFR BLD CALC: 35.1 % — SIGNIFICANT CHANGE UP (ref 34.5–45)
HGB BLD-MCNC: 11.2 G/DL — LOW (ref 11.5–15.5)
HGB BLD-MCNC: 9.8 G/DL — LOW (ref 11.5–15.5)
HYPERCHROMIA BLD QL AUTO: SLIGHT — SIGNIFICANT CHANGE UP
INR BLD: 1.38 RATIO — HIGH (ref 0.88–1.16)
LYMPHOCYTES # BLD AUTO: 17 % — SIGNIFICANT CHANGE UP (ref 13–44)
LYMPHOCYTES # BLD AUTO: SIGNIFICANT CHANGE UP K/UL (ref 1–3.3)
MACROCYTES BLD QL: SLIGHT — SIGNIFICANT CHANGE UP
MANUAL DIF COMMENT BLD-IMP: SIGNIFICANT CHANGE UP
MCHC RBC-ENTMCNC: 27.9 PG — SIGNIFICANT CHANGE UP (ref 27–34)
MCHC RBC-ENTMCNC: 27.9 PG — SIGNIFICANT CHANGE UP (ref 27–34)
MCHC RBC-ENTMCNC: 31.7 GM/DL — LOW (ref 32–36)
MCHC RBC-ENTMCNC: 32.1 GM/DL — SIGNIFICANT CHANGE UP (ref 32–36)
MCV RBC AUTO: 87.2 FL — SIGNIFICANT CHANGE UP (ref 80–100)
MCV RBC AUTO: 87.8 FL — SIGNIFICANT CHANGE UP (ref 80–100)
MONOCYTES # BLD AUTO: SIGNIFICANT CHANGE UP K/UL (ref 0–0.9)
MONOCYTES NFR BLD AUTO: 14 % — SIGNIFICANT CHANGE UP (ref 2–14)
NEUTROPHILS # BLD AUTO: SIGNIFICANT CHANGE UP K/UL (ref 1.8–7.4)
NEUTROPHILS NFR BLD AUTO: 65 % — SIGNIFICANT CHANGE UP (ref 43–77)
OVALOCYTES BLD QL SMEAR: SLIGHT — SIGNIFICANT CHANGE UP
PLAT MORPH BLD: NORMAL — SIGNIFICANT CHANGE UP
PLATELET # BLD AUTO: 157 K/UL — SIGNIFICANT CHANGE UP (ref 150–400)
PLATELET # BLD AUTO: 208 K/UL — SIGNIFICANT CHANGE UP (ref 150–400)
POIKILOCYTOSIS BLD QL AUTO: SLIGHT — SIGNIFICANT CHANGE UP
POLYCHROMASIA BLD QL SMEAR: SLIGHT — SIGNIFICANT CHANGE UP
POTASSIUM SERPL-MCNC: 4 MMOL/L — SIGNIFICANT CHANGE UP (ref 3.5–5.3)
POTASSIUM SERPL-SCNC: 4 MMOL/L — SIGNIFICANT CHANGE UP (ref 3.5–5.3)
PROT SERPL-MCNC: 8 GM/DL — SIGNIFICANT CHANGE UP (ref 6–8.3)
PROTHROM AB SERPL-ACNC: 15 SEC — HIGH (ref 9.8–12.7)
RBC # BLD: 3.52 M/UL — LOW (ref 3.8–5.2)
RBC # BLD: 4 M/UL — SIGNIFICANT CHANGE UP (ref 3.8–5.2)
RBC # FLD: 15.3 % — HIGH (ref 10.3–14.5)
RBC # FLD: 15.6 % — HIGH (ref 10.3–14.5)
RBC BLD AUTO: (no result)
SODIUM SERPL-SCNC: 138 MMOL/L — SIGNIFICANT CHANGE UP (ref 135–145)
TYPE + AB SCN PNL BLD: SIGNIFICANT CHANGE UP
WBC # BLD: 7 K/UL — SIGNIFICANT CHANGE UP (ref 3.8–10.5)
WBC # BLD: 7.2 K/UL — SIGNIFICANT CHANGE UP (ref 3.8–10.5)
WBC # FLD AUTO: 7 K/UL — SIGNIFICANT CHANGE UP (ref 3.8–10.5)
WBC # FLD AUTO: 7.2 K/UL — SIGNIFICANT CHANGE UP (ref 3.8–10.5)

## 2018-03-03 PROCEDURE — 99285 EMERGENCY DEPT VISIT HI MDM: CPT

## 2018-03-03 PROCEDURE — 93010 ELECTROCARDIOGRAM REPORT: CPT

## 2018-03-03 RX ORDER — PANTOPRAZOLE SODIUM 20 MG/1
40 TABLET, DELAYED RELEASE ORAL
Qty: 0 | Refills: 0 | Status: DISCONTINUED | OUTPATIENT
Start: 2018-03-03 | End: 2018-03-14

## 2018-03-03 RX ORDER — LACTOBACILLUS ACIDOPHILUS 100MM CELL
1 CAPSULE ORAL DAILY
Qty: 0 | Refills: 0 | Status: DISCONTINUED | OUTPATIENT
Start: 2018-03-03 | End: 2018-03-14

## 2018-03-03 RX ORDER — GLUCAGON INJECTION, SOLUTION 0.5 MG/.1ML
1 INJECTION, SOLUTION SUBCUTANEOUS ONCE
Qty: 0 | Refills: 0 | Status: DISCONTINUED | OUTPATIENT
Start: 2018-03-03 | End: 2018-03-10

## 2018-03-03 RX ORDER — HEPARIN SODIUM 5000 [USP'U]/ML
6000 INJECTION INTRAVENOUS; SUBCUTANEOUS EVERY 6 HOURS
Qty: 0 | Refills: 0 | Status: DISCONTINUED | OUTPATIENT
Start: 2018-03-03 | End: 2018-03-04

## 2018-03-03 RX ORDER — DEXTROSE 50 % IN WATER 50 %
25 SYRINGE (ML) INTRAVENOUS ONCE
Qty: 0 | Refills: 0 | Status: DISCONTINUED | OUTPATIENT
Start: 2018-03-03 | End: 2018-03-10

## 2018-03-03 RX ORDER — SODIUM CHLORIDE 9 MG/ML
1000 INJECTION, SOLUTION INTRAVENOUS
Qty: 0 | Refills: 0 | Status: DISCONTINUED | OUTPATIENT
Start: 2018-03-03 | End: 2018-03-04

## 2018-03-03 RX ORDER — DEXTROSE 50 % IN WATER 50 %
12.5 SYRINGE (ML) INTRAVENOUS ONCE
Qty: 0 | Refills: 0 | Status: DISCONTINUED | OUTPATIENT
Start: 2018-03-03 | End: 2018-03-10

## 2018-03-03 RX ORDER — LEVOTHYROXINE SODIUM 125 MCG
25 TABLET ORAL DAILY
Qty: 0 | Refills: 0 | Status: DISCONTINUED | OUTPATIENT
Start: 2018-03-03 | End: 2018-03-14

## 2018-03-03 RX ORDER — FUROSEMIDE 40 MG
40 TABLET ORAL
Qty: 0 | Refills: 0 | Status: DISCONTINUED | OUTPATIENT
Start: 2018-03-03 | End: 2018-03-05

## 2018-03-03 RX ORDER — HEPARIN SODIUM 5000 [USP'U]/ML
6000 INJECTION INTRAVENOUS; SUBCUTANEOUS ONCE
Qty: 0 | Refills: 0 | Status: COMPLETED | OUTPATIENT
Start: 2018-03-03 | End: 2018-03-03

## 2018-03-03 RX ORDER — SIMVASTATIN 20 MG/1
40 TABLET, FILM COATED ORAL AT BEDTIME
Qty: 0 | Refills: 0 | Status: DISCONTINUED | OUTPATIENT
Start: 2018-03-03 | End: 2018-03-14

## 2018-03-03 RX ORDER — METOPROLOL TARTRATE 50 MG
25 TABLET ORAL
Qty: 0 | Refills: 0 | Status: DISCONTINUED | OUTPATIENT
Start: 2018-03-03 | End: 2018-03-14

## 2018-03-03 RX ORDER — HEPARIN SODIUM 5000 [USP'U]/ML
INJECTION INTRAVENOUS; SUBCUTANEOUS
Qty: 25000 | Refills: 0 | Status: DISCONTINUED | OUTPATIENT
Start: 2018-03-03 | End: 2018-03-04

## 2018-03-03 RX ORDER — LANOLIN ALCOHOL/MO/W.PET/CERES
3 CREAM (GRAM) TOPICAL AT BEDTIME
Qty: 0 | Refills: 0 | Status: DISCONTINUED | OUTPATIENT
Start: 2018-03-03 | End: 2018-03-14

## 2018-03-03 RX ORDER — POLYETHYLENE GLYCOL 3350 17 G/17G
17 POWDER, FOR SOLUTION ORAL DAILY
Qty: 0 | Refills: 0 | Status: DISCONTINUED | OUTPATIENT
Start: 2018-03-03 | End: 2018-03-14

## 2018-03-03 RX ORDER — INSULIN LISPRO 100/ML
VIAL (ML) SUBCUTANEOUS
Qty: 0 | Refills: 0 | Status: DISCONTINUED | OUTPATIENT
Start: 2018-03-03 | End: 2018-03-10

## 2018-03-03 RX ORDER — METOPROLOL TARTRATE 50 MG
25 TABLET ORAL
Qty: 0 | Refills: 0 | Status: DISCONTINUED | OUTPATIENT
Start: 2018-03-03 | End: 2018-03-03

## 2018-03-03 RX ORDER — DOCUSATE SODIUM 100 MG
200 CAPSULE ORAL AT BEDTIME
Qty: 0 | Refills: 0 | Status: DISCONTINUED | OUTPATIENT
Start: 2018-03-03 | End: 2018-03-14

## 2018-03-03 RX ORDER — DEXTROSE 50 % IN WATER 50 %
1 SYRINGE (ML) INTRAVENOUS ONCE
Qty: 0 | Refills: 0 | Status: DISCONTINUED | OUTPATIENT
Start: 2018-03-03 | End: 2018-03-10

## 2018-03-03 RX ORDER — FUROSEMIDE 40 MG
40 TABLET ORAL
Qty: 0 | Refills: 0 | Status: DISCONTINUED | OUTPATIENT
Start: 2018-03-03 | End: 2018-03-03

## 2018-03-03 RX ORDER — LEVOTHYROXINE SODIUM 125 MCG
1 TABLET ORAL
Qty: 0 | Refills: 0 | COMMUNITY

## 2018-03-03 RX ORDER — POTASSIUM CHLORIDE 20 MEQ
20 PACKET (EA) ORAL
Qty: 0 | Refills: 0 | Status: DISCONTINUED | OUTPATIENT
Start: 2018-03-03 | End: 2018-03-14

## 2018-03-03 RX ORDER — ACETAMINOPHEN 500 MG
650 TABLET ORAL EVERY 8 HOURS
Qty: 0 | Refills: 0 | Status: DISCONTINUED | OUTPATIENT
Start: 2018-03-03 | End: 2018-03-14

## 2018-03-03 RX ORDER — HEPARIN SODIUM 5000 [USP'U]/ML
3000 INJECTION INTRAVENOUS; SUBCUTANEOUS EVERY 6 HOURS
Qty: 0 | Refills: 0 | Status: DISCONTINUED | OUTPATIENT
Start: 2018-03-03 | End: 2018-03-04

## 2018-03-03 RX ADMIN — Medication 200 MILLIGRAM(S): at 22:50

## 2018-03-03 RX ADMIN — Medication 20 MILLIGRAM(S): at 19:17

## 2018-03-03 RX ADMIN — HEPARIN SODIUM 6000 UNIT(S): 5000 INJECTION INTRAVENOUS; SUBCUTANEOUS at 16:20

## 2018-03-03 RX ADMIN — Medication 20 MILLIEQUIVALENT(S): at 19:17

## 2018-03-03 RX ADMIN — SIMVASTATIN 40 MILLIGRAM(S): 20 TABLET, FILM COATED ORAL at 22:51

## 2018-03-03 RX ADMIN — HEPARIN SODIUM 0 UNIT(S)/HR: 5000 INJECTION INTRAVENOUS; SUBCUTANEOUS at 23:24

## 2018-03-03 RX ADMIN — HEPARIN SODIUM 1300 UNIT(S)/HR: 5000 INJECTION INTRAVENOUS; SUBCUTANEOUS at 16:20

## 2018-03-03 RX ADMIN — Medication 3 MILLIGRAM(S): at 22:51

## 2018-03-03 NOTE — ED PROVIDER NOTE - OBJECTIVE STATEMENT
89 y/o F with a PMHx of pancreatitis, cirrhosis, DM, L knee replacement, AFIB on Eliquis presents to the ED with family c/o confirmed LLE DVT on US done at [employer] [facility] stopped Eliquis, has missed x3 dosages. Pt family states that she has been experiencing L calf pain. Pt currently calm, able to provide adequate hx, answer questioning and denies CP, SOB, fever, cough, chills, abd pain, NVD or any other acute c/o at this time. PMD Janice. 89 y/o F with a PMHx of pancreatitis, cirrhosis, DM, L knee replacement, AFIB on Eliquis presents to the ED with family c/o confirmed LLE DVT (left distal superficial femoral vein and left posterior tibial veins)  on US done at [facility] stopped Eliquis, has missed x3 dosages. Pt family states that she has been experiencing L calf pain. Pt currently calm, able to provide adequate hx, answer questioning and denies CP, SOB, fever, cough, chills, abd pain, NVD or any other acute c/o at this time. PMD Janice.

## 2018-03-03 NOTE — ED ADULT NURSE NOTE - OBJECTIVE STATEMENT
Pt sent in from [facility] radiology center s/p being diagnosed with +LLE DVT. Pt has had +swelling in b/l legs x1 wk with pain. Pt has hx of a-fib and takes eliquis which was stopped as of yesterday by PCP Janice.

## 2018-03-03 NOTE — ED ADULT NURSE NOTE - CHIEF COMPLAINT QUOTE
patient presents in ED from [facility] for a confirmed left leg DVT.  Swelling noted to left leg. patient denies SOB.

## 2018-03-03 NOTE — ED ADULT TRIAGE NOTE - CHIEF COMPLAINT QUOTE
patient presents in ED from [facility] for a confirmed left leg DVT.  Swelling noted to left leg. patient denies SOB.

## 2018-03-03 NOTE — H&P ADULT - NSHPPHYSICALEXAM_GEN_ALL_CORE
Vital Signs Last 24 Hrs  T(C): 36.8 (03 Mar 2018 15:05), Max: 36.8 (03 Mar 2018 15:05)  T(F): 98.2 (03 Mar 2018 15:05), Max: 98.2 (03 Mar 2018 15:05)  HR: 78 (03 Mar 2018 17:51) (78 - 80)  BP: 118/67 (03 Mar 2018 17:51) (118/67 - 123/74)  BP(mean): --  RR: 16 (03 Mar 2018 17:51) (15 - 16)  SpO2: 98% (03 Mar 2018 17:51) (97% - 98%)

## 2018-03-03 NOTE — ED PROVIDER NOTE - MEDICAL DECISION MAKING DETAILS
89 y/o F c/o worsening L calf swelling, US at [email or web address] shows DVT LLE with plans to receive labs, CBC, INR 89 y/o F c/o worsening L calf swelling, US at [facility] shows DVT LLE with plans to receive labs, CBC, INR. consult dr wu per family. heparin drip. admit for filter placement.

## 2018-03-03 NOTE — H&P ADULT - HISTORY OF PRESENT ILLNESS
89 yo Female with a PMHx of pancreatitis, cirrhosis, DM, L knee replacement, AFIB on Eliquis presented to the ED with family with complain of left lower extremity pain for more than 1 week. She got LE venous doppler US done as outpatient at [facility] and was confirmed to have LLE DVT (left distal superficial femoral vein and left posterior tibial veins). She stopped Eliquis recently and had missed x3 dosages. Pt currently calm, able to provide adequate hx, answer questioning and denies CP, SOB, fever, cough, chills, abd pain, NVD or any other acute c/o at this time.

## 2018-03-03 NOTE — H&P ADULT - ASSESSMENT
89 yo Female with a PMHx of pancreatitis, cirrhosis, DM, L knee replacement, AFIB on Eliquis presented to the ED with family with complain of left lower extremity pain for more than 1 week. She got LE venous doppler US done as outpatient at [facility] and was confirmed to have LLE DVT (left distal superficial femoral vein and left posterior tibial veins).    A/P:    1.    Left LE Acute DVT  -on heparin drip  -will follow heparin drip protocol  -IR service was already contacted and Dr Pulido from IR service already evaluated the patient in ER and scheduled to place an IVC filter tomorrow at am  -will follow patient clinically    2.  DM  -keep on ISS  -follow Dxt    3.  h/o A fib  -stable HR now   -continue Metoprolol     4.  h/o Cirrhosis  -clinically stable    5.  Hypothyroidism  -continue Levothyroxine    6.  Hyperlipidemia  - continue statin     7.  GERD  - on PPI    8.  h/o CHF  -stable now  -continue Lasix   -follow Is and Os 89 yo Female with a PMHx of pancreatitis, cirrhosis, DM, L knee replacement, AFIB on Eliquis presented to the ED with family with complain of left lower extremity pain for more than 1 week. She got LE venous doppler US done as outpatient at [facility] and was confirmed to have LLE DVT (left distal superficial femoral vein and left posterior tibial veins).    A/P:    1.    Left LE Acute DVT  -on heparin drip  -will follow heparin drip protocol  -Vascular surgery service was already contacted and Dr Pulido from vascular surgery service already evaluated the patient in ER and scheduled to place an IVC filter tomorrow at am  -will follow patient clinically    2.  DM  -keep on ISS  -follow Dxt    3.  h/o A fib  -stable HR now   -continue Metoprolol     4.  h/o Cirrhosis  -clinically stable    5.  Hypothyroidism  -continue Levothyroxine    6.  Hyperlipidemia  - continue statin     7.  GERD  - on PPI    8.  h/o CHF  -stable now  -continue Lasix   -follow Is and Os

## 2018-03-04 DIAGNOSIS — I25.10 ATHEROSCLEROTIC HEART DISEASE OF NATIVE CORONARY ARTERY WITHOUT ANGINA PECTORIS: ICD-10-CM

## 2018-03-04 DIAGNOSIS — I48.91 UNSPECIFIED ATRIAL FIBRILLATION: ICD-10-CM

## 2018-03-04 DIAGNOSIS — I50.9 HEART FAILURE, UNSPECIFIED: ICD-10-CM

## 2018-03-04 DIAGNOSIS — I27.20 PULMONARY HYPERTENSION, UNSPECIFIED: ICD-10-CM

## 2018-03-04 DIAGNOSIS — I82.432 ACUTE EMBOLISM AND THROMBOSIS OF LEFT POPLITEAL VEIN: ICD-10-CM

## 2018-03-04 DIAGNOSIS — E11.9 TYPE 2 DIABETES MELLITUS WITHOUT COMPLICATIONS: ICD-10-CM

## 2018-03-04 DIAGNOSIS — D64.9 ANEMIA, UNSPECIFIED: ICD-10-CM

## 2018-03-04 DIAGNOSIS — R06.00 DYSPNEA, UNSPECIFIED: ICD-10-CM

## 2018-03-04 LAB
ANION GAP SERPL CALC-SCNC: 8 MMOL/L — SIGNIFICANT CHANGE UP (ref 5–17)
ANISOCYTOSIS BLD QL: SLIGHT — SIGNIFICANT CHANGE UP
APTT BLD: 76.1 SEC — HIGH (ref 27.5–37.4)
APTT BLD: 77.6 SEC — HIGH (ref 27.5–37.4)
BASOPHILS # BLD AUTO: 0.1 K/UL — SIGNIFICANT CHANGE UP (ref 0–0.2)
BASOPHILS NFR BLD AUTO: 0.8 % — SIGNIFICANT CHANGE UP (ref 0–2)
BUN SERPL-MCNC: 12 MG/DL — SIGNIFICANT CHANGE UP (ref 7–23)
CALCIUM SERPL-MCNC: 8.7 MG/DL — SIGNIFICANT CHANGE UP (ref 8.5–10.1)
CHLORIDE SERPL-SCNC: 103 MMOL/L — SIGNIFICANT CHANGE UP (ref 96–108)
CO2 SERPL-SCNC: 28 MMOL/L — SIGNIFICANT CHANGE UP (ref 22–31)
CREAT SERPL-MCNC: 0.54 MG/DL — SIGNIFICANT CHANGE UP (ref 0.5–1.3)
ELLIPTOCYTES BLD QL SMEAR: SLIGHT — SIGNIFICANT CHANGE UP
EOSINOPHIL # BLD AUTO: 0.5 K/UL — SIGNIFICANT CHANGE UP (ref 0–0.5)
EOSINOPHIL NFR BLD AUTO: 6.8 % — HIGH (ref 0–6)
GLUCOSE SERPL-MCNC: 138 MG/DL — HIGH (ref 70–99)
HBA1C BLD-MCNC: 6.7 % — HIGH (ref 4–5.6)
HCT VFR BLD CALC: 30.9 % — LOW (ref 34.5–45)
HGB BLD-MCNC: 9.8 G/DL — LOW (ref 11.5–15.5)
HYPOCHROMIA BLD QL: SLIGHT — SIGNIFICANT CHANGE UP
LYMPHOCYTES # BLD AUTO: 1.1 K/UL — SIGNIFICANT CHANGE UP (ref 1–3.3)
LYMPHOCYTES # BLD AUTO: 17.1 % — SIGNIFICANT CHANGE UP (ref 13–44)
MANUAL DIF COMMENT BLD-IMP: SIGNIFICANT CHANGE UP
MCHC RBC-ENTMCNC: 27.4 PG — SIGNIFICANT CHANGE UP (ref 27–34)
MCHC RBC-ENTMCNC: 31.7 GM/DL — LOW (ref 32–36)
MCV RBC AUTO: 86.4 FL — SIGNIFICANT CHANGE UP (ref 80–100)
MICROCYTES BLD QL: SLIGHT — SIGNIFICANT CHANGE UP
MONOCYTES # BLD AUTO: 0.9 K/UL — SIGNIFICANT CHANGE UP (ref 0–0.9)
MONOCYTES NFR BLD AUTO: 13.6 % — SIGNIFICANT CHANGE UP (ref 2–14)
NEUTROPHILS # BLD AUTO: 4.1 K/UL — SIGNIFICANT CHANGE UP (ref 1.8–7.4)
NEUTROPHILS NFR BLD AUTO: 61.5 % — SIGNIFICANT CHANGE UP (ref 43–77)
OVALOCYTES BLD QL SMEAR: SLIGHT — SIGNIFICANT CHANGE UP
PLAT MORPH BLD: NORMAL — SIGNIFICANT CHANGE UP
PLATELET # BLD AUTO: 157 K/UL — SIGNIFICANT CHANGE UP (ref 150–400)
POIKILOCYTOSIS BLD QL AUTO: SLIGHT — SIGNIFICANT CHANGE UP
POTASSIUM SERPL-MCNC: 3.7 MMOL/L — SIGNIFICANT CHANGE UP (ref 3.5–5.3)
POTASSIUM SERPL-SCNC: 3.7 MMOL/L — SIGNIFICANT CHANGE UP (ref 3.5–5.3)
RBC # BLD: 3.57 M/UL — LOW (ref 3.8–5.2)
RBC # FLD: 15.2 % — HIGH (ref 10.3–14.5)
RBC BLD AUTO: (no result)
SCHISTOCYTES BLD QL AUTO: SLIGHT — SIGNIFICANT CHANGE UP
SODIUM SERPL-SCNC: 139 MMOL/L — SIGNIFICANT CHANGE UP (ref 135–145)
WBC # BLD: 6.7 K/UL — SIGNIFICANT CHANGE UP (ref 3.8–10.5)
WBC # FLD AUTO: 6.7 K/UL — SIGNIFICANT CHANGE UP (ref 3.8–10.5)

## 2018-03-04 PROCEDURE — 99222 1ST HOSP IP/OBS MODERATE 55: CPT

## 2018-03-04 RX ORDER — DOCUSATE SODIUM 100 MG
100 CAPSULE ORAL
Qty: 0 | Refills: 0 | Status: DISCONTINUED | OUTPATIENT
Start: 2018-03-04 | End: 2018-03-14

## 2018-03-04 RX ORDER — UBIDECARENONE 100 MG
1 CAPSULE ORAL
Qty: 0 | Refills: 0 | COMMUNITY

## 2018-03-04 RX ORDER — ACETAMINOPHEN 500 MG
650 TABLET ORAL EVERY 6 HOURS
Qty: 0 | Refills: 0 | Status: DISCONTINUED | OUTPATIENT
Start: 2018-03-04 | End: 2018-03-14

## 2018-03-04 RX ORDER — POTASSIUM CHLORIDE 20 MEQ
2 PACKET (EA) ORAL
Qty: 0 | Refills: 0 | COMMUNITY

## 2018-03-04 RX ORDER — SODIUM CHLORIDE 9 MG/ML
1 INJECTION INTRAMUSCULAR; INTRAVENOUS; SUBCUTANEOUS ONCE
Qty: 0 | Refills: 0 | Status: DISCONTINUED | OUTPATIENT
Start: 2018-03-04 | End: 2018-03-04

## 2018-03-04 RX ORDER — GARLIC 1000 MG
0 CAPSULE ORAL
Qty: 0 | Refills: 0 | COMMUNITY

## 2018-03-04 RX ORDER — LANOLIN ALCOHOL/MO/W.PET/CERES
3 CREAM (GRAM) TOPICAL AT BEDTIME
Qty: 0 | Refills: 0 | Status: DISCONTINUED | OUTPATIENT
Start: 2018-03-04 | End: 2018-03-04

## 2018-03-04 RX ORDER — FUROSEMIDE 40 MG
40 TABLET ORAL
Qty: 0 | Refills: 0 | Status: DISCONTINUED | OUTPATIENT
Start: 2018-03-04 | End: 2018-03-04

## 2018-03-04 RX ORDER — LEVOTHYROXINE SODIUM 125 MCG
25 TABLET ORAL DAILY
Qty: 0 | Refills: 0 | Status: DISCONTINUED | OUTPATIENT
Start: 2018-03-04 | End: 2018-03-04

## 2018-03-04 RX ORDER — HEPARIN SODIUM 5000 [USP'U]/ML
INJECTION INTRAVENOUS; SUBCUTANEOUS
Qty: 25000 | Refills: 0 | Status: DISCONTINUED | OUTPATIENT
Start: 2018-03-04 | End: 2018-03-07

## 2018-03-04 RX ORDER — POLYETHYLENE GLYCOL 3350 17 G/17G
17 POWDER, FOR SOLUTION ORAL DAILY
Qty: 0 | Refills: 0 | Status: DISCONTINUED | OUTPATIENT
Start: 2018-03-04 | End: 2018-03-04

## 2018-03-04 RX ORDER — SODIUM CHLORIDE 9 MG/ML
1000 INJECTION, SOLUTION INTRAVENOUS
Qty: 0 | Refills: 0 | Status: DISCONTINUED | OUTPATIENT
Start: 2018-03-04 | End: 2018-03-04

## 2018-03-04 RX ORDER — HEPARIN SODIUM 5000 [USP'U]/ML
3000 INJECTION INTRAVENOUS; SUBCUTANEOUS EVERY 6 HOURS
Qty: 0 | Refills: 0 | Status: DISCONTINUED | OUTPATIENT
Start: 2018-03-04 | End: 2018-03-07

## 2018-03-04 RX ORDER — LACTOBACILLUS ACIDOPHILUS 100MM CELL
1 CAPSULE ORAL DAILY
Qty: 0 | Refills: 0 | Status: DISCONTINUED | OUTPATIENT
Start: 2018-03-04 | End: 2018-03-04

## 2018-03-04 RX ORDER — GLUCOSAMINE HCL/CHONDROITIN SU 500-400 MG
3 CAPSULE ORAL
Qty: 0 | Refills: 0 | COMMUNITY

## 2018-03-04 RX ORDER — ONDANSETRON 8 MG/1
4 TABLET, FILM COATED ORAL ONCE
Qty: 0 | Refills: 0 | Status: DISCONTINUED | OUTPATIENT
Start: 2018-03-04 | End: 2018-03-04

## 2018-03-04 RX ORDER — SODIUM CHLORIDE 9 MG/ML
500 INJECTION INTRAMUSCULAR; INTRAVENOUS; SUBCUTANEOUS
Qty: 0 | Refills: 0 | Status: COMPLETED | OUTPATIENT
Start: 2018-03-04 | End: 2018-03-04

## 2018-03-04 RX ORDER — METOPROLOL TARTRATE 50 MG
25 TABLET ORAL
Qty: 0 | Refills: 0 | Status: DISCONTINUED | OUTPATIENT
Start: 2018-03-04 | End: 2018-03-04

## 2018-03-04 RX ORDER — ASPIRIN/CALCIUM CARB/MAGNESIUM 324 MG
81 TABLET ORAL DAILY
Qty: 0 | Refills: 0 | Status: DISCONTINUED | OUTPATIENT
Start: 2018-03-04 | End: 2018-03-14

## 2018-03-04 RX ORDER — FENTANYL CITRATE 50 UG/ML
25 INJECTION INTRAVENOUS
Qty: 0 | Refills: 0 | Status: DISCONTINUED | OUTPATIENT
Start: 2018-03-04 | End: 2018-03-04

## 2018-03-04 RX ORDER — SODIUM CHLORIDE 9 MG/ML
1000 INJECTION INTRAMUSCULAR; INTRAVENOUS; SUBCUTANEOUS ONCE
Qty: 0 | Refills: 0 | Status: COMPLETED | OUTPATIENT
Start: 2018-03-04 | End: 2018-03-04

## 2018-03-04 RX ORDER — SIMVASTATIN 20 MG/1
40 TABLET, FILM COATED ORAL AT BEDTIME
Qty: 0 | Refills: 0 | Status: DISCONTINUED | OUTPATIENT
Start: 2018-03-04 | End: 2018-03-04

## 2018-03-04 RX ORDER — HEPARIN SODIUM 5000 [USP'U]/ML
6000 INJECTION INTRAVENOUS; SUBCUTANEOUS EVERY 6 HOURS
Qty: 0 | Refills: 0 | Status: DISCONTINUED | OUTPATIENT
Start: 2018-03-04 | End: 2018-03-07

## 2018-03-04 RX ADMIN — Medication 1: at 14:07

## 2018-03-04 RX ADMIN — PANTOPRAZOLE SODIUM 40 MILLIGRAM(S): 20 TABLET, DELAYED RELEASE ORAL at 08:04

## 2018-03-04 RX ADMIN — Medication 3 MILLIGRAM(S): at 22:37

## 2018-03-04 RX ADMIN — Medication 25 MILLIGRAM(S): at 16:49

## 2018-03-04 RX ADMIN — Medication 100 MILLIGRAM(S): at 16:53

## 2018-03-04 RX ADMIN — HEPARIN SODIUM 1100 UNIT(S)/HR: 5000 INJECTION INTRAVENOUS; SUBCUTANEOUS at 00:38

## 2018-03-04 RX ADMIN — Medication 650 MILLIGRAM(S): at 11:41

## 2018-03-04 RX ADMIN — Medication 20 MILLIGRAM(S): at 16:49

## 2018-03-04 RX ADMIN — Medication 25 MICROGRAM(S): at 08:03

## 2018-03-04 RX ADMIN — Medication 20 MILLIEQUIVALENT(S): at 08:03

## 2018-03-04 RX ADMIN — Medication 81 MILLIGRAM(S): at 11:41

## 2018-03-04 RX ADMIN — Medication 1 TABLET(S): at 16:52

## 2018-03-04 RX ADMIN — Medication 200 MILLIGRAM(S): at 22:46

## 2018-03-04 RX ADMIN — SODIUM CHLORIDE 500 MILLILITER(S): 9 INJECTION INTRAMUSCULAR; INTRAVENOUS; SUBCUTANEOUS at 22:37

## 2018-03-04 RX ADMIN — HEPARIN SODIUM 1300 UNIT(S)/HR: 5000 INJECTION INTRAVENOUS; SUBCUTANEOUS at 16:35

## 2018-03-04 RX ADMIN — Medication 40 MILLIGRAM(S): at 17:49

## 2018-03-04 RX ADMIN — Medication 1 TABLET(S): at 11:41

## 2018-03-04 RX ADMIN — Medication 20 MILLIEQUIVALENT(S): at 16:52

## 2018-03-04 RX ADMIN — SIMVASTATIN 40 MILLIGRAM(S): 20 TABLET, FILM COATED ORAL at 22:38

## 2018-03-04 RX ADMIN — SODIUM CHLORIDE 1000 MILLILITER(S): 9 INJECTION INTRAMUSCULAR; INTRAVENOUS; SUBCUTANEOUS at 22:36

## 2018-03-04 RX ADMIN — Medication 20 MILLIGRAM(S): at 08:02

## 2018-03-04 NOTE — BRIEF OPERATIVE NOTE - POST-OP DX
DVT femoral (deep venous thrombosis) with thrombophlebitis, left  03/04/2018  L popliteal vein  Active  Bebeto Pulido

## 2018-03-04 NOTE — BRIEF OPERATIVE NOTE - PRE-OP DX
Deep vein thrombosis (DVT) of left lower extremity, unspecified chronicity, unspecified vein  03/04/2018    Active  Bebeto Pulido

## 2018-03-04 NOTE — CONSULT NOTE ADULT - SUBJECTIVE AND OBJECTIVE BOX
HPI:  89 yo Female with a PMHx of pancreatitis, cirrhosis, DM, L knee replacement, AFIB on Eliquis presented to the ED with family with complain of left lower extremity pain for more than 1 week. She got LE venous doppler US done as outpatient at [facility] and was confirmed to have LLE DVT (left distal superficial femoral vein and left posterior tibial veins). She stopped Eliquis recently and had missed x3 dosages. Patient had IVC filter placement this am. Has some sob with exertion.      PAST MEDICAL & SURGICAL HISTORY:  HLD (hyperlipidemia)  Hypothyroid  Afib  CAD (coronary artery disease)  Hypertension  History of Osteoarthritis  History of Atrial Fibrillation  GERD (Gastroesophageal Reflux Disease)  Dyslipidemia  Diabetes Mellitus Type II  History of appendectomy  History of cholecystectomy  H/O: Knee Surgery- right meniscus  H/O: Hysterectomy      MEDICATIONS  (STANDING):  aspirin enteric coated 81 milliGRAM(s) Oral daily  dextrose 50% Injectable 12.5 Gram(s) IV Push once  dextrose 50% Injectable 25 Gram(s) IV Push once  dextrose 50% Injectable 25 Gram(s) IV Push once  docusate sodium 200 milliGRAM(s) Oral at bedtime  docusate sodium 100 milliGRAM(s) Oral two times a day  furosemide    Tablet 40 milliGRAM(s) Oral two times a day  heparin  Infusion.  Unit(s)/Hr (13 mL/Hr) IV Continuous <Continuous>  insulin lispro (HumaLOG) corrective regimen sliding scale   SubCutaneous three times a day before meals  lactobacillus acidophilus 1 Tablet(s) Oral daily  levothyroxine 25 MICROGram(s) Oral daily  metoprolol     tartrate 25 milliGRAM(s) Oral two times a day  pantoprazole    Tablet 40 milliGRAM(s) Oral before breakfast  potassium chloride    Tablet ER 20 milliEquivalent(s) Oral two times a day  sildenafil (REVATIO) 20 milliGRAM(s) Oral two times a day  simvastatin 40 milliGRAM(s) Oral at bedtime    MEDICATIONS  (PRN):  acetaminophen   Tablet 650 milliGRAM(s) Oral every 6 hours PRN Mild Pain (1 - 3)  acetaminophen   Tablet. 650 milliGRAM(s) Oral every 8 hours PRN Mild Pain (1 - 3)  dextrose Gel 1 Dose(s) Oral once PRN Blood Glucose LESS THAN 70 milliGRAM(s)/deciliter  glucagon  Injectable 1 milliGRAM(s) IntraMuscular once PRN Glucose LESS THAN 70 milligrams/deciliter  heparin  Injectable 6000 Unit(s) IV Push every 6 hours PRN For aPTT less than 40  heparin  Injectable 3000 Unit(s) IV Push every 6 hours PRN For aPTT between 40 - 57  melatonin 3 milliGRAM(s) Oral at bedtime PRN Insomnia  polyethylene glycol 3350 17 Gram(s) Oral daily PRN Constipation      Allergies    penicillin (Rash)  penicillins (Other)  sulfa drugs (Rash; Other)    Intolerances        SOCIAL HISTORY: Denies tobacco, etoh abuse or illicit drug use    FAMILY HISTORY:  No pertinent family history in first degree relatives      Vital Signs Last 24 Hrs  T(C): 36.3 (04 Mar 2018 10:15), Max: 37.5 (04 Mar 2018 09:45)  T(F): 97.4 (04 Mar 2018 10:15), Max: 99.5 (04 Mar 2018 09:45)  HR: 78 (04 Mar 2018 10:15) (71 - 90)  BP: 120/65 (04 Mar 2018 10:15) (101/42 - 124/69)  BP(mean): --  RR: 18 (04 Mar 2018 10:15) (15 - 23)  SpO2: 95% (04 Mar 2018 10:15) (91% - 99%)    REVIEW OF SYSTEMS:    CONSTITUTIONAL:  As per HPI.  SKIN: no rashes  HEENT:  Eyes:  No diplopia or blurred vision. ENT:  No earache, sore throat or runny nose.  CARDIOVASCULAR:  No pressure, squeezing, tightness, heaviness or aching about the chest, neck, axilla or epigastrium.  RESPIRATORY:  No cough, shortness of breath, PND or orthopnea.  GASTROINTESTINAL:  No nausea, vomiting or diarrhea.  GENITOURINARY:  No dysuria, frequency or urgency.  MUSCULOSKELETAL:  As per HPI.  SKIN:  No change in skin, hair or nails.  NEUROLOGIC:  No paresthesias, fasciculations, seizures or weakness.  PSYCHIATRIC:  No disorder of thought or mood.  ENDOCRINE:  No heat or cold intolerance, polyuria or polydipsia.  HEMATOLOGICAL:  No easy bruising or bleedings:  .     PHYSICAL EXAMINATION:    GENERAL APPEARANCE:  Pt. is not currently dyspneic, in no distress. Pt. is alert, oriented, and pleasant.  HEENT:  Pupils are normal and react normally. No icterus. Mucous membranes well colored.  NECK:  Supple. No lymphadenopathy. Jugular venous pressure not elevated. Carotids equal.   HEART:  S1S2 irreg w 2/6 systolic murmur  CHEST:  bilateral ronchi  ABDOMEN:  Soft and nontender.   EXTREMITIES:  bilat leg edema; edema left knee w ecchymoses distal   SKIN:  No rash or significant lesions are noted.  CNS: AAO x 3      LABS:                        9.8    6.7   )-----------( 157      ( 04 Mar 2018 06:23 )             30.9     03-04    139  |  103  |  12  ----------------------------<  138<H>  3.7   |  28  |  0.54    Ca    8.7      04 Mar 2018 06:23    TPro  8.0  /  Alb  3.2<L>  /  TBili  0.7  /  DBili  x   /  AST  20  /  ALT  11<L>  /  AlkPhos  82  03-03    LIVER FUNCTIONS - ( 03 Mar 2018 15:10 )  Alb: 3.2 g/dL / Pro: 8.0 gm/dL / ALK PHOS: 82 U/L / ALT: 11 U/L / AST: 20 U/L / GGT: x           PT/INR - ( 03 Mar 2018 15:10 )   PT: 15.0 sec;   INR: 1.38 ratio         PTT - ( 04 Mar 2018 06:23 )  PTT:76.1 sec            RADIOLOGY & ADDITIONAL STUDIES:

## 2018-03-04 NOTE — PROGRESS NOTE ADULT - SUBJECTIVE AND OBJECTIVE BOX
CHIEF COMPLAINT: DVT    SUBJECTIVE: S/p IVC filter this AM.  No chest pain, sob.  Lives alone, unable to ambulate presently.      REVIEW OF SYSTEMS: All other review of systems is negative unless indicated above    Vital Signs Last 24 Hrs  T(C): 36.3 (04 Mar 2018 10:15), Max: 37.5 (04 Mar 2018 09:45)  T(F): 97.4 (04 Mar 2018 10:15), Max: 99.5 (04 Mar 2018 09:45)  HR: 78 (04 Mar 2018 10:15) (71 - 90)  BP: 120/65 (04 Mar 2018 10:15) (101/42 - 124/69)  BP(mean): --  RR: 18 (04 Mar 2018 10:15) (15 - 23)  SpO2: 95% (04 Mar 2018 10:15) (91% - 99%)    PHYSICAL EXAM:  Constitutional: NAD, awake and alert  HEENT: EOMI, Normal Hearing, MMM  Neck: Soft and supple, No JVD  Respiratory: Breath sounds are clear bilaterally, No wheezing, rales or rhonchi  Cardiovascular: S1 and S2, regular rate and rhythm, no Murmurs, gallops or rubs  Gastrointestinal: Bowel Sounds present, soft, nontender, nondistended, no guarding, no rebound  Extremities: LLE ecchymosis. No edema.  Vascular: 2+ peripheral pulses  Neurological: A/O x 3, no focal deficits  Musculoskeletal: 5/5 strength b/l upper and lower extremities  Skin: No rashes    MEDICATIONS:  MEDICATIONS  (STANDING):  aspirin enteric coated 81 milliGRAM(s) Oral daily  dextrose 50% Injectable 12.5 Gram(s) IV Push once  dextrose 50% Injectable 25 Gram(s) IV Push once  dextrose 50% Injectable 25 Gram(s) IV Push once  docusate sodium 200 milliGRAM(s) Oral at bedtime  docusate sodium 100 milliGRAM(s) Oral two times a day  furosemide    Tablet 40 milliGRAM(s) Oral two times a day  heparin  Infusion.  Unit(s)/Hr (13 mL/Hr) IV Continuous <Continuous>  insulin lispro (HumaLOG) corrective regimen sliding scale   SubCutaneous three times a day before meals  lactobacillus acidophilus 1 Tablet(s) Oral daily  levothyroxine 25 MICROGram(s) Oral daily  metoprolol     tartrate 25 milliGRAM(s) Oral two times a day  pantoprazole    Tablet 40 milliGRAM(s) Oral before breakfast  potassium chloride    Tablet ER 20 milliEquivalent(s) Oral two times a day  sildenafil (REVATIO) 20 milliGRAM(s) Oral two times a day  simvastatin 40 milliGRAM(s) Oral at bedtime    LABS: All Labs Reviewed:                        9.8    6.7   )-----------( 157      ( 04 Mar 2018 06:23 )             30.9     139  |  103  |  12  ----------------------------<  138<H>  3.7   |  28  |  0.54    Ca    8.7      04 Mar 2018 06:23    TPro  8.0  /  Alb  3.2<L>  /  TBili  0.7  /  DBili  x   /  AST  20  /  ALT  11<L>  /  AlkPhos  82  03-03    PT/INR - ( 03 Mar 2018 15:10 )   PT: 15.0 sec;   INR: 1.38 ratio    PTT - ( 04 Mar 2018 06:23 )  PTT:76.1 sec

## 2018-03-04 NOTE — PROGRESS NOTE ADULT - SUBJECTIVE AND OBJECTIVE BOX
:Pt was seen and examine josephine bedside    Temp: 99.3 F, BP: 102/48, HR: 84, RR: 16 Pt was seen and examined at bedside As RN called for Hypotension SBP: 80's. RN reported that Pt had IVC filter placement today and after the procedure Pt's SBP in 80's so Daytime hospitalist ordered 1L IVF. Rn checked vital after few hours and called again for hypotension.  On my assessment pt is resting comfortably in bed. AAOx3, Denies headache dizziness, chest pain, palpitations or SOB. Denies cough or dysuria.   Asked RN to check vitals again and noted to have Temp: 99.3 F, BP: 102/48, HR: 84, RR: 16. Pt's SBP: 100's since arrival to the ED. D./w Night RN to c/w IVF NS gentle hydration overnight.    RN reported that Pt did received Metoprolol and Lasix  this evening. D/w RN to check vitals prior to giving morning meds.     Hospitalist to f/u in AM Pt was seen and examined at bedside As RN called for Hypotension SBP: 80's. RN reported that Pt had IVC filter placement today and after the procedure Pt's SBP in 80's so Daytime hospitalist ordered 1L IVF. Rn checked vital after few hours and called again for hypotension.  On my assessment pt is resting comfortably in bed. AAOx3, Denies headache dizziness, chest pain, palpitations or SOB. Denies cough or dysuria.   Asked RN to check vitals again and noted to have Temp: 99.3 F, BP: 102/48, HR: 84, RR: 16. Pt's SBP: 100's since arrival to the ED. D./w Night RN to c/w IVF NS gentle hydration overnight.    RN reported that Pt did received Metoprolol and Lasix  this evening. D/w RN to check vitals prior to giving morning meds.     Hospitalist to f/u in AM      Addendum:  VSS overnight. SBP: 100's at baseline. D/w RN to continue to monitor. No tachycardia, Temp: 99F. Labs in AM.    Hospitalist to f/u in AM

## 2018-03-04 NOTE — CONSULT NOTE ADULT - SUBJECTIVE AND OBJECTIVE BOX
89-year-old female with history of atrial fibrillation and Eliquis BID.  Approximately one week ago patient was sitting in her recliner and when she got up and had new onset  significant pain left lower extremity/calf.  When initially evaluated there was no evidence of significant edema, infection. Over the course of several days the discomfort progressed and she developed swelling, and was found to have an ecchymoses  Posterior calf and minimally pretibial surface. Concern was possible hematoma and so she held Eliquis for 2 doses.  Subsequently she went [facility] where the duplex Doppler revealed DVT involving the left distal superficial femoral vein and left posterior tibial veins.  patient states she is faithful with taking her anticoagulation, and is unaware of any trauma or precipitating event. Due to  arthritis in her knee she favors her left leg. In her home she uses a walker.  She is otherwise fully independent.  approximately 5 years ago had left knee replacement and subsequent DVT left lower extremity; treated with anticoagulation , which she currently remains on due to A. fib  a [place] filter was placed earlier today.    Past medical history notable for Baker's cyst left leg noted duplex Doppler December 2017 during evaluation for episode of pancreatitis December 2017.  Ultimately had intervention at [facility] where she had dilatation of bile duct.  Reportedly with cirrhosis, etiology unclear. Pulmonary hypertension etiology unclear.  otherwise feels well, has had a questionable 40 pound weight loss over 6 months which she attributes to aggressive use of diuretics denies shortness of breath, anorexia, , fatigue, drenching night sweats or change in bowel habits.  Past medical history atrial fibrillation, coronary artery disease, type II diabetes, GERD, hyperlipidemia, hypertension, hypothyroid, osteoarthritis, cirrhosis, pulmonary hypertension.    Family medical history she has 4 brothers one sister, 3 adult sons, no one with a history of DVT or PE    physical examination well-developed 89-year-old female alert and oriented in bed in no acute distress  HEENT normocephalic anicteric, EOMI  neck supple without palpable lymph nodes,   lungs some rhonchi cleared with cough   Breasts without palpable masses, inverted nipples bilaterally  Heart irregular rhythm, 2/6 murmur  abdomen soft nontender no masses no palpable spleen bowel sounds present  Extremities: swollen left calf, tender, ecchymosis posterior calf and lateral pretibial surface  Lymph nodes none palpable neck axillary or inguinal areas  Right groin with bandage in place, from filter placed earlier today    WBc 7.0, RBC 4, Hgb 11.2, HCT 35.1, MCV 87.8, MCH 27.9, RDW 15.6, platelet 208  Repeat hemoglobin 9.8, hematocrit 30.9  PT 15, INR 1.38, a PTT 33.1  LFTs within normal limit, albumin 3.2

## 2018-03-04 NOTE — BRIEF OPERATIVE NOTE - PROCEDURE
<<-----Click on this checkbox to enter Procedure IVC filter placement  03/04/2018  R iliac venogram, cavagram, bilateral renal venogram, placement of IVC filter  Active  MGENNARO

## 2018-03-04 NOTE — CONSULT NOTE ADULT - SUBJECTIVE AND OBJECTIVE BOX
History of Present Illness: 	  89 yo Female with a PMHx of pancreatitis, cirrhosis, DM, L knee replacement, AFIB on Eliquis presented to the ED with family with complain of left lower extremity pain for more than 1 week. She got LE venous doppler US done as outpatient at [facility] and was confirmed to have LLE DVT (left distal superficial femoral vein and left posterior tibial veins). She stopped Eliquis recently and had missed x3 dosages. Pt currently calm, able to provide adequate hx, answer questioning and denies CP, SOB, fever, cough, chills, abd pain, NVD or any other acute c/o at this time.     History as noted; son  Laith Mojica noted his mother has had a CT of abdomen during the time of her pancreatitis that did not demonstrate an intra abdominal malignancy     Review of Systems:  · Negative General Symptoms	no fever; no chills	  · Negative Skin Symptoms	no rash; no itching	  · Negative Ophthalmologic Symptoms	no diplopia; no photophobia	  · Negative ENMT Symptoms	no hearing difficulty; no ear pain	  · Negative Respiratory and Thorax Symptoms	no wheezing; no dyspnea; no cough	  · Negative Cardiovascular Symptoms	no chest pain; no palpitations; no dyspnea on exertion; no orthopnea	  · Negative Gastrointestinal Symptoms	no nausea; no vomiting; no diarrhea	  · Negative General Genitourinary Symptoms	no hematuria; no renal colic	  · Negative Musculoskeletal Symptoms	no arthralgia; no arthritis	  · Negative Neurological Symptoms	no transient paralysis; no weakness	  · Negative Psychiatric Symptoms	no suicidal ideation; no depression	  · Negative Hematology Symptoms	no gum bleeding; no nose bleeding	  · Negative Endocrine Symptoms	no cold intolerance; no heat intolerance	  · Negative Allergic Reactions	no anaphylaxis; no respiratory distress	      Allergies and Intolerances:        Allergies:  	penicillin: Drug, Rash  	sulfa drugs: Drug Category, Rash, Other, rash  	penicillins: Drug Category, Other, Originally Entered as [see Comment: pt arms swell up] reaction to [PENICILLINS]    Home Medications:   * Patient Currently Takes Medications as of 03-Mar-2018 17:48 documented in Structured Notes  · 	furosemide 40 mg oral tablet: 1 tab(s) orally 2 times a day, Last Dose Taken:    · 	melatonin 3 mg oral tablet: 1 tab(s) orally once a day (at bedtime), As needed, Insomnia, Last Dose Taken:    · 	polyethylene glycol 3350 oral powder for reconstitution: 17 gram(s) orally once a day, As needed, Constipation, Last Dose Taken:    · 	acetaminophen 325 mg oral tablet: 2 tab(s) orally every 6 hours, As needed, Mild Pain (1 - 3), Last Dose Taken:    · 	Eliquis 5 mg oral tablet: 1 tab(s) orally 2 times a day, Last Dose Taken:    · 	aspirin 81 mg oral tablet: 1 tab(s) orally once a day, Last Dose Taken:    · 	NexIUM 40 mg oral delayed release capsule: 1 cap(s) orally once a day, Last Dose Taken:    · 	Zocor 40 mg oral tablet: 1 tab(s) orally once a day (at bedtime), Last Dose Taken:    · 	garlic oral tablet:  orally , Last Dose Taken:    · 	glucosamine-chondroitin 250 mg-200 mg oral tablet: 3 tab(s) orally once a day, Last Dose Taken:    · 	Acidophilus oral capsule: 1 cap(s) orally once a day, Last Dose Taken:    · 	docusate sodium 100 mg oral capsule: 2 cap(s) orally once a day (at bedtime), Last Dose Taken:    · 	sildenafil 20 mg oral tablet: 1 tab(s) orally 2 times a day, Last Dose Taken:    · 	CoQ10 300 mg oral capsule: 1 cap(s) orally once a day (at bedtime), Last Dose Taken:    · 	potassium chloride 10 mEq oral tablet, extended release: 2 tab(s) orally 2 times a day, Last Dose Taken:    · 	metoprolol tartrate 25 mg oral tablet: 1 tab(s) orally 2 times a day, Last Dose Taken:    · 	levothyroxine 25 mcg (0.025 mg) oral tablet: 1 tab(s) orally once a day, Last Dose Taken:      Patient History:    Past Medical History:  Afib    CAD (coronary artery disease)    Diabetes Mellitus Type II    Dyslipidemia    GERD (Gastroesophageal Reflux Disease)    History of Atrial Fibrillation    History of Osteoarthritis    HLD (hyperlipidemia)    Hypertension    Hypothyroid.     Past Surgical History:  H/O: Hysterectomy    H/O: Knee Surgery- right meniscus    History of appendectomy    History of cholecystectomy.     Family History:  No pertinent family history in first degree relatives.     Social History:  Social History (marital status, living situation, occupation, tobacco use, alcohol and drug use, and sexual history): Patient lives with family at home.  Non smoker, Non alcoholic.	     Tobacco Screening:  · Core Measure Site	Yes	  · Has the patient used tobacco in the past 30 days?	No	    Risk Assessment:    Present on Admission:  Deep Venous Thrombosis	yes	  DVT Confirmed	Thrombophlebitis of Femoropopliteal vein	  Pulmonary Embolus	no	  Urinary Catheter	no	  Central Venous Catheter/PICC Line	no	  Surgical Site Incision	no	  Pressure Ulcer(s)	no	     Heart Failure:  Does this patient have a history of or has been diagnosed with heart failure? yes.     LV Function Assessment (LVS function was evaluated before arrival and/or during hospitalization) unknown.       Physical Exam:  Physical Exam: Vital Signs Last 24 Hrs  T(C): 36.8 (03 Mar 2018 15:05), Max: 36.8 (03 Mar 2018 15:05)  T(F): 98.2 (03 Mar 2018 15:05), Max: 98.2 (03 Mar 2018 15:05)  HR: 78 (03 Mar 2018 17:51) (78 - 80)  BP: 118/67 (03 Mar 2018 17:51) (118/67 - 123/74)  BP(mean): --  RR: 16 (03 Mar 2018 17:51) (15 - 16) SpO2: 98% (03 Mar 2018 17:51) (97% - 98%)	     Physical Exam:  · Constitutional	detailed exam	  · Constitutional Details	well-developed; well-groomed	  · Eyes	detailed exam	  · Eyes Details	PERRL; EOMI	  · ENMT	detailed exam	  · ENMT Details	ear L; ear R	  · Neck	detailed exam	  · Neck Details	supple; no JVD	  · Back	detailed exam	  · Back Details	ROM intact; strength intact	  · Respiratory	detailed exam	  · Respiratory Details	airway patent; breath sounds equal; good air movement; respirations non-labored; clear to auscultation bilaterally	  · Cardiovascular	detailed exam	  · Cardiovascular Details	regular rate and rhythm	  · Cardiovascular Details	positive S1; positive S2	  · Gastrointestinal	detailed exam	  · GI Normal	soft; nontender; no distention; no masses palpable	  · Extremities	detailed exam	  · Extremities Details	no cyanosis; pedal edema	  · Pedal Edema Severity	3+	  · Extremities Comments	tender Left LE.; L proximal lateral calf with ecchymosis but no significant hematoma, varicosities	  · Vascular	detailed exam	  · Radial Pulse	right normal; left normal	  · Neurological	detailed exam	  · Neurological Details	alert and oriented x 3; responds to pain; responds to verbal commands; sensation intact; deep reflexes intact; cranial nerves intact	  · Skin	detailed exam	  · Skin Details	warm and dry; color normal	  · Lymph Nodes	No lymphadedenopathy	  · Musculoskeletal	detailed exam	  · Musculoskeletal Details	no joint swelling; no joint erythema; no joint warmth	  · Psychiatric	detailed exam	  · Psychiatric Details	normal affect; normal behavior	      Laboratory:   Blood Bank:	    03-Mar-2018 16:32, ABO RH Interpretation	  Type + Screen: A POS	    03-Mar-2018 16:32, Antibody Screen Interpretation	  Antibody Screen: NEG	  General Chemistry:	    03-Mar-2018 15:10, Comprehensive Metabolic Panel	  Sodium, Serum: 138, [135 - 145 mmol/L]	  Potassium, Serum: 4.0, [3.5 - 5.3 mmol/L]	  Chloride, Serum: 103, [96 - 108 mmol/L]	  Carbon Dioxide, Serum: 30, [22 - 31 mmol/L]	  Anion Gap, Serum: 5, [5 - 17 mmol/L]	  Blood Urea Nitrogen, Serum: 13, [7 - 23 mg/dL]	  Creatinine, Serum: 0.77, [0.50 - 1.30 mg/dL]	  Glucose, Serum:    121, [70 - 99 mg/dL]	  Calcium, Total Serum: 9.0, [8.5 - 10.1 mg/dL]	  Protein Total, Serum: 8.0, [6.0 - 8.3 gm/dL]	  Albumin, Serum:    3.2, [3.3 - 5.0 g/dL]	  Bilirubin Total, Serum: 0.7, [0.2 - 1.2 mg/dL]	  Alkaline Phosphatase, Serum: 82, [40 - 120 U/L]	  Aspartate Aminotransferase (AST/SGOT): 20, [15 - 37 U/L]	  Alanine Aminotransferase (ALT/SGPT):    11, [12 - 78 U/L]	  eGFR if Non : 68, [>=60 mL/min/1.73M2], Interpretative comment  The units for eGFR are ml/min/1.73m2 (normalized body surface area). The  eGFR is calculated from a serum creatinine using the CKD-EPI equation.  Other variables required for calculation are race, age and sex. Among  patients with chronic kidney disease (CKD), the eGFR is useful in  determining the stage of disease according to KDOQI CKD classification.  All eGFR results are reported numerically with the following  interpretation.          GFR                    With                 Without     (ml/min/1.73 m2)    Kidney Damage       Kidney Damage        >= 90                    Stage 1                     Normal        60-89                    Stage 2                     Decreased GFR        30-59     Stage 3                     Stage 3        15-29                    Stage 4                     Stage 4        < 15                      Stage 5                     Stage 5  Each stage of CKD assumes that the associated GFR level has been in  effect for at least 3 months. Determination of stages one and two (with  eGFR > 59 ml/min/m2) requires estimation of kidney damage for at least 3  months as defined by structural or functional abnormalities.  Limitations: All estimates of GFR will be less accurate for patients at  extremes of muscle mass (including but not limited to frail elderly,  critically ill, or cancer patients), those with unusual diets, and those  with conditions associated with reduced secretion or extrarenal  elimination of creatinine. The eGFR equation is not recommended for use  in patients with unstable creatinine levels.	  eGFR if : 79, [>=60 mL/min/1.73M2]	  Coagulation:	    03-Mar-2018 15:10, Activated Partial Thromboplastin Time	  Activated Partial Thromboplastin Time: 33.1, [27.5 - 37.4 sec], The recommended therapeutic heparin range (full dose) is 58-99 seconds.  Recommended therapeutic Argatroban range is 1.5 to 3.0 times the baseline  APTT value, not to exceed 100 seconds. Recommended therapeutic Refludan  range is 1.5 to 2.5 times thebaseline APTT.	    03-Mar-2018 15:10, Prothrombin Time and INR, Plasma	  Prothrombin Time, Plasma:    15.0, [9.8 - 12.7 sec], Effective March 21st, the reference range for PT has changed.	  INR:    1.38, [0.88 - 1.16 ratio]	  Hematology:	    03-Mar-2018 15:10, Complete Blood Count + Automated Diff	  WBC Count: 7.0, [3.8 - 10.5 K/uL]	  RBC Count: 4.00, [3.80 - 5.20 M/uL]	  Hemoglobin:    11.2, [11.5 - 15.5 g/dL]	  Hematocrit: 35.1, [34.5 - 45.0 %]	  Mean Cell Volume: 87.8, [80.0 - 100.0 fl]	  Mean Cell Hemoglobin: 27.9, [27.0 - 34.0 pg]	  Mean Cell Hemoglobin Conc:    31.7, [32.0 - 36.0 gm/dL]	  Red Cell Distrib Width:    15.6, [10.3 - 14.5 %]	  Platelet Count - Automated: 208, [150 - 400 K/uL]	  Auto Neutrophil #: SEE NOTE, [1.8 - 7.4 K/uL], Discrepancy exists between manual and automated percentage(s); automated  absolute  count(s) not reported.	  Auto Lymphocyte #: SEE NOTE, [1.0 - 3.3 K/uL], Discrepancy exists between manual and automated percentage(s); automated  absolute  count(s) not reported.	  Auto Monocyte #: SEE NOTE, [0.0 - 0.9 K/uL], Discrepancy exists between manual and automated percentage(s); automated  absolute  count(s) not reported.	  Auto Eosinophil #: SEE NOTE, [0.0 - 0.5 K/uL], Discrepancy exists between manual and automated percentage(s); automated  absolute  count(s) not reported.	  Auto Basophil #: SEE NOTE, [0.0 - 0.2 K/uL], Discrepancy exists between manual and automated percentage(s); automated  absolute  count(s) not reported.	  Auto Neutrophil %: 65.0, [43.0 - 77.0 %], Differential percentages must be correlated with absolute numbers for  clinical significance.	  Auto Lymphocyte %: 17.0, [13.0 - 44.0 %]	  Auto Monocyte %: 14.0, [2.0 - 14.0 %]	  Auto Eosinophil %: 4.0, [0.0 - 6.0 %]	    03-Mar-2018 15:10, Manual Differential	  Ovalocytes: Slight	  Anisocytosis: Slight	  Hyperchromasia: Slight	  Red Cell Morphology:    Abnormal, [Normal]	  Basophilic Stippling: Slight	  Tear Drops: Slight	  Poikilocytosis: Slight	  Polychromasia: Slight	  Comment - Hematology: MANUAL DIFFERENTIAL COUNT	  Macrocytosis: Slight	  Elliptocytes: Slight	  Platelet Morphology: Normal, [Normal]	    Assessment and Plan:    Assessment:  · Assessment		  89 yo Female with a PMHx of pancreatitis, cirrhosis, DM, L knee replacement, AFIB on Eliquis presented to the ED with family with complain of left lower extremity pain for more than 1 week. She got LE venous doppler US done as outpatient at [facility] and was confirmed to have LLE DVT (left distal superficial femoral vein and left posterior tibial veins).    A/P:    1.    Left LE Acute DVT  -on heparin drip  -will follow heparin drip protocol  will consider patient a failure of anti coagulation and recommend filter placement.  Discussed with son Dr. Mojica    Hematology evaluation by Dr. Coleman to determine other anti coagulant    2.  DM  -keep on ISS  -follow Dxt    3.  h/o A fib  -stable HR now   -continue Metoprolol     4.  h/o Cirrhosis  -clinically stable    5.  Hypothyroidism  -continue Levothyroxine    6.  Hyperlipidemia  - continue statin

## 2018-03-05 LAB
ANION GAP SERPL CALC-SCNC: 6 MMOL/L — SIGNIFICANT CHANGE UP (ref 5–17)
ANISOCYTOSIS BLD QL: SLIGHT — SIGNIFICANT CHANGE UP
APTT BLD: 95.1 SEC — HIGH (ref 27.5–37.4)
BASOPHILS # BLD AUTO: HIGH K/UL (ref 0–0.2)
BUN SERPL-MCNC: 9 MG/DL — SIGNIFICANT CHANGE UP (ref 7–23)
BURR CELLS BLD QL SMEAR: PRESENT — SIGNIFICANT CHANGE UP
CALCIUM SERPL-MCNC: 8.3 MG/DL — LOW (ref 8.5–10.1)
CHLORIDE SERPL-SCNC: 106 MMOL/L — SIGNIFICANT CHANGE UP (ref 96–108)
CO2 SERPL-SCNC: 25 MMOL/L — SIGNIFICANT CHANGE UP (ref 22–31)
CREAT SERPL-MCNC: 0.53 MG/DL — SIGNIFICANT CHANGE UP (ref 0.5–1.3)
CRP SERPL-MCNC: 0.9 MG/DL — HIGH (ref 0–0.4)
DRVVT SCREEN TO CONFIRM RATIO: SIGNIFICANT CHANGE UP
ELLIPTOCYTES BLD QL SMEAR: SLIGHT — SIGNIFICANT CHANGE UP
EOSINOPHIL # BLD AUTO: SIGNIFICANT CHANGE UP K/UL (ref 0–0.5)
EOSINOPHIL NFR BLD AUTO: 8 % — HIGH (ref 0–6)
ERYTHROCYTE [SEDIMENTATION RATE] IN BLOOD: 48 MM/HR — HIGH (ref 0–20)
FERRITIN SERPL-MCNC: 84 NG/ML — SIGNIFICANT CHANGE UP (ref 15–150)
FOLATE SERPL-MCNC: 19.9 NG/ML — SIGNIFICANT CHANGE UP (ref 4.8–24.2)
GLUCOSE SERPL-MCNC: 130 MG/DL — HIGH (ref 70–99)
HCT VFR BLD CALC: 30.7 % — LOW (ref 34.5–45)
HGB BLD-MCNC: 9.7 G/DL — LOW (ref 11.5–15.5)
HYPOCHROMIA BLD QL: SLIGHT — SIGNIFICANT CHANGE UP
IRON SATN MFR SERPL: 13 % — LOW (ref 14–50)
IRON SATN MFR SERPL: 42 UG/DL — SIGNIFICANT CHANGE UP (ref 30–160)
LA NT DPL PPP QL: 31.9 SEC — SIGNIFICANT CHANGE UP
LYMPHOCYTES # BLD AUTO: 21 % — SIGNIFICANT CHANGE UP (ref 13–44)
LYMPHOCYTES # BLD AUTO: LOW K/UL (ref 1–3.3)
MANUAL DIF COMMENT BLD-IMP: SIGNIFICANT CHANGE UP
MCHC RBC-ENTMCNC: 27.8 PG — SIGNIFICANT CHANGE UP (ref 27–34)
MCHC RBC-ENTMCNC: 31.5 GM/DL — LOW (ref 32–36)
MCV RBC AUTO: 88.3 FL — SIGNIFICANT CHANGE UP (ref 80–100)
MICROCYTES BLD QL: SLIGHT — SIGNIFICANT CHANGE UP
MONOCYTES # BLD AUTO: HIGH K/UL (ref 0–0.9)
MONOCYTES NFR BLD AUTO: 15 % — HIGH (ref 2–14)
NEUTROPHILS # BLD AUTO: SIGNIFICANT CHANGE UP K/UL (ref 1.8–7.4)
NEUTROPHILS NFR BLD AUTO: 56 % — SIGNIFICANT CHANGE UP (ref 43–77)
NORMALIZED SCT PPP-RTO: 0.79 RATIO — SIGNIFICANT CHANGE UP (ref 0–1.16)
NORMALIZED SCT PPP-RTO: SIGNIFICANT CHANGE UP
OVALOCYTES BLD QL SMEAR: SLIGHT — SIGNIFICANT CHANGE UP
PLAT MORPH BLD: NORMAL — SIGNIFICANT CHANGE UP
PLATELET # BLD AUTO: 168 K/UL — SIGNIFICANT CHANGE UP (ref 150–400)
POIKILOCYTOSIS BLD QL AUTO: SLIGHT — SIGNIFICANT CHANGE UP
POLYCHROMASIA BLD QL SMEAR: SLIGHT — SIGNIFICANT CHANGE UP
POTASSIUM SERPL-MCNC: 4.1 MMOL/L — SIGNIFICANT CHANGE UP (ref 3.5–5.3)
POTASSIUM SERPL-SCNC: 4.1 MMOL/L — SIGNIFICANT CHANGE UP (ref 3.5–5.3)
RBC # BLD: 3.48 M/UL — LOW (ref 3.8–5.2)
RBC # FLD: 16 % — HIGH (ref 10.3–14.5)
RBC BLD AUTO: (no result)
RHEUMATOID FACT SERPL-ACNC: <7 IU/ML — SIGNIFICANT CHANGE UP (ref 0–13)
SODIUM SERPL-SCNC: 137 MMOL/L — SIGNIFICANT CHANGE UP (ref 135–145)
TIBC SERPL-MCNC: 317 UG/DL — SIGNIFICANT CHANGE UP (ref 220–430)
UIBC SERPL-MCNC: 275 UG/DL — SIGNIFICANT CHANGE UP (ref 110–370)
VIT B12 SERPL-MCNC: 1088 PG/ML — SIGNIFICANT CHANGE UP (ref 232–1245)
WBC # BLD: 6.2 K/UL — SIGNIFICANT CHANGE UP (ref 3.8–10.5)
WBC # FLD AUTO: 6.2 K/UL — SIGNIFICANT CHANGE UP (ref 3.8–10.5)

## 2018-03-05 PROCEDURE — 71045 X-RAY EXAM CHEST 1 VIEW: CPT | Mod: 26

## 2018-03-05 PROCEDURE — 99233 SBSQ HOSP IP/OBS HIGH 50: CPT

## 2018-03-05 PROCEDURE — 78582 LUNG VENTILAT&PERFUS IMAGING: CPT | Mod: 26

## 2018-03-05 PROCEDURE — 99223 1ST HOSP IP/OBS HIGH 75: CPT

## 2018-03-05 RX ORDER — OXYCODONE HYDROCHLORIDE 5 MG/1
5 TABLET ORAL EVERY 8 HOURS
Qty: 0 | Refills: 0 | Status: DISCONTINUED | OUTPATIENT
Start: 2018-03-05 | End: 2018-03-10

## 2018-03-05 RX ORDER — ACETAMINOPHEN 500 MG
650 TABLET ORAL EVERY 6 HOURS
Qty: 0 | Refills: 0 | Status: DISCONTINUED | OUTPATIENT
Start: 2018-03-05 | End: 2018-03-14

## 2018-03-05 RX ORDER — FERROUS SULFATE 325(65) MG
325 TABLET ORAL DAILY
Qty: 0 | Refills: 0 | Status: DISCONTINUED | OUTPATIENT
Start: 2018-03-05 | End: 2018-03-14

## 2018-03-05 RX ADMIN — Medication 100 MILLIGRAM(S): at 05:58

## 2018-03-05 RX ADMIN — Medication 650 MILLIGRAM(S): at 15:11

## 2018-03-05 RX ADMIN — Medication 1 TABLET(S): at 12:57

## 2018-03-05 RX ADMIN — Medication 325 MILLIGRAM(S): at 17:21

## 2018-03-05 RX ADMIN — Medication 100 MILLIGRAM(S): at 17:21

## 2018-03-05 RX ADMIN — Medication 25 MILLIGRAM(S): at 17:21

## 2018-03-05 RX ADMIN — PANTOPRAZOLE SODIUM 40 MILLIGRAM(S): 20 TABLET, DELAYED RELEASE ORAL at 05:58

## 2018-03-05 RX ADMIN — HEPARIN SODIUM 1300 UNIT(S)/HR: 5000 INJECTION INTRAVENOUS; SUBCUTANEOUS at 00:32

## 2018-03-05 RX ADMIN — Medication 1: at 17:29

## 2018-03-05 RX ADMIN — SIMVASTATIN 40 MILLIGRAM(S): 20 TABLET, FILM COATED ORAL at 21:54

## 2018-03-05 RX ADMIN — Medication 25 MICROGRAM(S): at 05:58

## 2018-03-05 RX ADMIN — HEPARIN SODIUM 1300 UNIT(S)/HR: 5000 INJECTION INTRAVENOUS; SUBCUTANEOUS at 08:10

## 2018-03-05 RX ADMIN — Medication 20 MILLIEQUIVALENT(S): at 17:21

## 2018-03-05 RX ADMIN — Medication 200 MILLIGRAM(S): at 21:54

## 2018-03-05 RX ADMIN — Medication 3 MILLIGRAM(S): at 21:54

## 2018-03-05 RX ADMIN — Medication 20 MILLIEQUIVALENT(S): at 05:58

## 2018-03-05 RX ADMIN — Medication 20 MILLIGRAM(S): at 05:58

## 2018-03-05 RX ADMIN — Medication 81 MILLIGRAM(S): at 12:57

## 2018-03-05 RX ADMIN — OXYCODONE HYDROCHLORIDE 5 MILLIGRAM(S): 5 TABLET ORAL at 17:21

## 2018-03-05 NOTE — PROGRESS NOTE ADULT - SUBJECTIVE AND OBJECTIVE BOX
Subjective:  Awake, alert. No dyspnea or chest pain. Events noted. Dr Coleman's consult appreciated.    MEDICATIONS  (STANDING):  aspirin enteric coated 81 milliGRAM(s) Oral daily  dextrose 50% Injectable 12.5 Gram(s) IV Push once  dextrose 50% Injectable 25 Gram(s) IV Push once  dextrose 50% Injectable 25 Gram(s) IV Push once  docusate sodium 200 milliGRAM(s) Oral at bedtime  docusate sodium 100 milliGRAM(s) Oral two times a day  furosemide    Tablet 40 milliGRAM(s) Oral two times a day  heparin  Infusion.  Unit(s)/Hr (13 mL/Hr) IV Continuous <Continuous>  insulin lispro (HumaLOG) corrective regimen sliding scale   SubCutaneous three times a day before meals  lactobacillus acidophilus 1 Tablet(s) Oral daily  levothyroxine 25 MICROGram(s) Oral daily  metoprolol     tartrate 25 milliGRAM(s) Oral two times a day  multivitamin 1 Tablet(s) Oral daily  pantoprazole    Tablet 40 milliGRAM(s) Oral before breakfast  potassium chloride    Tablet ER 20 milliEquivalent(s) Oral two times a day  sildenafil (REVATIO) 20 milliGRAM(s) Oral two times a day  simvastatin 40 milliGRAM(s) Oral at bedtime    MEDICATIONS  (PRN):  acetaminophen   Tablet 650 milliGRAM(s) Oral every 6 hours PRN Mild Pain (1 - 3)  acetaminophen   Tablet. 650 milliGRAM(s) Oral every 8 hours PRN Mild Pain (1 - 3)  dextrose Gel 1 Dose(s) Oral once PRN Blood Glucose LESS THAN 70 milliGRAM(s)/deciliter  glucagon  Injectable 1 milliGRAM(s) IntraMuscular once PRN Glucose LESS THAN 70 milligrams/deciliter  heparin  Injectable 6000 Unit(s) IV Push every 6 hours PRN For aPTT less than 40  heparin  Injectable 3000 Unit(s) IV Push every 6 hours PRN For aPTT between 40 - 57  melatonin 3 milliGRAM(s) Oral at bedtime PRN Insomnia  polyethylene glycol 3350 17 Gram(s) Oral daily PRN Constipation      Allergies    penicillin (Rash)  penicillins (Other)  sulfa drugs (Rash; Other)    Intolerances        Vital Signs Last 24 Hrs  T(C): 36.6 (05 Mar 2018 05:10), Max: 37.5 (04 Mar 2018 09:45)  T(F): 97.8 (05 Mar 2018 05:10), Max: 99.5 (04 Mar 2018 09:45)  HR: 80 (05 Mar 2018 05:10) (60 - 91)  BP: 102/81 (05 Mar 2018 05:10) (80/42 - 120/65)  BP(mean): --  RR: 18 (05 Mar 2018 05:10) (18 - 23)  SpO2: 96% (05 Mar 2018 05:10) (93% - 99%)    PHYSICAL EXAMINATION:    NECK:  Supple. No lymphadenopathy. Jugular venous pressure not elevated. Carotids equal.   HEART:   The cardiac impulse has a normal quality. Reg. irreg, Nl S1 and S2.  There are no rubs or gallops noted  CHEST:  Chest with bilateral crackles approx 1/2 up. Normal respiratory effort.  ABDOMEN:  Soft and nontender.   EXTREMITIES:  There is edema of the LLE with ecchymosis of calf. Tenderness on palpation       LABS:                        9.7    6.2   )-----------( 168      ( 05 Mar 2018 07:07 )             30.7     03-05    137  |  106  |  9   ----------------------------<  130<H>  4.1   |  25  |  0.53    Ca    8.3<L>      05 Mar 2018 07:07    TPro  8.0  /  Alb  3.2<L>  /  TBili  0.7  /  DBili  x   /  AST  20  /  ALT  11<L>  /  AlkPhos  82  03-03    PT/INR - ( 03 Mar 2018 15:10 )   PT: 15.0 sec;   INR: 1.38 ratio         PTT - ( 05 Mar 2018 06:50 )  PTT:95.1 sec      RADIOLOGY & ADDITIONAL TESTS:    Assessment and Recommendation:   · Assessment		  s/p IVC filter  for V/Q sca to r/o chronic pulm emboli  on iv heparin--need to discuss treatment options with Dr. Coleman  has decreased HG which may be from leg bleeding - follow H/H  vascular f/u noted  heme evaluation noted  D/C Lasix and put back on Torsemide TIW  Monitor BGM's without Metformin--insulin coverage  D/C sildenafil  Follow BP carefully      Problem/Recommendation - 1:  Problem: Acute deep vein thrombosis (DVT) of popliteal vein of left lower extremity.    Problem/Recommendation - 2:  ·  Problem: Dyspnea and respiratory abnormalities.     Problem/Recommendation - 3:  ·  Problem: Pulmonary hypertension--r/o Chronic Pulm Emboli.     Problem/Recommendation - 4:  ·  Problem: Afib.     Problem/Recommendation - 5:  ·  Problem: CAD (coronary artery disease).     Problem/Recommendation - 6:  Problem: Diabetes mellitus type 2, controlled.    Problem/Recommendation - 7:  Problem: CHF (congestive heart failure).    Problem/Recommendation - 8:  Problem: Anemia.

## 2018-03-05 NOTE — PHYSICAL THERAPY INITIAL EVALUATION ADULT - MODALITIES TREATMENT COMMENTS
pt left in bed supine post Eval; bed alarm on; callbell in reach; pt instructed not to get up alone; call nursing for assist; pancho well; denied pain

## 2018-03-05 NOTE — CONSULT NOTE ADULT - SUBJECTIVE AND OBJECTIVE BOX
REASON FOR CONSULT: r/o vascultis    CHIEF COMPLAINT: pain in legs    HPI:  89 yo Female with a PMHx of pancreatitis, cirrhosis, DM, L knee replacement, AFIB on Eliquis presented to the ED with family with complain of left lower extremity pain for more than 1 week. She got LE venous doppler US done as outpatient at [facility] and was confirmed to have LLE DVT (left distal superficial femoral vein and left posterior tibial veins). She stopped Eliquis recently and had missed x3 dosages. Pt currently calm, able to provide adequate hx, answer questioning and denies CP, SOB, fever, cough, chills, abd pain, NVD or any other acute c/o at this time.   Rheum service was asked to see pt to rule out possible vasculitis as cause of DVT.     PMH as above  PSH reviewd  Allergies- reviewed  Social history and FH- reviewed    REVIEW OF SYSTEMS:    CONSTITUTIONAL: No weakness, fevers or chills  EYES/ENT: No visual changes;   NECK: No pain or stiffness  RESPIRATORY: No cough, wheezing, hemoptysis; No shortness of breath  CARDIOVASCULAR: No chest pain or palpitations  GASTROINTESTINAL: No abdominal or epigastric pain. No nausea, vomiting, or hematemesis; No diarrhea or constipation. No melena or hematochezia.  GENITOURINARY: No dysuria, frequency or hematuria  NEUROLOGICAL: No numbness or weakness  SKIN: No itching, burning, rashes, or lesions   Musculoskelatl- + leg pain  All other review of systems is negative unless indicated above    Vital Signs Last 24 Hrs  T(C): 36.6 (05 Mar 2018 17:11), Max: 37.4 (04 Mar 2018 21:49)  T(F): 97.9 (05 Mar 2018 17:11), Max: 99.3 (04 Mar 2018 21:49)  HR: 98 (05 Mar 2018 17:11) (72 - 98)  BP: 130/67 (05 Mar 2018 17:11) (80/42 - 130/67)  BP(mean): --  RR: 17 (05 Mar 2018 17:11) (16 - 18)  SpO2: 96% (05 Mar 2018 17:11) (91% - 97%)    I&O's Summary    04 Mar 2018 07:01  -  05 Mar 2018 07:00  --------------------------------------------------------  IN: 1400 mL / OUT: 200 mL / NET: 1200 mL    05 Mar 2018 07:01  -  05 Mar 2018 20:05  --------------------------------------------------------  IN: 216 mL / OUT: 150 mL / NET: 66 mL        CAPILLARY BLOOD GLUCOSE      POCT Blood Glucose.: 152 mg/dL (05 Mar 2018 17:23)  POCT Blood Glucose.: 122 mg/dL (05 Mar 2018 13:00)  POCT Blood Glucose.: 129 mg/dL (05 Mar 2018 07:48)      PHYSICAL EXAM:    Heart- S1 S2 reg  Lungs- b/l air entry  Abd- Soft NT  Ext- + edema  Skin- no rashes  Musculoskelatal- left TKR, no active synovitis noted    MEDICATIONS:  MEDICATIONS  (STANDING):  aspirin enteric coated 81 milliGRAM(s) Oral daily  dextrose 50% Injectable 12.5 Gram(s) IV Push once  dextrose 50% Injectable 25 Gram(s) IV Push once  dextrose 50% Injectable 25 Gram(s) IV Push once  docusate sodium 200 milliGRAM(s) Oral at bedtime  docusate sodium 100 milliGRAM(s) Oral two times a day  ferrous    sulfate 325 milliGRAM(s) Oral daily  heparin  Infusion.  Unit(s)/Hr (13 mL/Hr) IV Continuous <Continuous>  insulin lispro (HumaLOG) corrective regimen sliding scale   SubCutaneous three times a day before meals  lactobacillus acidophilus 1 Tablet(s) Oral daily  levothyroxine 25 MICROGram(s) Oral daily  metoprolol     tartrate 25 milliGRAM(s) Oral two times a day  multivitamin 1 Tablet(s) Oral daily  pantoprazole    Tablet 40 milliGRAM(s) Oral before breakfast  potassium chloride    Tablet ER 20 milliEquivalent(s) Oral two times a day  simvastatin 40 milliGRAM(s) Oral at bedtime      LABS: All Labs Reviewed:                        9.7    6.2   )-----------( 168      ( 05 Mar 2018 07:07 )             30.7     03-05    137  |  106  |  9   ----------------------------<  130<H>  4.1   |  25  |  0.53    Ca    8.3<L>      05 Mar 2018 07:07      PTT - ( 05 Mar 2018 06:50 )  PTT:95.1 sec      Blood Culture:     RADIOLOGY/EKG:    A/P

## 2018-03-05 NOTE — PHYSICAL THERAPY INITIAL EVALUATION ADULT - CRITERIA FOR SKILLED THERAPEUTIC INTERVENTIONS
will attempt completion of Eval @ later date pending results of V/Q scan to r/o chronic PE; further course of PT intervention pending

## 2018-03-05 NOTE — PROGRESS NOTE ADULT - SUBJECTIVE AND OBJECTIVE BOX
CHIEF COMPLAINT: DVT    SUBJECTIVE: O/N events noted.  Meds updated per Dr. Camacho this AM to accurate home meds.  Went for VQ, was negative.  Pt c/o L calf pain.    REVIEW OF SYSTEMS: All other review of systems is negative unless indicated above    Vital Signs Last 24 Hrs  T(C): 36.6 (05 Mar 2018 17:11), Max: 37.4 (04 Mar 2018 21:49)  T(F): 97.9 (05 Mar 2018 17:11), Max: 99.3 (04 Mar 2018 21:49)  HR: 98 (05 Mar 2018 17:11) (60 - 98)  BP: 130/67 (05 Mar 2018 17:11) (80/42 - 130/67)  BP(mean): --  RR: 17 (05 Mar 2018 17:11) (16 - 18)  SpO2: 96% (05 Mar 2018 17:11) (91% - 97%)    PHYSICAL EXAM:  Constitutional: NAD, awake and alert  HEENT: EOMI, Normal Hearing, MMM  Neck: Soft and supple, No JVD  Respiratory: Breath sounds are clear bilaterally, No wheezing, rales or rhonchi  Cardiovascular: S1 and S2, regular rate and rhythm, no Murmurs, gallops or rubs  Gastrointestinal: Bowel Sounds present, soft, nontender, nondistended, no guarding, no rebound  Extremities: LLE ecchymosis. No edema.  Vascular: 2+ peripheral pulses  Neurological: A/O x 3, no focal deficits  Musculoskeletal: 5/5 strength b/l upper and lower extremities  Skin: No rashes    MEDICATIONS:  MEDICATIONS  (STANDING):  aspirin enteric coated 81 milliGRAM(s) Oral daily  dextrose 50% Injectable 12.5 Gram(s) IV Push once  dextrose 50% Injectable 25 Gram(s) IV Push once  dextrose 50% Injectable 25 Gram(s) IV Push once  docusate sodium 200 milliGRAM(s) Oral at bedtime  docusate sodium 100 milliGRAM(s) Oral two times a day  ferrous    sulfate 325 milliGRAM(s) Oral daily  heparin  Infusion.  Unit(s)/Hr (13 mL/Hr) IV Continuous <Continuous>  insulin lispro (HumaLOG) corrective regimen sliding scale   SubCutaneous three times a day before meals  lactobacillus acidophilus 1 Tablet(s) Oral daily  levothyroxine 25 MICROGram(s) Oral daily  metoprolol     tartrate 25 milliGRAM(s) Oral two times a day  multivitamin 1 Tablet(s) Oral daily  pantoprazole    Tablet 40 milliGRAM(s) Oral before breakfast  potassium chloride    Tablet ER 20 milliEquivalent(s) Oral two times a day  simvastatin 40 milliGRAM(s) Oral at bedtime    LABS: All Labs Reviewed:                        9.7    6.2   )-----------( 168      ( 05 Mar 2018 07:07 )             30.7     137  |  106  |  9   ----------------------------<  130<H>  4.1   |  25  |  0.53    Ca    8.3<L>      05 Mar 2018 07:07    PTT - ( 05 Mar 2018 06:50 )  PTT:95.1 sec

## 2018-03-06 LAB
ANION GAP SERPL CALC-SCNC: 7 MMOL/L — SIGNIFICANT CHANGE UP (ref 5–17)
APPEARANCE UR: CLEAR — SIGNIFICANT CHANGE UP
APTT BLD: 153.2 SEC — CRITICAL HIGH (ref 27.5–37.4)
APTT BLD: 88.7 SEC — HIGH (ref 27.5–37.4)
B2 GLYCOPROT1 AB SER QL: NEGATIVE — SIGNIFICANT CHANGE UP
BACTERIA # UR AUTO: (no result)
BILIRUB UR-MCNC: NEGATIVE — SIGNIFICANT CHANGE UP
BUN SERPL-MCNC: 9 MG/DL — SIGNIFICANT CHANGE UP (ref 7–23)
C-ANCA SER-ACNC: NEGATIVE — SIGNIFICANT CHANGE UP
CALCIUM SERPL-MCNC: 8.6 MG/DL — SIGNIFICANT CHANGE UP (ref 8.5–10.1)
CARDIOLIPIN AB SER-ACNC: NEGATIVE — SIGNIFICANT CHANGE UP
CCP IGG SERPL-ACNC: <8 UNITS — SIGNIFICANT CHANGE UP (ref 0–19)
CHLORIDE SERPL-SCNC: 105 MMOL/L — SIGNIFICANT CHANGE UP (ref 96–108)
CO2 SERPL-SCNC: 24 MMOL/L — SIGNIFICANT CHANGE UP (ref 22–31)
COLOR SPEC: YELLOW — SIGNIFICANT CHANGE UP
CREAT SERPL-MCNC: 0.51 MG/DL — SIGNIFICANT CHANGE UP (ref 0.5–1.3)
DIFF PNL FLD: (no result)
DSDNA AB FLD-ACNC: <0.2 AI — SIGNIFICANT CHANGE UP
ENA SS-A AB FLD IA-ACNC: <0.2 AI — SIGNIFICANT CHANGE UP
EPI CELLS # UR: SIGNIFICANT CHANGE UP
FERRITIN SERPL-MCNC: 105 NG/ML — SIGNIFICANT CHANGE UP (ref 15–150)
GLUCOSE SERPL-MCNC: 141 MG/DL — HIGH (ref 70–99)
GLUCOSE UR QL: NEGATIVE MG/DL — SIGNIFICANT CHANGE UP
HCT VFR BLD CALC: 31.2 % — LOW (ref 34.5–45)
HGB BLD-MCNC: 9.8 G/DL — LOW (ref 11.5–15.5)
KETONES UR-MCNC: NEGATIVE — SIGNIFICANT CHANGE UP
LEUKOCYTE ESTERASE UR-ACNC: NEGATIVE — SIGNIFICANT CHANGE UP
MCHC RBC-ENTMCNC: 27.6 PG — SIGNIFICANT CHANGE UP (ref 27–34)
MCHC RBC-ENTMCNC: 31.2 GM/DL — LOW (ref 32–36)
MCV RBC AUTO: 88.6 FL — SIGNIFICANT CHANGE UP (ref 80–100)
NITRITE UR-MCNC: NEGATIVE — SIGNIFICANT CHANGE UP
P-ANCA SER-ACNC: NEGATIVE — SIGNIFICANT CHANGE UP
PH UR: 5 — SIGNIFICANT CHANGE UP (ref 5–8)
PLATELET # BLD AUTO: 153 K/UL — SIGNIFICANT CHANGE UP (ref 150–400)
POTASSIUM SERPL-MCNC: 4.5 MMOL/L — SIGNIFICANT CHANGE UP (ref 3.5–5.3)
POTASSIUM SERPL-SCNC: 4.5 MMOL/L — SIGNIFICANT CHANGE UP (ref 3.5–5.3)
PROT UR-MCNC: 15 MG/DL
RBC # BLD: 3.53 M/UL — LOW (ref 3.8–5.2)
RBC # FLD: 16.1 % — HIGH (ref 10.3–14.5)
RBC CASTS # UR COMP ASSIST: SIGNIFICANT CHANGE UP /HPF (ref 0–4)
RF+CCP IGG SER-IMP: NEGATIVE — SIGNIFICANT CHANGE UP
SODIUM SERPL-SCNC: 136 MMOL/L — SIGNIFICANT CHANGE UP (ref 135–145)
SP GR SPEC: 1.01 — SIGNIFICANT CHANGE UP (ref 1.01–1.02)
UROBILINOGEN FLD QL: 1 MG/DL
VIT B12 SERPL-MCNC: 1186 PG/ML — SIGNIFICANT CHANGE UP (ref 232–1245)
WBC # BLD: 6.7 K/UL — SIGNIFICANT CHANGE UP (ref 3.8–10.5)
WBC # FLD AUTO: 6.7 K/UL — SIGNIFICANT CHANGE UP (ref 3.8–10.5)
WBC UR QL: SIGNIFICANT CHANGE UP

## 2018-03-06 PROCEDURE — 99233 SBSQ HOSP IP/OBS HIGH 50: CPT

## 2018-03-06 PROCEDURE — 73701 CT LOWER EXTREMITY W/DYE: CPT | Mod: 26,LT

## 2018-03-06 PROCEDURE — 93971 EXTREMITY STUDY: CPT | Mod: 26,LT

## 2018-03-06 RX ORDER — SODIUM FERRIC GLUCONAT/SUCROSE 62.5MG/5ML
125 AMPUL (ML) INTRAVENOUS DAILY
Qty: 0 | Refills: 0 | Status: COMPLETED | OUTPATIENT
Start: 2018-03-06 | End: 2018-03-07

## 2018-03-06 RX ORDER — SODIUM CHLORIDE 9 MG/ML
1000 INJECTION INTRAMUSCULAR; INTRAVENOUS; SUBCUTANEOUS
Qty: 0 | Refills: 0 | Status: DISCONTINUED | OUTPATIENT
Start: 2018-03-06 | End: 2018-03-07

## 2018-03-06 RX ADMIN — Medication 110 MILLIGRAM(S): at 21:45

## 2018-03-06 RX ADMIN — PANTOPRAZOLE SODIUM 40 MILLIGRAM(S): 20 TABLET, DELAYED RELEASE ORAL at 05:19

## 2018-03-06 RX ADMIN — Medication 325 MILLIGRAM(S): at 12:12

## 2018-03-06 RX ADMIN — Medication 25 MILLIGRAM(S): at 05:19

## 2018-03-06 RX ADMIN — Medication 100 MILLIGRAM(S): at 17:43

## 2018-03-06 RX ADMIN — Medication 20 MILLIEQUIVALENT(S): at 17:44

## 2018-03-06 RX ADMIN — Medication 81 MILLIGRAM(S): at 12:12

## 2018-03-06 RX ADMIN — Medication 200 MILLIGRAM(S): at 21:45

## 2018-03-06 RX ADMIN — Medication 650 MILLIGRAM(S): at 12:16

## 2018-03-06 RX ADMIN — HEPARIN SODIUM 1100 UNIT(S)/HR: 5000 INJECTION INTRAVENOUS; SUBCUTANEOUS at 09:17

## 2018-03-06 RX ADMIN — Medication 3 MILLIGRAM(S): at 21:45

## 2018-03-06 RX ADMIN — Medication 25 MICROGRAM(S): at 05:19

## 2018-03-06 RX ADMIN — Medication 20 MILLIEQUIVALENT(S): at 05:19

## 2018-03-06 RX ADMIN — HEPARIN SODIUM 1100 UNIT(S)/HR: 5000 INJECTION INTRAVENOUS; SUBCUTANEOUS at 17:20

## 2018-03-06 RX ADMIN — Medication 1: at 12:16

## 2018-03-06 RX ADMIN — Medication 1 TABLET(S): at 12:12

## 2018-03-06 RX ADMIN — Medication 20 MILLIGRAM(S): at 05:18

## 2018-03-06 RX ADMIN — Medication 100 MILLIGRAM(S): at 05:18

## 2018-03-06 RX ADMIN — SIMVASTATIN 40 MILLIGRAM(S): 20 TABLET, FILM COATED ORAL at 21:45

## 2018-03-06 RX ADMIN — HEPARIN SODIUM 0 UNIT(S)/HR: 5000 INJECTION INTRAVENOUS; SUBCUTANEOUS at 08:05

## 2018-03-06 RX ADMIN — SODIUM CHLORIDE 50 MILLILITER(S): 9 INJECTION INTRAMUSCULAR; INTRAVENOUS; SUBCUTANEOUS at 13:21

## 2018-03-06 NOTE — PROGRESS NOTE ADULT - SUBJECTIVE AND OBJECTIVE BOX
Subjective:  Awake, alert. Less pain in the left calf. No dyspnea or chest pain. Discussed case with Davina Lucero and Елена    MEDICATIONS  (STANDING):  aspirin enteric coated 81 milliGRAM(s) Oral daily  dextrose 50% Injectable 12.5 Gram(s) IV Push once  dextrose 50% Injectable 25 Gram(s) IV Push once  dextrose 50% Injectable 25 Gram(s) IV Push once  docusate sodium 200 milliGRAM(s) Oral at bedtime  docusate sodium 100 milliGRAM(s) Oral two times a day  ferrous    sulfate 325 milliGRAM(s) Oral daily  heparin  Infusion.  Unit(s)/Hr (13 mL/Hr) IV Continuous <Continuous>  insulin lispro (HumaLOG) corrective regimen sliding scale   SubCutaneous three times a day before meals  lactobacillus acidophilus 1 Tablet(s) Oral daily  levothyroxine 25 MICROGram(s) Oral daily  metoprolol     tartrate 25 milliGRAM(s) Oral two times a day  multivitamin 1 Tablet(s) Oral daily  pantoprazole    Tablet 40 milliGRAM(s) Oral before breakfast  potassium chloride    Tablet ER 20 milliEquivalent(s) Oral two times a day  simvastatin 40 milliGRAM(s) Oral at bedtime  torsemide 20 milliGRAM(s) Oral every 48 hours    MEDICATIONS  (PRN):  acetaminophen   Tablet 650 milliGRAM(s) Oral every 6 hours PRN Mild Pain (1 - 3)  acetaminophen   Tablet. 650 milliGRAM(s) Oral every 8 hours PRN Mild Pain (1 - 3)  acetaminophen   Tablet. 650 milliGRAM(s) Oral every 6 hours PRN Moderate Pain (4 - 6)  dextrose Gel 1 Dose(s) Oral once PRN Blood Glucose LESS THAN 70 milliGRAM(s)/deciliter  glucagon  Injectable 1 milliGRAM(s) IntraMuscular once PRN Glucose LESS THAN 70 milligrams/deciliter  heparin  Injectable 6000 Unit(s) IV Push every 6 hours PRN For aPTT less than 40  heparin  Injectable 3000 Unit(s) IV Push every 6 hours PRN For aPTT between 40 - 57  melatonin 3 milliGRAM(s) Oral at bedtime PRN Insomnia  oxyCODONE    IR 5 milliGRAM(s) Oral every 8 hours PRN Severe Pain (7 - 10)  polyethylene glycol 3350 17 Gram(s) Oral daily PRN Constipation      Allergies    penicillin (Rash)  penicillins (Other)  sulfa drugs (Rash; Other)    Intolerances        Vital Signs Last 24 Hrs  T(C): 36.6 (06 Mar 2018 05:07), Max: 36.8 (05 Mar 2018 11:39)  T(F): 97.8 (06 Mar 2018 05:07), Max: 98.3 (05 Mar 2018 11:39)  HR: 71 (06 Mar 2018 05:07) (71 - 98)  BP: 120/68 (06 Mar 2018 05:07) (108/62 - 130/67)  BP(mean): --  RR: 16 (06 Mar 2018 05:07) (16 - 17)  SpO2: 92% (06 Mar 2018 05:07) (91% - 96%)    PHYSICAL EXAMINATION:    NECK:  Supple. No lymphadenopathy. Jugular venous pressure not elevated. Carotids equal.   HEART:   The cardiac impulse has a normal quality. Irreg., Nl S1 and S2.  There are no rubs or gallops noted  CHEST:  Chest with bilateral crackles approx 1/3 up. Normal respiratory effort.  ABDOMEN:  Soft and nontender.   EXTREMITIES:  There is an ecchymosis of the LLE. The calf is soft with minimal tenderness. +pulse. Nopalpable cord.       LABS:                        9.8    6.7   )-----------( 153      ( 06 Mar 2018 06:51 )             31.2     03-06    136  |  105  |  9   ----------------------------<  141<H>  4.5   |  24  |  0.51    Ca    8.6      06 Mar 2018 06:51      PTT - ( 06 Mar 2018 06:51 )  PTT:153.2 sec      RADIOLOGY & ADDITIONAL TESTS:  EXAM: [facility] VENTILATION PERFUS IMG                            PROCEDURE DATE:  03/05/2018          INTERPRETATION:  CLINICAL INFORMATION: 89-year-old female with pulmonary   hypertension and DVT, evaluate for pulmonary embolism.    RADIOPHARMACEUTICAL: 1 mCi Tc-99m-DTPA by inhalation; 6.2 mCi Tc-99m-MAA,   I.V.    TECHNIQUE:  Ventilation and perfusion images of the lungs were obtained   following administration of Tc-99m-DTPA and Tc-99m-MAA. Images were   obtained in the anterior, posterior,both lateral, and all 4 oblique   projections. This study was interpreted in conjunction with a chest   radiograph of 3/5/2018.    COMPARISON: No prior lung V/Q scan available.     FINDINGS: There is cardiomegaly and heterogeneous radiotracer   distribution in the remainder of both lungs on the ventilation and the   perfusion images. No segmental perfusion defects are noted.    IMPRESSION: Ventilation/perfusion lung scan: Very low probability of   pulmonary embolus.    MARITA HENRIQUEZ    < from: Xray Chest 1 View- PORTABLE-Routine (03.05.18 @ 09:18) >  EXAM:  XR CHEST PORTABLE ROUTINE 1V                            PROCEDURE DATE:  03/05/2018          INTERPRETATION:   History: CTEP, dyspnea    Chest:  one view.    Comparison: 12/23/2017    Findings/  impression:    AP radiograph of the chest demonstrates mild chronic appearing   interstitial changes tiny bibasilar effusion/atelectasis probable mild   vascular congestion. Tortuous thoracic aorta borderline cardiomegaly   patient is status post TAVR. Osteopenia and degenerative changes.    Assessment and Recommendation:   · Assessment		  s/p IVC filter  V/Q scan to r/o hronic pulm emboli--neg  on iv heparin--need to discuss treatment options with Dr. Coleman  has decreased HG which may be from leg bleeding - follow H/H  vascular f/u noted-Discussed w/ Dr. Pulido--Will obtain CT of LLE w/ contrast to see extent of clot/hematoma  heme evaluation noted  Put back on Torsemide TIW-start today  Monitor BGM's without Metformin--insulin coverage  Follow BP carefully  Follow BUN/Cr, H/H   Gentle hydration for a few hours before and after CT scan, then D/C      Problem/Recommendation - 1:  Problem: Acute deep vein thrombosis (DVT) of popliteal vein of left lower extremity.    Problem/Recommendation - 2:  ·  Problem: Dyspnea and respiratory abnormalities.     Problem/Recommendation - 3:  ·  Problem: Pulmonary hypertension--r/o Chronic Pulm Emboli.     Problem/Recommendation - 4:  ·  Problem: Afib.     Problem/Recommendation - 5:  ·  Problem: CAD (coronary artery disease).     Problem/Recommendation - 6:  Problem: Diabetes mellitus type 2, controlled.    Problem/Recommendation - 7:  Problem: CHF (congestive heart failure).    Problem/Recommendation - 8:  Problem: Anemia.

## 2018-03-06 NOTE — PROGRESS NOTE ADULT - SUBJECTIVE AND OBJECTIVE BOX
CHIEF COMPLAINT: DVT    SUBJECTIVE:  LLE pain slightly better this AM.  C/o cough. Has fever 101.8.    REVIEW OF SYSTEMS: All other review of systems is negative unless indicated above    Vital Signs Last 24 Hrs  T(C): 37.2 (06 Mar 2018 16:51), Max: 38.8 (06 Mar 2018 12:05)  T(F): 98.9 (06 Mar 2018 16:51), Max: 101.8 (06 Mar 2018 12:05)  HR: 84 (06 Mar 2018 16:51) (63 - 84)  BP: 112/64 (06 Mar 2018 16:51) (112/64 - 120/68)  BP(mean): --  RR: 18 (06 Mar 2018 16:51) (16 - 20)  SpO2: 96% (06 Mar 2018 16:51) (92% - 97%)    PHYSICAL EXAM:  Constitutional: NAD, awake and alert  HEENT: EOMI, Normal Hearing, MMM  Neck: Soft and supple, No JVD  Respiratory: Breath sounds are clear bilaterally, No wheezing, rales or rhonchi  Cardiovascular: S1 and S2, regular rate and rhythm, no Murmurs, gallops or rubs  Gastrointestinal: Bowel Sounds present, soft, nontender, nondistended, no guarding, no rebound  Extremities: LLE ecchymosis. L calf soft. No edema.  Vascular: 2+ peripheral pulses  Neurological: A/O x 3, no focal deficits  Musculoskeletal: 5/5 strength b/l upper and lower extremities  Skin: No rashes    MEDICATIONS:  MEDICATIONS  (STANDING):  aspirin enteric coated 81 milliGRAM(s) Oral daily  dextrose 50% Injectable 12.5 Gram(s) IV Push once  dextrose 50% Injectable 25 Gram(s) IV Push once  dextrose 50% Injectable 25 Gram(s) IV Push once  docusate sodium 200 milliGRAM(s) Oral at bedtime  docusate sodium 100 milliGRAM(s) Oral two times a day  ferrous    sulfate 325 milliGRAM(s) Oral daily  heparin  Infusion.  Unit(s)/Hr (13 mL/Hr) IV Continuous <Continuous>  insulin lispro (HumaLOG) corrective regimen sliding scale   SubCutaneous three times a day before meals  lactobacillus acidophilus 1 Tablet(s) Oral daily  levothyroxine 25 MICROGram(s) Oral daily  metoprolol     tartrate 25 milliGRAM(s) Oral two times a day  multivitamin 1 Tablet(s) Oral daily  pantoprazole    Tablet 40 milliGRAM(s) Oral before breakfast  potassium chloride    Tablet ER 20 milliEquivalent(s) Oral two times a day  simvastatin 40 milliGRAM(s) Oral at bedtime  sodium chloride 0.9%. 1000 milliLiter(s) (50 mL/Hr) IV Continuous <Continuous>  torsemide 20 milliGRAM(s) Oral every 48 hours    LABS: All Labs Reviewed:                        9.8    6.7   )-----------( 153      ( 06 Mar 2018 06:51 )             31.2     136  |  105  |  9   ----------------------------<  141<H>  4.5   |  24  |  0.51    Ca    8.6      06 Mar 2018 06:51    PTT - ( 06 Mar 2018 16:32 )  PTT:88.7 sec

## 2018-03-06 NOTE — PROGRESS NOTE ADULT - SUBJECTIVE AND OBJECTIVE BOX
difficulty ambulating secondary to L leg pain    V-Q scan results noted    on heparin (plts 153 K), no NOAC's yet    PE  no R groin hematoma; L foot pink and warm with 2/4 L DP pulse; ecchymoses L lateral proximal calf improved and L calf soft with at most minimal tenderness    A/P     status post IVC filter    L leg pain    discussed with Dr. Camacho: for CT of L leg and repeat venous duplex        MEDICATIONS  (STANDING):  aspirin enteric coated 81 milliGRAM(s) Oral daily  dextrose 50% Injectable 12.5 Gram(s) IV Push once  dextrose 50% Injectable 25 Gram(s) IV Push once  dextrose 50% Injectable 25 Gram(s) IV Push once  docusate sodium 200 milliGRAM(s) Oral at bedtime  docusate sodium 100 milliGRAM(s) Oral two times a day  ferrous    sulfate 325 milliGRAM(s) Oral daily  heparin  Infusion.  Unit(s)/Hr (13 mL/Hr) IV Continuous <Continuous>  insulin lispro (HumaLOG) corrective regimen sliding scale   SubCutaneous three times a day before meals  lactobacillus acidophilus 1 Tablet(s) Oral daily  levothyroxine 25 MICROGram(s) Oral daily  metoprolol     tartrate 25 milliGRAM(s) Oral two times a day  multivitamin 1 Tablet(s) Oral daily  pantoprazole    Tablet 40 milliGRAM(s) Oral before breakfast  potassium chloride    Tablet ER 20 milliEquivalent(s) Oral two times a day  simvastatin 40 milliGRAM(s) Oral at bedtime  torsemide 20 milliGRAM(s) Oral every 48 hours    MEDICATIONS  (PRN):  acetaminophen   Tablet 650 milliGRAM(s) Oral every 6 hours PRN Mild Pain (1 - 3)  acetaminophen   Tablet. 650 milliGRAM(s) Oral every 8 hours PRN Mild Pain (1 - 3)  acetaminophen   Tablet. 650 milliGRAM(s) Oral every 6 hours PRN Moderate Pain (4 - 6)  dextrose Gel 1 Dose(s) Oral once PRN Blood Glucose LESS THAN 70 milliGRAM(s)/deciliter  glucagon  Injectable 1 milliGRAM(s) IntraMuscular once PRN Glucose LESS THAN 70 milligrams/deciliter  heparin  Injectable 6000 Unit(s) IV Push every 6 hours PRN For aPTT less than 40  heparin  Injectable 3000 Unit(s) IV Push every 6 hours PRN For aPTT between 40 - 57  melatonin 3 milliGRAM(s) Oral at bedtime PRN Insomnia  oxyCODONE    IR 5 milliGRAM(s) Oral every 8 hours PRN Severe Pain (7 - 10)  polyethylene glycol 3350 17 Gram(s) Oral daily PRN Constipation      Allergies    penicillin (Rash)  penicillins (Other)  sulfa drugs (Rash; Other)    Intolerances        Flatus: [ ] YES [ ] NO             Bowel Movement: [ ] YES [ ] NO  Pain (0-10):            Pain Control Adequate: [ ] YES [ ] NO  Nausea: [ ] YES [ ] NO            Vomiting: [ ] YES [ ] NO  Diarrhea: [ ] YES [ ] NO         Constipation: [ ] YES [ ] NO     Chest Pain: [ ] YES [ ] NO    SOB:  [ ] YES [ ] NO    Vital Signs Last 24 Hrs  T(C): 36.6 (06 Mar 2018 05:07), Max: 36.8 (05 Mar 2018 11:39)  T(F): 97.8 (06 Mar 2018 05:07), Max: 98.3 (05 Mar 2018 11:39)  HR: 71 (06 Mar 2018 05:07) (71 - 98)  BP: 120/68 (06 Mar 2018 05:07) (108/62 - 130/67)  BP(mean): --  RR: 16 (06 Mar 2018 05:07) (16 - 17)  SpO2: 92% (06 Mar 2018 05:07) (91% - 96%)    I&O's Summary    05 Mar 2018 07:01  -  06 Mar 2018 07:00  --------------------------------------------------------  IN: 846 mL / OUT: 400 mL / NET: 446 mL        Physical Exam:  General: NAD, resting comfortably  Pulmonary: normal resp effort, CTA-B  Cardiovascular: NSR  Abdominal: soft, NT/ND  Extremities: WWP, normal strength  Neuro: A/O x 3, CNs II-XII grossly intact, normal motor/sensation, no focal deficits  Pulses:   Right:                                                                          Left:  FEM [ ]2+ [ ]1+ [ ]doppler                                             FEM [ ]2+ [ ]1+ [ ]doppler    POP [ ]2+ [ ]1+ [ ]doppler                                             POP [ ]2+ [ ]1+ [ ]doppler    DP [ ]2+ [ ]1+ [ ]doppler                                                DP [ ]2+ [ ]1+ [ ]doppler  PT[ ]2+ [ ]1+ [ ]doppler                                                  PT [ ]2+ [ ]1+ [ ]doppler    LABS:                        9.8    6.7   )-----------( 153      ( 06 Mar 2018 06:51 )             31.2     03-06    136  |  105  |  9   ----------------------------<  141<H>  4.5   |  24  |  0.51    Ca    8.6      06 Mar 2018 06:51      PTT - ( 06 Mar 2018 06:51 )  PTT:153.2 sec      CAPILLARY BLOOD GLUCOSE      POCT Blood Glucose.: 128 mg/dL (05 Mar 2018 20:58)  POCT Blood Glucose.: 152 mg/dL (05 Mar 2018 17:23)  POCT Blood Glucose.: 122 mg/dL (05 Mar 2018 13:00)      RADIOLOGY & ADDITIONAL TESTS:

## 2018-03-07 LAB
ANA PAT FLD IF-IMP: (no result)
ANA TITR SER: (no result)
APTT BLD: 140 SEC — CRITICAL HIGH (ref 27.5–37.4)
APTT BLD: 35.9 SEC — SIGNIFICANT CHANGE UP (ref 27.5–37.4)
APTT BLD: 73.7 SEC — HIGH (ref 27.5–37.4)
HCT VFR BLD CALC: 29.2 % — LOW (ref 34.5–45)
HGB BLD-MCNC: 9.4 G/DL — LOW (ref 11.5–15.5)
IGG SERPL-MCNC: 1217 MG/DL — SIGNIFICANT CHANGE UP (ref 700–1600)
IGG1 SER-MCNC: 1007 MG/DL — HIGH (ref 248–810)
IGG2 SER-MCNC: 146 MG/DL — SIGNIFICANT CHANGE UP (ref 130–555)
IGG3 SER-MCNC: 101 MG/DL — SIGNIFICANT CHANGE UP (ref 15–102)
IGG4 SER-MCNC: 80 MG/DL — SIGNIFICANT CHANGE UP (ref 2–96)
MCHC RBC-ENTMCNC: 28.1 PG — SIGNIFICANT CHANGE UP (ref 27–34)
MCHC RBC-ENTMCNC: 32.2 GM/DL — SIGNIFICANT CHANGE UP (ref 32–36)
MCV RBC AUTO: 87.2 FL — SIGNIFICANT CHANGE UP (ref 80–100)
NRBC # BLD: 0 /100 WBCS — SIGNIFICANT CHANGE UP (ref 0–0)
PLATELET # BLD AUTO: 160 K/UL — SIGNIFICANT CHANGE UP (ref 150–400)
RBC # BLD: 3.35 M/UL — LOW (ref 3.8–5.2)
RBC # FLD: 16.8 % — HIGH (ref 10.3–14.5)
WBC # BLD: 7.65 K/UL — SIGNIFICANT CHANGE UP (ref 3.8–10.5)
WBC # FLD AUTO: 7.65 K/UL — SIGNIFICANT CHANGE UP (ref 3.8–10.5)

## 2018-03-07 PROCEDURE — 93975 VASCULAR STUDY: CPT | Mod: 26

## 2018-03-07 PROCEDURE — 99233 SBSQ HOSP IP/OBS HIGH 50: CPT

## 2018-03-07 PROCEDURE — 99232 SBSQ HOSP IP/OBS MODERATE 35: CPT

## 2018-03-07 PROCEDURE — 93978 VASCULAR STUDY: CPT | Mod: 26

## 2018-03-07 PROCEDURE — 71045 X-RAY EXAM CHEST 1 VIEW: CPT | Mod: 26

## 2018-03-07 RX ORDER — APIXABAN 2.5 MG/1
5 TABLET, FILM COATED ORAL EVERY 12 HOURS
Qty: 0 | Refills: 0 | Status: DISCONTINUED | OUTPATIENT
Start: 2018-03-07 | End: 2018-03-14

## 2018-03-07 RX ADMIN — PANTOPRAZOLE SODIUM 40 MILLIGRAM(S): 20 TABLET, DELAYED RELEASE ORAL at 05:23

## 2018-03-07 RX ADMIN — HEPARIN SODIUM 0 UNIT(S)/HR: 5000 INJECTION INTRAVENOUS; SUBCUTANEOUS at 00:55

## 2018-03-07 RX ADMIN — HEPARIN SODIUM 900 UNIT(S)/HR: 5000 INJECTION INTRAVENOUS; SUBCUTANEOUS at 01:57

## 2018-03-07 RX ADMIN — Medication 20 MILLIEQUIVALENT(S): at 17:53

## 2018-03-07 RX ADMIN — HEPARIN SODIUM 900 UNIT(S)/HR: 5000 INJECTION INTRAVENOUS; SUBCUTANEOUS at 08:14

## 2018-03-07 RX ADMIN — Medication 100 MILLIGRAM(S): at 17:53

## 2018-03-07 RX ADMIN — Medication 25 MILLIGRAM(S): at 17:53

## 2018-03-07 RX ADMIN — OXYCODONE HYDROCHLORIDE 5 MILLIGRAM(S): 5 TABLET ORAL at 01:18

## 2018-03-07 RX ADMIN — APIXABAN 5 MILLIGRAM(S): 2.5 TABLET, FILM COATED ORAL at 17:53

## 2018-03-07 RX ADMIN — Medication 25 MICROGRAM(S): at 05:23

## 2018-03-07 RX ADMIN — SIMVASTATIN 40 MILLIGRAM(S): 20 TABLET, FILM COATED ORAL at 21:01

## 2018-03-07 RX ADMIN — Medication 20 MILLIEQUIVALENT(S): at 05:23

## 2018-03-07 RX ADMIN — Medication 325 MILLIGRAM(S): at 12:04

## 2018-03-07 RX ADMIN — Medication 1 TABLET(S): at 12:04

## 2018-03-07 RX ADMIN — Medication 81 MILLIGRAM(S): at 12:04

## 2018-03-07 RX ADMIN — Medication 110 MILLIGRAM(S): at 21:02

## 2018-03-07 RX ADMIN — Medication 650 MILLIGRAM(S): at 04:57

## 2018-03-07 RX ADMIN — Medication 200 MILLIGRAM(S): at 21:01

## 2018-03-07 RX ADMIN — Medication 100 MILLIGRAM(S): at 05:22

## 2018-03-07 RX ADMIN — OXYCODONE HYDROCHLORIDE 5 MILLIGRAM(S): 5 TABLET ORAL at 03:00

## 2018-03-07 RX ADMIN — Medication 100 MILLIGRAM(S): at 21:01

## 2018-03-07 RX ADMIN — Medication 650 MILLIGRAM(S): at 13:13

## 2018-03-07 RX ADMIN — Medication 1: at 12:05

## 2018-03-07 RX ADMIN — Medication 650 MILLIGRAM(S): at 01:22

## 2018-03-07 NOTE — PROGRESS NOTE ADULT - SUBJECTIVE AND OBJECTIVE BOX
CHIEF COMPLAINT:    SUBJECTIVE:     REVIEW OF SYSTEMS:    CONSTITUTIONAL: No weakness, fevers or chills  EYES/ENT: No visual changes;  No vertigo or throat pain   NECK: No pain or stiffness  RESPIRATORY: No cough, wheezing, hemoptysis; No shortness of breath  CARDIOVASCULAR: No chest pain or palpitations  GASTROINTESTINAL: No abdominal or epigastric pain. No nausea, vomiting, or hematemesis; No diarrhea or constipation. No melena or hematochezia.  GENITOURINARY: No dysuria, frequency or hematuria  NEUROLOGICAL: No numbness or weakness  SKIN: No itching, burning, rashes, or lesions   All other review of systems is negative unless indicated above    Vital Signs Last 24 Hrs  T(C): 37 (07 Mar 2018 08:03), Max: 38.8 (06 Mar 2018 12:05)  T(F): 98.6 (07 Mar 2018 08:03), Max: 101.8 (06 Mar 2018 12:05)  HR: 75 (07 Mar 2018 04:55) (63 - 84)  BP: 101/49 (07 Mar 2018 04:55) (101/49 - 114/69)  BP(mean): --  RR: 18 (07 Mar 2018 04:55) (18 - 20)  SpO2: 96% (07 Mar 2018 04:55) (96% - 97%)    I&O's Summary    06 Mar 2018 07:01  -  07 Mar 2018 07:00  --------------------------------------------------------  IN: 950 mL / OUT: 0 mL / NET: 950 mL        CAPILLARY BLOOD GLUCOSE      POCT Blood Glucose.: 113 mg/dL (07 Mar 2018 07:41)  POCT Blood Glucose.: 198 mg/dL (06 Mar 2018 21:56)  POCT Blood Glucose.: 123 mg/dL (06 Mar 2018 17:41)  POCT Blood Glucose.: 184 mg/dL (06 Mar 2018 11:52)      PHYSICAL EXAM:    Constitutional: NAD, awake and alert, well-developed  HEENT: PERR, EOMI, Normal Hearing, MMM  Neck: Soft and supple, No LAD, No JVD  Respiratory: Breath sounds are clear bilaterally, No wheezing, rales or rhonchi  Cardiovascular: S1 and S2, regular rate and rhythm, no Murmurs, gallops or rubs  Gastrointestinal: Bowel Sounds present, soft, nontender, nondistended, no guarding, no rebound  Extremities: No peripheral edema  Vascular: 2+ peripheral pulses  Neurological: A/O x 3, no focal deficits  Musculoskeletal: 5/5 strength b/l upper and lower extremities  Skin: No rashes    MEDICATIONS:  MEDICATIONS  (STANDING):  aspirin enteric coated 81 milliGRAM(s) Oral daily  dextrose 50% Injectable 12.5 Gram(s) IV Push once  dextrose 50% Injectable 25 Gram(s) IV Push once  dextrose 50% Injectable 25 Gram(s) IV Push once  docusate sodium 200 milliGRAM(s) Oral at bedtime  docusate sodium 100 milliGRAM(s) Oral two times a day  ferrous    sulfate 325 milliGRAM(s) Oral daily  heparin  Infusion.  Unit(s)/Hr (13 mL/Hr) IV Continuous <Continuous>  insulin lispro (HumaLOG) corrective regimen sliding scale   SubCutaneous three times a day before meals  lactobacillus acidophilus 1 Tablet(s) Oral daily  levothyroxine 25 MICROGram(s) Oral daily  metoprolol     tartrate 25 milliGRAM(s) Oral two times a day  multivitamin 1 Tablet(s) Oral daily  pantoprazole    Tablet 40 milliGRAM(s) Oral before breakfast  potassium chloride    Tablet ER 20 milliEquivalent(s) Oral two times a day  simvastatin 40 milliGRAM(s) Oral at bedtime  sodium chloride 0.9%. 1000 milliLiter(s) (50 mL/Hr) IV Continuous <Continuous>  sodium ferric gluconate complex IVPB 125 milliGRAM(s) IV Intermittent daily  torsemide 20 milliGRAM(s) Oral every 48 hours      LABS: All Labs Reviewed:                        9.4    7.65  )-----------( 160      ( 07 Mar 2018 07:17 )             29.2     03-06    136  |  105  |  9   ----------------------------<  141<H>  4.5   |  24  |  0.51    Ca    8.6      06 Mar 2018 06:51      PTT - ( 07 Mar 2018 07:17 )  PTT:73.7 sec         Assessment	  89 yo Female with a PMHx of pancreatitis, cirrhosis, DM, L knee replacement, AFIB on Eliquis presented to the ED with family with complain of left lower extremity pain for more than 1 week. She got LE venous doppler US done as outpatient at [facility] and was confirmed to have LLE DVT (left distal superficial femoral vein and left posterior tibial veins). She is now s/p IVC filter placement.    A/P:    # LLE Acute DVT while on Eliquis s/p IVC filter this AM  - AC failure? Trauma? Malignancy?  - continue heparin ggt  - Vascular surgery appreciated   - Hematology evaluation appreicated  - MRI C/A/P w IV con was ordered however is cancelled per pt request.  Defer decision for further imaging to onc/pulm/family. Per report had recent C/A/P imaging which was unrevealing  - AC planning?    # Fever unclear etio at present possibly related to dvt? no clear infectious source presently.   - XR done 3/5 without obvious consolidation.  - send bcx x 2, UA  - ID eval  - will monitor off abx for now    # LLE pain - out of proportion to what would be expected for DVT  - Will monitor compartment tension and bruising.  - F/u LLE CTV and US  - pain control    # Pulmonary hypertension  - VQ done to eval for CTEP was negative  - Pulm following    # Acute on chronic anemia likely a/w leg hematoma  - Will monitor H/H maame while on hep ggt    # DM  - Scale + BGM    # h/o A fib  - rate controlled . continue Metoprolol   - Was on eliquis- on hold presently.  Heparin ggt for now pending AC plan.    # h/o Cirrhosis of unclear etio  - clinically stable    # Hypothyroidism  -continue Levothyroxine    # Hyperlipidemia  - continue statin     # GERD  - on PPI    # h/o CHF  - stable now  - cw home regimen diuretics  - follow Is and Os     VTE ppx: s/p IVC fx and on hep ggt CHIEF COMPLAINT/Diagnosis: SOB/ fever/ LLE dvt/ LLE edema    SUBJECTIVE: no complaints    REVIEW OF SYSTEMS:    CONSTITUTIONAL: No weakness, fevers or chills  EYES/ENT: No visual changes;  No vertigo or throat pain   NECK: No pain or stiffness  RESPIRATORY: No cough, wheezing, hemoptysis; No shortness of breath  CARDIOVASCULAR: No chest pain or palpitations  GASTROINTESTINAL: No abdominal or epigastric pain. No nausea, vomiting, or hematemesis; No diarrhea or constipation. No melena or hematochezia.  GENITOURINARY: No dysuria, frequency or hematuria  NEUROLOGICAL: No numbness or weakness  SKIN: No itching, burning, rashes, or lesions   All other review of systems is negative unless indicated above    Vital Signs Last 24 Hrs  T(C): 37 (07 Mar 2018 08:03), Max: 38.8 (06 Mar 2018 12:05)  T(F): 98.6 (07 Mar 2018 08:03), Max: 101.8 (06 Mar 2018 12:05)  HR: 75 (07 Mar 2018 04:55) (63 - 84)  BP: 101/49 (07 Mar 2018 04:55) (101/49 - 114/69)  BP(mean): --  RR: 18 (07 Mar 2018 04:55) (18 - 20)  SpO2: 96% (07 Mar 2018 04:55) (96% - 97%)    I&O's Summary    06 Mar 2018 07:01  -  07 Mar 2018 07:00  --------------------------------------------------------  IN: 950 mL / OUT: 0 mL / NET: 950 mL        CAPILLARY BLOOD GLUCOSE      POCT Blood Glucose.: 113 mg/dL (07 Mar 2018 07:41)  POCT Blood Glucose.: 198 mg/dL (06 Mar 2018 21:56)  POCT Blood Glucose.: 123 mg/dL (06 Mar 2018 17:41)  POCT Blood Glucose.: 184 mg/dL (06 Mar 2018 11:52)      PHYSICAL EXAM:    Constitutional: NAD, awake and alert, well-developed  HEENT: PERR, EOMI, Normal Hearing, MMM  Neck: Soft and supple, No LAD, No JVD  Respiratory: Breath sounds are clear bilaterally, No wheezing, rales or rhonchi  Cardiovascular: S1 and S2, regular rate and rhythm, no Murmurs, gallops or rubs  Gastrointestinal: Bowel Sounds present, soft, nontender, nondistended, no guarding, no rebound  Extremities: No peripheral edema  Vascular: 2+ peripheral pulses  Neurological: A/O x 3, no focal deficits  Musculoskeletal: 5/5 strength b/l upper and lower extremities  Skin: No rashes    MEDICATIONS:  MEDICATIONS  (STANDING):  aspirin enteric coated 81 milliGRAM(s) Oral daily  dextrose 50% Injectable 12.5 Gram(s) IV Push once  dextrose 50% Injectable 25 Gram(s) IV Push once  dextrose 50% Injectable 25 Gram(s) IV Push once  docusate sodium 200 milliGRAM(s) Oral at bedtime  docusate sodium 100 milliGRAM(s) Oral two times a day  ferrous    sulfate 325 milliGRAM(s) Oral daily  heparin  Infusion.  Unit(s)/Hr (13 mL/Hr) IV Continuous <Continuous>  insulin lispro (HumaLOG) corrective regimen sliding scale   SubCutaneous three times a day before meals  lactobacillus acidophilus 1 Tablet(s) Oral daily  levothyroxine 25 MICROGram(s) Oral daily  metoprolol     tartrate 25 milliGRAM(s) Oral two times a day  multivitamin 1 Tablet(s) Oral daily  pantoprazole    Tablet 40 milliGRAM(s) Oral before breakfast  potassium chloride    Tablet ER 20 milliEquivalent(s) Oral two times a day  simvastatin 40 milliGRAM(s) Oral at bedtime  sodium chloride 0.9%. 1000 milliLiter(s) (50 mL/Hr) IV Continuous <Continuous>  sodium ferric gluconate complex IVPB 125 milliGRAM(s) IV Intermittent daily  torsemide 20 milliGRAM(s) Oral every 48 hours      LABS: All Labs Reviewed:                        9.4    7.65  )-----------( 160      ( 07 Mar 2018 07:17 )             29.2     03-06    136  |  105  |  9   ----------------------------<  141<H>  4.5   |  24  |  0.51    Ca    8.6      06 Mar 2018 06:51      PTT - ( 07 Mar 2018 07:17 )  PTT:73.7 sec        Assessment and Plan	    89 yo Female with a PMHx of pancreatitis, cirrhosis, DM, L knee replacement, AFIB on Eliquis presented to the ED with family with complain of left lower extremity pain for more than 1 week. She got LE venous doppler US done as outpatient at [facility] and was confirmed to have LLE DVT (left distal superficial femoral vein and left posterior tibial veins). She is now s/p IVC filter placement.      # LLE Acute DVT while on Eliquis s/p IVC filter   - AC failure? Trauma? Malignancy?  - continue heparin ggt  - Vascular surgery appreciated   - Hematology evaluation appreicated  -repeat ultrasound of the LLE done > apparently no dvt >> possible migration of clot? will order abdomen u/s to evalute   -placed back on Eliquis as per reccomendations of hem/onc    # Fever unclear etio at present possibly related to dvt? no clear infectious source presently.   - XR done 3/5 without obvious consolidation.  - send bcx x 2, UA  - ID eval  - will monitor off abx for now    # LLE pain - out of proportion to what would be expected for DVT  - Will monitor compartment tension and bruising.  - F/u LLE CTV and US  - pain control    # Pulmonary hypertension  - VQ done to eval for CTEP was negative  - Pulm following    # Acute on chronic anemia likely a/w leg hematoma  - Will monitor H/H maame while on hep ggt    # DM  - Scale + BGM    # h/o A fib  - rate controlled . continue Metoprolol   - Was on eliquis- on hold presently.  Heparin ggt for now pending AC plan.    # h/o Cirrhosis of unclear etio  - clinically stable    # Hypothyroidism  -continue Levothyroxine    # Hyperlipidemia  - continue statin     # GERD  - on PPI    # h/o CHF  - stable now  - cw home regimen diuretics  - follow Is and Os     #Tempurature spikes:  -no source of infection; patient deneis all complaints  -infectious disease on board    VTE ppx: s/p IVC fx and on hep ggt

## 2018-03-07 NOTE — PROGRESS NOTE ADULT - SUBJECTIVE AND OBJECTIVE BOX
CT scan of legs and repeat duplex Dopplers do not confirm DVT.  Left lower extremity edema, tenderness probably related to modest hematoma, etiology unclear.  This  rules out  "failure" of Eliquis; thusly once stable can discharge home on Eliquis which she was on prior to admission.  No role for further thrombophilia workup as there is no evidence of an unprovoked DVT.

## 2018-03-07 NOTE — PROGRESS NOTE ADULT - SUBJECTIVE AND OBJECTIVE BOX
CHIEF COMPLAINT: leg pain    SUBJECTIVE: chart reviewed, events noted, pt denies any new complaints today    REVIEW OF SYSTEMS:    General: denies fevers, chills or night sweats  Respiratory and Thorax: denies asthma, chest pain or SOB  Cardiovascular: denies chest pain or SOB	  Gastrointestinal: denies colitis	    Vital Signs Last 24 Hrs  T(C): 36.7 (07 Mar 2018 04:55), Max: 38.8 (06 Mar 2018 12:05)  T(F): 98.1 (07 Mar 2018 04:55), Max: 101.8 (06 Mar 2018 12:05)  HR: 75 (07 Mar 2018 04:55) (63 - 84)  BP: 101/49 (07 Mar 2018 04:55) (101/49 - 114/69)  BP(mean): --  RR: 18 (07 Mar 2018 04:55) (18 - 20)  SpO2: 96% (07 Mar 2018 04:55) (96% - 97%)    I&O's Summary    06 Mar 2018 07:01  -  07 Mar 2018 07:00  --------------------------------------------------------  IN: 950 mL / OUT: 0 mL / NET: 950 mL        CAPILLARY BLOOD GLUCOSE      POCT Blood Glucose.: 198 mg/dL (06 Mar 2018 21:56)  POCT Blood Glucose.: 123 mg/dL (06 Mar 2018 17:41)  POCT Blood Glucose.: 184 mg/dL (06 Mar 2018 11:52)  POCT Blood Glucose.: 116 mg/dL (06 Mar 2018 08:19)      PHYSICAL EXAM:    Constitutional: NAD, awake and alert, well-developed  HEENT- no oral lesions noted, no lymphadenoapthy noted  Heart- S1 S2 reg  Lungs- CTA b/l  Abd- Soft NT  Ext- no edema  Skin- no rashes  Musculoskelatal- FROM all joints, no active synovitis noted  Neurological- intact strength upper and lower extremities      MEDICATIONS:  MEDICATIONS  (STANDING):  aspirin enteric coated 81 milliGRAM(s) Oral daily  dextrose 50% Injectable 12.5 Gram(s) IV Push once  dextrose 50% Injectable 25 Gram(s) IV Push once  dextrose 50% Injectable 25 Gram(s) IV Push once  docusate sodium 200 milliGRAM(s) Oral at bedtime  docusate sodium 100 milliGRAM(s) Oral two times a day  ferrous    sulfate 325 milliGRAM(s) Oral daily  heparin  Infusion.  Unit(s)/Hr (13 mL/Hr) IV Continuous <Continuous>  insulin lispro (HumaLOG) corrective regimen sliding scale   SubCutaneous three times a day before meals  lactobacillus acidophilus 1 Tablet(s) Oral daily  levothyroxine 25 MICROGram(s) Oral daily  metoprolol     tartrate 25 milliGRAM(s) Oral two times a day  multivitamin 1 Tablet(s) Oral daily  pantoprazole    Tablet 40 milliGRAM(s) Oral before breakfast  potassium chloride    Tablet ER 20 milliEquivalent(s) Oral two times a day  simvastatin 40 milliGRAM(s) Oral at bedtime  sodium chloride 0.9%. 1000 milliLiter(s) (50 mL/Hr) IV Continuous <Continuous>  sodium ferric gluconate complex IVPB 125 milliGRAM(s) IV Intermittent daily  torsemide 20 milliGRAM(s) Oral every 48 hours      LABS: All Labs Reviewed:                        9.8    6.7   )-----------( 153      ( 06 Mar 2018 06:51 )             31.2     03-06    136  |  105  |  9   ----------------------------<  141<H>  4.5   |  24  |  0.51    Ca    8.6      06 Mar 2018 06:51      PTT - ( 06 Mar 2018 23:10 )  PTT:140.0 sec      Blood Culture:     RADIOLOGY/EKG:    A/P:

## 2018-03-07 NOTE — PROGRESS NOTE ADULT - SUBJECTIVE AND OBJECTIVE BOX
Subjective:  still c/o some left leg pain  has cough with clear phlegm    MEDICATIONS  (STANDING):  aspirin enteric coated 81 milliGRAM(s) Oral daily  dextrose 50% Injectable 12.5 Gram(s) IV Push once  dextrose 50% Injectable 25 Gram(s) IV Push once  dextrose 50% Injectable 25 Gram(s) IV Push once  docusate sodium 200 milliGRAM(s) Oral at bedtime  docusate sodium 100 milliGRAM(s) Oral two times a day  ferrous    sulfate 325 milliGRAM(s) Oral daily  heparin  Infusion.  Unit(s)/Hr (13 mL/Hr) IV Continuous <Continuous>  insulin lispro (HumaLOG) corrective regimen sliding scale   SubCutaneous three times a day before meals  lactobacillus acidophilus 1 Tablet(s) Oral daily  levothyroxine 25 MICROGram(s) Oral daily  metoprolol     tartrate 25 milliGRAM(s) Oral two times a day  multivitamin 1 Tablet(s) Oral daily  pantoprazole    Tablet 40 milliGRAM(s) Oral before breakfast  potassium chloride    Tablet ER 20 milliEquivalent(s) Oral two times a day  simvastatin 40 milliGRAM(s) Oral at bedtime  sodium chloride 0.9%. 1000 milliLiter(s) (50 mL/Hr) IV Continuous <Continuous>  sodium ferric gluconate complex IVPB 125 milliGRAM(s) IV Intermittent daily  torsemide 20 milliGRAM(s) Oral every 48 hours    MEDICATIONS  (PRN):  acetaminophen   Tablet 650 milliGRAM(s) Oral every 6 hours PRN Mild Pain (1 - 3)  acetaminophen   Tablet. 650 milliGRAM(s) Oral every 8 hours PRN Mild Pain (1 - 3)  acetaminophen   Tablet. 650 milliGRAM(s) Oral every 6 hours PRN Moderate Pain (4 - 6)  dextrose Gel 1 Dose(s) Oral once PRN Blood Glucose LESS THAN 70 milliGRAM(s)/deciliter  glucagon  Injectable 1 milliGRAM(s) IntraMuscular once PRN Glucose LESS THAN 70 milligrams/deciliter  heparin  Injectable 6000 Unit(s) IV Push every 6 hours PRN For aPTT less than 40  heparin  Injectable 3000 Unit(s) IV Push every 6 hours PRN For aPTT between 40 - 57  melatonin 3 milliGRAM(s) Oral at bedtime PRN Insomnia  oxyCODONE    IR 5 milliGRAM(s) Oral every 8 hours PRN Severe Pain (7 - 10)  polyethylene glycol 3350 17 Gram(s) Oral daily PRN Constipation      Allergies    penicillin (Rash)  penicillins (Other)  sulfa drugs (Rash; Other)    Intolerances        REVIEW OF SYSTEMS:    CONSTITUTIONAL:  As per HPI.  HEENT:  Eyes:  No diplopia or blurred vision. ENT:  No earache, sore throat or runny nose.  CARDIOVASCULAR:  No pressure, squeezing, tightness, heaviness or aching about the chest, neck, axilla or epigastrium.  RESPIRATORY:  No cough, shortness of breath, PND or orthopnea.  GASTROINTESTINAL:  No nausea, vomiting or diarrhea.  GENITOURINARY:  No dysuria, frequency or urgency.  MUSCULOSKELETAL:  no joint pain, deformity, tenderness  EXTREMITIES: no clubbing cyanosis,edema  SKIN:  No change in skin, hair or nails.  NEUROLOGIC:  No paresthesias, fasciculations, seizures or weakness.  PSYCHIATRIC:  No disorder of thought or mood.  ENDOCRINE:  No heat or cold intolerance, polyuria or polydipsia.  HEMATOLOGICAL:  No easy bruising or bleedings:    Vital Signs Last 24 Hrs  T(C): 37 (07 Mar 2018 08:03), Max: 38.8 (06 Mar 2018 12:05)  T(F): 98.6 (07 Mar 2018 08:03), Max: 101.8 (06 Mar 2018 12:05)  HR: 75 (07 Mar 2018 04:55) (63 - 84)  BP: 101/49 (07 Mar 2018 04:55) (101/49 - 114/69)  BP(mean): --  RR: 18 (07 Mar 2018 04:55) (18 - 20)  SpO2: 96% (07 Mar 2018 04:55) (96% - 97%)    PHYSICAL EXAMINATION:  SKIN: no rashes  HEAD: NC/AT  EYES: PERRLA, EOMI  EARS: TM's intact  NOSE: no abnormalities  NECK:  Supple. No lymphadenopathy. Jugular venous pressure not elevated. Carotids equal.   HEART:   The cardiac impulse has a normal quality. Reg., Nl S1 and S2.  2/6 systolic murmur  CHEST:  bilat ronchi  ABDOMEN:  Soft and nontender.   EXTREMITIES:  left knee edema  NEURO: AAO x 3, no focal deficts       LABS:                        9.4    7.65  )-----------( 160      ( 07 Mar 2018 07:17 )             29.2     03-06    136  |  105  |  9   ----------------------------<  141<H>  4.5   |  24  |  0.51    Ca    8.6      06 Mar 2018 06:51      PTT - ( 07 Mar 2018 07:17 )  PTT:73.7 sec  Urinalysis Basic - ( 06 Mar 2018 22:30 )    Color: Yellow / Appearance: Clear / S.010 / pH: x  Gluc: x / Ketone: Negative  / Bili: Negative / Urobili: 1 mg/dL   Blood: x / Protein: 15 mg/dL / Nitrite: Negative   Leuk Esterase: Negative / RBC: 0-2 /HPF / WBC 0-2   Sq Epi: x / Non Sq Epi: Few / Bacteria: Few        RADIOLOGY & ADDITIONAL TESTS:

## 2018-03-07 NOTE — CONSULT NOTE ADULT - SUBJECTIVE AND OBJECTIVE BOX
Patient is a 89y old  Female who presents with a chief complaint of "pain on my left lower leg" (03 Mar 2018 22:51)      HPI:  89 yo Female with a PMHx of pancreatitis, cirrhosis, DM, L knee replacement, AFIB on Eliquis presented to the ED with family with complain of left lower extremity pain for more than 1 week. She got LE venous doppler US done as outpatient at [facility] and was confirmed to have LLE DVT (left distal superficial femoral vein and left posterior tibial veins). She stopped Eliquis recently and had missed x3 dosages. Here, she had IVC filter placed on 3/6, was taken off AC, US LLE/CT LLE no evidence of DVT, V/Q scan negative for PE, she was noted to have fever to 101 3/6 and low grade temp 3/7, reports cough with clear phlegm and some sore throat. No abd pain, no diarrhea, no dysuria or rashes.       PMH: as above    PSH: as above    Meds: per reconciliation sheet, noted below    MEDICATIONS  (STANDING):  apixaban 5 milliGRAM(s) Oral every 12 hours  aspirin enteric coated 81 milliGRAM(s) Oral daily  dextrose 50% Injectable 12.5 Gram(s) IV Push once  dextrose 50% Injectable 25 Gram(s) IV Push once  dextrose 50% Injectable 25 Gram(s) IV Push once  docusate sodium 200 milliGRAM(s) Oral at bedtime  docusate sodium 100 milliGRAM(s) Oral two times a day  ferrous    sulfate 325 milliGRAM(s) Oral daily  insulin lispro (HumaLOG) corrective regimen sliding scale   SubCutaneous three times a day before meals  lactobacillus acidophilus 1 Tablet(s) Oral daily  levothyroxine 25 MICROGram(s) Oral daily  metoprolol     tartrate 25 milliGRAM(s) Oral two times a day  multivitamin 1 Tablet(s) Oral daily  pantoprazole    Tablet 40 milliGRAM(s) Oral before breakfast  potassium chloride    Tablet ER 20 milliEquivalent(s) Oral two times a day  simvastatin 40 milliGRAM(s) Oral at bedtime  sodium chloride 0.9%. 1000 milliLiter(s) (50 mL/Hr) IV Continuous <Continuous>  sodium ferric gluconate complex IVPB 125 milliGRAM(s) IV Intermittent daily  torsemide 20 milliGRAM(s) Oral every 48 hours      Allergies    penicillin (Rash)  penicillins (Other)  sulfa drugs (Rash; Other)    Intolerances        Social: no smoking, no alcohol, no illegal drugs; no recent travel, no exposure to TB    Family history:  No pertinent family history in first degree relatives      ROS: the patient denies fever, no chills, no HA, no dizziness, no sore throat, no blurry vision, no CP, no palpitations, no abdominal pain, no diarrhea, no N/V, no dysuria, no leg pain, no claudication, no rash,no rectal pain or bleeding, no night sweats    Vital Signs Last 24 Hrs  T(C): 37 (07 Mar 2018 08:03), Max: 38.8 (06 Mar 2018 12:05)  T(F): 98.6 (07 Mar 2018 08:03), Max: 101.8 (06 Mar 2018 12:05)  HR: 75 (07 Mar 2018 04:55) (63 - 84)  BP: 101/49 (07 Mar 2018 04:55) (101/49 - 114/69)  BP(mean): --  RR: 18 (07 Mar 2018 04:55) (18 - 20)  SpO2: 96% (07 Mar 2018 04:55) (96% - 97%)      PE:  Constitutional: frail looking  HEENT: NC/AT, EOMI, PERRLA  Neck: supple  Back: no tenderness  Respiratory: b/l crackles along bases  Cardiovascular: S1S2 regular, no murmurs  Abdomen: soft, not tender, not distended, positive BS  Genitourinary: deferred  Rectal: deferred  Musculoskeletal: no muscle tenderness  Extremities: LLL edema, ecchymosis, bruising along posterior calf, tenderness  Neurological:  no focal deficits  Skin: no rashes    Labs:                        9.4    7.65  )-----------( 160      ( 07 Mar 2018 07:17 )             29.2     03-06    136  |  105  |  9   ----------------------------<  141<H>  4.5   |  24  |  0.51    Ca    8.6      06 Mar 2018 06:51         Urinalysis Basic - ( 06 Mar 2018 22:30 )    Color: Yellow / Appearance: Clear / S.010 / pH: x  Gluc: x / Ketone: Negative  / Bili: Negative / Urobili: 1 mg/dL   Blood: x / Protein: 15 mg/dL / Nitrite: Negative   Leuk Esterase: Negative / RBC: 0-2 /HPF / WBC 0-2   Sq Epi: x / Non Sq Epi: Few / Bacteria: Few        Radiology:      EXAM:  CT LWR EXT IC LT                            PROCEDURE DATE:  2018          INTERPRETATION:  Clinical information: Status post IVC filter and left   lower extremity DVT. Now with left lower extremity hematoma and severe   pain.    TECHNIQUE: CT of the lower extremities was performed, extending from the   mid pelvis through the feet. Intravenous administration of 125 cc   Omnipaque 350 nonionic intravenous contrast with 75 cc discarded was   uneventful. Coronal and sagittal reformatted images as well as coronal   MIP images were obtained    COMPARISON: Bilateral lower extremity ultrasound from 2018.    FINDINGS: Right leg: The external iliac, common femoral, femoral,   popliteal and calf veins are patent.    Left lower extremity: The external iliac, common femoral and femoral   veins are are patent. The popliteal vein could not be visualized due to   marked artifact from the patient's left hip arthroplasty.    Additional findings: Colonic diverticulosis without diverticulitis. Right   renal cyst. Small rim-enhancing structure in the anteromedial proximal   left calf measuring 2.3 x 1.3 cm is nonspecific, however could represent   a small hematoma.    Impression:  Right leg: no deep vein thrombosis    Left leg: No deep vein thrombosis visualized. Unable to evaluate the   popliteal vein due to artifact from the patient's left hip arthroplasty.   Recommend left lower extremity ultrasound for further evaluation, with   attention to the popliteal vein.      < from: NM Pulmonary Ventilation/Perfusion Scan (18 @ 12:01) >    < from: US Duplex Venous Lower Ext Ltd, Left (18 @ 17:40) >    EXAM:  US DPLX LWR EXT VEINS LTD LT                            PROCEDURE DATE:  2018          INTERPRETATION:  CLINICAL STATEMENT: Swelling leg.    TECHNIQUE: Ultrasound of left lower extremity deep venous system.    COMPARISON: None.    FINDINGS:    There is color and spectral flow, compression and augmentation of the   common femoral, superficial femoral and popliteal veins.    There is flow in the posterior tibial vein.    The contralateral common femoral vein is patent.    IMPRESSION:    No evidence of DVT.       EXAM:  NM PULM VENTILATION PERFUS IMG                            PROCEDURE DATE:  2018          INTERPRETATION:  CLINICAL INFORMATION: 89-year-old female with pulmonary   hypertension and DVT, evaluate for pulmonary embolism.    RADIOPHARMACEUTICAL: 1 mCi Tc-99m-DTPA by inhalation; 6.2 mCi Tc-99m-MAA,   I.V.    TECHNIQUE:  Ventilation and perfusion images of the lungs were obtained   following administration of Tc-99m-DTPA and Tc-99m-MAA. Images were   obtained in the anterior, posterior,both lateral, and all 4 oblique   projections. This study was interpreted in conjunction with a chest   radiograph of 3/5/2018.    COMPARISON: No prior lung V/Q scan available.     FINDINGS: There is cardiomegaly and heterogeneous radiotracer   distribution in the remainder of both lungs on the ventilation and the   perfusion images. No segmental perfusion defects are noted.    IMPRESSION: Ventilation/perfusion lung scan: Very low probability of   pulmonary embolus.    < from: Xray Chest 1 View- PORTABLE-Routine (18 @ 09:18) >  EXAM:  XR CHEST PORTABLE ROUTINE 1V                            PROCEDURE DATE:  2018          INTERPRETATION:   History: CTEP, dyspnea    Chest:  one view.    Comparison: 2017    Findings/  impression:    AP radiograph of the chest demonstrates mild chronic appearing   interstitial changes tiny bibasilar effusion/atelectasis probable mild   vascular congestion. Tortuous thoracic aorta borderline cardiomegaly   patient is status post TAVR. Osteopenia and degenerative changes.      < end of copied text >      Advanced directives addressed: full resuscitation

## 2018-03-08 LAB
ADD ON TEST-SPECIMEN IN LAB: SIGNIFICANT CHANGE UP
ANION GAP SERPL CALC-SCNC: 6 MMOL/L — SIGNIFICANT CHANGE UP (ref 5–17)
BUN SERPL-MCNC: 10 MG/DL — SIGNIFICANT CHANGE UP (ref 7–23)
CALCIUM SERPL-MCNC: 8.5 MG/DL — SIGNIFICANT CHANGE UP (ref 8.5–10.1)
CHLORIDE SERPL-SCNC: 102 MMOL/L — SIGNIFICANT CHANGE UP (ref 96–108)
CO2 SERPL-SCNC: 26 MMOL/L — SIGNIFICANT CHANGE UP (ref 22–31)
CREAT SERPL-MCNC: 0.54 MG/DL — SIGNIFICANT CHANGE UP (ref 0.5–1.3)
CRP SERPL-MCNC: 4.3 MG/DL — HIGH (ref 0–0.4)
ERYTHROCYTE [SEDIMENTATION RATE] IN BLOOD: 54 MM/HR — HIGH (ref 0–20)
GLUCOSE SERPL-MCNC: 173 MG/DL — HIGH (ref 70–99)
HCT VFR BLD CALC: 30.7 % — LOW (ref 34.5–45)
HGB BLD-MCNC: 9.7 G/DL — LOW (ref 11.5–15.5)
MCHC RBC-ENTMCNC: 28 PG — SIGNIFICANT CHANGE UP (ref 27–34)
MCHC RBC-ENTMCNC: 31.6 GM/DL — LOW (ref 32–36)
MCV RBC AUTO: 88.5 FL — SIGNIFICANT CHANGE UP (ref 80–100)
NRBC # BLD: 0 /100 WBCS — SIGNIFICANT CHANGE UP (ref 0–0)
NT-PROBNP SERPL-SCNC: 2724 PG/ML — HIGH (ref 0–450)
PLATELET # BLD AUTO: 166 K/UL — SIGNIFICANT CHANGE UP (ref 150–400)
POTASSIUM SERPL-MCNC: 4.6 MMOL/L — SIGNIFICANT CHANGE UP (ref 3.5–5.3)
POTASSIUM SERPL-SCNC: 4.6 MMOL/L — SIGNIFICANT CHANGE UP (ref 3.5–5.3)
RBC # BLD: 3.47 M/UL — LOW (ref 3.8–5.2)
RBC # FLD: 17.2 % — HIGH (ref 10.3–14.5)
SODIUM SERPL-SCNC: 134 MMOL/L — LOW (ref 135–145)
WBC # BLD: 6.58 K/UL — SIGNIFICANT CHANGE UP (ref 3.8–10.5)
WBC # FLD AUTO: 6.58 K/UL — SIGNIFICANT CHANGE UP (ref 3.8–10.5)

## 2018-03-08 PROCEDURE — 99233 SBSQ HOSP IP/OBS HIGH 50: CPT

## 2018-03-08 PROCEDURE — 73562 X-RAY EXAM OF KNEE 3: CPT | Mod: 26,LT

## 2018-03-08 PROCEDURE — 73590 X-RAY EXAM OF LOWER LEG: CPT | Mod: 26,LT

## 2018-03-08 PROCEDURE — 71250 CT THORAX DX C-: CPT | Mod: 26

## 2018-03-08 RX ADMIN — Medication 1: at 17:24

## 2018-03-08 RX ADMIN — Medication 100 MILLIGRAM(S): at 17:18

## 2018-03-08 RX ADMIN — PANTOPRAZOLE SODIUM 40 MILLIGRAM(S): 20 TABLET, DELAYED RELEASE ORAL at 09:18

## 2018-03-08 RX ADMIN — APIXABAN 5 MILLIGRAM(S): 2.5 TABLET, FILM COATED ORAL at 05:33

## 2018-03-08 RX ADMIN — Medication 20 MILLIEQUIVALENT(S): at 05:33

## 2018-03-08 RX ADMIN — Medication 1 TABLET(S): at 12:36

## 2018-03-08 RX ADMIN — Medication 325 MILLIGRAM(S): at 12:36

## 2018-03-08 RX ADMIN — Medication 20 MILLIGRAM(S): at 05:33

## 2018-03-08 RX ADMIN — APIXABAN 5 MILLIGRAM(S): 2.5 TABLET, FILM COATED ORAL at 17:19

## 2018-03-08 RX ADMIN — Medication 20 MILLIEQUIVALENT(S): at 17:28

## 2018-03-08 RX ADMIN — Medication 650 MILLIGRAM(S): at 19:22

## 2018-03-08 RX ADMIN — Medication 81 MILLIGRAM(S): at 12:36

## 2018-03-08 RX ADMIN — Medication 200 MILLIGRAM(S): at 21:56

## 2018-03-08 RX ADMIN — Medication 25 MICROGRAM(S): at 05:33

## 2018-03-08 RX ADMIN — Medication 100 MILLIGRAM(S): at 05:33

## 2018-03-08 RX ADMIN — Medication 25 MILLIGRAM(S): at 17:34

## 2018-03-08 RX ADMIN — SIMVASTATIN 40 MILLIGRAM(S): 20 TABLET, FILM COATED ORAL at 21:56

## 2018-03-08 RX ADMIN — Medication 3 MILLIGRAM(S): at 21:56

## 2018-03-08 NOTE — PROGRESS NOTE ADULT - SUBJECTIVE AND OBJECTIVE BOX
CHIEF COMPLAINT/Diagnosis: No DVT/ Left leg hematoma, edema , pain/ tempurature spikes    SUBJECTIVE: no complaints    REVIEW OF SYSTEMS:    CONSTITUTIONAL: No weakness, fevers or chills  EYES/ENT: No visual changes;  No vertigo or throat pain   NECK: No pain or stiffness  RESPIRATORY: No cough, wheezing, hemoptysis; No shortness of breath  CARDIOVASCULAR: No chest pain or palpitations  GASTROINTESTINAL: No abdominal or epigastric pain. No nausea, vomiting, or hematemesis; No diarrhea or constipation. No melena or hematochezia.  GENITOURINARY: No dysuria, frequency or hematuria  NEUROLOGICAL: No numbness or weakness  SKIN: No itching, burning, rashes, or lesions   All other review of systems is negative unless indicated above    Vital Signs Last 24 Hrs  T(C): 36.8 (08 Mar 2018 09:48), Max: 36.8 (08 Mar 2018 09:48)  T(F): 98.2 (08 Mar 2018 09:48), Max: 98.2 (08 Mar 2018 09:48)  HR: 83 (08 Mar 2018 09:48) (76 - 83)  BP: 106/74 (08 Mar 2018 09:48) (103/58 - 117/67)  BP(mean): --  RR: 18 (08 Mar 2018 09:48) (18 - 18)  SpO2: 92% (08 Mar 2018 09:48) (92% - 98%)    I&O's Summary      CAPILLARY BLOOD GLUCOSE      POCT Blood Glucose.: 106 mg/dL (08 Mar 2018 11:37)  POCT Blood Glucose.: 133 mg/dL (08 Mar 2018 07:49)  POCT Blood Glucose.: 153 mg/dL (07 Mar 2018 21:23)  POCT Blood Glucose.: 95 mg/dL (07 Mar 2018 17:48)      PHYSICAL EXAM:    Constitutional: NAD, awake and alert, well-developed  HEENT: PERR, EOMI, Normal Hearing, MMM  Neck: Soft and supple, No LAD, No JVD  Respiratory: Breath sounds are clear bilaterally, No wheezing, rales or rhonchi  Cardiovascular: S1 and S2, regular rate and rhythm, no Murmurs, gallops or rubs  Gastrointestinal: Bowel Sounds present, soft, nontender, nondistended, no guarding, no rebound  Extremities: No peripheral edema  Vascular: 2+ peripheral pulses  Neurological: A/O x 3, no focal deficits  Musculoskeletal: 5/5 strength b/l upper and lower extremities  Skin: No rashes    MEDICATIONS:  MEDICATIONS  (STANDING):  apixaban 5 milliGRAM(s) Oral every 12 hours  aspirin enteric coated 81 milliGRAM(s) Oral daily  dextrose 50% Injectable 12.5 Gram(s) IV Push once  dextrose 50% Injectable 25 Gram(s) IV Push once  dextrose 50% Injectable 25 Gram(s) IV Push once  docusate sodium 200 milliGRAM(s) Oral at bedtime  docusate sodium 100 milliGRAM(s) Oral two times a day  ferrous    sulfate 325 milliGRAM(s) Oral daily  insulin lispro (HumaLOG) corrective regimen sliding scale   SubCutaneous three times a day before meals  lactobacillus acidophilus 1 Tablet(s) Oral daily  levothyroxine 25 MICROGram(s) Oral daily  metoprolol     tartrate 25 milliGRAM(s) Oral two times a day  multivitamin 1 Tablet(s) Oral daily  pantoprazole    Tablet 40 milliGRAM(s) Oral before breakfast  potassium chloride    Tablet ER 20 milliEquivalent(s) Oral two times a day  simvastatin 40 milliGRAM(s) Oral at bedtime  torsemide 20 milliGRAM(s) Oral daily      LABS: All Labs Reviewed:                        9.7    6.58  )-----------( 166      ( 08 Mar 2018 07:08 )             30.7     03-08    134<L>  |  102  |  10  ----------------------------<  173<H>  4.6   |  26  |  0.54    Ca    8.5      08 Mar 2018 01:20      PTT - ( 07 Mar 2018 14:03 )  PTT:35.9 sec      Blood Culture: 03-06 @ 22:30  Organism --  Gram Stain Blood -- Gram Stain --  Specimen Source .Urine Clean Catch (Midstream)  Culture-Blood --    03-06 @ 16:32  Organism --  Gram Stain Blood -- Gram Stain --  Specimen Source .Blood None  Culture-Blood --          Assessment and Plan	    89 yo Female with a PMHx of pancreatitis, cirrhosis, DM, L knee replacement, AFIB on Eliquis presented to the ED with family with complain of left lower extremity pain for more than 1 week. [facility] and was confirmed to have LLE DVT (left distal superficial femoral vein and left posterior tibial veins). She is now s/p IVC filter placement.      # Left leg edema/ bruising secondary to hematoma  -NO DVT left leg  -u/s of the abdomen confirmed no clot on the IVC filter  -repeat ultrasound of the LLE done > apparently no dvt >> possible migration of clot? will order abdomen u/s to evalute   -placed back on Eliquis as per reccomendations of hem/onc  - Vascular surgery appreciated   - Hematology evaluation appreicated  -ortho input apprecaited- normal xrays of left knee    # Fever unclear etio at present possibly related to dvt? no clear infectious source presently.   - XR done 3/5 without obvious consolidation.  - send bcx x 2, UA  - ID eval apprecaited  -ct chest shows worseing insterititial lung disease however no infiltrate or consolidation  - will monitor off abx for now    # LLE pain - significantly improved today  - Will monitor compartment tension and bruising.  - F/u LLE CTV and US  - pain control    # Pulmonary hypertension  - VQ done to eval for CTEP was negative  - Pulm following    # Acute on chronic anemia likely a/w leg hematoma  - Will monitor H/H maame while on hep ggt    # DM  - Scale + BGM    # h/o A fib  - rate controlled . continue Metoprolol   - Was on eliquis- on hold presently.  Heparin ggt for now pending AC plan.    # h/o Cirrhosis of unclear etio  - clinically stable    # Hypothyroidism  -continue Levothyroxine    # Hyperlipidemia  - continue statin     # GERD  - on PPI    # Acute exacerbation of diastolic  CHF w/ cough and increased lower extremity edema  -temporarily increase diuretics to 20 toresemide daily  - follow Is and Os     VTE ppx: on Eliquis CHIEF COMPLAINT/Diagnosis: No DVT/ Left leg hematoma, edema , pain/ tempurature spikes    SUBJECTIVE: no complaints    REVIEW OF SYSTEMS:    CONSTITUTIONAL: No weakness, fevers or chills  EYES/ENT: No visual changes;  No vertigo or throat pain   NECK: No pain or stiffness  RESPIRATORY: No cough, wheezing, hemoptysis; No shortness of breath  CARDIOVASCULAR: No chest pain or palpitations  GASTROINTESTINAL: No abdominal or epigastric pain. No nausea, vomiting, or hematemesis; No diarrhea or constipation. No melena or hematochezia.  GENITOURINARY: No dysuria, frequency or hematuria  NEUROLOGICAL: No numbness or weakness  SKIN: No itching, burning, rashes, or lesions   All other review of systems is negative unless indicated above    Vital Signs Last 24 Hrs  T(C): 36.8 (08 Mar 2018 09:48), Max: 36.8 (08 Mar 2018 09:48)  T(F): 98.2 (08 Mar 2018 09:48), Max: 98.2 (08 Mar 2018 09:48)  HR: 83 (08 Mar 2018 09:48) (76 - 83)  BP: 106/74 (08 Mar 2018 09:48) (103/58 - 117/67)  BP(mean): --  RR: 18 (08 Mar 2018 09:48) (18 - 18)  SpO2: 92% (08 Mar 2018 09:48) (92% - 98%)    I&O's Summary      CAPILLARY BLOOD GLUCOSE      POCT Blood Glucose.: 106 mg/dL (08 Mar 2018 11:37)  POCT Blood Glucose.: 133 mg/dL (08 Mar 2018 07:49)  POCT Blood Glucose.: 153 mg/dL (07 Mar 2018 21:23)  POCT Blood Glucose.: 95 mg/dL (07 Mar 2018 17:48)      PHYSICAL EXAM:    Constitutional: NAD, awake and alert, well-developed  HEENT: PERR, EOMI, Normal Hearing, MMM  Neck: Soft and supple, No LAD, No JVD  Respiratory: Breath sounds are clear bilaterally, No wheezing, rales or rhonchi  Cardiovascular: S1 and S2, regular rate and rhythm, no Murmurs, gallops or rubs  Gastrointestinal: Bowel Sounds present, soft, nontender, nondistended, no guarding, no rebound  Extremities: No peripheral edema  Vascular: 2+ peripheral pulses  Neurological: A/O x 3, no focal deficits  Musculoskeletal: 5/5 strength b/l upper and lower extremities  Skin: No rashes    MEDICATIONS:  MEDICATIONS  (STANDING):  apixaban 5 milliGRAM(s) Oral every 12 hours  aspirin enteric coated 81 milliGRAM(s) Oral daily  dextrose 50% Injectable 12.5 Gram(s) IV Push once  dextrose 50% Injectable 25 Gram(s) IV Push once  dextrose 50% Injectable 25 Gram(s) IV Push once  docusate sodium 200 milliGRAM(s) Oral at bedtime  docusate sodium 100 milliGRAM(s) Oral two times a day  ferrous    sulfate 325 milliGRAM(s) Oral daily  insulin lispro (HumaLOG) corrective regimen sliding scale   SubCutaneous three times a day before meals  lactobacillus acidophilus 1 Tablet(s) Oral daily  levothyroxine 25 MICROGram(s) Oral daily  metoprolol     tartrate 25 milliGRAM(s) Oral two times a day  multivitamin 1 Tablet(s) Oral daily  pantoprazole    Tablet 40 milliGRAM(s) Oral before breakfast  potassium chloride    Tablet ER 20 milliEquivalent(s) Oral two times a day  simvastatin 40 milliGRAM(s) Oral at bedtime  torsemide 20 milliGRAM(s) Oral daily      LABS: All Labs Reviewed:                        9.7    6.58  )-----------( 166      ( 08 Mar 2018 07:08 )             30.7     03-08    134<L>  |  102  |  10  ----------------------------<  173<H>  4.6   |  26  |  0.54    Ca    8.5      08 Mar 2018 01:20      PTT - ( 07 Mar 2018 14:03 )  PTT:35.9 sec      Blood Culture: 03-06 @ 22:30  Organism --  Gram Stain Blood -- Gram Stain --  Specimen Source .Urine Clean Catch (Midstream)  Culture-Blood --    03-06 @ 16:32  Organism --  Gram Stain Blood -- Gram Stain --  Specimen Source .Blood None  Culture-Blood --          Assessment and Plan	    89 yo Female with a PMHx of pancreatitis, cirrhosis, DM, L knee replacement, AFIB on Eliquis presented to the ED with family with complain of left lower extremity pain for more than 1 week. She [facility] and was confirmed to have LLE DVT (left distal superficial femoral vein and left posterior tibial veins). She is now s/p IVC filter placement.      # Left leg edema/ bruising secondary to hematoma  -NO DVT left leg  -u/s of the abdomen confirmed no clot on the IVC filter  -repeat ultrasound of the LLE done > apparently no dvt >> possible migration of clot? will order abdomen u/s to evalute   -placed back on Eliquis as per reccomendations of hem/onc  - Vascular surgery appreciated   - Hematology evaluation appreicated  -ortho input apprecaited- normal xrays of left knee    # Fever unclear etio at present possibly related to dvt? no clear infectious source presently.   - XR done 3/5 without obvious consolidation.  - send bcx x 2, UA  - ID eval apprecaited  -ct chest shows worseing insterititial lung disease however no infiltrate or consolidation  - will monitor off abx for now    # LLE pain - significantly improved today  - Will monitor compartment tension and bruising.  - F/u LLE CTV and US  - pain control    # Pulmonary hypertension  - VQ done to eval for CTEP was negative  - Pulm following    # Acute on chronic anemia likely a/w leg hematoma  - Will monitor H/H maame while on hep ggt    # DM  - Scale + BGM    # h/o A fib  - rate controlled . continue Metoprolol   - Was on eliquis- on hold presently.  Heparin ggt for now pending AC plan.    # h/o Cirrhosis of unclear etio  - clinically stable    # Hypothyroidism  -continue Levothyroxine    # Hyperlipidemia  - continue statin     # GERD  - on PPI    # Acute exacerbation of diastolic  CHF w/ cough and increased lower extremity edema  -temporarily increase diuretics to 20 toresemide daily  - follow Is and Os     #Hyponatremia  -improving    VTE ppx: on Eliquis

## 2018-03-08 NOTE — PROGRESS NOTE ADULT - SUBJECTIVE AND OBJECTIVE BOX
Subjective:  Awake, alert. Non-productive cough. Less pain in LLE    MEDICATIONS  (STANDING):  apixaban 5 milliGRAM(s) Oral every 12 hours  aspirin enteric coated 81 milliGRAM(s) Oral daily  dextrose 50% Injectable 12.5 Gram(s) IV Push once  dextrose 50% Injectable 25 Gram(s) IV Push once  dextrose 50% Injectable 25 Gram(s) IV Push once  docusate sodium 200 milliGRAM(s) Oral at bedtime  docusate sodium 100 milliGRAM(s) Oral two times a day  ferrous    sulfate 325 milliGRAM(s) Oral daily  insulin lispro (HumaLOG) corrective regimen sliding scale   SubCutaneous three times a day before meals  lactobacillus acidophilus 1 Tablet(s) Oral daily  levothyroxine 25 MICROGram(s) Oral daily  metoprolol     tartrate 25 milliGRAM(s) Oral two times a day  multivitamin 1 Tablet(s) Oral daily  pantoprazole    Tablet 40 milliGRAM(s) Oral before breakfast  potassium chloride    Tablet ER 20 milliEquivalent(s) Oral two times a day  simvastatin 40 milliGRAM(s) Oral at bedtime  torsemide 20 milliGRAM(s) Oral every 48 hours    MEDICATIONS  (PRN):  acetaminophen   Tablet 650 milliGRAM(s) Oral every 6 hours PRN Mild Pain (1 - 3)  acetaminophen   Tablet. 650 milliGRAM(s) Oral every 8 hours PRN Mild Pain (1 - 3)  acetaminophen   Tablet. 650 milliGRAM(s) Oral every 6 hours PRN Moderate Pain (4 - 6)  dextrose Gel 1 Dose(s) Oral once PRN Blood Glucose LESS THAN 70 milliGRAM(s)/deciliter  glucagon  Injectable 1 milliGRAM(s) IntraMuscular once PRN Glucose LESS THAN 70 milligrams/deciliter  guaiFENesin    Syrup 100 milliGRAM(s) Oral every 6 hours PRN Cough  HYDROcodone/homatropine Syrup 5 milliLiter(s) Oral every 6 hours PRN Cough  melatonin 3 milliGRAM(s) Oral at bedtime PRN Insomnia  oxyCODONE    IR 5 milliGRAM(s) Oral every 8 hours PRN Severe Pain (7 - 10)  polyethylene glycol 3350 17 Gram(s) Oral daily PRN Constipation      Allergies    penicillin (Rash)  penicillins (Other)  sulfa drugs (Rash; Other)    Intolerances        Vital Signs Last 24 Hrs  T(C): 36.5 (08 Mar 2018 05:14), Max: 36.9 (07 Mar 2018 13:16)  T(F): 97.7 (08 Mar 2018 05:14), Max: 98.5 (07 Mar 2018 13:16)  HR: 77 (08 Mar 2018 05:14) (76 - 77)  BP: 103/58 (08 Mar 2018 05:14) (103/58 - 117/67)  BP(mean): --  RR: 18 (08 Mar 2018 05:14) (18 - 18)  SpO2: 97% (08 Mar 2018 05:14) (97% - 98%)    PHYSICAL EXAMINATION:    NECK:  Supple. No lymphadenopathy. Jugular venous pressure not elevated. Carotids equal.   HEART:   The cardiac impulse has a normal quality. Irreg., Nl S1 and S2.  There are no rubs or gallops noted  CHEST:  Chest with crackles on left 1/3 up and crackles at right base. Normal respiratory effort.  ABDOMEN:  Soft and nontender.   EXTREMITIES:  There is nan ecchymosis of the LLE with tenderness on palpation. No cords. Pain on palpation       LABS:                        9.7    6.58  )-----------( 166      ( 08 Mar 2018 07:08 )             30.7     03-08    134<L>  |  102  |  10  ----------------------------<  173<H>  4.6   |  26  |  0.54    Ca    8.5      08 Mar 2018 01:20      PTT - ( 07 Mar 2018 14:03 )  PTT:35.9 sec  Urinalysis Basic - ( 06 Mar 2018 22:30 )    Color: Yellow / Appearance: Clear / S.010 / pH: x  Gluc: x / Ketone: Negative  / Bili: Negative / Urobili: 1 mg/dL   Blood: x / Protein: 15 mg/dL / Nitrite: Negative   Leuk Esterase: Negative / RBC: 0-2 /HPF / WBC 0-2   Sq Epi: x / Non Sq Epi: Few / Bacteria: Few        RADIOLOGY & ADDITIONAL TESTS:    < from: US Abdomen Doppler (18 @ 14:43) >  EXAM:  US DPLX ABDOMEN                            PROCEDURE DATE:  2018          INTERPRETATION:  Clinical information: Evaluate for clot at site of IVC   filter    TECHNIQUE: Ultrasound of the inferior vena cava was performed with gray   scale, color-flow and spectral Doppler    COMPARISON: None    FINDINGS: The mid and upper abdominal inferior vena cava is visualized   and is widely patent. The IVC filter is visualized within the lumen of   the cava. There is flow proximal and distal tothe filter. The lower   inferior vena cava could not be visualized due to overlying bowel gas.    IMPRESSION:    No evidence of clot at the IVC filter site.      < from: Xray Chest 1 View- PORTABLE-Urgent (18 @ 10:05) >  EXAM:  XR CHEST PORTABLE URGENT 1V                            PROCEDURE DATE:  2018          INTERPRETATION:  Clinical information: Cough    Portable AP chest radiograph from 0957 hours:    COMPARISON:  2018    FINDINGS:  Status post transcatheter aortic valve replacement. Diffusely   prominent markings throughout the lungs most compatible with pulmonary   vascular congestion, slightly worsened. No pneumothorax or definite   pleural effusion.    IMPRESSION:    Mild pulmonary edema, worsened since 2018    < from: CT Lower Extremity w/ IV Cont, Left (18 @ 14:42) >  EXAM:  CT LWR EXT IC LT                            PROCEDURE DATE:  2018          INTERPRETATION:  Clinical information: Status post IVC filter and left   lower extremity DVT. Now with left lower extremity hematoma and severe   pain.    TECHNIQUE: CT of the lower extremities was performed, extending from the   mid pelvis through the feet. Intravenous administration of 125 cc   Omnipaque 350 nonionic intravenous contrast with 75 cc discarded was   uneventful. Coronal and sagittal reformatted images as well as coronal   MIP images were obtained    COMPARISON: Bilateral lower extremity ultrasound from 2018.    FINDINGS: Right leg: The external iliac, common femoral, femoral,   popliteal and calf veins are patent.    Left lower extremity: The external iliac, common femoral and femoral   veins are are patent. The popliteal vein could not be visualized due to   marked artifact from the patient's left hip arthroplasty.    Additional findings: Colonic diverticulosis without diverticulitis. Right   renal cyst. Small rim-enhancing structure in the anteromedial proximal   left calf measuring 2.3 x 1.3 cm is nonspecific, however could represent   a small hematoma.    Impression:  Right leg: no deep vein thrombosis    Left leg: No deep vein thrombosis visualized. Unable to evaluate the   popliteal vein due to artifact from the patient's left hip arthroplasty.   Recommend left lower extremity ultrasound for further evaluation, with   attention to the popliteal vein.        Assessment and Plan:   · Assessment		  elequis re-started  c/o cough - Chest CT today--r/o infiltrate/effusion  remains afebrile  Elevated BNP noted-will give additional Torsemide on 3/9  NEEDS DAILY WEIGHTS  Ortho eval of left calf--Dr. Blakely  Monitor CBC, BMP      Problem/Plan - 1:  ·  Problem: Dyspnea and respiratory abnormalities.     Problem/Plan - 2:  ·  Problem: Pulmonary hypertension.     Problem/Plan - 3:  ·  Problem: Afib.     Problem/Plan - 4:  ·  Problem: CAD (coronary artery disease).     Problem/Plan - 5:  ·  Problem: Diabetes mellitus type 2, controlled.     Problem/Plan - 6:  Problem: CHF (congestive heart failure).    Problem/Plan - 7:  ·  Problem: Anemia.

## 2018-03-08 NOTE — CONSULT NOTE ADULT - SUBJECTIVE AND OBJECTIVE BOX
89F community ambulatory presents w/ left lower extremity DVT and bruising on lateral aspect of LLE distal to the knee. Patient denies pain. Denies history of recent trauma or fall. Pt states she woke up from a nap with posterior calf pain and bruising. Denies numbness/tingling. Denies fever/chills. Denies pain/injury elsewhere. Pt states she had a L TKA w/ Dr. Blakely 4-5yrs ago. She does not regularly see and orthopedic doctor.     HEALTH ISSUES - PROBLEM Dx:  Anemia: Anemia  CHF (congestive heart failure): CHF (congestive heart failure)  Diabetes mellitus type 2, controlled: Diabetes mellitus type 2, controlled  CAD (coronary artery disease): CAD (coronary artery disease)  Afib: Afib  Pulmonary hypertension: Pulmonary hypertension  Dyspnea and respiratory abnormalities: Dyspnea and respiratory abnormalities  Acute deep vein thrombosis (DVT) of popliteal vein of left lower extremity: Acute deep vein thrombosis (DVT) of popliteal vein of left lower extremity        MEDICATIONS  (STANDING):  apixaban 5 milliGRAM(s) Oral every 12 hours  aspirin enteric coated 81 milliGRAM(s) Oral daily  dextrose 50% Injectable 12.5 Gram(s) IV Push once  dextrose 50% Injectable 25 Gram(s) IV Push once  dextrose 50% Injectable 25 Gram(s) IV Push once  docusate sodium 200 milliGRAM(s) Oral at bedtime  docusate sodium 100 milliGRAM(s) Oral two times a day  ferrous    sulfate 325 milliGRAM(s) Oral daily  insulin lispro (HumaLOG) corrective regimen sliding scale   SubCutaneous three times a day before meals  lactobacillus acidophilus 1 Tablet(s) Oral daily  levothyroxine 25 MICROGram(s) Oral daily  metoprolol     tartrate 25 milliGRAM(s) Oral two times a day  multivitamin 1 Tablet(s) Oral daily  pantoprazole    Tablet 40 milliGRAM(s) Oral before breakfast  potassium chloride    Tablet ER 20 milliEquivalent(s) Oral two times a day  simvastatin 40 milliGRAM(s) Oral at bedtime  torsemide 20 milliGRAM(s) Oral daily    Allergies    penicillin (Rash)  penicillins (Other)  sulfa drugs (Rash; Other)    Intolerances                            9.7    6.58  )-----------( 166      ( 08 Mar 2018 07:08 )             30.7     08 Mar 2018 01:20    134    |  102    |  10     ----------------------------<  173    4.6     |  26     |  0.54     Ca    8.5        08 Mar 2018 01:20      PTT - ( 07 Mar 2018 14:03 )  PTT:35.9 sec  Vital Signs Last 24 Hrs  T(C): 36.8 (03-08-18 @ 09:48), Max: 36.9 (03-07-18 @ 13:16)  T(F): 98.2 (03-08-18 @ 09:48), Max: 98.5 (03-07-18 @ 13:16)  HR: 83 (03-08-18 @ 09:48) (76 - 83)  BP: 106/74 (03-08-18 @ 09:48) (103/58 - 117/67)  BP(mean): --  RR: 18 (03-08-18 @ 09:48) (18 - 18)  SpO2: 92% (03-08-18 @ 09:48) (92% - 98%)    Imaging: XR demonstates R/L Knee OA    Physical Exam  Gen: NAD  Right/Left LE: skin intact, No erythema. Nothing to suggest sepsis. AROM limited to: ___. Swelling/Osteoarthritic Deformity noted. Positive/Negative Effusion. Able to SLR, Neg log roll, Negative Heel strike, no ttp elsewhere, +EHL/FHL/TA/GS function, no calf TTP, DP/PT pulses intact, compartments soft. Calf soft, nontender. Ligamentous exam benign: Varus/Valgus/Lockman Negative.      A/P: 89y Female with Right/Left Knee Pain/Osteoarthritis/Chondrocalcinosis  Analgesia  Antiinflammatories as tolerated  WBAT, rolling walker, PT  ICE/Cryotherapy  DVT PE ppx  FU with Orthopedist as outpt  Orthopedically stable for DC  Discussed with  ____ 89F community ambulatory presents w/ left lower extremity DVT and bruising on lateral aspect of LLE distal to the knee. Patient denies pain. Denies history of recent trauma or fall. Pt states she woke up from a nap with posterior calf pain and bruising. Denies numbness/tingling. Denies fever/chills. Denies pain/injury elsewhere. Pt states she had a L TKA w/ Dr. Blakely 4-5yrs ago. She does not regularly see and orthopedic doctor.     HEALTH ISSUES - PROBLEM Dx:  Anemia: Anemia  CHF (congestive heart failure): CHF (congestive heart failure)  Diabetes mellitus type 2, controlled: Diabetes mellitus type 2, controlled  CAD (coronary artery disease): CAD (coronary artery disease)  Afib: Afib  Pulmonary hypertension: Pulmonary hypertension  Dyspnea and respiratory abnormalities: Dyspnea and respiratory abnormalities  Acute deep vein thrombosis (DVT) of popliteal vein of left lower extremity: Acute deep vein thrombosis (DVT) of popliteal vein of left lower extremity    MEDICATIONS  (STANDING):  apixaban 5 milliGRAM(s) Oral every 12 hours  aspirin enteric coated 81 milliGRAM(s) Oral daily  dextrose 50% Injectable 12.5 Gram(s) IV Push once  dextrose 50% Injectable 25 Gram(s) IV Push once  dextrose 50% Injectable 25 Gram(s) IV Push once  docusate sodium 200 milliGRAM(s) Oral at bedtime  docusate sodium 100 milliGRAM(s) Oral two times a day  ferrous    sulfate 325 milliGRAM(s) Oral daily  insulin lispro (HumaLOG) corrective regimen sliding scale   SubCutaneous three times a day before meals  lactobacillus acidophilus 1 Tablet(s) Oral daily  levothyroxine 25 MICROGram(s) Oral daily  metoprolol     tartrate 25 milliGRAM(s) Oral two times a day  multivitamin 1 Tablet(s) Oral daily  pantoprazole    Tablet 40 milliGRAM(s) Oral before breakfast  potassium chloride    Tablet ER 20 milliEquivalent(s) Oral two times a day  simvastatin 40 milliGRAM(s) Oral at bedtime  torsemide 20 milliGRAM(s) Oral daily    Allergies    penicillin (Rash)  penicillins (Other)  sulfa drugs (Rash; Other)    Intolerances                            9.7    6.58  )-----------( 166      ( 08 Mar 2018 07:08 )             30.7     08 Mar 2018 01:20    134    |  102    |  10     ----------------------------<  173    4.6     |  26     |  0.54     Ca    8.5        08 Mar 2018 01:20      PTT - ( 07 Mar 2018 14:03 )  PTT:35.9 sec  Vital Signs Last 24 Hrs  T(C): 36.8 (03-08-18 @ 09:48), Max: 36.9 (03-07-18 @ 13:16)  T(F): 98.2 (03-08-18 @ 09:48), Max: 98.5 (03-07-18 @ 13:16)  HR: 83 (03-08-18 @ 09:48) (76 - 83)  BP: 106/74 (03-08-18 @ 09:48) (103/58 - 117/67)  BP(mean): --  RR: 18 (03-08-18 @ 09:48) (18 - 18)  SpO2: 92% (03-08-18 @ 09:48) (92% - 98%)    Imaging: XR demonstates R/L Knee OA    Physical Exam  Gen: NAD  Right/Left LE: skin intact, No erythema. Nothing to suggest sepsis. Ecchymosis on lateral aspect LLE beginning just distal to knee. No palpable hematoma. AROM/PROM to 110 deg flex and 0 deg extension. Negative Effusion. Able to SLR, Neg log roll, Negative Heel strike, no ttp elsewhere, +EHL/FHL/TA/GS function, no calf TTP, DP/PT pulses intact, compartments soft. Calf soft, nontender. Ligamentous exam benign: Varus/Valgus/Lockman Negative.    XR L Knee: Neg for Fx, official read pending

## 2018-03-08 NOTE — CONSULT NOTE ADULT - ASSESSMENT
89 yo Female with a PMHx of pancreatitis, cirrhosis, DM, R knee replacement, AFIB on Eliquis presented to the ED with family with complain of left lower extremity pain for more than 1 week. She got LE venous doppler US done as outpatient at [facility] and was confirmed to have LLE DVT (left distal superficial femoral vein and left posterior tibial veins). She is now s/p IVC filter placement.    A/P:   LLE Acute DVT while on Eliquis s/p IVC filter this AM  - etiology unclear.   - rheum service was asked to see pt to rule out vasculitis  - on last admission pt was r/o for Igg4 related disease which can cause pancreatitis  - for completion APL antibodies and vasculitis work up was ordered  - will follow.
89 yo Female with a PMHx of pancreatitis, cirrhosis, DM, L knee replacement, AFIB on Eliquis presented to the ED with family with complain of left lower extremity pain for more than 1 week. She got LE venous doppler US done as outpatient at [facility] and was confirmed to have LLE DVT (left distal superficial femoral vein and left posterior tibial veins). She stopped Eliquis recently and had missed x3 dosages. Here, she had IVC filter placed on 3/6, was taken off AC, US LLE/CT LLE no evidence of DVT, V/Q scan negative for PE, she was noted to have fever to 101 3/6 and low grade temp 3/7, reports cough with clear phlegm and some sore throat. No abd pain, no diarrhea, no dysuria or rashes.     1. fevers/acute LLE DVT/cough  - noted with cough/crackles likely vascular congestion   - fevers could be related to LLE hematoma/DVT  - s/p IVC filter 3/6  - her US and CT with no evidence of DVT here  - restated on ac  - f/u xray  - f/u blood cx, UA (-), no rashes, diarrhea or other obvious infectious symptoms  - monitor off antibiotics for now  - f/u cbc  - monitor temps  - supportive care    2. other issues - care per medicine
had left DVT while on anticoagulation  s/p IVC filter  for V/Q sca to r/o chronic VTE  on iv heparin  has decreased HG which may be from leg bleeding - follow H/H  vascular f/u noted  for heme evaluation
impression: etiology of lower extremity DVT while on anticoagulation unclear.  A Chio filter has been placed to limit risk of PE. patient also now on infusional heparin  Patient will have further pulmonary evaluation to rule out occult pulmonary emboli in light of history of unexplained pulmonary hypertension.  Suggest rheumatology consultation to rule out underlying vasculitis in light of unexplained DVT, pancreatitis, cirrhosis wall of unclear etiology.  Of concern is an occult malignancy not rapidly noted with previous hospitalizations 3 months ago. Suggest repeat imaging of abdomen and pelvis considering either CT scans with contrast or MRI abdomen and pelvis with contrast.  consider follow-up with GI for further clarification of questionable cirrhosis.  Further suggestions including what additional anticoagulation would be appropriate will be addressed after the above evaluations.
A/P: 89y Female with LLE contusion  Analgesia  Antiinflammatories as tolerated  WBAT, rolling walker, PT  Ice  DVT PE ppx  FU ESR/CRP  D/w pt no need for orthopedic surgical intervention at this time  All pt questions answered  D/w Dr Blakely and agrees w/ above plan  FU in office w/ Dr Blakely on outpatient basis as needed  Ortho stable

## 2018-03-09 LAB
-  AMIKACIN: SIGNIFICANT CHANGE UP
-  AMPICILLIN/SULBACTAM: SIGNIFICANT CHANGE UP
-  AMPICILLIN: SIGNIFICANT CHANGE UP
-  AZTREONAM: SIGNIFICANT CHANGE UP
-  CEFAZOLIN: SIGNIFICANT CHANGE UP
-  CEFEPIME: SIGNIFICANT CHANGE UP
-  CEFOXITIN: SIGNIFICANT CHANGE UP
-  CEFTAZIDIME: SIGNIFICANT CHANGE UP
-  CEFTRIAXONE: SIGNIFICANT CHANGE UP
-  CIPROFLOXACIN: SIGNIFICANT CHANGE UP
-  ERTAPENEM: SIGNIFICANT CHANGE UP
-  GENTAMICIN: SIGNIFICANT CHANGE UP
-  IMIPENEM: SIGNIFICANT CHANGE UP
-  LEVOFLOXACIN: SIGNIFICANT CHANGE UP
-  MEROPENEM: SIGNIFICANT CHANGE UP
-  NITROFURANTOIN: SIGNIFICANT CHANGE UP
-  PIPERACILLIN/TAZOBACTAM: SIGNIFICANT CHANGE UP
-  TOBRAMYCIN: SIGNIFICANT CHANGE UP
-  TRIMETHOPRIM/SULFAMETHOXAZOLE: SIGNIFICANT CHANGE UP
ADD ON TEST-SPECIMEN IN LAB: SIGNIFICANT CHANGE UP
ANION GAP SERPL CALC-SCNC: 7 MMOL/L — SIGNIFICANT CHANGE UP (ref 5–17)
APPEARANCE UR: CLEAR — SIGNIFICANT CHANGE UP
AUTO DIFF PNL BLD: (no result)
BILIRUB UR-MCNC: NEGATIVE — SIGNIFICANT CHANGE UP
BUN SERPL-MCNC: 10 MG/DL — SIGNIFICANT CHANGE UP (ref 7–23)
CALCIUM SERPL-MCNC: 9 MG/DL — SIGNIFICANT CHANGE UP (ref 8.5–10.1)
CHLORIDE SERPL-SCNC: 99 MMOL/L — SIGNIFICANT CHANGE UP (ref 96–108)
CO2 SERPL-SCNC: 29 MMOL/L — SIGNIFICANT CHANGE UP (ref 22–31)
COLOR SPEC: YELLOW — SIGNIFICANT CHANGE UP
CREAT SERPL-MCNC: 0.7 MG/DL — SIGNIFICANT CHANGE UP (ref 0.5–1.3)
CULTURE RESULTS: SIGNIFICANT CHANGE UP
DIFF PNL FLD: NEGATIVE — SIGNIFICANT CHANGE UP
GLUCOSE SERPL-MCNC: 139 MG/DL — HIGH (ref 70–99)
GLUCOSE UR QL: NEGATIVE MG/DL — SIGNIFICANT CHANGE UP
HCT VFR BLD CALC: 32.1 % — LOW (ref 34.5–45)
HGB BLD-MCNC: 10 G/DL — LOW (ref 11.5–15.5)
KETONES UR-MCNC: NEGATIVE — SIGNIFICANT CHANGE UP
LEUKOCYTE ESTERASE UR-ACNC: NEGATIVE — SIGNIFICANT CHANGE UP
MCHC RBC-ENTMCNC: 27.9 PG — SIGNIFICANT CHANGE UP (ref 27–34)
MCHC RBC-ENTMCNC: 31.2 GM/DL — LOW (ref 32–36)
MCV RBC AUTO: 89.7 FL — SIGNIFICANT CHANGE UP (ref 80–100)
METHOD TYPE: SIGNIFICANT CHANGE UP
NITRITE UR-MCNC: NEGATIVE — SIGNIFICANT CHANGE UP
NRBC # BLD: 0 /100 WBCS — SIGNIFICANT CHANGE UP (ref 0–0)
NT-PROBNP SERPL-SCNC: 5507 PG/ML — HIGH (ref 0–450)
ORGANISM # SPEC MICROSCOPIC CNT: SIGNIFICANT CHANGE UP
ORGANISM # SPEC MICROSCOPIC CNT: SIGNIFICANT CHANGE UP
PH UR: 6.5 — SIGNIFICANT CHANGE UP (ref 5–8)
PLATELET # BLD AUTO: 174 K/UL — SIGNIFICANT CHANGE UP (ref 150–400)
POTASSIUM SERPL-MCNC: 4.5 MMOL/L — SIGNIFICANT CHANGE UP (ref 3.5–5.3)
POTASSIUM SERPL-SCNC: 4.5 MMOL/L — SIGNIFICANT CHANGE UP (ref 3.5–5.3)
PROT UR-MCNC: NEGATIVE MG/DL — SIGNIFICANT CHANGE UP
RAPID RVP RESULT: DETECTED
RBC # BLD: 3.58 M/UL — LOW (ref 3.8–5.2)
RBC # FLD: 17.5 % — HIGH (ref 10.3–14.5)
RV+EV RNA SPEC QL NAA+PROBE: DETECTED
SODIUM SERPL-SCNC: 135 MMOL/L — SIGNIFICANT CHANGE UP (ref 135–145)
SP GR SPEC: 1 — LOW (ref 1.01–1.02)
SPECIMEN SOURCE: SIGNIFICANT CHANGE UP
UROBILINOGEN FLD QL: NEGATIVE MG/DL — SIGNIFICANT CHANGE UP
WBC # BLD: 6.64 K/UL — SIGNIFICANT CHANGE UP (ref 3.8–10.5)
WBC # FLD AUTO: 6.64 K/UL — SIGNIFICANT CHANGE UP (ref 3.8–10.5)

## 2018-03-09 PROCEDURE — 99233 SBSQ HOSP IP/OBS HIGH 50: CPT

## 2018-03-09 RX ADMIN — Medication 100 MILLIGRAM(S): at 06:28

## 2018-03-09 RX ADMIN — Medication 20 MILLIEQUIVALENT(S): at 17:27

## 2018-03-09 RX ADMIN — Medication 650 MILLIGRAM(S): at 17:46

## 2018-03-09 RX ADMIN — Medication 20 MILLIGRAM(S): at 22:45

## 2018-03-09 RX ADMIN — Medication 1 TABLET(S): at 13:12

## 2018-03-09 RX ADMIN — Medication 100 MILLIGRAM(S): at 17:27

## 2018-03-09 RX ADMIN — Medication 20 MILLIGRAM(S): at 06:28

## 2018-03-09 RX ADMIN — Medication 81 MILLIGRAM(S): at 13:12

## 2018-03-09 RX ADMIN — Medication 1: at 17:27

## 2018-03-09 RX ADMIN — Medication 25 MILLIGRAM(S): at 06:28

## 2018-03-09 RX ADMIN — Medication 20 MILLIEQUIVALENT(S): at 06:28

## 2018-03-09 RX ADMIN — PANTOPRAZOLE SODIUM 40 MILLIGRAM(S): 20 TABLET, DELAYED RELEASE ORAL at 06:28

## 2018-03-09 RX ADMIN — SIMVASTATIN 40 MILLIGRAM(S): 20 TABLET, FILM COATED ORAL at 22:45

## 2018-03-09 RX ADMIN — APIXABAN 5 MILLIGRAM(S): 2.5 TABLET, FILM COATED ORAL at 17:27

## 2018-03-09 RX ADMIN — Medication 325 MILLIGRAM(S): at 13:12

## 2018-03-09 RX ADMIN — Medication 200 MILLIGRAM(S): at 22:46

## 2018-03-09 RX ADMIN — Medication 3 MILLIGRAM(S): at 22:46

## 2018-03-09 RX ADMIN — Medication 25 MILLIGRAM(S): at 17:27

## 2018-03-09 RX ADMIN — APIXABAN 5 MILLIGRAM(S): 2.5 TABLET, FILM COATED ORAL at 06:28

## 2018-03-09 RX ADMIN — Medication 25 MICROGRAM(S): at 06:28

## 2018-03-09 NOTE — PROGRESS NOTE ADULT - SUBJECTIVE AND OBJECTIVE BOX
Subjective:    Awake, alert. Non-productive cough. No dysuria. Events noted.    MEDICATIONS  (STANDING):  apixaban 5 milliGRAM(s) Oral every 12 hours  aspirin enteric coated 81 milliGRAM(s) Oral daily  dextrose 50% Injectable 12.5 Gram(s) IV Push once  dextrose 50% Injectable 25 Gram(s) IV Push once  dextrose 50% Injectable 25 Gram(s) IV Push once  docusate sodium 200 milliGRAM(s) Oral at bedtime  docusate sodium 100 milliGRAM(s) Oral two times a day  ferrous    sulfate 325 milliGRAM(s) Oral daily  insulin lispro (HumaLOG) corrective regimen sliding scale   SubCutaneous three times a day before meals  lactobacillus acidophilus 1 Tablet(s) Oral daily  levothyroxine 25 MICROGram(s) Oral daily  metoprolol     tartrate 25 milliGRAM(s) Oral two times a day  multivitamin 1 Tablet(s) Oral daily  pantoprazole    Tablet 40 milliGRAM(s) Oral before breakfast  potassium chloride    Tablet ER 20 milliEquivalent(s) Oral two times a day  simvastatin 40 milliGRAM(s) Oral at bedtime  torsemide 20 milliGRAM(s) Oral daily    MEDICATIONS  (PRN):  acetaminophen   Tablet 650 milliGRAM(s) Oral every 6 hours PRN Mild Pain (1 - 3)  acetaminophen   Tablet. 650 milliGRAM(s) Oral every 8 hours PRN Mild Pain (1 - 3)  acetaminophen   Tablet. 650 milliGRAM(s) Oral every 6 hours PRN Moderate Pain (4 - 6)  dextrose Gel 1 Dose(s) Oral once PRN Blood Glucose LESS THAN 70 milliGRAM(s)/deciliter  glucagon  Injectable 1 milliGRAM(s) IntraMuscular once PRN Glucose LESS THAN 70 milligrams/deciliter  guaiFENesin    Syrup 100 milliGRAM(s) Oral every 6 hours PRN Cough  HYDROcodone/homatropine Syrup 5 milliLiter(s) Oral every 6 hours PRN Cough  melatonin 3 milliGRAM(s) Oral at bedtime PRN Insomnia  oxyCODONE    IR 5 milliGRAM(s) Oral every 8 hours PRN Severe Pain (7 - 10)  polyethylene glycol 3350 17 Gram(s) Oral daily PRN Constipation      Allergies    penicillin (Rash)  penicillins (Other)  sulfa drugs (Rash; Other)    Intolerances        Vital Signs Last 24 Hrs  T(C): 37.3 (09 Mar 2018 06:23), Max: 38.7 (08 Mar 2018 19:20)  T(F): 99.2 (09 Mar 2018 06:23), Max: 101.7 (08 Mar 2018 19:20)  HR: 83 (09 Mar 2018 04:50) (74 - 105)  BP: 122/77 (09 Mar 2018 04:50) (96/48 - 135/64)  BP(mean): --  RR: 20 (09 Mar 2018 04:50) (17 - 20)  SpO2: 95% (09 Mar 2018 04:50) (92% - 97%)    PHYSICAL EXAMINATION:    NECK:  Supple. No lymphadenopathy. Jugular venous pressure not elevated. Carotids equal.   HEART:   The cardiac impulse has a normal quality. Reg. with extrasystoles., Nl S1 and S2.  There are no rubs or gallops noted  CHEST:  Chest with bibasilar crackles. Rales now more prominent on right laterally. Normal respiratory effort.  ABDOMEN:  Soft and nontender.   EXTREMITIES:  There is no edema. The ecchymosis is resolving.  on palpation.      LABS:                        10.0   6.64  )-----------( 174      ( 09 Mar 2018 06:55 )             32.1     03-08    134<L>  |  102  |  10  ----------------------------<  173<H>  4.6   |  26  |  0.54    Ca    8.5      08 Mar 2018 01:20      PTT - ( 07 Mar 2018 14:03 )  PTT:35.9 sec      RADIOLOGY & ADDITIONAL TESTS:    Assessment and Plan:   · Assessment		  elequis re-started  c/o cough - Chest CT no infiltrate/effusion--mosaic pattern c/w fluid overload  febrile yesterday--spoke with ID. Sent Viral swab. Needs U/A, C+S. Concern about findings on exam-ie., ?development of nosocomial infection  Elevated BNP noted-will give  Torsemide daily  NEEDS DAILY WEIGHTS  Ortho eval of left calf--Dr. Blakely  Monitor CBC, BMP. Repeat BNP      Problem/Plan - 1:  ·  Problem: Dyspnea and respiratory abnormalities.     Problem/Plan - 2:  ·  Problem: Pulmonary hypertension.     Problem/Plan - 3:  ·  Problem: Afib.     Problem/Plan - 4:  ·  Problem: CAD (coronary artery disease).     Problem/Plan - 5:  ·  Problem: Diabetes mellitus type 2, controlled.     Problem/Plan - 6:  Problem: CHF (congestive heart failure).    Problem/Plan - 7:  ·  Problem: Anemia.

## 2018-03-09 NOTE — PROGRESS NOTE ADULT - SUBJECTIVE AND OBJECTIVE BOX
CHIEF COMPLAINT/Diagnosis: no dvt/ leg edema pain/ hematoma left leg/ chf/ enterovirus    SUBJECTIVE: no complaints    REVIEW OF SYSTEMS:    CONSTITUTIONAL: No weakness, fevers or chills  EYES/ENT: No visual changes;  No vertigo or throat pain   NECK: No pain or stiffness  RESPIRATORY: No cough, wheezing, hemoptysis; No shortness of breath  CARDIOVASCULAR: No chest pain or palpitations  GASTROINTESTINAL: No abdominal or epigastric pain. No nausea, vomiting, or hematemesis; No diarrhea or constipation. No melena or hematochezia.  GENITOURINARY: No dysuria, frequency or hematuria  NEUROLOGICAL: No numbness or weakness  SKIN: No itching, burning, rashes, or lesions   All other review of systems is negative unless indicated above    Vital Signs Last 24 Hrs  T(C): 36.2 (09 Mar 2018 12:45), Max: 38.7 (08 Mar 2018 19:20)  T(F): 97.2 (09 Mar 2018 12:45), Max: 101.7 (08 Mar 2018 19:20)  HR: 100 (09 Mar 2018 12:45) (74 - 105)  BP: 113/49 (09 Mar 2018 12:45) (96/48 - 135/64)  BP(mean): --  RR: 20 (09 Mar 2018 12:45) (17 - 20)  SpO2: 95% (09 Mar 2018 04:50) (95% - 97%)    I&O's Summary      CAPILLARY BLOOD GLUCOSE      POCT Blood Glucose.: 140 mg/dL (09 Mar 2018 13:11)  POCT Blood Glucose.: 127 mg/dL (09 Mar 2018 08:13)  POCT Blood Glucose.: 136 mg/dL (08 Mar 2018 21:59)  POCT Blood Glucose.: 189 mg/dL (08 Mar 2018 17:17)      PHYSICAL EXAM:    Constitutional: NAD, awake and alert, well-developed  HEENT: PERR, EOMI, Normal Hearing, MMM  Neck: Soft and supple, No LAD, No JVD  Respiratory: crackles b/l lungs  Cardiovascular: S1 and S2, regular rate and rhythm, no Murmurs, gallops or rubs  Gastrointestinal: Bowel Sounds present, soft, nontender, nondistended, no guarding, no rebound  Extremities: No peripheral edema  Vascular: 2+ peripheral pulses  Neurological: A/O x 3, no focal deficits  Musculoskeletal: 5/5 strength b/l upper and lower extremities  Skin: No rashes    MEDICATIONS:  MEDICATIONS  (STANDING):  apixaban 5 milliGRAM(s) Oral every 12 hours  aspirin enteric coated 81 milliGRAM(s) Oral daily  dextrose 50% Injectable 12.5 Gram(s) IV Push once  dextrose 50% Injectable 25 Gram(s) IV Push once  dextrose 50% Injectable 25 Gram(s) IV Push once  docusate sodium 200 milliGRAM(s) Oral at bedtime  docusate sodium 100 milliGRAM(s) Oral two times a day  ferrous    sulfate 325 milliGRAM(s) Oral daily  insulin lispro (HumaLOG) corrective regimen sliding scale   SubCutaneous three times a day before meals  lactobacillus acidophilus 1 Tablet(s) Oral daily  levothyroxine 25 MICROGram(s) Oral daily  metoprolol     tartrate 25 milliGRAM(s) Oral two times a day  multivitamin 1 Tablet(s) Oral daily  pantoprazole    Tablet 40 milliGRAM(s) Oral before breakfast  potassium chloride    Tablet ER 20 milliEquivalent(s) Oral two times a day  simvastatin 40 milliGRAM(s) Oral at bedtime  torsemide 20 milliGRAM(s) Oral daily      LABS: All Labs Reviewed:                        10.0   6.64  )-----------( 174      ( 09 Mar 2018 06:55 )             32.1     03-09    135  |  99  |  10  ----------------------------<  139<H>  4.5   |  29  |  0.70    Ca    9.0      09 Mar 2018 09:53            Blood Culture: 03-06 @ 22:30  Organism Escherichia coli  Gram Stain Blood -- Gram Stain --  Specimen Source .Urine Clean Catch (Midstream)  Culture-Blood --    03-06 @ 16:32  Organism --  Gram Stain Blood -- Gram Stain --  Specimen Source .Blood None  Culture-Blood --        Assessment and Plan	    89 yo Female with a PMHx of pancreatitis, cirrhosis, DM, L knee replacement, AFIB on Eliquis presented to the ED with family with complain of left lower extremity pain for more than 1 week. She got LE venous doppler US done as outpatient at [facility] and was confirmed to have LLE DVT (left distal superficial femoral vein and left posterior tibial veins). She is now s/p IVC filter placement.      # Left leg edema/ bruising secondary to hematoma  -NO DVT left leg  -u/s of the abdomen confirmed no clot on the IVC filter  -placed back on Eliquis as per reccomendations of hem/onc  - Vascular surgery appreciated   - Hematology evaluation appreicated  -ortho input apprecaited- normal xrays of left knee    # Fever likely secondary to postive rhinovirus and upper respiratory infection  - XR done 3/5 without obvious consolidation.  - send bcx x 2, UA  - ID eval apprecaited  -ct chest shows worseing insterititial lung disease however no infiltrate or consolidation  - will monitor off abx for now    # LLE pain - significantly improved today  - Will monitor compartment tension and bruising.  - F/u LLE CTV and US  - pain control    # Pulmonary hypertension  - VQ done to eval for CTEP was negative  - Pulm following    # Acute on chronic anemia likely a/w leg hematoma  - Will monitor H/H maame while on hep ggt    # DM  - Scale + BGM    # h/o A fib  - rate controlled . continue Metoprolol   - Was on eliquis- on hold presently.  Heparin ggt for now pending AC plan.    # h/o Cirrhosis of unclear etio  - clinically stable    # Hypothyroidism  -continue Levothyroxine    # Hyperlipidemia  - continue statin     # GERD  - on PPI    # Acute exacerbation of diastolic  CHF w/ cough and increased lower extremity edema  -temporarily increase diuretics to 20 toresemide daily  - follow Is and Os   -crackes today b/l  >>> give one extra dose of toresemide tonight    #Hyponatremia  -improving    VTE ppx: on Eliquis

## 2018-03-09 NOTE — PROGRESS NOTE ADULT - SUBJECTIVE AND OBJECTIVE BOX
89 yo Female with a PMHx of pancreatitis, cirrhosis, DM, L knee replacement, AFIB on Eliquis presented to the ED with family with complain of left lower extremity pain for more than 1 week. She got LE venous doppler US done as outpatient at [facility] and was confirmed to have LLE DVT (left distal superficial femoral vein and left posterior tibial veins). She stopped Eliquis recently and had missed x3 dosages. Here, she had IVC filter placed on 3/6, was taken off AC, US LLE/CT LLE no evidence of DVT, V/Q scan negative for PE, she was noted to have fever to 101 3/6 and low grade temp 3/7, reports cough with clear phlegm and some sore throat. No abd pain, no diarrhea, no dysuria or rashes.     febrile to 101.7 and low grade temp to 100.1  has nonproductive cough    Date of Service: 03-09-18 @ 11:28    MEDICATIONS  (STANDING):  apixaban 5 milliGRAM(s) Oral every 12 hours  aspirin enteric coated 81 milliGRAM(s) Oral daily  dextrose 50% Injectable 12.5 Gram(s) IV Push once  dextrose 50% Injectable 25 Gram(s) IV Push once  dextrose 50% Injectable 25 Gram(s) IV Push once  docusate sodium 200 milliGRAM(s) Oral at bedtime  docusate sodium 100 milliGRAM(s) Oral two times a day  ferrous    sulfate 325 milliGRAM(s) Oral daily  insulin lispro (HumaLOG) corrective regimen sliding scale   SubCutaneous three times a day before meals  lactobacillus acidophilus 1 Tablet(s) Oral daily  levothyroxine 25 MICROGram(s) Oral daily  metoprolol     tartrate 25 milliGRAM(s) Oral two times a day  multivitamin 1 Tablet(s) Oral daily  pantoprazole    Tablet 40 milliGRAM(s) Oral before breakfast  potassium chloride    Tablet ER 20 milliEquivalent(s) Oral two times a day  simvastatin 40 milliGRAM(s) Oral at bedtime  torsemide 20 milliGRAM(s) Oral daily      Vital Signs Last 24 Hrs  T(C): 37.3 (09 Mar 2018 06:23), Max: 38.7 (08 Mar 2018 19:20)  T(F): 99.2 (09 Mar 2018 06:23), Max: 101.7 (08 Mar 2018 19:20)  HR: 83 (09 Mar 2018 04:50) (74 - 105)  BP: 122/77 (09 Mar 2018 04:50) (96/48 - 135/64)  BP(mean): --  RR: 20 (09 Mar 2018 04:50) (17 - 20)  SpO2: 95% (09 Mar 2018 04:50) (95% - 97%)            PE:  Constitutional: frail looking  HEENT: NC/AT, EOMI, PERRLA  Neck: supple  Back: no tenderness  Respiratory: b/l crackles along bases  Cardiovascular: S1S2 regular, no murmurs  Abdomen: soft, not tender, not distended, positive BS  Genitourinary: deferred  Rectal: deferred  Musculoskeletal: no muscle tenderness  Extremities: LLL edema, ecchymosis, bruising along posterior calf, tenderness  Neurological:  no focal deficits  Skin: no rashes    Labs:                        10.0   6.64  )-----------( 174      ( 09 Mar 2018 06:55 )             32.1     03-09    135  |  99  |  10  ----------------------------<  139<H>  4.5   |  29  |  0.70    Ca    9.0      09 Mar 2018 09:53           RVP positive for entero/rhinovirus    Culture - Urine (03.06.18 @ 22:30)    -  Amikacin: S <=8    -  Ampicillin: S 4    -  Ampicillin/Sulbactam: S <=4/2    -  Aztreonam: S <=4    -  Cefazolin: S <=2    -  Cefepime: S <=2    -  Cefoxitin: S <=4    -  Ceftazidime: S <=1    -  Ceftriaxone: S <=1    -  Ciprofloxacin: S <=0.5    -  Ertapenem: S <=0.5    -  Gentamicin: S <=1    -  Imipenem: S <=1    -  Levofloxacin: S <=1    -  Meropenem: S <=1    -  Nitrofurantoin: S <=32    -  Piperacillin/Tazobactam: S <=8    -  Tobramycin: S <=2    -  Trimethoprim/Sulfamethoxazole: S <=0.5/9.5    Specimen Source: .Urine Clean Catch (Midstream)    Culture Results:   10,000 - 49,000 CFU/mL Escherichia coli    Organism Identification: Escherichia coli    Organism: Escherichia coli    Method Type: NIGEL  Culture - Blood (03.06.18 @ 16:32)    Specimen Source: .Blood None    Culture Results:   No growth to date.          Radiology:  < from: CT Chest No Cont (03.08.18 @ 10:47) >  EXAM:  CT CHEST                            PROCEDURE DATE:  03/08/2018          INTERPRETATION:  Clinical information: Spiking temperatures. Rule out   pneumonia.    COMPARISON: March 15, 2016    PROCEDURE:   CT of the Chest was performed without intravenous contrast.  Sagittal and coronal reformats were performed.    FINDINGS:    LOWER NECK: Within normal limits.  AXILLA, MEDIASTINUM AND SHANTI: Several enlarged right lower paratracheal   lymph nodes, measuring up to 1.6 cm (2; 30). Additional smaller   mediastinal lymph nodes are present. There is no axillary lymphadenopathy.  VESSELS: Normal caliber aorta with moderate atherosclerotic arterial   calcification, including calcification of the coronary arteries.  HEART: The heart is enlarged.Status post transcatheter aortic valve   replacement.No pericardial effusion.  PLEURA: No pleural effusion.  LUNGS AND LARGE AIRWAYS: Patent central airways. Several benign calcified   left sided granulomata. No suspicious pulmonary nodules.Mild peripheral   reticular opacities compatible with interstitial lung disease and   slightly worsened. No focal lung consolidation. Mosaic attenuation of the   lung parenchyma which can be seen in the setting of small vessel or small   airway disease.  VISUALIZED UPPER ABDOMEN: Partially imaged right renal cyst.   BONES: No acute abnormality. Old right rib fracture.  CHEST WALL:  Unremarkable    IMPRESSION: No evidence of pneumonia.  Mild interstitial lung disease, slightly worsened as compared with March   15, 2016.  Mild mediastinal lymphadenopathy.      < end of copied text >        EXAM:  CT LWR EXT IC LT                            PROCEDURE DATE:  03/06/2018          INTERPRETATION:  Clinical information: Status post IVC filter and left   lower extremity DVT. Now with left lower extremity hematoma and severe   pain.    TECHNIQUE: CT of the lower extremities was performed, extending from the   mid pelvis through the feet. Intravenous administration of 125 cc   Omnipaque 350 nonionic intravenous contrast with 75 cc discarded was   uneventful. Coronal and sagittal reformatted images as well as coronal   MIP images were obtained    COMPARISON: Bilateral lower extremity ultrasound from March 03, 2018.    FINDINGS: Right leg: The external iliac, common femoral, femoral,   popliteal and calf veins are patent.    Left lower extremity: The external iliac, common femoral and femoral   veins are are patent. The popliteal vein could not be visualized due to   marked artifact from the patient's left hip arthroplasty.    Additional findings: Colonic diverticulosis without diverticulitis. Right   renal cyst. Small rim-enhancing structure in the anteromedial proximal   left calf measuring 2.3 x 1.3 cm is nonspecific, however could represent   a small hematoma.    Impression:  Right leg: no deep vein thrombosis    Left leg: No deep vein thrombosis visualized. Unable to evaluate the   popliteal vein due to artifact from the patient's left hip arthroplasty.   Recommend left lower extremity ultrasound for further evaluation, with   attention to the popliteal vein.      < from: NM Pulmonary Ventilation/Perfusion Scan (03.05.18 @ 12:01) >    < from: US Duplex Venous Lower Ext Ltd, Left (03.06.18 @ 17:40) >    EXAM:  US DPLX LWR EXT VEINS LTD LT                            PROCEDURE DATE:  03/06/2018          INTERPRETATION:  CLINICAL STATEMENT: Swelling leg.    TECHNIQUE: Ultrasound of left lower extremity deep venous system.    COMPARISON: None.    FINDINGS:    There is color and spectral flow, compression and augmentation of the   common femoral, superficial femoral and popliteal veins.    There is flow in the posterior tibial vein.    The contralateral common femoral vein is patent.    IMPRESSION:    No evidence of DVT.       EXAM:  NM PULM VENTILATION PERFUS IMG                            PROCEDURE DATE:  03/05/2018          INTERPRETATION:  CLINICAL INFORMATION: 89-year-old female with pulmonary   hypertension and DVT, evaluate for pulmonary embolism.    RADIOPHARMACEUTICAL: 1 mCi Tc-99m-DTPA by inhalation; 6.2 mCi Tc-99m-MAA,   I.V.    TECHNIQUE:  Ventilation and perfusion images of the lungs were obtained   following administration of Tc-99m-DTPA and Tc-99m-MAA. Images were   obtained in the anterior, posterior,both lateral, and all 4 oblique   projections. This study was interpreted in conjunction with a chest   radiograph of 3/5/2018.    COMPARISON: No prior lung V/Q scan available.     FINDINGS: There is cardiomegaly and heterogeneous radiotracer   distribution in the remainder of both lungs on the ventilation and the   perfusion images. No segmental perfusion defects are noted.    IMPRESSION: Ventilation/perfusion lung scan: Very low probability of   pulmonary embolus.    < from: Xray Chest 1 View- PORTABLE-Routine (03.05.18 @ 09:18) >  EXAM:  XR CHEST PORTABLE ROUTINE 1V                            PROCEDURE DATE:  03/05/2018          INTERPRETATION:   History: CTEP, dyspnea    Chest:  one view.    Comparison: 12/23/2017    Findings/  impression:    AP radiograph of the chest demonstrates mild chronic appearing   interstitial changes tiny bibasilar effusion/atelectasis probable mild   vascular congestion. Tortuous thoracic aorta borderline cardiomegaly   patient is status post TAVR. Osteopenia and degenerative changes.      < end of copied text >      Advanced directives addressed: full resuscitation

## 2018-03-10 LAB
ANION GAP SERPL CALC-SCNC: 7 MMOL/L — SIGNIFICANT CHANGE UP (ref 5–17)
BUN SERPL-MCNC: 11 MG/DL — SIGNIFICANT CHANGE UP (ref 7–23)
CALCIUM SERPL-MCNC: 8.8 MG/DL — SIGNIFICANT CHANGE UP (ref 8.5–10.1)
CHLORIDE SERPL-SCNC: 97 MMOL/L — SIGNIFICANT CHANGE UP (ref 96–108)
CO2 SERPL-SCNC: 32 MMOL/L — HIGH (ref 22–31)
CREAT SERPL-MCNC: 0.68 MG/DL — SIGNIFICANT CHANGE UP (ref 0.5–1.3)
GLUCOSE SERPL-MCNC: 128 MG/DL — HIGH (ref 70–99)
HCT VFR BLD CALC: 32.2 % — LOW (ref 34.5–45)
HGB BLD-MCNC: 10.2 G/DL — LOW (ref 11.5–15.5)
MCHC RBC-ENTMCNC: 28.3 PG — SIGNIFICANT CHANGE UP (ref 27–34)
MCHC RBC-ENTMCNC: 31.7 GM/DL — LOW (ref 32–36)
MCV RBC AUTO: 89.2 FL — SIGNIFICANT CHANGE UP (ref 80–100)
NRBC # BLD: 0 /100 WBCS — SIGNIFICANT CHANGE UP (ref 0–0)
PLATELET # BLD AUTO: 191 K/UL — SIGNIFICANT CHANGE UP (ref 150–400)
POTASSIUM SERPL-MCNC: 3.6 MMOL/L — SIGNIFICANT CHANGE UP (ref 3.5–5.3)
POTASSIUM SERPL-SCNC: 3.6 MMOL/L — SIGNIFICANT CHANGE UP (ref 3.5–5.3)
RBC # BLD: 3.61 M/UL — LOW (ref 3.8–5.2)
RBC # FLD: 17.8 % — HIGH (ref 10.3–14.5)
SODIUM SERPL-SCNC: 136 MMOL/L — SIGNIFICANT CHANGE UP (ref 135–145)
WBC # BLD: 6.07 K/UL — SIGNIFICANT CHANGE UP (ref 3.8–10.5)
WBC # FLD AUTO: 6.07 K/UL — SIGNIFICANT CHANGE UP (ref 3.8–10.5)

## 2018-03-10 PROCEDURE — 71045 X-RAY EXAM CHEST 1 VIEW: CPT | Mod: 26

## 2018-03-10 PROCEDURE — 99233 SBSQ HOSP IP/OBS HIGH 50: CPT

## 2018-03-10 RX ORDER — BENZOCAINE AND MENTHOL 5; 1 G/100ML; G/100ML
1 LIQUID ORAL EVERY 4 HOURS
Qty: 0 | Refills: 0 | Status: DISCONTINUED | OUTPATIENT
Start: 2018-03-10 | End: 2018-03-14

## 2018-03-10 RX ORDER — BUDESONIDE, MICRONIZED 100 %
0.5 POWDER (GRAM) MISCELLANEOUS EVERY 12 HOURS
Qty: 0 | Refills: 0 | Status: DISCONTINUED | OUTPATIENT
Start: 2018-03-10 | End: 2018-03-14

## 2018-03-10 RX ADMIN — Medication 100 MILLIGRAM(S): at 06:19

## 2018-03-10 RX ADMIN — PANTOPRAZOLE SODIUM 40 MILLIGRAM(S): 20 TABLET, DELAYED RELEASE ORAL at 06:20

## 2018-03-10 RX ADMIN — Medication 40 MILLIGRAM(S): at 17:56

## 2018-03-10 RX ADMIN — Medication 325 MILLIGRAM(S): at 11:43

## 2018-03-10 RX ADMIN — Medication 20 MILLIEQUIVALENT(S): at 17:57

## 2018-03-10 RX ADMIN — Medication 650 MILLIGRAM(S): at 17:58

## 2018-03-10 RX ADMIN — SIMVASTATIN 40 MILLIGRAM(S): 20 TABLET, FILM COATED ORAL at 21:14

## 2018-03-10 RX ADMIN — Medication 20 MILLIGRAM(S): at 06:22

## 2018-03-10 RX ADMIN — BENZOCAINE AND MENTHOL 1 LOZENGE: 5; 1 LIQUID ORAL at 11:42

## 2018-03-10 RX ADMIN — Medication 0.5 MILLIGRAM(S): at 20:25

## 2018-03-10 RX ADMIN — Medication 20 MILLIEQUIVALENT(S): at 06:20

## 2018-03-10 RX ADMIN — Medication 100 MILLIGRAM(S): at 17:55

## 2018-03-10 RX ADMIN — Medication 3 MILLIGRAM(S): at 21:14

## 2018-03-10 RX ADMIN — Medication 650 MILLIGRAM(S): at 21:16

## 2018-03-10 RX ADMIN — Medication 1 TABLET(S): at 11:43

## 2018-03-10 RX ADMIN — BENZOCAINE AND MENTHOL 1 LOZENGE: 5; 1 LIQUID ORAL at 17:55

## 2018-03-10 RX ADMIN — Medication 81 MILLIGRAM(S): at 11:43

## 2018-03-10 RX ADMIN — APIXABAN 5 MILLIGRAM(S): 2.5 TABLET, FILM COATED ORAL at 06:22

## 2018-03-10 RX ADMIN — Medication 25 MICROGRAM(S): at 06:20

## 2018-03-10 RX ADMIN — Medication 20 MILLIGRAM(S): at 17:57

## 2018-03-10 RX ADMIN — Medication 200 MILLIGRAM(S): at 21:14

## 2018-03-10 RX ADMIN — Medication 25 MILLIGRAM(S): at 17:57

## 2018-03-10 RX ADMIN — APIXABAN 5 MILLIGRAM(S): 2.5 TABLET, FILM COATED ORAL at 17:55

## 2018-03-10 NOTE — PROGRESS NOTE ADULT - SUBJECTIVE AND OBJECTIVE BOX
fever to 102F within last 24 hours  no localizing symptoms other than cough    R groin without findings    status post IVC filter placement with subsequent duplex demonstrating no evidence of DVT    stable    medical management    MEDICATIONS  (STANDING):  apixaban 5 milliGRAM(s) Oral every 12 hours  aspirin enteric coated 81 milliGRAM(s) Oral daily  buDESOnide   0.5 milliGRAM(s) Respule 0.5 milliGRAM(s) Inhalation every 12 hours  docusate sodium 200 milliGRAM(s) Oral at bedtime  docusate sodium 100 milliGRAM(s) Oral two times a day  ferrous    sulfate 325 milliGRAM(s) Oral daily  lactobacillus acidophilus 1 Tablet(s) Oral daily  levothyroxine 25 MICROGram(s) Oral daily  methylPREDNISolone sodium succinate Injectable 40 milliGRAM(s) IV Push every 8 hours  metoprolol     tartrate 25 milliGRAM(s) Oral two times a day  multivitamin 1 Tablet(s) Oral daily  pantoprazole    Tablet 40 milliGRAM(s) Oral before breakfast  potassium chloride    Tablet ER 20 milliEquivalent(s) Oral two times a day  simvastatin 40 milliGRAM(s) Oral at bedtime  torsemide 20 milliGRAM(s) Oral daily    MEDICATIONS  (PRN):  acetaminophen   Tablet 650 milliGRAM(s) Oral every 6 hours PRN Mild Pain (1 - 3)  acetaminophen   Tablet. 650 milliGRAM(s) Oral every 8 hours PRN Mild Pain (1 - 3)  acetaminophen   Tablet. 650 milliGRAM(s) Oral every 6 hours PRN Moderate Pain (4 - 6)  benzocaine 15 mG/menthol 3.6 mG Lozenge 1 Lozenge Oral every 4 hours PRN Sore Throat  dextrose Gel 1 Dose(s) Oral once PRN Blood Glucose LESS THAN 70 milliGRAM(s)/deciliter  guaiFENesin    Syrup 100 milliGRAM(s) Oral every 6 hours PRN Cough  HYDROcodone/homatropine Syrup 5 milliLiter(s) Oral every 6 hours PRN Cough  melatonin 3 milliGRAM(s) Oral at bedtime PRN Insomnia  oxyCODONE    IR 5 milliGRAM(s) Oral every 8 hours PRN Severe Pain (7 - 10)  polyethylene glycol 3350 17 Gram(s) Oral daily PRN Constipation      Allergies    penicillin (Rash)  penicillins (Other)  sulfa drugs (Rash; Other)    Intolerances        Flatus: [ ] YES [ ] NO             Bowel Movement: [ ] YES [ ] NO  Pain (0-10):            Pain Control Adequate: [ ] YES [ ] NO  Nausea: [ ] YES [ ] NO            Vomiting: [ ] YES [ ] NO  Diarrhea: [ ] YES [ ] NO         Constipation: [ ] YES [ ] NO     Chest Pain: [ ] YES [ ] NO    SOB:  [ ] YES [ ] NO    Vital Signs Last 24 Hrs  T(C): 36.3 (10 Mar 2018 04:49), Max: 38.9 (09 Mar 2018 17:41)  T(F): 97.3 (10 Mar 2018 04:49), Max: 102.1 (09 Mar 2018 17:41)  HR: 69 (10 Mar 2018 04:49) (69 - 100)  BP: 100/52 (10 Mar 2018 04:49) (100/52 - 122/60)  BP(mean): --  RR: 20 (10 Mar 2018 04:49) (20 - 20)  SpO2: 97% (10 Mar 2018 04:49) (95% - 97%)    I&O's Summary      Physical Exam:  General: NAD, resting comfortably  Pulmonary: normal resp effort, CTA-B  Cardiovascular: NSR  Abdominal: soft, NT/ND  Extremities: WWP, normal strength  Neuro: A/O x 3, CNs II-XII grossly intact, normal motor/sensation, no focal deficits  Pulses:   Right:                                                                          Left:  FEM [ ]2+ [ ]1+ [ ]doppler                                             FEM [ ]2+ [ ]1+ [ ]doppler    POP [ ]2+ [ ]1+ [ ]doppler                                             POP [ ]2+ [ ]1+ [ ]doppler    DP [ ]2+ [ ]1+ [ ]doppler                                                DP [ ]2+ [ ]1+ [ ]doppler  PT[ ]2+ [ ]1+ [ ]doppler                                                  PT [ ]2+ [ ]1+ [ ]doppler    LABS:                        10.2   6.07  )-----------( 191      ( 10 Mar 2018 07:27 )             32.2     03-10    136  |  97  |  11  ----------------------------<  128<H>  3.6   |  32<H>  |  0.68    Ca    8.8      10 Mar 2018 07:27        Urinalysis Basic - ( 09 Mar 2018 10:20 )    Color: Yellow / Appearance: Clear / S.005 / pH: x  Gluc: x / Ketone: Negative  / Bili: Negative / Urobili: Negative mg/dL   Blood: x / Protein: Negative mg/dL / Nitrite: Negative   Leuk Esterase: Negative / RBC: x / WBC x   Sq Epi: x / Non Sq Epi: x / Bacteria: x        CAPILLARY BLOOD GLUCOSE      POCT Blood Glucose.: 137 mg/dL (10 Mar 2018 08:23)  POCT Blood Glucose.: 144 mg/dL (09 Mar 2018 21:08)  POCT Blood Glucose.: 153 mg/dL (09 Mar 2018 17:24)  POCT Blood Glucose.: 140 mg/dL (09 Mar 2018 13:11)      RADIOLOGY & ADDITIONAL TESTS:

## 2018-03-10 NOTE — PROGRESS NOTE ADULT - SUBJECTIVE AND OBJECTIVE BOX
HOSPITAL COURSE AND ASSESSMENT  89 yo Female with a PMH of pancreatitis, cirrhosis, DM, L knee replacement, AFIB on Eliquis presented to the ED with family with complaint of left lower extremity pain for more than 1 week. She got LE venous doppler US done as outpatient at [facility] and was reported to have LLE DVT (left distal superficial femoral vein and left posterior tibial veins).    She was admitted to the medical floor for management of LLE DVT. Her eliquis was held and heparin gtt was started. IVC filter was placed by vascular surgery. Repeat US showed no DVT. She was then transitioned to her home eliquis. Course complicated by fever, for which she was diagnosed with rhinovirus. She also had pulmonary edema that required IV the PO diuresis.    Anticipate discharge Monday, likely to SNF for continued care.      *LLE edema and hematoma  -CT LLE: NO DVT left leg  -US of the abdomen confirmed no clot on the IVC filter  -Vascular surgery appreciated- risk > benefit with retrieval of filter  -Hematology evaluation appreciated: ok to resume Eliquis   -Ortho input appreciated: normal xrays of left knee    *Sepsis due to URI/rhinovirus, not POA  -source evaluation with blood culture ngtd, UA 3/6 and 3/9 without infection, urine culture 3/6 <50K E Coli and patient asymptomatic  -CXR 3/5 and 3/7 with pulmonary edema   -CT chest with worsening ILD, no infiltrate  -Supportive care with antitussives, steroids (dose decreased)  - ID eval appreciated: no need for antibiotics at present    *Acute on Chronic Diastolic Heart Failure with Severe Pulmonary hypertension and ILD  -Echo 10/2016 with preserved EF  -heart catheterization 2/2017 with patent stents, severe pulmonary HTN (PDE recommended at that time)  -for completeness, VQ excluded PE  -BNP 5507 last check  -Diuresis with torsemide daily, will increase to BID for 24-48 hours  - Pulm following    *Acute blood loss anemia superimposed on chronic normocytic anemia   -likely blood loss into hematoma  -Hb stable at 10  -ok to continue eliquis    *DM type II with vascular complication  - A1C 6.7  -Glucose never >200  -Given age and comorbidites, will stop accuchecks    *Atrial Fibrillation with chronic anticoagulation  - rate controlled with Metoprolol   - CHADS2=3. On eliquis for stroke risk reduction    *Hypervolemic Hyponatremia  -diuresis    *Cryptogenic Cirrhosis, Recurrent Pancreatitis, Hypothyroidism, Hyperlipidemia, GERD  - clinically stable  -home regimen    *Severe Protein Calorie Malnutrition  -Albumin 2.4 in December  -Supplement as able    VTE ppx: on Eliquis    *Advanced Care Planning  -HCP sons  -Code status should be addressed, but given family dynamics, best to be discussed as outpatient      ----------------------------------------------------------------------------------------------  CHIEF COMPLAINT:  cough    SUBJECTIVE:     tolerating PO. not OOB. feels cough is bothering her the most. no other complaints. Pain control adequate.    REVIEW OF SYSTEMS:  All other review of systems is negative unless indicated above    Vital Signs Last 24 Hrs  T(C): 36.3 (10 Mar 2018 04:49), Max: 38.9 (09 Mar 2018 17:41)  T(F): 97.3 (10 Mar 2018 04:49), Max: 102.1 (09 Mar 2018 17:41)  HR: 69 (10 Mar 2018 04:49) (69 - 100)  BP: 100/52 (10 Mar 2018 04:49) (100/52 - 122/60)  BP(mean): --  RR: 20 (10 Mar 2018 04:49) (20 - 20)  SpO2: 97% (10 Mar 2018 04:49) (95% - 97%)  CAPILLARY BLOOD GLUCOSE      POCT Blood Glucose.: 137 mg/dL (10 Mar 2018 08:23)  POCT Blood Glucose.: 144 mg/dL (09 Mar 2018 21:08)  POCT Blood Glucose.: 153 mg/dL (09 Mar 2018 17:24)  POCT Blood Glucose.: 140 mg/dL (09 Mar 2018 13:11)      PHYSICAL EXAM:  Constitutional: NAD, NCAT  HEENT: EOMI, Normal Hearing, MMM, fair dentition  Neck: trachea midline, No JVD  Respiratory: Breath sounds with faint crackle, expiratory wheeze, unlabored respiration  Cardiovascular: S1 and S2, regular rate   Gastrointestinal: Bowel Sounds present, soft, nontender, nondistended  Extremities: No peripheral edema  Vascular: 2+ radial pulse  Neurological: awake, alert, follows commands, sensation grossly intact  Psych: appropriate affect,  insight  Musculoskeletal: moves all extremities  Skin: hematoma    ALLERGIES  penicillin (Rash)  penicillins (Other)  sulfa drugs (Rash; Other)      MEDICATIONS  (STANDING):  apixaban 5 milliGRAM(s) Oral every 12 hours  aspirin enteric coated 81 milliGRAM(s) Oral daily  buDESOnide   0.5 milliGRAM(s) Respule 0.5 milliGRAM(s) Inhalation every 12 hours  docusate sodium 200 milliGRAM(s) Oral at bedtime  docusate sodium 100 milliGRAM(s) Oral two times a day  ferrous    sulfate 325 milliGRAM(s) Oral daily  lactobacillus acidophilus 1 Tablet(s) Oral daily  levothyroxine 25 MICROGram(s) Oral daily  methylPREDNISolone sodium succinate Injectable 40 milliGRAM(s) IV Push two times a day  metoprolol     tartrate 25 milliGRAM(s) Oral two times a day  multivitamin 1 Tablet(s) Oral daily  pantoprazole    Tablet 40 milliGRAM(s) Oral before breakfast  potassium chloride    Tablet ER 20 milliEquivalent(s) Oral two times a day  simvastatin 40 milliGRAM(s) Oral at bedtime  torsemide 20 milliGRAM(s) Oral daily      LABS: All Labs Reviewed:                        10.2   6.07  )-----------( 191      ( 10 Mar 2018 07:27 )             32.2     03-10    136  |  97  |  11  ----------------------------<  128<H>  3.6   |  32<H>  |  0.68    Ca    8.8      10 Mar 2018 07:27            Blood Culture: 03-06 @ 22:30  Organism Escherichia coli  Gram Stain Blood -- Gram Stain --  Specimen Source .Urine Clean Catch (Midstream)  Culture-Blood --    03-06 @ 16:32  Organism --  Gram Stain Blood -- Gram Stain --  Specimen Source .Blood None  Culture-Blood --

## 2018-03-10 NOTE — PROGRESS NOTE ADULT - SUBJECTIVE AND OBJECTIVE BOX
Subjective:    Awake, alert. Cough is still non-productive. No dysuria  MEDICATIONS  (STANDING):  apixaban 5 milliGRAM(s) Oral every 12 hours  aspirin enteric coated 81 milliGRAM(s) Oral daily  buDESOnide   0.5 milliGRAM(s) Respule 0.5 milliGRAM(s) Inhalation every 12 hours  docusate sodium 200 milliGRAM(s) Oral at bedtime  docusate sodium 100 milliGRAM(s) Oral two times a day  ferrous    sulfate 325 milliGRAM(s) Oral daily  lactobacillus acidophilus 1 Tablet(s) Oral daily  levothyroxine 25 MICROGram(s) Oral daily  methylPREDNISolone sodium succinate Injectable 40 milliGRAM(s) IV Push every 8 hours  metoprolol     tartrate 25 milliGRAM(s) Oral two times a day  multivitamin 1 Tablet(s) Oral daily  pantoprazole    Tablet 40 milliGRAM(s) Oral before breakfast  potassium chloride    Tablet ER 20 milliEquivalent(s) Oral two times a day  simvastatin 40 milliGRAM(s) Oral at bedtime  torsemide 20 milliGRAM(s) Oral daily    MEDICATIONS  (PRN):  acetaminophen   Tablet 650 milliGRAM(s) Oral every 6 hours PRN Mild Pain (1 - 3)  acetaminophen   Tablet. 650 milliGRAM(s) Oral every 8 hours PRN Mild Pain (1 - 3)  acetaminophen   Tablet. 650 milliGRAM(s) Oral every 6 hours PRN Moderate Pain (4 - 6)  benzocaine 15 mG/menthol 3.6 mG Lozenge 1 Lozenge Oral every 4 hours PRN Sore Throat  dextrose Gel 1 Dose(s) Oral once PRN Blood Glucose LESS THAN 70 milliGRAM(s)/deciliter  guaiFENesin    Syrup 100 milliGRAM(s) Oral every 6 hours PRN Cough  HYDROcodone/homatropine Syrup 5 milliLiter(s) Oral every 6 hours PRN Cough  melatonin 3 milliGRAM(s) Oral at bedtime PRN Insomnia  oxyCODONE    IR 5 milliGRAM(s) Oral every 8 hours PRN Severe Pain (7 - 10)  polyethylene glycol 3350 17 Gram(s) Oral daily PRN Constipation      Allergies    penicillin (Rash)  penicillins (Other)  sulfa drugs (Rash; Other)    Intolerances        Vital Signs Last 24 Hrs  T(C): 36.3 (10 Mar 2018 04:49), Max: 38.9 (09 Mar 2018 17:41)  T(F): 97.3 (10 Mar 2018 04:49), Max: 102.1 (09 Mar 2018 17:41)  HR: 69 (10 Mar 2018 04:49) (69 - 100)  BP: 100/52 (10 Mar 2018 04:49) (100/52 - 122/60)  BP(mean): --  RR: 20 (10 Mar 2018 04:49) (20 - 20)  SpO2: 97% (10 Mar 2018 04:49) (95% - 97%)  Weight: 158.4 LBS    PHYSICAL EXAMINATION:    NECK:  Supple. No lymphadenopathy. Jugular venous pressure not elevated. Carotids equal.   HEART:   The cardiac impulse has a normal quality. Reg., Nl S1 and S2.  There are no murmurs, rubs or gallops noted  CHEST:  Chest with bilateral crackles approx 1/2 up. Scattered wheezes. Normal respiratory effort.  ABDOMEN:  Soft and nontender.   EXTREMITIES:  There is no edema. Ecchymosis is resolving      LABS:                        10.2   6.07  )-----------( 191      ( 10 Mar 2018 07:27 )             32.2     03-10    136  |  97  |  11  ----------------------------<  128<H>  3.6   |  32<H>  |  0.68    Ca    8.8      10 Mar 2018 07:27        Urinalysis Basic - ( 09 Mar 2018 10:20 )    Color: Yellow / Appearance: Clear / S.005 / pH: x  Gluc: x / Ketone: Negative  / Bili: Negative / Urobili: Negative mg/dL   Blood: x / Protein: Negative mg/dL / Nitrite: Negative   Leuk Esterase: Negative / RBC: x / WBC x   Sq Epi: x / Non Sq Epi: x / Bacteria: x        RADIOLOGY & ADDITIONAL TESTS:    Assessment and Plan:   · Assessment		  elequis re-started  c/o cough - Chest CT no infiltrate/effusion--mosaic pattern c/w fluid overload  febrile yesterday--?viral etiology. Blood/Urine Cx's--neg . ?development of nosocomial infection--repeat CXR today  Elevated BNP noted-will give  Torsemide daily  NEEDS DAILY WEIGHTS  Monitor CBC, BMP. Repeat BNP  IV steroids  Begin aerosols      Problem/Plan - 1:  ·  Problem: Dyspnea and respiratory abnormalities.     Problem/Plan - 2:  ·  Problem: Pulmonary hypertension.     Problem/Plan - 3:  ·  Problem: Afib.     Problem/Plan - 4:  ·  Problem: CAD (coronary artery disease).     Problem/Plan - 5:  ·  Problem: Diabetes mellitus type 2, controlled.     Problem/Plan - 6:  Problem: CHF (congestive heart failure).    Problem/Plan - 7:  ·  Problem: Anemia.

## 2018-03-11 LAB
CULTURE RESULTS: SIGNIFICANT CHANGE UP
CULTURE RESULTS: SIGNIFICANT CHANGE UP
HCT VFR BLD CALC: 33.2 % — LOW (ref 34.5–45)
HGB BLD-MCNC: 10.7 G/DL — LOW (ref 11.5–15.5)
MCHC RBC-ENTMCNC: 28.2 PG — SIGNIFICANT CHANGE UP (ref 27–34)
MCHC RBC-ENTMCNC: 32.2 GM/DL — SIGNIFICANT CHANGE UP (ref 32–36)
MCV RBC AUTO: 87.4 FL — SIGNIFICANT CHANGE UP (ref 80–100)
NRBC # BLD: 0 /100 WBCS — SIGNIFICANT CHANGE UP (ref 0–0)
PLATELET # BLD AUTO: 201 K/UL — SIGNIFICANT CHANGE UP (ref 150–400)
PROCALCITONIN SERPL-MCNC: <0.05 NG/ML — SIGNIFICANT CHANGE UP (ref 0–0.04)
RBC # BLD: 3.8 M/UL — SIGNIFICANT CHANGE UP (ref 3.8–5.2)
RBC # FLD: 17.7 % — HIGH (ref 10.3–14.5)
SPECIMEN SOURCE: SIGNIFICANT CHANGE UP
SPECIMEN SOURCE: SIGNIFICANT CHANGE UP
WBC # BLD: 6.16 K/UL — SIGNIFICANT CHANGE UP (ref 3.8–10.5)
WBC # FLD AUTO: 6.16 K/UL — SIGNIFICANT CHANGE UP (ref 3.8–10.5)

## 2018-03-11 PROCEDURE — 99233 SBSQ HOSP IP/OBS HIGH 50: CPT

## 2018-03-11 RX ORDER — FLUTICASONE PROPIONATE 50 MCG
2 SPRAY, SUSPENSION NASAL
Qty: 0 | Refills: 0 | Status: DISCONTINUED | OUTPATIENT
Start: 2018-03-11 | End: 2018-03-14

## 2018-03-11 RX ADMIN — Medication 0.5 MILLIGRAM(S): at 20:57

## 2018-03-11 RX ADMIN — Medication 20 MILLIGRAM(S): at 18:50

## 2018-03-11 RX ADMIN — Medication 100 MILLIGRAM(S): at 05:58

## 2018-03-11 RX ADMIN — Medication 1 TABLET(S): at 12:47

## 2018-03-11 RX ADMIN — Medication 20 MILLIEQUIVALENT(S): at 05:58

## 2018-03-11 RX ADMIN — BENZOCAINE AND MENTHOL 1 LOZENGE: 5; 1 LIQUID ORAL at 05:58

## 2018-03-11 RX ADMIN — Medication 20 MILLIEQUIVALENT(S): at 18:49

## 2018-03-11 RX ADMIN — BENZOCAINE AND MENTHOL 1 LOZENGE: 5; 1 LIQUID ORAL at 16:34

## 2018-03-11 RX ADMIN — PANTOPRAZOLE SODIUM 40 MILLIGRAM(S): 20 TABLET, DELAYED RELEASE ORAL at 05:58

## 2018-03-11 RX ADMIN — Medication 100 MILLIGRAM(S): at 18:47

## 2018-03-11 RX ADMIN — Medication 81 MILLIGRAM(S): at 12:47

## 2018-03-11 RX ADMIN — Medication 40 MILLIGRAM(S): at 18:46

## 2018-03-11 RX ADMIN — Medication 3 MILLIGRAM(S): at 22:25

## 2018-03-11 RX ADMIN — Medication 200 MILLIGRAM(S): at 22:25

## 2018-03-11 RX ADMIN — Medication 325 MILLIGRAM(S): at 12:46

## 2018-03-11 RX ADMIN — Medication 1 TABLET(S): at 12:46

## 2018-03-11 RX ADMIN — Medication 40 MILLIGRAM(S): at 05:58

## 2018-03-11 RX ADMIN — Medication 0.5 MILLIGRAM(S): at 08:29

## 2018-03-11 RX ADMIN — SIMVASTATIN 40 MILLIGRAM(S): 20 TABLET, FILM COATED ORAL at 22:25

## 2018-03-11 RX ADMIN — Medication 25 MILLIGRAM(S): at 18:48

## 2018-03-11 RX ADMIN — Medication 20 MILLIGRAM(S): at 06:01

## 2018-03-11 RX ADMIN — Medication 2 SPRAY(S): at 18:49

## 2018-03-11 RX ADMIN — Medication 25 MICROGRAM(S): at 05:58

## 2018-03-11 RX ADMIN — APIXABAN 5 MILLIGRAM(S): 2.5 TABLET, FILM COATED ORAL at 05:58

## 2018-03-11 RX ADMIN — APIXABAN 5 MILLIGRAM(S): 2.5 TABLET, FILM COATED ORAL at 18:48

## 2018-03-11 NOTE — PROGRESS NOTE ADULT - SUBJECTIVE AND OBJECTIVE BOX
HOSPITAL COURSE AND ASSESSMENT  89 yo Female with a PMH of pancreatitis, cirrhosis, DM, L knee replacement, AFIB on Eliquis presented to the ED with family with complaint of left lower extremity pain for more than 1 week. She got LE venous doppler US done as outpatient at [facility] and was reported to have LLE DVT (left distal superficial femoral vein and left posterior tibial veins).    She was admitted to the medical floor for management of LLE DVT. Her eliquis was held and heparin gtt was started. IVC filter was placed by vascular surgery. Repeat US showed no DVT. She was then transitioned to her home eliquis. Course complicated by fever, for which she was diagnosed with rhinovirus. She also had pulmonary edema that required IV the PO diuresis.    Anticipate discharge Monday, likely to SNF for continued care.      *LLE edema and hematoma  -CT LLE: NO DVT left leg  -US of the abdomen confirmed no clot on the IVC filter  -Vascular surgery appreciated- risk > benefit with retrieval of filter  -Hematology evaluation appreciated: ok to resume Eliquis   -Ortho input appreciated: normal xrays of left knee    *Sepsis due to URI/rhinovirus, not POA  -source evaluation with blood culture ngtd, UA 3/6 and 3/9 without infection, urine culture 3/6 <50K E Coli and patient asymptomatic  -CXR 3/5 and 3/7 with pulmonary edema, possible focal infiltrate 3/7  -CT chest with worsening ILD, no infiltrate  -Supportive care with antitussives, steroids (dose decreased)  -procalcitonin  -pharmacy for med review for drug fever as fever occurs at 1700  -Levaquin (PCN allergy) pending above  - ID eval appreciated: no need for antibiotics at present    *Acute on Chronic Diastolic Heart Failure with Severe Pulmonary hypertension and ILD  -Echo 10/2016 with preserved EF  -heart catheterization 2/2017 with patent stents, severe pulmonary HTN (PDE recommended at that time)  -for completeness, VQ excluded PE  -BNP 5507 last check  -Diuresis with torsemide daily, will increase to BID for 24-48 hours  - Pulm following    *Acute blood loss anemia superimposed on chronic normocytic anemia   -likely blood loss into hematoma  -Hb stable at 10  -ok to continue eliquis    *DM type II with vascular complication  - A1C 6.7  -Glucose never >200  -Given age and comorbidites, will stop accuchecks    *Atrial Fibrillation with chronic anticoagulation  - rate controlled with Metoprolol   - CHADS2=3. On eliquis for stroke risk reduction    *Hypervolemic Hyponatremia  -diuresis    *Cryptogenic Cirrhosis, Recurrent Pancreatitis, Hypothyroidism, Hyperlipidemia, GERD  - clinically stable  -home regimen    *Severe Protein Calorie Malnutrition  -Albumin 2.4 in December  -Supplement as able    VTE ppx: on Eliquis    *Advanced Care Planning  -HCP sons  -Code status should be addressed, but given family dynamics, best to be discussed as outpatient      ----------------------------------------------------------------------------------------------  CHIEF COMPLAINT:  cough    SUBJECTIVE:     tolerating PO. OOB to chair. wants ensure for a snack    REVIEW OF SYSTEMS:  All other review of systems is negative unless indicated above    Vital Signs Last 24 Hrs  T(C): 36.3 (11 Mar 2018 12:08), Max: 38.8 (10 Mar 2018 17:49)  T(F): 97.4 (11 Mar 2018 12:08), Max: 101.9 (10 Mar 2018 17:49)  HR: 83 (11 Mar 2018 12:08) (74 - 96)  BP: 112/56 (11 Mar 2018 12:08) (90/63 - 112/56)  BP(mean): --  RR: 20 (11 Mar 2018 12:08) (14 - 20)  SpO2: 94% (11 Mar 2018 12:08) (93% - 99%)  CAPILLARY BLOOD GLUCOSE          PHYSICAL EXAM:  Constitutional: NAD, NCAT  HEENT: EOMI, Normal Hearing, MMM, fair dentition  Neck: trachea midline, No JVD  Respiratory: Breath sounds are coarse, unlabored respiration  Cardiovascular: S1 and S2, regular rate   Gastrointestinal: Bowel Sounds present, soft, nontender, nondistended  Extremities: No peripheral edema  Vascular: 2+ radial pulse  Neurological: awake, alert, follows commands, sensation grossly intact  Psych: appropriate affect,  insight  Musculoskeletal: moves all extremities  Skin: No rashes    ALLERGIES  penicillin (Rash)  penicillins (Other)  sulfa drugs (Rash; Other)      MEDICATIONS  (STANDING):  apixaban 5 milliGRAM(s) Oral every 12 hours  aspirin enteric coated 81 milliGRAM(s) Oral daily  buDESOnide   0.5 milliGRAM(s) Respule 0.5 milliGRAM(s) Inhalation every 12 hours  docusate sodium 200 milliGRAM(s) Oral at bedtime  docusate sodium 100 milliGRAM(s) Oral two times a day  ferrous    sulfate 325 milliGRAM(s) Oral daily  fluticasone propionate 50 MICROgram(s)/spray Nasal Spray 2 Spray(s) Both Nostrils two times a day  lactobacillus acidophilus 1 Tablet(s) Oral daily  levothyroxine 25 MICROGram(s) Oral daily  methylPREDNISolone sodium succinate Injectable 40 milliGRAM(s) IV Push two times a day  metoprolol     tartrate 25 milliGRAM(s) Oral two times a day  multivitamin 1 Tablet(s) Oral daily  pantoprazole    Tablet 40 milliGRAM(s) Oral before breakfast  potassium chloride    Tablet ER 20 milliEquivalent(s) Oral two times a day  simvastatin 40 milliGRAM(s) Oral at bedtime  torsemide 20 milliGRAM(s) Oral two times a day      LABS: All Labs Reviewed:                        10.7   6.16  )-----------( 201      ( 11 Mar 2018 06:30 )             33.2     03-10    136  |  97  |  11  ----------------------------<  128<H>  3.6   |  32<H>  |  0.68    Ca    8.8      10 Mar 2018 07:27            Blood Culture: 03-06 @ 22:30  Organism Escherichia coli  Gram Stain Blood -- Gram Stain --  Specimen Source .Urine Clean Catch (Midstream)  Culture-Blood --    03-06 @ 16:32  Organism --  Gram Stain Blood -- Gram Stain --  Specimen Source .Blood None  Culture-Blood --

## 2018-03-11 NOTE — PROGRESS NOTE ADULT - SUBJECTIVE AND OBJECTIVE BOX
Subjective:  Awake, alert. Less cough and dyspnea. Scant white sputum    MEDICATIONS  (STANDING):  apixaban 5 milliGRAM(s) Oral every 12 hours  aspirin enteric coated 81 milliGRAM(s) Oral daily  buDESOnide   0.5 milliGRAM(s) Respule 0.5 milliGRAM(s) Inhalation every 12 hours  docusate sodium 200 milliGRAM(s) Oral at bedtime  docusate sodium 100 milliGRAM(s) Oral two times a day  ferrous    sulfate 325 milliGRAM(s) Oral daily  lactobacillus acidophilus 1 Tablet(s) Oral daily  levothyroxine 25 MICROGram(s) Oral daily  methylPREDNISolone sodium succinate Injectable 40 milliGRAM(s) IV Push two times a day  metoprolol     tartrate 25 milliGRAM(s) Oral two times a day  multivitamin 1 Tablet(s) Oral daily  pantoprazole    Tablet 40 milliGRAM(s) Oral before breakfast  potassium chloride    Tablet ER 20 milliEquivalent(s) Oral two times a day  simvastatin 40 milliGRAM(s) Oral at bedtime  torsemide 20 milliGRAM(s) Oral two times a day    MEDICATIONS  (PRN):  acetaminophen   Tablet 650 milliGRAM(s) Oral every 6 hours PRN Mild Pain (1 - 3)  acetaminophen   Tablet. 650 milliGRAM(s) Oral every 8 hours PRN Mild Pain (1 - 3)  acetaminophen   Tablet. 650 milliGRAM(s) Oral every 6 hours PRN Moderate Pain (4 - 6)  benzocaine 15 mG/menthol 3.6 mG Lozenge 1 Lozenge Oral every 4 hours PRN Sore Throat  guaiFENesin    Syrup 100 milliGRAM(s) Oral every 6 hours PRN Cough  HYDROcodone/homatropine Syrup 5 milliLiter(s) Oral every 6 hours PRN Cough  melatonin 3 milliGRAM(s) Oral at bedtime PRN Insomnia  polyethylene glycol 3350 17 Gram(s) Oral daily PRN Constipation      Allergies    penicillin (Rash)  penicillins (Other)  sulfa drugs (Rash; Other)    Intolerances        Vital Signs Last 24 Hrs  T(C): 36.3 (11 Mar 2018 05:03), Max: 38.8 (10 Mar 2018 17:49)  T(F): 97.4 (11 Mar 2018 05:03), Max: 101.9 (10 Mar 2018 17:49)  HR: 79 (11 Mar 2018 08:45) (74 - 96)  BP: 109/61 (11 Mar 2018 05:58) (90/63 - 111/48)  BP(mean): --  RR: 17 (11 Mar 2018 05:03) (14 - 20)  SpO2: 93% (11 Mar 2018 09:00) (93% - 99%)  Weight: 157 LBS    PHYSICAL EXAMINATION:    NECK:  Supple. No lymphadenopathy. Jugular venous pressure not elevated. Carotids equal.   HEART:   The cardiac impulse has a normal quality. Reg., Nl S1 and S2.  There are no rubs or gallops noted  CHEST:  Chest with bilateral crackles approx 1/2 up. Wheezes improved.  Normal respiratory effort.  ABDOMEN:  Soft and nontender.   EXTREMITIES:  There is no edema. Minimal tenderness on palpation of hematoma, ecchymosis resolving      LABS:                        10.7   6.16  )-----------( 201      ( 11 Mar 2018 06:30 )             33.2     03-10    136  |  97  |  11  ----------------------------<  128<H>  3.6   |  32<H>  |  0.68    Ca    8.8      10 Mar 2018 07:27        Urinalysis Basic - ( 09 Mar 2018 10:20 )    Color: Yellow / Appearance: Clear / S.005 / pH: x  Gluc: x / Ketone: Negative  / Bili: Negative / Urobili: Negative mg/dL   Blood: x / Protein: Negative mg/dL / Nitrite: Negative   Leuk Esterase: Negative / RBC: x / WBC x   Sq Epi: x / Non Sq Epi: x / Bacteria: x        RADIOLOGY & ADDITIONAL TESTS:  < from: Xray Chest 1 View- PORTABLE-Routine (03.10.18 @ 11:27) >  EXAM:  XR CHEST PORTABLE ROUTINE 1V                            PROCEDURE DATE:  03/10/2018          INTERPRETATION:      INDICATION: Cough and wheezing    Single frontal view of the chest compared to prior study of 3/7/2018    FINDINGS/  IMPRESSION:       The lungs are chronic appearing interstitial opacities and more focal   infiltrate/consolidation right upper lobe, superimposed acute disease   cannot be excluded. There is no pleural effusion. There is no   pneumothorax.  The cardiac silhouette is within normal limits. Status   post TAVR  There is osteopenia and degenerative changes. Extensive   degeneration of the left shoulder with periarticular dystrophic   calcification/mineralization.      Assessment and Plan:   · Assessment		  elequis re-started  c/o cough - Chest X-Ray pattern c/w fluid overload and possible infiltrate on right  febrile yesterday and daily--?viral etiology, ?development of nosocomial infection  Elevated BNP noted-will give  Torsemide BID  NEEDS DAILY WEIGHTS  Monitor CBC, BMP. Repeat BNP  IV steroids  Begin aerosols  D/W Dr. Dunlap--will begin Antibiotics due to persistence of fevers      Problem/Plan - 1:  ·  Problem: Dyspnea and respiratory abnormalities.     Problem/Plan - 2:  ·  Problem: Pulmonary hypertension.     Problem/Plan - 3:  ·  Problem: Afib.     Problem/Plan - 4:  ·  Problem: CAD (coronary artery disease).     Problem/Plan - 5:  ·  Problem: Diabetes mellitus type 2, controlled.     Problem/Plan - 6:  Problem: CHF (congestive heart failure).    Problem/Plan - 7:  ·  Problem: Anemia. no

## 2018-03-12 LAB
ANION GAP SERPL CALC-SCNC: 9 MMOL/L — SIGNIFICANT CHANGE UP (ref 5–17)
BUN SERPL-MCNC: 18 MG/DL — SIGNIFICANT CHANGE UP (ref 7–23)
CALCIUM SERPL-MCNC: 9 MG/DL — SIGNIFICANT CHANGE UP (ref 8.5–10.1)
CHLORIDE SERPL-SCNC: 97 MMOL/L — SIGNIFICANT CHANGE UP (ref 96–108)
CO2 SERPL-SCNC: 28 MMOL/L — SIGNIFICANT CHANGE UP (ref 22–31)
CREAT SERPL-MCNC: 0.86 MG/DL — SIGNIFICANT CHANGE UP (ref 0.5–1.3)
GLUCOSE SERPL-MCNC: 244 MG/DL — HIGH (ref 70–99)
HCT VFR BLD CALC: 34.8 % — SIGNIFICANT CHANGE UP (ref 34.5–45)
HGB BLD-MCNC: 11 G/DL — LOW (ref 11.5–15.5)
MCHC RBC-ENTMCNC: 28.1 PG — SIGNIFICANT CHANGE UP (ref 27–34)
MCHC RBC-ENTMCNC: 31.6 GM/DL — LOW (ref 32–36)
MCV RBC AUTO: 89 FL — SIGNIFICANT CHANGE UP (ref 80–100)
MRSA PCR RESULT.: SIGNIFICANT CHANGE UP
NRBC # BLD: 0 /100 WBCS — SIGNIFICANT CHANGE UP (ref 0–0)
PLATELET # BLD AUTO: 234 K/UL — SIGNIFICANT CHANGE UP (ref 150–400)
POTASSIUM SERPL-MCNC: 4.2 MMOL/L — SIGNIFICANT CHANGE UP (ref 3.5–5.3)
POTASSIUM SERPL-SCNC: 4.2 MMOL/L — SIGNIFICANT CHANGE UP (ref 3.5–5.3)
RBC # BLD: 3.91 M/UL — SIGNIFICANT CHANGE UP (ref 3.8–5.2)
RBC # FLD: 17.7 % — HIGH (ref 10.3–14.5)
S AUREUS DNA NOSE QL NAA+PROBE: SIGNIFICANT CHANGE UP
SODIUM SERPL-SCNC: 134 MMOL/L — LOW (ref 135–145)
WBC # BLD: 9.6 K/UL — SIGNIFICANT CHANGE UP (ref 3.8–10.5)
WBC # FLD AUTO: 9.6 K/UL — SIGNIFICANT CHANGE UP (ref 3.8–10.5)

## 2018-03-12 PROCEDURE — 99233 SBSQ HOSP IP/OBS HIGH 50: CPT

## 2018-03-12 RX ORDER — MULTIVIT WITH MIN/MFOLATE/K2 340-15/3 G
240 POWDER (GRAM) ORAL ONCE
Qty: 0 | Refills: 0 | Status: COMPLETED | OUTPATIENT
Start: 2018-03-12 | End: 2018-03-12

## 2018-03-12 RX ORDER — PHENYLEPHRINE-SHARK LIVER OIL-MINERAL OIL-PETROLATUM RECTAL OINTMENT
1 OINTMENT (GRAM) RECTAL
Qty: 0 | Refills: 0 | Status: DISCONTINUED | OUTPATIENT
Start: 2018-03-12 | End: 2018-03-14

## 2018-03-12 RX ADMIN — Medication 40 MILLIGRAM(S): at 05:49

## 2018-03-12 RX ADMIN — Medication 20 MILLIEQUIVALENT(S): at 05:48

## 2018-03-12 RX ADMIN — Medication 20 MILLIGRAM(S): at 17:30

## 2018-03-12 RX ADMIN — PHENYLEPHRINE-SHARK LIVER OIL-MINERAL OIL-PETROLATUM RECTAL OINTMENT 1 APPLICATION(S): at 21:59

## 2018-03-12 RX ADMIN — Medication 2 SPRAY(S): at 05:49

## 2018-03-12 RX ADMIN — PHENYLEPHRINE-SHARK LIVER OIL-MINERAL OIL-PETROLATUM RECTAL OINTMENT 1 APPLICATION(S): at 15:48

## 2018-03-12 RX ADMIN — PANTOPRAZOLE SODIUM 40 MILLIGRAM(S): 20 TABLET, DELAYED RELEASE ORAL at 05:48

## 2018-03-12 RX ADMIN — Medication 20 MILLIEQUIVALENT(S): at 17:30

## 2018-03-12 RX ADMIN — Medication 2 SPRAY(S): at 17:29

## 2018-03-12 RX ADMIN — Medication 100 MILLIGRAM(S): at 17:30

## 2018-03-12 RX ADMIN — Medication 100 MILLIGRAM(S): at 05:48

## 2018-03-12 RX ADMIN — Medication 1 TABLET(S): at 11:49

## 2018-03-12 RX ADMIN — APIXABAN 5 MILLIGRAM(S): 2.5 TABLET, FILM COATED ORAL at 17:30

## 2018-03-12 RX ADMIN — Medication 325 MILLIGRAM(S): at 11:49

## 2018-03-12 RX ADMIN — Medication 0.5 MILLIGRAM(S): at 21:41

## 2018-03-12 RX ADMIN — Medication 20 MILLIGRAM(S): at 05:48

## 2018-03-12 RX ADMIN — Medication 240 MILLILITER(S): at 10:41

## 2018-03-12 RX ADMIN — Medication 0.5 MILLIGRAM(S): at 07:47

## 2018-03-12 RX ADMIN — SIMVASTATIN 40 MILLIGRAM(S): 20 TABLET, FILM COATED ORAL at 22:00

## 2018-03-12 RX ADMIN — Medication 200 MILLIGRAM(S): at 22:00

## 2018-03-12 RX ADMIN — Medication 25 MICROGRAM(S): at 05:48

## 2018-03-12 RX ADMIN — APIXABAN 5 MILLIGRAM(S): 2.5 TABLET, FILM COATED ORAL at 05:48

## 2018-03-12 RX ADMIN — Medication 25 MILLIGRAM(S): at 17:32

## 2018-03-12 RX ADMIN — Medication 81 MILLIGRAM(S): at 11:49

## 2018-03-12 RX ADMIN — Medication 20 MILLIGRAM(S): at 17:32

## 2018-03-12 NOTE — PROGRESS NOTE ADULT - ASSESSMENT
89 yo Female with a PMHx of pancreatitis, cirrhosis, DM, L knee replacement, AFIB on Eliquis presented to the ED with family with complain of left lower extremity pain for more than 1 week. She got LE venous doppler US done as outpatient at [facility] and was confirmed to have LLE DVT (left distal superficial femoral vein and left posterior tibial veins). She stopped Eliquis recently and had missed x3 dosages. Here, she had IVC filter placed on 3/6, was taken off AC, US LLE/CT LLE no evidence of DVT, V/Q scan negative for PE, she was noted to have fever to 101 3/6 and low grade temp 3/7, reports cough with clear phlegm and some sore throat. No abd pain, no diarrhea, no dysuria or rashes.     1. fevers/viral syndrome with entero/rhinovirus/CHF/ILD/L calf hematoma  - afebrile since 3/10, improving  - RVP positive for entero/rhinovirus   - noted with L calf hematoma  - CT chest with interstitial lung disease, no obvious PNA UA (-)  - xray 3/10 reviewed  - procalcitonin (-)   - recommend monitor off antibiotics for now  - f/u cbc  - monitor temps  - supportive care    2. other issues - care per medicine
89 yo Female with a PMHx of pancreatitis, cirrhosis, DM, R knee replacement, AFIB on Eliquis presented to the ED with family with complain of left lower extremity pain for more than 1 week. She got LE venous doppler US done as outpatient at [facility] and was confirmed to have LLE DVT (left distal superficial femoral vein and left posterior tibial veins).    A/P:    # LLE Acute DVT while on Eliquis s/p IVC filter this AM  - AC failure? Trauma?   - restart heparin ggt no bolus at 4PM.  - Vascular surgery appreciated   - Hematology to evaluate    # Pulmonary hypertension  - ?CTEP  - Obtain VQ scan  - Pulm consulted    # DM  - Scale + BGM    # h/o A fib  - rate controlled . continue Metoprolol   - Was on eliquis- on hold presently.  Heparin ggt for now pending AC plan.    # h/o Cirrhosis of unclear etio  - clinically stable    # Hypothyroidism  -continue Levothyroxine    # Hyperlipidemia  - continue statin     # GERD  - on PPI    # h/o CHF  -stable now  -continue Lasix   -follow Is and Os     VTE ppx: s/p IVC fx and on hep ggt
89 yo Female with a PMHx of pancreatitis, cirrhosis, DM, R knee replacement, AFIB on Eliquis presented to the ED with family with complain of left lower extremity pain for more than 1 week. She got LE venous doppler US done as outpatient at [facility] and was confirmed to have LLE DVT (left distal superficial femoral vein and left posterior tibial veins). She is now s/p IVC filter placement.    A/P:    # LLE Acute DVT while on Eliquis s/p IVC filter this AM  - AC failure? Trauma? Malignancy?  - continue heparin ggt  - Vascular surgery appreciated   - Hematology evaluation appreicated  - MRI C/A/P w IV con was ordered however is cancelled per pt request.  Will discuss with patients son regarding CT C/A/P w con for malignancy screen.  - AC planning?    # Pulmonary hypertension  - VQ done to eval for CTEP was negative  - Pulm following    # Acute on chronic anemia likely a/w leg hematoma  - Will monitor H/H maame while on hep ggt  - c/o LLE pain which is being attributed to dvt. Will monitor compartment tension and bruising.    # DM  - Scale + BGM    # h/o A fib  - rate controlled . continue Metoprolol   - Was on eliquis- on hold presently.  Heparin ggt for now pending AC plan.    # h/o Cirrhosis of unclear etio  - clinically stable    # Hypothyroidism  -continue Levothyroxine    # Hyperlipidemia  - continue statin     # GERD  - on PPI    # h/o CHF  -stable now  -continue Lasix   -follow Is and Os     VTE ppx: s/p IVC fx and on hep ggt
89 yo Female with a PMHx of pancreatitis, cirrhosis, DM, L knee replacement, AFIB on Eliquis presented to the ED with family with complain of left lower extremity pain for more than 1 week. She got LE venous doppler US done as outpatient at [facility] and was confirmed to have LLE DVT (left distal superficial femoral vein and left posterior tibial veins). She stopped Eliquis recently and had missed x3 dosages. Here, she had IVC filter placed on 3/6, was taken off AC, US LLE/CT LLE no evidence of DVT, V/Q scan negative for PE, she was noted to have fever to 101 3/6 and low grade temp 3/7, reports cough with clear phlegm and some sore throat. No abd pain, no diarrhea, no dysuria or rashes.     1. fevers/viral syndrome with entero/rhinovirus/L calf hematoma  - RVP positive for entero/rhinovirus - etiology for fevers/upper resp symptoms  - noted with L calf hematoma  - s/p IVC filter 3/6  - her US and CT with no evidence of DVT here  - restarted on ac  - CT chest with interstitial lung disease, elevated BNP, started on diuresis, UA (-)  - monitor off antibiotics for now  - f/u cbc  - monitor temps  - supportive care    2. other issues - care per medicine
89 yo Female with a PMHx of pancreatitis, cirrhosis, DM, L knee replacement, AFIB on Eliquis presented to the ED with family with complain of left lower extremity pain for more than 1 week. She got LE venous doppler US done as outpatient at [facility] and was confirmed to have LLE DVT (left distal superficial femoral vein and left posterior tibial veins). She is now s/p IVC filter placement.    A/P:    # LLE Acute DVT while on Eliquis s/p IVC filter this AM  - AC failure? Trauma? Malignancy?  - continue heparin ggt  - Vascular surgery appreciated   - Hematology evaluation appreicated  - MRI C/A/P w IV con was ordered however is cancelled per pt request.  Defer decision for further imaging to onc/pulm/family. Per report had recent C/A/P imaging which was unrevealing  - AC planning?    # Fever unclear etio at present possibly related to dvt? no clear infectious source presently.   - XR done 3/5 without obvious consolidation.  - send bcx x 2, UA  - ID eval  - will monitor off abx for now    # LLE pain - out of proportion to what would be expected for DVT  - Will monitor compartment tension and bruising.  - F/u LLE CTV and US  - pain control    # Pulmonary hypertension  - VQ done to eval for CTEP was negative  - Pulm following    # Acute on chronic anemia likely a/w leg hematoma  - Will monitor H/H maame while on hep ggt    # DM  - Scale + BGM    # h/o A fib  - rate controlled . continue Metoprolol   - Was on eliquis- on hold presently.  Heparin ggt for now pending AC plan.    # h/o Cirrhosis of unclear etio  - clinically stable    # Hypothyroidism  -continue Levothyroxine    # Hyperlipidemia  - continue statin     # GERD  - on PPI    # h/o CHF  - stable now  - cw home regimen diuretics  - follow Is and Os     VTE ppx: s/p IVC fx and on hep ggt
89 yo Female with a PMHx of pancreatitis, cirrhosis, DM, R knee replacement, AFIB on Eliquis presented to the ED with family with complain of left lower extremity pain for more than 1 week. She got LE venous doppler US done as outpatient at [facility] and was confirmed to have LLE DVT (left distal superficial femoral vein and left posterior tibial veins). She is now s/p IVC filter placement.    A/P:   LLE Acute DVT while on Eliquis s/p IVC filter this AM  - etiology unclear.   - rheum service was asked to see pt to rule out vasculitis, her labs are WNL she is otherwise seronegative  - on last admission pt was r/o for Igg4 related disease which can cause pancreatitis  - please recall as needed
no evidence of dvt  still on heparin  would consider restart elequis  c/o cough - repeat CXR today  remains afebrile

## 2018-03-12 NOTE — PROGRESS NOTE ADULT - SUBJECTIVE AND OBJECTIVE BOX
Subjective:    Awake, alert. Cough is improved slightly. Mild BONDS    MEDICATIONS  (STANDING):  apixaban 5 milliGRAM(s) Oral every 12 hours  aspirin enteric coated 81 milliGRAM(s) Oral daily  buDESOnide   0.5 milliGRAM(s) Respule 0.5 milliGRAM(s) Inhalation every 12 hours  docusate sodium 200 milliGRAM(s) Oral at bedtime  docusate sodium 100 milliGRAM(s) Oral two times a day  ferrous    sulfate 325 milliGRAM(s) Oral daily  fluticasone propionate 50 MICROgram(s)/spray Nasal Spray 2 Spray(s) Both Nostrils two times a day  lactobacillus acidophilus 1 Tablet(s) Oral daily  levoFLOXacin  Tablet 750 milliGRAM(s) Oral every 24 hours  levothyroxine 25 MICROGram(s) Oral daily  methylPREDNISolone sodium succinate Injectable 40 milliGRAM(s) IV Push two times a day  metoprolol     tartrate 25 milliGRAM(s) Oral two times a day  multivitamin 1 Tablet(s) Oral daily  pantoprazole    Tablet 40 milliGRAM(s) Oral before breakfast  potassium chloride    Tablet ER 20 milliEquivalent(s) Oral two times a day  simvastatin 40 milliGRAM(s) Oral at bedtime  torsemide 20 milliGRAM(s) Oral two times a day    MEDICATIONS  (PRN):  acetaminophen   Tablet 650 milliGRAM(s) Oral every 6 hours PRN Mild Pain (1 - 3)  acetaminophen   Tablet. 650 milliGRAM(s) Oral every 8 hours PRN Mild Pain (1 - 3)  acetaminophen   Tablet. 650 milliGRAM(s) Oral every 6 hours PRN Moderate Pain (4 - 6)  benzocaine 15 mG/menthol 3.6 mG Lozenge 1 Lozenge Oral every 4 hours PRN Sore Throat  guaiFENesin    Syrup 100 milliGRAM(s) Oral every 6 hours PRN Cough  HYDROcodone/homatropine Syrup 5 milliLiter(s) Oral every 6 hours PRN Cough  melatonin 3 milliGRAM(s) Oral at bedtime PRN Insomnia  polyethylene glycol 3350 17 Gram(s) Oral daily PRN Constipation      Allergies    penicillin (Rash)  penicillins (Other)  sulfa drugs (Rash; Other)    Intolerances        Vital Signs Last 24 Hrs  T(C): 36.3 (12 Mar 2018 04:40), Max: 36.6 (11 Mar 2018 22:32)  T(F): 97.4 (12 Mar 2018 04:40), Max: 97.8 (11 Mar 2018 22:32)  HR: 60 (12 Mar 2018 07:47) (60 - 96)  BP: 108/63 (12 Mar 2018 04:40) (106/54 - 117/59)  BP(mean): --  RR: 16 (12 Mar 2018 07:49) (15 - 20)  SpO2: 96% (12 Mar 2018 07:49) (90% - 99%)  Weight: 162LBS    PHYSICAL EXAMINATION:    NECK:  Supple. No lymphadenopathy. Jugular venous pressure not elevated. Carotids equal.   HEART:   The cardiac impulse has a normal quality. Reg., Nl S1 and S2.  There are no rubs or gallops noted  CHEST:  Chest with bilateral crackles approx 1/2 up. Normal respiratory effort.  ABDOMEN:  Soft and nontender.   EXTREMITIES:  There is no edema. Chronic changes. Decreased pain on palpation of left calf      LABS:                        11.0   9.60  )-----------( 234      ( 12 Mar 2018 07:07 )             34.8                 RADIOLOGY & ADDITIONAL TESTS:    Assessment and Plan:   · Assessment		  elequis re-started  c/o cough - Chest X-Ray pattern c/w fluid overload and possible infiltrate on right  afebrile yesterday   Elevated BNP noted-will give  Torsemide BID  NEEDS DAILY WEIGHTS  Monitor CBC, BMP--today  IV steroids--continue, but taper to 20mg Q12  Begin aerosols  D/W Dr. Dunlap--maintain Antibiotics, but consider switch from Quinolones  Increase activity as pancho       Problem/Plan - 1:  ·  Problem: Dyspnea and respiratory abnormalities.     Problem/Plan - 2:  ·  Problem: Pulmonary hypertension.     Problem/Plan - 3:  ·  Problem: Afib.     Problem/Plan - 4:  ·  Problem: CAD (coronary artery disease).     Problem/Plan - 5:  ·  Problem: Diabetes mellitus type 2, controlled.     Problem/Plan - 6:  Problem: CHF (congestive heart failure).    Problem/Plan - 7:  ·  Problem: Anemia.

## 2018-03-12 NOTE — PROGRESS NOTE ADULT - SUBJECTIVE AND OBJECTIVE BOX
HOSPITAL COURSE AND ASSESSMENT  89 yo Female with a PMH of pancreatitis, cirrhosis, DM, L knee replacement, AFIB on Eliquis presented to the ED with family with complaint of left lower extremity pain for more than 1 week. She got LE venous doppler US done as outpatient at [facility] and was reported to have LLE DVT (left distal superficial femoral vein and left posterior tibial veins).    She was admitted to the medical floor for management of LLE DVT. Her eliquis was held and heparin gtt was started. IVC filter was placed by vascular surgery. Repeat US showed no DVT. She was then transitioned to her home eliquis. Course complicated by fever, for which she was diagnosed with rhinovirus. She also had pulmonary edema that required IV the PO diuresis. Her IV steroids were weaned. Her main concern was her persistent cough, which mildly improved with antitussives.    Anticipate discharge Wednesday, to home if she continues to improve and remains stable on transition to PO steroids.  Called Dr Dill 3/12 and left message. Discussed with Willie Camacho, Norm, and Mila as well      *LLE edema and hematoma  -CT LLE: NO DVT left leg  -US of the abdomen confirmed no clot on the IVC filter  -Vascular surgery appreciated- risk > benefit with retrieval of filter  -Hematology evaluation appreciated: ok to resume Eliquis   -Ortho input appreciated: normal xrays of left knee    *Sepsis due to URI/rhinovirus, not POA  -source evaluation with blood culture NGTD, UA 3/6 and 3/9 without infection, urine culture 3/6 <50K E Coli and patient asymptomatic  -CXR 3/5 and 3/7 with pulmonary edema, possible focal infiltrate 3/7  -CT chest with worsening ILD, no infiltrate  -Supportive care with antitussives, steroids (dose decreased per pulmonary)  -procalcitonin WNL  -pharmacy for med review for drug fever as fever occurs at 1700  -Levaquin (PCN allergy) pending procalcitonin, now discontinued  - ID eval appreciated: no need for antibiotics at present    *Acute on Chronic Diastolic Heart Failure with Severe Pulmonary hypertension and ILD  -Echo 10/2016 with preserved EF  -heart catheterization 2/2017 with patent stents, severe pulmonary HTN (PDE recommended at that time)  -for completeness, VQ excluded PE  -BNP 5507 last check, repeat in AM  -Diuresis with torsemide daily, will increase to BID for symptomatic relief  - Pulm following    *Acute blood loss anemia superimposed on chronic normocytic anemia   -likely blood loss into hematoma  -Hb stable at 10  -ok to continue eliquis    *DM type II with vascular complication  - A1C 6.7  -Glucose never consistently >200  -Given age and comorbidites, will stop accuchecks    *Atrial Fibrillation with chronic anticoagulation  - rate controlled with Metoprolol   - CHADS2=3. On eliquis for stroke risk reduction    *Hypervolemic Hyponatremia  -diuresis    *Cryptogenic Cirrhosis, Recurrent Pancreatitis, Hypothyroidism, Hyperlipidemia, GERD  - clinically stable  -home regimen    *Severe Protein Calorie Malnutrition  -Albumin 2.4 in December  -Supplement as able    VTE ppx: on Eliquis    *Advanced Care Planning  -HCP sons  -Code status should be addressed, but given family dynamics, best to be discussed as outpatient      ----------------------------------------------------------------------------------------------  CHIEF COMPLAINT:  cough    SUBJECTIVE:     tolerating PO. OOB. discussing cooking and stuffed pepper recipes. Feels well. no oxygen required today and ambulated to bathroom.    REVIEW OF SYSTEMS:  All other review of systems is negative unless indicated above    Vital Signs Last 24 Hrs  T(C): 36.5 (12 Mar 2018 12:12), Max: 36.6 (11 Mar 2018 22:32)  T(F): 97.7 (12 Mar 2018 12:12), Max: 97.8 (11 Mar 2018 22:32)  HR: 77 (12 Mar 2018 12:12) (60 - 96)  BP: 105/72 (12 Mar 2018 12:12) (105/72 - 117/59)  BP(mean): --  RR: 20 (12 Mar 2018 12:12) (15 - 20)  SpO2: 93% (12 Mar 2018 12:12) (90% - 99%)  CAPILLARY BLOOD GLUCOSE          PHYSICAL EXAM:  Constitutional: NAD, NCAT, frail  HEENT: EOMI, Normal Hearing, MMM, fair dentition  Neck: trachea midline, No JVD  Respiratory: Breath sounds are crackles, unlabored respiration  Cardiovascular: S1 and S2, regular rate   Gastrointestinal: Bowel Sounds present, soft, nontender, nondistended  Extremities: No peripheral edema  Vascular: 2+ radial pulse  Neurological: awake, alert, follows commands, sensation grossly intact  Psych: appropriate affect,  insight  Musculoskeletal: moves all extremities  Skin: No rashes    ALLERGIES  penicillin (Rash)  penicillins (Other)  sulfa drugs (Rash; Other)      MEDICATIONS  (STANDING):  apixaban 5 milliGRAM(s) Oral every 12 hours  aspirin enteric coated 81 milliGRAM(s) Oral daily  buDESOnide   0.5 milliGRAM(s) Respule 0.5 milliGRAM(s) Inhalation every 12 hours  docusate sodium 200 milliGRAM(s) Oral at bedtime  docusate sodium 100 milliGRAM(s) Oral two times a day  ferrous    sulfate 325 milliGRAM(s) Oral daily  fluticasone propionate 50 MICROgram(s)/spray Nasal Spray 2 Spray(s) Both Nostrils two times a day  lactobacillus acidophilus 1 Tablet(s) Oral daily  levothyroxine 25 MICROGram(s) Oral daily  methylPREDNISolone sodium succinate Injectable 20 milliGRAM(s) IV Push every 12 hours  metoprolol     tartrate 25 milliGRAM(s) Oral two times a day  multivitamin 1 Tablet(s) Oral daily  pantoprazole    Tablet 40 milliGRAM(s) Oral before breakfast  potassium chloride    Tablet ER 20 milliEquivalent(s) Oral two times a day  simvastatin 40 milliGRAM(s) Oral at bedtime  torsemide 20 milliGRAM(s) Oral two times a day      LABS: All Labs Reviewed:                        11.0   9.60  )-----------( 234      ( 12 Mar 2018 07:07 )             34.8     03-12    134<L>  |  97  |  18  ----------------------------<  244<H>  4.2   |  28  |  0.86    Ca    9.0      12 Mar 2018 09:21            Blood Culture: HOSPITAL COURSE AND ASSESSMENT  89 yo Female with a PMH of pancreatitis, cirrhosis, DM, L knee replacement, AFIB on Eliquis presented to the ED with family with complaint of left lower extremity pain for more than 1 week. She got LE venous doppler US done as outpatient at [facility] and was reported to have LLE DVT (left distal superficial femoral vein and left posterior tibial veins).    She was admitted to the medical floor for management of LLE DVT. Her eliquis was held and heparin gtt was started. IVC filter was placed by vascular surgery. Repeat US showed no DVT. She was then transitioned to her home eliquis. Course complicated by fever, for which she was diagnosed with rhinovirus. She also had pulmonary edema that required IV the PO diuresis. Her IV steroids were weaned. Her main concern was her persistent cough, which mildly improved with antitussives.    Anticipate discharge Wednesday, to home if she continues to improve and remains stable on transition to PO steroids.  Called Dr Dill 3/12 and left message. Discussed with Willie Camacho, Norm, and Mila as well      *LLE edema and hematoma  -CT LLE: NO DVT left leg  -US of the abdomen confirmed no clot on the IVC filter  -Vascular surgery appreciated- risk > benefit with retrieval of filter  -Hematology evaluation appreciated: ok to resume Eliquis   -Ortho input appreciated: normal xrays of left knee    *Sepsis due to URI/rhinovirus, not POA  -source evaluation with blood culture NGTD, UA 3/6 and 3/9 without infection, urine culture 3/6 <50K E Coli and patient asymptomatic  -CXR 3/5 and 3/7 with pulmonary edema, possible focal infiltrate 3/7  -CT chest with worsening ILD, no infiltrate  -Supportive care with antitussives, steroids (dose decreased per pulmonary)  -procalcitonin WNL  -pharmacy for med review for drug fever as fever occurs at 1700  -Levaquin (PCN allergy) pending procalcitonin, now discontinued  - ID eval appreciated: no need for antibiotics at present    *Acute on Chronic Diastolic Heart Failure with Severe Pulmonary hypertension and ILD  -Echo 10/2016 with preserved EF  -heart catheterization 2/2017 with patent stents, severe pulmonary HTN (PDE recommended at that time)  -for completeness, VQ excluded PE  -BNP 5507 last check, repeat in AM  -Diuresis with torsemide daily, will increase to BID for symptomatic relief  - Pulm following    *Acute blood loss anemia superimposed on chronic normocytic anemia   -likely blood loss into hematoma  -Hb stable at 10  -ok to continue eliquis    *DM type II with vascular complication  - A1C 6.7  -Glucose never consistently >200  -Given age and comorbidites, will stop accuchecks    *Atrial Fibrillation with chronic anticoagulation  - rate controlled with Metoprolol   - CHADS2=3. On eliquis for stroke risk reduction    *Hypervolemic Hyponatremia  -diuresis    *Cryptogenic Cirrhosis, Recurrent Pancreatitis, Hypothyroidism, Hyperlipidemia, GERD  - clinically stable  -home regimen    *Constipation with known hemorrhoids  -pt requests Magnesium citrate and preperation H  -BM with above regimen    *Severe Protein Calorie Malnutrition  -Albumin 2.4 in December  -Supplement as able    VTE ppx: on Eliquis    *Advanced Care Planning  -HCP sons  -Code status should be addressed, but given family dynamics, best to be discussed as outpatient      ----------------------------------------------------------------------------------------------  CHIEF COMPLAINT:  cough    SUBJECTIVE:     tolerating PO. OOB. discussing cooking and stuffed pepper recipes. Feels well. no oxygen required today and ambulated to bathroom.    REVIEW OF SYSTEMS:  All other review of systems is negative unless indicated above    Vital Signs Last 24 Hrs  T(C): 36.5 (12 Mar 2018 12:12), Max: 36.6 (11 Mar 2018 22:32)  T(F): 97.7 (12 Mar 2018 12:12), Max: 97.8 (11 Mar 2018 22:32)  HR: 77 (12 Mar 2018 12:12) (60 - 96)  BP: 105/72 (12 Mar 2018 12:12) (105/72 - 117/59)  BP(mean): --  RR: 20 (12 Mar 2018 12:12) (15 - 20)  SpO2: 93% (12 Mar 2018 12:12) (90% - 99%)  CAPILLARY BLOOD GLUCOSE          PHYSICAL EXAM:  Constitutional: NAD, NCAT, frail  HEENT: EOMI, Normal Hearing, MMM, fair dentition  Neck: trachea midline, No JVD  Respiratory: Breath sounds are crackles, unlabored respiration  Cardiovascular: S1 and S2, regular rate   Gastrointestinal: Bowel Sounds present, soft, nontender, nondistended  Extremities: No peripheral edema  Vascular: 2+ radial pulse  Neurological: awake, alert, follows commands, sensation grossly intact  Psych: appropriate affect,  insight  Musculoskeletal: moves all extremities  Skin: No rashes    ALLERGIES  penicillin (Rash)  penicillins (Other)  sulfa drugs (Rash; Other)      MEDICATIONS  (STANDING):  apixaban 5 milliGRAM(s) Oral every 12 hours  aspirin enteric coated 81 milliGRAM(s) Oral daily  buDESOnide   0.5 milliGRAM(s) Respule 0.5 milliGRAM(s) Inhalation every 12 hours  docusate sodium 200 milliGRAM(s) Oral at bedtime  docusate sodium 100 milliGRAM(s) Oral two times a day  ferrous    sulfate 325 milliGRAM(s) Oral daily  fluticasone propionate 50 MICROgram(s)/spray Nasal Spray 2 Spray(s) Both Nostrils two times a day  lactobacillus acidophilus 1 Tablet(s) Oral daily  levothyroxine 25 MICROGram(s) Oral daily  methylPREDNISolone sodium succinate Injectable 20 milliGRAM(s) IV Push every 12 hours  metoprolol     tartrate 25 milliGRAM(s) Oral two times a day  multivitamin 1 Tablet(s) Oral daily  pantoprazole    Tablet 40 milliGRAM(s) Oral before breakfast  potassium chloride    Tablet ER 20 milliEquivalent(s) Oral two times a day  simvastatin 40 milliGRAM(s) Oral at bedtime  torsemide 20 milliGRAM(s) Oral two times a day      LABS: All Labs Reviewed:                        11.0   9.60  )-----------( 234      ( 12 Mar 2018 07:07 )             34.8     03-12    134<L>  |  97  |  18  ----------------------------<  244<H>  4.2   |  28  |  0.86    Ca    9.0      12 Mar 2018 09:21            Blood Culture:

## 2018-03-12 NOTE — PROGRESS NOTE ADULT - SUBJECTIVE AND OBJECTIVE BOX
89 yo Female with a PMHx of pancreatitis, cirrhosis, DM, L knee replacement, AFIB on Eliquis presented to the ED with family with complain of left lower extremity pain for more than 1 week. She got LE venous doppler US done as outpatient at [facility] and was confirmed to have LLE DVT (left distal superficial femoral vein and left posterior tibial veins). She stopped Eliquis recently and had missed x3 dosages. Here, she had IVC filter placed on 3/6, was taken off AC, US LLE/CT LLE no evidence of DVT, V/Q scan negative for PE, she was noted to have fever to 101 3/6 and low grade temp 3/7, reports cough with clear phlegm and some sore throat. No abd pain, no diarrhea, no dysuria or rashes.     no fevers since 3/10  OOB to chair  less cough  no pain    Date of Service: 03-12-18 @ 12:28      MEDICATIONS  (STANDING):  apixaban 5 milliGRAM(s) Oral every 12 hours  aspirin enteric coated 81 milliGRAM(s) Oral daily  buDESOnide   0.5 milliGRAM(s) Respule 0.5 milliGRAM(s) Inhalation every 12 hours  docusate sodium 200 milliGRAM(s) Oral at bedtime  docusate sodium 100 milliGRAM(s) Oral two times a day  ferrous    sulfate 325 milliGRAM(s) Oral daily  fluticasone propionate 50 MICROgram(s)/spray Nasal Spray 2 Spray(s) Both Nostrils two times a day  lactobacillus acidophilus 1 Tablet(s) Oral daily  levothyroxine 25 MICROGram(s) Oral daily  methylPREDNISolone sodium succinate Injectable 20 milliGRAM(s) IV Push every 12 hours  metoprolol     tartrate 25 milliGRAM(s) Oral two times a day  multivitamin 1 Tablet(s) Oral daily  pantoprazole    Tablet 40 milliGRAM(s) Oral before breakfast  potassium chloride    Tablet ER 20 milliEquivalent(s) Oral two times a day  simvastatin 40 milliGRAM(s) Oral at bedtime  torsemide 20 milliGRAM(s) Oral two times a day      Vital Signs Last 24 Hrs  T(C): 36.9 (12 Mar 2018 16:23), Max: 36.9 (12 Mar 2018 16:23)  T(F): 98.4 (12 Mar 2018 16:23), Max: 98.4 (12 Mar 2018 16:23)  HR: 78 (12 Mar 2018 16:23) (60 - 84)  BP: 114/53 (12 Mar 2018 16:23) (105/72 - 117/59)  BP(mean): --  RR: 18 (12 Mar 2018 16:23) (16 - 20)  SpO2: 95% (12 Mar 2018 16:23) (90% - 98%)        PE:  Constitutional: frail looking  HEENT: NC/AT, EOMI, PERRLA  Neck: supple  Back: no tenderness  Respiratory: b/l crackles along bases  Cardiovascular: S1S2 regular, no murmurs  Abdomen: soft, not tender, not distended, positive BS  Genitourinary: deferred  Rectal: deferred  Musculoskeletal: no muscle tenderness  Extremities: LLL edema, ecchymosis, bruising along posterior calf, tenderness  Neurological:  no focal deficits  Skin: no rashes    Labs:                        11.0   9.60  )-----------( 234      ( 12 Mar 2018 07:07 )             34.8     03-12    134<L>  |  97  |  18  ----------------------------<  244<H>  4.2   |  28  |  0.86    Ca    9.0      12 Mar 2018 09:21        RVP positive for entero/rhinovirus    Culture - Urine (03.06.18 @ 22:30)    -  Amikacin: S <=8    -  Ampicillin: S 4    -  Ampicillin/Sulbactam: S <=4/2    -  Aztreonam: S <=4    -  Cefazolin: S <=2    -  Cefepime: S <=2    -  Cefoxitin: S <=4    -  Ceftazidime: S <=1    -  Ceftriaxone: S <=1    -  Ciprofloxacin: S <=0.5    -  Ertapenem: S <=0.5    -  Gentamicin: S <=1    -  Imipenem: S <=1    -  Levofloxacin: S <=1    -  Meropenem: S <=1    -  Nitrofurantoin: S <=32    -  Piperacillin/Tazobactam: S <=8    -  Tobramycin: S <=2    -  Trimethoprim/Sulfamethoxazole: S <=0.5/9.5    Specimen Source: .Urine Clean Catch (Midstream)    Culture Results:   10,000 - 49,000 CFU/mL Escherichia coli    Organism Identification: Escherichia coli    Organism: Escherichia coli    Method Type: NIGEL  Culture - Blood (03.06.18 @ 16:32)    Specimen Source: .Blood None    Culture Results:   No growth to date.      procalcitonin <0.05      Radiology:  < from: Xray Chest 1 View- PORTABLE-Routine (03.10.18 @ 11:27) >    EXAM:  XR CHEST PORTABLE ROUTINE 1V                            PROCEDURE DATE:  03/10/2018          INTERPRETATION:      INDICATION: Cough and wheezing    Single frontal view of the chest compared to prior study of 3/7/2018    FINDINGS/  IMPRESSION:       The lungs are chronic appearing interstitial opacities and more focal   infiltrate/consolidation right upper lobe, superimposed acute disease   cannot be excluded. There is no pleural effusion. There is no   pneumothorax.  The cardiac silhouette is within normal limits. Status   post TAVR  There is osteopenia and degenerative changes. Extensive   degeneration of the left shoulder with periarticular dystrophic   calcification/mineralization.    < end of copied text >    < from: CT Chest No Cont (03.08.18 @ 10:47) >  EXAM:  CT CHEST                            PROCEDURE DATE:  03/08/2018          INTERPRETATION:  Clinical information: Spiking temperatures. Rule out   pneumonia.    COMPARISON: March 15, 2016    PROCEDURE:   CT of the Chest was performed without intravenous contrast.  Sagittal and coronal reformats were performed.    FINDINGS:    LOWER NECK: Within normal limits.  AXILLA, MEDIASTINUM AND SHANTI: Several enlarged right lower paratracheal   lymph nodes, measuring up to 1.6 cm (2; 30). Additional smaller   mediastinal lymph nodes are present. There is no axillary lymphadenopathy.  VESSELS: Normal caliber aorta with moderate atherosclerotic arterial   calcification, including calcification of the coronary arteries.  HEART: The heart is enlarged.Status post transcatheter aortic valve   replacement.No pericardial effusion.  PLEURA: No pleural effusion.  LUNGS AND LARGE AIRWAYS: Patent central airways. Several benign calcified   left sided granulomata. No suspicious pulmonary nodules.Mild peripheral   reticular opacities compatible with interstitial lung disease and   slightly worsened. No focal lung consolidation. Mosaic attenuation of the   lung parenchyma which can be seen in the setting of small vessel or small   airway disease.  VISUALIZED UPPER ABDOMEN: Partially imaged right renal cyst.   BONES: No acute abnormality. Old right rib fracture.  CHEST WALL:  Unremarkable    IMPRESSION: No evidence of pneumonia.  Mild interstitial lung disease, slightly worsened as compared with March   15, 2016.  Mild mediastinal lymphadenopathy.      < end of copied text >        EXAM:  CT LWR EXT IC LT                            PROCEDURE DATE:  03/06/2018          INTERPRETATION:  Clinical information: Status post IVC filter and left   lower extremity DVT. Now with left lower extremity hematoma and severe   pain.    TECHNIQUE: CT of the lower extremities was performed, extending from the   mid pelvis through the feet. Intravenous administration of 125 cc   Omnipaque 350 nonionic intravenous contrast with 75 cc discarded was   uneventful. Coronal and sagittal reformatted images as well as coronal   MIP images were obtained    COMPARISON: Bilateral lower extremity ultrasound from March 03, 2018.    FINDINGS: Right leg: The external iliac, common femoral, femoral,   popliteal and calf veins are patent.    Left lower extremity: The external iliac, common femoral and femoral   veins are are patent. The popliteal vein could not be visualized due to   marked artifact from the patient's left hip arthroplasty.    Additional findings: Colonic diverticulosis without diverticulitis. Right   renal cyst. Small rim-enhancing structure in the anteromedial proximal   left calf measuring 2.3 x 1.3 cm is nonspecific, however could represent   a small hematoma.    Impression:  Right leg: no deep vein thrombosis    Left leg: No deep vein thrombosis visualized. Unable to evaluate the   popliteal vein due to artifact from the patient's left hip arthroplasty.   Recommend left lower extremity ultrasound for further evaluation, with   attention to the popliteal vein.      < from: NM Pulmonary Ventilation/Perfusion Scan (03.05.18 @ 12:01) >    < from: US Duplex Venous Lower Ext Ltd, Left (03.06.18 @ 17:40) >    EXAM:  US DPLX LWR EXT VEINS LTD LT                            PROCEDURE DATE:  03/06/2018          INTERPRETATION:  CLINICAL STATEMENT: Swelling leg.    TECHNIQUE: Ultrasound of left lower extremity deep venous system.    COMPARISON: None.    FINDINGS:    There is color and spectral flow, compression and augmentation of the   common femoral, superficial femoral and popliteal veins.    There is flow in the posterior tibial vein.    The contralateral common femoral vein is patent.    IMPRESSION:    No evidence of DVT.       EXAM:  NM PULM VENTILATION PERFUS IMG                            PROCEDURE DATE:  03/05/2018          INTERPRETATION:  CLINICAL INFORMATION: 89-year-old female with pulmonary   hypertension and DVT, evaluate for pulmonary embolism.    RADIOPHARMACEUTICAL: 1 mCi Tc-99m-DTPA by inhalation; 6.2 mCi Tc-99m-MAA,   I.V.    TECHNIQUE:  Ventilation and perfusion images of the lungs were obtained   following administration of Tc-99m-DTPA and Tc-99m-MAA. Images were   obtained in the anterior, posterior,both lateral, and all 4 oblique   projections. This study was interpreted in conjunction with a chest   radiograph of 3/5/2018.    COMPARISON: No prior lung V/Q scan available.     FINDINGS: There is cardiomegaly and heterogeneous radiotracer   distribution in the remainder of both lungs on the ventilation and the   perfusion images. No segmental perfusion defects are noted.    IMPRESSION: Ventilation/perfusion lung scan: Very low probability of   pulmonary embolus.    < from: Xray Chest 1 View- PORTABLE-Routine (03.05.18 @ 09:18) >  EXAM:  XR CHEST PORTABLE ROUTINE 1V                            PROCEDURE DATE:  03/05/2018          INTERPRETATION:   History: CTEP, dyspnea    Chest:  one view.    Comparison: 12/23/2017    Findings/  impression:    AP radiograph of the chest demonstrates mild chronic appearing   interstitial changes tiny bibasilar effusion/atelectasis probable mild   vascular congestion. Tortuous thoracic aorta borderline cardiomegaly   patient is status post TAVR. Osteopenia and degenerative changes.      < end of copied text >      Advanced directives addressed: full resuscitation

## 2018-03-13 LAB
ANION GAP SERPL CALC-SCNC: 7 MMOL/L — SIGNIFICANT CHANGE UP (ref 5–17)
BUN SERPL-MCNC: 21 MG/DL — SIGNIFICANT CHANGE UP (ref 7–23)
CALCIUM SERPL-MCNC: 8.9 MG/DL — SIGNIFICANT CHANGE UP (ref 8.5–10.1)
CHLORIDE SERPL-SCNC: 97 MMOL/L — SIGNIFICANT CHANGE UP (ref 96–108)
CO2 SERPL-SCNC: 33 MMOL/L — HIGH (ref 22–31)
CREAT SERPL-MCNC: 0.72 MG/DL — SIGNIFICANT CHANGE UP (ref 0.5–1.3)
GLUCOSE SERPL-MCNC: 183 MG/DL — HIGH (ref 70–99)
HCT VFR BLD CALC: 34.1 % — LOW (ref 34.5–45)
HGB BLD-MCNC: 10.6 G/DL — LOW (ref 11.5–15.5)
MCHC RBC-ENTMCNC: 27.7 PG — SIGNIFICANT CHANGE UP (ref 27–34)
MCHC RBC-ENTMCNC: 31.1 GM/DL — LOW (ref 32–36)
MCV RBC AUTO: 89.3 FL — SIGNIFICANT CHANGE UP (ref 80–100)
NRBC # BLD: 0 /100 WBCS — SIGNIFICANT CHANGE UP (ref 0–0)
NT-PROBNP SERPL-SCNC: 3404 PG/ML — HIGH (ref 0–450)
PLATELET # BLD AUTO: 253 K/UL — SIGNIFICANT CHANGE UP (ref 150–400)
POTASSIUM SERPL-MCNC: 3.8 MMOL/L — SIGNIFICANT CHANGE UP (ref 3.5–5.3)
POTASSIUM SERPL-SCNC: 3.8 MMOL/L — SIGNIFICANT CHANGE UP (ref 3.5–5.3)
RBC # BLD: 3.82 M/UL — SIGNIFICANT CHANGE UP (ref 3.8–5.2)
RBC # FLD: 18 % — HIGH (ref 10.3–14.5)
SODIUM SERPL-SCNC: 137 MMOL/L — SIGNIFICANT CHANGE UP (ref 135–145)
WBC # BLD: 8.92 K/UL — SIGNIFICANT CHANGE UP (ref 3.8–10.5)
WBC # FLD AUTO: 8.92 K/UL — SIGNIFICANT CHANGE UP (ref 3.8–10.5)

## 2018-03-13 PROCEDURE — 99233 SBSQ HOSP IP/OBS HIGH 50: CPT

## 2018-03-13 RX ADMIN — Medication 20 MILLIEQUIVALENT(S): at 17:32

## 2018-03-13 RX ADMIN — Medication 20 MILLIGRAM(S): at 05:26

## 2018-03-13 RX ADMIN — Medication 20 MILLIGRAM(S): at 17:33

## 2018-03-13 RX ADMIN — Medication 20 MILLIGRAM(S): at 17:34

## 2018-03-13 RX ADMIN — PANTOPRAZOLE SODIUM 40 MILLIGRAM(S): 20 TABLET, DELAYED RELEASE ORAL at 05:26

## 2018-03-13 RX ADMIN — Medication 1 TABLET(S): at 11:35

## 2018-03-13 RX ADMIN — Medication 3 MILLIGRAM(S): at 01:52

## 2018-03-13 RX ADMIN — Medication 325 MILLIGRAM(S): at 11:35

## 2018-03-13 RX ADMIN — PHENYLEPHRINE-SHARK LIVER OIL-MINERAL OIL-PETROLATUM RECTAL OINTMENT 1 APPLICATION(S): at 17:36

## 2018-03-13 RX ADMIN — Medication 100 MILLIGRAM(S): at 17:32

## 2018-03-13 RX ADMIN — SIMVASTATIN 40 MILLIGRAM(S): 20 TABLET, FILM COATED ORAL at 21:20

## 2018-03-13 RX ADMIN — Medication 0.5 MILLIGRAM(S): at 07:46

## 2018-03-13 RX ADMIN — Medication 2 SPRAY(S): at 17:36

## 2018-03-13 RX ADMIN — Medication 25 MICROGRAM(S): at 05:26

## 2018-03-13 RX ADMIN — Medication 20 MILLIEQUIVALENT(S): at 05:24

## 2018-03-13 RX ADMIN — Medication 25 MILLIGRAM(S): at 17:33

## 2018-03-13 RX ADMIN — Medication 25 MILLIGRAM(S): at 05:26

## 2018-03-13 RX ADMIN — Medication 2 SPRAY(S): at 05:25

## 2018-03-13 RX ADMIN — Medication 200 MILLIGRAM(S): at 21:20

## 2018-03-13 RX ADMIN — APIXABAN 5 MILLIGRAM(S): 2.5 TABLET, FILM COATED ORAL at 05:26

## 2018-03-13 RX ADMIN — Medication 20 MILLIGRAM(S): at 05:25

## 2018-03-13 RX ADMIN — Medication 0.5 MILLIGRAM(S): at 19:49

## 2018-03-13 RX ADMIN — Medication 81 MILLIGRAM(S): at 11:35

## 2018-03-13 RX ADMIN — APIXABAN 5 MILLIGRAM(S): 2.5 TABLET, FILM COATED ORAL at 17:34

## 2018-03-13 NOTE — PROGRESS NOTE ADULT - PROVIDER SPECIALTY LIST ADULT
Heme/Onc
Hospitalist
Infectious Disease
Infectious Disease
Pulmonology
Rheumatology
Vascular Surgery
Vascular Surgery
Pulmonology

## 2018-03-13 NOTE — PROGRESS NOTE ADULT - SUBJECTIVE AND OBJECTIVE BOX
Subjective:    Awake, alert. Comfortable. No sputum    MEDICATIONS  (STANDING):  apixaban 5 milliGRAM(s) Oral every 12 hours  aspirin enteric coated 81 milliGRAM(s) Oral daily  buDESOnide   0.5 milliGRAM(s) Respule 0.5 milliGRAM(s) Inhalation every 12 hours  docusate sodium 200 milliGRAM(s) Oral at bedtime  docusate sodium 100 milliGRAM(s) Oral two times a day  ferrous    sulfate 325 milliGRAM(s) Oral daily  fluticasone propionate 50 MICROgram(s)/spray Nasal Spray 2 Spray(s) Both Nostrils two times a day  lactobacillus acidophilus 1 Tablet(s) Oral daily  levothyroxine 25 MICROGram(s) Oral daily  methylPREDNISolone sodium succinate Injectable 20 milliGRAM(s) IV Push every 12 hours  metoprolol     tartrate 25 milliGRAM(s) Oral two times a day  multivitamin 1 Tablet(s) Oral daily  pantoprazole    Tablet 40 milliGRAM(s) Oral before breakfast  potassium chloride    Tablet ER 20 milliEquivalent(s) Oral two times a day  simvastatin 40 milliGRAM(s) Oral at bedtime  torsemide 20 milliGRAM(s) Oral two times a day    MEDICATIONS  (PRN):  acetaminophen   Tablet 650 milliGRAM(s) Oral every 6 hours PRN Mild Pain (1 - 3)  acetaminophen   Tablet. 650 milliGRAM(s) Oral every 8 hours PRN Mild Pain (1 - 3)  acetaminophen   Tablet. 650 milliGRAM(s) Oral every 6 hours PRN Moderate Pain (4 - 6)  benzocaine 15 mG/menthol 3.6 mG Lozenge 1 Lozenge Oral every 4 hours PRN Sore Throat  guaiFENesin    Syrup 100 milliGRAM(s) Oral every 6 hours PRN Cough  hemorrhoidal Ointment 1 Application(s) Rectal two times a day PRN hemorrhoids  HYDROcodone/homatropine Syrup 5 milliLiter(s) Oral every 6 hours PRN Cough  melatonin 3 milliGRAM(s) Oral at bedtime PRN Insomnia  polyethylene glycol 3350 17 Gram(s) Oral daily PRN Constipation      Allergies    penicillin (Rash)  penicillins (Other)  sulfa drugs (Rash; Other)    Intolerances        Vital Signs Last 24 Hrs  T(C): 36.4 (13 Mar 2018 21:27), Max: 36.5 (13 Mar 2018 10:56)  T(F): 97.5 (13 Mar 2018 21:27), Max: 97.7 (13 Mar 2018 10:56)  HR: 75 (13 Mar 2018 21:27) (71 - 80)  BP: 117/60 (13 Mar 2018 21:27) (101/46 - 117/60)  BP(mean): --  RR: 18 (13 Mar 2018 21:27) (18 - 18)  SpO2: 94% (13 Mar 2018 21:27) (91% - 95%)    PHYSICAL EXAMINATION:    NECK:  Supple. No lymphadenopathy. Jugular venous pressure not elevated. Carotids equal.   HEART:   The cardiac impulse has a normal quality. Reg., Nl S1 and S2.  There are no rubs or gallops noted  CHEST:  Chest with bibasilar crackles 1/2 up. Normal respiratory effort.  ABDOMEN:  Soft and nontender.   EXTREMITIES:  There is no edema. Chronic venous stasis changes       LABS:                        10.6   8.92  )-----------( 253      ( 13 Mar 2018 06:47 )             34.1     03-13    137  |  97  |  21  ----------------------------<  183<H>  3.8   |  33<H>  |  0.72    Ca    8.9      13 Mar 2018 06:47            RADIOLOGY & ADDITIONAL TESTS:    Assessment and Plan:   · Assessment		  elequis re-started  Elevated BNP noted  NEEDS DAILY WEIGHTS  Monitor CBC, BMP  D/C IV steroids  Begin aerosols  Increase activity as pancho   Send home on Torsemide daily (not BID). Will repeat bloodwork next week with [place] home draw to re-assess BMP, BNP and CBC      Problem/Plan - 1:  ·  Problem: Dyspnea and respiratory abnormalities.     Problem/Plan - 2:  ·  Problem: Pulmonary hypertension.     Problem/Plan - 3:  ·  Problem: Afib.     Problem/Plan - 4:  ·  Problem: CAD (coronary artery disease).     Problem/Plan - 5:  ·  Problem: Diabetes mellitus type 2, controlled.     Problem/Plan - 6:  Problem: CHF (congestive heart failure).    Problem/Plan - 7:  ·  Problem: Anemia.

## 2018-03-13 NOTE — PROGRESS NOTE ADULT - SUBJECTIVE AND OBJECTIVE BOX
HOSPITAL COURSE AND ASSESSMENT  89 yo Female with a PMH of pancreatitis, cirrhosis, DM, L knee replacement, AFIB on Eliquis presented to the ED with family with complaint of left lower extremity pain for more than 1 week. She got LE venous doppler US done as outpatient at [facility] and was reported to have LLE DVT (left distal superficial femoral vein and left posterior tibial veins).    She was admitted to the medical floor for management of LLE DVT. Her eliquis was held and heparin gtt was started. IVC filter was placed by vascular surgery. Repeat US showed no DVT. She was then transitioned to her home eliquis. Course complicated by fever, for which she was diagnosed with rhinovirus. She also had pulmonary edema that required IV the PO diuresis. Her IV steroids were weaned. Her main concern was her persistent cough, which mildly improved with antitussives.    Anticipate discharge Wednesday or Thursday if continues to improve and able to transition to po steroid    *LLE edema and hematoma  -CT LLE: NO DVT left leg  -US of the abdomen confirmed no clot on the IVC filter  -Vascular surgery appreciated- risk > benefit with retrieval of filter  -Hematology evaluation appreciated: ok to resume Eliquis   -Ortho input appreciated: normal xrays of left knee    *Sepsis due to URI/rhinovirus, not POA  -source evaluation with blood culture NGTD, UA 3/6 and 3/9 without infection, urine culture 3/6 <50K E Coli and patient asymptomatic  -CXR 3/5 and 3/7 with pulmonary edema, possible focal infiltrate 3/7  -CT chest with worsening ILD, no infiltrate  -Supportive care with antitussives, steroids - will cw methylpred 20 BID d/t persistent rhonchi/wheeze  -procalcitonin WNL  -pharmacy for med review for drug fever as fever occurs at 1700  -Levaquin (PCN allergy) pending procalcitonin, now discontinued  - ID eval appreciated: no need for antibiotics at present    *Acute on Chronic Diastolic Heart Failure with Severe Pulmonary hypertension and ILD  -Echo 10/2016 with preserved EF  -heart catheterization 2/2017 with patent stents, severe pulmonary HTN (PDE recommended at that time)  -for completeness, VQ excluded PE  -BNP 5507 last check, repeat in AM  -Diuresis with torsemide daily, will increase to BID for symptomatic relief  - Pulm following    *Acute blood loss anemia superimposed on chronic normocytic anemia   -likely blood loss into hematoma  -Hb stable at 10  -ok to continue eliquis    *DM type II with vascular complication  - A1C 6.7  -Glucose never consistently >200  -Given age and comorbidites, will stop accuchecks    *Atrial Fibrillation with chronic anticoagulation  - rate controlled with Metoprolol   - CHADS2=3. On eliquis for stroke risk reduction    *Hypervolemic Hyponatremia  -diuresis    *Cryptogenic Cirrhosis, Recurrent Pancreatitis, Hypothyroidism, Hyperlipidemia, GERD  - clinically stable  -home regimen    *Constipation with known hemorrhoids  -pt requests Magnesium citrate and preperation H  -BM with above regimen    *Severe Protein Calorie Malnutrition  -Albumin 2.4 in December  -Supplement as able    VTE ppx: on Eliquis    *Advanced Care Planning  -HCP sons  -Code status should be addressed, but given family dynamics, best to be discussed as outpatient    ----------------------------------------------------------------------------------------------  CHIEF COMPLAINT:  cough    SUBJECTIVE:  tolerating PO. OOB.  Feels well. no oxygen required today and ambulated in rahman.    REVIEW OF SYSTEMS:  All other review of systems is negative unless indicated above    Vital Signs Last 24 Hrs  T(C): 36.4 (13 Mar 2018 16:33), Max: 36.5 (13 Mar 2018 10:56)  T(F): 97.6 (13 Mar 2018 16:33), Max: 97.7 (13 Mar 2018 10:56)  HR: 80 (13 Mar 2018 16:33) (71 - 91)  BP: 105/54 (13 Mar 2018 16:33) (101/46 - 121/68)  BP(mean): --  RR: 18 (13 Mar 2018 16:33) (18 - 18)  SpO2: 95% (13 Mar 2018 16:33) (91% - 99%)    PHYSICAL EXAM:  Constitutional: NAD, NCAT  HEENT: EOMI, Normal Hearing, MMM, fair dentition  Neck: trachea midline, No JVD  Respiratory: Breath sounds are coarse with occasional wheeze, unlabored respiration  Cardiovascular: S1 and S2, regular rate   Gastrointestinal: Bowel Sounds present, soft, nontender, nondistended  Extremities: No peripheral edema  Vascular: 2+ radial pulse  Neurological: awake, alert, follows commands, sensation grossly intact  Psych: appropriate affect,  insight  Musculoskeletal: moves all extremities  Skin: L calf bruising improving    MEDICATIONS:  MEDICATIONS  (STANDING):  apixaban 5 milliGRAM(s) Oral every 12 hours  aspirin enteric coated 81 milliGRAM(s) Oral daily  buDESOnide   0.5 milliGRAM(s) Respule 0.5 milliGRAM(s) Inhalation every 12 hours  docusate sodium 200 milliGRAM(s) Oral at bedtime  docusate sodium 100 milliGRAM(s) Oral two times a day  ferrous    sulfate 325 milliGRAM(s) Oral daily  fluticasone propionate 50 MICROgram(s)/spray Nasal Spray 2 Spray(s) Both Nostrils two times a day  lactobacillus acidophilus 1 Tablet(s) Oral daily  levothyroxine 25 MICROGram(s) Oral daily  methylPREDNISolone sodium succinate Injectable 20 milliGRAM(s) IV Push every 12 hours  metoprolol     tartrate 25 milliGRAM(s) Oral two times a day  multivitamin 1 Tablet(s) Oral daily  pantoprazole    Tablet 40 milliGRAM(s) Oral before breakfast  potassium chloride    Tablet ER 20 milliEquivalent(s) Oral two times a day  simvastatin 40 milliGRAM(s) Oral at bedtime  torsemide 20 milliGRAM(s) Oral two times a day    LABS: All Labs Reviewed:                        10.6   8.92  )-----------( 253      ( 13 Mar 2018 06:47 )             34.1     137  |  97  |  21  ----------------------------<  183<H>  3.8   |  33<H>  |  0.72    Ca    8.9      13 Mar 2018 06:47

## 2018-03-14 ENCOUNTER — TRANSCRIPTION ENCOUNTER (OUTPATIENT)
Age: 83
End: 2018-03-14

## 2018-03-14 VITALS — WEIGHT: 162.04 LBS

## 2018-03-14 LAB
HCT VFR BLD CALC: 34.7 % — SIGNIFICANT CHANGE UP (ref 34.5–45)
HGB BLD-MCNC: 10.8 G/DL — LOW (ref 11.5–15.5)
MCHC RBC-ENTMCNC: 27.8 PG — SIGNIFICANT CHANGE UP (ref 27–34)
MCHC RBC-ENTMCNC: 31.1 GM/DL — LOW (ref 32–36)
MCV RBC AUTO: 89.4 FL — SIGNIFICANT CHANGE UP (ref 80–100)
NRBC # BLD: 0 /100 WBCS — SIGNIFICANT CHANGE UP (ref 0–0)
PLATELET # BLD AUTO: 243 K/UL — SIGNIFICANT CHANGE UP (ref 150–400)
RBC # BLD: 3.88 M/UL — SIGNIFICANT CHANGE UP (ref 3.8–5.2)
RBC # FLD: 17.8 % — HIGH (ref 10.3–14.5)
WBC # BLD: 7.61 K/UL — SIGNIFICANT CHANGE UP (ref 3.8–10.5)
WBC # FLD AUTO: 7.61 K/UL — SIGNIFICANT CHANGE UP (ref 3.8–10.5)

## 2018-03-14 RX ORDER — FLUTICASONE PROPIONATE 50 MCG
2 SPRAY, SUSPENSION NASAL
Qty: 1 | Refills: 0
Start: 2018-03-14

## 2018-03-14 RX ORDER — POTASSIUM CHLORIDE 20 MEQ
2 PACKET (EA) ORAL
Qty: 40 | Refills: 0
Start: 2018-03-14 | End: 2018-04-02

## 2018-03-14 RX ORDER — POTASSIUM CHLORIDE 20 MEQ
2 PACKET (EA) ORAL
Qty: 0 | Refills: 0 | DISCHARGE
Start: 2018-03-14 | End: 2018-04-02

## 2018-03-14 RX ORDER — BENZOCAINE AND MENTHOL 5; 1 G/100ML; G/100ML
1 LIQUID ORAL
Qty: 2 | Refills: 0
Start: 2018-03-14

## 2018-03-14 RX ORDER — ACETAMINOPHEN 500 MG
2 TABLET ORAL
Qty: 0 | Refills: 0 | DISCHARGE
Start: 2018-03-14

## 2018-03-14 RX ORDER — FERROUS SULFATE 325(65) MG
1 TABLET ORAL
Qty: 30 | Refills: 0
Start: 2018-03-14 | End: 2018-04-12

## 2018-03-14 RX ORDER — PHENYLEPHRINE-SHARK LIVER OIL-MINERAL OIL-PETROLATUM RECTAL OINTMENT
1 OINTMENT (GRAM) RECTAL
Qty: 0 | Refills: 0 | DISCHARGE
Start: 2018-03-14

## 2018-03-14 RX ADMIN — PANTOPRAZOLE SODIUM 40 MILLIGRAM(S): 20 TABLET, DELAYED RELEASE ORAL at 05:29

## 2018-03-14 RX ADMIN — Medication 20 MILLIGRAM(S): at 05:30

## 2018-03-14 RX ADMIN — Medication 20 MILLIGRAM(S): at 05:28

## 2018-03-14 RX ADMIN — Medication 100 MILLIGRAM(S): at 05:28

## 2018-03-14 RX ADMIN — Medication 2 SPRAY(S): at 05:32

## 2018-03-14 RX ADMIN — Medication 1 TABLET(S): at 11:11

## 2018-03-14 RX ADMIN — Medication 25 MILLIGRAM(S): at 05:30

## 2018-03-14 RX ADMIN — Medication 81 MILLIGRAM(S): at 11:10

## 2018-03-14 RX ADMIN — Medication 25 MICROGRAM(S): at 05:28

## 2018-03-14 RX ADMIN — APIXABAN 5 MILLIGRAM(S): 2.5 TABLET, FILM COATED ORAL at 05:28

## 2018-03-14 RX ADMIN — Medication 1 TABLET(S): at 11:10

## 2018-03-14 RX ADMIN — Medication 0.5 MILLIGRAM(S): at 11:14

## 2018-03-14 RX ADMIN — Medication 20 MILLIEQUIVALENT(S): at 05:28

## 2018-03-14 RX ADMIN — Medication 325 MILLIGRAM(S): at 11:10

## 2018-03-14 RX ADMIN — Medication 3 MILLIGRAM(S): at 00:39

## 2018-03-14 NOTE — DISCHARGE NOTE ADULT - MEDICATION SUMMARY - MEDICATIONS TO CHANGE
I will SWITCH the dose or number of times a day I take the medications listed below when I get home from the hospital:    potassium chloride 10 mEq oral tablet, extended release  -- 2 tab(s) by mouth 2 times a day  -- TWO TABLETS ON WEDNESDAY WITH METOLAZONE

## 2018-03-14 NOTE — DISCHARGE NOTE ADULT - HOSPITAL COURSE
HOSPITAL COURSE AND ASSESSMENT  89 yo Female with a PMH of pancreatitis, cirrhosis, DM, L knee replacement, AFIB on Eliquis presented to the ED with family with complaint of left lower extremity pain for more than 1 week. She got LE venous doppler US done as outpatient at [facility] and was reported to have LLE DVT (left distal superficial femoral vein and left posterior tibial veins).    She was admitted to the medical floor for management of LLE DVT. Her eliquis was held and heparin gtt was started. IVC filter was placed by vascular surgery. Repeat US showed no DVT. She was then transitioned to her home eliquis. Course complicated by fever, for which she was diagnosed with rhinovirus. She also had pulmonary edema that required IV the PO diuresis. Her IV steroids were weaned. Her main concern was her persistent cough, which mildly improved with antitussives.    *LLE edema and hematoma  -CT LLE: NO DVT left leg  -US of the abdomen confirmed no clot on the IVC filter  -Vascular surgery appreciated- risk > benefit with retrieval of filter  -Hematology evaluation appreciated: ok to resume Eliquis   -Ortho input appreciated: normal xrays of left knee    *Sepsis due to URI/rhinovirus, not POA  -source evaluation with blood culture NGTD, UA 3/6 and 3/9 without infection, urine culture 3/6 <50K E Coli and patient asymptomatic  -CXR 3/5 and 3/7 with pulmonary edema, possible focal infiltrate 3/7  -CT chest with worsening ILD, no infiltrate  -Supportive care with antitussives, steroids - will cw methylpred 20 BID d/t persistent rhonchi/wheeze  -procalcitonin WNL  -pharmacy for med review for drug fever as fever occurs at 1700  -Levaquin (PCN allergy) pending procalcitonin, now discontinued  - ID eval appreciated: no need for antibiotics at present    *Acute on Chronic Diastolic Heart Failure with Severe Pulmonary hypertension and ILD  -Echo 10/2016 with preserved EF  -heart catheterization 2/2017 with patent stents, severe pulmonary HTN (PDE recommended at that time)  -for completeness, VQ excluded PE  -BNP 5507 last check, repeat in AM  -Diuresis with torsemide daily for 1 week will have home lab check in 1 wk and reassess diuretic at that time.  - Pulm follow up outpt    *Acute blood loss anemia superimposed on chronic normocytic anemia   -likely blood loss into hematoma -Hb stable at 10 -ok to continue eliquis    *DM type II with vascular complication - A1C 6.7    *Atrial Fibrillation with chronic anticoagulation  - rate controlled with Metoprolol  - CHADS2=3. On eliquis for stroke risk reduction    *Hypervolemic Hyponatremia -diuresis    *Cryptogenic Cirrhosis, Recurrent Pancreatitis, Hypothyroidism, Hyperlipidemia, GERD  - clinically stable    *Constipation with known hemorrhoids  -pt requests Magnesium citrate and preperation H -BM with above regimen    *Severe Protein Calorie Malnutrition  -Albumin 2.4 in December -Supplement as able    VTE ppx: on Eliquis    *Advanced Care Planning  -HCP sons -Code status should be addressed, but given family dynamics, best to be discussed as outpatient  ----------------------------------------------------------------------------------------------  CHIEF COMPLAINT: cough  SUBJECTIVE: tolerating PO. OOB.  Feels well. no oxygen required today and ambulated in rahman.  REVIEW OF SYSTEMS: All other review of systems is negative unless indicated above  Vital Signs Last 24 Hrs  T(C): 36.3 (14 Mar 2018 11:49), Max: 36.4 (13 Mar 2018 16:33)  T(F): 97.4 (14 Mar 2018 11:49), Max: 97.6 (13 Mar 2018 16:33)  HR: 78 (14 Mar 2018 11:49) (66 - 80)  BP: 96/42 (14 Mar 2018 11:49) (96/42 - 131/68)  BP(mean): --  RR: 20 (14 Mar 2018 11:49) (18 - 20)  SpO2: 95% (14 Mar 2018 11:49) (94% - 100%)  PHYSICAL EXAM:  Constitutional: NAD, NCAT  HEENT: EOMI, Normal Hearing, MMM, fair dentition  Neck: trachea midline, No JVD  Respiratory: Mild basilar rales BL, no wheezing or rhonchi  Cardiovascular: S1 and S2, regular rate   Gastrointestinal: Bowel Sounds present, soft, nontender, nondistended  Extremities: No peripheral edema  Vascular: 2+ radial pulse  Neurological: awake, alert, follows commands, sensation grossly intact  Psych: appropriate affect,  insight  Musculoskeletal: moves all extremities  Skin: L calf bruising improving             10.8   7.61  )-----------( 243      ( 14 Mar 2018 06:52 )             34.7   137  |  97  |  21  ----------------------------<  183<H>  3.8   |  33<H>  |  0.72  Ca    8.9      13 Mar 2018 06:47 HOSPITAL COURSE AND ASSESSMENT  89 yo Female with a PMH of pancreatitis, cirrhosis, DM, L knee replacement, AFIB on Eliquis presented to the ED with family with complaint of left lower extremity pain for more than 1 week. She got LE venous doppler US done as outpatient at [facility] and was reported to have LLE DVT (left distal superficial femoral vein and left posterior tibial veins).    She was admitted to the medical floor for management of LLE DVT. Her eliquis was held and heparin gtt was started. IVC filter was placed by vascular surgery. Repeat US showed no DVT. She was then transitioned to her home eliquis. Course complicated by fever, for which she was diagnosed with rhinovirus. She also had pulmonary edema that required IV the PO diuresis. Her IV steroids were weaned. Her main concern was her persistent cough, which mildly improved with antitussives.    *LLE edema and hematoma  -CT LLE: NO DVT left leg  -US of the abdomen confirmed no clot on the IVC filter  -Vascular surgery appreciated- risk > benefit with retrieval of filter  -Hematology evaluation appreciated: ok to resume Eliquis   -Ortho input appreciated: normal xrays of left knee    *Sepsis due to URI/rhinovirus, not POA  -source evaluation with blood culture NGTD, UA 3/6 and 3/9 without infection, urine culture 3/6 <50K E Coli and patient asymptomatic  -CXR 3/5 and 3/7 with pulmonary edema, possible focal infiltrate 3/7  -CT chest with worsening ILD, no infiltrate  -Supportive care with antitussives, steroids - will cw methylpred 20 BID d/t persistent rhonchi/wheeze  -procalcitonin WNL  -pharmacy for med review for drug fever as fever occurs at 1700  -Levaquin (PCN allergy) pending procalcitonin, now discontinued  - ID eval appreciated: no need for antibiotics at present    *Acute on Chronic Diastolic Heart Failure with Severe Pulmonary hypertension and ILD  -Echo 10/2016 with preserved EF  -heart catheterization 2/2017 with patent stents, severe pulmonary HTN (PDE recommended at that time)  -for completeness, VQ excluded PE  -BNP 5507 last check, repeat in AM  -Diuresis with torsemide daily for 1 week will have home lab check in 1 wk and reassess diuretic at that time.  - Pulm follow up outpt    *Acute blood loss anemia superimposed on chronic normocytic anemia   -likely blood loss into hematoma -Hb stable at 10 -ok to continue eliquis    *DM type II with vascular complication - A1C 6.7    *Atrial Fibrillation with chronic anticoagulation  - rate controlled with Metoprolol  - CHADS2=3. On eliquis for stroke risk reduction    *Hypervolemic Hyponatremia -diuresis    *Cryptogenic Cirrhosis, Recurrent Pancreatitis, Hypothyroidism, Hyperlipidemia, GERD  - clinically stable    *Constipation with known hemorrhoids  -pt requests Magnesium citrate and preperation H -BM with above regimen    *Severe Protein Calorie Malnutrition  -Albumin 2.4 in December -Supplement as able    VTE ppx: on Eliquis    *Advanced Care Planning  -HCP sons -Code status should be addressed, but given family dynamics, best to be discussed as outpatient  ----------------------------------------------------------------------------------------------  CHIEF COMPLAINT: cough  SUBJECTIVE: tolerating PO. OOB.  Feels well. no oxygen required today and ambulated in rahman.  REVIEW OF SYSTEMS: All other review of systems is negative unless indicated above  Vital Signs Last 24 Hrs  T(C): 36.3 (14 Mar 2018 11:49), Max: 36.4 (13 Mar 2018 16:33)  T(F): 97.4 (14 Mar 2018 11:49), Max: 97.6 (13 Mar 2018 16:33)  HR: 78 (14 Mar 2018 11:49) (66 - 80)  BP: 96/42 (14 Mar 2018 11:49) (96/42 - 131/68)  BP(mean): --  RR: 20 (14 Mar 2018 11:49) (18 - 20)  SpO2: 95% (14 Mar 2018 11:49) (94% - 100%)  PHYSICAL EXAM:  Constitutional: NAD, NCAT  HEENT: EOMI, Normal Hearing, MMM, fair dentition  Neck: trachea midline, No JVD  Respiratory: Mild basilar rales BL, no wheezing or rhonchi  Cardiovascular: S1 and S2, regular rate   Gastrointestinal: Bowel Sounds present, soft, nontender, nondistended  Extremities: No peripheral edema  Vascular: 2+ radial pulse  Neurological: awake, alert, follows commands, sensation grossly intact  Psych: appropriate affect,  insight  Musculoskeletal: moves all extremities  Skin: L calf bruising improving             10.8   7.61  )-----------( 243      ( 14 Mar 2018 06:52 )             34.7   137  |  97  |  21  ----------------------------<  183<H>  3.8   |  33<H>  |  0.72  Ca    8.9      13 Mar 2018 06:47  Discussed with Dr. Camacho  Time spent on discharge 40 minutes

## 2018-03-14 NOTE — DISCHARGE NOTE ADULT - PATIENT PORTAL LINK FT
You can access the [facility], offered by [facility], by registering with the following website: [email or web address]

## 2018-03-14 NOTE — DISCHARGE NOTE ADULT - MEDICATION SUMMARY - MEDICATIONS TO STOP TAKING
I will STOP taking the medications listed below when I get home from the hospital:    sildenafil 20 mg oral tablet  -- 1 tab(s) by mouth 2 times a day    furosemide 40 mg oral tablet  -- 1 tab(s) by mouth 2 times a day

## 2018-03-14 NOTE — DISCHARGE NOTE ADULT - CARE PLAN
Principal Discharge DX:	Pulmonary edema  Goal:	resolution  Assessment and plan of treatment:	Take demedex daily for 1 week then decreased to 3 times per week.  Take 20meq of potassium on days when you take demedex.  Follow up with Dr. Camacho  Secondary Diagnosis:	Cough  Assessment and plan of treatment:	Cough suppressant as needed

## 2018-03-14 NOTE — DISCHARGE NOTE ADULT - CARE PROVIDER_API CALL
Samy Camacho), Internal Medicine; Pulmonary Disease  [address] [place] [address]  Phone: [phone]  Fax: [phone]

## 2018-03-14 NOTE — DISCHARGE NOTE ADULT - MEDICATION SUMMARY - MEDICATIONS TO TAKE
I will START or STAY ON the medications listed below when I get home from the hospital:    aspirin 81 mg oral tablet  -- 1 tab(s) by mouth once a day  -- Indication: For .    acetaminophen 325 mg oral tablet  -- 2 tab(s) by mouth every 6 hours, As needed, Moderate Pain (4 - 6)  -- Indication: For .    phenylephrine 0.25%-3% rectal ointment  -- 1 application rectally 2 times a day, As needed, hemorrhoids  -- Indication: For .    Eliquis 5 mg oral tablet  -- 1 tab(s) by mouth 2 times a day  -- Indication: For .    Zocor 40 mg oral tablet  -- 1 tab(s) by mouth once a day (at bedtime)  -- Indication: For .    metoprolol tartrate 25 mg oral tablet  -- 1 tab(s) by mouth 2 times a day  -- Indication: For .    torsemide 20 mg oral tablet  -- 1 tab(s) by mouth once a day  -- Indication: For .    guaiFENesin 200 mg oral tablet  -- 1 tab(s) by mouth every 6 hours, As Needed   -- Medication should be taken with plenty of water.    -- Indication: For .    FeroSul 325 mg (65 mg elemental iron) oral tablet  -- 1 tab(s) by mouth once a day   -- Indication: For .    docusate sodium 100 mg oral capsule  -- 2 cap(s) by mouth once a day (at bedtime)  -- Indication: For .    polyethylene glycol 3350 oral powder for reconstitution  -- 17 gram(s) by mouth once a day, As needed, Constipation  -- Indication: For .    potassium chloride 10 mEq oral tablet, extended release  -- 2 tab(s) by mouth once a day   -- Indication: For .    benzocaine-menthol 15 mg-3.6 mg mucous membrane lozenge  -- 1 lozenge mucous membrane every 4 hours   -- Indication: For .    fluticasone 50 mcg/inh nasal spray  -- 2 spray(s) into nose 2 times a day, As Needed -for congestion   -- Indication: For .    melatonin 3 mg oral tablet  -- 1 tab(s) by mouth once a day (at bedtime), As needed, Insomnia  -- Indication: For .    Acidophilus oral capsule  -- 1 cap(s) by mouth once a day  -- Indication: For .    NexIUM 40 mg oral delayed release capsule  -- 1 cap(s) by mouth once a day  -- Indication: For .    levothyroxine 25 mcg (0.025 mg) oral tablet  -- 1 tab(s) by mouth once a day  -- Indication: For .    HYDROcodone-homatropine 5 mg-1.5 mg/5 mL oral syrup  -- 5 milliliter(s) by mouth every 6 hours, As Needed -for cough MDD:20mL  -- Caution federal law prohibits the transfer of this drug to any person other  than the person for whom it was prescribed.  May cause drowsiness.  Alcohol may intensify this effect.  Use care when operating dangerous machinery.  Obtain medical advice before taking any non-prescription drugs as some may affect the action of this medication.    -- Indication: For .

## 2018-03-20 DIAGNOSIS — K86.1 OTHER CHRONIC PANCREATITIS: ICD-10-CM

## 2018-03-20 DIAGNOSIS — D62 ACUTE POSTHEMORRHAGIC ANEMIA: ICD-10-CM

## 2018-03-20 DIAGNOSIS — K74.69 OTHER CIRRHOSIS OF LIVER: ICD-10-CM

## 2018-03-20 DIAGNOSIS — J06.9 ACUTE UPPER RESPIRATORY INFECTION, UNSPECIFIED: ICD-10-CM

## 2018-03-20 DIAGNOSIS — Z88.0 ALLERGY STATUS TO PENICILLIN: ICD-10-CM

## 2018-03-20 DIAGNOSIS — S80.12XA CONTUSION OF LEFT LOWER LEG, INITIAL ENCOUNTER: ICD-10-CM

## 2018-03-20 DIAGNOSIS — Z79.01 LONG TERM (CURRENT) USE OF ANTICOAGULANTS: ICD-10-CM

## 2018-03-20 DIAGNOSIS — I27.20 PULMONARY HYPERTENSION, UNSPECIFIED: ICD-10-CM

## 2018-03-20 DIAGNOSIS — I50.33 ACUTE ON CHRONIC DIASTOLIC (CONGESTIVE) HEART FAILURE: ICD-10-CM

## 2018-03-20 DIAGNOSIS — K59.00 CONSTIPATION, UNSPECIFIED: ICD-10-CM

## 2018-03-20 DIAGNOSIS — Z90.710 ACQUIRED ABSENCE OF BOTH CERVIX AND UTERUS: ICD-10-CM

## 2018-03-20 DIAGNOSIS — B95.2 ENTEROCOCCUS AS THE CAUSE OF DISEASES CLASSIFIED ELSEWHERE: ICD-10-CM

## 2018-03-20 DIAGNOSIS — I25.10 ATHEROSCLEROTIC HEART DISEASE OF NATIVE CORONARY ARTERY WITHOUT ANGINA PECTORIS: ICD-10-CM

## 2018-03-20 DIAGNOSIS — E11.51 TYPE 2 DIABETES MELLITUS WITH DIABETIC PERIPHERAL ANGIOPATHY WITHOUT GANGRENE: ICD-10-CM

## 2018-03-20 DIAGNOSIS — I11.0 HYPERTENSIVE HEART DISEASE WITH HEART FAILURE: ICD-10-CM

## 2018-03-20 DIAGNOSIS — E78.5 HYPERLIPIDEMIA, UNSPECIFIED: ICD-10-CM

## 2018-03-20 DIAGNOSIS — E03.9 HYPOTHYROIDISM, UNSPECIFIED: ICD-10-CM

## 2018-03-20 DIAGNOSIS — Z88.2 ALLERGY STATUS TO SULFONAMIDES: ICD-10-CM

## 2018-03-20 DIAGNOSIS — Z95.5 PRESENCE OF CORONARY ANGIOPLASTY IMPLANT AND GRAFT: ICD-10-CM

## 2018-03-20 DIAGNOSIS — I48.91 UNSPECIFIED ATRIAL FIBRILLATION: ICD-10-CM

## 2018-03-20 DIAGNOSIS — Z96.652 PRESENCE OF LEFT ARTIFICIAL KNEE JOINT: ICD-10-CM

## 2018-03-20 DIAGNOSIS — I82.402 ACUTE EMBOLISM AND THROMBOSIS OF UNSPECIFIED DEEP VEINS OF LEFT LOWER EXTREMITY: ICD-10-CM

## 2018-03-20 DIAGNOSIS — J84.9 INTERSTITIAL PULMONARY DISEASE, UNSPECIFIED: ICD-10-CM

## 2018-03-20 DIAGNOSIS — Z79.82 LONG TERM (CURRENT) USE OF ASPIRIN: ICD-10-CM

## 2018-03-20 DIAGNOSIS — Z86.718 PERSONAL HISTORY OF OTHER VENOUS THROMBOSIS AND EMBOLISM: ICD-10-CM

## 2018-03-20 DIAGNOSIS — K21.9 GASTRO-ESOPHAGEAL REFLUX DISEASE WITHOUT ESOPHAGITIS: ICD-10-CM

## 2018-04-05 ENCOUNTER — EMERGENCY (EMERGENCY)
Facility: HOSPITAL | Age: 83
LOS: 0 days | Discharge: HOME | End: 2018-04-05
Attending: EMERGENCY MEDICINE | Admitting: EMERGENCY MEDICINE
Payer: MEDICARE

## 2018-04-05 VITALS
OXYGEN SATURATION: 100 % | DIASTOLIC BLOOD PRESSURE: 69 MMHG | TEMPERATURE: 98 F | SYSTOLIC BLOOD PRESSURE: 127 MMHG | HEART RATE: 72 BPM

## 2018-04-05 VITALS
TEMPERATURE: 97 F | WEIGHT: 162.92 LBS | HEART RATE: 75 BPM | OXYGEN SATURATION: 100 % | DIASTOLIC BLOOD PRESSURE: 68 MMHG | SYSTOLIC BLOOD PRESSURE: 133 MMHG

## 2018-04-05 DIAGNOSIS — I25.10 ATHEROSCLEROTIC HEART DISEASE OF NATIVE CORONARY ARTERY WITHOUT ANGINA PECTORIS: ICD-10-CM

## 2018-04-05 DIAGNOSIS — E78.5 HYPERLIPIDEMIA, UNSPECIFIED: ICD-10-CM

## 2018-04-05 DIAGNOSIS — Z79.01 LONG TERM (CURRENT) USE OF ANTICOAGULANTS: ICD-10-CM

## 2018-04-05 DIAGNOSIS — R60.0 LOCALIZED EDEMA: ICD-10-CM

## 2018-04-05 DIAGNOSIS — I44.7 LEFT BUNDLE-BRANCH BLOCK, UNSPECIFIED: ICD-10-CM

## 2018-04-05 DIAGNOSIS — Z90.49 ACQUIRED ABSENCE OF OTHER SPECIFIED PARTS OF DIGESTIVE TRACT: Chronic | ICD-10-CM

## 2018-04-05 DIAGNOSIS — I35.0 NONRHEUMATIC AORTIC (VALVE) STENOSIS: ICD-10-CM

## 2018-04-05 DIAGNOSIS — M19.90 UNSPECIFIED OSTEOARTHRITIS, UNSPECIFIED SITE: ICD-10-CM

## 2018-04-05 DIAGNOSIS — K21.9 GASTRO-ESOPHAGEAL REFLUX DISEASE WITHOUT ESOPHAGITIS: ICD-10-CM

## 2018-04-05 DIAGNOSIS — F32.9 MAJOR DEPRESSIVE DISORDER, SINGLE EPISODE, UNSPECIFIED: ICD-10-CM

## 2018-04-05 DIAGNOSIS — I48.91 UNSPECIFIED ATRIAL FIBRILLATION: ICD-10-CM

## 2018-04-05 DIAGNOSIS — E03.9 HYPOTHYROIDISM, UNSPECIFIED: ICD-10-CM

## 2018-04-05 DIAGNOSIS — I10 ESSENTIAL (PRIMARY) HYPERTENSION: ICD-10-CM

## 2018-04-05 DIAGNOSIS — E11.9 TYPE 2 DIABETES MELLITUS WITHOUT COMPLICATIONS: ICD-10-CM

## 2018-04-05 PROCEDURE — 99284 EMERGENCY DEPT VISIT MOD MDM: CPT

## 2018-04-05 PROCEDURE — 93970 EXTREMITY STUDY: CPT | Mod: 26

## 2018-04-05 PROCEDURE — 73562 X-RAY EXAM OF KNEE 3: CPT | Mod: 26,LT

## 2018-04-05 NOTE — ED PROVIDER NOTE - OBJECTIVE STATEMENT
89 y/o female with PMHx of Anemia, Depression, Aortic Stenosis, Afib, CAD, CHF, HTN, T2 DM, LBBB, GERD, presents to the ED c/o leg swelling that began two days ago. Pt states that she cannot walk and has pain under her kneecap. Pt son states the skin near the knee is warm. Pt had an IVC filter placed recently. She states that the pain is aggravated while walking. Pt denies any falls or recent trauma. PCP Dr. Camacho

## 2018-04-05 NOTE — ED ADULT NURSE REASSESSMENT NOTE - NS ED NURSE REASSESS COMMENT FT1
Received report from Cheri MAY. Patient resting comfortably. Family at bedside. No distress noted. Ready for d/c.

## 2018-04-05 NOTE — ED PROVIDER NOTE - MUSCULOSKELETAL, MLM
Tenderness to palpation to left patellar and proximal tibia. No evidence of trauma to the left knee. No bilateral pitting edema to LE. LE are symmetrical.

## 2018-04-05 NOTE — ED PROVIDER NOTE - MEDICAL DECISION MAKING DETAILS
patient without knee joint effusion, full range of motion. no evidence of DVT. appropriate for discharge home with PMD f/u.

## 2018-05-03 ENCOUNTER — APPOINTMENT (OUTPATIENT)
Dept: INTERNAL MEDICINE | Facility: CLINIC | Age: 83
End: 2018-05-03
Payer: MEDICARE

## 2018-05-03 ENCOUNTER — NON-APPOINTMENT (OUTPATIENT)
Age: 83
End: 2018-05-03

## 2018-05-03 VITALS
SYSTOLIC BLOOD PRESSURE: 116 MMHG | TEMPERATURE: 98.1 F | BODY MASS INDEX: 27.49 KG/M2 | WEIGHT: 161 LBS | HEIGHT: 64 IN | RESPIRATION RATE: 18 BRPM | OXYGEN SATURATION: 96 % | DIASTOLIC BLOOD PRESSURE: 72 MMHG | HEART RATE: 80 BPM

## 2018-05-03 DIAGNOSIS — E03.9 HYPOTHYROIDISM, UNSPECIFIED: ICD-10-CM

## 2018-05-03 DIAGNOSIS — E78.00 PURE HYPERCHOLESTEROLEMIA, UNSPECIFIED: ICD-10-CM

## 2018-05-03 DIAGNOSIS — J98.4 OTHER DISORDERS OF LUNG: ICD-10-CM

## 2018-05-03 PROCEDURE — 99214 OFFICE O/P EST MOD 30 MIN: CPT | Mod: 25

## 2018-05-03 PROCEDURE — 94010 BREATHING CAPACITY TEST: CPT

## 2018-05-03 RX ORDER — SACCHAROMYCES BOULARDII 50 MG
CAPSULE ORAL
Refills: 0 | Status: ACTIVE | COMMUNITY

## 2018-05-03 RX ORDER — OLOPATADINE HYDROCHLORIDE 7 MG/ML
0.7 SOLUTION OPHTHALMIC
Qty: 2 | Refills: 0 | Status: ACTIVE | COMMUNITY
Start: 2018-03-27

## 2018-05-16 ENCOUNTER — MEDICATION RENEWAL (OUTPATIENT)
Age: 83
End: 2018-05-16

## 2018-06-20 NOTE — ED ADULT NURSE NOTE - CHPI ED SYMPTOMS POS
Most viral illnesses get worse for the first 3-5 days, then plateau and improve gradually over the next 3-5 days. Monitor symptoms for now. Use otc meds for comfort as needed--  Claritin/zyrtec once daily to reduce post-nasal drip and productive cough.
TENDERNESS/PAIN

## 2018-07-06 ENCOUNTER — RX RENEWAL (OUTPATIENT)
Age: 83
End: 2018-07-06

## 2018-07-09 ENCOUNTER — MEDICATION RENEWAL (OUTPATIENT)
Age: 83
End: 2018-07-09

## 2018-07-30 ENCOUNTER — RX RENEWAL (OUTPATIENT)
Age: 83
End: 2018-07-30

## 2018-07-31 ENCOUNTER — RX RENEWAL (OUTPATIENT)
Age: 83
End: 2018-07-31

## 2018-08-09 ENCOUNTER — APPOINTMENT (OUTPATIENT)
Dept: INTERNAL MEDICINE | Facility: CLINIC | Age: 83
End: 2018-08-09

## 2018-08-09 ENCOUNTER — APPOINTMENT (OUTPATIENT)
Dept: INTERNAL MEDICINE | Facility: CLINIC | Age: 83
End: 2018-08-09
Payer: MEDICARE

## 2018-08-09 VITALS
BODY MASS INDEX: 26.98 KG/M2 | TEMPERATURE: 97.9 F | SYSTOLIC BLOOD PRESSURE: 126 MMHG | OXYGEN SATURATION: 95 % | RESPIRATION RATE: 16 BRPM | DIASTOLIC BLOOD PRESSURE: 80 MMHG | HEART RATE: 70 BPM | WEIGHT: 158 LBS | HEIGHT: 64 IN

## 2018-08-09 DIAGNOSIS — R06.00 DYSPNEA, UNSPECIFIED: ICD-10-CM

## 2018-08-09 DIAGNOSIS — Z87.19 PERSONAL HISTORY OF OTHER DISEASES OF THE DIGESTIVE SYSTEM: ICD-10-CM

## 2018-08-09 DIAGNOSIS — D64.9 ANEMIA, UNSPECIFIED: ICD-10-CM

## 2018-08-09 DIAGNOSIS — I51.9 HEART DISEASE, UNSPECIFIED: ICD-10-CM

## 2018-08-09 DIAGNOSIS — J45.909 UNSPECIFIED ASTHMA, UNCOMPLICATED: ICD-10-CM

## 2018-08-09 LAB — GLUCOSE BLDC GLUCOMTR-MCNC: 174

## 2018-08-09 PROCEDURE — 99214 OFFICE O/P EST MOD 30 MIN: CPT | Mod: 25

## 2018-08-09 PROCEDURE — 90750 HZV VACC RECOMBINANT IM: CPT | Mod: GY,GA

## 2018-08-09 PROCEDURE — 90471 IMMUNIZATION ADMIN: CPT | Mod: GY,GA

## 2018-08-09 PROCEDURE — 82962 GLUCOSE BLOOD TEST: CPT

## 2018-08-09 RX ORDER — TORSEMIDE 20 MG/1
20 TABLET ORAL
Refills: 0 | Status: COMPLETED | COMMUNITY
End: 2018-08-09

## 2018-08-09 NOTE — ASSESSMENT
[FreeTextEntry1] : Strict low fat/Na ADA diet.\par Continue daily weight and call if over 162 pounds.\par Continue current medications.\par FBW by [facility] soon-see orders.\par Shingrix #1 today with second at 3 mo follow up.\par Dr Hernandez for cardiology f/u.\par Dr Marquis for opthal f/u.\par She or family will call with any decomp in status.\par F/U 3 mos as scheduled or sooner PRN.

## 2018-08-09 NOTE — PHYSICAL EXAM
[No Acute Distress] : no acute distress [Well Developed] : well developed [Normal Voice/Communication] : normal voice/communication [Normal Sclera/Conjunctiva] : normal sclera/conjunctiva [PERRL] : pupils equal round and reactive to light [EOMI] : extraocular movements intact [Normal Outer Ear/Nose] : the outer ears and nose were normal in appearance [Normal Oropharynx] : the oropharynx was normal [No JVD] : no jugular venous distention [Supple] : supple [No Lymphadenopathy] : no lymphadenopathy [Thyroid Normal, No Nodules] : the thyroid was normal and there were no nodules present [No Respiratory Distress] : no respiratory distress  [No Accessory Muscle Use] : no accessory muscle use [Normal Rate] : normal rate  [Normal S1, S2] : normal S1 and S2 [No Extremity Clubbing/Cyanosis] : no extremity clubbing/cyanosis [___ +] : bilateral [unfilled]U+ pretibial pitting edema [Rt] : varicose veins of the right leg noted [Lt] : varicose veins of the left leg noted [1+] : left 1+ [Soft] : abdomen soft [Non Tender] : non-tender [Non-distended] : non-distended [Normal Bowel Sounds] : normal bowel sounds [Normal Supraclavicular Nodes] : no supraclavicular lymphadenopathy [Normal Anterior Cervical Nodes] : no anterior cervical lymphadenopathy [Kyphosis] : kyphosis [No Joint Swelling] : no joint swelling [Grossly Normal Strength/Tone] : grossly normal strength/tone [No Rash] : no rash [Coordination Grossly Intact] : coordination grossly intact [No Focal Deficits] : no focal deficits [Speech Grossly Normal] : speech grossly normal [Memory Grossly Normal] : memory grossly normal [Normal Affect] : the affect was normal [Alert and Oriented x3] : oriented to person, place, and time [Normal Mood] : the mood was normal [Normal Insight/Judgement] : insight and judgment were intact [Comprehensive Foot Exam Normal] : Right and left foot were examined and both feet are normal. No ulcers in either foot. Toes are normal and with full ROM.  Normal tactile sensation with monofilament testing throughout both feet [de-identified] : overweight and chronically ill appearing [de-identified] : bibase crackles right more than left but improve with cough. [de-identified] : irreg S1 S2 with 1/6 J LUIS LSB [de-identified] : multiple small ecchymosis of UEs [de-identified] : uses walker to ambulate

## 2018-08-09 NOTE — REVIEW OF SYSTEMS
[Fever] : no fever [Chills] : no chills [Fatigue] : fatigue [Night Sweats] : no night sweats [Recent Change In Weight] : ~T no recent weight change [Vision Problems] : no vision problems [Hearing Loss] : hearing loss [Nosebleed] : no nosebleeds [Hoarseness] : no hoarseness [Nasal Discharge] : no nasal discharge [Sore Throat] : no sore throat [Postnasal Drip] : no postnasal drip [Chest Pain] : no chest pain [Palpitations] : no palpitations [Leg Claudication] : no leg claudication [Lower Ext Edema] : lower extremity edema [Orthopnea] : no orthopnea [Paroysmal Nocturnal Dyspnea] : no paroysmal nocturnal dyspnea [Wheezing] : no wheezing [Cough] : no cough [Dyspnea on Exertion] : dyspnea on exertion [Abdominal Pain] : no abdominal pain [Nausea] : no nausea [Constipation] : no constipation [Vomiting] : no vomiting [Heartburn] : no heartburn [Melena] : no melena [Dysuria] : no dysuria [Hematuria] : no hematuria [Frequency] : frequency [Joint Pain] : no joint pain [Joint Stiffness] : joint stiffness [Joint Swelling] : no joint swelling [Muscle Pain] : no muscle pain [Back Pain] : no back pain [Itching] : no itching [Headache] : no headache [Dizziness] : no dizziness [Fainting] : no fainting [Memory Loss] : no memory loss [Unsteady Walking] : ataxia [Suicidal] : not suicidal [Anxiety] : anxiety [Depression] : no depression [Easy Bleeding] : no easy bleeding [Easy Bruising] : easy bruising [Negative] : Heme/Lymph

## 2018-08-09 NOTE — HEALTH RISK ASSESSMENT
[One fall no injury in past year] : Patient reported one fall in the past year without injury [0] : 2) Feeling down, depressed, or hopeless: Not at all (0) [[id number]] : 0

## 2018-08-09 NOTE — HISTORY OF PRESENT ILLNESS
[Family Member] : family member [FreeTextEntry1] : CHF, DM and LE edema [de-identified] : The patient reports feeling well and maintains her daily weight below 160 pounds on regular basis. She states she has been stable at 158 pounds every day this week. LE edema is intermittent and improved on days of diuretic use. She has been compliant with low sodium, diabetic diet. She does simple exercises at home and denies chest pain, palpitation, LH or dizziness. She walks in her apartment and denies cough, wheeze or hemoptysis. BONDS is mild with light activity and resolves quickly. She denies abd pain, nausea, emesis, melena or BRBPR.

## 2018-08-09 NOTE — COUNSELING
[Weight management counseling provided] : Weight management [Healthy eating counseling provided] : healthy eating [Activity counseling provided] : activity [Walking] : Walking [Good understanding] : Patient has a good understanding of lifestyle changes and the steps needed to achieve self management goals

## 2018-08-13 ENCOUNTER — RX RENEWAL (OUTPATIENT)
Age: 83
End: 2018-08-13

## 2018-08-13 DIAGNOSIS — K64.9 UNSPECIFIED HEMORRHOIDS: ICD-10-CM

## 2018-08-14 ENCOUNTER — RX RENEWAL (OUTPATIENT)
Age: 83
End: 2018-08-14

## 2018-08-15 ENCOUNTER — RX RENEWAL (OUTPATIENT)
Age: 83
End: 2018-08-15

## 2018-08-15 RX ORDER — HYDROCORTISONE 25 MG/G
2.5 CREAM TOPICAL
Qty: 1 | Refills: 0 | Status: DISCONTINUED | COMMUNITY
Start: 2018-08-13 | End: 2018-08-15

## 2018-09-13 LAB
CHOLEST SERPL-MCNC: 121
GLUCOSE SERPL-MCNC: 119
GLUCOSE SERPL-MCNC: 126
GLUCOSE SERPL-MCNC: 149
GLUCOSE SERPL-MCNC: 211
HBA1C MFR BLD HPLC: 6.5
HDLC SERPL-MCNC: 47.8
LDLC SERPL CALC-MCNC: 56.6

## 2018-10-04 NOTE — H&P ADULT - ENMT
No oral lesions; no gross abnormalities no deformity, pain or tenderness. no restriction of movement

## 2018-10-25 ENCOUNTER — CHART COPY (OUTPATIENT)
Age: 83
End: 2018-10-25

## 2018-10-26 ENCOUNTER — MEDICATION RENEWAL (OUTPATIENT)
Age: 83
End: 2018-10-26

## 2018-11-08 ENCOUNTER — APPOINTMENT (OUTPATIENT)
Dept: INTERNAL MEDICINE | Facility: CLINIC | Age: 83
End: 2018-11-08

## 2018-11-19 ENCOUNTER — RX RENEWAL (OUTPATIENT)
Age: 83
End: 2018-11-19

## 2018-12-04 ENCOUNTER — MEDICATION RENEWAL (OUTPATIENT)
Age: 83
End: 2018-12-04

## 2018-12-04 NOTE — ED ADULT NURSE NOTE - NSSISCREENINGQ2_ED_A_ED
Is This A New Presentation, Or A Follow-Up?: Skin Lesions How Severe Is Your Skin Lesion?: mild Has Your Skin Lesion Been Treated?: not been treated Is This A New Presentation, Or A Follow-Up?: Skin Lesion No

## 2018-12-18 ENCOUNTER — RX RENEWAL (OUTPATIENT)
Age: 83
End: 2018-12-18

## 2018-12-18 ENCOUNTER — MEDICATION RENEWAL (OUTPATIENT)
Age: 83
End: 2018-12-18

## 2018-12-21 ENCOUNTER — RX RENEWAL (OUTPATIENT)
Age: 83
End: 2018-12-21

## 2019-01-07 ENCOUNTER — RX RENEWAL (OUTPATIENT)
Age: 84
End: 2019-01-07

## 2019-01-30 ENCOUNTER — MEDICATION RENEWAL (OUTPATIENT)
Age: 84
End: 2019-01-30

## 2019-02-26 ENCOUNTER — RX RENEWAL (OUTPATIENT)
Age: 84
End: 2019-02-26

## 2019-02-26 ENCOUNTER — MEDICATION RENEWAL (OUTPATIENT)
Age: 84
End: 2019-02-26

## 2019-02-26 RX ORDER — DICLOFENAC SODIUM 10 MG/G
1 GEL TOPICAL DAILY
Qty: 1 | Refills: 2 | Status: ACTIVE | COMMUNITY
Start: 1900-01-01 | End: 1900-01-01

## 2019-02-27 ENCOUNTER — RX RENEWAL (OUTPATIENT)
Age: 84
End: 2019-02-27

## 2019-02-27 RX ORDER — PRAMOXINE HYDROCHLORIDE AND HYDROCORTISONE ACETATE 10; 25 MG/G; MG/G
2.5-1 CREAM TOPICAL
Qty: 30 | Refills: 3 | Status: ACTIVE | COMMUNITY
Start: 2018-08-15 | End: 1900-01-01

## 2019-02-28 ENCOUNTER — APPOINTMENT (OUTPATIENT)
Dept: INTERNAL MEDICINE | Facility: CLINIC | Age: 84
End: 2019-02-28
Payer: MEDICARE

## 2019-02-28 VITALS
SYSTOLIC BLOOD PRESSURE: 122 MMHG | HEART RATE: 74 BPM | TEMPERATURE: 97.9 F | BODY MASS INDEX: 28.17 KG/M2 | HEIGHT: 64 IN | WEIGHT: 165 LBS | OXYGEN SATURATION: 95 % | DIASTOLIC BLOOD PRESSURE: 62 MMHG | RESPIRATION RATE: 16 BRPM

## 2019-02-28 DIAGNOSIS — Z23 ENCOUNTER FOR IMMUNIZATION: ICD-10-CM

## 2019-02-28 DIAGNOSIS — Z00.00 ENCOUNTER FOR GENERAL ADULT MEDICAL EXAMINATION W/OUT ABNORMAL FINDINGS: ICD-10-CM

## 2019-02-28 PROCEDURE — G0439: CPT

## 2019-02-28 PROCEDURE — G0438: CPT

## 2019-02-28 RX ORDER — PANTOPRAZOLE 40 MG/1
40 TABLET, DELAYED RELEASE ORAL
Qty: 30 | Refills: 0 | Status: DISCONTINUED | COMMUNITY
Start: 2018-01-07 | End: 2019-02-28

## 2019-02-28 RX ORDER — LEVOTHYROXINE SODIUM 137 UG/1
TABLET ORAL
Refills: 0 | Status: DISCONTINUED | COMMUNITY
End: 2019-02-28

## 2019-03-06 LAB — HBA1C MFR BLD HPLC: 6.9

## 2019-03-16 NOTE — PHYSICAL EXAM
[No Rash] : no rash [Normal Affect] : the affect was normal [General Appearance - Alert] : alert [General Appearance - In No Acute Distress] : in no acute distress [Sclera] : the sclera and conjunctiva were normal [PERRL With Normal Accommodation] : pupils were equal in size, round, and reactive to light [Extraocular Movements] : extraocular movements were intact [Outer Ear] : the ears and nose were normal in appearance [Oropharynx] : the oropharynx was normal [Neck Cervical Mass (___cm)] : no neck mass was observed [Jugular Venous Distention Increased] : there was no jugular-venous distention [Thyroid Diffuse Enlargement] : the thyroid was not enlarged [Respiration, Rhythm And Depth] : normal respiratory rhythm and effort [Bowel Sounds] : normal bowel sounds [Abdomen Soft] : soft [Abdomen Tenderness] : non-tender [] : no hepato-splenomegaly [Cervical Lymph Nodes Enlarged Posterior Bilaterally] : posterior cervical [Cervical Lymph Nodes Enlarged Anterior Bilaterally] : anterior cervical [Supraclavicular Lymph Nodes Enlarged Bilaterally] : supraclavicular [No CVA Tenderness] : no ~M costovertebral angle tenderness [Nail Clubbing] : no clubbing  or cyanosis of the fingernails [de-identified] : Seborrheic keratoses are present [FreeTextEntry1] : Seborrheic keratoses are present

## 2019-03-16 NOTE — PLAN
[FreeTextEntry1] : 1. Continue with medication as outlined above.\par \par 2. We will now attempt to increase the torsemide, to 20 mg daily for 7 days in a row. We will monitor her weight carefully. I would like to see the weight closer to 161 pounds if possible. We will need to monitor for signs of dizziness given the fact that her blood pressure is somewhat marginal today. If she does become symptomatic, then we will need to return to her standard dose of the medication. Again, salt intake has been forbidden. If there is no improvement at all in her lower extremity edema, nor in her weight, I would consider a short course of Zaroxolyn if her pressure can tolerate it. Further recommendations will be made after one week.\par \par 3. The second shingles vaccine will be obtained a local pharmacy.\par \par 4. The patient needs routine cardiac followup with Dr. Mary knutson regarding her aortic valve placement. She will need a repeat echocardiogram to assess the valve, as well as her pulmonary hypertension.\par \par 5. The patient will schedule an appointment with Dr. Proctor for a hearing assessment and wax debridement of the right external auditory canal\par \par 6. Followup in 6 months with pulmonary function testing and EKG.\par \par 7. Routine blood work will be performed in the next several days with a home blood draw.\par \par 8. Leg elevation, and compression stockings have been recommended, but this will be difficult for the patient as she lives alone.

## 2019-03-16 NOTE — REVIEW OF SYSTEMS
[Recent Change In Weight] : ~T recent weight change [Constipation] : constipation [Incontinence] : incontinence [Negative] : Heme/Lymph [FreeTextEntry2] : See history present illness

## 2019-03-16 NOTE — HISTORY OF PRESENT ILLNESS
[de-identified] : Patient presents today for a wellness evaluation.\par \par Overall, the patient states that she has been relatively stable. Been fairly compliant with her medications taking them on a daily basis. Recently, her dietary choices have not been great. She states that she is attempting to minimize her salt intake, however, she does consume a moderate amount of dairy-type products including cheese. She notes that her weight had been fairly stable at approximately 161 pounds for quite some time. Recently, her weight has increased up to 165 pounds, on her home scale. She feels as if there has been increase swelling of her lower extremities recently. She continues to take Demadex 20 mg for 4 days straight, and then she takes one day off, before resuming the four-day cycle once again.\par \par The patient has had some issues with constipation. She denies any dyspnea or dyspnea on exertion. She is primarily housebound at this time. She denies paroxysmal nocturnal dyspnea. She denies chest pain. Her appetite is good. She does complain of discomfort in her left shoulder. Overall, her hearing is diminished. She now comes in for this assessment.

## 2019-03-21 ENCOUNTER — APPOINTMENT (OUTPATIENT)
Dept: INTERNAL MEDICINE | Facility: CLINIC | Age: 84
End: 2019-03-21

## 2019-03-28 ENCOUNTER — APPOINTMENT (OUTPATIENT)
Dept: INTERNAL MEDICINE | Facility: CLINIC | Age: 84
End: 2019-03-28
Payer: MEDICARE

## 2019-03-28 VITALS
DIASTOLIC BLOOD PRESSURE: 76 MMHG | HEART RATE: 96 BPM | RESPIRATION RATE: 16 BRPM | TEMPERATURE: 97.6 F | OXYGEN SATURATION: 99 % | WEIGHT: 165 LBS | BODY MASS INDEX: 28.17 KG/M2 | HEIGHT: 64 IN | SYSTOLIC BLOOD PRESSURE: 120 MMHG

## 2019-03-28 VITALS — DIASTOLIC BLOOD PRESSURE: 60 MMHG | SYSTOLIC BLOOD PRESSURE: 106 MMHG

## 2019-03-28 DIAGNOSIS — I87.2 VENOUS INSUFFICIENCY (CHRONIC) (PERIPHERAL): ICD-10-CM

## 2019-03-28 DIAGNOSIS — E87.70 FLUID OVERLOAD, UNSPECIFIED: ICD-10-CM

## 2019-03-28 DIAGNOSIS — I27.20 PULMONARY HYPERTENSION, UNSPECIFIED: ICD-10-CM

## 2019-03-28 DIAGNOSIS — I25.10 ATHEROSCLEROTIC HEART DISEASE OF NATIVE CORONARY ARTERY W/OUT ANGINA PECTORIS: ICD-10-CM

## 2019-03-28 PROCEDURE — 99214 OFFICE O/P EST MOD 30 MIN: CPT

## 2019-03-28 RX ORDER — ACETAMINOPHEN 650 MG/1
650 TABLET, FILM COATED, EXTENDED RELEASE ORAL
Refills: 0 | Status: ACTIVE | COMMUNITY
Start: 2019-03-28

## 2019-03-28 NOTE — PHYSICAL EXAM
[General Appearance - Alert] : alert [General Appearance - In No Acute Distress] : in no acute distress [Sclera] : the sclera and conjunctiva were normal [PERRL With Normal Accommodation] : pupils were equal in size, round, and reactive to light [Extraocular Movements] : extraocular movements were intact [Outer Ear] : the ears and nose were normal in appearance [Oropharynx] : the oropharynx was normal [Neck Cervical Mass (___cm)] : no neck mass was observed [Jugular Venous Distention Increased] : there was no jugular-venous distention [Thyroid Diffuse Enlargement] : the thyroid was not enlarged [Respiration, Rhythm And Depth] : normal respiratory rhythm and effort [Bowel Sounds] : normal bowel sounds [Abdomen Soft] : soft [Abdomen Tenderness] : non-tender [] : no hepato-splenomegaly [Nail Clubbing] : no clubbing  or cyanosis of the fingernails [FreeTextEntry1] : Seborrheic keratoses are present

## 2019-03-28 NOTE — ASSESSMENT
[FreeTextEntry1] : \par \par At this time continue with Tosemide 20 mg qd with close monitoring of weight.  If weight should increase >168 with increasing pedal edema consider an additional torsemide 20 mg.  Prefer that this done infrequently as previous attempts to push diuretic dose resulted in orthostatic hypotension.  \par Sildenafil as per .  \par \par Chek BMP today\par \par FU with Dr. Blakely regarding shoulder derangement.\par \par FU with  with EKG/ PFT in September.

## 2019-03-28 NOTE — HISTORY OF PRESENT ILLNESS
[FreeTextEntry1] : FU edema/diastolic dysfunction/PHTN [de-identified] : After her last visit here with  she was evaluated by  on 3/14. Due to persistent LE edema Torsemide was increased 20mg qd. In addition she was started on sildenafil 20 mg tid which son  started her on 3/25/19 but only taking QD.\par Home weights last week near 170.  Her son added an additional dose of Torsemide 20 mg on 3/25, 3/26 with significant improvement in her edema.  Home BP generally >100 sys\par L shoulder pain - to see  this week.  She has s/O frozen shoulder.  \par \par \par Seen by Dr. Hernandez on 3/14. Echcardiogram revealed elevated pulmonary artery pressures w/ a PAS of approx 50. He re-started the Revatio, and placed her on Torsemide on a daily basis. Her weighjt was 170 lbs in his office. He will consider a MEMS device. \par

## 2019-04-01 LAB
ANION GAP SERPL CALC-SCNC: 11 MMOL/L
BUN SERPL-MCNC: 13 MG/DL
CALCIUM SERPL-MCNC: 9.4 MG/DL
CHLORIDE SERPL-SCNC: 100 MMOL/L
CO2 SERPL-SCNC: 29 MMOL/L
CREAT SERPL-MCNC: 0.7 MG/DL
GLUCOSE SERPL-MCNC: 167 MG/DL
POTASSIUM SERPL-SCNC: 4.2 MMOL/L
SODIUM SERPL-SCNC: 140 MMOL/L

## 2019-04-02 ENCOUNTER — RX RENEWAL (OUTPATIENT)
Age: 84
End: 2019-04-02

## 2019-04-10 NOTE — PATIENT PROFILE ADULT. - SUPPORT PERSON PHONE
[phone] Post-Care Instructions: Patient instructed to not lie down for 4 hours and limit physical activity for 24 hours. Patient instructed not to travel by airplane for 48 hours.

## 2019-05-10 ENCOUNTER — MEDICATION RENEWAL (OUTPATIENT)
Age: 84
End: 2019-05-10

## 2019-05-16 NOTE — ED ADULT TRIAGE NOTE - DIRECT TO ROOM CARE INITIATED:
31-May-2017 11:33 Island Pedicle Flap Text: The defect edges were debeveled with a #15 scalpel blade.  Given the location of the defect, shape of the defect and the proximity to free margins an island pedicle advancement flap was deemed most appropriate.  Using a sterile surgical marker, an appropriate advancement flap was drawn incorporating the defect, outlining the appropriate donor tissue and placing the expected incisions within the relaxed skin tension lines where possible.    The area thus outlined was incised deep to adipose tissue with a #15 scalpel blade.  The skin margins were undermined to an appropriate distance in all directions around the primary defect and laterally outward around the island pedicle utilizing iris scissors.  There was minimal undermining beneath the pedicle flap.

## 2019-05-24 ENCOUNTER — MEDICATION RENEWAL (OUTPATIENT)
Age: 84
End: 2019-05-24

## 2019-05-30 ENCOUNTER — MEDICATION RENEWAL (OUTPATIENT)
Age: 84
End: 2019-05-30

## 2019-06-20 ENCOUNTER — RX RENEWAL (OUTPATIENT)
Age: 84
End: 2019-06-20

## 2019-06-23 ENCOUNTER — INPATIENT (INPATIENT)
Facility: HOSPITAL | Age: 84
LOS: 7 days | Discharge: NURSING, REHAB OR LONG-TERM CARE | End: 2019-07-01
Attending: INTERNAL MEDICINE | Admitting: INTERNAL MEDICINE
Payer: MEDICARE

## 2019-06-23 VITALS
SYSTOLIC BLOOD PRESSURE: 122 MMHG | WEIGHT: 166.89 LBS | HEIGHT: 64 IN | TEMPERATURE: 98 F | OXYGEN SATURATION: 94 % | HEART RATE: 83 BPM | DIASTOLIC BLOOD PRESSURE: 62 MMHG | RESPIRATION RATE: 18 BRPM

## 2019-06-23 DIAGNOSIS — Z90.49 ACQUIRED ABSENCE OF OTHER SPECIFIED PARTS OF DIGESTIVE TRACT: Chronic | ICD-10-CM

## 2019-06-23 DIAGNOSIS — Z96.652 PRESENCE OF LEFT ARTIFICIAL KNEE JOINT: Chronic | ICD-10-CM

## 2019-06-23 DIAGNOSIS — Z95.828 PRESENCE OF OTHER VASCULAR IMPLANTS AND GRAFTS: Chronic | ICD-10-CM

## 2019-06-23 LAB
ADD ON TEST-SPECIMEN IN LAB: SIGNIFICANT CHANGE UP
ALBUMIN SERPL ELPH-MCNC: 3.1 G/DL — LOW (ref 3.3–5)
ALP SERPL-CCNC: 84 U/L — SIGNIFICANT CHANGE UP (ref 40–120)
ALT FLD-CCNC: 11 U/L — LOW (ref 12–78)
ANION GAP SERPL CALC-SCNC: 8 MMOL/L — SIGNIFICANT CHANGE UP (ref 5–17)
APPEARANCE UR: CLEAR — SIGNIFICANT CHANGE UP
APTT BLD: 36.4 SEC — HIGH (ref 27.5–36.3)
AST SERPL-CCNC: 17 U/L — SIGNIFICANT CHANGE UP (ref 15–37)
BASOPHILS # BLD AUTO: 0.05 K/UL — SIGNIFICANT CHANGE UP (ref 0–0.2)
BASOPHILS NFR BLD AUTO: 0.9 % — SIGNIFICANT CHANGE UP (ref 0–2)
BILIRUB SERPL-MCNC: 0.6 MG/DL — SIGNIFICANT CHANGE UP (ref 0.2–1.2)
BILIRUB UR-MCNC: NEGATIVE — SIGNIFICANT CHANGE UP
BUN SERPL-MCNC: 13 MG/DL — SIGNIFICANT CHANGE UP (ref 7–23)
CALCIUM SERPL-MCNC: 9.1 MG/DL — SIGNIFICANT CHANGE UP (ref 8.5–10.1)
CHLORIDE SERPL-SCNC: 105 MMOL/L — SIGNIFICANT CHANGE UP (ref 96–108)
CO2 SERPL-SCNC: 28 MMOL/L — SIGNIFICANT CHANGE UP (ref 22–31)
COLOR SPEC: YELLOW — SIGNIFICANT CHANGE UP
CREAT SERPL-MCNC: 0.87 MG/DL — SIGNIFICANT CHANGE UP (ref 0.5–1.3)
CRP SERPL-MCNC: 0.22 MG/DL — SIGNIFICANT CHANGE UP (ref 0–0.4)
DIFF PNL FLD: NEGATIVE — SIGNIFICANT CHANGE UP
EOSINOPHIL # BLD AUTO: 0.25 K/UL — SIGNIFICANT CHANGE UP (ref 0–0.5)
EOSINOPHIL NFR BLD AUTO: 4.5 % — SIGNIFICANT CHANGE UP (ref 0–6)
EPI CELLS # UR: SIGNIFICANT CHANGE UP
ERYTHROCYTE [SEDIMENTATION RATE] IN BLOOD: 31 MM/HR — HIGH (ref 0–20)
GLUCOSE BLDC GLUCOMTR-MCNC: 133 MG/DL — HIGH (ref 70–99)
GLUCOSE BLDC GLUCOMTR-MCNC: 163 MG/DL — HIGH (ref 70–99)
GLUCOSE BLDC GLUCOMTR-MCNC: 173 MG/DL — HIGH (ref 70–99)
GLUCOSE SERPL-MCNC: 168 MG/DL — HIGH (ref 70–99)
GLUCOSE UR QL: NEGATIVE MG/DL — SIGNIFICANT CHANGE UP
HCT VFR BLD CALC: 36.3 % — SIGNIFICANT CHANGE UP (ref 34.5–45)
HGB BLD-MCNC: 11.7 G/DL — SIGNIFICANT CHANGE UP (ref 11.5–15.5)
IMM GRANULOCYTES NFR BLD AUTO: 0.4 % — SIGNIFICANT CHANGE UP (ref 0–1.5)
INR BLD: 1.8 RATIO — HIGH (ref 0.88–1.16)
KETONES UR-MCNC: NEGATIVE — SIGNIFICANT CHANGE UP
LEUKOCYTE ESTERASE UR-ACNC: NEGATIVE — SIGNIFICANT CHANGE UP
LYMPHOCYTES # BLD AUTO: 0.73 K/UL — LOW (ref 1–3.3)
LYMPHOCYTES # BLD AUTO: 13.2 % — SIGNIFICANT CHANGE UP (ref 13–44)
MCHC RBC-ENTMCNC: 32.1 PG — SIGNIFICANT CHANGE UP (ref 27–34)
MCHC RBC-ENTMCNC: 32.2 GM/DL — SIGNIFICANT CHANGE UP (ref 32–36)
MCV RBC AUTO: 99.7 FL — SIGNIFICANT CHANGE UP (ref 80–100)
MONOCYTES # BLD AUTO: 0.75 K/UL — SIGNIFICANT CHANGE UP (ref 0–0.9)
MONOCYTES NFR BLD AUTO: 13.5 % — SIGNIFICANT CHANGE UP (ref 2–14)
NEUTROPHILS # BLD AUTO: 3.74 K/UL — SIGNIFICANT CHANGE UP (ref 1.8–7.4)
NEUTROPHILS NFR BLD AUTO: 67.5 % — SIGNIFICANT CHANGE UP (ref 43–77)
NITRITE UR-MCNC: NEGATIVE — SIGNIFICANT CHANGE UP
PH UR: 8 — SIGNIFICANT CHANGE UP (ref 5–8)
PLATELET # BLD AUTO: 161 K/UL — SIGNIFICANT CHANGE UP (ref 150–400)
POTASSIUM SERPL-MCNC: 4 MMOL/L — SIGNIFICANT CHANGE UP (ref 3.5–5.3)
POTASSIUM SERPL-SCNC: 4 MMOL/L — SIGNIFICANT CHANGE UP (ref 3.5–5.3)
PROT SERPL-MCNC: 7.2 GM/DL — SIGNIFICANT CHANGE UP (ref 6–8.3)
PROT UR-MCNC: 15 MG/DL
PROTHROM AB SERPL-ACNC: 20.3 SEC — HIGH (ref 10–12.9)
RBC # BLD: 3.64 M/UL — LOW (ref 3.8–5.2)
RBC # FLD: 16.1 % — HIGH (ref 10.3–14.5)
RBC CASTS # UR COMP ASSIST: ABNORMAL /HPF (ref 0–4)
SODIUM SERPL-SCNC: 141 MMOL/L — SIGNIFICANT CHANGE UP (ref 135–145)
SP GR SPEC: 1.01 — SIGNIFICANT CHANGE UP (ref 1.01–1.02)
TROPONIN I SERPL-MCNC: <0.015 NG/ML — SIGNIFICANT CHANGE UP (ref 0.01–0.04)
TROPONIN I SERPL-MCNC: <0.015 NG/ML — SIGNIFICANT CHANGE UP (ref 0.01–0.04)
URATE SERPL-MCNC: 7 MG/DL — SIGNIFICANT CHANGE UP (ref 2.5–7)
UROBILINOGEN FLD QL: NEGATIVE MG/DL — SIGNIFICANT CHANGE UP
WBC # BLD: 5.54 K/UL — SIGNIFICANT CHANGE UP (ref 3.8–10.5)
WBC # FLD AUTO: 5.54 K/UL — SIGNIFICANT CHANGE UP (ref 3.8–10.5)
WBC UR QL: NEGATIVE — SIGNIFICANT CHANGE UP

## 2019-06-23 PROCEDURE — 93010 ELECTROCARDIOGRAM REPORT: CPT

## 2019-06-23 PROCEDURE — 71250 CT THORAX DX C-: CPT | Mod: 26

## 2019-06-23 PROCEDURE — 71045 X-RAY EXAM CHEST 1 VIEW: CPT | Mod: 26

## 2019-06-23 PROCEDURE — 73706 CT ANGIO LWR EXTR W/O&W/DYE: CPT | Mod: 26,LT

## 2019-06-23 PROCEDURE — 73562 X-RAY EXAM OF KNEE 3: CPT | Mod: 26,LT

## 2019-06-23 PROCEDURE — 99285 EMERGENCY DEPT VISIT HI MDM: CPT

## 2019-06-23 PROCEDURE — 93926 LOWER EXTREMITY STUDY: CPT | Mod: 26,LT

## 2019-06-23 PROCEDURE — 93971 EXTREMITY STUDY: CPT | Mod: 26,LT

## 2019-06-23 RX ORDER — FUROSEMIDE 40 MG
40 TABLET ORAL EVERY 12 HOURS
Refills: 0 | Status: DISCONTINUED | OUTPATIENT
Start: 2019-06-23 | End: 2019-06-25

## 2019-06-23 RX ORDER — ACETAMINOPHEN 500 MG
1000 TABLET ORAL ONCE
Refills: 0 | Status: COMPLETED | OUTPATIENT
Start: 2019-06-23 | End: 2019-06-23

## 2019-06-23 RX ORDER — SIMVASTATIN 20 MG/1
40 TABLET, FILM COATED ORAL AT BEDTIME
Refills: 0 | Status: DISCONTINUED | OUTPATIENT
Start: 2019-06-23 | End: 2019-07-01

## 2019-06-23 RX ORDER — ESCITALOPRAM OXALATE 10 MG/1
10 TABLET, FILM COATED ORAL DAILY
Refills: 0 | Status: DISCONTINUED | OUTPATIENT
Start: 2019-06-23 | End: 2019-07-01

## 2019-06-23 RX ORDER — LIDOCAINE 4 G/100G
1 CREAM TOPICAL DAILY
Refills: 0 | Status: DISCONTINUED | OUTPATIENT
Start: 2019-06-23 | End: 2019-07-01

## 2019-06-23 RX ORDER — SODIUM CHLORIDE 9 MG/ML
1000 INJECTION, SOLUTION INTRAVENOUS
Refills: 0 | Status: DISCONTINUED | OUTPATIENT
Start: 2019-06-23 | End: 2019-07-01

## 2019-06-23 RX ORDER — INSULIN LISPRO 100/ML
VIAL (ML) SUBCUTANEOUS AT BEDTIME
Refills: 0 | Status: DISCONTINUED | OUTPATIENT
Start: 2019-06-23 | End: 2019-07-01

## 2019-06-23 RX ORDER — POTASSIUM CHLORIDE 20 MEQ
20 PACKET (EA) ORAL DAILY
Refills: 0 | Status: DISCONTINUED | OUTPATIENT
Start: 2019-06-23 | End: 2019-07-01

## 2019-06-23 RX ORDER — GLUCAGON INJECTION, SOLUTION 0.5 MG/.1ML
1 INJECTION, SOLUTION SUBCUTANEOUS ONCE
Refills: 0 | Status: DISCONTINUED | OUTPATIENT
Start: 2019-06-23 | End: 2019-07-01

## 2019-06-23 RX ORDER — PANTOPRAZOLE SODIUM 20 MG/1
40 TABLET, DELAYED RELEASE ORAL
Refills: 0 | Status: DISCONTINUED | OUTPATIENT
Start: 2019-06-23 | End: 2019-07-01

## 2019-06-23 RX ORDER — LEVOTHYROXINE SODIUM 125 MCG
50 TABLET ORAL DAILY
Refills: 0 | Status: DISCONTINUED | OUTPATIENT
Start: 2019-06-23 | End: 2019-07-01

## 2019-06-23 RX ORDER — ACETAMINOPHEN 500 MG
650 TABLET ORAL EVERY 6 HOURS
Refills: 0 | Status: DISCONTINUED | OUTPATIENT
Start: 2019-06-23 | End: 2019-07-01

## 2019-06-23 RX ORDER — LACTOBACILLUS ACIDOPHILUS 100MM CELL
1 CAPSULE ORAL
Qty: 0 | Refills: 0 | DISCHARGE

## 2019-06-23 RX ORDER — MORPHINE SULFATE 50 MG/1
4 CAPSULE, EXTENDED RELEASE ORAL ONCE
Refills: 0 | Status: DISCONTINUED | OUTPATIENT
Start: 2019-06-23 | End: 2019-06-23

## 2019-06-23 RX ORDER — ASPIRIN/CALCIUM CARB/MAGNESIUM 324 MG
81 TABLET ORAL DAILY
Refills: 0 | Status: DISCONTINUED | OUTPATIENT
Start: 2019-06-23 | End: 2019-07-01

## 2019-06-23 RX ORDER — TRAMADOL HYDROCHLORIDE 50 MG/1
50 TABLET ORAL EVERY 6 HOURS
Refills: 0 | Status: DISCONTINUED | OUTPATIENT
Start: 2019-06-23 | End: 2019-06-25

## 2019-06-23 RX ORDER — APIXABAN 2.5 MG/1
5 TABLET, FILM COATED ORAL EVERY 12 HOURS
Refills: 0 | Status: DISCONTINUED | OUTPATIENT
Start: 2019-06-23 | End: 2019-07-01

## 2019-06-23 RX ORDER — METOPROLOL TARTRATE 50 MG
25 TABLET ORAL AT BEDTIME
Refills: 0 | Status: DISCONTINUED | OUTPATIENT
Start: 2019-06-23 | End: 2019-07-01

## 2019-06-23 RX ORDER — DEXTROSE 50 % IN WATER 50 %
25 SYRINGE (ML) INTRAVENOUS ONCE
Refills: 0 | Status: DISCONTINUED | OUTPATIENT
Start: 2019-06-23 | End: 2019-07-01

## 2019-06-23 RX ORDER — ONDANSETRON 8 MG/1
4 TABLET, FILM COATED ORAL EVERY 6 HOURS
Refills: 0 | Status: DISCONTINUED | OUTPATIENT
Start: 2019-06-23 | End: 2019-07-01

## 2019-06-23 RX ORDER — DEXTROSE 50 % IN WATER 50 %
12.5 SYRINGE (ML) INTRAVENOUS ONCE
Refills: 0 | Status: DISCONTINUED | OUTPATIENT
Start: 2019-06-23 | End: 2019-07-01

## 2019-06-23 RX ORDER — METOPROLOL TARTRATE 50 MG
1 TABLET ORAL
Qty: 0 | Refills: 0 | DISCHARGE

## 2019-06-23 RX ORDER — MULTIVIT-MIN/FERROUS GLUCONATE 9 MG/15 ML
1 LIQUID (ML) ORAL DAILY
Refills: 0 | Status: DISCONTINUED | OUTPATIENT
Start: 2019-06-23 | End: 2019-07-01

## 2019-06-23 RX ORDER — SACCHAROMYCES BOULARDII 250 MG
250 POWDER IN PACKET (EA) ORAL
Refills: 0 | Status: DISCONTINUED | OUTPATIENT
Start: 2019-06-23 | End: 2019-07-01

## 2019-06-23 RX ORDER — DOCUSATE SODIUM 100 MG
200 CAPSULE ORAL AT BEDTIME
Refills: 0 | Status: DISCONTINUED | OUTPATIENT
Start: 2019-06-23 | End: 2019-07-01

## 2019-06-23 RX ORDER — DEXTROSE 50 % IN WATER 50 %
15 SYRINGE (ML) INTRAVENOUS ONCE
Refills: 0 | Status: DISCONTINUED | OUTPATIENT
Start: 2019-06-23 | End: 2019-07-01

## 2019-06-23 RX ORDER — FUROSEMIDE 40 MG
40 TABLET ORAL ONCE
Refills: 0 | Status: COMPLETED | OUTPATIENT
Start: 2019-06-23 | End: 2019-06-23

## 2019-06-23 RX ORDER — LEVOTHYROXINE SODIUM 125 MCG
1 TABLET ORAL
Qty: 0 | Refills: 0 | DISCHARGE

## 2019-06-23 RX ORDER — INSULIN LISPRO 100/ML
VIAL (ML) SUBCUTANEOUS
Refills: 0 | Status: DISCONTINUED | OUTPATIENT
Start: 2019-06-23 | End: 2019-07-01

## 2019-06-23 RX ORDER — SENNA PLUS 8.6 MG/1
2 TABLET ORAL AT BEDTIME
Refills: 0 | Status: DISCONTINUED | OUTPATIENT
Start: 2019-06-23 | End: 2019-07-01

## 2019-06-23 RX ADMIN — Medication 25 MILLIGRAM(S): at 21:00

## 2019-06-23 RX ADMIN — MORPHINE SULFATE 4 MILLIGRAM(S): 50 CAPSULE, EXTENDED RELEASE ORAL at 08:32

## 2019-06-23 RX ADMIN — TRAMADOL HYDROCHLORIDE 50 MILLIGRAM(S): 50 TABLET ORAL at 18:42

## 2019-06-23 RX ADMIN — LIDOCAINE 1 PATCH: 4 CREAM TOPICAL at 17:14

## 2019-06-23 RX ADMIN — Medication 40 MILLIGRAM(S): at 11:12

## 2019-06-23 RX ADMIN — APIXABAN 5 MILLIGRAM(S): 2.5 TABLET, FILM COATED ORAL at 17:17

## 2019-06-23 RX ADMIN — MORPHINE SULFATE 4 MILLIGRAM(S): 50 CAPSULE, EXTENDED RELEASE ORAL at 07:56

## 2019-06-23 RX ADMIN — Medication 200 MILLIGRAM(S): at 21:00

## 2019-06-23 RX ADMIN — Medication 2: at 17:17

## 2019-06-23 RX ADMIN — Medication 81 MILLIGRAM(S): at 17:17

## 2019-06-23 RX ADMIN — Medication 1000 MILLIGRAM(S): at 07:20

## 2019-06-23 RX ADMIN — SIMVASTATIN 40 MILLIGRAM(S): 20 TABLET, FILM COATED ORAL at 21:00

## 2019-06-23 RX ADMIN — Medication 250 MILLIGRAM(S): at 17:16

## 2019-06-23 RX ADMIN — Medication 1 DROP(S): at 17:17

## 2019-06-23 RX ADMIN — Medication 20 MILLIEQUIVALENT(S): at 17:16

## 2019-06-23 RX ADMIN — Medication 20 MILLIGRAM(S): at 17:16

## 2019-06-23 RX ADMIN — Medication 40 MILLIGRAM(S): at 17:15

## 2019-06-23 RX ADMIN — Medication 1000 MILLIGRAM(S): at 06:59

## 2019-06-23 RX ADMIN — ESCITALOPRAM OXALATE 10 MILLIGRAM(S): 10 TABLET, FILM COATED ORAL at 17:16

## 2019-06-23 NOTE — PHARMACOTHERAPY INTERVENTION NOTE - COMMENTS
Completed medication history. Home medications reviewed with patient's son and confirmed with Dr First

## 2019-06-23 NOTE — H&P ADULT - ASSESSMENT
89 yo Female with a PMHx of pancreatitis, S/p ERCP,  cirrhosis, DM type II,  L knee replacement, AFIB on Eliquis, CAD s/p stents, severe Pulm HTN, Chronic Right heart failure, Diastolic dysfunction, AS, S/p TAVR admitted for :      1. Hypoxia, weight gain, LE edema  likely due to Acute on chronic diastolic CHF,  acute on Chronic R sided HF   admit to telemetry  monitor weight daily   BNP elevated, repeat in 48h   Trop neg, will trend   CXR: no pleural effusions, + PVC   Monitor Pulse ox and supplement with NC   S/p Lasix 40mg IVP x 1, monitor UO  C/w Lasix 40mg IV BID,  re eval in am if needs to be adjusted   Monitor electrolytes and replace   C/w home cardiac meds: on Toprol, Sildenafil   ECHO   CT chest   Cardio  eval, d/w Dr Hernandez       2. L LE pain and edema/ L knee edema   unclear etiology of acute pain, overall LE edema likely due to volume overload, asymmetric edema likely due  to  poor circulation and venous insufficiency. No signs of infection   LLE Doppler neg for DVT   CT angio neg for arterial occlusion or bleeding, calf edema   Will check ESR/CRP, uric acid possible inflammatory arthritis?    elevate LE  Ice   Control pain  Ortho eval       3. DM Type II   diabetic diet   Check HB A1c  Hold oral hypoglycemics       4. Afib on a/c   C/w Toprol and Eliquis   Rate controlled       5. CAD s/p Stents   No CP, trop neg, EKG: AfIB LBBB  Trend trop to R/o ACS but unlikely  C/w statin, BB, ASA       6. DVT PPxs: on a/c     7. Advance care planning. DNR/DNI discussed with Pt son at bedside, was not sure about it, rochelle discuss with other siblings.     FULL CODE

## 2019-06-23 NOTE — ED ADULT NURSE REASSESSMENT NOTE - NS ED NURSE REASSESS COMMENT FT1
pt received at change of shift. pt c/o left leg pain and discomfort not resolved by resting. pt denies dyspnea. pt medicated per emar. pt pneding CT scan. pt and family updated on POC. will cont to monitor.

## 2019-06-23 NOTE — ED PROVIDER NOTE - CONSTITUTIONAL, MLM
normal... Well appearing, well nourished, awake, alert, oriented to person,  and in moderate distress due to pain.

## 2019-06-23 NOTE — ED PROVIDER NOTE - ENMT, MLM
Addended by: DAYANARA DON on: 12/20/2017 03:18 PM     Modules accepted: Orders     Airway patent, Nasal mucosa clear. Mouth with normal mucosa. Throat has no vesicles, no oropharyngeal exudates and uvula is midline.

## 2019-06-23 NOTE — ED PROVIDER NOTE - CARE PLAN
Principal Discharge DX:	Congestive heart failure, unspecified HF chronicity, unspecified heart failure type  Secondary Diagnosis:	Left leg pain

## 2019-06-23 NOTE — H&P ADULT - HISTORY OF PRESENT ILLNESS
89 yo Female with a PMHx of pancreatitis, cirrhosis, DM, L knee replacement, AFIB on Eliquis 89 yo Female with a PMHx of pancreatitis, S/p ERCP,  cirrhosis, DM type II,  L knee replacement, AFIB on Eliquis, CAD s/p stents, severe Pulm HTN, Chronic Right heart failure, Diastolic dysfunction, AS, S/p TAVR presented to ED c/o L knee and leg pain. Pt reports that had sharp pains whole  night, called her son in am in tears. As per son legs noted to be more swollen   past week, + weight gain, but Pt denies orthopnea, she was complaining feeling weak past few days, but no SOB. No fevers. Also Pt had calf ecchymosis which then resolved and  later developed similar ecchymosis on dorsal foot, resolved now. Was able to ambulate until last night. Pt was  recently seen by Cardio Dr Hernandez and was started on Sildenafil and lasix was  adjusted.  Pt denies CP.      in ED:  WBCs wnl,  VS stable, except Pt noted to be dyspneic and hypoxic on 86-88% on RA. CXR suspected PVC, BNP elevated. CT LLE with calf edema. LLE doppler  negative for DVT   Pt was given 40mg IVP of Lasix.  IV morphine for pain

## 2019-06-23 NOTE — ED ADULT TRIAGE NOTE - CHIEF COMPLAINT QUOTE
Pt presents to ER c/o left lower leg pain. Onset of symptoms began last night at 2300. Pt reports having a blood got in LLE one month ago

## 2019-06-23 NOTE — ED ADULT NURSE REASSESSMENT NOTE - NS ED NURSE REASSESS COMMENT FT1
Patient received from previous nurse. Pt is A&Ox4, VSS on 2L. Pt was complaining of pain in her left leg, Dr. England made aware, MD will assess patient and place orders. Will follow orders as prescribed. Safety and comfort measures in place. Hourly rounding will be done on my time. Will continue to monitor.

## 2019-06-23 NOTE — ED ADULT NURSE NOTE - OBJECTIVE STATEMENT
pt arrives to ED complaining of b/l leg pain and swelling. pt states over the past few days increased swelling and pain in b/l lower extremities, left being worse than right. pt with history of CHF takes lasix daily. +dorsalis pedis pulse felt. denies cp, sob. alert and oriented x 4.

## 2019-06-23 NOTE — CONSULT NOTE ADULT - ASSESSMENT
A/P: 89F s/p L TKA (2014) with diffuse left lower extremity pain/swelling likely secondary to edema/swelling from CHF  Recommend Ice/elevation and anti-inflammatories as tolerated  Reevaluation in AM  Low suspicion for infectious etiology, ESR mildly elevated 31 and CRP normal, afebrile, no leukocytosis  Xrays, Dopplers and CT angio of LLE negative  WBAT LLE, assistive devices as needed  Pain control.  No acute orthopedic surgical intervention  Discussed with Dr. Blakely, agrees with plan above

## 2019-06-23 NOTE — H&P ADULT - NSICDXPASTSURGICALHX_GEN_ALL_CORE_FT
PAST SURGICAL HISTORY:  H/O total knee replacement, left     H/O: Hysterectomy     H/O: Knee Surgery- right meniscus     History of appendectomy     History of cholecystectomy     S/P IVC filter Note that Pt does not have  h/o DVT, outPt doppler was read first as +, but after review reported as no DVT. Pt got IVC filer before that

## 2019-06-23 NOTE — H&P ADULT - NSHPPHYSICALEXAM_GEN_ALL_CORE
Vital Signs Last 24 Hrs  T(C): 36.8 (23 Jun 2019 12:54), Max: 36.8 (23 Jun 2019 12:54)  T(F): 98.2 (23 Jun 2019 12:54), Max: 98.2 (23 Jun 2019 12:54)  HR: 80 (23 Jun 2019 12:54) (80 - 98)  BP: 109/50 (23 Jun 2019 12:54) (105/55 - 129/52)  RR: 19 (23 Jun 2019 12:54) (18 - 20)  SpO2: 96% (23 Jun 2019 12:54) (94% - 97%)    PHYSICAL EXAM:  General: Well developed; frail elderly female; in no acute distress,  on NC   Eyes: PERRLA, EOMI; conjunctiva and sclera clear  Head: Normocephalic; atraumatic  ENMT: No nasal discharge; airway clear  Neck: Supple; non tender; no masses  Respiratory:  decreased BS at bases with bibasilar  rales, No wheezes or rhonchi  Cardiovascular: Irregular rate and rhythm. S1 and S2 Normal; + murmur  Gastrointestinal: Soft non-tender non-distended; Normal bowel sounds  Genitourinary: No  suprapubic tenderness  Extremities: + B/l LE Edema L>R, significant edema to L knee, no erythema or warmth. Decreased ROM   Vascular: Peripheral pulses palpable 1+ bilaterally, chronic  stasis changes, + varicosities   Neurological: Alert and oriented x2, non focal, forgetful   Skin: Warm and dry. No acute rash  Lymph Nodes: No acute cervical adenopathy  Musculoskeletal: Normal muscle tone, without deformities  Psychiatric: Cooperative and appropriate

## 2019-06-23 NOTE — PHARMACOTHERAPY INTERVENTION NOTE - INTERVENTION CATEGORIES
Med Reconciliation [facility] requested refill. Ok to refill Valicyclovir x 1 with outbreak dosage per Dr. Arnold and last office note. Patient should take two tablets, twice daily for three days. Patient should follow up in one year (January) for medication refill per office visit note.

## 2019-06-23 NOTE — ED ADULT NURSE NOTE - NSIMPLEMENTINTERV_GEN_ALL_ED
Implemented All Fall with Harm Risk Interventions:  [place] to call system. Call bell, personal items and telephone within reach. Instruct patient to call for assistance. Room bathroom lighting operational. Non-slip footwear when patient is off stretcher. Physically safe environment: no spills, clutter or unnecessary equipment. Stretcher in lowest position, wheels locked, appropriate side rails in place. Provide visual cue, wrist band, yellow gown, etc. Monitor gait and stability. Monitor for mental status changes and reorient to person, place, and time. Review medications for side effects contributing to fall risk. Reinforce activity limits and safety measures with patient and family. Provide visual clues: red socks.

## 2019-06-23 NOTE — ED PROVIDER NOTE - OBJECTIVE STATEMENT
89 yo pt presents to ED with son for left leg pain.  Pt has been dealing with pain for the last couple of months.  Today the pain is much more sever.  No fall, no travel, no sick contact.  Per son there is some swelling in the legs.  PMD = julio cesar, vascular = Dr. Orosco, cards = Dr. Hernandez.  pt here with son and states she lives alone. 89 yo pt presents to ED with son for left leg pain.  Pt has been dealing with pain on and off for last month.  Yesterday at 11am pt with sudden onset of sever pain left leg.   No fall, no travel, no sick contact.  Per son there is some swelling in the legs.  PMD = julio cesar, vascular = Dr. Orosco, cards = Dr. Hernandez.  pt here with son and states she lives alone.

## 2019-06-23 NOTE — H&P ADULT - NSICDXPASTMEDICALHX_GEN_ALL_CORE_FT
PAST MEDICAL HISTORY:  Afib     Aortic stenosis     CAD (coronary artery disease)     Diabetes Mellitus Type II     Dyslipidemia     GERD (Gastroesophageal Reflux Disease)     History of Osteoarthritis     History of pancreatitis     HLD (hyperlipidemia)     Hypertension     Hypothyroid

## 2019-06-23 NOTE — ED ADULT NURSE REASSESSMENT NOTE - NS ED NURSE REASSESS COMMENT FT1
pt w/ [id number] on monitor on RA to 85%. pt denies dyspnea or pain. pt placed on 2lpm NC w/ improvement in Sp02 to 95%. pt w/ stated relief in leg pain after morphine. pt repositioned to comfort. Primafit applied for comfort. pending dispo. will cont to monitor.

## 2019-06-23 NOTE — ED PROVIDER NOTE - PROGRESS NOTE DETAILS
Maya Caraballo for ED Attending Dr. Palmer: called and discussed with son Dr. Mojica, pt BNP normally 600. Update, pt with possible CHF exacerbation,  uncertain cause for pain in left leg. Attending jaime Palmer/w Dr. Pro for admission Maya Carabalol for ED Attending Dr. Palmer: called and discussed with son Dr. Mojica, pt BNP normally 600 as outpt. Update, pt with possible CHF exacerbation,  uncertain cause for pain in left leg.

## 2019-06-24 DIAGNOSIS — I48.2 CHRONIC ATRIAL FIBRILLATION: ICD-10-CM

## 2019-06-24 DIAGNOSIS — R09.02 HYPOXEMIA: ICD-10-CM

## 2019-06-24 DIAGNOSIS — I27.20 PULMONARY HYPERTENSION, UNSPECIFIED: ICD-10-CM

## 2019-06-24 DIAGNOSIS — R06.02 SHORTNESS OF BREATH: ICD-10-CM

## 2019-06-24 DIAGNOSIS — I50.9 HEART FAILURE, UNSPECIFIED: ICD-10-CM

## 2019-06-24 LAB
ADD ON TEST-SPECIMEN IN LAB: SIGNIFICANT CHANGE UP
ANION GAP SERPL CALC-SCNC: 5 MMOL/L — SIGNIFICANT CHANGE UP (ref 5–17)
BASOPHILS # BLD AUTO: 0.04 K/UL — SIGNIFICANT CHANGE UP (ref 0–0.2)
BASOPHILS NFR BLD AUTO: 0.6 % — SIGNIFICANT CHANGE UP (ref 0–2)
BUN SERPL-MCNC: 12 MG/DL — SIGNIFICANT CHANGE UP (ref 7–23)
CALCIUM SERPL-MCNC: 8.8 MG/DL — SIGNIFICANT CHANGE UP (ref 8.5–10.1)
CHLORIDE SERPL-SCNC: 100 MMOL/L — SIGNIFICANT CHANGE UP (ref 96–108)
CO2 SERPL-SCNC: 30 MMOL/L — SIGNIFICANT CHANGE UP (ref 22–31)
CREAT SERPL-MCNC: 0.73 MG/DL — SIGNIFICANT CHANGE UP (ref 0.5–1.3)
CULTURE RESULTS: SIGNIFICANT CHANGE UP
EOSINOPHIL # BLD AUTO: 0.3 K/UL — SIGNIFICANT CHANGE UP (ref 0–0.5)
EOSINOPHIL NFR BLD AUTO: 4.7 % — SIGNIFICANT CHANGE UP (ref 0–6)
GLUCOSE BLDC GLUCOMTR-MCNC: 128 MG/DL — HIGH (ref 70–99)
GLUCOSE BLDC GLUCOMTR-MCNC: 133 MG/DL — HIGH (ref 70–99)
GLUCOSE BLDC GLUCOMTR-MCNC: 194 MG/DL — HIGH (ref 70–99)
GLUCOSE BLDC GLUCOMTR-MCNC: 201 MG/DL — HIGH (ref 70–99)
GLUCOSE SERPL-MCNC: 135 MG/DL — HIGH (ref 70–99)
HBA1C BLD-MCNC: 7 % — HIGH (ref 4–5.6)
HCT VFR BLD CALC: 35.2 % — SIGNIFICANT CHANGE UP (ref 34.5–45)
HGB BLD-MCNC: 11.2 G/DL — LOW (ref 11.5–15.5)
IMM GRANULOCYTES NFR BLD AUTO: 0.3 % — SIGNIFICANT CHANGE UP (ref 0–1.5)
LYMPHOCYTES # BLD AUTO: 0.8 K/UL — LOW (ref 1–3.3)
LYMPHOCYTES # BLD AUTO: 12.5 % — LOW (ref 13–44)
MCHC RBC-ENTMCNC: 31.8 GM/DL — LOW (ref 32–36)
MCHC RBC-ENTMCNC: 31.8 PG — SIGNIFICANT CHANGE UP (ref 27–34)
MCV RBC AUTO: 100 FL — SIGNIFICANT CHANGE UP (ref 80–100)
MONOCYTES # BLD AUTO: 0.93 K/UL — HIGH (ref 0–0.9)
MONOCYTES NFR BLD AUTO: 14.6 % — HIGH (ref 2–14)
NEUTROPHILS # BLD AUTO: 4.3 K/UL — SIGNIFICANT CHANGE UP (ref 1.8–7.4)
NEUTROPHILS NFR BLD AUTO: 67.3 % — SIGNIFICANT CHANGE UP (ref 43–77)
PLATELET # BLD AUTO: 138 K/UL — LOW (ref 150–400)
POTASSIUM SERPL-MCNC: 4 MMOL/L — SIGNIFICANT CHANGE UP (ref 3.5–5.3)
POTASSIUM SERPL-SCNC: 4 MMOL/L — SIGNIFICANT CHANGE UP (ref 3.5–5.3)
RBC # BLD: 3.52 M/UL — LOW (ref 3.8–5.2)
RBC # FLD: 16 % — HIGH (ref 10.3–14.5)
SODIUM SERPL-SCNC: 135 MMOL/L — SIGNIFICANT CHANGE UP (ref 135–145)
SPECIMEN SOURCE: SIGNIFICANT CHANGE UP
URATE SERPL-MCNC: 6.7 MG/DL — SIGNIFICANT CHANGE UP (ref 2.5–7)
WBC # BLD: 6.39 K/UL — SIGNIFICANT CHANGE UP (ref 3.8–10.5)
WBC # FLD AUTO: 6.39 K/UL — SIGNIFICANT CHANGE UP (ref 3.8–10.5)

## 2019-06-24 PROCEDURE — 99221 1ST HOSP IP/OBS SF/LOW 40: CPT

## 2019-06-24 PROCEDURE — 99223 1ST HOSP IP/OBS HIGH 75: CPT

## 2019-06-24 PROCEDURE — 93010 ELECTROCARDIOGRAM REPORT: CPT

## 2019-06-24 RX ORDER — MORPHINE SULFATE 50 MG/1
3 CAPSULE, EXTENDED RELEASE ORAL EVERY 6 HOURS
Refills: 0 | Status: DISCONTINUED | OUTPATIENT
Start: 2019-06-24 | End: 2019-06-25

## 2019-06-24 RX ADMIN — Medication 40 MILLIGRAM(S): at 05:18

## 2019-06-24 RX ADMIN — ESCITALOPRAM OXALATE 10 MILLIGRAM(S): 10 TABLET, FILM COATED ORAL at 11:50

## 2019-06-24 RX ADMIN — APIXABAN 5 MILLIGRAM(S): 2.5 TABLET, FILM COATED ORAL at 05:18

## 2019-06-24 RX ADMIN — Medication 200 MILLIGRAM(S): at 21:51

## 2019-06-24 RX ADMIN — TRAMADOL HYDROCHLORIDE 50 MILLIGRAM(S): 50 TABLET ORAL at 07:03

## 2019-06-24 RX ADMIN — LIDOCAINE 1 PATCH: 4 CREAM TOPICAL at 05:24

## 2019-06-24 RX ADMIN — Medication 40 MILLIGRAM(S): at 18:16

## 2019-06-24 RX ADMIN — Medication 20 MILLIGRAM(S): at 05:18

## 2019-06-24 RX ADMIN — Medication 1 TABLET(S): at 11:51

## 2019-06-24 RX ADMIN — Medication 2: at 12:27

## 2019-06-24 RX ADMIN — TRAMADOL HYDROCHLORIDE 50 MILLIGRAM(S): 50 TABLET ORAL at 07:01

## 2019-06-24 RX ADMIN — SIMVASTATIN 40 MILLIGRAM(S): 20 TABLET, FILM COATED ORAL at 21:51

## 2019-06-24 RX ADMIN — MORPHINE SULFATE 3 MILLIGRAM(S): 50 CAPSULE, EXTENDED RELEASE ORAL at 16:28

## 2019-06-24 RX ADMIN — PANTOPRAZOLE SODIUM 40 MILLIGRAM(S): 20 TABLET, DELAYED RELEASE ORAL at 05:18

## 2019-06-24 RX ADMIN — Medication 250 MILLIGRAM(S): at 18:16

## 2019-06-24 RX ADMIN — Medication 25 MILLIGRAM(S): at 21:51

## 2019-06-24 RX ADMIN — Medication 50 MICROGRAM(S): at 05:18

## 2019-06-24 RX ADMIN — LIDOCAINE 1 PATCH: 4 CREAM TOPICAL at 11:51

## 2019-06-24 RX ADMIN — Medication 81 MILLIGRAM(S): at 11:50

## 2019-06-24 RX ADMIN — Medication 1 DROP(S): at 18:18

## 2019-06-24 RX ADMIN — LIDOCAINE 1 PATCH: 4 CREAM TOPICAL at 19:45

## 2019-06-24 RX ADMIN — Medication 20 MILLIEQUIVALENT(S): at 11:50

## 2019-06-24 RX ADMIN — Medication 20 MILLIGRAM(S): at 18:16

## 2019-06-24 RX ADMIN — Medication 250 MILLIGRAM(S): at 05:18

## 2019-06-24 RX ADMIN — Medication 1 DROP(S): at 05:19

## 2019-06-24 RX ADMIN — MORPHINE SULFATE 3 MILLIGRAM(S): 50 CAPSULE, EXTENDED RELEASE ORAL at 17:45

## 2019-06-24 RX ADMIN — APIXABAN 5 MILLIGRAM(S): 2.5 TABLET, FILM COATED ORAL at 18:17

## 2019-06-24 NOTE — CONSULT NOTE ADULT - ASSESSMENT
89 yo Female with a PMHx of pancreatitis, S/p ERCP,  cirrhosis, DM type II,  L knee replacement, AFIB on Eliquis, CAD s/p stents, severe Pulm HTN, Chronic Right heart failure, Diastolic dysfunction, AS, S/p TAVR presented to ED c/o L knee and leg pain. Patient has hx of left TKA in 2014, already evaluated by orthopedics this admission, no orthopedic intervention.  Her left leg is diffusely painful, making fluid retention/leg edema the probable cause of her leg pain.  DVT/vascular occlusion has been ruled out.  She denies any hx of crystalline arthritis/gout, and while the physical exam is not too impressive for gout,  the timing of her symptoms in the setting of recent alterations in diuretic, the seemingly abrupt nature of symptoms, with more significant pain on left side, there is consideration for this playing a role as well.      Plan:  -Patient is on diuretics  -Pain control as ordered.   -PT as tolerated  -Continue to observe progress on current therapy one more day.  If still considerable pain/inability to ambulate tomorrow, consider adding trial of low dose steroid 15mg IV solumedrol daily x 2-3 days if ok from cardiopulmonary standpoint.   -will follow

## 2019-06-24 NOTE — PROGRESS NOTE ADULT - ASSESSMENT
89 yo Female with a PMHx of pancreatitis, S/p ERCP,  cirrhosis, DM type II,  L knee replacement, AFIB on Eliquis, CAD s/p stents, severe Pulm HTN, Chronic Right heart failure, Diastolic dysfunction, AS, S/p TAVR presented to ED c/o L knee and leg pain.     #Left leg/ knee pain/ edema:    Unclear if all related to CHF/ LE edema/ fluid overload.    No hx of trauma as per patient.    CT Left LE reviewed-- no fracture or acute pathology.    No obvious cellulitis and no WBC or fever.    Uric acid level WNL and no hx of gout.    Ortho eval appreciated-- no intervention.    PT eval.    Will consult RHEUM for eval to r/o other causes of joint disease/ joint pain.      #Acute hypoxic Respiratory Failure due to Acute on Chronic Diastolic CHF Exacerbation:    Pt with hypoxia (86% on RA on admission)/ pulmonary edema on exam.    Cont lasix 40 IV BID.    Cont spironolactone.    Cardio f/u.    Daily weights.  166 lbs on 6/24.    Last ECHO 2014 in our system-- will check.      #Severe Pulmonary HTN:    Cont revatio.      #DM:    Cont meds.  Controlled.      #Hx of Severe AS s/p TAVR:    Stable.  Check ECHO.      #Afib:    Cont eliqius/ BB

## 2019-06-24 NOTE — PHYSICAL THERAPY INITIAL EVALUATION ADULT - IMPAIRMENTS FOUND, PT EVAL
aerobic capacity/endurance/gross motor/arousal, attention, and cognition/gait, locomotion, and balance

## 2019-06-24 NOTE — PHYSICAL THERAPY INITIAL EVALUATION ADULT - DIAGNOSIS, PT EVAL
CHF, pulmonary hypertension CHF, pulmonary hypertension; Left leg/ knee pain/ edema-no orthopedic intervention required;  cute hypoxic Respiratory Failure due to Acute on Chronic Diastolic CHF Exacerbation:

## 2019-06-24 NOTE — PROGRESS NOTE ADULT - ASSESSMENT
A/P: 89F s/p L TKA (2014) with diffuse left lower extremity pain/swelling likely secondary to edema/swelling from CHF  Recommend Ice/elevation and anti-inflammatories as tolerated  Pain improved this morning  Low suspicion for infectious etiology, ESR mildly elevated 31 and CRP normal, afebrile, no leukocytosis  Xrays, Dopplers and CT angio of LLE negative  WBAT LLE, assistive devices as needed  Pain control.  No acute orthopedic surgical intervention  Can follow up with Dr. Blakely in office when discharged as needed   Will discuss with Dr. Blakely and advise if plan changes

## 2019-06-24 NOTE — PHYSICAL THERAPY INITIAL EVALUATION ADULT - MODALITIES TREATMENT COMMENTS
Unable to perform functional mobility assessment as patient too pain to move actively in the bed. Maximal Assistance Lift, such as [place]des or No Stedy, is recommended for OOB transfers for safe staff and patient handling. HARSHIL, RN informed of session/status.

## 2019-06-24 NOTE — PHYSICAL THERAPY INITIAL EVALUATION ADULT - PLANNED THERAPY INTERVENTIONS, PT EVAL
gait training/transfer training/today: eval only, patient to able to tolerate therapeutic exercises/bed mobility training

## 2019-06-24 NOTE — PHYSICAL THERAPY INITIAL EVALUATION ADULT - PASSIVE RANGE OF MOTION EXAMINATION, REHAB EVAL
Left UE Passive ROM was WFL (within functional limits)/shoulder to 90* with pain with all motions. B hip PROM to 45* of flexion, AB/ADDuction, Unable to assess B knee mobilty-pt not able to move actively and refusing PROM as hand placement on posterior legs create too much pain.

## 2019-06-24 NOTE — PHYSICAL THERAPY INITIAL EVALUATION ADULT - PERTINENT HX OF CURRENT PROBLEM, REHAB EVAL
89 yo F admitted c/o L knee and leg pain. Pt reports that had sharp pains whole  night, called her son in am in tears. As per son legs noted to be more swollen  past week, + weight gain.  Dopplers (-).  X-ray R knee (-) per ortho resident. 89 yo F admitted c/o L knee and leg pain. Pt reports that had sharp pains whole  night, called her son in am in tears. As per son legs noted to be more swollen  past week, + weight gain.  Dopplers (-).  X-ray R knee (-).

## 2019-06-24 NOTE — CONSULT NOTE ADULT - ASSESSMENT
1. Leg pain - Likely secondary to pedal edema .  So far arterial and venous US did not reveal any pathology .  Ortho team input noted,  Treatment of the pedal edema recommended.    2. Pedal edema - Acute on chronic decompensated HF pEF, complicated by severe pulmonary hypertension,last cath also showed low CO.  Will continue lasix 40mg iv BID.  She might need more diuretics  Will determine tomorrow if no good diruesis on this dose now.  Diuresis with close monitoring of the renal function and electrolytes.  Goal potassium of 4 and magnesium of 2.   Strict I/O and daily wt checks. Low sodium diet. Nutrition education.   Joao stockings     So far no documentation of I/O . Informed RN.    3. Severe pulmonary hypertension- Group II- will treat left heart failure and reasses.     4. Atrial fibrillation -chronic - will continue full dose anticoagulation with apixiban,    5. CAD s/p PCI of LAD in past- stable .continue current meds.    6. s/p Aortic valve replacement- bioprosthetic- stable per last echo 4/2019.  continue to monitor.    Other medical issues- Management per primary team.   Thank you for allowing me to participate in the care of this patient. Please feel free to contact me with any questions.

## 2019-06-24 NOTE — CONSULT NOTE ADULT - ASSESSMENT
Assessment/Plan    - Continue diuresis  - Daily weights-need to use standing scale if possible  - Consider use of spironolactone  - Monitor lytes carefully-may need to push BUN  - Cardiology f/u

## 2019-06-25 LAB
ANION GAP SERPL CALC-SCNC: 7 MMOL/L — SIGNIFICANT CHANGE UP (ref 5–17)
APPEARANCE UR: CLEAR — SIGNIFICANT CHANGE UP
BACTERIA # UR AUTO: ABNORMAL
BILIRUB UR-MCNC: NEGATIVE — SIGNIFICANT CHANGE UP
BUN SERPL-MCNC: 14 MG/DL — SIGNIFICANT CHANGE UP (ref 7–23)
CALCIUM SERPL-MCNC: 8.7 MG/DL — SIGNIFICANT CHANGE UP (ref 8.5–10.1)
CHLORIDE SERPL-SCNC: 100 MMOL/L — SIGNIFICANT CHANGE UP (ref 96–108)
CO2 SERPL-SCNC: 28 MMOL/L — SIGNIFICANT CHANGE UP (ref 22–31)
COLOR SPEC: YELLOW — SIGNIFICANT CHANGE UP
COMMENT - URINE: SIGNIFICANT CHANGE UP
CREAT SERPL-MCNC: 0.57 MG/DL — SIGNIFICANT CHANGE UP (ref 0.5–1.3)
DIFF PNL FLD: ABNORMAL
EPI CELLS # UR: ABNORMAL
GLUCOSE BLDC GLUCOMTR-MCNC: 133 MG/DL — HIGH (ref 70–99)
GLUCOSE BLDC GLUCOMTR-MCNC: 143 MG/DL — HIGH (ref 70–99)
GLUCOSE BLDC GLUCOMTR-MCNC: 162 MG/DL — HIGH (ref 70–99)
GLUCOSE BLDC GLUCOMTR-MCNC: 286 MG/DL — HIGH (ref 70–99)
GLUCOSE SERPL-MCNC: 163 MG/DL — HIGH (ref 70–99)
GLUCOSE UR QL: NEGATIVE MG/DL — SIGNIFICANT CHANGE UP
HCT VFR BLD CALC: 35.7 % — SIGNIFICANT CHANGE UP (ref 34.5–45)
HGB BLD-MCNC: 11.7 G/DL — SIGNIFICANT CHANGE UP (ref 11.5–15.5)
KETONES UR-MCNC: NEGATIVE — SIGNIFICANT CHANGE UP
LACTATE SERPL-SCNC: 1.2 MMOL/L — SIGNIFICANT CHANGE UP (ref 0.7–2)
LEUKOCYTE ESTERASE UR-ACNC: NEGATIVE — SIGNIFICANT CHANGE UP
MCHC RBC-ENTMCNC: 31.9 PG — SIGNIFICANT CHANGE UP (ref 27–34)
MCHC RBC-ENTMCNC: 32.8 GM/DL — SIGNIFICANT CHANGE UP (ref 32–36)
MCV RBC AUTO: 97.3 FL — SIGNIFICANT CHANGE UP (ref 80–100)
NITRITE UR-MCNC: NEGATIVE — SIGNIFICANT CHANGE UP
PH UR: 7 — SIGNIFICANT CHANGE UP (ref 5–8)
PLATELET # BLD AUTO: 134 K/UL — LOW (ref 150–400)
POTASSIUM SERPL-MCNC: 3.7 MMOL/L — SIGNIFICANT CHANGE UP (ref 3.5–5.3)
POTASSIUM SERPL-SCNC: 3.7 MMOL/L — SIGNIFICANT CHANGE UP (ref 3.5–5.3)
PROT UR-MCNC: NEGATIVE MG/DL — SIGNIFICANT CHANGE UP
RBC # BLD: 3.67 M/UL — LOW (ref 3.8–5.2)
RBC # FLD: 15.7 % — HIGH (ref 10.3–14.5)
RBC CASTS # UR COMP ASSIST: SIGNIFICANT CHANGE UP /HPF (ref 0–4)
SODIUM SERPL-SCNC: 135 MMOL/L — SIGNIFICANT CHANGE UP (ref 135–145)
SP GR SPEC: 1 — LOW (ref 1.01–1.02)
UROBILINOGEN FLD QL: 1 MG/DL
WBC # BLD: 8.54 K/UL — SIGNIFICANT CHANGE UP (ref 3.8–10.5)
WBC # FLD AUTO: 8.54 K/UL — SIGNIFICANT CHANGE UP (ref 3.8–10.5)
WBC UR QL: NEGATIVE — SIGNIFICANT CHANGE UP

## 2019-06-25 PROCEDURE — 93306 TTE W/DOPPLER COMPLETE: CPT | Mod: 26

## 2019-06-25 PROCEDURE — 93010 ELECTROCARDIOGRAM REPORT: CPT

## 2019-06-25 PROCEDURE — 99233 SBSQ HOSP IP/OBS HIGH 50: CPT

## 2019-06-25 PROCEDURE — 71045 X-RAY EXAM CHEST 1 VIEW: CPT | Mod: 26

## 2019-06-25 PROCEDURE — 70450 CT HEAD/BRAIN W/O DYE: CPT | Mod: 26

## 2019-06-25 RX ORDER — FUROSEMIDE 40 MG
40 TABLET ORAL ONCE
Refills: 0 | Status: COMPLETED | OUTPATIENT
Start: 2019-06-25 | End: 2019-06-25

## 2019-06-25 RX ORDER — POTASSIUM CHLORIDE 20 MEQ
40 PACKET (EA) ORAL EVERY 4 HOURS
Refills: 0 | Status: COMPLETED | OUTPATIENT
Start: 2019-06-25 | End: 2019-06-25

## 2019-06-25 RX ORDER — VANCOMYCIN HCL 1 G
1000 VIAL (EA) INTRAVENOUS ONCE
Refills: 0 | Status: COMPLETED | OUTPATIENT
Start: 2019-06-25 | End: 2019-06-25

## 2019-06-25 RX ORDER — FUROSEMIDE 40 MG
80 TABLET ORAL EVERY 12 HOURS
Refills: 0 | Status: DISCONTINUED | OUTPATIENT
Start: 2019-06-25 | End: 2019-06-28

## 2019-06-25 RX ORDER — AZTREONAM 2 G
1000 VIAL (EA) INJECTION ONCE
Refills: 0 | Status: COMPLETED | OUTPATIENT
Start: 2019-06-25 | End: 2019-06-25

## 2019-06-25 RX ORDER — AZTREONAM 2 G
VIAL (EA) INJECTION
Refills: 0 | Status: DISCONTINUED | OUTPATIENT
Start: 2019-06-25 | End: 2019-06-26

## 2019-06-25 RX ORDER — AZTREONAM 2 G
1000 VIAL (EA) INJECTION EVERY 8 HOURS
Refills: 0 | Status: DISCONTINUED | OUTPATIENT
Start: 2019-06-25 | End: 2019-06-26

## 2019-06-25 RX ADMIN — Medication 40 MILLIEQUIVALENT(S): at 08:42

## 2019-06-25 RX ADMIN — MORPHINE SULFATE 3 MILLIGRAM(S): 50 CAPSULE, EXTENDED RELEASE ORAL at 05:48

## 2019-06-25 RX ADMIN — LIDOCAINE 1 PATCH: 4 CREAM TOPICAL at 00:00

## 2019-06-25 RX ADMIN — Medication 20 MILLIGRAM(S): at 18:27

## 2019-06-25 RX ADMIN — Medication 2: at 21:32

## 2019-06-25 RX ADMIN — Medication 80 MILLIGRAM(S): at 18:30

## 2019-06-25 RX ADMIN — Medication 2: at 08:28

## 2019-06-25 RX ADMIN — Medication 250 MILLIGRAM(S): at 05:43

## 2019-06-25 RX ADMIN — APIXABAN 5 MILLIGRAM(S): 2.5 TABLET, FILM COATED ORAL at 05:43

## 2019-06-25 RX ADMIN — APIXABAN 5 MILLIGRAM(S): 2.5 TABLET, FILM COATED ORAL at 18:28

## 2019-06-25 RX ADMIN — Medication 50 MILLIGRAM(S): at 18:26

## 2019-06-25 RX ADMIN — Medication 1 DROP(S): at 18:29

## 2019-06-25 RX ADMIN — Medication 81 MILLIGRAM(S): at 08:42

## 2019-06-25 RX ADMIN — Medication 40 MILLIEQUIVALENT(S): at 18:25

## 2019-06-25 RX ADMIN — SIMVASTATIN 40 MILLIGRAM(S): 20 TABLET, FILM COATED ORAL at 21:33

## 2019-06-25 RX ADMIN — Medication 50 MICROGRAM(S): at 05:43

## 2019-06-25 RX ADMIN — LIDOCAINE 1 PATCH: 4 CREAM TOPICAL at 19:27

## 2019-06-25 RX ADMIN — Medication 1 DROP(S): at 05:44

## 2019-06-25 RX ADMIN — ESCITALOPRAM OXALATE 10 MILLIGRAM(S): 10 TABLET, FILM COATED ORAL at 08:42

## 2019-06-25 RX ADMIN — Medication 40 MILLIGRAM(S): at 08:41

## 2019-06-25 RX ADMIN — Medication 250 MILLIGRAM(S): at 18:26

## 2019-06-25 RX ADMIN — Medication 25 MILLIGRAM(S): at 21:31

## 2019-06-25 RX ADMIN — Medication 1 TABLET(S): at 08:43

## 2019-06-25 RX ADMIN — Medication 20 MILLIGRAM(S): at 05:43

## 2019-06-25 RX ADMIN — PANTOPRAZOLE SODIUM 40 MILLIGRAM(S): 20 TABLET, DELAYED RELEASE ORAL at 05:44

## 2019-06-25 RX ADMIN — LIDOCAINE 1 PATCH: 4 CREAM TOPICAL at 21:28

## 2019-06-25 RX ADMIN — Medication 250 MILLIGRAM(S): at 20:51

## 2019-06-25 RX ADMIN — Medication 40 MILLIGRAM(S): at 05:43

## 2019-06-25 RX ADMIN — Medication 200 MILLIGRAM(S): at 21:32

## 2019-06-25 RX ADMIN — Medication 650 MILLIGRAM(S): at 14:47

## 2019-06-25 RX ADMIN — MORPHINE SULFATE 3 MILLIGRAM(S): 50 CAPSULE, EXTENDED RELEASE ORAL at 06:30

## 2019-06-25 RX ADMIN — LIDOCAINE 1 PATCH: 4 CREAM TOPICAL at 08:43

## 2019-06-25 RX ADMIN — Medication 40 MILLIGRAM(S): at 18:25

## 2019-06-25 NOTE — PROGRESS NOTE ADULT - ASSESSMENT
89 yo Female with a PMHx of pancreatitis, S/p ERCP,  cirrhosis, DM type II,  L knee replacement, AFIB on Eliquis, CAD s/p stents, severe Pulm HTN, Chronic Right heart failure, Diastolic dysfunction, AS, S/p TAVR presented to ED c/o L knee and leg pain.  Hospital course significant for increased lethargy and fever.      #Fever--r/o sepsis:    Check ua/ urine cx/ blood cx.    Lactate WNL @ 1.2.    No obvious infections present on admission.    Repeat CXR overall unchanged-- chronic ILD.    No obvious cellulitis/ skin issues.    Pt c/o dysuria.    Vanco/ Aztreonam IV STAT pending culture results.    ID eval.    Follow hemodynamics.      #Metabolic Encephalopathy:    Suspect related to fever.    CT head negative.    F/u pancultures as above.      #Left leg/ knee pain/ edema:    Suspect pain related to swelling from CHF.    Given one dose solumedrol 40 IV on 6/25 with concern could not r/o gout.    Cont diuresis.    CT Left LE reviewed-- no fracture or acute pathology.      #Acute hypoxic Respiratory Failure due to Acute on Chronic Diastolic CHF Exacerbation:    Pt with hypoxia (86% on RA on admission)/ pulmonary edema on exam.    Cont lasix.  Dose increased by cardiology 80 IV BID.    Cont spironolactone.    Cardio f/u.    Daily weights.  166 lbs on 6/24.    F/u repeat ECHO.      #Severe Pulmonary HTN:    Cont revatio.      #DM:    Cont meds.  Controlled.      #Hx of Severe AS s/p TAVR:    Stable.  Check ECHO.      #Afib:    Cont eliqius/ BB    D/w son Dr. Laith Mojica at length 6/25. 89 yo Female with a PMHx of pancreatitis, S/p ERCP,  cirrhosis, DM type II,  L knee replacement, AFIB on Eliquis, CAD s/p stents, severe Pulm HTN, Chronic Right heart failure, Diastolic dysfunction, AS, S/p TAVR presented to ED c/o L knee and leg pain.  Hospital course significant for increased lethargy and fever.      #Fever--r/o sepsis:    Check ua/ urine cx/ blood cx.    Lactate WNL @ 1.2.    No obvious infections present on admission.    Repeat CXR overall unchanged-- chronic ILD.    No obvious cellulitis/ skin issues.    Pt c/o dysuria.    Vanco/ Aztreonam IV STAT pending culture results.    ID eval.    Follow hemodynamics.      #Metabolic Encephalopathy:    Suspect related to fever.    CT head negative.    F/u pancultures as above.      #Left leg/ knee pain/ edema:    Suspect pain related to swelling from CHF.    Given one dose solumedrol 40 IV on 6/25 with concern could not r/o gout.    Cont diuresis.    CT Left LE reviewed-- no fracture or acute pathology.      #Acute hypoxic Respiratory Failure due to Acute on Chronic Diastolic CHF Exacerbation:    Pt with hypoxia (86% on RA on admission)/ pulmonary edema on exam.    Cont lasix.  Dose increased by cardiology 80 IV BID.    Cont spironolactone.    Cardio f/u.    Daily weights.  166 lbs on 6/24.    F/u repeat ECHO.      #Severe Pulmonary HTN:    Cont revatio.      #DM:    Cont meds.  Controlled.      #Hx of Severe AS s/p TAVR:    Stable.  Check ECHO.      #Afib:    Cont eliqius/ BB    D/w son Dr. Laith Mojica at length 6/25.  Home phone:  [phone]

## 2019-06-25 NOTE — PROVIDER CONTACT NOTE (CHANGE IN STATUS NOTIFICATION) - SITUATION
Dr. Acosta called re: pt complaint of dysuria and change in mental status noticed by ancillary staff (pt appears more withdrawn, not participating with self feeding or physical therapy, pt not involved in feeding self and needed assistance from CNA). Pt states "I don't feel very well".

## 2019-06-25 NOTE — PROGRESS NOTE ADULT - ASSESSMENT
1. Leg pain - Likely secondary to pedal edema .  So far arterial and venous US did not reveal any pathology .  Ortho team input noted,  Treatment of the pedal edema recommended.    2. Pedal edema - Acute on chronic decompensated HF pEF, complicated by severe pulmonary hypertension,last cath also showed low CO.  Increase lasix 80mg iv BID.    PO potassium supplements ordered.   Diuresis with close monitoring of the renal function and electrolytes.  Goal potassium of 4 and magnesium of 2.   Strict I/O and daily wt checks. Low sodium diet. Nutrition education.   Joao stockings     So far no documentation of I/O . Informed RN.    3. Severe pulmonary hypertension- Group II- will treat left heart failure and reasses.     4. Atrial fibrillation -chronic - will continue full dose anticoagulation with apixiban,    5. CAD s/p PCI of LAD in past- stable .continue current meds.    6. s/p Aortic valve replacement- bioprosthetic- stable per last echo 4/2019.  continue to monitor.    Other medical issues- Management per primary team.   Thank you for allowing me to participate in the care of this patient. Please feel free to contact me with any questions.

## 2019-06-26 LAB
ANION GAP SERPL CALC-SCNC: 6 MMOL/L — SIGNIFICANT CHANGE UP (ref 5–17)
BUN SERPL-MCNC: 16 MG/DL — SIGNIFICANT CHANGE UP (ref 7–23)
CALCIUM SERPL-MCNC: 8.7 MG/DL — SIGNIFICANT CHANGE UP (ref 8.5–10.1)
CHLORIDE SERPL-SCNC: 101 MMOL/L — SIGNIFICANT CHANGE UP (ref 96–108)
CO2 SERPL-SCNC: 29 MMOL/L — SIGNIFICANT CHANGE UP (ref 22–31)
CREAT SERPL-MCNC: 0.72 MG/DL — SIGNIFICANT CHANGE UP (ref 0.5–1.3)
GLUCOSE BLDC GLUCOMTR-MCNC: 148 MG/DL — HIGH (ref 70–99)
GLUCOSE BLDC GLUCOMTR-MCNC: 178 MG/DL — HIGH (ref 70–99)
GLUCOSE BLDC GLUCOMTR-MCNC: 237 MG/DL — HIGH (ref 70–99)
GLUCOSE BLDC GLUCOMTR-MCNC: 308 MG/DL — HIGH (ref 70–99)
GLUCOSE SERPL-MCNC: 205 MG/DL — HIGH (ref 70–99)
HCT VFR BLD CALC: 35 % — SIGNIFICANT CHANGE UP (ref 34.5–45)
HGB BLD-MCNC: 11.6 G/DL — SIGNIFICANT CHANGE UP (ref 11.5–15.5)
MCHC RBC-ENTMCNC: 32 PG — SIGNIFICANT CHANGE UP (ref 27–34)
MCHC RBC-ENTMCNC: 33.1 GM/DL — SIGNIFICANT CHANGE UP (ref 32–36)
MCV RBC AUTO: 96.4 FL — SIGNIFICANT CHANGE UP (ref 80–100)
PLATELET # BLD AUTO: 123 K/UL — LOW (ref 150–400)
POTASSIUM SERPL-MCNC: 3.4 MMOL/L — LOW (ref 3.5–5.3)
POTASSIUM SERPL-SCNC: 3.4 MMOL/L — LOW (ref 3.5–5.3)
RBC # BLD: 3.63 M/UL — LOW (ref 3.8–5.2)
RBC # FLD: 15.8 % — HIGH (ref 10.3–14.5)
SODIUM SERPL-SCNC: 136 MMOL/L — SIGNIFICANT CHANGE UP (ref 135–145)
WBC # BLD: 10.11 K/UL — SIGNIFICANT CHANGE UP (ref 3.8–10.5)
WBC # FLD AUTO: 10.11 K/UL — SIGNIFICANT CHANGE UP (ref 3.8–10.5)

## 2019-06-26 PROCEDURE — 99232 SBSQ HOSP IP/OBS MODERATE 35: CPT

## 2019-06-26 PROCEDURE — 99233 SBSQ HOSP IP/OBS HIGH 50: CPT

## 2019-06-26 RX ADMIN — ESCITALOPRAM OXALATE 10 MILLIGRAM(S): 10 TABLET, FILM COATED ORAL at 11:52

## 2019-06-26 RX ADMIN — Medication 20 MILLIEQUIVALENT(S): at 11:54

## 2019-06-26 RX ADMIN — Medication 650 MILLIGRAM(S): at 00:54

## 2019-06-26 RX ADMIN — Medication 1 TABLET(S): at 11:52

## 2019-06-26 RX ADMIN — Medication 80 MILLIGRAM(S): at 17:19

## 2019-06-26 RX ADMIN — Medication 200 MILLIGRAM(S): at 21:39

## 2019-06-26 RX ADMIN — PANTOPRAZOLE SODIUM 40 MILLIGRAM(S): 20 TABLET, DELAYED RELEASE ORAL at 06:25

## 2019-06-26 RX ADMIN — Medication 81 MILLIGRAM(S): at 11:54

## 2019-06-26 RX ADMIN — APIXABAN 5 MILLIGRAM(S): 2.5 TABLET, FILM COATED ORAL at 17:19

## 2019-06-26 RX ADMIN — Medication 1 DROP(S): at 06:25

## 2019-06-26 RX ADMIN — Medication 30 MILLIGRAM(S): at 17:19

## 2019-06-26 RX ADMIN — SIMVASTATIN 40 MILLIGRAM(S): 20 TABLET, FILM COATED ORAL at 21:39

## 2019-06-26 RX ADMIN — Medication 4: at 08:25

## 2019-06-26 RX ADMIN — LIDOCAINE 1 PATCH: 4 CREAM TOPICAL at 17:23

## 2019-06-26 RX ADMIN — Medication 50 MILLIGRAM(S): at 01:00

## 2019-06-26 RX ADMIN — Medication 650 MILLIGRAM(S): at 06:36

## 2019-06-26 RX ADMIN — Medication 2: at 12:00

## 2019-06-26 RX ADMIN — Medication 80 MILLIGRAM(S): at 06:24

## 2019-06-26 RX ADMIN — APIXABAN 5 MILLIGRAM(S): 2.5 TABLET, FILM COATED ORAL at 06:23

## 2019-06-26 RX ADMIN — LIDOCAINE 1 PATCH: 4 CREAM TOPICAL at 23:54

## 2019-06-26 RX ADMIN — Medication 250 MILLIGRAM(S): at 17:19

## 2019-06-26 RX ADMIN — Medication 1 DROP(S): at 21:40

## 2019-06-26 RX ADMIN — Medication 50 MICROGRAM(S): at 06:24

## 2019-06-26 RX ADMIN — Medication 20 MILLIGRAM(S): at 06:24

## 2019-06-26 RX ADMIN — Medication 4: at 21:39

## 2019-06-26 RX ADMIN — LIDOCAINE 1 PATCH: 4 CREAM TOPICAL at 11:51

## 2019-06-26 RX ADMIN — Medication 250 MILLIGRAM(S): at 06:24

## 2019-06-26 RX ADMIN — Medication 50 MILLIGRAM(S): at 08:26

## 2019-06-26 RX ADMIN — Medication 20 MILLIGRAM(S): at 17:20

## 2019-06-26 NOTE — CONSULT NOTE ADULT - SUBJECTIVE AND OBJECTIVE BOX
89F community ambulator with walker at baseline presents w/ left lower extremity swelling/worsening of pain overnight from her left knee down tibia to ankle. Denies any recent trauma/fall/injury to the LLE.  Says swelling has been worsening of bilateral lower extremities secondary to CHF and recently had her lasix adjusted.  Denies any bony point tenderness and describes it as more sharp/diffuse pain, hurts equally at rest and with weightbearing.  Patient is on eliquis for Afib and has history of DVT and hematoma to that same left leg seen last by orthopedics in March of 2018 for similar presentation when she had acute DVT of the LLE.  Patient is s/p L TKA with Dr. Blakely ~2014.      PAST MEDICAL & SURGICAL HISTORY:  Aortic stenosis  History of pancreatitis  HLD (hyperlipidemia)  Hypothyroid  Afib  CAD (coronary artery disease)  Hypertension  History of Osteoarthritis  GERD (Gastroesophageal Reflux Disease)  Dyslipidemia  Diabetes Mellitus Type II  S/P IVC filter: Note that Pt does not have  h/o DVT, outPt doppler was read first as +, but after review reported as no DVT. Pt got IVC filer before that  H/O total knee replacement, left  History of appendectomy  History of cholecystectomy  H/O: Knee Surgery- right meniscus  H/O: Hysterectomy      X-rays/CT of the left knee/leg demonstrate s/p L total knee arthroplasty, no acute fractures, small effusion    Physical Exam    Gen: NAD  Left LE: skin intact, No erythema. No eccymosis. Diffuse TTP from knee to ankle, no bony point tenderness to palpation, No palpable hematoma. AROM/PROM limited 0-30 deg flex. negative Effusion. +pitting edema bilaterally, able to SLR, Neg log roll, Negative Heel strike, no ttp elsewhere, +EHL/FHL/TA/GS function, DP/PT pulses intact, compartments soft.     Secondary Survey: No TTP over bony prominences, SILT, palpable pulses, full/painless range of motion, compartments soft
HPI:  89 yo Female with a PMHx of pancreatitis, S/p ERCP,  cirrhosis, DM type II,  L knee replacement, AFIB on Eliquis, CAD s/p stents, severe Pulm HTN, Chronic Right heart failure, Diastolic dysfunction, AS, S/p TAVR presented to ED c/o L knee and leg pain. Pt reports that had sharp pains whole  night, called her son in am in tears. As per son legs noted to be more swollen   past week, + weight gain, but Pt denies orthopnea, she was complaining feeling weak past few days, but no SOB. No fevers. Also Pt had calf ecchymosis which then resolved and  later developed similar ecchymosis on dorsal foot, resolved now. Was able to ambulate until last night. Pt was  recently seen by Cardio Dr Hernandez and was started on Sildenafil and lasix was  adjusted.  Pt denies CP.      in ED:  WBCs wnl,  VS stable, except Pt noted to be dyspneic and hypoxic on 86-88% on RA. CXR suspected PVC, BNP elevated. CT LLE with calf edema. LLE doppler  negative for DVT   Pt was given 40mg IVP of Lasix.  IV morphine for pain (2019 11:23)    Patient states her weight has been stable at home at approx 167 lbs. Denies eating exogenous salt. Mild dyspnea, but no sputum production, chest pain, fevers, or wheezing. W/U to date is noted. CT scan of the chest is similar to the study in 2018, with a mosaic pattern.       PAST MEDICAL & SURGICAL HISTORY:  Aortic stenosis  History of pancreatitis  HLD (hyperlipidemia)  Hypothyroid  Afib  CAD (coronary artery disease)  Hypertension  History of Osteoarthritis  GERD (Gastroesophageal Reflux Disease)  Dyslipidemia  Diabetes Mellitus Type II  S/P IVC filter: Note that Pt does not have  h/o DVT, outPt doppler was read first as +, but after review reported as no DVT. Pt got IVC filer before that  H/O total knee replacement, left  History of appendectomy  History of cholecystectomy  H/O: Knee Surgery- right meniscus  H/O: Hysterectomy      MEDICATIONS  (STANDING):  apixaban 5 milliGRAM(s) Oral every 12 hours  artificial tears (preservative free) Ophthalmic Solution 1 Drop(s) Both EYES two times a day  aspirin  chewable 81 milliGRAM(s) Oral daily  dextrose 5%. 1000 milliLiter(s) (50 mL/Hr) IV Continuous <Continuous>  dextrose 50% Injectable 12.5 Gram(s) IV Push once  dextrose 50% Injectable 25 Gram(s) IV Push once  dextrose 50% Injectable 25 Gram(s) IV Push once  docusate sodium 200 milliGRAM(s) Oral at bedtime  escitalopram 10 milliGRAM(s) Oral daily  furosemide   Injectable 40 milliGRAM(s) IV Push every 12 hours  insulin lispro (HumaLOG) corrective regimen sliding scale   SubCutaneous three times a day before meals  insulin lispro (HumaLOG) corrective regimen sliding scale   SubCutaneous at bedtime  levothyroxine 50 MICROGram(s) Oral daily  lidocaine   Patch 1 Patch Transdermal daily  metoprolol succinate ER 25 milliGRAM(s) Oral at bedtime  multivitamin/minerals 1 Tablet(s) Oral daily  pantoprazole    Tablet 40 milliGRAM(s) Oral before breakfast  potassium chloride    Tablet ER 20 milliEquivalent(s) Oral daily  saccharomyces boulardii 250 milliGRAM(s) Oral two times a day  sildenafil (REVATIO) 20 milliGRAM(s) Oral two times a day  simvastatin 40 milliGRAM(s) Oral at bedtime    MEDICATIONS  (PRN):  acetaminophen   Tablet .. 650 milliGRAM(s) Oral every 6 hours PRN Temp greater or equal to 38C (100.4F), Mild Pain (1 - 3)  bisacodyl 5 milliGRAM(s) Oral daily PRN Constipation  dextrose 40% Gel 15 Gram(s) Oral once PRN Blood Glucose LESS THAN 70 milliGRAM(s)/deciliter  glucagon  Injectable 1 milliGRAM(s) IntraMuscular once PRN Glucose LESS THAN 70 milligrams/deciliter  ondansetron Injectable 4 milliGRAM(s) IV Push every 6 hours PRN Nausea  senna 2 Tablet(s) Oral at bedtime PRN Constipation  traMADol 50 milliGRAM(s) Oral every 6 hours PRN Moderate Pain (4 - 6)      Allergies    penicillin (Rash)  penicillins (Other)  sulfa drugs (Rash; Other)    Intolerances        SOCIAL HISTORY: Denies tobacco, etoh abuse or illicit drug use    FAMILY HISTORY:  FH: heart disease  FH: diabetes mellitus: both parents      Vital Signs Last 24 Hrs  T(C): 36.3 (2019 04:33), Max: 36.8 (2019 12:54)  T(F): 97.4 (2019 04:33), Max: 98.2 (2019 12:54)  HR: 72 (2019 04:33) (66 - 88)  BP: 111/54 (2019 04:33) (102/45 - 129/52)  BP(mean): --  RR: 17 (2019 04:33) (17 - 20)  SpO2: 99% (2019 04:33) (91% - 99%)    REVIEW OF SYSTEMS:    CONSTITUTIONAL:  As per HPI.  HEENT:  Eyes:  No diplopia or blurred vision. ENT:  No earache, sore throat or runny nose.  CARDIOVASCULAR:  See HPI  RESPIRATORY:  No cough, shortness of breath, PND or orthopnea.  GASTROINTESTINAL:  No nausea, vomiting or diarrhea.  GENITOURINARY:  No dysuria, frequency or urgency.  MUSCULOSKELETAL:  As per HPI.  SKIN:  No change in skin, hair or nails.  NEUROLOGIC:  No paresthesias, fasciculations, seizures or weakness.  PSYCHIATRIC:  No disorder of thought or mood.  ENDOCRINE:  No heat or cold intolerance, polyuria or polydipsia.  HEMATOLOGICAL:  No easy bruising or bleedings:  .     PHYSICAL EXAMINATION:    GENERAL APPEARANCE:  Pt. is not currently dyspneic, in no distress. Pt. is alert, oriented, and pleasant.  HEENT:  Pupils are normal and react normally. No icterus. Mucous membranes well colored.  NECK:  Supple. No lymphadenopathy. Jugular venous pressure not elevated.   HEART:   The cardiac impulse has a normal quality. Irregular, irreg. Normal S1 and S2.   CHEST:  Chest with bibasilar crackles approx 1/2 way up.. Normal respiratory effort.  ABDOMEN:  Soft and nontender.   EXTREMITIES:  There is no cyanosis, clubbing. 2+ edema bilat with tenderness on palpation of both calves. No cords..   SKIN:  No rash or significant lesions are noted.    LABS:                        11.2   6.39  )-----------( 138      ( 2019 07:47 )             35.2     06-    135  |  100  |  12  ----------------------------<  135<H>  4.0   |  30  |  0.73    Ca    8.8      2019 07:47    TPro  7.2  /  Alb  3.1<L>  /  TBili  0.6  /  DBili  x   /  AST  17  /  ALT  11<L>  /  AlkPhos  84      LIVER FUNCTIONS - ( 2019 06:49 )  Alb: 3.1 g/dL / Pro: 7.2 gm/dL / ALK PHOS: 84 U/L / ALT: 11 U/L / AST: 17 U/L / GGT: x           PT/INR - ( 2019 06:49 )   PT: 20.3 sec;   INR: 1.80 ratio         PTT - ( 2019 06:49 )  PTT:36.4 sec  CARDIAC MARKERS ( 2019 13:33 )  <0.015 ng/mL / x     / x     / x     / x      CARDIAC MARKERS ( 2019 06:49 )  <0.015 ng/mL / x     / 35 U/L / x     / x          Urinalysis Basic - ( 2019 10:45 )    Color: Yellow / Appearance: Clear / S.010 / pH: x  Gluc: x / Ketone: Negative  / Bili: Negative / Urobili: Negative mg/dL   Blood: x / Protein: 15 mg/dL / Nitrite: Negative   Leuk Esterase: Negative / RBC: 3-5 /HPF / WBC Negative   Sq Epi: x / Non Sq Epi: Few / Bacteria: x          RADIOLOGY & ADDITIONAL STUDIES: < from: CT Chest No Cont (19 @ 12:42) >  EXAM:  CT CHEST                            PROCEDURE DATE:  2019          INTERPRETATION:  Clinical information: Hypoxia.    Comparison: 3/8/2018 CT scan of the chest    PROCEDURE:   CT of the Chest was performed without intravenous contrast.    FINDINGS:    Lungs and airways: Bilateral subpleural interlobular septal thickening   again noted compatible with chronic interstitial lung changes. Scattered   areas of subsegmental atelectasis again noted. Bilateral mosaic   attenuation which may be related to air trapping.    Pleura: Within normal limits. No pleural effusion.    Mediastinum and Jenn: Stable 1.4 cm prevascular adenopathy and 1.2 cm   pretracheal adenopathy.    Heart: There is biatrial enlargement. No pericardial effusion.     Vessels: Atherosclerotic changes within the coronary arteries and   thoracic aorta. There has been TAVR.    Chest wall and lower neck: 1.2 cm left supraclavicular adenopathy and 1.2   cm right supraclavicular adenopathy.    Upper abdomen: There is residual contrast within the left renal   collecting system. There is colonic diverticulosis. There is a nodular   liver surface contour compatible with cirrhosis.    Bones: Severe degenerative changes in the left shoulder.     IMPRESSION: Stable appearance of the chronic interstitial lung disease as   described above.    Stable mediastinal adenopathy.    Bilateral supraclavicular adenopathy not well visualized on the prior   exam.    RONDA DELGADO M.D., ATTENDING RADIOLOGIST
PCP:    REQUESTING PHYSICIAN:    REASON FOR CONSULT:    CHIEF COMPLAINT:    HPI:  89 yo Female with a PMHx of pancreatitis, S/p ERCP,  cirrhosis, DM type II,  L knee replacement, AFIB on Eliquis, CAD s/p stents, severe Pulm HTN, Chronic Right heart failure, Diastolic dysfunction, AS, S/p TAVR presented to ED c/o L knee and leg pain. Pt reports that had sharp pains whole  night, called her son in am in tears. As per son legs noted to be more swollen   past week, + weight gain, but Pt denies orthopnea, she was complaining feeling weak past few days, but no SOB. No fevers. Also Pt had calf ecchymosis which then resolved and  later developed similar ecchymosis on dorsal foot, resolved now. Was able to ambulate until last night. Pt was  recently seen by Cardio Dr Hernandez and was started on Sildenafil and lasix was  adjusted.  Pt denies CP.      Patient states she woke up at 4am with the pain.  She had difficulty ambulating.  Normally ambulates with walker. No recent trama to left leg.  The pain is throughout the leg, from knee down.     In ED:  CT LLE with calf edema. LLE doppler  negative for DVT   Pt was given 40mg IVP of Lasix.  IV morphine for pain (23 Jun 2019 11:23)    She states her pain is better today than it was when she came in, though she is still afraid to ambulate.          PAST MEDICAL & SURGICAL HISTORY:  Aortic stenosis  History of pancreatitis  HLD (hyperlipidemia)  Hypothyroid  Afib  CAD (coronary artery disease)  Hypertension  History of Osteoarthritis  GERD (Gastroesophageal Reflux Disease)  Dyslipidemia  Diabetes Mellitus Type II  S/P IVC filter: Note that Pt does not have  h/o DVT, outPt doppler was read first as +, but after review reported as no DVT. Pt got IVC filer before that  H/O total knee replacement, left  History of appendectomy  History of cholecystectomy  H/O: Knee Surgery- right meniscus  H/O: Hysterectomy    Allergies: PCN/Sulfa  Meds: Reviewed in [facility]    REVIEW OF SYSTEMS:      All other review of systems is negative unless indicated above    Vital Signs Last 24 Hrs  T(C): 36.6 (24 Jun 2019 10:35), Max: 36.7 (23 Jun 2019 17:35)  T(F): 97.9 (24 Jun 2019 10:35), Max: 98 (23 Jun 2019 17:35)  HR: 86 (24 Jun 2019 10:35) (66 - 86)  BP: 104/68 (24 Jun 2019 10:35) (102/45 - 111/54)  BP(mean): --  RR: 18 (24 Jun 2019 10:35) (17 - 18)  SpO2: 97% (24 Jun 2019 10:35) (97% - 99%)    I&O's Summary    24 Jun 2019 07:01  -  24 Jun 2019 16:01  --------------------------------------------------------  IN: 600 mL / OUT: 350 mL / NET: 250 mL        CAPILLARY BLOOD GLUCOSE      POCT Blood Glucose.: 194 mg/dL (24 Jun 2019 11:59)  POCT Blood Glucose.: 128 mg/dL (24 Jun 2019 07:52)  POCT Blood Glucose.: 173 mg/dL (23 Jun 2019 20:53)  POCT Blood Glucose.: 163 mg/dL (23 Jun 2019 16:39)      PHYSICAL EXAM:    HEENT- no oral lesions noted, no lymphadenoapthy noted  Heart- S1 S2 reg  Lungs- rales b/L  Abd- Soft NT  Ext-+Edema b/L  Skin- no rashes  Musculoskelatal- TTP diffusely from knee down to ankle.  Unable to flex knee on her own given weight of leg.  Passive flexion to 30 degrees does not elicit pain in left knee.      Neurological- intact strength upper and lower extremities  MEDICATIONS:  MEDICATIONS  (STANDING):  apixaban 5 milliGRAM(s) Oral every 12 hours  artificial tears (preservative free) Ophthalmic Solution 1 Drop(s) Both EYES two times a day  aspirin  chewable 81 milliGRAM(s) Oral daily  dextrose 5%. 1000 milliLiter(s) (50 mL/Hr) IV Continuous <Continuous>  dextrose 50% Injectable 12.5 Gram(s) IV Push once  dextrose 50% Injectable 25 Gram(s) IV Push once  dextrose 50% Injectable 25 Gram(s) IV Push once  docusate sodium 200 milliGRAM(s) Oral at bedtime  escitalopram 10 milliGRAM(s) Oral daily  furosemide   Injectable 40 milliGRAM(s) IV Push every 12 hours  insulin lispro (HumaLOG) corrective regimen sliding scale   SubCutaneous three times a day before meals  insulin lispro (HumaLOG) corrective regimen sliding scale   SubCutaneous at bedtime  levothyroxine 50 MICROGram(s) Oral daily  lidocaine   Patch 1 Patch Transdermal daily  metoprolol succinate ER 25 milliGRAM(s) Oral at bedtime  multivitamin/minerals 1 Tablet(s) Oral daily  pantoprazole    Tablet 40 milliGRAM(s) Oral before breakfast  potassium chloride    Tablet ER 20 milliEquivalent(s) Oral daily  saccharomyces boulardii 250 milliGRAM(s) Oral two times a day  sildenafil (REVATIO) 20 milliGRAM(s) Oral two times a day  simvastatin 40 milliGRAM(s) Oral at bedtime      LABS: All Labs Reviewed:                        11.2   6.39  )-----------( 138      ( 24 Jun 2019 07:47 )             35.2     06-24    135  |  100  |  12  ----------------------------<  135<H>  4.0   |  30  |  0.73    Ca    8.8      24 Jun 2019 07:47    TPro  7.2  /  Alb  3.1<L>  /  TBili  0.6  /  DBili  x   /  AST  17  /  ALT  11<L>  /  AlkPhos  84  06-23    PT/INR - ( 23 Jun 2019 06:49 )   PT: 20.3 sec;   INR: 1.80 ratio         PTT - ( 23 Jun 2019 06:49 )  PTT:36.4 sec  CARDIAC MARKERS ( 23 Jun 2019 13:33 )  <0.015 ng/mL / x     / x     / x     / x      CARDIAC MARKERS ( 23 Jun 2019 06:49 )  <0.015 ng/mL / x     / 35 U/L / x     / x          Blood Culture: Organism --  Gram Stain Blood -- Gram Stain --  Specimen Source .Urine None  Culture-Blood --        RADIOLOGY/EKG:  Doppler US LLE reviewed  CT LLE reviewed  uric acid 6.7    A/P
Patient is a 89y old  Female who presents with a chief complaint of leg pain (2019 09:31)    HPI:  89 y/o Female with h/o pancreatitis s/p ERCP,  cirrhosis, DM type II,  left knee replacement, AFib on Eliquis, CAD s/p stents, severe pulm HTN, chronic right heart failure, Diastolic dysfunction, AS, s/p TAVR was admitted on  for L knee and leg pain. Pt reports that had sharp pains x one day, called her son in am in tears. As per son legs noted to be more swollen and the patient gained weight. Pt was  recently seen by Cardio Dr Hernandez and was started on Sildenafil and lasix was  adjusted. In ER, she was afebrile and no abx were given. Pt noted to be dyspneic and hypoxic on 86-88% on RA. On  she was noted with 101.4F and the patient received vancomycin IV and aztreonam.  Concern about cellulitis was raised.       PMH: as above  PSH: as above  Meds: per reconciliation sheet, noted below  MEDICATIONS  (STANDING):  apixaban 5 milliGRAM(s) Oral every 12 hours  artificial tears (preservative free) Ophthalmic Solution 1 Drop(s) Both EYES two times a day  aspirin  chewable 81 milliGRAM(s) Oral daily  dextrose 5%. 1000 milliLiter(s) (50 mL/Hr) IV Continuous <Continuous>  dextrose 50% Injectable 12.5 Gram(s) IV Push once  dextrose 50% Injectable 25 Gram(s) IV Push once  dextrose 50% Injectable 25 Gram(s) IV Push once  docusate sodium 200 milliGRAM(s) Oral at bedtime  escitalopram 10 milliGRAM(s) Oral daily  furosemide   Injectable 80 milliGRAM(s) IV Push every 12 hours  insulin lispro (HumaLOG) corrective regimen sliding scale   SubCutaneous three times a day before meals  insulin lispro (HumaLOG) corrective regimen sliding scale   SubCutaneous at bedtime  levothyroxine 50 MICROGram(s) Oral daily  lidocaine   Patch 1 Patch Transdermal daily  metoprolol succinate ER 25 milliGRAM(s) Oral at bedtime  multivitamin/minerals 1 Tablet(s) Oral daily  pantoprazole    Tablet 40 milliGRAM(s) Oral before breakfast  potassium chloride    Tablet ER 20 milliEquivalent(s) Oral daily  saccharomyces boulardii 250 milliGRAM(s) Oral two times a day  sildenafil (REVATIO) 20 milliGRAM(s) Oral two times a day  simvastatin 40 milliGRAM(s) Oral at bedtime    MEDICATIONS  (PRN):  acetaminophen   Tablet .. 650 milliGRAM(s) Oral every 6 hours PRN Temp greater or equal to 38C (100.4F), Mild Pain (1 - 3)  bisacodyl 5 milliGRAM(s) Oral daily PRN Constipation  dextrose 40% Gel 15 Gram(s) Oral once PRN Blood Glucose LESS THAN 70 milliGRAM(s)/deciliter  glucagon  Injectable 1 milliGRAM(s) IntraMuscular once PRN Glucose LESS THAN 70 milligrams/deciliter  ondansetron Injectable 4 milliGRAM(s) IV Push every 6 hours PRN Nausea  senna 2 Tablet(s) Oral at bedtime PRN Constipation    Allergies    penicillin (Rash)  penicillins (Other)  sulfa drugs (Rash; Other)    Intolerances      Social: no smoking, no alcohol, no illegal drugs; no recent travel, no exposure to TB  FAMILY HISTORY:  FH: heart disease  FH: diabetes mellitus: both parents    no history of premature cardiovascular disease in first degree relatives    ROS: the patient denies HA, no seizures, no dizziness, no sore throat, no nasal congestion, no blurry vision, no CP, no palpitations, no SOB, no cough, no abdominal pain, no diarrhea, no N/V, no dysuria, has leg pain, mainly her left knee, no claudication, no rash, no joint aches, no rectal pain or bleeding, no night sweats  All other systems reviewed and are negative    Vital Signs Last 24 Hrs  T(C): 36.6 (2019 10:28), Max: 38.6 (2019 16:00)  T(F): 97.8 (2019 10:28), Max: 101.4 (2019 16:00)  HR: 74 (2019 10:28) (70 - 99)  BP: 100/48 (2019 10:28) (92/54 - 121/58)  BP(mean): --  RR: 18 (2019 10:28) (16 - 18)  SpO2: 98% (2019 10:28) (93% - 98%)  Daily     Daily Weight in k.5 (2019 08:50)    PE:    Constitutional: frail looking  HEENT: NC/AT, EOMI, PERRLA, conjunctivae clear; ears and nose atraumatic; pharynx benign  Neck: supple; thyroid not palpable  Back: no tenderness  Respiratory: respiratory effort normal; crackles at bases  Cardiovascular: S1S2 regular, no murmurs  Abdomen: soft, not tender, not distended, positive BS; no liver or spleen organomegaly  Genitourinary: no suprapubic tenderness  Lymphatic: no LN palpable  Musculoskeletal: no muscle tenderness, no joint swelling or tenderness  Extremities: 2+ pedal edema  Neurological/ Psychiatric: AxOx3, judgement and insight normal; moving all extremities  Skin: no rashes; no palpable lesions    Labs: all available labs reviewed                        11.6   10.11 )-----------( 123      ( 2019 08:30 )             35.0     -    136  |  101  |  16  ----------------------------<  205<H>  3.4<L>   |  29  |  0.72    Ca    8.7      2019 08:30         Urinalysis Basic - ( 2019 01:50 )    Color: Yellow / Appearance: Clear / S.005 / pH: x  Gluc: x / Ketone: Negative  / Bili: Negative / Urobili: 1 mg/dL   Blood: x / Protein: Negative mg/dL / Nitrite: Negative   Leuk Esterase: Negative / RBC: 0-2 /HPF / WBC Negative   Sq Epi: x / Non Sq Epi: Moderate / Bacteria: Many      Culture - Urine (collected 2019 10:45)  Source: .Urine None  Final Report (2019 07:30):    >=3 organisms. Probable collection contamination.    Radiology: all available radiological tests reviewed    < from: Xray Chest 1 View-PORTABLE IMMEDIATE (19 @ 15:31) >  Nonspecific prominent bilateral interstitial markings are redemonstrated.   Prominent cardiac silhouette appears similar to prior. Atherosclerotic   calcifications of the thoracic aorta. Cardiac valve device. No pleural   effusion or pneumothorax is seen. Arthritic changes of the left   glenohumeral joint with joint body adjacent to the humeral neck.    < end of copied text >      Advanced directives addressed: full resuscitation
Patient is a 89y old  Female who presents with a chief complaint of leg pain.     HPI:  89 yo Female with a PMHx of pancreatitis, S/p ERCP,  cirrhosis, DM type II,  L knee replacement, AFIB on Eliquis, CAD s/p PCI, severe Pulm HTN, Chronic Right heart failure, Diastolic dysfunction, AS, S/p TAVR presented to ED c/o L knee and leg pain. Pt reports that had sharp pains whole  night, called her son in am in tears. As per son legs noted to be more swollen   past week, + weight gain, but Pt denies orthopnea, she was complaining feeling weak past few days, but no SOB. No fevers. Also Pt had calf ecchymosis which then resolved and  later developed similar ecchymosis on dorsal foot, resolved now. Was able to ambulate until last night. Pt was  recently seen by Cardio Dr Hernandez and was started on Sildenafil and lasix was  adjusted.  Pt denies CP.      Pt denies any SOB or Chest pain .  She still c/o leg pain       PAST MEDICAL & SURGICAL HISTORY:  Aortic stenosis  History of pancreatitis  HLD (hyperlipidemia)  Hypothyroid  Afib  CAD (coronary artery disease)  Hypertension  History of Osteoarthritis  GERD (Gastroesophageal Reflux Disease)  Dyslipidemia  Diabetes Mellitus Type II  S/P IVC filter: Note that Pt does not have  h/o DVT, outPt doppler was read first as +, but after review reported as no DVT. Pt got IVC filer before that  H/O total knee replacement, left  History of appendectomy  History of cholecystectomy  H/O: Knee Surgery- right meniscus  H/O: Hysterectomy      MEDICATIONS  (STANDING):  apixaban 5 milliGRAM(s) Oral every 12 hours  artificial tears (preservative free) Ophthalmic Solution 1 Drop(s) Both EYES two times a day  aspirin  chewable 81 milliGRAM(s) Oral daily  dextrose 5%. 1000 milliLiter(s) (50 mL/Hr) IV Continuous <Continuous>  dextrose 50% Injectable 12.5 Gram(s) IV Push once  dextrose 50% Injectable 25 Gram(s) IV Push once  dextrose 50% Injectable 25 Gram(s) IV Push once  docusate sodium 200 milliGRAM(s) Oral at bedtime  escitalopram 10 milliGRAM(s) Oral daily  furosemide   Injectable 40 milliGRAM(s) IV Push every 12 hours  insulin lispro (HumaLOG) corrective regimen sliding scale   SubCutaneous three times a day before meals  insulin lispro (HumaLOG) corrective regimen sliding scale   SubCutaneous at bedtime  levothyroxine 50 MICROGram(s) Oral daily  lidocaine   Patch 1 Patch Transdermal daily  metoprolol succinate ER 25 milliGRAM(s) Oral at bedtime  multivitamin/minerals 1 Tablet(s) Oral daily  pantoprazole    Tablet 40 milliGRAM(s) Oral before breakfast  potassium chloride    Tablet ER 20 milliEquivalent(s) Oral daily  saccharomyces boulardii 250 milliGRAM(s) Oral two times a day  sildenafil (REVATIO) 20 milliGRAM(s) Oral two times a day  simvastatin 40 milliGRAM(s) Oral at bedtime    MEDICATIONS  (PRN):  acetaminophen   Tablet .. 650 milliGRAM(s) Oral every 6 hours PRN Temp greater or equal to 38C (100.4F), Mild Pain (1 - 3)  bisacodyl 5 milliGRAM(s) Oral daily PRN Constipation  dextrose 40% Gel 15 Gram(s) Oral once PRN Blood Glucose LESS THAN 70 milliGRAM(s)/deciliter  glucagon  Injectable 1 milliGRAM(s) IntraMuscular once PRN Glucose LESS THAN 70 milligrams/deciliter  ondansetron Injectable 4 milliGRAM(s) IV Push every 6 hours PRN Nausea  senna 2 Tablet(s) Oral at bedtime PRN Constipation  traMADol 50 milliGRAM(s) Oral every 6 hours PRN Moderate Pain (4 - 6)      FAMILY HISTORY:  FH: heart disease  FH: diabetes mellitus: both parents      SOCIAL HISTORY:  no recent smoking     REVIEW OF SYSTEMS:  CONSTITUTIONAL:    No fatigue, malaise, lethargy.  No fever or chills.  HEENT:  Eyes:  No visual changes.     ENT:  No epistaxis.  No sinus pain.    RESPIRATORY:  No cough.  No wheeze.  No hemoptysis.  No shortness of breath.  CARDIOVASCULAR:  No chest pains.  No palpitations. No shortness of breath, No orthopnea or PND.  GASTROINTESTINAL:  No abdominal pain.  No nausea or vomiting.    GENITOURINARY:    No hematuria.    MUSCULOSKELETAL:  c/o l leg pain   NEUROLOGICAL:  No tingling or numbness or weakness.  PSYCHIATRIC:  No confusion  SKIN:  No rashes.    ENDOCRINE:  No unexplained weight loss.  No polydipsia.   HEMATOLOGIC:  No anemia.  No prolonged or excessive bleeding.   ALLERGIC AND IMMUNOLOGIC:  No pruritus.          Vital Signs Last 24 Hrs  T(C): 36.3 (2019 04:33), Max: 36.8 (2019 12:54)  T(F): 97.4 (2019 04:33), Max: 98.2 (2019 12:54)  HR: 72 (2019 04:33) (66 - 98)  BP: 111/54 (2019 04:33) (102/45 - 129/52)  BP(mean): --  RR: 17 (2019 04:33) (17 - 20)  SpO2: 99% (2019 04:33) (91% - 99%)    PHYSICAL EXAM-    Constitutional: elderly frail , in no acute distress    Head: Head is normocephalic and atraumatic.      Neck: + JVD and EJ distension      Cardiovascular: irregular and systolic murmur 2/6     Respiratory: coarse BS b/l basal     Abdomen: Soft, nontender, nondistended with positive bowel sounds.      Extremity: No tenderness. 2+ pitting pedal edema upto thighs    Neurologic: The patient is alert and oriented.      Skin: No rash, no obvious lesions noted.      Psychiatric: The patient appears to be emotionally stable.      INTERPRETATION OF TELEMETRY: afib 60-70/min     ECG: afib , L axisw , no s tt changes.     I&O's Detail      LABS:                        11.7   5.54  )-----------( 161      ( 2019 06:49 )             36.3     06-23    141  |  105  |  13  ----------------------------<  168<H>  4.0   |  28  |  0.87    Ca    9.1      2019 06:49    TPro  7.2  /  Alb  3.1<L>  /  TBili  0.6  /  DBili  x   /  AST  17  /  ALT  11<L>  /  AlkPhos  84  06-23    CARDIAC MARKERS ( 2019 13:33 )  <0.015 ng/mL / x     / x     / x     / x      CARDIAC MARKERS ( 2019 06:49 )  <0.015 ng/mL / x     / 35 U/L / x     / x          PT/INR - ( 2019 06:49 )   PT: 20.3 sec;   INR: 1.80 ratio         PTT - ( 2019 06:49 )  PTT:36.4 sec  Urinalysis Basic - ( 2019 10:45 )    Color: Yellow / Appearance: Clear / S.010 / pH: x  Gluc: x / Ketone: Negative  / Bili: Negative / Urobili: Negative mg/dL   Blood: x / Protein: 15 mg/dL / Nitrite: Negative   Leuk Esterase: Negative / RBC: 3-5 /HPF / WBC Negative   Sq Epi: x / Non Sq Epi: Few / Bacteria: x      I&O's Summary    BNP  RADIOLOGY & ADDITIONAL STUDIES:  < from: Cardiac Cath Lab - Adult (17 @ 13:38) >   Cardiac Arteries and Lesion Findings    LMCA: Large caliber vessel. Normal.    LAD: Medium caliber vessel. Normal. Widely patent stent.    LCx: Large caliber vessel. Normal.    RCA: Medium caliber vessel. Normal.     Impression     Diagnostic Conclusions     Normal LV systolic function. Estimated LV ejection fraction is 65 %.   One Vessel coronary artery disease (LAD) .   Patent bare metal stent in the mid LAD.   Elevated left ventricular end-diastolic pressure.   Hemodynamic status is abnormal.   Severe pulmonary artery hypertension.   No aortic valve stenosis.No gradient across Spaien Valve.   No aortic valve regurgitation.     Recommendations     Manage with medical therapy.   Consider Phosphodiesterase Inhibitors for PHT.    Estimated Blood Loss:6ml.     Signatures     ----------------------------------------------------------------   Electronically signed by Florentin Hernandez MD, Director of   Cardiac CathLab(Performing Physician) on 2017 16:37   ----------------------------------------------------------------    < end of copied text >

## 2019-06-26 NOTE — PROGRESS NOTE ADULT - ASSESSMENT
89 yo Female with a PMHx of pancreatitis, S/p ERCP,  cirrhosis, DM type II,  L knee replacement, AFIB on Eliquis, CAD s/p stents, severe Pulm HTN, Chronic Right heart failure, Diastolic dysfunction, AS, S/p TAVR presented to ED c/o L knee and leg pain.  Pt admitted with CHF/ LE Edema.  Pain thought to be from edema.  Hospital course significant for increased lethargy and fever on 6/25 but now improved.        #Fever--r/o sepsis:    Cultures so far negative to date.    CXR unchanged.  Hypoxia from admission improved with diuresis.  No obvious PNA.    F/u blood cx.    F/u urine cx.    Appreciate ID eval.    Vanco/ Aztreonam given 6/25 but stopped 6/26.      #Metabolic Encephalopathy:    Suspect related to fever.    CT head negative.    Improved.      #Left leg/ knee pain/ edema:    Suspect pain related to swelling from CHF.    Also cannot r/o new onset gout with adjusting of diuretics outpatient.    Given solumedrol 40 6/25-- as per Rheum will repeat 30 mg 6/26 and 6/27 for possible gout.    CT LE was negative.    Ortho eval negative.    PT eval.      #Acute hypoxic Respiratory Failure due to Acute on Chronic Diastolic CHF Exacerbation:    Pt with hypoxia (86% on RA on admission)/ pulmonary edema on exam.    Cont lasix.  Dose increased by cardiology 80 IV BID.    Cont spironolactone.    Hypoxia imrpoved-- now 96% on RA.      #Severe Pulmonary HTN:    Cont revatio.      #DM:    Cont meds.  Controlled.      #Hx of Severe AS s/p TAVR:    Stable.  Check ECHO.      #Afib:    Cont eliqius/ BB    DISPO:    Cont diuresis for CHF.  Follow labs.    F/u cultures to r/o sepsis with fever on 6/25.    D/w son Dr. Laith Mojica at length 6/25.  Home phone:  [phone].

## 2019-06-26 NOTE — PROGRESS NOTE ADULT - ASSESSMENT
Assessment/Plan    - Continue diuresis-lasix increased to 80mg Q12H  - BUN/Cr stable  - Daily weights-need to use standing scale if possible  - Consider use of spironolactone  - Monitor lytes carefully-may need to push BUN  - Cardiology f/u noted  - Monitor I+O's daily  - OOB to chair      Problem/Recommendation - 1:  Problem: Congestive heart failure, unspecified HF chronicity, unspecified heart failure type.    Problem/Recommendation - 2:  ·  Problem: Shortness of breath.     Problem/Recommendation - 3:  ·  Problem: Pulmonary hypertension.     Problem/Recommendation - 4:  ·  Problem: Hypoxemia.     Problem/Recommendation - 5:  ·  Problem: Chronic atrial fibrillation.

## 2019-06-26 NOTE — PROGRESS NOTE ADULT - ASSESSMENT
1. Leg pain - Likely secondary to pedal edema.   So far arterial and venous US did not reveal any pathology .  Ortho team input noted.   Cont diuresis. Treatment of the pedal edema recommended.    2. Pedal edema - Acute on chronic decompensated HF pEF, complicated by severe pulmonary hypertension,  Last cath also showed low CO. Cont lasix at 80mg iv BID for today.    PO potassium supplements ordered. BUN/Cr stable.   Diuresis with close monitoring of the renal function and electrolytes.  Goal potassium of 4 and magnesium of 2.   Strict I/O and daily wt checks. Low sodium diet. Nutrition education.   Joao stockings.  Pt with documented 2 pound wt GAIN since admission- i doubt this is accurate.     3. Severe pulmonary hypertension- Group II- will treat left heart failure and reassess.   Medical mgmt for now.    4. Atrial fibrillation -chronic - will continue full dose anticoagulation with apixiban,    5. CAD s/p PCI of LAD in past- stable .continue current meds.    6. s/p Aortic valve replacement- bioprosthetic- stable per last echo 4/2019.  continue to monitor.    7. DVT proph.

## 2019-06-26 NOTE — PROGRESS NOTE ADULT - ASSESSMENT
89 yo Female with a PMHx of pancreatitis, S/p ERCP,  cirrhosis, DM type II,  L knee replacement, AFIB on Eliquis, CAD s/p stents, severe Pulm HTN, Chronic Right heart failure, Diastolic dysfunction, AS, S/p TAVR presented to ED c/o L knee and leg pain. Patient has hx of left TKA in 2014, already evaluated by orthopedics this admission, no orthopedic intervention.  Her left leg is diffusely painful, making fluid retention/leg edema the probable cause of her leg pain.  DVT/vascular occlusion has been ruled out.  She denies any hx of crystalline arthritis/gout, and while the physical exam is not too impressive for gout,  the timing of her symptoms in the setting of recent alterations in diuretic, the seemingly abrupt nature of symptoms, with more significant pain on left side, there is consideration for this playing a role as well.      Plan:  -Patient is on diuretics  -PT as tolerated  -Received solumedrol 40mg IV x 1 yesterday, tolerated it well.  No worsening of heart failure.  Will give dose of 30mg IV solumedrol today 6/26/19, and 30mg IV tomorrow 6/27/19.  -will continue to follow

## 2019-06-26 NOTE — CONSULT NOTE ADULT - ASSESSMENT
89 y/o Female with h/o pancreatitis s/p ERCP,  cirrhosis, DM type II,  left knee replacement, AFib on Eliquis, CAD s/p stents, severe pulm HTN, chronic right heart failure, Diastolic dysfunction, AS, s/p TAVR was admitted on 6/23 for L knee and leg pain. Pt reports that had sharp pains x one day, called her son in am in tears. As per son legs noted to be more swollen and the patient gained weight. In ER, she was afebrile and no abx were given. Pt noted to be dyspneic and hypoxic on 86-88% on RA. On 6/25 she was noted with 101.4F and the patient received vancomycin IV and aztreonam.    1. Febrile episode. 89 y/o Female with h/o pancreatitis s/p ERCP,  cirrhosis, DM type II,  left knee replacement, AFib on Eliquis, CAD s/p stents, severe pulm HTN, chronic right heart failure, Diastolic dysfunction, AS, s/p TAVR was admitted on 6/23 for L knee and leg pain. Pt reports that had sharp pains x one day, called her son in am in tears. As per son legs noted to be more swollen and the patient gained weight. In ER, she was afebrile and no abx were given. Pt noted to be dyspneic and hypoxic on 86-88% on RA. On 6/25 she was noted with 101.4F and the patient received vancomycin IV and aztreonam.    1. Febrile episode. ?cause. Pedal edema. Arthritis. Chronic diastolic CHF. Cirrhosis.  -encephalopathy resolved ?related to morphine  -leg pains are improved today  -BC x 2 were collected  -no clinical evidence of legs cellulitis  -she received vancomycin IV and aztreonam  -will hold off further abx coverage  -f/u cultures  -old chart reviewed to assess prior cultures  -monitor temps  -f/u CBC  -supportive care  2. Other issues:   -care per medicine

## 2019-06-27 ENCOUNTER — APPOINTMENT (OUTPATIENT)
Dept: INTERNAL MEDICINE | Facility: CLINIC | Age: 84
End: 2019-06-27

## 2019-06-27 LAB
-  AMIKACIN: SIGNIFICANT CHANGE UP
-  AMOXICILLIN/CLAVULANIC ACID: SIGNIFICANT CHANGE UP
-  AMPICILLIN/SULBACTAM: SIGNIFICANT CHANGE UP
-  AMPICILLIN: SIGNIFICANT CHANGE UP
-  AZTREONAM: SIGNIFICANT CHANGE UP
-  CEFAZOLIN: SIGNIFICANT CHANGE UP
-  CEFEPIME: SIGNIFICANT CHANGE UP
-  CEFOXITIN: SIGNIFICANT CHANGE UP
-  CEFTRIAXONE: SIGNIFICANT CHANGE UP
-  CIPROFLOXACIN: SIGNIFICANT CHANGE UP
-  ERTAPENEM: SIGNIFICANT CHANGE UP
-  GENTAMICIN: SIGNIFICANT CHANGE UP
-  IMIPENEM: SIGNIFICANT CHANGE UP
-  LEVOFLOXACIN: SIGNIFICANT CHANGE UP
-  MEROPENEM: SIGNIFICANT CHANGE UP
-  NITROFURANTOIN: SIGNIFICANT CHANGE UP
-  PIPERACILLIN/TAZOBACTAM: SIGNIFICANT CHANGE UP
-  TIGECYCLINE: SIGNIFICANT CHANGE UP
-  TOBRAMYCIN: SIGNIFICANT CHANGE UP
-  TRIMETHOPRIM/SULFAMETHOXAZOLE: SIGNIFICANT CHANGE UP
ANION GAP SERPL CALC-SCNC: 7 MMOL/L — SIGNIFICANT CHANGE UP (ref 5–17)
BUN SERPL-MCNC: 21 MG/DL — SIGNIFICANT CHANGE UP (ref 7–23)
CALCIUM SERPL-MCNC: 8.7 MG/DL — SIGNIFICANT CHANGE UP (ref 8.5–10.1)
CHLORIDE SERPL-SCNC: 102 MMOL/L — SIGNIFICANT CHANGE UP (ref 96–108)
CO2 SERPL-SCNC: 26 MMOL/L — SIGNIFICANT CHANGE UP (ref 22–31)
CREAT SERPL-MCNC: 0.83 MG/DL — SIGNIFICANT CHANGE UP (ref 0.5–1.3)
CULTURE RESULTS: SIGNIFICANT CHANGE UP
GLUCOSE BLDC GLUCOMTR-MCNC: 200 MG/DL — HIGH (ref 70–99)
GLUCOSE BLDC GLUCOMTR-MCNC: 253 MG/DL — HIGH (ref 70–99)
GLUCOSE BLDC GLUCOMTR-MCNC: 261 MG/DL — HIGH (ref 70–99)
GLUCOSE BLDC GLUCOMTR-MCNC: 272 MG/DL — HIGH (ref 70–99)
GLUCOSE SERPL-MCNC: 258 MG/DL — HIGH (ref 70–99)
HCT VFR BLD CALC: 36 % — SIGNIFICANT CHANGE UP (ref 34.5–45)
HGB BLD-MCNC: 11.6 G/DL — SIGNIFICANT CHANGE UP (ref 11.5–15.5)
MAGNESIUM SERPL-MCNC: 2 MG/DL — SIGNIFICANT CHANGE UP (ref 1.6–2.6)
MCHC RBC-ENTMCNC: 31.9 PG — SIGNIFICANT CHANGE UP (ref 27–34)
MCHC RBC-ENTMCNC: 32.2 GM/DL — SIGNIFICANT CHANGE UP (ref 32–36)
MCV RBC AUTO: 98.9 FL — SIGNIFICANT CHANGE UP (ref 80–100)
METHOD TYPE: SIGNIFICANT CHANGE UP
ORGANISM # SPEC MICROSCOPIC CNT: SIGNIFICANT CHANGE UP
ORGANISM # SPEC MICROSCOPIC CNT: SIGNIFICANT CHANGE UP
PLATELET # BLD AUTO: 150 K/UL — SIGNIFICANT CHANGE UP (ref 150–400)
POTASSIUM SERPL-MCNC: 3.6 MMOL/L — SIGNIFICANT CHANGE UP (ref 3.5–5.3)
POTASSIUM SERPL-SCNC: 3.6 MMOL/L — SIGNIFICANT CHANGE UP (ref 3.5–5.3)
RBC # BLD: 3.64 M/UL — LOW (ref 3.8–5.2)
RBC # FLD: 15.7 % — HIGH (ref 10.3–14.5)
SODIUM SERPL-SCNC: 135 MMOL/L — SIGNIFICANT CHANGE UP (ref 135–145)
SPECIMEN SOURCE: SIGNIFICANT CHANGE UP
WBC # BLD: 10.53 K/UL — HIGH (ref 3.8–10.5)
WBC # FLD AUTO: 10.53 K/UL — HIGH (ref 3.8–10.5)

## 2019-06-27 PROCEDURE — 99024 POSTOP FOLLOW-UP VISIT: CPT

## 2019-06-27 PROCEDURE — 99233 SBSQ HOSP IP/OBS HIGH 50: CPT

## 2019-06-27 PROCEDURE — 20610 DRAIN/INJ JOINT/BURSA W/O US: CPT

## 2019-06-27 RX ADMIN — APIXABAN 5 MILLIGRAM(S): 2.5 TABLET, FILM COATED ORAL at 05:12

## 2019-06-27 RX ADMIN — Medication 200 MILLIGRAM(S): at 21:40

## 2019-06-27 RX ADMIN — Medication 20 MILLIGRAM(S): at 17:10

## 2019-06-27 RX ADMIN — Medication 1 TABLET(S): at 11:08

## 2019-06-27 RX ADMIN — Medication 30 MILLIGRAM(S): at 21:40

## 2019-06-27 RX ADMIN — PANTOPRAZOLE SODIUM 40 MILLIGRAM(S): 20 TABLET, DELAYED RELEASE ORAL at 05:13

## 2019-06-27 RX ADMIN — ESCITALOPRAM OXALATE 10 MILLIGRAM(S): 10 TABLET, FILM COATED ORAL at 11:06

## 2019-06-27 RX ADMIN — Medication 2: at 22:09

## 2019-06-27 RX ADMIN — Medication 25 MILLIGRAM(S): at 21:41

## 2019-06-27 RX ADMIN — Medication 6: at 08:06

## 2019-06-27 RX ADMIN — Medication 20 MILLIEQUIVALENT(S): at 11:08

## 2019-06-27 RX ADMIN — Medication 81 MILLIGRAM(S): at 11:06

## 2019-06-27 RX ADMIN — SIMVASTATIN 40 MILLIGRAM(S): 20 TABLET, FILM COATED ORAL at 21:40

## 2019-06-27 RX ADMIN — Medication 250 MILLIGRAM(S): at 05:12

## 2019-06-27 RX ADMIN — Medication 250 MILLIGRAM(S): at 17:10

## 2019-06-27 RX ADMIN — Medication 6: at 11:56

## 2019-06-27 RX ADMIN — LIDOCAINE 1 PATCH: 4 CREAM TOPICAL at 22:41

## 2019-06-27 RX ADMIN — APIXABAN 5 MILLIGRAM(S): 2.5 TABLET, FILM COATED ORAL at 17:09

## 2019-06-27 RX ADMIN — Medication 1 DROP(S): at 05:14

## 2019-06-27 RX ADMIN — LIDOCAINE 1 PATCH: 4 CREAM TOPICAL at 11:07

## 2019-06-27 RX ADMIN — Medication 20 MILLIGRAM(S): at 05:13

## 2019-06-27 RX ADMIN — LIDOCAINE 1 PATCH: 4 CREAM TOPICAL at 21:41

## 2019-06-27 RX ADMIN — Medication 2: at 17:09

## 2019-06-27 RX ADMIN — Medication 50 MICROGRAM(S): at 05:13

## 2019-06-27 RX ADMIN — Medication 650 MILLIGRAM(S): at 17:13

## 2019-06-27 RX ADMIN — Medication 80 MILLIGRAM(S): at 05:12

## 2019-06-27 NOTE — PROCEDURE NOTE - NSINFORMCONSENT_GEN_A_CORE
Left shoulder intraarticular injection/Benefits, risks, and possible complications of procedure explained to patient/caregiver who verbalized understanding and gave written consent.

## 2019-06-27 NOTE — CONSULT NOTE ADULT - CONSULT REQUESTED BY NAME
Chief Complaint   Patient presents with     Follow Up     Chronic systolic heart failure secondary to NICM, Stage C, NYHA Class IIIa     Vitals were taken and medications were reconciled. .    Anjelica Kiser MA    8:23 AM         [place]

## 2019-06-27 NOTE — PROGRESS NOTE ADULT - ASSESSMENT
1. Leg pain - Likely secondary to pedal edema .  So far arterial and venous US did not reveal any pathology .  Ortho team input noted,  Treatment of the pedal edema recommended.    2. Pedal edema - Acute on chronic decompensated HF pEF, complicated by severe pulmonary hypertension,last cath also showed low CO.  Continue lasix 80mg iv BID.    Diuresis with close monitoring of the renal function and electrolytes.  Goal potassium of 4 and magnesium of 2.   Strict I/O and daily wt checks. Low sodium diet. Nutrition education.   Joao stockings     So far no documentation of I/O . Informed RN.    3. Severe pulmonary hypertension- Group II- will treat left heart failure and reasses.     4. Atrial fibrillation -chronic - will continue full dose anticoagulation with apixiban,    5. CAD s/p PCI of LAD in past- stable .continue current meds.    6. s/p Aortic valve replacement- bioprosthetic- stable per last echo 4/2019.  continue to monitor.    7. Fever- likely UTI.  Pt on antibiotics now.  Blood cx negative so far.  Discussed with ID team yesterday.     Other medical issues- Management per primary team.   Thank you for allowing me to participate in the care of this patient. Please feel free to contact me with any questions.

## 2019-06-27 NOTE — PROGRESS NOTE ADULT - ASSESSMENT
89 y/o Female with h/o pancreatitis s/p ERCP,  cirrhosis, DM type II,  left knee replacement, AFib on Eliquis, CAD s/p stents, severe pulm HTN, chronic right heart failure, Diastolic dysfunction, AS, s/p TAVR was admitted on 6/23 for L knee and leg pain. Pt reports that had sharp pains x one day, called her son in am in tears. As per son legs noted to be more swollen and the patient gained weight. In ER, she was afebrile and no abx were given. Pt noted to be dyspneic and hypoxic on 86-88% on RA. On 6/25 she was noted with 101.4F and the patient received vancomycin IV and aztreonam.    1. Febrile episode resolved. Pedal edema. Arthritis. Chronic diastolic CHF. Cirrhosis.  -encephalopathy resolved ?related to morphine  -no leg pain  -reported with urine culture growing E.coli, but the patient has no dysuria and UA is clear without pyuria; doubt urinary infection; likely colonization  -BC x 2 and urine c/s reviewed  -no clinical evidence of legs cellulitis  -continue to observe abx coverage  -f/u cultures  -monitor temps  -f/u CBC  -supportive care  2. Other issues:   -care per medicine

## 2019-06-27 NOTE — PROCEDURE NOTE - NSDIAGNOSTIC_RESP_A_CORE
May 18, 2018      We are seeing Alexia Dempsey, 1967, at [facility]. Alyssa Santana MD is their primary care physician. To help with our [place] maintenance records could you please send the following:     MOST RECENT MAMMOGRAM     Please fax to [facility] at [phone], attention Natalie Serrato.     Thank-you in advance for your assistance. If you have any questions or concerns please contact me at [phone].     Natalie HEART LPN  Care Coordination Department  [facility]  [phone]      
Therapeutic

## 2019-06-27 NOTE — PROGRESS NOTE ADULT - ASSESSMENT
89 yo Female with a PMHx of pancreatitis, S/p ERCP,  cirrhosis, DM type II,  Left knee replacement, AFIB on Eliquis, CAD s/p stents, severe Pulm HTN, Chronic Right heart failure, Diastolic dysfunction, AS, S/p TAVR presented to ED c/o Left knee and leg pain.  Pt admitted with CHF/ LE Edema.  Pain thought to be from edema.  Hospital course significant for increased lethargy and fever on 6/25 but now afebrile x 2 days    #Fever-- no clear source; no UTI ( contaminated sample)   blood cx neg  CXR unchanged.  Hypoxia from admission improved with diuresis.  No obvious PNA.    Appreciate ID eval.    Vanco/ Aztreonam given 6/25 but stopped 6/26.      #Metabolic Encephalopathy:    Suspect related to fever.    CT head negative.    Improved.      #Left leg/ knee pain/ edema:  pain better  Suspect pain related to swelling from CHF.    Also cannot r/o new onset gout with adjusting of diuretics   Given solumedrol 40 6/25-- as per Rheum will repeat 30 mg 6/26 and 6/27 for possible gout.    CT LE was negative.    Ortho eval negative.    PT eval.      #Acute hypoxic Respiratory Failure due to Acute on Chronic Diastolic CHF Exacerbation:    Pt with hypoxia (86% on RA on admission)/ pulmonary edema on exam.    Cont lasix.  Dose increased by cardiology 80 IV BID.     Hypoxia improved     #Severe Pulmonary HTN:    Cont Revatio      #DM:    Cont meds.  Controlled.      #Hx of Severe AS s/p TAVR:    Stable.  Check ECHO.      #Afib:    Cont eliqius/ BB    DISPO:    Cont diuresis for CHF.  Follow labs.    D/w pt, team, Dr. Knutson, and pt's son, Dr. Laith Mojica at length on 6/27.  Home phone:  [phone].

## 2019-06-28 LAB
ANION GAP SERPL CALC-SCNC: 9 MMOL/L — SIGNIFICANT CHANGE UP (ref 5–17)
BUN SERPL-MCNC: 22 MG/DL — SIGNIFICANT CHANGE UP (ref 7–23)
CALCIUM SERPL-MCNC: 8.5 MG/DL — SIGNIFICANT CHANGE UP (ref 8.5–10.1)
CHLORIDE SERPL-SCNC: 98 MMOL/L — SIGNIFICANT CHANGE UP (ref 96–108)
CO2 SERPL-SCNC: 26 MMOL/L — SIGNIFICANT CHANGE UP (ref 22–31)
CREAT SERPL-MCNC: 0.73 MG/DL — SIGNIFICANT CHANGE UP (ref 0.5–1.3)
GLUCOSE BLDC GLUCOMTR-MCNC: 280 MG/DL — HIGH (ref 70–99)
GLUCOSE BLDC GLUCOMTR-MCNC: 309 MG/DL — HIGH (ref 70–99)
GLUCOSE BLDC GLUCOMTR-MCNC: 327 MG/DL — HIGH (ref 70–99)
GLUCOSE BLDC GLUCOMTR-MCNC: 354 MG/DL — HIGH (ref 70–99)
GLUCOSE SERPL-MCNC: 317 MG/DL — HIGH (ref 70–99)
MAGNESIUM SERPL-MCNC: 2 MG/DL — SIGNIFICANT CHANGE UP (ref 1.6–2.6)
POTASSIUM SERPL-MCNC: 3.6 MMOL/L — SIGNIFICANT CHANGE UP (ref 3.5–5.3)
POTASSIUM SERPL-SCNC: 3.6 MMOL/L — SIGNIFICANT CHANGE UP (ref 3.5–5.3)
SODIUM SERPL-SCNC: 133 MMOL/L — LOW (ref 135–145)

## 2019-06-28 PROCEDURE — 99232 SBSQ HOSP IP/OBS MODERATE 35: CPT

## 2019-06-28 PROCEDURE — 99233 SBSQ HOSP IP/OBS HIGH 50: CPT

## 2019-06-28 RX ORDER — FUROSEMIDE 40 MG
80 TABLET ORAL DAILY
Refills: 0 | Status: DISCONTINUED | OUTPATIENT
Start: 2019-06-28 | End: 2019-06-30

## 2019-06-28 RX ADMIN — Medication 1 DROP(S): at 17:13

## 2019-06-28 RX ADMIN — APIXABAN 5 MILLIGRAM(S): 2.5 TABLET, FILM COATED ORAL at 17:11

## 2019-06-28 RX ADMIN — Medication 20 MILLIGRAM(S): at 17:11

## 2019-06-28 RX ADMIN — Medication 1 DROP(S): at 05:51

## 2019-06-28 RX ADMIN — Medication 6: at 21:12

## 2019-06-28 RX ADMIN — Medication 6: at 11:44

## 2019-06-28 RX ADMIN — Medication 650 MILLIGRAM(S): at 21:22

## 2019-06-28 RX ADMIN — ESCITALOPRAM OXALATE 10 MILLIGRAM(S): 10 TABLET, FILM COATED ORAL at 11:43

## 2019-06-28 RX ADMIN — Medication 25 MILLIGRAM(S): at 21:13

## 2019-06-28 RX ADMIN — LIDOCAINE 1 PATCH: 4 CREAM TOPICAL at 19:34

## 2019-06-28 RX ADMIN — Medication 250 MILLIGRAM(S): at 05:50

## 2019-06-28 RX ADMIN — Medication 200 MILLIGRAM(S): at 21:13

## 2019-06-28 RX ADMIN — Medication 8: at 07:46

## 2019-06-28 RX ADMIN — PANTOPRAZOLE SODIUM 40 MILLIGRAM(S): 20 TABLET, DELAYED RELEASE ORAL at 05:51

## 2019-06-28 RX ADMIN — Medication 81 MILLIGRAM(S): at 11:42

## 2019-06-28 RX ADMIN — LIDOCAINE 1 PATCH: 4 CREAM TOPICAL at 11:43

## 2019-06-28 RX ADMIN — LIDOCAINE 1 PATCH: 4 CREAM TOPICAL at 22:00

## 2019-06-28 RX ADMIN — SIMVASTATIN 40 MILLIGRAM(S): 20 TABLET, FILM COATED ORAL at 21:13

## 2019-06-28 RX ADMIN — Medication 20 MILLIGRAM(S): at 11:42

## 2019-06-28 RX ADMIN — Medication 20 MILLIGRAM(S): at 05:50

## 2019-06-28 RX ADMIN — Medication 80 MILLIGRAM(S): at 05:50

## 2019-06-28 RX ADMIN — Medication 50 MICROGRAM(S): at 05:51

## 2019-06-28 RX ADMIN — APIXABAN 5 MILLIGRAM(S): 2.5 TABLET, FILM COATED ORAL at 05:51

## 2019-06-28 RX ADMIN — Medication 8: at 16:50

## 2019-06-28 RX ADMIN — Medication 20 MILLIEQUIVALENT(S): at 11:43

## 2019-06-28 RX ADMIN — Medication 1 TABLET(S): at 11:43

## 2019-06-28 RX ADMIN — Medication 250 MILLIGRAM(S): at 17:11

## 2019-06-28 NOTE — PROGRESS NOTE ADULT - ASSESSMENT
89 y/o Female with h/o pancreatitis s/p ERCP,  cirrhosis, DM type II,  left knee replacement, AFib on Eliquis, CAD s/p stents, severe pulm HTN, chronic right heart failure, Diastolic dysfunction, AS, s/p TAVR was admitted on 6/23 for L knee and leg pain. Pt reports that had sharp pains x one day, called her son in am in tears. As per son legs noted to be more swollen and the patient gained weight. In ER, she was afebrile and no abx were given. Pt noted to be dyspneic and hypoxic on 86-88% on RA. On 6/25 she was noted with 101.4F and the patient received vancomycin IV and aztreonam.    1. Pedal edema. Arthritis. Chronic diastolic CHF. Cirrhosis.  -encephalopathy resolved   -leg pain is improvined  -reported with urine culture growing E.coli, but the patient has no dysuria and UA is clear without pyuria; doubt urinary infection; likely colonization  -BC x 2 and urine c/s reviewed  -no clinical evidence of legs cellulitis  -continue to observe abx coverage  -f/u cultures  -monitor temps  -f/u CBC  -supportive care  2. Other issues:   -care per medicine

## 2019-06-28 NOTE — PROGRESS NOTE ADULT - ASSESSMENT
89 yo Female with a PMHx of pancreatitis, S/p ERCP,  cirrhosis, DM type II,  L knee replacement, AFIB on Eliquis, CAD s/p stents, severe Pulm HTN, Chronic Right heart failure, Diastolic dysfunction, AS, S/p TAVR presented to ED c/o L knee and leg pain. Patient has hx of left TKA in 2014, already evaluated by orthopedics this admission, no orthopedic intervention.  Her left leg is diffusely painful, making fluid retention/leg edema the probable cause of her leg pain.  DVT/vascular occlusion has been ruled out.  She denies any hx of crystalline arthritis/gout, and while initial physical exam was not too impressive for gout,  the timing of her symptoms in the setting of recent alterations in diuretic, the seemingly abrupt nature of symptoms, with more significant pain on left side, there is consideration for this playing a role as well.      Plan:  -Patient is on diuretics  -Received solumedrol 40mg IV x 1 day, then 30mg IV x 2 days.  Continue with 20mg IV solumedrol daily while inpatient, with plan to switch to PO prednisone for continued taper upon discharge.  The pain in her legs and motion in her left prosthetic knee continues to improve with diuresis and steroid.     -Recommend PT as tolerated  -Received CS injection left shoulder 6/27/19 by ortho  -will follow

## 2019-06-28 NOTE — PROGRESS NOTE ADULT - ASSESSMENT
89 yo Female with a PMHx of pancreatitis, S/p ERCP,  cirrhosis, DM type II,  Left knee replacement, AFIB on Eliquis, CAD s/p stents, severe Pulm HTN, Chronic Right heart failure, Diastolic dysfunction, AS, S/p TAVR presented to ED c/o Left knee and leg pain.  Pt admitted with CHF/ LE Edema.  Pain thought to be from edema.  Hospital course significant for increased lethargy and fever on 6/25 but now afebrile x 2 days    #Fever-- no clear source; no UTI ( contaminated sample)   blood cx neg  CXR unchanged.  Hypoxia from admission improved with diuresis.  No obvious PNA.    Appreciate ID eval.    Vanco/ Aztreonam given 6/25 but stopped 6/26.    monitor off ABX    #Metabolic Encephalopathy:    Suspect related to fever.    CT head negative.    much Improved.      #Left leg/ knee pain/ edema:  pain better, likely gout  Also cannot r/o new onset gout with adjusting of diuretics   Given solumedrol 40 6/25-- as per Rheum will repeat 30 mg 6/26 and 6/27 for possible gout.  ; cont 20 mg daily and then switch to po taper at discharge  CT LE was negative.    Ortho eval negative.    PT eval.      #Acute hypoxic Respiratory Failure due to Acute on Chronic Diastolic CHF Exacerbation:    Pt with hypoxia (86% on RA on admission)/ pulmonary edema on exam.    Cont lasix.  Dose decreased by cardiology 80 IV daily   Hypoxia improved     #Severe Pulmonary HTN:    Cont Revatio      #DM:    Cont meds.  Controlled.      #Hx of Severe AS s/p TAVR:    Stable.  Check ECHO.      #Afib:    Cont eliqius/ BB    DISPO:    Cont diuresis for CHF.  Follow labs.    D/w pt, team, Dr. Knutson, and pt's son, Dr. Laith Mojica at length   Home phone:  [phone].

## 2019-06-28 NOTE — PROGRESS NOTE ADULT - ASSESSMENT
1. Leg pain - Likely secondary to pedal edema .  So far arterial and venous US did not reveal any pathology .  Ortho team input noted,  Treatment of the pedal edema recommended.    2. Pedal edema - Acute on chronic decompensated HF pEF, complicated by severe pulmonary hypertension,last cath also showed low CO.  good diuresis so far  Will decrease lasix to 80mg iv daily, Continue diuresis over the weekend unless worsening renal function   Diuresis with close monitoring of the renal function and electrolytes.  Goal potassium of 4 and magnesium of 2.   Strict I/O and daily wt checks. Low sodium diet. Nutrition education.   Joao stockings       3. Severe pulmonary hypertension- Group II- will treat left heart failure and reasses.     4. Atrial fibrillation -chronic - will continue full dose anticoagulation with apixiban,    5. CAD s/p PCI of LAD in past- stable .continue current meds.    6. s/p Aortic valve replacement- bioprosthetic- stable per last echo 4/2019.  continue to monitor.    7. Fever- no signs of infection per ID team .     Other medical issues- Management per primary team.   Thank you for allowing me to participate in the care of this patient. Please feel free to contact me with any questions.

## 2019-06-29 LAB
GLUCOSE BLDC GLUCOMTR-MCNC: 263 MG/DL — HIGH (ref 70–99)
GLUCOSE BLDC GLUCOMTR-MCNC: 328 MG/DL — HIGH (ref 70–99)
GLUCOSE BLDC GLUCOMTR-MCNC: 341 MG/DL — HIGH (ref 70–99)
GLUCOSE BLDC GLUCOMTR-MCNC: 361 MG/DL — HIGH (ref 70–99)

## 2019-06-29 PROCEDURE — 99233 SBSQ HOSP IP/OBS HIGH 50: CPT

## 2019-06-29 PROCEDURE — 99232 SBSQ HOSP IP/OBS MODERATE 35: CPT

## 2019-06-29 RX ADMIN — Medication 250 MILLIGRAM(S): at 17:51

## 2019-06-29 RX ADMIN — Medication 1 DROP(S): at 17:50

## 2019-06-29 RX ADMIN — Medication 20 MILLIGRAM(S): at 05:22

## 2019-06-29 RX ADMIN — PANTOPRAZOLE SODIUM 40 MILLIGRAM(S): 20 TABLET, DELAYED RELEASE ORAL at 05:22

## 2019-06-29 RX ADMIN — APIXABAN 5 MILLIGRAM(S): 2.5 TABLET, FILM COATED ORAL at 17:49

## 2019-06-29 RX ADMIN — Medication 81 MILLIGRAM(S): at 11:26

## 2019-06-29 RX ADMIN — Medication 1 TABLET(S): at 11:22

## 2019-06-29 RX ADMIN — SIMVASTATIN 40 MILLIGRAM(S): 20 TABLET, FILM COATED ORAL at 21:43

## 2019-06-29 RX ADMIN — Medication 6: at 21:42

## 2019-06-29 RX ADMIN — Medication 8: at 17:48

## 2019-06-29 RX ADMIN — Medication 250 MILLIGRAM(S): at 05:22

## 2019-06-29 RX ADMIN — APIXABAN 5 MILLIGRAM(S): 2.5 TABLET, FILM COATED ORAL at 05:22

## 2019-06-29 RX ADMIN — LIDOCAINE 1 PATCH: 4 CREAM TOPICAL at 14:57

## 2019-06-29 RX ADMIN — Medication 80 MILLIGRAM(S): at 11:26

## 2019-06-29 RX ADMIN — ESCITALOPRAM OXALATE 10 MILLIGRAM(S): 10 TABLET, FILM COATED ORAL at 11:22

## 2019-06-29 RX ADMIN — Medication 20 MILLIGRAM(S): at 17:51

## 2019-06-29 RX ADMIN — Medication 20 MILLIEQUIVALENT(S): at 11:26

## 2019-06-29 RX ADMIN — LIDOCAINE 1 PATCH: 4 CREAM TOPICAL at 22:09

## 2019-06-29 RX ADMIN — Medication 8: at 12:33

## 2019-06-29 RX ADMIN — Medication 1 DROP(S): at 05:25

## 2019-06-29 RX ADMIN — Medication 25 MILLIGRAM(S): at 21:43

## 2019-06-29 RX ADMIN — Medication 200 MILLIGRAM(S): at 21:43

## 2019-06-29 RX ADMIN — Medication 50 MICROGRAM(S): at 05:22

## 2019-06-29 RX ADMIN — Medication 6: at 08:52

## 2019-06-29 NOTE — PROGRESS NOTE ADULT - ASSESSMENT
89 yo Female with a PMHx of pancreatitis, S/p ERCP,  cirrhosis, DM type II,  Left knee replacement, AFIB on Eliquis, CAD s/p stents, severe Pulm HTN, Chronic Right heart failure, Diastolic dysfunction, AS, S/p TAVR presented to ED c/o Left knee and leg pain.  Pt admitted with CHF/ LE Edema.  Pain thought to be from edema.  Hospital course significant for increased lethargy and fever on 6/25 but now afebrile x 2 days    #Fever-- no clear source; no UTI ( contaminated sample)   blood cx neg  CXR unchanged.  Hypoxia from admission improved with diuresis.  No obvious PNA.    Appreciate ID eval.    Vanco/ Aztreonam given 6/25 but stopped 6/26.    monitor off ABX, afebrile  check labs in am    #Metabolic Encephalopathy:    Suspect related to fever.    CT head negative.    much Improved.      #Left leg/ knee pain/ edema:  pain better, likely gout  Also cannot r/o new onset gout with adjusting of diuretics   Given solumedrol 40 6/25-- as per Rheum will repeat 30 mg 6/26 and 6/27 for possible gout.  ; cont solumedrol 20 mg iv daily and then switch to po taper at discharge  CT LE was negative.    Ortho eval negative.    PT eval.      #Acute hypoxic Respiratory Failure due to Acute on Chronic Diastolic CHF Exacerbation:    Pt with hypoxia (86% on RA on admission)/ pulmonary edema on exam.    Cont lasix.  Dose decreased by cardiology 80 IV daily   Hypoxia improved     #Severe Pulmonary HTN:    Cont Revatio      #DM:    Cont meds.  Controlled.      #Hx of Severe AS s/p TAVR:    Stable.  Check ECHO.      #Afib:    Cont eliqius/ BB    DISPO:    Cont diuresis for CHF.  Follow labs.    D/w pt, team, Dr. Knutson, and pt's son, Dr. Griffithleidy Careycarlos at length   Home phone:  [phone].    dispo: likely [facility] on 7/1

## 2019-06-29 NOTE — PROGRESS NOTE ADULT - ASSESSMENT
89 yo Female with a PMHx of pancreatitis, S/p ERCP,  cirrhosis, DM type II,  L knee replacement, AFIB on Eliquis, CAD s/p stents, severe Pulm HTN, Chronic Right heart failure, Diastolic dysfunction, AS, S/p TAVR presented to ED c/o L knee and leg pain. Patient has hx of left TKA in 2014, already evaluated by orthopedics this admission, no orthopedic intervention.  Her left leg is diffusely painful, making fluid retention/leg edema the probable cause of her leg pain.  DVT/vascular occlusion has been ruled out.  She denies any hx of crystalline arthritis/gout, and while initial physical exam was not too impressive for gout,  the timing of her symptoms in the setting of recent alterations in diuretic, the seemingly abrupt nature of symptoms, with more significant pain on left side, there is consideration for this playing a role as well.      Plan:  -Patient is on diuretics  -Received solumedrol 40mg IV x 1 day, then 30mg IV x 2 days.  Continue with 20mg IV solumedrol daily while inpatient, with plan to switch to PO prednisone for continued taper upon discharge.  The pain in her legs and motion in her left prosthetic knee continues to improve with diuresis and steroid.     -Recommend PT as tolerated  -Received CS injection left shoulder 6/27/19 by ortho.  Her left motion is improved.    -will follow

## 2019-06-29 NOTE — PROGRESS NOTE ADULT - ASSESSMENT
1. Leg pain - Likely secondary to pedal edema .  So far arterial and venous US did not reveal any pathology .  Ortho team input noted,  Treatment of the pedal edema recommended. IV lasix with f/u BUN CR Na     2. Pedal edema - Acute on chronic decompensated HF pEF, complicated by severe pulmonary hypertension,last cath also showed low CO.  good diuresis so far  Will decrease lasix to 80mg iv daily, Continue diuresis over the weekend unless worsening renal function   Diuresis with close monitoring of the renal function and electrolytes.  Goal potassium of 4 and magnesium of 2.   Strict I/O and daily wt checks. Low sodium diet. Nutrition education.   Joao stockings       3. Severe pulmonary hypertension- Group II- will treat left heart failure and reasses.     4. Atrial fibrillation -chronic - will continue full dose anticoagulation with apixiban,    5. CAD s/p PCI of LAD in past- stable .continue current meds.    6. s/p Aortic valve replacement- bioprosthetic- stable per last echo 4/2019.  continue to monitor.    7. Fever- no signs of infection per ID team .     Other medical issues- Management per primary team.   Thank you for allowing me to participate in the care of this patient. Please feel free to contact me with any questions.

## 2019-06-30 LAB
ANION GAP SERPL CALC-SCNC: 5 MMOL/L — SIGNIFICANT CHANGE UP (ref 5–17)
BUN SERPL-MCNC: 18 MG/DL — SIGNIFICANT CHANGE UP (ref 7–23)
CALCIUM SERPL-MCNC: 8.7 MG/DL — SIGNIFICANT CHANGE UP (ref 8.5–10.1)
CHLORIDE SERPL-SCNC: 100 MMOL/L — SIGNIFICANT CHANGE UP (ref 96–108)
CO2 SERPL-SCNC: 30 MMOL/L — SIGNIFICANT CHANGE UP (ref 22–31)
CREAT SERPL-MCNC: 0.65 MG/DL — SIGNIFICANT CHANGE UP (ref 0.5–1.3)
CULTURE RESULTS: SIGNIFICANT CHANGE UP
CULTURE RESULTS: SIGNIFICANT CHANGE UP
GLUCOSE BLDC GLUCOMTR-MCNC: 224 MG/DL — HIGH (ref 70–99)
GLUCOSE BLDC GLUCOMTR-MCNC: 307 MG/DL — HIGH (ref 70–99)
GLUCOSE BLDC GLUCOMTR-MCNC: 391 MG/DL — HIGH (ref 70–99)
GLUCOSE BLDC GLUCOMTR-MCNC: 405 MG/DL — HIGH (ref 70–99)
GLUCOSE SERPL-MCNC: 228 MG/DL — HIGH (ref 70–99)
HCT VFR BLD CALC: 36.9 % — SIGNIFICANT CHANGE UP (ref 34.5–45)
HGB BLD-MCNC: 12.1 G/DL — SIGNIFICANT CHANGE UP (ref 11.5–15.5)
MCHC RBC-ENTMCNC: 31.6 PG — SIGNIFICANT CHANGE UP (ref 27–34)
MCHC RBC-ENTMCNC: 32.8 GM/DL — SIGNIFICANT CHANGE UP (ref 32–36)
MCV RBC AUTO: 96.3 FL — SIGNIFICANT CHANGE UP (ref 80–100)
NT-PROBNP SERPL-SCNC: 2862 PG/ML — HIGH (ref 0–450)
PLATELET # BLD AUTO: 169 K/UL — SIGNIFICANT CHANGE UP (ref 150–400)
POTASSIUM SERPL-MCNC: 4.3 MMOL/L — SIGNIFICANT CHANGE UP (ref 3.5–5.3)
POTASSIUM SERPL-SCNC: 4.3 MMOL/L — SIGNIFICANT CHANGE UP (ref 3.5–5.3)
RBC # BLD: 3.83 M/UL — SIGNIFICANT CHANGE UP (ref 3.8–5.2)
RBC # FLD: 15.1 % — HIGH (ref 10.3–14.5)
SODIUM SERPL-SCNC: 135 MMOL/L — SIGNIFICANT CHANGE UP (ref 135–145)
SPECIMEN SOURCE: SIGNIFICANT CHANGE UP
SPECIMEN SOURCE: SIGNIFICANT CHANGE UP
WBC # BLD: 5.83 K/UL — SIGNIFICANT CHANGE UP (ref 3.8–10.5)
WBC # FLD AUTO: 5.83 K/UL — SIGNIFICANT CHANGE UP (ref 3.8–10.5)

## 2019-06-30 PROCEDURE — 99233 SBSQ HOSP IP/OBS HIGH 50: CPT

## 2019-06-30 RX ORDER — INSULIN LISPRO 100/ML
2 VIAL (ML) SUBCUTANEOUS ONCE
Refills: 0 | Status: COMPLETED | OUTPATIENT
Start: 2019-06-30 | End: 2019-06-30

## 2019-06-30 RX ORDER — FUROSEMIDE 40 MG
40 TABLET ORAL DAILY
Refills: 0 | Status: DISCONTINUED | OUTPATIENT
Start: 2019-06-30 | End: 2019-07-01

## 2019-06-30 RX ADMIN — PANTOPRAZOLE SODIUM 40 MILLIGRAM(S): 20 TABLET, DELAYED RELEASE ORAL at 05:33

## 2019-06-30 RX ADMIN — LIDOCAINE 1 PATCH: 4 CREAM TOPICAL at 12:19

## 2019-06-30 RX ADMIN — LIDOCAINE 1 PATCH: 4 CREAM TOPICAL at 21:16

## 2019-06-30 RX ADMIN — Medication 200 MILLIGRAM(S): at 21:25

## 2019-06-30 RX ADMIN — Medication 12: at 17:30

## 2019-06-30 RX ADMIN — APIXABAN 5 MILLIGRAM(S): 2.5 TABLET, FILM COATED ORAL at 05:32

## 2019-06-30 RX ADMIN — Medication 50 MICROGRAM(S): at 05:33

## 2019-06-30 RX ADMIN — Medication 10: at 12:20

## 2019-06-30 RX ADMIN — Medication 650 MILLIGRAM(S): at 21:26

## 2019-06-30 RX ADMIN — Medication 25 MILLIGRAM(S): at 21:25

## 2019-06-30 RX ADMIN — Medication 80 MILLIGRAM(S): at 13:04

## 2019-06-30 RX ADMIN — Medication 81 MILLIGRAM(S): at 12:23

## 2019-06-30 RX ADMIN — Medication 2 UNIT(S): at 17:30

## 2019-06-30 RX ADMIN — Medication 20 MILLIGRAM(S): at 05:33

## 2019-06-30 RX ADMIN — ESCITALOPRAM OXALATE 10 MILLIGRAM(S): 10 TABLET, FILM COATED ORAL at 12:19

## 2019-06-30 RX ADMIN — Medication 1 DROP(S): at 17:32

## 2019-06-30 RX ADMIN — APIXABAN 5 MILLIGRAM(S): 2.5 TABLET, FILM COATED ORAL at 17:31

## 2019-06-30 RX ADMIN — SIMVASTATIN 40 MILLIGRAM(S): 20 TABLET, FILM COATED ORAL at 21:25

## 2019-06-30 RX ADMIN — Medication 4: at 21:25

## 2019-06-30 RX ADMIN — Medication 250 MILLIGRAM(S): at 05:33

## 2019-06-30 RX ADMIN — Medication 1 TABLET(S): at 12:19

## 2019-06-30 RX ADMIN — Medication 250 MILLIGRAM(S): at 17:32

## 2019-06-30 RX ADMIN — Medication 20 MILLIGRAM(S): at 17:32

## 2019-06-30 RX ADMIN — Medication 20 MILLIEQUIVALENT(S): at 12:23

## 2019-06-30 RX ADMIN — Medication 4: at 08:42

## 2019-06-30 RX ADMIN — LIDOCAINE 1 PATCH: 4 CREAM TOPICAL at 03:04

## 2019-06-30 RX ADMIN — Medication 20 MILLIGRAM(S): at 05:32

## 2019-06-30 NOTE — PROGRESS NOTE ADULT - ASSESSMENT
A/P: 89F s/p L TKA (2014) with diffuse left lower extremity pain/swelling now resolving (likely secondary to CHF), and s/p L shoulder injection by Dr. Blakely 6/27/19  Recommend Ice/elevation and anti-inflammatories as tolerated  Pain/ROM improving  WBAT BL LE and LUE, assistive devices as needed  Pain control.  No acute orthopedic surgical intervention  Can follow up with Dr. Blakely in office when discharged as needed   Will discuss with Dr. Blakely and advise if plan changes

## 2019-06-30 NOTE — PROGRESS NOTE ADULT - ASSESSMENT
89 yo Female with a PMHx of pancreatitis, S/p ERCP,  cirrhosis, DM type II,  Left knee replacement, AFIB on Eliquis, CAD s/p stents, severe Pulm HTN, Chronic Right heart failure, Diastolic dysfunction, AS, S/p TAVR presented to ED c/o Left knee and leg pain.  Pt admitted with CHF/ LE Edema.  Pain thought to be from edema.  Hospital course significant for increased lethargy and fever on 6/25 but now afebrile x 2 days    #Fever-- no clear source; no UTI ( contaminated sample)   blood cx neg  CXR unchanged.  Hypoxia from admission improved with diuresis.  No obvious PNA.    Appreciate ID eval.    Vanco/ Aztreonam given 6/25 but stopped 6/26.    monitor off ABX, afebrile  check labs in am    #Metabolic Encephalopathy:    Suspect related to fever.    CT head negative.    much Improved.      #Left leg/ knee pain/ edema:  pain better, likely gout  Also cannot r/o new onset gout with adjusting of diuretics   Given solumedrol 40 6/25-- as per Rheum will repeat 30 mg 6/26 and 6/27 for possible gout.  ; cont solumedrol 20 mg iv daily and then switch to po taper at discharge  CT LE was negative.    Ortho eval negative.    PT eval.      #Acute hypoxic Respiratory Failure due to Acute on Chronic Diastolic CHF Exacerbation:    Pt with hypoxia (86% on RA on admission)/ pulmonary edema on exam.    Cont lasix.  Dose decreased by cardiology to 40 mg po.  Hypoxia improved     #Severe Pulmonary HTN:    Cont Revatio      #DM:  uncontrolled sec to iv steroids; change steroids to po  Cont meds.      #Hx of Severe AS s/p TAVR:    Stable.  Check ECHO.      #Afib:    Cont eliqius/ BB    DISPO:    Cont diuresis for CHF.  Follow labs.    D/w pt, team, dr. Camacho, and pt's son, Dr. Laith Mojica at length   Home phone:  [phone].    dispo: likely [facility] on 7/1

## 2019-06-30 NOTE — PROGRESS NOTE ADULT - ASSESSMENT
1. Leg pain - Likely secondary to pedal edema .  So far arterial and venous US did not reveal any pathology .  Ortho team input noted,  continue lasix but change to PO today     2. Pedal edema - Acute on chronic decompensated HF pEF, complicated by severe pulmonary hypertension,last cath also showed low CO.  good diuresis so far  Diuresis with close monitoring of the renal function and electrolytes.  Goal potassium of 4 and magnesium of 2.   Strict I/O and daily wt checks. Low sodium diet. Nutrition education.   Joao stockings   Consider Cardiomems will speak to Dr Hwang       3. Severe pulmonary hypertension- Group II- will treat left heart failure and reasses.     4. Atrial fibrillation -chronic - will continue full dose anticoagulation with apixiban,    5. CAD s/p PCI of LAD in past- stable .continue current meds.    6. s/p Aortic valve replacement- bioprosthetic- stable per last echo 4/2019.  continue to monitor.    7. Fever- no signs of infection per ID team .     Other medical issues- Management per primary team.   Thank you for allowing me to participate in the care of this patient. Please feel free to contact me with any questions.

## 2019-07-01 ENCOUNTER — TRANSCRIPTION ENCOUNTER (OUTPATIENT)
Age: 84
End: 2019-07-01

## 2019-07-01 VITALS
HEART RATE: 94 BPM | RESPIRATION RATE: 16 BRPM | SYSTOLIC BLOOD PRESSURE: 155 MMHG | OXYGEN SATURATION: 98 % | DIASTOLIC BLOOD PRESSURE: 72 MMHG

## 2019-07-01 LAB
GLUCOSE BLDC GLUCOMTR-MCNC: 201 MG/DL — HIGH (ref 70–99)
GLUCOSE BLDC GLUCOMTR-MCNC: 241 MG/DL — HIGH (ref 70–99)

## 2019-07-01 PROCEDURE — 99233 SBSQ HOSP IP/OBS HIGH 50: CPT

## 2019-07-01 RX ORDER — METOPROLOL TARTRATE 50 MG
1 TABLET ORAL
Qty: 0 | Refills: 0 | DISCHARGE

## 2019-07-01 RX ORDER — LIDOCAINE 4 G/100G
1 CREAM TOPICAL
Qty: 0 | Refills: 0 | DISCHARGE
Start: 2019-07-01

## 2019-07-01 RX ORDER — RANITIDINE HYDROCHLORIDE 150 MG/1
1 TABLET, FILM COATED ORAL
Qty: 0 | Refills: 0 | DISCHARGE

## 2019-07-01 RX ORDER — SENNA PLUS 8.6 MG/1
2 TABLET ORAL
Qty: 0 | Refills: 0 | DISCHARGE
Start: 2019-07-01

## 2019-07-01 RX ORDER — METOPROLOL TARTRATE 50 MG
1 TABLET ORAL
Qty: 0 | Refills: 0 | DISCHARGE
Start: 2019-07-01

## 2019-07-01 RX ORDER — INSULIN LISPRO 100/ML
2 VIAL (ML) SUBCUTANEOUS
Qty: 0 | Refills: 0 | DISCHARGE
Start: 2019-07-01

## 2019-07-01 RX ADMIN — Medication 81 MILLIGRAM(S): at 12:03

## 2019-07-01 RX ADMIN — PANTOPRAZOLE SODIUM 40 MILLIGRAM(S): 20 TABLET, DELAYED RELEASE ORAL at 06:26

## 2019-07-01 RX ADMIN — Medication 50 MICROGRAM(S): at 06:26

## 2019-07-01 RX ADMIN — Medication 20 MILLIGRAM(S): at 06:26

## 2019-07-01 RX ADMIN — Medication 20 MILLIEQUIVALENT(S): at 12:05

## 2019-07-01 RX ADMIN — Medication 4: at 12:03

## 2019-07-01 RX ADMIN — Medication 4: at 08:16

## 2019-07-01 RX ADMIN — Medication 250 MILLIGRAM(S): at 06:26

## 2019-07-01 RX ADMIN — Medication 20 MILLIGRAM(S): at 12:05

## 2019-07-01 RX ADMIN — ESCITALOPRAM OXALATE 10 MILLIGRAM(S): 10 TABLET, FILM COATED ORAL at 12:04

## 2019-07-01 RX ADMIN — Medication 1 TABLET(S): at 12:05

## 2019-07-01 RX ADMIN — LIDOCAINE 1 PATCH: 4 CREAM TOPICAL at 12:04

## 2019-07-01 RX ADMIN — APIXABAN 5 MILLIGRAM(S): 2.5 TABLET, FILM COATED ORAL at 06:26

## 2019-07-01 RX ADMIN — Medication 1 DROP(S): at 06:26

## 2019-07-01 NOTE — PROGRESS NOTE ADULT - ASSESSMENT
1. Leg pain - still with pain despite treatment of the edema.  So far no evidence of thromboembolic disease and ortho team evaluated the pt.  Management per primary team.     2. Pedal edema - Acute on chronic decompensated HF pEF, complicated by severe pulmonary hypertension,last cath also showed low CO.  good diuresis so far  Switch to torsemide 40mg po daily and DC planning.  Pt to f/u with me in office after discharge for heart failure.  Diuresis with close monitoring of the renal function and electrolytes.  Goal potassium of 4 and magnesium of 2.   Strict I/O and daily wt checks. Low sodium diet. Nutrition education.   She is an ideal candidate for implanatable pulmonary artery pressure monitor     3. Severe pulmonary hypertension- Group II- will treat left heart failure with some component of mixed pulmonary hypertension    4. Atrial fibrillation -chronic - will continue full dose anticoagulation with apixiban,    5. CAD s/p PCI of LAD in past- stable .continue current meds.    6. s/p Aortic valve replacement- bioprosthetic- stable per last echo 4/2019.  continue to monitor.    7. Fever- no signs of infection per ID team .     Other medical issues- Management per primary team.   Thank you for allowing me to participate in the care of this patient. Please feel free to contact me with any questions.

## 2019-07-01 NOTE — PROGRESS NOTE ADULT - PROVIDER SPECIALTY LIST ADULT
Cardiology
Hospitalist
Infectious Disease
Orthopedics
Pulmonology
Rheumatology
Hospitalist
Pulmonology
Cardiology
Hospitalist
Cardiology
Infectious Disease
Cardiology

## 2019-07-01 NOTE — DISCHARGE NOTE PROVIDER - HOSPITAL COURSE
PHYSICAL EXAM:        Daily       Daily Weight in k.4 (2019 08:48)        ICU Vital Signs Last 24 Hrs    T(C): 36.6 (2019 10:58), Max: 36.6 (2019 10:58)    T(F): 97.9 (2019 10:58), Max: 97.9 (2019 10:58)    HR: 71 (2019 10:58) (71 - 85)    BP: 102/54 (2019 10:58) (102/54 - 124/55)    BP(mean): --    ABP: --    ABP(mean): --    RR: 18 (2019 10:58) (18 - 18)    SpO2: 96% (2019 10:58) (95% - 100%)            Constitutional: Well appearing    HEENT: Atraumatic, CYNDEE, Normal, No congestion    Respiratory: Breath Sounds normal, no rhonchi/wheeze    Cardiovascular: N S1S2;     Gastrointestinal: Abdomen soft, non tender, Bowel Sounds present    Extremities: No edema, peripheral pulses present    Neurological: AAO x 3, no gross focal motor deficits    Skin: Non cellulitic, no rash, ulcers    Lymph Nodes: No lymphadenopathy noted    Back: No CVA tenderness     Musculoskeletal: non tender    Breasts: Deferred    Genitourinary: deferred    Rectal: Deferred            89 yo Female with a PMHx of pancreatitis, S/p ERCP,  cirrhosis, DM type II,  Left knee replacement, AFIB on Eliquis, CAD s/p stents, severe Pulm HTN, Chronic Right heart failure, Diastolic dysfunction, AS, S/p TAVR presented to ED c/o Left knee and leg pain.  Pt admitted with CHF/ LE Edema.  Pain thought to be from edema.  Hospital course significant for increased lethargy and fever on  but now afebrile x 2 days        #Fever-- no clear source; no UTI ( contaminated sample)     blood cx neg    CXR unchanged.  Hypoxia from admission improved with diuresis.  No obvious PNA.      Appreciate ID eval.      Vanco/ Aztreonam given  but stopped .      monitor off ABX, remains afebrile        #Metabolic Encephalopathy:      Suspect related to fever.      CT head negative.      resolved        #Left leg/ knee pain/ edema:  pain better, likely gout    Also cannot r/o new onset gout with adjusting of diuretics     Given solumedrol 40 -- as per Rheum will repeat 30 mg  and  for possible gout.  ; prednisone 20 mg po daily on , 7/3 and 10 md daily on , ,     CT LE was negative.      Ortho eval negative.      PT eval., [facility]    much improved        #Acute hypoxic Respiratory Failure due to Acute on Chronic Diastolic CHF Exacerbation:      Pt with hypoxia (86% on RA on admission)/ pulmonary edema on exam.      Cont lasix.  Dose decreased by cardiology to 40 mg po.    Hypoxia much improved     f/u with Dr. Bruner for cardiomem placement        #Severe Pulmonary HTN:      Cont Revatio          #DM:  uncontrolled sec to iv steroids; change steroids to po    Cont meds.          #Hx of Severe AS s/p TAVR:      Stable.          #Afib:      Cont eliqius/ BB        D/w pt, team, Dr. Camacho, and pt's son, Dr. Laith Mojica at length   Home phone:  [phone].        dispo: [facility] on         total time spent 42 min

## 2019-07-01 NOTE — DISCHARGE NOTE PROVIDER - CARE PROVIDER_API CALL
Samy Camacho)  Internal Medicine; Pulmonary Disease  [address] [place] [address]  Phone: [phone]  Fax: [phone]  Follow Up Time:     Sid Frazier)  Cardiovascular Disease; Nuclear Cardiology  [address] [place] [address]  Phone: [phone]  Fax: [phone]  Follow Up Time:

## 2019-07-01 NOTE — PROGRESS NOTE ADULT - REASON FOR ADMISSION
leg pain

## 2019-07-01 NOTE — PROGRESS NOTE ADULT - SUBJECTIVE AND OBJECTIVE BOX
Cardiology Progress Note    HPI: 89 yo Female with a PMHx of pancreatitis, S/p ERCP,  cirrhosis, DM type II,  L knee replacement, AFIB on Eliquis, CAD s/p PCI, severe Pulm HTN, Chronic Right heart failure, Diastolic dysfunction, AS, S/p TAVR presented to ED c/o L knee and leg pain. Pt reports that had sharp pains whole  night, called her son in am in tears. As per son legs noted to be more swollen   past week, + weight gain, but Pt denies orthopnea, she was complaining feeling weak past few days, but no SOB. No fevers. Also Pt had calf ecchymosis which then resolved and  later developed similar ecchymosis on dorsal foot, resolved now. Was able to ambulate until last night. Pt was  recently seen by Cardio Dr Hernandez and was started on Sildenafil and lasix was  adjusted.  Pt denies CP.      . No events last pm. No CP/SOB. Feels tired.   Afib 70-80.     PAST MEDICAL & SURGICAL HISTORY:  Aortic stenosis  History of pancreatitis  HLD (hyperlipidemia)  Hypothyroid  Afib  CAD (coronary artery disease)  Hypertension  History of Osteoarthritis  GERD (Gastroesophageal Reflux Disease)  Dyslipidemia  Diabetes Mellitus Type II  S/P IVC filter: Note that Pt does not have  h/o DVT, outPt doppler was read first as +, but after review reported as no DVT. Pt got IVC filer before that  H/O total knee replacement, left  History of appendectomy  History of cholecystectomy  H/O: Knee Surgery- right meniscus  H/O: Hysterectomy      MEDICATIONS  (STANDING):  apixaban 5 milliGRAM(s) Oral every 12 hours  artificial tears (preservative free) Ophthalmic Solution 1 Drop(s) Both EYES two times a day  aspirin  chewable 81 milliGRAM(s) Oral daily  dextrose 5%. 1000 milliLiter(s) (50 mL/Hr) IV Continuous <Continuous>  dextrose 50% Injectable 12.5 Gram(s) IV Push once  dextrose 50% Injectable 25 Gram(s) IV Push once  dextrose 50% Injectable 25 Gram(s) IV Push once  docusate sodium 200 milliGRAM(s) Oral at bedtime  escitalopram 10 milliGRAM(s) Oral daily  furosemide   Injectable 40 milliGRAM(s) IV Push every 12 hours  insulin lispro (HumaLOG) corrective regimen sliding scale   SubCutaneous three times a day before meals  insulin lispro (HumaLOG) corrective regimen sliding scale   SubCutaneous at bedtime  levothyroxine 50 MICROGram(s) Oral daily  lidocaine   Patch 1 Patch Transdermal daily  metoprolol succinate ER 25 milliGRAM(s) Oral at bedtime  multivitamin/minerals 1 Tablet(s) Oral daily  pantoprazole    Tablet 40 milliGRAM(s) Oral before breakfast  potassium chloride    Tablet ER 20 milliEquivalent(s) Oral daily  saccharomyces boulardii 250 milliGRAM(s) Oral two times a day  sildenafil (REVATIO) 20 milliGRAM(s) Oral two times a day  simvastatin 40 milliGRAM(s) Oral at bedtime    MEDICATIONS  (PRN):  acetaminophen   Tablet .. 650 milliGRAM(s) Oral every 6 hours PRN Temp greater or equal to 38C (100.4F), Mild Pain (1 - 3)  bisacodyl 5 milliGRAM(s) Oral daily PRN Constipation  dextrose 40% Gel 15 Gram(s) Oral once PRN Blood Glucose LESS THAN 70 milliGRAM(s)/deciliter  glucagon  Injectable 1 milliGRAM(s) IntraMuscular once PRN Glucose LESS THAN 70 milligrams/deciliter  ondansetron Injectable 4 milliGRAM(s) IV Push every 6 hours PRN Nausea  senna 2 Tablet(s) Oral at bedtime PRN Constipation  traMADol 50 milliGRAM(s) Oral every 6 hours PRN Moderate Pain (4 - 6)      FAMILY HISTORY:  FH: heart disease  FH: diabetes mellitus: both parents      SOCIAL HISTORY:  no recent smoking     REVIEW OF SYSTEMS:  CONSTITUTIONAL:    No fatigue, malaise, lethargy.  No fever or chills.  HEENT:  Eyes:  No visual changes.     ENT:  No epistaxis.  No sinus pain.    RESPIRATORY:  No cough.  No wheeze.  No hemoptysis.  No shortness of breath.  CARDIOVASCULAR:  No chest pains.  No palpitations. No shortness of breath, No orthopnea or PND.  GASTROINTESTINAL:  No abdominal pain.  No nausea or vomiting.    GENITOURINARY:    No hematuria.    MUSCULOSKELETAL:  c/o l leg pain   NEUROLOGICAL:  No tingling or numbness or weakness.    ICU Vital Signs Last 24 Hrs  T(C): 36.7 (2019 04:47), Max: 36.8 (2019 21:57)  T(F): 98.1 (2019 04:47), Max: 98.2 (2019 21:57)  HR: 87 (2019 04:47) (72 - 87)  BP: 117/49 (2019 04:47) (103/54 - 117/49)  BP(mean): 64 (2019 18:12) (64 - 65)  ABP: --  ABP(mean): --  RR: 17 (2019 04:47) (16 - 18)  SpO2: 95% (2019 04:47) (95% - 97%)    PHYSICAL EXAM-  Constitutional: elderly frail , in no acute distress  Head: Head is normocephalic and atraumatic.    Neck: + JVD and EJ distension    Cardiovascular: irregular and systolic murmur 2/6   Respiratory: coarse BS b/l basal   Abdomen: Soft, nontender, nondistended with positive bowel sounds.    Extremity: No tenderness. 2+ pitting pedal edema upto thighs  Neurologic: The patient is alert and oriented.      INTERPRETATION OF TELEMETRY: afib 60-70/min     ECG: afib , L axisw , no s tt changes.     I&O's Detail                            11.2   6.39  )-----------( 138      ( 2019 07:47 )             35.2     06-25    135  |  100  |  14  ----------------------------<  163<H>  3.7   |  28  |  0.57    Ca    8.7      2019 07:05      CARDIAC MARKERS ( 2019 13:33 )  <0.015 ng/mL / x     / x     / x     / x        PTT - ( 2019 06:49 )  PTT:36.4 sec  Urinalysis Basic - ( 2019 10:45 )    Color: Yellow / Appearance: Clear / S.010 / pH: x  Gluc: x / Ketone: Negative  / Bili: Negative / Urobili: Negative mg/dL   Blood: x / Protein: 15 mg/dL / Nitrite: Negative   Leuk Esterase: Negative / RBC: 3-5 /HPF / WBC Negative   Sq Epi: x / Non Sq Epi: Few / Bacteria: x      I&O's Summary    BNP  RADIOLOGY & ADDITIONAL STUDIES:  < from: Cardiac Cath Lab - Adult (17 @ 13:38) >   Cardiac Arteries and Lesion Findings    LMCA: Large caliber vessel. Normal.    LAD: Medium caliber vessel. Normal. Widely patent stent.    LCx: Large caliber vessel. Normal.    RCA: Medium caliber vessel. Normal.     Impression     Diagnostic Conclusions     Normal LV systolic function. Estimated LV ejection fraction is 65 %.   One Vessel coronary artery disease (LAD) .   Patent bare metal stent in the mid LAD.   Elevated left ventricular end-diastolic pressure.   Hemodynamic status is abnormal.   Severe pulmonary artery hypertension.   No aortic valve stenosis.No gradient across Spaien Valve.   No aortic valve regurgitation.     Recommendations     Manage with medical therapy.   Consider Phosphodiesterase Inhibitors for PHT.    Estimated Blood Loss:6ml.     Signatures     ----------------------------------------------------------------   Electronically signed by Florentin Hernandez MD, Director of   Cardiac CathLab(Performing Physician) on 2017 16:37   ----------------------------------------------------------------    < end of copied text >
Date of service: 19 @ 10:42    OOB to chair  Alert and verbal  Feel better  No fever or chills reported  No dysuria  Reported with E.coli in urine culture    ROS: no fever or chills; denies dizziness, no HA, no SOB or cough, no abdominal pain, no diarrhea or constipation; no legs pain, no rashes    MEDICATIONS  (STANDING):  apixaban 5 milliGRAM(s) Oral every 12 hours  artificial tears (preservative free) Ophthalmic Solution 1 Drop(s) Both EYES two times a day  aspirin  chewable 81 milliGRAM(s) Oral daily  dextrose 5%. 1000 milliLiter(s) (50 mL/Hr) IV Continuous <Continuous>  dextrose 50% Injectable 12.5 Gram(s) IV Push once  dextrose 50% Injectable 25 Gram(s) IV Push once  dextrose 50% Injectable 25 Gram(s) IV Push once  docusate sodium 200 milliGRAM(s) Oral at bedtime  escitalopram 10 milliGRAM(s) Oral daily  furosemide   Injectable 80 milliGRAM(s) IV Push every 12 hours  insulin lispro (HumaLOG) corrective regimen sliding scale   SubCutaneous three times a day before meals  insulin lispro (HumaLOG) corrective regimen sliding scale   SubCutaneous at bedtime  levothyroxine 50 MICROGram(s) Oral daily  lidocaine   Patch 1 Patch Transdermal daily  metoprolol succinate ER 25 milliGRAM(s) Oral at bedtime  multivitamin/minerals 1 Tablet(s) Oral daily  pantoprazole    Tablet 40 milliGRAM(s) Oral before breakfast  potassium chloride    Tablet ER 20 milliEquivalent(s) Oral daily  saccharomyces boulardii 250 milliGRAM(s) Oral two times a day  sildenafil (REVATIO) 20 milliGRAM(s) Oral two times a day  simvastatin 40 milliGRAM(s) Oral at bedtime      Vital Signs Last 24 Hrs  T(C): 36.3 (2019 10:40), Max: 36.6 (2019 21:37)  T(F): 97.4 (2019 10:40), Max: 97.8 (2019 21:37)  HR: 85 (2019 10:40) (76 - 85)  BP: 90/42 (2019 10:40) (90/42 - 128/60)  BP(mean): --  RR: 17 (2019 10:40) (15 - 18)  SpO2: 99% (2019 10:40) (97% - 100%)    Physical Exam:      Constitutional: frail looking  HEENT: NC/AT, EOMI, PERRLA, conjunctivae clear  Neck: supple; thyroid not palpable  Back: no tenderness  Respiratory: respiratory effort normal; crackles at bases  Cardiovascular: S1S2 regular, no murmurs  Abdomen: soft, not tender, not distended, positive BS  Genitourinary: no suprapubic tenderness  Lymphatic: no LN palpable  Musculoskeletal: no muscle tenderness, no joint swelling or tenderness  Extremities: 2+ pedal edema; Left knee TKR - no erythema, prior postop wound is healed well  Neurological/ Psychiatric: AxOx3, moving all extremities  Skin: no rashes; no palpable lesions    Labs: reviewed                        11.6   1011 )-----------( 123      ( 2019 08:30 )             35.0     06-26    136  |  101  |  16  ----------------------------<  205<H>  3.4<L>   |  29  |  0.72    Ca    8.7      2019 08:30    Urinalysis Basic - ( 2019 01:50 )    Color: Yellow / Appearance: Clear / S.005 / pH: x  Gluc: x / Ketone: Negative  / Bili: Negative / Urobili: 1 mg/dL   Blood: x / Protein: Negative mg/dL / Nitrite: Negative   Leuk Esterase: Negative / RBC: 0-2 /HPF / WBC Negative   Sq Epi: x / Non Sq Epi: Moderate / Bacteria: Many      Culture - Blood (collected 2019 15:25)  Source: .Blood None  Preliminary Report (2019 18:02):    No growth to date.    Culture - Blood (collected 2019 15:25)  Source: .Blood None  Preliminary Report (2019 18:02):    No growth to date.    Culture - Urine (collected 2019 01:50)  Source: .Urine Clean Catch (Midstream)  Preliminary Report (2019 20:12):    >100,000 CFU/ml Escherichia coli    Culture - Urine (collected 2019 10:45)  Source: .Urine None  Final Report (2019 07:30):    >=3 organisms. Probable collection contamination.        Radiology: all available radiological tests reviewed    < from: Xray Chest 1 View-PORTABLE IMMEDIATE (19 @ 15:31) >  Nonspecific prominent bilateral interstitial markings are redemonstrated.   Prominent cardiac silhouette appears similar to prior. Atherosclerotic   calcifications of the thoracic aorta. Cardiac valve device. No pleural   effusion or pneumothorax is seen. Arthritic changes of the left   glenohumeral joint with joint body adjacent to the humeral neck.    < end of copied text >      Advanced directives addressed: full resuscitation
Patient is a 89y old  Female who presents with a chief complaint of leg pain (28 Jun 2019 15:35)  6/29- occassional sharp pain in her knee , but denies CP or SOB       MEDICATIONS  (STANDING):  apixaban 5 milliGRAM(s) Oral every 12 hours  artificial tears (preservative free) Ophthalmic Solution 1 Drop(s) Both EYES two times a day  aspirin  chewable 81 milliGRAM(s) Oral daily  dextrose 5%. 1000 milliLiter(s) (50 mL/Hr) IV Continuous <Continuous>  dextrose 50% Injectable 12.5 Gram(s) IV Push once  dextrose 50% Injectable 25 Gram(s) IV Push once  dextrose 50% Injectable 25 Gram(s) IV Push once  docusate sodium 200 milliGRAM(s) Oral at bedtime  escitalopram 10 milliGRAM(s) Oral daily  furosemide   Injectable 80 milliGRAM(s) IV Push daily  insulin lispro (HumaLOG) corrective regimen sliding scale   SubCutaneous three times a day before meals  insulin lispro (HumaLOG) corrective regimen sliding scale   SubCutaneous at bedtime  levothyroxine 50 MICROGram(s) Oral daily  lidocaine   Patch 1 Patch Transdermal daily  methylPREDNISolone sodium succinate Injectable 20 milliGRAM(s) IV Push daily  metoprolol succinate ER 25 milliGRAM(s) Oral at bedtime  multivitamin/minerals 1 Tablet(s) Oral daily  pantoprazole    Tablet 40 milliGRAM(s) Oral before breakfast  potassium chloride    Tablet ER 20 milliEquivalent(s) Oral daily  saccharomyces boulardii 250 milliGRAM(s) Oral two times a day  sildenafil (REVATIO) 20 milliGRAM(s) Oral two times a day  simvastatin 40 milliGRAM(s) Oral at bedtime    MEDICATIONS  (PRN):  acetaminophen   Tablet .. 650 milliGRAM(s) Oral every 6 hours PRN Temp greater or equal to 38C (100.4F), Mild Pain (1 - 3)  bisacodyl 5 milliGRAM(s) Oral daily PRN Constipation  dextrose 40% Gel 15 Gram(s) Oral once PRN Blood Glucose LESS THAN 70 milliGRAM(s)/deciliter  glucagon  Injectable 1 milliGRAM(s) IntraMuscular once PRN Glucose LESS THAN 70 milligrams/deciliter  ondansetron Injectable 4 milliGRAM(s) IV Push every 6 hours PRN Nausea  senna 2 Tablet(s) Oral at bedtime PRN Constipation            Vital Signs Last 24 Hrs  T(C): 36.6 (29 Jun 2019 04:40), Max: 36.6 (28 Jun 2019 17:18)  T(F): 97.8 (29 Jun 2019 04:40), Max: 97.9 (28 Jun 2019 17:18)  HR: 70 (29 Jun 2019 04:40) (70 - 85)  BP: 110/54 (29 Jun 2019 04:40) (107/51 - 135/63)  BP(mean): --  RR: 18 (29 Jun 2019 04:40) (18 - 18)  SpO2: 94% (29 Jun 2019 04:40) (94% - 98%)            INTERPRETATION OF TELEMETRY:    ECG:        LABS:                        11.6   10.53 )-----------( 150      ( 27 Jun 2019 10:53 )             36.0     06-28    133<L>  |  98  |  22  ----------------------------<  317<H>  3.6   |  26  |  0.73    Ca    8.5      28 Jun 2019 07:57  Mg     2.0     06-28              I&O's Summary    28 Jun 2019 07:01  -  29 Jun 2019 07:00  --------------------------------------------------------  IN: 0 mL / OUT: 1350 mL / NET: -1350 mL      BNP  RADIOLOGY & ADDITIONAL STUDIES:
Patient is a 89y old  Female who presents with a chief complaint of leg pain (30 Jun 2019 11:17)  6/30 - was 175 lbs on admission now down to 161 lbs , excellent reponse to Iv diuresis      MEDICATIONS  (STANDING):  apixaban 5 milliGRAM(s) Oral every 12 hours  artificial tears (preservative free) Ophthalmic Solution 1 Drop(s) Both EYES two times a day  aspirin  chewable 81 milliGRAM(s) Oral daily  dextrose 5%. 1000 milliLiter(s) (50 mL/Hr) IV Continuous <Continuous>  dextrose 50% Injectable 12.5 Gram(s) IV Push once  dextrose 50% Injectable 25 Gram(s) IV Push once  dextrose 50% Injectable 25 Gram(s) IV Push once  docusate sodium 200 milliGRAM(s) Oral at bedtime  escitalopram 10 milliGRAM(s) Oral daily  furosemide   Injectable 80 milliGRAM(s) IV Push daily  insulin lispro (HumaLOG) corrective regimen sliding scale   SubCutaneous three times a day before meals  insulin lispro (HumaLOG) corrective regimen sliding scale   SubCutaneous at bedtime  levothyroxine 50 MICROGram(s) Oral daily  lidocaine   Patch 1 Patch Transdermal daily  metoprolol succinate ER 25 milliGRAM(s) Oral at bedtime  multivitamin/minerals 1 Tablet(s) Oral daily  pantoprazole    Tablet 40 milliGRAM(s) Oral before breakfast  potassium chloride    Tablet ER 20 milliEquivalent(s) Oral daily  predniSONE   Tablet 20 milliGRAM(s) Oral daily  saccharomyces boulardii 250 milliGRAM(s) Oral two times a day  sildenafil (REVATIO) 20 milliGRAM(s) Oral two times a day  simvastatin 40 milliGRAM(s) Oral at bedtime    MEDICATIONS  (PRN):  acetaminophen   Tablet .. 650 milliGRAM(s) Oral every 6 hours PRN Temp greater or equal to 38C (100.4F), Mild Pain (1 - 3)  bisacodyl 5 milliGRAM(s) Oral daily PRN Constipation  dextrose 40% Gel 15 Gram(s) Oral once PRN Blood Glucose LESS THAN 70 milliGRAM(s)/deciliter  glucagon  Injectable 1 milliGRAM(s) IntraMuscular once PRN Glucose LESS THAN 70 milligrams/deciliter  ondansetron Injectable 4 milliGRAM(s) IV Push every 6 hours PRN Nausea  senna 2 Tablet(s) Oral at bedtime PRN Constipation            Vital Signs Last 24 Hrs  T(C): 36.6 (30 Jun 2019 10:28), Max: 36.7 (29 Jun 2019 16:18)  T(F): 97.8 (30 Jun 2019 10:28), Max: 98.1 (30 Jun 2019 05:03)  HR: 78 (30 Jun 2019 10:28) (74 - 84)  BP: 116/59 (30 Jun 2019 10:28) (104/45 - 118/63)  BP(mean): --  RR: 18 (30 Jun 2019 10:28) (16 - 18)  SpO2: 98% (30 Jun 2019 10:28) (98% - 100%)            INTERPRETATION OF TELEMETRY:    ECG:        LABS:                        12.1   5.83  )-----------( 169      ( 30 Jun 2019 07:15 )             36.9     06-30    135  |  100  |  18  ----------------------------<  228<H>  4.3   |  30  |  0.65    Ca    8.7      30 Jun 2019 07:15              I&O's Summary    29 Jun 2019 07:01  -  30 Jun 2019 07:00  --------------------------------------------------------  IN: 0 mL / OUT: 2300 mL / NET: -2300 mL      BNPSerum Pro-Brain Natriuretic Peptide: 2862 pg/mL (06-30 @ 07:15)    RADIOLOGY & ADDITIONAL STUDIES:
Subjective:    Awake, alert. Events noted. Spoke with Dr Blakely-injected left shoulder. Less pain in knee    MEDICATIONS  (STANDING):  apixaban 5 milliGRAM(s) Oral every 12 hours  artificial tears (preservative free) Ophthalmic Solution 1 Drop(s) Both EYES two times a day  aspirin  chewable 81 milliGRAM(s) Oral daily  dextrose 5%. 1000 milliLiter(s) (50 mL/Hr) IV Continuous <Continuous>  dextrose 50% Injectable 12.5 Gram(s) IV Push once  dextrose 50% Injectable 25 Gram(s) IV Push once  dextrose 50% Injectable 25 Gram(s) IV Push once  docusate sodium 200 milliGRAM(s) Oral at bedtime  escitalopram 10 milliGRAM(s) Oral daily  furosemide   Injectable 80 milliGRAM(s) IV Push daily  insulin lispro (HumaLOG) corrective regimen sliding scale   SubCutaneous three times a day before meals  insulin lispro (HumaLOG) corrective regimen sliding scale   SubCutaneous at bedtime  levothyroxine 50 MICROGram(s) Oral daily  lidocaine   Patch 1 Patch Transdermal daily  metoprolol succinate ER 25 milliGRAM(s) Oral at bedtime  multivitamin/minerals 1 Tablet(s) Oral daily  pantoprazole    Tablet 40 milliGRAM(s) Oral before breakfast  potassium chloride    Tablet ER 20 milliEquivalent(s) Oral daily  saccharomyces boulardii 250 milliGRAM(s) Oral two times a day  sildenafil (REVATIO) 20 milliGRAM(s) Oral two times a day  simvastatin 40 milliGRAM(s) Oral at bedtime    MEDICATIONS  (PRN):  acetaminophen   Tablet .. 650 milliGRAM(s) Oral every 6 hours PRN Temp greater or equal to 38C (100.4F), Mild Pain (1 - 3)  bisacodyl 5 milliGRAM(s) Oral daily PRN Constipation  dextrose 40% Gel 15 Gram(s) Oral once PRN Blood Glucose LESS THAN 70 milliGRAM(s)/deciliter  glucagon  Injectable 1 milliGRAM(s) IntraMuscular once PRN Glucose LESS THAN 70 milligrams/deciliter  ondansetron Injectable 4 milliGRAM(s) IV Push every 6 hours PRN Nausea  senna 2 Tablet(s) Oral at bedtime PRN Constipation      Allergies    penicillin (Rash)  penicillins (Other)  sulfa drugs (Rash; Other)    Intolerances        Vital Signs Last 24 Hrs  T(C): 36.5 (28 Jun 2019 04:31), Max: 36.7 (27 Jun 2019 16:53)  T(F): 97.7 (28 Jun 2019 04:31), Max: 98 (27 Jun 2019 16:53)  HR: 83 (28 Jun 2019 04:31) (83 - 99)  BP: 117/60 (28 Jun 2019 04:31) (90/42 - 117/60)  BP(mean): --  RR: 18 (28 Jun 2019 04:31) (16 - 18)  SpO2: 93% (28 Jun 2019 04:31) (93% - 99%)    PHYSICAL EXAMINATION:    NECK:  Supple. No lymphadenopathy. Jugular venous pressure not elevated.   HEART:   The cardiac impulse has a normal quality. Irreg., Nl S1 and S2.    CHEST:  Chest with bilateral crackles. Normal respiratory effort.  ABDOMEN:  Soft and nontender.   EXTREMITIES:  There is 1+ edema.       LABS:                        11.6   10.53 )-----------( 150      ( 27 Jun 2019 10:53 )             36.0     06-28    133<L>  |  98  |  22  ----------------------------<  317<H>  3.6   |  26  |  0.73    Ca    8.5      28 Jun 2019 07:57  Mg     2.0     06-28            RADIOLOGY & ADDITIONAL TESTS:    Assessment and Plan:   · Assessment		  Assessment/Plan    - Continue diuresis-lasix decreased to 80mg QD  - BUN/Cr stable  - Daily weights-need to use standing scale if possible  - Monitor lytes carefully-may need to push BUN  - Cardiology f/u noted  - Monitor I+O's daily  - OOB to chair  - Discussed Tx of UTI w/ Dr. Knutson-colonized. No Abx.    - Ortho eval for cortisone injection of left shoulder-done.      Problem/Recommendation - 1:  Problem: Congestive heart failure, unspecified HF chronicity, unspecified heart failure type.    Problem/Recommendation - 2:  ·  Problem: Shortness of breath.     Problem/Recommendation - 3:  ·  Problem: Pulmonary hypertension.     Problem/Recommendation - 4:  ·  Problem: Hypoxemia.     Problem/Recommendation - 5:  ·  Problem: Chronic atrial fibrillation.
Subjective:  Patient is a 89y old  Female who presents with a chief complaint of leg pain (2019 16:01)    HPI:  89 yo Female with a PMHx of pancreatitis, S/p ERCP,  cirrhosis, DM type II,  L knee replacement, AFIB on Eliquis, CAD s/p stents, severe Pulm HTN, Chronic Right heart failure, Diastolic dysfunction, AS, S/p TAVR presented to ED c/o L knee and leg pain. Pt reports that had sharp pains whole  night, called her son in am in tears. As per son legs noted to be more swollen past week, + weight gain, but Pt denies orthopnea, she was complaining feeling weak past few days, but no SOB. No fevers. Also Pt had calf ecchymosis which then resolved and  later developed similar ecchymosis on dorsal foot, resolved now. Was able to ambulate until last night. Pt was  recently seen by Cardio Dr Hernandez and was started on Sildenafil and lasix was  adjusted.  Pt denies CP.   In ED:  WBCs wnl,  VS stable, except Pt noted to be dyspneic and hypoxic on 86-88% on RA. CXR suspected PVC, BNP elevated. CT LLE with calf edema. LLE doppler  negative for DVT.      Hospital course:  Pt diuresed with IV lasix for CHF/ LE edema.  Pt c/o left leg/ knee pain and unclear if could be new onset gout.  Given IV steroids.  On , patient more lethargic.  Spiked temp to 101.6 rectal.  Pancultures so far negative to date.  Given ABX on  but stopped by ID  as no obvious source of infection.  CT head negative.      :  Pt seen.  Case d/w pulmonary.  Pt states overall improved but still c/o swelling in left leg and pain.    :  Pt seen earlier on rounds.  More lethargic today.  Arousable.  Knows she's at .  Knows she has 3 sons.  No specific complaints.  Febrile to 101 this afternoon.  D/w son Laith on phone.    :  Pt seen earlier this morning.  Much better today.  Awake and alert.  In chair.  States she had a terrible HA yesterday.      : pt feels better, less sob, no cp  afebrile x 2days  Bp fluctuating        PHYSICAL EXAM:    Daily     Daily Weight in k.4 (2019 10:40)    ICU Vital Signs Last 24 Hrs  T(C): 36.3 (2019 10:40), Max: 36.6 (2019 21:37)  T(F): 97.4 (2019 10:40), Max: 97.8 (2019 21:37)  HR: 85 (2019 10:40) (76 - 85)  BP: 90/42 (2019 10:40) (90/42 - 128/60)  BP(mean): --  ABP: --  ABP(mean): --  RR: 17 (2019 10:40) (15 - 18)  SpO2: 99% (2019 10:40) (97% - 100%)      Constitutional: Weak appearing  HEENT: Atraumatic, CYNDEE, Normal, No congestion  Respiratory: Breath Sounds normal, no rhonchi/wheeze  Cardiovascular: N S1S2;   Gastrointestinal: Abdomen soft, non tender, Bowel Sounds present  Extremities: chronic leg edema, peripheral pulses present  Neurological: AAO x 1, no gross focal motor deficits  Skin: Non cellulitic, no rash, ulcers  Lymph Nodes: No lymphadenopathy noted  Back: No CVA tenderness   Musculoskeletal: non tender  Breasts: Deferred  Genitourinary: deferred  Rectal: Deferred                          11.6   10.11 )-----------( 123      ( 2019 08:30 )             35.0       CBC Full  -  ( 2019 08:30 )  WBC Count : 10.11 K/uL  RBC Count : 3.63 M/uL  Hemoglobin : 11.6 g/dL  Hematocrit : 35.0 %  Platelet Count - Automated : 123 K/uL  Mean Cell Volume : 96.4 fl  Mean Cell Hemoglobin : 32.0 pg  Mean Cell Hemoglobin Concentration : 33.1 gm/dL  Auto Neutrophil # : x  Auto Lymphocyte # : x  Auto Monocyte # : x  Auto Eosinophil # : x  Auto Basophil # : x  Auto Neutrophil % : x  Auto Lymphocyte % : x  Auto Monocyte % : x  Auto Eosinophil % : x  Auto Basophil % : x          136  |  101  |  16  ----------------------------<  205<H>  3.4<L>   |  29  |  0.72    Ca    8.7      2019 08:30                              MEDICATIONS  (STANDING):  apixaban 5 milliGRAM(s) Oral every 12 hours  artificial tears (preservative free) Ophthalmic Solution 1 Drop(s) Both EYES two times a day  aspirin  chewable 81 milliGRAM(s) Oral daily  dextrose 5%. 1000 milliLiter(s) (50 mL/Hr) IV Continuous <Continuous>  dextrose 50% Injectable 12.5 Gram(s) IV Push once  dextrose 50% Injectable 25 Gram(s) IV Push once  dextrose 50% Injectable 25 Gram(s) IV Push once  docusate sodium 200 milliGRAM(s) Oral at bedtime  escitalopram 10 milliGRAM(s) Oral daily  furosemide   Injectable 80 milliGRAM(s) IV Push every 12 hours  insulin lispro (HumaLOG) corrective regimen sliding scale   SubCutaneous three times a day before meals  insulin lispro (HumaLOG) corrective regimen sliding scale   SubCutaneous at bedtime  levothyroxine 50 MICROGram(s) Oral daily  lidocaine   Patch 1 Patch Transdermal daily  metoprolol succinate ER 25 milliGRAM(s) Oral at bedtime  multivitamin/minerals 1 Tablet(s) Oral daily  pantoprazole    Tablet 40 milliGRAM(s) Oral before breakfast  potassium chloride    Tablet ER 20 milliEquivalent(s) Oral daily  saccharomyces boulardii 250 milliGRAM(s) Oral two times a day  sildenafil (REVATIO) 20 milliGRAM(s) Oral two times a day  simvastatin 40 milliGRAM(s) Oral at bedtime
CHIEF COMPLAINT:    SUBJECTIVE:  Patient leg feels better than yesterday.  Though still with pain.  Received CS injection by ortho yesterday.  Still with some pain/difficulty moving left shoulder.        No fever, chills  Breathing is stable      Vital Signs Last 24 Hrs  T(C): 36.5 (28 Jun 2019 04:31), Max: 36.7 (27 Jun 2019 16:53)  T(F): 97.7 (28 Jun 2019 04:31), Max: 98 (27 Jun 2019 16:53)  HR: 83 (28 Jun 2019 04:31) (83 - 99)  BP: 117/60 (28 Jun 2019 04:31) (90/42 - 117/60)  BP(mean): --  RR: 18 (28 Jun 2019 04:31) (16 - 18)  SpO2: 93% (28 Jun 2019 04:31) (93% - 99%)    I&O's Summary    28 Jun 2019 07:01  -  28 Jun 2019 09:27  --------------------------------------------------------  IN: 0 mL / OUT: 900 mL / NET: -900 mL        CAPILLARY BLOOD GLUCOSE      POCT Blood Glucose.: 309 mg/dL (28 Jun 2019 07:41)  POCT Blood Glucose.: 253 mg/dL (27 Jun 2019 22:05)  POCT Blood Glucose.: 200 mg/dL (27 Jun 2019 17:00)  POCT Blood Glucose.: 272 mg/dL (27 Jun 2019 11:41)      PHYSICAL EXAM:  HEENT- no oral lesions noted, no lymphadenoapthy noted  Heart- S1 S2 reg  Lungs- rales b/L  Abd- Soft NT  Ext-+Edema b/L  Skin- no rashes  Musculoskelatal- Improvement in the tenderness about the left knee and in lower left leg.  Better ROM left knee.  No pain on active motion of ankles b/L.  Improvement in the diffuse tenderness of b/L lower extremities.      MEDICATIONS:  MEDICATIONS  (STANDING):  apixaban 5 milliGRAM(s) Oral every 12 hours  artificial tears (preservative free) Ophthalmic Solution 1 Drop(s) Both EYES two times a day  aspirin  chewable 81 milliGRAM(s) Oral daily  dextrose 5%. 1000 milliLiter(s) (50 mL/Hr) IV Continuous <Continuous>  dextrose 50% Injectable 12.5 Gram(s) IV Push once  dextrose 50% Injectable 25 Gram(s) IV Push once  dextrose 50% Injectable 25 Gram(s) IV Push once  docusate sodium 200 milliGRAM(s) Oral at bedtime  escitalopram 10 milliGRAM(s) Oral daily  furosemide   Injectable 80 milliGRAM(s) IV Push daily  insulin lispro (HumaLOG) corrective regimen sliding scale   SubCutaneous three times a day before meals  insulin lispro (HumaLOG) corrective regimen sliding scale   SubCutaneous at bedtime  levothyroxine 50 MICROGram(s) Oral daily  lidocaine   Patch 1 Patch Transdermal daily  metoprolol succinate ER 25 milliGRAM(s) Oral at bedtime  multivitamin/minerals 1 Tablet(s) Oral daily  pantoprazole    Tablet 40 milliGRAM(s) Oral before breakfast  potassium chloride    Tablet ER 20 milliEquivalent(s) Oral daily  saccharomyces boulardii 250 milliGRAM(s) Oral two times a day  sildenafil (REVATIO) 20 milliGRAM(s) Oral two times a day  simvastatin 40 milliGRAM(s) Oral at bedtime      LABS: All Labs Reviewed:                        11.6   10.53 )-----------( 150      ( 27 Jun 2019 10:53 )             36.0     06-28    133<L>  |  98  |  22  ----------------------------<  317<H>  3.6   |  26  |  0.73    Ca    8.5      28 Jun 2019 07:57  Mg     2.0     06-28            Blood Culture: 06-25 @ 15:25  Organism --  Gram Stain Blood -- Gram Stain --  Specimen Source .Blood None  Culture-Blood --    06-25 @ 01:50  Organism Escherichia coli  Gram Stain Blood -- Gram Stain --  Specimen Source .Urine Clean Catch (Midstream)  Culture-Blood --    06-23 @ 10:45  Organism --  Gram Stain Blood -- Gram Stain --  Specimen Source .Urine None  Culture-Blood --        RADIOLOGY/EKG:    A/P:
CHIEF COMPLAINT:    SUBJECTIVE:  Patient states her leg pain is doing much better.  Her left arm hurts where she has some ecchymosis.  She has some improvement in left shoulder pain.      REVIEW OF SYSTEMS:    Denies SOB/Chest pain    Vital Signs Last 24 Hrs  T(C): 36.7 (29 Jun 2019 16:18), Max: 36.7 (29 Jun 2019 16:18)  T(F): 98 (29 Jun 2019 16:18), Max: 98 (29 Jun 2019 16:18)  HR: 80 (29 Jun 2019 16:18) (70 - 80)  BP: 108/58 (29 Jun 2019 16:18) (107/51 - 120/69)  BP(mean): --  RR: 18 (29 Jun 2019 11:09) (18 - 18)  SpO2: 99% (29 Jun 2019 16:18) (94% - 99%)    I&O's Summary    28 Jun 2019 07:01  -  29 Jun 2019 07:00  --------------------------------------------------------  IN: 0 mL / OUT: 1350 mL / NET: -1350 mL    29 Jun 2019 07:01  -  29 Jun 2019 18:14  --------------------------------------------------------  IN: 0 mL / OUT: 700 mL / NET: -700 mL        CAPILLARY BLOOD GLUCOSE      POCT Blood Glucose.: 341 mg/dL (29 Jun 2019 17:15)  POCT Blood Glucose.: 328 mg/dL (29 Jun 2019 12:19)  POCT Blood Glucose.: 263 mg/dL (29 Jun 2019 07:44)  POCT Blood Glucose.: 354 mg/dL (28 Jun 2019 20:43)      PHYSICAL EXAM:    Constitutional: NAD, awake and alert, well-developed  HEENT- no oral lesions noted, no lymphadenoapthy noted  Heart- S1 S2 reg  Lungs- CTA b/l  Abd- Soft NT  Ext- Edema in LE improved.  Decreased TTP in legs diffusely.    Skin- no rashes  Musculoskelatal- Better ROM in both left knee and left shoulder.  Left shoulder still with limitation, abducts to 120 degrees.  Yesterday was not able to abduct at all.  No POM in ankles.    Neurological- intact strength upper and lower extremities      MEDICATIONS:  MEDICATIONS  (STANDING):  apixaban 5 milliGRAM(s) Oral every 12 hours  artificial tears (preservative free) Ophthalmic Solution 1 Drop(s) Both EYES two times a day  aspirin  chewable 81 milliGRAM(s) Oral daily  dextrose 5%. 1000 milliLiter(s) (50 mL/Hr) IV Continuous <Continuous>  dextrose 50% Injectable 12.5 Gram(s) IV Push once  dextrose 50% Injectable 25 Gram(s) IV Push once  dextrose 50% Injectable 25 Gram(s) IV Push once  docusate sodium 200 milliGRAM(s) Oral at bedtime  escitalopram 10 milliGRAM(s) Oral daily  furosemide   Injectable 80 milliGRAM(s) IV Push daily  insulin lispro (HumaLOG) corrective regimen sliding scale   SubCutaneous three times a day before meals  insulin lispro (HumaLOG) corrective regimen sliding scale   SubCutaneous at bedtime  levothyroxine 50 MICROGram(s) Oral daily  lidocaine   Patch 1 Patch Transdermal daily  methylPREDNISolone sodium succinate Injectable 20 milliGRAM(s) IV Push daily  metoprolol succinate ER 25 milliGRAM(s) Oral at bedtime  multivitamin/minerals 1 Tablet(s) Oral daily  pantoprazole    Tablet 40 milliGRAM(s) Oral before breakfast  potassium chloride    Tablet ER 20 milliEquivalent(s) Oral daily  saccharomyces boulardii 250 milliGRAM(s) Oral two times a day  sildenafil (REVATIO) 20 milliGRAM(s) Oral two times a day  simvastatin 40 milliGRAM(s) Oral at bedtime      LABS: All Labs Reviewed:    06-28    133<L>  |  98  |  22  ----------------------------<  317<H>  3.6   |  26  |  0.73    Ca    8.5      28 Jun 2019 07:57  Mg     2.0     06-28            Blood Culture: 06-25 @ 15:25  Organism --  Gram Stain Blood -- Gram Stain --  Specimen Source .Blood None  Culture-Blood --    06-25 @ 01:50  Organism Escherichia coli  Gram Stain Blood -- Gram Stain --  Specimen Source .Urine Clean Catch (Midstream)  Culture-Blood --        RADIOLOGY/EKG:    A/P:
CHIEF COMPLAINT:  Left leg pain    SUBJECTIVE: Patient is feeling a little better.  She is sitting up in chair and eating lunch. States she was able to bear weight on her legs today for a short amount of time.  Feels her overall pain is 20% improved since yesterday.  Feels her breathing is stable.      REVIEW OF SYSTEMS:  Negative except as above.      Vital Signs Last 24 Hrs  T(C): 36.6 (26 Jun 2019 10:28), Max: 38.6 (25 Jun 2019 16:00)  T(F): 97.8 (26 Jun 2019 10:28), Max: 101.4 (25 Jun 2019 16:00)  HR: 74 (26 Jun 2019 10:28) (70 - 99)  BP: 100/48 (26 Jun 2019 10:28) (92/54 - 121/58)  BP(mean): --  RR: 18 (26 Jun 2019 10:28) (16 - 18)  SpO2: 98% (26 Jun 2019 10:28) (93% - 98%)    I&O's Summary    25 Jun 2019 07:01  -  26 Jun 2019 07:00  --------------------------------------------------------  IN: 0 mL / OUT: 2480 mL / NET: -2480 mL        CAPILLARY BLOOD GLUCOSE      POCT Blood Glucose.: 178 mg/dL (26 Jun 2019 11:58)  POCT Blood Glucose.: 237 mg/dL (26 Jun 2019 07:39)  POCT Blood Glucose.: 286 mg/dL (25 Jun 2019 20:41)  POCT Blood Glucose.: 133 mg/dL (25 Jun 2019 17:58)      PHYSICAL EXAM:  HEENT- no oral lesions noted, no lymphadenoapthy noted  Heart- S1 S2 reg  Lungs- rales b/L  Abd- Soft NT  Ext-+Edema b/L  Skin- no rashes  Musculoskelatal- Improvement in the tenderness about the left knee and in lower left leg.  Pain on full flexion of left knee.  Diffuse TTP right LE, but less intense as in left LE.       MEDICATIONS:  MEDICATIONS  (STANDING):  apixaban 5 milliGRAM(s) Oral every 12 hours  artificial tears (preservative free) Ophthalmic Solution 1 Drop(s) Both EYES two times a day  aspirin  chewable 81 milliGRAM(s) Oral daily  dextrose 5%. 1000 milliLiter(s) (50 mL/Hr) IV Continuous <Continuous>  dextrose 50% Injectable 12.5 Gram(s) IV Push once  dextrose 50% Injectable 25 Gram(s) IV Push once  dextrose 50% Injectable 25 Gram(s) IV Push once  docusate sodium 200 milliGRAM(s) Oral at bedtime  escitalopram 10 milliGRAM(s) Oral daily  furosemide   Injectable 80 milliGRAM(s) IV Push every 12 hours  insulin lispro (HumaLOG) corrective regimen sliding scale   SubCutaneous three times a day before meals  insulin lispro (HumaLOG) corrective regimen sliding scale   SubCutaneous at bedtime  levothyroxine 50 MICROGram(s) Oral daily  lidocaine   Patch 1 Patch Transdermal daily  metoprolol succinate ER 25 milliGRAM(s) Oral at bedtime  multivitamin/minerals 1 Tablet(s) Oral daily  pantoprazole    Tablet 40 milliGRAM(s) Oral before breakfast  potassium chloride    Tablet ER 20 milliEquivalent(s) Oral daily  saccharomyces boulardii 250 milliGRAM(s) Oral two times a day  sildenafil (REVATIO) 20 milliGRAM(s) Oral two times a day  simvastatin 40 milliGRAM(s) Oral at bedtime      LABS: All Labs Reviewed:                        11.6   10.11 )-----------( 123      ( 26 Jun 2019 08:30 )             35.0     06-26    136  |  101  |  16  ----------------------------<  205<H>  3.4<L>   |  29  |  0.72    Ca    8.7      26 Jun 2019 08:30            Blood Culture: 06-23 @ 10:45  Organism --  Gram Stain Blood -- Gram Stain --  Specimen Source .Urine None  Culture-Blood --        RADIOLOGY/EKG:    A/P:
Date of service: 19 @ 12:39    Sitting in bed in NAD  Left leg pain is improving; has persistent discomfort in left knee; left ankle is improved  Also has right knee pain  Difficulty walking due to legs feeling unsteady  Has urinary incontinence; no dysuria    ROS: no fever or chills; denies dizziness, no HA, no SOB or cough, no abdominal pain, no diarrhea or constipation; no rashes    MEDICATIONS  (STANDING):  apixaban 5 milliGRAM(s) Oral every 12 hours  artificial tears (preservative free) Ophthalmic Solution 1 Drop(s) Both EYES two times a day  aspirin  chewable 81 milliGRAM(s) Oral daily  dextrose 5%. 1000 milliLiter(s) (50 mL/Hr) IV Continuous <Continuous>  dextrose 50% Injectable 12.5 Gram(s) IV Push once  dextrose 50% Injectable 25 Gram(s) IV Push once  dextrose 50% Injectable 25 Gram(s) IV Push once  docusate sodium 200 milliGRAM(s) Oral at bedtime  escitalopram 10 milliGRAM(s) Oral daily  furosemide   Injectable 80 milliGRAM(s) IV Push daily  insulin lispro (HumaLOG) corrective regimen sliding scale   SubCutaneous three times a day before meals  insulin lispro (HumaLOG) corrective regimen sliding scale   SubCutaneous at bedtime  levothyroxine 50 MICROGram(s) Oral daily  lidocaine   Patch 1 Patch Transdermal daily  methylPREDNISolone sodium succinate Injectable 20 milliGRAM(s) IV Push daily  metoprolol succinate ER 25 milliGRAM(s) Oral at bedtime  multivitamin/minerals 1 Tablet(s) Oral daily  pantoprazole    Tablet 40 milliGRAM(s) Oral before breakfast  potassium chloride    Tablet ER 20 milliEquivalent(s) Oral daily  saccharomyces boulardii 250 milliGRAM(s) Oral two times a day  sildenafil (REVATIO) 20 milliGRAM(s) Oral two times a day  simvastatin 40 milliGRAM(s) Oral at bedtime      Vital Signs Last 24 Hrs  T(C): 36.5 (2019 04:31), Max: 36.7 (2019 16:53)  T(F): 97.7 (2019 04:31), Max: 98 (2019 16:53)  HR: 83 (2019 04:31) (83 - 99)  BP: 117/60 (2019 04:31) (95/46 - 117/60)  BP(mean): --  RR: 18 (2019 04:31) (16 - 18)  SpO2: 93% (2019 04:31) (93% - 99%)    Physical Exam:      Constitutional: frail looking  HEENT: NC/AT, EOMI, PERRLA, conjunctivae clear  Neck: supple; thyroid not palpable  Back: no tenderness  Respiratory: respiratory effort normal; crackles at bases  Cardiovascular: S1S2 regular, no murmurs  Abdomen: soft, not tender, not distended, positive BS  Genitourinary: no suprapubic tenderness  Lymphatic: no LN palpable  Musculoskeletal: no muscle tenderness, no joint swelling or tenderness  Extremities: 2+ pedal edema; Left knee TKR - no erythema, prior postop wound is healed well  Neurological/ Psychiatric: AxOx3, moving all extremities  Skin: no rashes; no palpable lesions    Labs: reviewed                        11.6   10.11 )-----------( 123      ( 2019 08:30 )             35.0     06-26    136  |  101  |  16  ----------------------------<  205<H>  3.4<L>   |  29  |  0.72    Ca    8.7      2019 08:30    Urinalysis Basic - ( 2019 01:50 )    Color: Yellow / Appearance: Clear / S.005 / pH: x  Gluc: x / Ketone: Negative  / Bili: Negative / Urobili: 1 mg/dL   Blood: x / Protein: Negative mg/dL / Nitrite: Negative   Leuk Esterase: Negative / RBC: 0-2 /HPF / WBC Negative   Sq Epi: x / Non Sq Epi: Moderate / Bacteria: Many      Culture - Blood (collected 2019 15:25)  Source: .Blood None  Preliminary Report (2019 18:02):    No growth to date.    Culture - Blood (collected 2019 15:25)  Source: .Blood None  Preliminary Report (2019 18:02):    No growth to date.    Culture - Urine (collected 2019 01:50)  Source: .Urine Clean Catch (Midstream)  Preliminary Report (2019 20:12):    >100,000 CFU/ml Escherichia coli    Culture - Urine (collected 2019 10:45)  Source: .Urine None  Final Report (2019 07:30):    >=3 organisms. Probable collection contamination.        Radiology: all available radiological tests reviewed    < from: Xray Chest 1 View-PORTABLE IMMEDIATE (19 @ 15:31) >  Nonspecific prominent bilateral interstitial markings are redemonstrated.   Prominent cardiac silhouette appears similar to prior. Atherosclerotic   calcifications of the thoracic aorta. Cardiac valve device. No pleural   effusion or pneumothorax is seen. Arthritic changes of the left   glenohumeral joint with joint body adjacent to the humeral neck.    < end of copied text >      Advanced directives addressed: full resuscitation
Orthopedics Follow-up Note:    Dr. Blakely was informed that patient has been c/o left shoulder pain. Pt evaluated at bedside with Dr. Blakely resting comfortably. Pt report left shoulder pain for which she has gotten intraarticular injections by Dr. Blakely in the past with relief. Pt also c/o left knee pain with ambulation.    Vital Signs Last 24 Hrs  T(C): 36.3 (27 Jun 2019 10:40), Max: 36.6 (26 Jun 2019 21:37)  T(F): 97.4 (27 Jun 2019 10:40), Max: 97.8 (26 Jun 2019 21:37)  HR: 85 (27 Jun 2019 10:40) (76 - 85)  BP: 90/42 (27 Jun 2019 10:40) (90/42 - 128/60)  BP(mean): --  RR: 17 (27 Jun 2019 10:40) (15 - 18)  SpO2: 99% (27 Jun 2019 10:40) (97% - 100%)                          11.6   10.53 )-----------( 150      ( 27 Jun 2019 10:53 )             36.0                ESR 30  CRP 0.22    Orthopedics Progress Note:    This is a 89y/o Female s/p Right**/**Left Total Knee Arthroplasty POD 2.  Pain is controlled. Pt feeling well. No nausea or vomiting. Pt was up OOB today with PT.    Vital Signs Last 24 Hrs  T(C): 36.3 (06-27-19 @ 10:40), Max: 36.6 (06-26-19 @ 21:37)  T(F): 97.4 (06-27-19 @ 10:40), Max: 97.8 (06-26-19 @ 21:37)  HR: 85 (06-27-19 @ 10:40) (76 - 85)  BP: 90/42 (06-27-19 @ 10:40) (90/42 - 128/60)  BP(mean): --  RR: 17 (06-27-19 @ 10:40) (15 - 18)  SpO2: 99% (06-27-19 @ 10:40) (97% - 100%)                        11.6   10.53 )-----------( 150      ( 27 Jun 2019 10:53 )             36.0     27 Jun 2019 10:53    135    |  102    |  21     ----------------------------<  258    3.6     |  26     |  0.83     Ca    8.7        27 Jun 2019 10:53  Mg     2.0       27 Jun 2019 10:53      CXR demonstrates severe left shoulder GH and AC joint OA.  Left knee x-rays demonstrate stable appearing total knee prosthesis.    Exam:  NAD AAOx3.  Left shoulder:  No swelling, no erythema, normothermic.  +TTP. LROM 2' pain.  Moving all fingers, sensation intact.   Radial pulse palpable.     Left knee:  +healed scar, +diffuse LLE edema, no erythema, normothermic.  +mild TTP. Good ROM with some pain at limits.  Calves are soft and nontender.  Moving all toes, sensation intact.  DP and PT pulses palpable.    A/P:  Severe left shoulder OA; left knee pain.  -Left shoulder intraarticular injection performed by Dr. Blakely at bedside w/ 5cc 2% lidocaine/4cc 0.25% marcaine/1cc depo using sterile technique after written consent obtained; pt tolerated procedure well.    -Analgesia  -LLE elevation  -Ice application left knee and shoulder.  -DVT PE ppx  -OOB PT WBAT LLE and LUE.  -Orthopedically stable for discharge.  -Follow-up with Dr. Blakely in the office 2-4 weeks after discharge.    Pt was seen with Dr. Blakely.
Patient is a 89y old  Female who presents with a chief complaint of leg pain.     HPI:  89 yo Female with a PMHx of pancreatitis, S/p ERCP,  cirrhosis, DM type II,  L knee replacement, AFIB on Eliquis, CAD s/p PCI, severe Pulm HTN, Chronic Right heart failure, Diastolic dysfunction, AS, S/p TAVR presented to ED c/o L knee and leg pain. Pt reports that had sharp pains whole  night, called her son in am in tears. As per son legs noted to be more swollen   past week, + weight gain, but Pt denies orthopnea, she was complaining feeling weak past few days, but no SOB. No fevers. Also Pt had calf ecchymosis which then resolved and  later developed similar ecchymosis on dorsal foot, resolved now. Was able to ambulate until last night. Pt was  recently seen by Cardio Dr Hernandez and was started on Sildenafil and lasix was  adjusted.  Pt denies CP.      Pt denies any SOB or Chest pain .  She still c/o leg pain     - pt seen and examined by me today. Pt denies any symptoms.       PAST MEDICAL & SURGICAL HISTORY:  Aortic stenosis  History of pancreatitis  HLD (hyperlipidemia)  Hypothyroid  Afib  CAD (coronary artery disease)  Hypertension  History of Osteoarthritis  GERD (Gastroesophageal Reflux Disease)  Dyslipidemia  Diabetes Mellitus Type II  S/P IVC filter: Note that Pt does not have  h/o DVT, outPt doppler was read first as +, but after review reported as no DVT. Pt got IVC filer before that  H/O total knee replacement, left  History of appendectomy  History of cholecystectomy  H/O: Knee Surgery- right meniscus  H/O: Hysterectomy      MEDICATIONS  (STANDING):  apixaban 5 milliGRAM(s) Oral every 12 hours  artificial tears (preservative free) Ophthalmic Solution 1 Drop(s) Both EYES two times a day  aspirin  chewable 81 milliGRAM(s) Oral daily  dextrose 5%. 1000 milliLiter(s) (50 mL/Hr) IV Continuous <Continuous>  dextrose 50% Injectable 12.5 Gram(s) IV Push once  dextrose 50% Injectable 25 Gram(s) IV Push once  dextrose 50% Injectable 25 Gram(s) IV Push once  docusate sodium 200 milliGRAM(s) Oral at bedtime  escitalopram 10 milliGRAM(s) Oral daily  furosemide   Injectable 40 milliGRAM(s) IV Push every 12 hours  insulin lispro (HumaLOG) corrective regimen sliding scale   SubCutaneous three times a day before meals  insulin lispro (HumaLOG) corrective regimen sliding scale   SubCutaneous at bedtime  levothyroxine 50 MICROGram(s) Oral daily  lidocaine   Patch 1 Patch Transdermal daily  metoprolol succinate ER 25 milliGRAM(s) Oral at bedtime  multivitamin/minerals 1 Tablet(s) Oral daily  pantoprazole    Tablet 40 milliGRAM(s) Oral before breakfast  potassium chloride    Tablet ER 20 milliEquivalent(s) Oral daily  saccharomyces boulardii 250 milliGRAM(s) Oral two times a day  sildenafil (REVATIO) 20 milliGRAM(s) Oral two times a day  simvastatin 40 milliGRAM(s) Oral at bedtime    MEDICATIONS  (PRN):  acetaminophen   Tablet .. 650 milliGRAM(s) Oral every 6 hours PRN Temp greater or equal to 38C (100.4F), Mild Pain (1 - 3)  bisacodyl 5 milliGRAM(s) Oral daily PRN Constipation  dextrose 40% Gel 15 Gram(s) Oral once PRN Blood Glucose LESS THAN 70 milliGRAM(s)/deciliter  glucagon  Injectable 1 milliGRAM(s) IntraMuscular once PRN Glucose LESS THAN 70 milligrams/deciliter  ondansetron Injectable 4 milliGRAM(s) IV Push every 6 hours PRN Nausea  senna 2 Tablet(s) Oral at bedtime PRN Constipation  traMADol 50 milliGRAM(s) Oral every 6 hours PRN Moderate Pain (4 - 6)      FAMILY HISTORY:  FH: heart disease  FH: diabetes mellitus: both parents      SOCIAL HISTORY:  no recent smoking     REVIEW OF SYSTEMS:  CONSTITUTIONAL:    No fatigue, malaise, lethargy.  No fever or chills.  HEENT:  Eyes:  No visual changes.     ENT:  No epistaxis.  No sinus pain.    RESPIRATORY:  No cough.  No wheeze.  No hemoptysis.  No shortness of breath.  CARDIOVASCULAR:  No chest pains.  No palpitations. No shortness of breath, No orthopnea or PND.  GASTROINTESTINAL:  No abdominal pain.  No nausea or vomiting.    GENITOURINARY:    No hematuria.    MUSCULOSKELETAL:  c/o l leg pain   NEUROLOGICAL:  No tingling or numbness or weakness.  PSYCHIATRIC:  No confusion  SKIN:  No rashes.    ENDOCRINE:  No unexplained weight loss.  No polydipsia.   HEMATOLOGIC:  No anemia.  No prolonged or excessive bleeding.   ALLERGIC AND IMMUNOLOGIC:  No pruritus.        ICU Vital Signs Last 24 Hrs  T(C): 36.7 (2019 04:47), Max: 36.8 (2019 21:57)  T(F): 98.1 (2019 04:47), Max: 98.2 (2019 21:57)  HR: 87 (2019 04:47) (72 - 87)  BP: 117/49 (2019 04:47) (103/54 - 117/49)  BP(mean): 64 (2019 18:12) (64 - 65)  ABP: --  ABP(mean): --  RR: 17 (2019 04:47) (16 - 18)  SpO2: 95% (2019 04:47) (95% - 97%)    PHYSICAL EXAM-    Constitutional: elderly frail , in no acute distress    Head: Head is normocephalic and atraumatic.      Neck: + JVD and EJ distension      Cardiovascular: irregular and systolic murmur 2/6     Respiratory: coarse BS b/l basal     Abdomen: Soft, nontender, nondistended with positive bowel sounds.      Extremity: No tenderness. 2+ pitting pedal edema upto thighs    Neurologic: The patient is alert and oriented.      Skin: No rash, no obvious lesions noted.      Psychiatric: The patient appears to be emotionally stable.      INTERPRETATION OF TELEMETRY: afib 60-70/min     ECG: afib , L axisw , no s tt changes.     I&O's Detail                            11.2   6.39  )-----------( 138      ( 2019 07:47 )             35.2     06-25    135  |  100  |  14  ----------------------------<  163<H>  3.7   |  28  |  0.57    Ca    8.7      2019 07:05      CARDIAC MARKERS ( 2019 13:33 )  <0.015 ng/mL / x     / x     / x     / x                     PTT - ( 2019 06:49 )  PTT:36.4 sec  Urinalysis Basic - ( 2019 10:45 )    Color: Yellow / Appearance: Clear / S.010 / pH: x  Gluc: x / Ketone: Negative  / Bili: Negative / Urobili: Negative mg/dL   Blood: x / Protein: 15 mg/dL / Nitrite: Negative   Leuk Esterase: Negative / RBC: 3-5 /HPF / WBC Negative   Sq Epi: x / Non Sq Epi: Few / Bacteria: x      I&O's Summary    BNP  RADIOLOGY & ADDITIONAL STUDIES:  < from: Cardiac Cath Lab - Adult (17 @ 13:38) >   Cardiac Arteries and Lesion Findings    LMCA: Large caliber vessel. Normal.    LAD: Medium caliber vessel. Normal. Widely patent stent.    LCx: Large caliber vessel. Normal.    RCA: Medium caliber vessel. Normal.     Impression     Diagnostic Conclusions     Normal LV systolic function. Estimated LV ejection fraction is 65 %.   One Vessel coronary artery disease (LAD) .   Patent bare metal stent in the mid LAD.   Elevated left ventricular end-diastolic pressure.   Hemodynamic status is abnormal.   Severe pulmonary artery hypertension.   No aortic valve stenosis.No gradient across Spaien Valve.   No aortic valve regurgitation.     Recommendations     Manage with medical therapy.   Consider Phosphodiesterase Inhibitors for PHT.    Estimated Blood Loss:6ml.     Signatures     ----------------------------------------------------------------   Electronically signed by Florentin Hernandez MD, Director of   Cardiac CathLab(Performing Physician) on 2017 16:37   ----------------------------------------------------------------    < end of copied text >
Patient is a 89y old  Female who presents with a chief complaint of leg pain.     HPI:  89 yo Female with a PMHx of pancreatitis, S/p ERCP,  cirrhosis, DM type II,  L knee replacement, AFIB on Eliquis, CAD s/p PCI, severe Pulm HTN, Chronic Right heart failure, Diastolic dysfunction, AS, S/p TAVR presented to ED c/o L knee and leg pain. Pt reports that had sharp pains whole  night, called her son in am in tears. As per son legs noted to be more swollen   past week, + weight gain, but Pt denies orthopnea, she was complaining feeling weak past few days, but no SOB. No fevers. Also Pt had calf ecchymosis which then resolved and  later developed similar ecchymosis on dorsal foot, resolved now. Was able to ambulate until last night. Pt was  recently seen by Cardio Dr Hernandez and was started on Sildenafil and lasix was  adjusted.  Pt denies CP.      Pt denies any SOB or Chest pain .  She still c/o leg pain     - pt seen and examined by me today. Pt denies any symptoms.     - pt seen and examined by me today. Pt denies symptoms this am except for leg pain b/l .      PAST MEDICAL & SURGICAL HISTORY:  Aortic stenosis  History of pancreatitis  HLD (hyperlipidemia)  Hypothyroid  Afib  CAD (coronary artery disease)  Hypertension  History of Osteoarthritis  GERD (Gastroesophageal Reflux Disease)  Dyslipidemia  Diabetes Mellitus Type II  S/P IVC filter: Note that Pt does not have  h/o DVT, outPt doppler was read first as +, but after review reported as no DVT. Pt got IVC filer before that  H/O total knee replacement, left  History of appendectomy  History of cholecystectomy  H/O: Knee Surgery- right meniscus  H/O: Hysterectomy      MEDICATIONS  (STANDING):  apixaban 5 milliGRAM(s) Oral every 12 hours  artificial tears (preservative free) Ophthalmic Solution 1 Drop(s) Both EYES two times a day  aspirin  chewable 81 milliGRAM(s) Oral daily  dextrose 5%. 1000 milliLiter(s) (50 mL/Hr) IV Continuous <Continuous>  dextrose 50% Injectable 12.5 Gram(s) IV Push once  dextrose 50% Injectable 25 Gram(s) IV Push once  dextrose 50% Injectable 25 Gram(s) IV Push once  docusate sodium 200 milliGRAM(s) Oral at bedtime  escitalopram 10 milliGRAM(s) Oral daily  furosemide   Injectable 40 milliGRAM(s) IV Push every 12 hours  insulin lispro (HumaLOG) corrective regimen sliding scale   SubCutaneous three times a day before meals  insulin lispro (HumaLOG) corrective regimen sliding scale   SubCutaneous at bedtime  levothyroxine 50 MICROGram(s) Oral daily  lidocaine   Patch 1 Patch Transdermal daily  metoprolol succinate ER 25 milliGRAM(s) Oral at bedtime  multivitamin/minerals 1 Tablet(s) Oral daily  pantoprazole    Tablet 40 milliGRAM(s) Oral before breakfast  potassium chloride    Tablet ER 20 milliEquivalent(s) Oral daily  saccharomyces boulardii 250 milliGRAM(s) Oral two times a day  sildenafil (REVATIO) 20 milliGRAM(s) Oral two times a day  simvastatin 40 milliGRAM(s) Oral at bedtime    MEDICATIONS  (PRN):  acetaminophen   Tablet .. 650 milliGRAM(s) Oral every 6 hours PRN Temp greater or equal to 38C (100.4F), Mild Pain (1 - 3)  bisacodyl 5 milliGRAM(s) Oral daily PRN Constipation  dextrose 40% Gel 15 Gram(s) Oral once PRN Blood Glucose LESS THAN 70 milliGRAM(s)/deciliter  glucagon  Injectable 1 milliGRAM(s) IntraMuscular once PRN Glucose LESS THAN 70 milligrams/deciliter  ondansetron Injectable 4 milliGRAM(s) IV Push every 6 hours PRN Nausea  senna 2 Tablet(s) Oral at bedtime PRN Constipation  traMADol 50 milliGRAM(s) Oral every 6 hours PRN Moderate Pain (4 - 6)      FAMILY HISTORY:  FH: heart disease  FH: diabetes mellitus: both parents      SOCIAL HISTORY:  no recent smoking     REVIEW OF SYSTEMS:  CONSTITUTIONAL:    No fatigue, malaise, lethargy.  No fever or chills.  HEENT:  Eyes:  No visual changes.     ENT:  No epistaxis.  No sinus pain.    RESPIRATORY:  No cough.  No wheeze.  No hemoptysis.  No shortness of breath.  CARDIOVASCULAR:  No chest pains.  No palpitations. No shortness of breath, No orthopnea or PND.  GASTROINTESTINAL:  No abdominal pain.  No nausea or vomiting.    GENITOURINARY:    No hematuria.    MUSCULOSKELETAL:  c/o l leg pain   NEUROLOGICAL:  No tingling or numbness or weakness.  PSYCHIATRIC:  No confusion    ICU Vital Signs Last 24 Hrs  T(C): 36.3 (2019 04:43), Max: 36.6 (2019 10:28)  T(F): 97.4 (2019 04:43), Max: 97.8 (2019 10:28)  HR: 77 (2019 04:43) (74 - 78)  BP: 128/60 (2019 04:43) (96/52 - 128/60)  BP(mean): --  ABP: --  ABP(mean): --  RR: 18 (2019 04:43) (15 - 18)  SpO2: 100% (2019 04:43) (97% - 100%)      PHYSICAL EXAM-    Constitutional: elderly frail , in no acute distress    Head: Head is normocephalic and atraumatic.      Neck: + JVD and EJ distension      Cardiovascular: irregular and systolic murmur 2/6     Respiratory: coarse BS b/l basal     Abdomen: Soft, nontender, nondistended with positive bowel sounds.      Extremity: No tenderness. 2+ pitting pedal edema upto thighs    Neurologic: The patient is alert and oriented.      Skin: No rash, no obvious lesions noted.      Psychiatric: The patient appears to be emotionally stable.      INTERPRETATION OF TELEMETRY: afib 60-70/min     ECG: afib , L axis , no s tt changes.     I&O's Detail                            11.2   6.39  )-----------( 138      ( 2019 07:47 )             35.2     06-25    135  |  100  |  14  ----------------------------<  163<H>  3.7   |  28  |  0.57    Ca    8.7      2019 07:05      CARDIAC MARKERS ( 2019 13:33 )  <0.015 ng/mL / x     / x     / x     / x                     PTT - ( 2019 06:49 )  PTT:36.4 sec  Urinalysis Basic - ( 2019 10:45 )    Color: Yellow / Appearance: Clear / S.010 / pH: x  Gluc: x / Ketone: Negative  / Bili: Negative / Urobili: Negative mg/dL   Blood: x / Protein: 15 mg/dL / Nitrite: Negative   Leuk Esterase: Negative / RBC: 3-5 /HPF / WBC Negative   Sq Epi: x / Non Sq Epi: Few / Bacteria: x      I&O's Summary    BNP  RADIOLOGY & ADDITIONAL STUDIES:  < from: Cardiac Cath Lab - Adult (17 @ 13:38) >   Cardiac Arteries and Lesion Findings    LMCA: Large caliber vessel. Normal.    LAD: Medium caliber vessel. Normal. Widely patent stent.    LCx: Large caliber vessel. Normal.    RCA: Medium caliber vessel. Normal.     Impression     Diagnostic Conclusions     Normal LV systolic function. Estimated LV ejection fraction is 65 %.   One Vessel coronary artery disease (LAD) .   Patent bare metal stent in the mid LAD.   Elevated left ventricular end-diastolic pressure.   Hemodynamic status is abnormal.   Severe pulmonary artery hypertension.   No aortic valve stenosis.No gradient across Spaien Valve.   No aortic valve regurgitation.     Recommendations     Manage with medical therapy.   Consider Phosphodiesterase Inhibitors for PHT.    Estimated Blood Loss:6ml.     Signatures     ----------------------------------------------------------------   Electronically signed by Florentin Hernandez MD, Director of   Cardiac CathLab(Performing Physician) on 2017 16:37   ----------------------------------------------------------------    < end of copied text >
Patient is a 89y old  Female who presents with a chief complaint of leg pain.     HPI:  89 yo Female with a PMHx of pancreatitis, S/p ERCP,  cirrhosis, DM type II,  L knee replacement, AFIB on Eliquis, CAD s/p PCI, severe Pulm HTN, Chronic Right heart failure, Diastolic dysfunction, AS, S/p TAVR presented to ED c/o L knee and leg pain.        - pt seen and examined by me today. Pt denies any symptoms.     - pt seen and examined by me today. Pt denies symptoms this am except for leg pain b/l .    - Pt seen and examined by me today am. She denies any symptoms except leg pain and L arm pain where IVline is .     - pt seen and examined by me today. Pt denies any CP or SOB this am but still c/o tenderness to touch in b/l LE.    PAST MEDICAL & SURGICAL HISTORY:  Aortic stenosis  History of pancreatitis  HLD (hyperlipidemia)  Hypothyroid  Afib  CAD (coronary artery disease)  Hypertension  History of Osteoarthritis  GERD (Gastroesophageal Reflux Disease)  Dyslipidemia  Diabetes Mellitus Type II  S/P IVC filter: Note that Pt does not have  h/o DVT, outPt doppler was read first as +, but after review reported as no DVT. Pt got IVC filer before that  H/O total knee replacement, left  History of appendectomy  History of cholecystectomy  H/O: Knee Surgery- right meniscus  H/O: Hysterectomy      MEDICATIONS  (STANDING):  apixaban 5 milliGRAM(s) Oral every 12 hours  artificial tears (preservative free) Ophthalmic Solution 1 Drop(s) Both EYES two times a day  aspirin  chewable 81 milliGRAM(s) Oral daily  dextrose 5%. 1000 milliLiter(s) (50 mL/Hr) IV Continuous <Continuous>  dextrose 50% Injectable 12.5 Gram(s) IV Push once  dextrose 50% Injectable 25 Gram(s) IV Push once  dextrose 50% Injectable 25 Gram(s) IV Push once  docusate sodium 200 milliGRAM(s) Oral at bedtime  escitalopram 10 milliGRAM(s) Oral daily  furosemide   Injectable 40 milliGRAM(s) IV Push every 12 hours  insulin lispro (HumaLOG) corrective regimen sliding scale   SubCutaneous three times a day before meals  insulin lispro (HumaLOG) corrective regimen sliding scale   SubCutaneous at bedtime  levothyroxine 50 MICROGram(s) Oral daily  lidocaine   Patch 1 Patch Transdermal daily  metoprolol succinate ER 25 milliGRAM(s) Oral at bedtime  multivitamin/minerals 1 Tablet(s) Oral daily  pantoprazole    Tablet 40 milliGRAM(s) Oral before breakfast  potassium chloride    Tablet ER 20 milliEquivalent(s) Oral daily  saccharomyces boulardii 250 milliGRAM(s) Oral two times a day  sildenafil (REVATIO) 20 milliGRAM(s) Oral two times a day  simvastatin 40 milliGRAM(s) Oral at bedtime    MEDICATIONS  (PRN):  acetaminophen   Tablet .. 650 milliGRAM(s) Oral every 6 hours PRN Temp greater or equal to 38C (100.4F), Mild Pain (1 - 3)  bisacodyl 5 milliGRAM(s) Oral daily PRN Constipation  dextrose 40% Gel 15 Gram(s) Oral once PRN Blood Glucose LESS THAN 70 milliGRAM(s)/deciliter  glucagon  Injectable 1 milliGRAM(s) IntraMuscular once PRN Glucose LESS THAN 70 milligrams/deciliter  ondansetron Injectable 4 milliGRAM(s) IV Push every 6 hours PRN Nausea  senna 2 Tablet(s) Oral at bedtime PRN Constipation  traMADol 50 milliGRAM(s) Oral every 6 hours PRN Moderate Pain (4 - 6)      FAMILY HISTORY:  FH: heart disease  FH: diabetes mellitus: both parents      SOCIAL HISTORY:  no recent smoking     REVIEW OF SYSTEMS:  CONSTITUTIONAL:    No fatigue, malaise, lethargy.  No fever or chills.  HEENT:  Eyes:  No visual changes.     ENT:  No epistaxis.  No sinus pain.    RESPIRATORY:  No cough.  No wheeze.  No hemoptysis.  No shortness of breath.  CARDIOVASCULAR:  No chest pains.  No palpitations. No shortness of breath, No orthopnea or PND.  GASTROINTESTINAL:  No abdominal pain.  No nausea or vomiting.    GENITOURINARY:    No hematuria.    MUSCULOSKELETAL:  c/o l leg pain   NEUROLOGICAL:  No tingling or numbness or weakness.  PSYCHIATRIC:  No confusion    ICU Vital Signs Last 24 Hrs  T(C): 36.6 (2019 10:58), Max: 36.6 (2019 10:58)  T(F): 97.9 (2019 10:58), Max: 97.9 (2019 10:58)  HR: 71 (2019 10:58) (71 - 78)  BP: 102/54 (2019 10:58) (102/54 - 116/58)  BP(mean): --  ABP: --  ABP(mean): --  RR: 18 (2019 10:58) (18 - 18)  SpO2: 96% (2019 10:58) (95% - 96%)        PHYSICAL EXAM-    Constitutional: elderly frail , in no acute distress    Head: Head is normocephalic and atraumatic.      Neck: no JVD    Cardiovascular: irregular and systolic murmur 2/6     Respiratory: rales b/l basal     Abdomen: Soft, nontender, nondistended with positive bowel sounds.      Extremity: No tenderness. trace pitting pedal edema     Neurologic: The patient is alert and oriented.      Skin: No rash, no obvious lesions noted.      Psychiatric: The patient appears to be emotionally stable.      INTERPRETATION OF TELEMETRY: afib 60-70/min     ECG: afib , L axis , no s tt changes.     I&O's Detail                                       11.6   10.53 )-----------( 150      ( 2019 10:53 )             36.0     06-27    135  |  102  |  21  ----------------------------<  258<H>  3.6   |  26  |  0.83    Ca    8.7      2019 10:53  Mg     2.0     06-27       PTT - ( 2019 06:49 )  PTT:36.4 sec  Urinalysis Basic - ( 2019 10:45 )    Color: Yellow / Appearance: Clear / S.010 / pH: x  Gluc: x / Ketone: Negative  / Bili: Negative / Urobili: Negative mg/dL   Blood: x / Protein: 15 mg/dL / Nitrite: Negative   Leuk Esterase: Negative / RBC: 3-5 /HPF / WBC Negative   Sq Epi: x / Non Sq Epi: Few / Bacteria: x      I&O's Summary    BNP  RADIOLOGY & ADDITIONAL STUDIES:  < from: Cardiac Cath Lab - Adult (17 @ 13:38) >   Cardiac Arteries and Lesion Findings    LMCA: Large caliber vessel. Normal.    LAD: Medium caliber vessel. Normal. Widely patent stent.    LCx: Large caliber vessel. Normal.    RCA: Medium caliber vessel. Normal.     Impression     Diagnostic Conclusions     Normal LV systolic function. Estimated LV ejection fraction is 65 %.   One Vessel coronary artery disease (LAD) .   Patent bare metal stent in the mid LAD.   Elevated left ventricular end-diastolic pressure.   Hemodynamic status is abnormal.   Severe pulmonary artery hypertension.   No aortic valve stenosis.No gradient across Spaien Valve.   No aortic valve regurgitation.     Recommendations     Manage with medical therapy.   Consider Phosphodiesterase Inhibitors for PHT.    Estimated Blood Loss:6ml.     Signatures     ----------------------------------------------------------------   Electronically signed by Florentin Hernandez MD, Director of   Cardiac CathLab(Performing Physician) on 2017 16:37   ----------------------------------------------------------------    < end of copied text >
Pt S/E at bedside, no acute events overnight, pain improved this morning    Vital Signs Last 24 Hrs  T(C): 36.3 (24 Jun 2019 04:33), Max: 36.8 (23 Jun 2019 12:54)  T(F): 97.4 (24 Jun 2019 04:33), Max: 98.2 (23 Jun 2019 12:54)  HR: 72 (24 Jun 2019 04:33) (66 - 98)  BP: 111/54 (24 Jun 2019 04:33) (102/45 - 129/52)  BP(mean): --  RR: 17 (24 Jun 2019 04:33) (17 - 20)  SpO2: 99% (24 Jun 2019 04:33) (91% - 99%)    Gen: NAD  Left LE: skin intact, No erythema. No eccymosis. Diffuse TTP from knee to ankle, no bony point tenderness to palpation, No palpable hematoma. AROM/PROM limited 0-30 deg flex. negative Effusion. +pitting edema bilaterally, able to SLR, Neg log roll, Negative Heel strike, no ttp elsewhere, +EHL/FHL/TA/GS function, DP/PT pulses intact, compartments soft.
Pt S/E at bedside, no acute events overnight, pain much improved in both knees and left shoulder    Vital Signs Last 24 Hrs  T(C): 36.3 (30 Jun 2019 16:25), Max: 36.7 (30 Jun 2019 05:03)  T(F): 97.3 (30 Jun 2019 16:25), Max: 98.1 (30 Jun 2019 05:03)  HR: 77 (30 Jun 2019 21:24) (74 - 85)  BP: 116/58 (30 Jun 2019 21:24) (104/45 - 124/55)  BP(mean): --  RR: 18 (30 Jun 2019 10:28) (16 - 18)  SpO2: 100% (30 Jun 2019 16:25) (98% - 100%)    Gen: NAD    Bilateral Lower Extremity:  Skin intact, ROM active/passive greatly improved  +EHL/FHL/TA/GS  SILT L3-S1  +DP/PT Pulses  Compartments soft  No calf TTP B/L    Left Upper Extremity:  Skin intact, no erythema  +AIN/PIN/M/R/U/Msc/Ax  SILT C5-T1  +Radial Pulse  Compartments soft
Subjective:    Awake, alert. Breathing improved. Left knee pain is diminished.    MEDICATIONS  (STANDING):  apixaban 5 milliGRAM(s) Oral every 12 hours  artificial tears (preservative free) Ophthalmic Solution 1 Drop(s) Both EYES two times a day  aspirin  chewable 81 milliGRAM(s) Oral daily  dextrose 5%. 1000 milliLiter(s) (50 mL/Hr) IV Continuous <Continuous>  dextrose 50% Injectable 12.5 Gram(s) IV Push once  dextrose 50% Injectable 25 Gram(s) IV Push once  dextrose 50% Injectable 25 Gram(s) IV Push once  docusate sodium 200 milliGRAM(s) Oral at bedtime  escitalopram 10 milliGRAM(s) Oral daily  furosemide   Injectable 80 milliGRAM(s) IV Push daily  insulin lispro (HumaLOG) corrective regimen sliding scale   SubCutaneous three times a day before meals  insulin lispro (HumaLOG) corrective regimen sliding scale   SubCutaneous at bedtime  levothyroxine 50 MICROGram(s) Oral daily  lidocaine   Patch 1 Patch Transdermal daily  methylPREDNISolone sodium succinate Injectable 20 milliGRAM(s) IV Push daily  metoprolol succinate ER 25 milliGRAM(s) Oral at bedtime  multivitamin/minerals 1 Tablet(s) Oral daily  pantoprazole    Tablet 40 milliGRAM(s) Oral before breakfast  potassium chloride    Tablet ER 20 milliEquivalent(s) Oral daily  saccharomyces boulardii 250 milliGRAM(s) Oral two times a day  sildenafil (REVATIO) 20 milliGRAM(s) Oral two times a day  simvastatin 40 milliGRAM(s) Oral at bedtime    MEDICATIONS  (PRN):  acetaminophen   Tablet .. 650 milliGRAM(s) Oral every 6 hours PRN Temp greater or equal to 38C (100.4F), Mild Pain (1 - 3)  bisacodyl 5 milliGRAM(s) Oral daily PRN Constipation  dextrose 40% Gel 15 Gram(s) Oral once PRN Blood Glucose LESS THAN 70 milliGRAM(s)/deciliter  glucagon  Injectable 1 milliGRAM(s) IntraMuscular once PRN Glucose LESS THAN 70 milligrams/deciliter  ondansetron Injectable 4 milliGRAM(s) IV Push every 6 hours PRN Nausea  senna 2 Tablet(s) Oral at bedtime PRN Constipation      Allergies    penicillin (Rash)  penicillins (Other)  sulfa drugs (Rash; Other)    Intolerances        Vital Signs Last 24 Hrs  T(C): 36.4 (29 Jun 2019 11:09), Max: 36.6 (28 Jun 2019 17:18)  T(F): 97.6 (29 Jun 2019 11:09), Max: 97.9 (28 Jun 2019 17:18)  HR: 80 (29 Jun 2019 11:09) (70 - 80)  BP: 120/69 (29 Jun 2019 11:09) (107/51 - 135/63)  BP(mean): --  RR: 18 (29 Jun 2019 11:09) (18 - 18)  SpO2: 96% (29 Jun 2019 11:09) (94% - 97%)    PHYSICAL EXAMINATION:    NECK:  Supple. No lymphadenopathy. Jugular venous pressure not elevated.   HEART:   The cardiac impulse has a normal quality. Reg., Nl S1 and S2.    CHEST:  Chest with bilateral crackles approx 1/3 up. Normal respiratory effort.  ABDOMEN:  Soft and nontender.   EXTREMITIES:  There is 1+ edema. No warmth of left knee      LABS:    06-28    133<L>  |  98  |  22  ----------------------------<  317<H>  3.6   |  26  |  0.73    Ca    8.5      28 Jun 2019 07:57  Mg     2.0     06-28            RADIOLOGY & ADDITIONAL TESTS:    Assessment and Plan:   · Assessment		  Assessment/Plan    - Continue diuresis-lasix maintained at 80mg QD  - BUN/Cr stable  - Daily weights-need to use standing scale   - Monitor lytes carefully-may need to push BUN  - Cardiology f/u noted  - Monitor I+O's daily  - OOB to chair  - Repeat labs on 6/30  - Continue solumedrol as recommended by rheumatology-prednisone taper as outpatient      Problem/Recommendation - 1:  Problem: Congestive heart failure, unspecified HF chronicity, unspecified heart failure type.    Problem/Recommendation - 2:  ·  Problem: Shortness of breath.     Problem/Recommendation - 3:  ·  Problem: Pulmonary hypertension.     Problem/Recommendation - 4:  ·  Problem: Hypoxemia.     Problem/Recommendation - 5:  ·  Problem: Chronic atrial fibrillation.
Subjective:    Awake, alert. Improving slowly. No dysuria or sputum production. Diminished pain in legs    MEDICATIONS  (STANDING):  apixaban 5 milliGRAM(s) Oral every 12 hours  artificial tears (preservative free) Ophthalmic Solution 1 Drop(s) Both EYES two times a day  aspirin  chewable 81 milliGRAM(s) Oral daily  dextrose 5%. 1000 milliLiter(s) (50 mL/Hr) IV Continuous <Continuous>  dextrose 50% Injectable 12.5 Gram(s) IV Push once  dextrose 50% Injectable 25 Gram(s) IV Push once  dextrose 50% Injectable 25 Gram(s) IV Push once  docusate sodium 200 milliGRAM(s) Oral at bedtime  escitalopram 10 milliGRAM(s) Oral daily  furosemide   Injectable 80 milliGRAM(s) IV Push every 12 hours  insulin lispro (HumaLOG) corrective regimen sliding scale   SubCutaneous three times a day before meals  insulin lispro (HumaLOG) corrective regimen sliding scale   SubCutaneous at bedtime  levothyroxine 50 MICROGram(s) Oral daily  lidocaine   Patch 1 Patch Transdermal daily  metoprolol succinate ER 25 milliGRAM(s) Oral at bedtime  multivitamin/minerals 1 Tablet(s) Oral daily  pantoprazole    Tablet 40 milliGRAM(s) Oral before breakfast  potassium chloride    Tablet ER 20 milliEquivalent(s) Oral daily  saccharomyces boulardii 250 milliGRAM(s) Oral two times a day  sildenafil (REVATIO) 20 milliGRAM(s) Oral two times a day  simvastatin 40 milliGRAM(s) Oral at bedtime    MEDICATIONS  (PRN):  acetaminophen   Tablet .. 650 milliGRAM(s) Oral every 6 hours PRN Temp greater or equal to 38C (100.4F), Mild Pain (1 - 3)  bisacodyl 5 milliGRAM(s) Oral daily PRN Constipation  dextrose 40% Gel 15 Gram(s) Oral once PRN Blood Glucose LESS THAN 70 milliGRAM(s)/deciliter  glucagon  Injectable 1 milliGRAM(s) IntraMuscular once PRN Glucose LESS THAN 70 milligrams/deciliter  ondansetron Injectable 4 milliGRAM(s) IV Push every 6 hours PRN Nausea  senna 2 Tablet(s) Oral at bedtime PRN Constipation      Allergies    penicillin (Rash)  penicillins (Other)  sulfa drugs (Rash; Other)    Intolerances        Vital Signs Last 24 Hrs  T(C): 36.3 (27 Jun 2019 04:43), Max: 36.6 (26 Jun 2019 10:28)  T(F): 97.4 (27 Jun 2019 04:43), Max: 97.8 (26 Jun 2019 10:28)  HR: 77 (27 Jun 2019 04:43) (74 - 78)  BP: 128/60 (27 Jun 2019 04:43) (96/52 - 128/60)  BP(mean): --  RR: 18 (27 Jun 2019 04:43) (15 - 18)  SpO2: 100% (27 Jun 2019 04:43) (97% - 100%)    PHYSICAL EXAMINATION:    NECK:  Supple. No lymphadenopathy. Jugular venous pressure not elevated.   HEART:   The cardiac impulse has a normal quality. Irreg., Nl S1 and S2.    CHEST:  Chest with bilateral crackles, but improved . Normal respiratory effort.  ABDOMEN:  Soft and nontender.   EXTREMITIES:  There is 1-2+ edema.       LABS:                        11.6   10.11 )-----------( 123      ( 26 Jun 2019 08:30 )             35.0     06-26    136  |  101  |  16  ----------------------------<  205<H>  3.4<L>   |  29  |  0.72    Ca    8.7      26 Jun 2019 08:30            RADIOLOGY & ADDITIONAL TESTS:    Assessment and Plan:   · Assessment		  Assessment/Plan    - Continue diuresis-lasix increased to 80mg Q12H  - BUN/Cr stable  - Daily weights-need to use standing scale if possible  - Consider use of spironolactone  - Monitor lytes carefully-may need to push BUN  - Cardiology f/u noted  - Monitor I+O's daily  - OOB to chair  - Discussed Tx of UTI w/ Dr. Knutson. He does not feel patient has any evidence of a prosthetic joint (knee) infection)  - Ortho re-eval for possible cortisone injection of left shoulder.      Problem/Recommendation - 1:  Problem: Congestive heart failure, unspecified HF chronicity, unspecified heart failure type.    Problem/Recommendation - 2:  ·  Problem: Shortness of breath.     Problem/Recommendation - 3:  ·  Problem: Pulmonary hypertension.     Problem/Recommendation - 4:  ·  Problem: Hypoxemia.     Problem/Recommendation - 5:  ·  Problem: Chronic atrial fibrillation.
Subjective:    Awake, alert. Less pain in left leg/knee    MEDICATIONS  (STANDING):  apixaban 5 milliGRAM(s) Oral every 12 hours  artificial tears (preservative free) Ophthalmic Solution 1 Drop(s) Both EYES two times a day  aspirin  chewable 81 milliGRAM(s) Oral daily  dextrose 5%. 1000 milliLiter(s) (50 mL/Hr) IV Continuous <Continuous>  dextrose 50% Injectable 12.5 Gram(s) IV Push once  dextrose 50% Injectable 25 Gram(s) IV Push once  dextrose 50% Injectable 25 Gram(s) IV Push once  docusate sodium 200 milliGRAM(s) Oral at bedtime  escitalopram 10 milliGRAM(s) Oral daily  furosemide   Injectable 80 milliGRAM(s) IV Push every 12 hours  insulin lispro (HumaLOG) corrective regimen sliding scale   SubCutaneous three times a day before meals  insulin lispro (HumaLOG) corrective regimen sliding scale   SubCutaneous at bedtime  levothyroxine 50 MICROGram(s) Oral daily  lidocaine   Patch 1 Patch Transdermal daily  metoprolol succinate ER 25 milliGRAM(s) Oral at bedtime  multivitamin/minerals 1 Tablet(s) Oral daily  pantoprazole    Tablet 40 milliGRAM(s) Oral before breakfast  potassium chloride    Tablet ER 20 milliEquivalent(s) Oral daily  potassium chloride    Tablet ER 40 milliEquivalent(s) Oral every 4 hours  saccharomyces boulardii 250 milliGRAM(s) Oral two times a day  sildenafil (REVATIO) 20 milliGRAM(s) Oral two times a day  simvastatin 40 milliGRAM(s) Oral at bedtime    MEDICATIONS  (PRN):  acetaminophen   Tablet .. 650 milliGRAM(s) Oral every 6 hours PRN Temp greater or equal to 38C (100.4F), Mild Pain (1 - 3)  bisacodyl 5 milliGRAM(s) Oral daily PRN Constipation  dextrose 40% Gel 15 Gram(s) Oral once PRN Blood Glucose LESS THAN 70 milliGRAM(s)/deciliter  glucagon  Injectable 1 milliGRAM(s) IntraMuscular once PRN Glucose LESS THAN 70 milligrams/deciliter  morphine  - Injectable 3 milliGRAM(s) IV Push every 6 hours PRN Severe Pain (7 - 10)  ondansetron Injectable 4 milliGRAM(s) IV Push every 6 hours PRN Nausea  senna 2 Tablet(s) Oral at bedtime PRN Constipation  traMADol 50 milliGRAM(s) Oral every 6 hours PRN Moderate Pain (4 - 6)      Allergies    penicillin (Rash)  penicillins (Other)  sulfa drugs (Rash; Other)    Intolerances        Vital Signs Last 24 Hrs  T(C): 36.8 (2019 08:38), Max: 36.8 (2019 21:57)  T(F): 98.2 (2019 08:38), Max: 98.2 (2019 21:57)  HR: 86 (2019 08:38) (72 - 87)  BP: 119/69 (2019 08:38) (103/54 - 119/69)  BP(mean): 64 (2019 18:12) (64 - 65)  RR: 16 (2019 08:38) (16 - 18)  SpO2: 95% (2019 08:38) (95% - 97%)    PHYSICAL EXAMINATION:    NECK:  Supple. No lymphadenopathy. Jugular venous pressure not elevated.    HEART:   The cardiac impulse has a normal quality. Irreg. irreg, Nl S1 and S2.    CHEST:  Chest bilateral crackles persist approx 1/2 up. Normal respiratory effort.  ABDOMEN:  Soft and nontender.   EXTREMITIES:  There is 2+ edema.       LABS:                        11.2   6.39  )-----------( 138      ( 2019 07:47 )             35.2     06-25    135  |  100  |  14  ----------------------------<  163<H>  3.7   |  28  |  0.57    Ca    8.7      2019 07:05        Urinalysis Basic - ( 2019 10:45 )    Color: Yellow / Appearance: Clear / S.010 / pH: x  Gluc: x / Ketone: Negative  / Bili: Negative / Urobili: Negative mg/dL   Blood: x / Protein: 15 mg/dL / Nitrite: Negative   Leuk Esterase: Negative / RBC: 3-5 /HPF / WBC Negative   Sq Epi: x / Non Sq Epi: Few / Bacteria: x        RADIOLOGY & ADDITIONAL TESTS:    Assessment/Plan    - Continue diuresis-lasix increased to 80mg Q12H  - Daily weights-need to use standing scale if possible  - Consider use of spironolactone  - Monitor lytes carefully-may need to push BUN  - Cardiology f/u  - Monitor I+O's daily  - OOB to chair      Problem/Recommendation - 1:  Problem: Congestive heart failure, unspecified HF chronicity, unspecified heart failure type.    Problem/Recommendation - 2:  ·  Problem: Shortness of breath.     Problem/Recommendation - 3:  ·  Problem: Pulmonary hypertension.     Problem/Recommendation - 4:  ·  Problem: Hypoxemia.     Problem/Recommendation - 5:  ·  Problem: Chronic atrial fibrillation.
Subjective:    Awake, alert. No new complaints    MEDICATIONS  (STANDING):  apixaban 5 milliGRAM(s) Oral every 12 hours  artificial tears (preservative free) Ophthalmic Solution 1 Drop(s) Both EYES two times a day  aspirin  chewable 81 milliGRAM(s) Oral daily  dextrose 5%. 1000 milliLiter(s) (50 mL/Hr) IV Continuous <Continuous>  dextrose 50% Injectable 12.5 Gram(s) IV Push once  dextrose 50% Injectable 25 Gram(s) IV Push once  dextrose 50% Injectable 25 Gram(s) IV Push once  docusate sodium 200 milliGRAM(s) Oral at bedtime  escitalopram 10 milliGRAM(s) Oral daily  furosemide   Injectable 80 milliGRAM(s) IV Push daily  insulin lispro (HumaLOG) corrective regimen sliding scale   SubCutaneous three times a day before meals  insulin lispro (HumaLOG) corrective regimen sliding scale   SubCutaneous at bedtime  levothyroxine 50 MICROGram(s) Oral daily  lidocaine   Patch 1 Patch Transdermal daily  metoprolol succinate ER 25 milliGRAM(s) Oral at bedtime  multivitamin/minerals 1 Tablet(s) Oral daily  pantoprazole    Tablet 40 milliGRAM(s) Oral before breakfast  potassium chloride    Tablet ER 20 milliEquivalent(s) Oral daily  saccharomyces boulardii 250 milliGRAM(s) Oral two times a day  sildenafil (REVATIO) 20 milliGRAM(s) Oral two times a day  simvastatin 40 milliGRAM(s) Oral at bedtime    MEDICATIONS  (PRN):  acetaminophen   Tablet .. 650 milliGRAM(s) Oral every 6 hours PRN Temp greater or equal to 38C (100.4F), Mild Pain (1 - 3)  bisacodyl 5 milliGRAM(s) Oral daily PRN Constipation  dextrose 40% Gel 15 Gram(s) Oral once PRN Blood Glucose LESS THAN 70 milliGRAM(s)/deciliter  glucagon  Injectable 1 milliGRAM(s) IntraMuscular once PRN Glucose LESS THAN 70 milligrams/deciliter  ondansetron Injectable 4 milliGRAM(s) IV Push every 6 hours PRN Nausea  senna 2 Tablet(s) Oral at bedtime PRN Constipation      Allergies    penicillin (Rash)  penicillins (Other)  sulfa drugs (Rash; Other)    Intolerances        Vital Signs Last 24 Hrs  T(C): 36.6 (30 Jun 2019 10:28), Max: 36.7 (29 Jun 2019 16:18)  T(F): 97.8 (30 Jun 2019 10:28), Max: 98.1 (30 Jun 2019 05:03)  HR: 78 (30 Jun 2019 10:28) (74 - 84)  BP: 116/59 (30 Jun 2019 10:28) (104/45 - 118/63)  BP(mean): --  RR: 18 (30 Jun 2019 10:28) (16 - 18)  SpO2: 98% (30 Jun 2019 10:28) (98% - 100%)    PHYSICAL EXAMINATION:    NECK:  Supple. No lymphadenopathy. Jugular venous pressure not elevated.   HEART:   The cardiac impulse has a normal quality. Irreg. irreg, Nl S1 and S2.    CHEST:  Chest with bibasilar crackles, R>L. Normal respiratory effort.  ABDOMEN:  Soft and nontender.   EXTREMITIES:  There is 1+ edema.       LABS:                        12.1   5.83  )-----------( 169      ( 30 Jun 2019 07:15 )             36.9     06-30    135  |  100  |  18  ----------------------------<  228<H>  4.3   |  30  |  0.65    Ca    8.7      30 Jun 2019 07:15            RADIOLOGY & ADDITIONAL TESTS:    Assessment and Plan:   · Assessment		  Assessment/Plan    - Continue diuresis-lasix maintained at 80mg QD. D/W Dr. Frazier-will change to oral lasix  - BUN/Cr stable  - Daily weights-need to use standing scale-now down to 161 lbs   - Monitor lytes carefully-may need to push BUN  - Cardiology f/u noted  - Monitor I+O's daily  - OOB to chair  - Repeat labs noted  - Change to prednisone-taper as outpatient      Problem/Recommendation - 1:  Problem: Congestive heart failure, unspecified HF chronicity, unspecified heart failure type.    Problem/Recommendation - 2:  ·  Problem: Shortness of breath.     Problem/Recommendation - 3:  ·  Problem: Pulmonary hypertension.     Problem/Recommendation - 4:  ·  Problem: Hypoxemia.     Problem/Recommendation - 5:  ·  Problem: Chronic atrial fibrillation.
Subjective:    Awake, alert. Overall improved. Pain overall better. Ortho f/u noted. D/W cardiology and hospitalist staff.    MEDICATIONS  (STANDING):  apixaban 5 milliGRAM(s) Oral every 12 hours  artificial tears (preservative free) Ophthalmic Solution 1 Drop(s) Both EYES two times a day  aspirin  chewable 81 milliGRAM(s) Oral daily  dextrose 5%. 1000 milliLiter(s) (50 mL/Hr) IV Continuous <Continuous>  dextrose 50% Injectable 12.5 Gram(s) IV Push once  dextrose 50% Injectable 25 Gram(s) IV Push once  dextrose 50% Injectable 25 Gram(s) IV Push once  docusate sodium 200 milliGRAM(s) Oral at bedtime  escitalopram 10 milliGRAM(s) Oral daily  insulin lispro (HumaLOG) corrective regimen sliding scale   SubCutaneous three times a day before meals  insulin lispro (HumaLOG) corrective regimen sliding scale   SubCutaneous at bedtime  levothyroxine 50 MICROGram(s) Oral daily  lidocaine   Patch 1 Patch Transdermal daily  metoprolol succinate ER 25 milliGRAM(s) Oral at bedtime  multivitamin/minerals 1 Tablet(s) Oral daily  pantoprazole    Tablet 40 milliGRAM(s) Oral before breakfast  potassium chloride    Tablet ER 20 milliEquivalent(s) Oral daily  predniSONE   Tablet 20 milliGRAM(s) Oral daily  saccharomyces boulardii 250 milliGRAM(s) Oral two times a day  sildenafil (REVATIO) 20 milliGRAM(s) Oral two times a day  simvastatin 40 milliGRAM(s) Oral at bedtime    MEDICATIONS  (PRN):  acetaminophen   Tablet .. 650 milliGRAM(s) Oral every 6 hours PRN Temp greater or equal to 38C (100.4F), Mild Pain (1 - 3)  bisacodyl 5 milliGRAM(s) Oral daily PRN Constipation  dextrose 40% Gel 15 Gram(s) Oral once PRN Blood Glucose LESS THAN 70 milliGRAM(s)/deciliter  glucagon  Injectable 1 milliGRAM(s) IntraMuscular once PRN Glucose LESS THAN 70 milligrams/deciliter  ondansetron Injectable 4 milliGRAM(s) IV Push every 6 hours PRN Nausea  senna 2 Tablet(s) Oral at bedtime PRN Constipation      Allergies    penicillin (Rash)  penicillins (Other)  sulfa drugs (Rash; Other)    Intolerances        Vital Signs Last 24 Hrs  T(C): 36.4 (01 Jul 2019 05:06), Max: 36.6 (30 Jun 2019 10:28)  T(F): 97.5 (01 Jul 2019 05:06), Max: 97.8 (30 Jun 2019 10:28)  HR: 78 (01 Jul 2019 05:06) (77 - 85)  BP: 108/57 (01 Jul 2019 05:06) (108/57 - 124/55)  BP(mean): --  RR: 18 (30 Jun 2019 10:28) (18 - 18)  SpO2: 95% (01 Jul 2019 05:06) (95% - 100%)    PHYSICAL EXAMINATION:    NECK:  Supple. No lymphadenopathy. Jugular venous pressure not elevated.   HEART:   The cardiac impulse has a normal quality. Irreg. irreg, Nl S1 and S2.   CHEST:  Chest with bibasilar crackles-improved. Normal respiratory effort.  ABDOMEN:  Soft and nontender.   EXTREMITIES:  There is tr-1+ edema.       LABS:                        12.1   5.83  )-----------( 169      ( 30 Jun 2019 07:15 )             36.9     06-30    135  |  100  |  18  ----------------------------<  228<H>  4.3   |  30  |  0.65    Ca    8.7      30 Jun 2019 07:15            RADIOLOGY & ADDITIONAL TESTS:    Assessment and Plan:   · Assessment		  Assessment/Plan    - Continue diuresis-Torsemide at 40mg QD. D/W Dr. Cunningham  - BUN/Cr stable  - Daily weights-need to use standing scale-stable at 161 lbs   - Cardiology f/u noted  - Monitor I+O's daily  - Phys Tx  - Change to prednisone-taper as outpatient over 3-4 days. Monitor BGM's      Problem/Recommendation - 1:  Problem: Congestive heart failure, unspecified HF chronicity, unspecified heart failure type.    Problem/Recommendation - 2:  ·  Problem: Shortness of breath.     Problem/Recommendation - 3:  ·  Problem: Pulmonary hypertension.     Problem/Recommendation - 4:  ·  Problem: Hypoxemia.     Problem/Recommendation - 5:  ·  Problem: Chronic atrial fibrillation.
Subjective:  Patient is a 89y old  Female who presents with a chief complaint of leg pain (2019 16:01)    HPI:  89 yo Female with a PMHx of pancreatitis, S/p ERCP,  cirrhosis, DM type II,  L knee replacement, AFIB on Eliquis, CAD s/p stents, severe Pulm HTN, Chronic Right heart failure, Diastolic dysfunction, AS, S/p TAVR presented to ED c/o L knee and leg pain. Pt reports that had sharp pains whole  night, called her son in am in tears. As per son legs noted to be more swollen past week, + weight gain, but Pt denies orthopnea, she was complaining feeling weak past few days, but no SOB. No fevers. Also Pt had calf ecchymosis which then resolved and  later developed similar ecchymosis on dorsal foot, resolved now. Was able to ambulate until last night. Pt was  recently seen by Cardio Dr Hernandez and was started on Sildenafil and lasix was  adjusted.  Pt denies CP.      in ED:  WBCs wnl,  VS stable, except Pt noted to be dyspneic and hypoxic on 86-88% on RA. CXR suspected PVC, BNP elevated. CT LLE with calf edema. LLE doppler  negative for DVT   Pt was given 40mg IVP of Lasix.  IV morphine for pain (2019 11:23)    :  Pt seen.  Case d/w pulmonary.  Pt states overall improved but still c/o swelling in left leg and pain.    :  Pt seen earlier on rounds.  More lethargic today.  Arousable.  Knows she's at .  Knows she has 3 sons.  No specific complaints.  Febrile to 101 this afternoon.  D/w son Laith on phone.      Review of system- Rest of the review of system are negative except mentioned in HPI    Vital Signs Last 24 Hrs  T(C): 38.6 (2019 16:00), Max: 38.6 (2019 16:00)  T(F): 101.4 (2019 16:00), Max: 101.4 (2019 16:00)  HR: 82 (2019 17:19) (72 - 99)  BP: 92/54 (2019 17:19) (92/54 - 119/69)  BP(mean): 64 (2019 18:12) (64 - 64)  RR: 16 (2019 16:00) (16 - 17)  SpO2: 93% (2019 17:19) (93% - 96%)    PHYSICAL EXAM:  GENERAL: NAD  NERVOUS SYSTEM:  alert and awake.  more lethargic today.  arousable.    EYES: EOMI, PERRLA, conjunctiva and sclera clear  HEENT: Moist mucous membranes  NECK: Supple, No JVD  CHEST/LUNG: wet rales half way up.  No wheezing.    HEART: Regular rate and rhythm; No murmurs, rubs, or gallops  ABDOMEN: Soft, Nontender, Nondistended; Bowel sounds present  GENITOURINARY- Voiding, no suprapubic tenderness  EXTREMITIES: 2+ edema b/l LE. worse on left compared to right.    MUSCULOSKELETAL:- No muscle tenderness, Muscle tone normal, No joint tenderness, no Joint swelling, Joint range of motion-normal  SKIN-no rash, no lesion    LABS:      135  |  100  |  14  ----------------------------<  163<H>  3.7   |  28  |  0.57    Ca    8.7      2019 07:05                        11.7   8.54  )-----------( 134      ( 2019 15:25 )             35.7     Urinalysis Basic - ( 2019 01:50 )  Color: Yellow / Appearance: Clear / S.005 / pH: x  Gluc: x / Ketone: Negative  / Bili: Negative / Urobili: 1 mg/dL   Blood: x / Protein: Negative mg/dL / Nitrite: Negative   Leuk Esterase: Negative / RBC: 0-2 /HPF / WBC Negative   Sq Epi: x / Non Sq Epi: Moderate / Bacteria: Many    MEDICATIONS  (STANDING):  apixaban 5 milliGRAM(s) Oral every 12 hours  artificial tears (preservative free) Ophthalmic Solution 1 Drop(s) Both EYES two times a day  aspirin  chewable 81 milliGRAM(s) Oral daily  aztreonam  IVPB      aztreonam  IVPB 1000 milliGRAM(s) IV Intermittent once  aztreonam  IVPB 1000 milliGRAM(s) IV Intermittent every 8 hours  dextrose 5%. 1000 milliLiter(s) (50 mL/Hr) IV Continuous <Continuous>  dextrose 50% Injectable 12.5 Gram(s) IV Push once  dextrose 50% Injectable 25 Gram(s) IV Push once  dextrose 50% Injectable 25 Gram(s) IV Push once  docusate sodium 200 milliGRAM(s) Oral at bedtime  escitalopram 10 milliGRAM(s) Oral daily  furosemide   Injectable 80 milliGRAM(s) IV Push every 12 hours  insulin lispro (HumaLOG) corrective regimen sliding scale   SubCutaneous three times a day before meals  insulin lispro (HumaLOG) corrective regimen sliding scale   SubCutaneous at bedtime  levothyroxine 50 MICROGram(s) Oral daily  lidocaine   Patch 1 Patch Transdermal daily  methylPREDNISolone sodium succinate Injectable 40 milliGRAM(s) IV Push once  metoprolol succinate ER 25 milliGRAM(s) Oral at bedtime  multivitamin/minerals 1 Tablet(s) Oral daily  pantoprazole    Tablet 40 milliGRAM(s) Oral before breakfast  potassium chloride    Tablet ER 20 milliEquivalent(s) Oral daily  potassium chloride    Tablet ER 40 milliEquivalent(s) Oral every 4 hours  saccharomyces boulardii 250 milliGRAM(s) Oral two times a day  sildenafil (REVATIO) 20 milliGRAM(s) Oral two times a day  simvastatin 40 milliGRAM(s) Oral at bedtime  vancomycin  IVPB 1000 milliGRAM(s) IV Intermittent once    MEDICATIONS  (PRN):  acetaminophen   Tablet .. 650 milliGRAM(s) Oral every 6 hours PRN Temp greater or equal to 38C (100.4F), Mild Pain (1 - 3)  bisacodyl 5 milliGRAM(s) Oral daily PRN Constipation  dextrose 40% Gel 15 Gram(s) Oral once PRN Blood Glucose LESS THAN 70 milliGRAM(s)/deciliter  glucagon  Injectable 1 milliGRAM(s) IntraMuscular once PRN Glucose LESS THAN 70 milligrams/deciliter  ondansetron Injectable 4 milliGRAM(s) IV Push every 6 hours PRN Nausea  senna 2 Tablet(s) Oral at bedtime PRN Constipation
Subjective:  Patient is a 89y old  Female who presents with a chief complaint of leg pain (2019 16:01)    HPI:  89 yo Female with a PMHx of pancreatitis, S/p ERCP,  cirrhosis, DM type II,  L knee replacement, AFIB on Eliquis, CAD s/p stents, severe Pulm HTN, Chronic Right heart failure, Diastolic dysfunction, AS, S/p TAVR presented to ED c/o L knee and leg pain. Pt reports that had sharp pains whole  night, called her son in am in tears. As per son legs noted to be more swollen past week, + weight gain, but Pt denies orthopnea, she was complaining feeling weak past few days, but no SOB. No fevers. Also Pt had calf ecchymosis which then resolved and  later developed similar ecchymosis on dorsal foot, resolved now. Was able to ambulate until last night. Pt was  recently seen by Cardio Dr Hernandez and was started on Sildenafil and lasix was  adjusted.  Pt denies CP.   In ED:  WBCs wnl,  VS stable, except Pt noted to be dyspneic and hypoxic on 86-88% on RA. CXR suspected PVC, BNP elevated. CT LLE with calf edema. LLE doppler  negative for DVT.      Hospital course:  Pt diuresed with IV lasix for CHF/ LE edema.  Pt c/o left leg/ knee pain and unclear if could be new onset gout.  Given IV steroids.  On , patient more lethargic.  Spiked temp to 101.6 rectal.  Pancultures so far negative to date.  Given ABX on  but stopped by ID  as no obvious source of infection.  CT head negative.      :  Pt seen.  Case d/w pulmonary.  Pt states overall improved but still c/o swelling in left leg and pain.    :  Pt seen earlier on rounds.  More lethargic today.  Arousable.  Knows she's at .  Knows she has 3 sons.  No specific complaints.  Febrile to 101 this afternoon.  D/w son Laith on phone.    :  Pt seen earlier this morning.  Much better today.  Awake and alert.  In chair.  States she had a terrible HA yesterday.      Review of system- Rest of the review of system are negative except mentioned in HPI    Vital Signs Last 24 Hrs  T(C): 36.4 (2019 16:23), Max: 37.2 (2019 18:00)  T(F): 97.6 (2019 16:23), Max: 98.9 (2019 18:00)  HR: 77 (2019 17:16) (70 - 89)  BP: 106/59 (2019 17:16) (99/54 - 121/58)  BP(mean): --  RR: 18 (2019 16:23) (16 - 18)  SpO2: 98% (2019 16:23) (93% - 98%)    PHYSICAL EXAM:  GENERAL: NAD  NERVOUS SYSTEM:  alert and awake. looks better today.    EYES: EOMI, PERRLA, conjunctiva and sclera clear  HEENT: Moist mucous membranes  NECK: Supple, No JVD  CHEST/LUNG: wet rales half way up.  No wheezing.    HEART: Regular rate and rhythm; No murmurs, rubs, or gallops  ABDOMEN: Soft, Nontender, Nondistended; Bowel sounds present  GENITOURINARY- Voiding, no suprapubic tenderness  EXTREMITIES: 1-2+ edema b/l LE. worse on left compared to right.    MUSCULOSKELETAL:- No muscle tenderness, Muscle tone normal, No joint tenderness, no Joint swelling, Joint range of motion-normal  SKIN-no rash, no lesion    LABS:      136  |  101  |  16  ----------------------------<  205<H>  3.4<L>   |  29  |  0.72    Ca    8.7      2019 08:30                        11.6   10.11 )-----------( 123      ( 2019 08:30 )             35.0     Urinalysis Basic - ( 2019 01:50 )  Color: Yellow / Appearance: Clear / S.005 / pH: x  Gluc: x / Ketone: Negative  / Bili: Negative / Urobili: 1 mg/dL   Blood: x / Protein: Negative mg/dL / Nitrite: Negative   Leuk Esterase: Negative / RBC: 0-2 /HPF / WBC Negative   Sq Epi: x / Non Sq Epi: Moderate / Bacteria: Many    MEDICATIONS  (STANDING):  apixaban 5 milliGRAM(s) Oral every 12 hours  artificial tears (preservative free) Ophthalmic Solution 1 Drop(s) Both EYES two times a day  aspirin  chewable 81 milliGRAM(s) Oral daily  dextrose 5%. 1000 milliLiter(s) (50 mL/Hr) IV Continuous <Continuous>  dextrose 50% Injectable 12.5 Gram(s) IV Push once  dextrose 50% Injectable 25 Gram(s) IV Push once  dextrose 50% Injectable 25 Gram(s) IV Push once  docusate sodium 200 milliGRAM(s) Oral at bedtime  escitalopram 10 milliGRAM(s) Oral daily  furosemide   Injectable 80 milliGRAM(s) IV Push every 12 hours  insulin lispro (HumaLOG) corrective regimen sliding scale   SubCutaneous three times a day before meals  insulin lispro (HumaLOG) corrective regimen sliding scale   SubCutaneous at bedtime  levothyroxine 50 MICROGram(s) Oral daily  lidocaine   Patch 1 Patch Transdermal daily  metoprolol succinate ER 25 milliGRAM(s) Oral at bedtime  multivitamin/minerals 1 Tablet(s) Oral daily  pantoprazole    Tablet 40 milliGRAM(s) Oral before breakfast  potassium chloride    Tablet ER 20 milliEquivalent(s) Oral daily  saccharomyces boulardii 250 milliGRAM(s) Oral two times a day  sildenafil (REVATIO) 20 milliGRAM(s) Oral two times a day  simvastatin 40 milliGRAM(s) Oral at bedtime    MEDICATIONS  (PRN):  acetaminophen   Tablet .. 650 milliGRAM(s) Oral every 6 hours PRN Temp greater or equal to 38C (100.4F), Mild Pain (1 - 3)  bisacodyl 5 milliGRAM(s) Oral daily PRN Constipation  dextrose 40% Gel 15 Gram(s) Oral once PRN Blood Glucose LESS THAN 70 milliGRAM(s)/deciliter  glucagon  Injectable 1 milliGRAM(s) IntraMuscular once PRN Glucose LESS THAN 70 milligrams/deciliter  ondansetron Injectable 4 milliGRAM(s) IV Push every 6 hours PRN Nausea  senna 2 Tablet(s) Oral at bedtime PRN Constipation
Subjective:  Patient is a 89y old  Female who presents with a chief complaint of leg pain (24 Jun 2019 16:01)    HPI:  89 yo Female with a PMHx of pancreatitis, S/p ERCP,  cirrhosis, DM type II,  L knee replacement, AFIB on Eliquis, CAD s/p stents, severe Pulm HTN, Chronic Right heart failure, Diastolic dysfunction, AS, S/p TAVR presented to ED c/o L knee and leg pain. Pt reports that had sharp pains whole  night, called her son in am in tears. As per son legs noted to be more swollen past week, + weight gain, but Pt denies orthopnea, she was complaining feeling weak past few days, but no SOB. No fevers. Also Pt had calf ecchymosis which then resolved and  later developed similar ecchymosis on dorsal foot, resolved now. Was able to ambulate until last night. Pt was  recently seen by Cardio Dr Hernandez and was started on Sildenafil and lasix was  adjusted.  Pt denies CP.   In ED:  WBCs wnl,  VS stable, except Pt noted to be dyspneic and hypoxic on 86-88% on RA. CXR suspected PVC, BNP elevated. CT LLE with calf edema. LLE doppler  negative for DVT.      Hospital course:  Pt diuresed with IV lasix for CHF/ LE edema.  Pt c/o left leg/ knee pain and unclear if could be new onset gout.  Given IV steroids.  On 6/25, patient more lethargic.  Spiked temp to 101.6 rectal.  Pancultures so far negative to date.  Given ABX on 6/25 but stopped by ID 6/26 as no obvious source of infection.  CT head negative.      6/24:  Pt seen.  Case d/w pulmonary.  Pt states overall improved but still c/o swelling in left leg and pain.    6/25:  Pt seen earlier on rounds.  More lethargic today.  Arousable.  Knows she's at .  Knows she has 3 sons.  No specific complaints.  Febrile to 101 this afternoon.  D/w son Laith on phone.    6/26:  Pt seen earlier this morning.  Much better today.  Awake and alert.  In chair.  States she had a terrible HA yesterday.      6/27: pt feels better, less sob, no cp  afebrile x 2days  Bp fluctuating    6/28: pt feeling better; less knee pain  s/p left shoulder steroid injection 6/27  Na 133  lasix decreased to once daily    PHYSICAL EXAM:    Vital Signs Last 24 Hrs  T(C): 36.4 (28 Jun 2019 11:08), Max: 36.7 (27 Jun 2019 16:53)  T(F): 97.6 (28 Jun 2019 11:08), Max: 98 (27 Jun 2019 16:53)  HR: 85 (28 Jun 2019 11:08) (83 - 99)  BP: 119/60 (28 Jun 2019 11:08) (95/46 - 119/60)  BP(mean): --  RR: 18 (28 Jun 2019 11:08) (16 - 18)  SpO2: 98% (28 Jun 2019 11:08) (93% - 99%)      Constitutional: Well appearing  HEENT: Atraumatic, CYNDEE, Normal, No congestion  Respiratory: Breath Sounds normal, no rhonchi/wheeze  Cardiovascular: N S1S2;   Gastrointestinal: Abdomen soft, non tender, Bowel Sounds present  Extremities: chronic leg edema, peripheral pulses present  Neurological: AAO x 3, no gross focal motor deficits  Skin: Non cellulitic, no rash, ulcers  Lymph Nodes: No lymphadenopathy noted  Back: No CVA tenderness   Musculoskeletal: non tender  Breasts: Deferred  Genitourinary: deferred  Rectal: Deferred     Lab Results:  CBC  CBC Full  -  ( 27 Jun 2019 10:53 )  WBC Count : 10.53 K/uL  RBC Count : 3.64 M/uL  Hemoglobin : 11.6 g/dL  Hematocrit : 36.0 %  Platelet Count - Automated : 150 K/uL  Mean Cell Volume : 98.9 fl  Mean Cell Hemoglobin : 31.9 pg  Mean Cell Hemoglobin Concentration : 32.2 gm/dL  Auto Neutrophil # : x  Auto Lymphocyte # : x  Auto Monocyte # : x  Auto Eosinophil # : x  Auto Basophil # : x  Auto Neutrophil % : x  Auto Lymphocyte % : x  Auto Monocyte % : x  Auto Eosinophil % : x  Auto Basophil % : x    .		Differential:	[] Automated		[] Manual  Chemistry                        11.6   10.53 )-----------( 150      ( 27 Jun 2019 10:53 )             36.0     06-28    133<L>  |  98  |  22  ----------------------------<  317<H>  3.6   |  26  |  0.73    Ca    8.5      28 Jun 2019 07:57  Mg     2.0     06-28                  MICROBIOLOGY/CULTURES:  Culture Results:   No growth to date. (06-25 @ 15:25)  Culture Results:   No growth to date. (06-25 @ 15:25)  Culture Results:   >100,000 CFU/ml Escherichia coli (06-25 @ 01:50)  Culture Results:   >=3 organisms. Probable collection contamination. (06-23 @ 10:45)    MEDICATIONS  (STANDING):  apixaban 5 milliGRAM(s) Oral every 12 hours  artificial tears (preservative free) Ophthalmic Solution 1 Drop(s) Both EYES two times a day  aspirin  chewable 81 milliGRAM(s) Oral daily  dextrose 5%. 1000 milliLiter(s) (50 mL/Hr) IV Continuous <Continuous>  dextrose 50% Injectable 12.5 Gram(s) IV Push once  dextrose 50% Injectable 25 Gram(s) IV Push once  dextrose 50% Injectable 25 Gram(s) IV Push once  docusate sodium 200 milliGRAM(s) Oral at bedtime  escitalopram 10 milliGRAM(s) Oral daily  furosemide   Injectable 80 milliGRAM(s) IV Push daily  insulin lispro (HumaLOG) corrective regimen sliding scale   SubCutaneous three times a day before meals  insulin lispro (HumaLOG) corrective regimen sliding scale   SubCutaneous at bedtime  levothyroxine 50 MICROGram(s) Oral daily  lidocaine   Patch 1 Patch Transdermal daily  methylPREDNISolone sodium succinate Injectable 20 milliGRAM(s) IV Push daily  metoprolol succinate ER 25 milliGRAM(s) Oral at bedtime  multivitamin/minerals 1 Tablet(s) Oral daily  pantoprazole    Tablet 40 milliGRAM(s) Oral before breakfast  potassium chloride    Tablet ER 20 milliEquivalent(s) Oral daily  saccharomyces boulardii 250 milliGRAM(s) Oral two times a day  sildenafil (REVATIO) 20 milliGRAM(s) Oral two times a day  simvastatin 40 milliGRAM(s) Oral at bedtime
Subjective:  Patient is a 89y old  Female who presents with a chief complaint of leg pain (24 Jun 2019 16:01)    HPI:  89 yo Female with a PMHx of pancreatitis, S/p ERCP,  cirrhosis, DM type II,  L knee replacement, AFIB on Eliquis, CAD s/p stents, severe Pulm HTN, Chronic Right heart failure, Diastolic dysfunction, AS, S/p TAVR presented to ED c/o L knee and leg pain. Pt reports that had sharp pains whole  night, called her son in am in tears. As per son legs noted to be more swollen past week, + weight gain, but Pt denies orthopnea, she was complaining feeling weak past few days, but no SOB. No fevers. Also Pt had calf ecchymosis which then resolved and  later developed similar ecchymosis on dorsal foot, resolved now. Was able to ambulate until last night. Pt was  recently seen by Cardio Dr Hernandez and was started on Sildenafil and lasix was  adjusted.  Pt denies CP.   In ED:  WBCs wnl,  VS stable, except Pt noted to be dyspneic and hypoxic on 86-88% on RA. CXR suspected PVC, BNP elevated. CT LLE with calf edema. LLE doppler  negative for DVT.      Hospital course:  Pt diuresed with IV lasix for CHF/ LE edema.  Pt c/o left leg/ knee pain and unclear if could be new onset gout.  Given IV steroids.  On 6/25, patient more lethargic.  Spiked temp to 101.6 rectal.  Pancultures so far negative to date.  Given ABX on 6/25 but stopped by ID 6/26 as no obvious source of infection.  CT head negative.      6/24:  Pt seen.  Case d/w pulmonary.  Pt states overall improved but still c/o swelling in left leg and pain.    6/25:  Pt seen earlier on rounds.  More lethargic today.  Arousable.  Knows she's at .  Knows she has 3 sons.  No specific complaints.  Febrile to 101 this afternoon.  D/w son Laith on phone.    6/26:  Pt seen earlier this morning.  Much better today.  Awake and alert.  In chair.  States she had a terrible HA yesterday.      6/27: pt feels better, less sob, no cp  afebrile x 2days  Bp fluctuating    6/28: pt feeling better; less knee pain  s/p left shoulder steroid injection 6/27  Na 133  lasix decreased to once daily    6/29: pt feeling better; decreasing knee/leg pain    6/30, less pain  Finger sticks up  steroid changed to po prednisone  lasix changed to po    PHYSICAL EXAM:    Vital Signs Last 24 Hrs  T(C): 36.6 (30 Jun 2019 10:28), Max: 36.7 (29 Jun 2019 16:18)  T(F): 97.8 (30 Jun 2019 10:28), Max: 98.1 (30 Jun 2019 05:03)  HR: 78 (30 Jun 2019 10:28) (74 - 84)  BP: 116/59 (30 Jun 2019 10:28) (104/45 - 118/63)  BP(mean): --  RR: 18 (30 Jun 2019 10:28) (16 - 18)  SpO2: 98% (30 Jun 2019 10:28) (98% - 100%)    Constitutional: Well appearing  HEENT: Atraumatic, CYNDEE, Normal, No congestion  Respiratory: Breath Sounds normal, no rhonchi/wheeze  Cardiovascular: N S1S2;   Gastrointestinal: Abdomen soft, non tender, Bowel Sounds present  Extremities: chronic leg edema, peripheral pulses present  Neurological: AAO x 3, no gross focal motor deficits  Skin: Non cellulitic, no rash, ulcers  Lymph Nodes: No lymphadenopathy noted  Back: No CVA tenderness   Musculoskeletal: non tender  Breasts: Deferred  Genitourinary: deferred  Rectal: Deferred      Lab Results:  CBC  CBC Full  -  ( 30 Jun 2019 07:15 )  WBC Count : 5.83 K/uL  RBC Count : 3.83 M/uL  Hemoglobin : 12.1 g/dL  Hematocrit : 36.9 %  Platelet Count - Automated : 169 K/uL  Mean Cell Volume : 96.3 fl  Mean Cell Hemoglobin : 31.6 pg  Mean Cell Hemoglobin Concentration : 32.8 gm/dL  Auto Neutrophil # : x  Auto Lymphocyte # : x  Auto Monocyte # : x  Auto Eosinophil # : x  Auto Basophil # : x  Auto Neutrophil % : x  Auto Lymphocyte % : x  Auto Monocyte % : x  Auto Eosinophil % : x  Auto Basophil % : x    .		Differential:	[] Automated		[] Manual  Chemistry                        12.1   5.83  )-----------( 169      ( 30 Jun 2019 07:15 )             36.9     06-30    135  |  100  |  18  ----------------------------<  228<H>  4.3   |  30  |  0.65    Ca    8.7      30 Jun 2019 07:15                  MICROBIOLOGY/CULTURES:  Culture Results:   No growth to date. (06-25 @ 15:25)  Culture Results:   No growth to date. (06-25 @ 15:25)  Culture Results:   >100,000 CFU/ml Escherichia coli (06-25 @ 01:50)    MEDICATIONS  (STANDING):  apixaban 5 milliGRAM(s) Oral every 12 hours  artificial tears (preservative free) Ophthalmic Solution 1 Drop(s) Both EYES two times a day  aspirin  chewable 81 milliGRAM(s) Oral daily  dextrose 5%. 1000 milliLiter(s) (50 mL/Hr) IV Continuous <Continuous>  dextrose 50% Injectable 12.5 Gram(s) IV Push once  dextrose 50% Injectable 25 Gram(s) IV Push once  dextrose 50% Injectable 25 Gram(s) IV Push once  docusate sodium 200 milliGRAM(s) Oral at bedtime  escitalopram 10 milliGRAM(s) Oral daily  furosemide    Tablet 40 milliGRAM(s) Oral daily  insulin lispro (HumaLOG) corrective regimen sliding scale   SubCutaneous three times a day before meals  insulin lispro (HumaLOG) corrective regimen sliding scale   SubCutaneous at bedtime  levothyroxine 50 MICROGram(s) Oral daily  lidocaine   Patch 1 Patch Transdermal daily  metoprolol succinate ER 25 milliGRAM(s) Oral at bedtime  multivitamin/minerals 1 Tablet(s) Oral daily  pantoprazole    Tablet 40 milliGRAM(s) Oral before breakfast  potassium chloride    Tablet ER 20 milliEquivalent(s) Oral daily  predniSONE   Tablet 20 milliGRAM(s) Oral daily  saccharomyces boulardii 250 milliGRAM(s) Oral two times a day  sildenafil (REVATIO) 20 milliGRAM(s) Oral two times a day  simvastatin 40 milliGRAM(s) Oral at bedtime
Subjective:  Patient is a 89y old  Female who presents with a chief complaint of leg pain (24 Jun 2019 16:01)    HPI:  89 yo Female with a PMHx of pancreatitis, S/p ERCP,  cirrhosis, DM type II,  L knee replacement, AFIB on Eliquis, CAD s/p stents, severe Pulm HTN, Chronic Right heart failure, Diastolic dysfunction, AS, S/p TAVR presented to ED c/o L knee and leg pain. Pt reports that had sharp pains whole  night, called her son in am in tears. As per son legs noted to be more swollen past week, + weight gain, but Pt denies orthopnea, she was complaining feeling weak past few days, but no SOB. No fevers. Also Pt had calf ecchymosis which then resolved and  later developed similar ecchymosis on dorsal foot, resolved now. Was able to ambulate until last night. Pt was  recently seen by Cardio Dr Hernandez and was started on Sildenafil and lasix was  adjusted.  Pt denies CP.   In ED:  WBCs wnl,  VS stable, except Pt noted to be dyspneic and hypoxic on 86-88% on RA. CXR suspected PVC, BNP elevated. CT LLE with calf edema. LLE doppler  negative for DVT.      Hospital course:  Pt diuresed with IV lasix for CHF/ LE edema.  Pt c/o left leg/ knee pain and unclear if could be new onset gout.  Given IV steroids.  On 6/25, patient more lethargic.  Spiked temp to 101.6 rectal.  Pancultures so far negative to date.  Given ABX on 6/25 but stopped by ID 6/26 as no obvious source of infection.  CT head negative.      6/24:  Pt seen.  Case d/w pulmonary.  Pt states overall improved but still c/o swelling in left leg and pain.    6/25:  Pt seen earlier on rounds.  More lethargic today.  Arousable.  Knows she's at .  Knows she has 3 sons.  No specific complaints.  Febrile to 101 this afternoon.  D/w son Laith on phone.    6/26:  Pt seen earlier this morning.  Much better today.  Awake and alert.  In chair.  States she had a terrible HA yesterday.      6/27: pt feels better, less sob, no cp  afebrile x 2days  Bp fluctuating    6/28: pt feeling better; less knee pain  s/p left shoulder steroid injection 6/27  Na 133  lasix decreased to once daily    6/29: pt feeling better; decreasing knee/leg pain    PHYSICAL EXAM:    Vital Signs Last 24 Hrs  T(C): 36.4 (29 Jun 2019 11:09), Max: 36.6 (28 Jun 2019 17:18)  T(F): 97.6 (29 Jun 2019 11:09), Max: 97.9 (28 Jun 2019 17:18)  HR: 80 (29 Jun 2019 11:09) (70 - 80)  BP: 120/69 (29 Jun 2019 11:09) (107/51 - 135/63)  BP(mean): --  RR: 18 (29 Jun 2019 11:09) (18 - 18)  SpO2: 96% (29 Jun 2019 11:09) (94% - 97%)      Constitutional: Well appearing  HEENT: Atraumatic, CYNDEE, Normal, No congestion  Respiratory: Breath Sounds normal, no rhonchi/wheeze  Cardiovascular: N S1S2;   Gastrointestinal: Abdomen soft, non tender, Bowel Sounds present  Extremities: chronic leg edema, peripheral pulses present  Neurological: AAO x 3, no gross focal motor deficits  Skin: Non cellulitic, no rash, ulcers  Lymph Nodes: No lymphadenopathy noted  Back: No CVA tenderness   Musculoskeletal: non tender  Breasts: Deferred  Genitourinary: deferred  Rectal: Deferred      Lab Results:  CBC    .		Differential:	[] Automated		[] Manual  Chemistry    06-28    133<L>  |  98  |  22  ----------------------------<  317<H>  3.6   |  26  |  0.73    Ca    8.5      28 Jun 2019 07:57  Mg     2.0     06-28                  MICROBIOLOGY/CULTURES:  Culture Results:   No growth to date. (06-25 @ 15:25)  Culture Results:   No growth to date. (06-25 @ 15:25)  Culture Results:   >100,000 CFU/ml Escherichia coli (06-25 @ 01:50)  Culture Results:   >=3 organisms. Probable collection contamination. (06-23 @ 10:45)      MEDICATIONS  (STANDING):  apixaban 5 milliGRAM(s) Oral every 12 hours  artificial tears (preservative free) Ophthalmic Solution 1 Drop(s) Both EYES two times a day  aspirin  chewable 81 milliGRAM(s) Oral daily  dextrose 5%. 1000 milliLiter(s) (50 mL/Hr) IV Continuous <Continuous>  dextrose 50% Injectable 12.5 Gram(s) IV Push once  dextrose 50% Injectable 25 Gram(s) IV Push once  dextrose 50% Injectable 25 Gram(s) IV Push once  docusate sodium 200 milliGRAM(s) Oral at bedtime  escitalopram 10 milliGRAM(s) Oral daily  furosemide   Injectable 80 milliGRAM(s) IV Push daily  insulin lispro (HumaLOG) corrective regimen sliding scale   SubCutaneous three times a day before meals  insulin lispro (HumaLOG) corrective regimen sliding scale   SubCutaneous at bedtime  levothyroxine 50 MICROGram(s) Oral daily  lidocaine   Patch 1 Patch Transdermal daily  methylPREDNISolone sodium succinate Injectable 20 milliGRAM(s) IV Push daily  metoprolol succinate ER 25 milliGRAM(s) Oral at bedtime  multivitamin/minerals 1 Tablet(s) Oral daily  pantoprazole    Tablet 40 milliGRAM(s) Oral before breakfast  potassium chloride    Tablet ER 20 milliEquivalent(s) Oral daily  saccharomyces boulardii 250 milliGRAM(s) Oral two times a day  sildenafil (REVATIO) 20 milliGRAM(s) Oral two times a day  simvastatin 40 milliGRAM(s) Oral at bedtime
Subjective:  no distress    MEDICATIONS  (STANDING):  apixaban 5 milliGRAM(s) Oral every 12 hours  artificial tears (preservative free) Ophthalmic Solution 1 Drop(s) Both EYES two times a day  aspirin  chewable 81 milliGRAM(s) Oral daily  aztreonam  IVPB      aztreonam  IVPB 1000 milliGRAM(s) IV Intermittent every 8 hours  dextrose 5%. 1000 milliLiter(s) (50 mL/Hr) IV Continuous <Continuous>  dextrose 50% Injectable 12.5 Gram(s) IV Push once  dextrose 50% Injectable 25 Gram(s) IV Push once  dextrose 50% Injectable 25 Gram(s) IV Push once  docusate sodium 200 milliGRAM(s) Oral at bedtime  escitalopram 10 milliGRAM(s) Oral daily  furosemide   Injectable 80 milliGRAM(s) IV Push every 12 hours  insulin lispro (HumaLOG) corrective regimen sliding scale   SubCutaneous three times a day before meals  insulin lispro (HumaLOG) corrective regimen sliding scale   SubCutaneous at bedtime  levothyroxine 50 MICROGram(s) Oral daily  lidocaine   Patch 1 Patch Transdermal daily  metoprolol succinate ER 25 milliGRAM(s) Oral at bedtime  multivitamin/minerals 1 Tablet(s) Oral daily  pantoprazole    Tablet 40 milliGRAM(s) Oral before breakfast  potassium chloride    Tablet ER 20 milliEquivalent(s) Oral daily  saccharomyces boulardii 250 milliGRAM(s) Oral two times a day  sildenafil (REVATIO) 20 milliGRAM(s) Oral two times a day  simvastatin 40 milliGRAM(s) Oral at bedtime    MEDICATIONS  (PRN):  acetaminophen   Tablet .. 650 milliGRAM(s) Oral every 6 hours PRN Temp greater or equal to 38C (100.4F), Mild Pain (1 - 3)  bisacodyl 5 milliGRAM(s) Oral daily PRN Constipation  dextrose 40% Gel 15 Gram(s) Oral once PRN Blood Glucose LESS THAN 70 milliGRAM(s)/deciliter  glucagon  Injectable 1 milliGRAM(s) IntraMuscular once PRN Glucose LESS THAN 70 milligrams/deciliter  ondansetron Injectable 4 milliGRAM(s) IV Push every 6 hours PRN Nausea  senna 2 Tablet(s) Oral at bedtime PRN Constipation      Allergies    penicillin (Rash)  penicillins (Other)  sulfa drugs (Rash; Other)    Intolerances        REVIEW OF SYSTEMS:    CONSTITUTIONAL:  As per HPI.  HEENT:  Eyes:  No diplopia or blurred vision. ENT:  No earache, sore throat or runny nose.  CARDIOVASCULAR:  No pressure, squeezing, tightness, heaviness or aching about the chest, neck, axilla or epigastrium.  RESPIRATORY:  No cough, shortness of breath, PND or orthopnea.  GASTROINTESTINAL:  No nausea, vomiting or diarrhea.  GENITOURINARY:  No dysuria, frequency or urgency.  MUSCULOSKELETAL:  no joint pain, deformity, tenderness  EXTREMITIES: no clubbing cyanosis,edema  SKIN:  No change in skin, hair or nails.  NEUROLOGIC:  No paresthesias, fasciculations, seizures or weakness.  PSYCHIATRIC:  No disorder of thought or mood.  ENDOCRINE:  No heat or cold intolerance, polyuria or polydipsia.  HEMATOLOGICAL:  No easy bruising or bleedings:    Vital Signs Last 24 Hrs  T(C): 36.3 (2019 04:32), Max: 38.6 (2019 16:00)  T(F): 97.4 (2019 04:32), Max: 101.4 (2019 16:00)  HR: 70 (2019 06:30) (70 - 99)  BP: 109/52 (2019 06:30) (92/54 - 121/58)  BP(mean): --  RR: 17 (2019 04:32) (16 - 17)  SpO2: 93% (2019 04:32) (93% - 96%)    PHYSICAL EXAMINATION:  SKIN: no rashes  HEAD: NC/AT  EYES: PERRLA, EOMI  EARS: TM's intact  NOSE: no abnormalities  NECK:  Supple. No lymphadenopathy. Jugular venous pressure not elevated. Carotids equal.   HEART:   S1S2 irreg w 2/6 systolic nurmur  CHEST:  basilar crackles  ABDOMEN:  Soft and nontender.   EXTREMITIES:  no C/C/E  NEURO: AAO x 3, no focal deficts       LABS:                        11.7   8.54  )-----------( 134      ( 2019 15:25 )             35.7     06-25    135  |  100  |  14  ----------------------------<  163<H>  3.7   |  28  |  0.57    Ca    8.7      2019 07:05        Urinalysis Basic - ( 2019 01:50 )    Color: Yellow / Appearance: Clear / S.005 / pH: x  Gluc: x / Ketone: Negative  / Bili: Negative / Urobili: 1 mg/dL   Blood: x / Protein: Negative mg/dL / Nitrite: Negative   Leuk Esterase: Negative / RBC: 0-2 /HPF / WBC Negative   Sq Epi: x / Non Sq Epi: Moderate / Bacteria: Many        RADIOLOGY & ADDITIONAL TESTS:
Subjective:  Patient is a 89y old  Female who presents with a chief complaint of leg pain (2019 16:01)    HPI:  89 yo Female with a PMHx of pancreatitis, S/p ERCP,  cirrhosis, DM type II,  L knee replacement, AFIB on Eliquis, CAD s/p stents, severe Pulm HTN, Chronic Right heart failure, Diastolic dysfunction, AS, S/p TAVR presented to ED c/o L knee and leg pain. Pt reports that had sharp pains whole  night, called her son in am in tears. As per son legs noted to be more swollen past week, + weight gain, but Pt denies orthopnea, she was complaining feeling weak past few days, but no SOB. No fevers. Also Pt had calf ecchymosis which then resolved and  later developed similar ecchymosis on dorsal foot, resolved now. Was able to ambulate until last night. Pt was  recently seen by Cardio Dr Hernandez and was started on Sildenafil and lasix was  adjusted.  Pt denies CP.      in ED:  WBCs wnl,  VS stable, except Pt noted to be dyspneic and hypoxic on 86-88% on RA. CXR suspected PVC, BNP elevated. CT LLE with calf edema. LLE doppler  negative for DVT   Pt was given 40mg IVP of Lasix.  IV morphine for pain (2019 11:23)    :  Pt seen.  Case d/w pulmonary.  Pt states overall improved but still c/o swelling in left leg and pain.      Review of system- Rest of the review of system are negative except mentioned in HPI    Vital Signs Last 24 Hrs  T(C): 36.6 (2019 10:35), Max: 36.7 (2019 17:35)  T(F): 97.9 (2019 10:35), Max: 98 (2019 17:35)  HR: 86 (2019 10:35) (66 - 86)  BP: 103/54 (2019 16:23) (102/45 - 111/54)  BP(mean): 65 (2019 16:23) (65 - 65)  RR: 18 (2019 10:35) (17 - 18)  SpO2: 96% (2019 16:23) (96% - 99%)    PHYSICAL EXAM:  GENERAL: NAD  NERVOUS SYSTEM:  Alert & Oriented X3, non- focal exam, Motor Strength 5/5 B/L upper and lower extremities; DTRs 2+ intact and symmetric  HEAD:  Atraumatic, Normocephalic  EYES: EOMI, PERRLA, conjunctiva and sclera clear  HEENT: Moist mucous membranes  NECK: Supple, No JVD  CHEST/LUNG: wet rales half way up.  No wheezing.    HEART: Regular rate and rhythm; No murmurs, rubs, or gallops  ABDOMEN: Soft, Nontender, Nondistended; Bowel sounds present  GENITOURINARY- Voiding, no suprapubic tenderness  EXTREMITIES: 2+ edema b/l LE. worse on left compared to right.    MUSCULOSKELETAL:- No muscle tenderness, Muscle tone normal, No joint tenderness, no Joint swelling, Joint range of motion-normal  SKIN-no rash, no lesion    LABS:                        11.2   6.39  )-----------( 138      ( 2019 07:47 )             35.2     06-24    135  |  100  |  12  ----------------------------<  135<H>  4.0   |  30  |  0.73    Ca    8.8      2019 07:47    TPro  7.2  /  Alb  3.1<L>  /  TBili  0.6  /  DBili  x   /  AST  17  /  ALT  11<L>  /  AlkPhos  84  06-23    PT/INR - ( 2019 06:49 )   PT: 20.3 sec;   INR: 1.80 ratio         PTT - ( 2019 06:49 )  PTT:36.4 sec  CARDIAC MARKERS ( 2019 13:33 )  <0.015 ng/mL / x     / x     / x     / x      CARDIAC MARKERS ( 2019 06:49 )  <0.015 ng/mL / x     / 35 U/L / x     / x          Urinalysis Basic - ( 2019 10:45 )  Color: Yellow / Appearance: Clear / S.010 / pH: x  Gluc: x / Ketone: Negative  / Bili: Negative / Urobili: Negative mg/dL   Blood: x / Protein: 15 mg/dL / Nitrite: Negative   Leuk Esterase: Negative / RBC: 3-5 /HPF / WBC Negative   Sq Epi: x / Non Sq Epi: Few / Bacteria: x        CAPILLARY BLOOD GLUCOSE  POCT Blood Glucose.: 133 mg/dL (2019 16:37)  POCT Blood Glucose.: 194 mg/dL (2019 11:59)  POCT Blood Glucose.: 128 mg/dL (2019 07:52)  POCT Blood Glucose.: 173 mg/dL (2019 20:53)    Current medications:  acetaminophen   Tablet .. 650 milliGRAM(s) Oral every 6 hours PRN  apixaban 5 milliGRAM(s) Oral every 12 hours  artificial tears (preservative free) Ophthalmic Solution 1 Drop(s) Both EYES two times a day  aspirin  chewable 81 milliGRAM(s) Oral daily  bisacodyl 5 milliGRAM(s) Oral daily PRN  dextrose 40% Gel 15 Gram(s) Oral once PRN  dextrose 5%. 1000 milliLiter(s) IV Continuous <Continuous>  dextrose 50% Injectable 12.5 Gram(s) IV Push once  dextrose 50% Injectable 25 Gram(s) IV Push once  dextrose 50% Injectable 25 Gram(s) IV Push once  docusate sodium 200 milliGRAM(s) Oral at bedtime  escitalopram 10 milliGRAM(s) Oral daily  furosemide   Injectable 40 milliGRAM(s) IV Push every 12 hours  glucagon  Injectable 1 milliGRAM(s) IntraMuscular once PRN  insulin lispro (HumaLOG) corrective regimen sliding scale   SubCutaneous three times a day before meals  insulin lispro (HumaLOG) corrective regimen sliding scale   SubCutaneous at bedtime  levothyroxine 50 MICROGram(s) Oral daily  lidocaine   Patch 1 Patch Transdermal daily  metoprolol succinate ER 25 milliGRAM(s) Oral at bedtime  morphine  - Injectable 3 milliGRAM(s) IV Push every 6 hours PRN  multivitamin/minerals 1 Tablet(s) Oral daily  ondansetron Injectable 4 milliGRAM(s) IV Push every 6 hours PRN  pantoprazole    Tablet 40 milliGRAM(s) Oral before breakfast  potassium chloride    Tablet ER 20 milliEquivalent(s) Oral daily  saccharomyces boulardii 250 milliGRAM(s) Oral two times a day  senna 2 Tablet(s) Oral at bedtime PRN  sildenafil (REVATIO) 20 milliGRAM(s) Oral two times a day  simvastatin 40 milliGRAM(s) Oral at bedtime  traMADol 50 milliGRAM(s) Oral every 6 hours PRN
Patient is a 89y old  Female who presents with a chief complaint of leg pain.     HPI:  89 yo Female with a PMHx of pancreatitis, S/p ERCP,  cirrhosis, DM type II,  L knee replacement, AFIB on Eliquis, CAD s/p PCI, severe Pulm HTN, Chronic Right heart failure, Diastolic dysfunction, AS, S/p TAVR presented to ED c/o L knee and leg pain.        - pt seen and examined by me today. Pt denies any symptoms.     - pt seen and examined by me today. Pt denies symptoms this am except for leg pain b/l .    - Pt seen and examined by me today am. She denies any symptoms except leg pain and L arm pain where IVline is .       PAST MEDICAL & SURGICAL HISTORY:  Aortic stenosis  History of pancreatitis  HLD (hyperlipidemia)  Hypothyroid  Afib  CAD (coronary artery disease)  Hypertension  History of Osteoarthritis  GERD (Gastroesophageal Reflux Disease)  Dyslipidemia  Diabetes Mellitus Type II  S/P IVC filter: Note that Pt does not have  h/o DVT, outPt doppler was read first as +, but after review reported as no DVT. Pt got IVC filer before that  H/O total knee replacement, left  History of appendectomy  History of cholecystectomy  H/O: Knee Surgery- right meniscus  H/O: Hysterectomy      MEDICATIONS  (STANDING):  apixaban 5 milliGRAM(s) Oral every 12 hours  artificial tears (preservative free) Ophthalmic Solution 1 Drop(s) Both EYES two times a day  aspirin  chewable 81 milliGRAM(s) Oral daily  dextrose 5%. 1000 milliLiter(s) (50 mL/Hr) IV Continuous <Continuous>  dextrose 50% Injectable 12.5 Gram(s) IV Push once  dextrose 50% Injectable 25 Gram(s) IV Push once  dextrose 50% Injectable 25 Gram(s) IV Push once  docusate sodium 200 milliGRAM(s) Oral at bedtime  escitalopram 10 milliGRAM(s) Oral daily  furosemide   Injectable 40 milliGRAM(s) IV Push every 12 hours  insulin lispro (HumaLOG) corrective regimen sliding scale   SubCutaneous three times a day before meals  insulin lispro (HumaLOG) corrective regimen sliding scale   SubCutaneous at bedtime  levothyroxine 50 MICROGram(s) Oral daily  lidocaine   Patch 1 Patch Transdermal daily  metoprolol succinate ER 25 milliGRAM(s) Oral at bedtime  multivitamin/minerals 1 Tablet(s) Oral daily  pantoprazole    Tablet 40 milliGRAM(s) Oral before breakfast  potassium chloride    Tablet ER 20 milliEquivalent(s) Oral daily  saccharomyces boulardii 250 milliGRAM(s) Oral two times a day  sildenafil (REVATIO) 20 milliGRAM(s) Oral two times a day  simvastatin 40 milliGRAM(s) Oral at bedtime    MEDICATIONS  (PRN):  acetaminophen   Tablet .. 650 milliGRAM(s) Oral every 6 hours PRN Temp greater or equal to 38C (100.4F), Mild Pain (1 - 3)  bisacodyl 5 milliGRAM(s) Oral daily PRN Constipation  dextrose 40% Gel 15 Gram(s) Oral once PRN Blood Glucose LESS THAN 70 milliGRAM(s)/deciliter  glucagon  Injectable 1 milliGRAM(s) IntraMuscular once PRN Glucose LESS THAN 70 milligrams/deciliter  ondansetron Injectable 4 milliGRAM(s) IV Push every 6 hours PRN Nausea  senna 2 Tablet(s) Oral at bedtime PRN Constipation  traMADol 50 milliGRAM(s) Oral every 6 hours PRN Moderate Pain (4 - 6)      FAMILY HISTORY:  FH: heart disease  FH: diabetes mellitus: both parents      SOCIAL HISTORY:  no recent smoking     REVIEW OF SYSTEMS:  CONSTITUTIONAL:    No fatigue, malaise, lethargy.  No fever or chills.  HEENT:  Eyes:  No visual changes.     ENT:  No epistaxis.  No sinus pain.    RESPIRATORY:  No cough.  No wheeze.  No hemoptysis.  No shortness of breath.  CARDIOVASCULAR:  No chest pains.  No palpitations. No shortness of breath, No orthopnea or PND.  GASTROINTESTINAL:  No abdominal pain.  No nausea or vomiting.    GENITOURINARY:    No hematuria.    MUSCULOSKELETAL:  c/o l leg pain   NEUROLOGICAL:  No tingling or numbness or weakness.  PSYCHIATRIC:  No confusion  ICU Vital Signs Last 24 Hrs  T(C): 36.5 (2019 04:31), Max: 36.7 (2019 16:53)  T(F): 97.7 (2019 04:31), Max: 98 (2019 16:53)  HR: 83 (2019 04:31) (83 - 99)  BP: 117/60 (2019 04:31) (90/42 - 117/60)  BP(mean): --  ABP: --  ABP(mean): --  RR: 18 (2019 04:31) (16 - 18)  SpO2: 93% (2019 04:31) (93% - 99%)        PHYSICAL EXAM-    Constitutional: elderly frail , in no acute distress    Head: Head is normocephalic and atraumatic.      Neck: + JVD and EJ distension      Cardiovascular: irregular and systolic murmur 2/6     Respiratory: rales b/l basal     Abdomen: Soft, nontender, nondistended with positive bowel sounds.      Extremity: No tenderness. 2+ pitting pedal edema  upto knee    Neurologic: The patient is alert and oriented.      Skin: No rash, no obvious lesions noted.      Psychiatric: The patient appears to be emotionally stable.      INTERPRETATION OF TELEMETRY: afib 60-70/min     ECG: afib , L axis , no s tt changes.     I&O's Detail                                       11.6   10 )-----------( 150      ( 2019 10:53 )             36.0     06-27    135  |  102  |  21  ----------------------------<  258<H>  3.6   |  26  |  0.83    Ca    8.7      2019 10:53  Mg     2.0            PTT - ( 2019 06:49 )  PTT:36.4 sec  Urinalysis Basic - ( 2019 10:45 )    Color: Yellow / Appearance: Clear / S.010 / pH: x  Gluc: x / Ketone: Negative  / Bili: Negative / Urobili: Negative mg/dL   Blood: x / Protein: 15 mg/dL / Nitrite: Negative   Leuk Esterase: Negative / RBC: 3-5 /HPF / WBC Negative   Sq Epi: x / Non Sq Epi: Few / Bacteria: x      I&O's Summary    BNP  RADIOLOGY & ADDITIONAL STUDIES:  < from: Cardiac Cath Lab - Adult (17 @ 13:38) >   Cardiac Arteries and Lesion Findings    LMCA: Large caliber vessel. Normal.    LAD: Medium caliber vessel. Normal. Widely patent stent.    LCx: Large caliber vessel. Normal.    RCA: Medium caliber vessel. Normal.     Impression     Diagnostic Conclusions     Normal LV systolic function. Estimated LV ejection fraction is 65 %.   One Vessel coronary artery disease (LAD) .   Patent bare metal stent in the mid LAD.   Elevated left ventricular end-diastolic pressure.   Hemodynamic status is abnormal.   Severe pulmonary artery hypertension.   No aortic valve stenosis.No gradient across Spaien Valve.   No aortic valve regurgitation.     Recommendations     Manage with medical therapy.   Consider Phosphodiesterase Inhibitors for PHT.    Estimated Blood Loss:6ml.     Signatures     ----------------------------------------------------------------   Electronically signed by Florentin Hernandez MD, Director of   Cardiac CathLab(Performing Physician) on 2017 16:37   ----------------------------------------------------------------    < end of copied text >

## 2019-07-01 NOTE — DISCHARGE NOTE PROVIDER - NSDCQMERRANDS_GEN_ALL_CORE
Trip and fall today injuring left wrist and left shoulder. Abrasion to left knee. No head injury.
Yes

## 2019-07-01 NOTE — DISCHARGE NOTE NURSING/CASE MANAGEMENT/SOCIAL WORK - NSDCDPATPORTLINK_GEN_ALL_CORE
You can access the [facility] Patient Portal, offered by [facility], by registering with the following website: [email or web address]

## 2019-07-01 NOTE — DISCHARGE NOTE PROVIDER - NSDCCPCAREPLAN_GEN_ALL_CORE_FT
PRINCIPAL DISCHARGE DIAGNOSIS  Diagnosis: Congestive heart failure, unspecified HF chronicity, unspecified heart failure type  Assessment and Plan of Treatment:       SECONDARY DISCHARGE DIAGNOSES  Diagnosis: Left leg pain  Assessment and Plan of Treatment:

## 2019-07-01 NOTE — DISCHARGE NOTE PROVIDER - CARE PROVIDERS DIRECT ADDRESSES
[email or web address]

## 2019-07-06 DIAGNOSIS — K74.60 UNSPECIFIED CIRRHOSIS OF LIVER: ICD-10-CM

## 2019-07-06 DIAGNOSIS — Z88.0 ALLERGY STATUS TO PENICILLIN: ICD-10-CM

## 2019-07-06 DIAGNOSIS — E11.9 TYPE 2 DIABETES MELLITUS WITHOUT COMPLICATIONS: ICD-10-CM

## 2019-07-06 DIAGNOSIS — I11.0 HYPERTENSIVE HEART DISEASE WITH HEART FAILURE: ICD-10-CM

## 2019-07-06 DIAGNOSIS — I50.33 ACUTE ON CHRONIC DIASTOLIC (CONGESTIVE) HEART FAILURE: ICD-10-CM

## 2019-07-06 DIAGNOSIS — I48.2 CHRONIC ATRIAL FIBRILLATION: ICD-10-CM

## 2019-07-06 DIAGNOSIS — I27.20 PULMONARY HYPERTENSION, UNSPECIFIED: ICD-10-CM

## 2019-07-06 DIAGNOSIS — G93.41 METABOLIC ENCEPHALOPATHY: ICD-10-CM

## 2019-07-06 DIAGNOSIS — K21.9 GASTRO-ESOPHAGEAL REFLUX DISEASE WITHOUT ESOPHAGITIS: ICD-10-CM

## 2019-07-06 DIAGNOSIS — I25.10 ATHEROSCLEROTIC HEART DISEASE OF NATIVE CORONARY ARTERY WITHOUT ANGINA PECTORIS: ICD-10-CM

## 2019-07-06 DIAGNOSIS — J96.01 ACUTE RESPIRATORY FAILURE WITH HYPOXIA: ICD-10-CM

## 2019-07-06 DIAGNOSIS — M10.9 GOUT, UNSPECIFIED: ICD-10-CM

## 2019-07-06 DIAGNOSIS — Z88.2 ALLERGY STATUS TO SULFONAMIDES: ICD-10-CM

## 2019-09-05 NOTE — PROGRESS NOTE ADULT - PROBLEM SELECTOR PROBLEM 1
Acute pancreatitis
No cyanosis, no pallor, no jaundice, no rash

## 2019-09-06 NOTE — ED PROVIDER NOTE - CPE EDP NEURO NORM
normal... Alternatives Discussed Intro (Do Not Add Period): I discussed alternative treatments to Mohs surgery and specifically discussed the risks and benefits of

## 2019-09-19 ENCOUNTER — APPOINTMENT (OUTPATIENT)
Dept: INTERNAL MEDICINE | Facility: CLINIC | Age: 84
End: 2019-09-19

## 2019-10-01 PROBLEM — I35.0 NONRHEUMATIC AORTIC (VALVE) STENOSIS: Chronic | Status: ACTIVE | Noted: 2019-06-23

## 2019-10-01 PROBLEM — Z87.19 PERSONAL HISTORY OF OTHER DISEASES OF THE DIGESTIVE SYSTEM: Chronic | Status: ACTIVE | Noted: 2019-06-23

## 2019-10-03 ENCOUNTER — LABORATORY RESULT (OUTPATIENT)
Age: 84
End: 2019-10-03

## 2019-10-03 ENCOUNTER — APPOINTMENT (OUTPATIENT)
Dept: INTERNAL MEDICINE | Facility: CLINIC | Age: 84
End: 2019-10-03
Payer: MEDICARE

## 2019-10-03 VITALS
HEART RATE: 90 BPM | DIASTOLIC BLOOD PRESSURE: 60 MMHG | OXYGEN SATURATION: 95 % | BODY MASS INDEX: 25.78 KG/M2 | RESPIRATION RATE: 18 BRPM | TEMPERATURE: 98.6 F | WEIGHT: 151 LBS | SYSTOLIC BLOOD PRESSURE: 92 MMHG | HEIGHT: 64 IN

## 2019-10-03 DIAGNOSIS — E11.9 TYPE 2 DIABETES MELLITUS W/OUT COMPLICATIONS: ICD-10-CM

## 2019-10-03 DIAGNOSIS — J44.9 CHRONIC OBSTRUCTIVE PULMONARY DISEASE, UNSPECIFIED: ICD-10-CM

## 2019-10-03 DIAGNOSIS — M1A.0410 IDIOPATHIC CHRONIC GOUT, RIGHT HAND, W/OUT TOPHUS (TOPHI): ICD-10-CM

## 2019-10-03 DIAGNOSIS — M19.90 UNSPECIFIED OSTEOARTHRITIS, UNSPECIFIED SITE: ICD-10-CM

## 2019-10-03 DIAGNOSIS — M10.9 GOUT, UNSPECIFIED: ICD-10-CM

## 2019-10-03 DIAGNOSIS — Z95.2 PRESENCE OF PROSTHETIC HEART VALVE: ICD-10-CM

## 2019-10-03 LAB
ALBUMIN SERPL ELPH-MCNC: 4 G/DL
ALP BLD-CCNC: 74 U/L
ALT SERPL-CCNC: 9 U/L
ANION GAP SERPL CALC-SCNC: 15 MMOL/L
AST SERPL-CCNC: 19 U/L
BASOPHILS # BLD AUTO: 0.1 K/UL
BASOPHILS NFR BLD AUTO: 1.5 %
BILIRUB SERPL-MCNC: 0.7 MG/DL
BUN SERPL-MCNC: 13 MG/DL
CALCIUM SERPL-MCNC: 9.6 MG/DL
CHLORIDE SERPL-SCNC: 99 MMOL/L
CO2 SERPL-SCNC: 28 MMOL/L
CREAT SERPL-MCNC: 0.72 MG/DL
EOSINOPHIL # BLD AUTO: 0.35 K/UL
EOSINOPHIL NFR BLD AUTO: 5.2 %
ESTIMATED AVERAGE GLUCOSE: 143 MG/DL
GLUCOSE SERPL-MCNC: 144 MG/DL
HBA1C MFR BLD HPLC: 6.6 %
HCT VFR BLD CALC: 40.9 %
HGB BLD-MCNC: 12.8 G/DL
IMM GRANULOCYTES NFR BLD AUTO: 0.3 %
LYMPHOCYTES # BLD AUTO: 1.07 K/UL
LYMPHOCYTES NFR BLD AUTO: 15.9 %
MAN DIFF?: NORMAL
MCHC RBC-ENTMCNC: 31.2 PG
MCHC RBC-ENTMCNC: 31.3 GM/DL
MCV RBC AUTO: 99.8 FL
MONOCYTES # BLD AUTO: 0.81 K/UL
MONOCYTES NFR BLD AUTO: 12 %
NEUTROPHILS # BLD AUTO: 4.39 K/UL
NEUTROPHILS NFR BLD AUTO: 65.1 %
PLATELET # BLD AUTO: 181 K/UL
POTASSIUM SERPL-SCNC: 3.5 MMOL/L
PROT SERPL-MCNC: 7.3 G/DL
RBC # BLD: 4.1 M/UL
RBC # FLD: 17.1 %
SODIUM SERPL-SCNC: 142 MMOL/L
T3FREE SERPL-MCNC: 2.88 PG/ML
T3RU NFR SERPL: 1 TBI
T4 FREE SERPL-MCNC: 1.3 NG/DL
T4 SERPL-MCNC: 7.3 UG/DL
TSH SERPL-ACNC: 2.49 UIU/ML
WBC # FLD AUTO: 6.74 K/UL

## 2019-10-03 PROCEDURE — 99214 OFFICE O/P EST MOD 30 MIN: CPT | Mod: 25

## 2019-10-03 PROCEDURE — 94060 EVALUATION OF WHEEZING: CPT

## 2019-10-03 PROCEDURE — 94729 DIFFUSING CAPACITY: CPT

## 2019-10-03 PROCEDURE — 94727 GAS DIL/WSHOT DETER LNG VOL: CPT

## 2019-10-03 PROCEDURE — 90662 IIV NO PRSV INCREASED AG IM: CPT

## 2019-10-03 PROCEDURE — G0008: CPT

## 2019-10-03 PROCEDURE — ZZZZZ: CPT

## 2019-10-03 RX ORDER — TRAMADOL HYDROCHLORIDE 50 MG/1
50 TABLET, COATED ORAL
Qty: 90 | Refills: 5 | Status: ACTIVE | COMMUNITY
Start: 2019-10-03 | End: 1900-01-01

## 2019-10-03 RX ORDER — LIDOCAINE 5% 700 MG/1
5 PATCH TOPICAL
Qty: 60 | Refills: 11 | Status: ACTIVE | COMMUNITY
Start: 2019-10-03 | End: 1900-01-01

## 2019-10-03 RX ORDER — METFORMIN HYDROCHLORIDE 1000 MG/1
1000 TABLET, COATED ORAL
Qty: 180 | Refills: 1 | Status: ACTIVE | COMMUNITY
Start: 2017-06-08

## 2019-10-03 RX ORDER — SILDENAFIL 20 MG/1
20 TABLET ORAL
Qty: 180 | Refills: 3 | Status: ACTIVE | COMMUNITY
Start: 2019-03-16

## 2019-10-03 NOTE — DATA REVIEWED
[FreeTextEntry1] : A pulmonary function test is performed. Lung volumes are moderately reduced in a symmetric fashion. Lung mechanics are within normal limits. Slight bronchodilator reactivity is demonstrated. The DLCO is moderately reduced, however corrects for alveolar volume it is normal. Saturation is maintained. This are present a moderate restrictive process.

## 2019-10-03 NOTE — PHYSICAL EXAM
[General Appearance - Alert] : alert [Neck Cervical Mass (___cm)] : no neck mass was observed [General Appearance - In No Acute Distress] : in no acute distress [Jugular Venous Distention Increased] : there was no jugular-venous distention [Thyroid Diffuse Enlargement] : the thyroid was not enlarged [Respiration, Rhythm And Depth] : normal respiratory rhythm and effort [Bowel Sounds] : normal bowel sounds [Abdomen Soft] : soft [Abdomen Tenderness] : non-tender [] : no hepato-splenomegaly [Cervical Lymph Nodes Enlarged Posterior Bilaterally] : posterior cervical [Supraclavicular Lymph Nodes Enlarged Bilaterally] : supraclavicular [Cervical Lymph Nodes Enlarged Anterior Bilaterally] : anterior cervical [Nail Clubbing] : no clubbing  or cyanosis of the fingernails [FreeTextEntry1] : Seborrheic keratoses are present

## 2019-10-03 NOTE — HISTORY OF PRESENT ILLNESS
[de-identified] : The patient comes in today for a followup evaluation, and reassessment, status post hospitalization and rehabilitation stint.\par \par The patient was hospitalized in July, for shortness of breath. She was felt to be fluid overloaded and had mild congestive heart failure. She was diuresed aggressively, and we got her weight down to between 150, and 153 pounds. She did well in this regard. Her hospital course, however, was complicated by joint painually was felt to be due to gout. She was seen by rheumatology, and treated with prednisone. She was eventually tapered off of this medication after she responded clinically. Unfortunately, her blood sugars were elevated. She required intermittent insulin on a sliding scale for elevated BGM's. She eventually, went to a rehabilitation facility where she remained for several months. Her strength has improved. She is able to stand without assistance. She is able to ambulate short distances with the use of a walker or cane. She now comes in for this assessment.\par \par At this time, she states that she is doing fairly well. She does complain of a slight cough productive of scant amounts of clear sputum. She does not cough nocturnally. She does complain of allergy mediated symptoms and postnasal drip. She feels that this may be triggering her cough. She denies any wheeze or gastroesophageal reflux symptoms.\par \par The patient still continues to complain of pain in her knee and her shoulder. She does not check her blood sugars at home. She is no longer on insulin. She has remained on metformin.\par \par Lastly, the patient had an episode of loose stool 2 days ago, and began yesterday. She denies any abdominal pain. There was no blood in the stool. \par \par The patient's HCC parameters have been assessed as above, and are stable at this time. She now comes in for this assessment.

## 2019-10-03 NOTE — PLAN
[FreeTextEntry1] : 1. Continue with medication as outlined above.\par \par 2. The patient will now institute Flonase 2 squirts in each nostril b.i.d. for the postnasal drip. We will monitor as to whether or not this helps to improve the cough.\par \par 3. Trial of Imodium right ear as needed for loose stools.\par \par 4 will now institute a trial of tramadol 50 mg t.i.d. p.r.n. for the chronic osteoarthritic pains in her shoulder and knee\par \par 5. Lidoderm patches applied b.i.d. to the shoulder and the knee\par \par 6. Flu shot has been administered\par \par 7. The patient undergo blood work today to include a complete metabolic panel, CBC, hemoglobin A1c, and thyroid function studies. If she appears to be dehydrated with an elevated BUN, I may need to adjust the torsemide given the fact that her blood pressure is slightly low. The patient, however, is asymptomatic with regards to dizziness and lightheadedness.\par \par 8. Continue with allopurinol 200 mg daily for chronic treatment of gout\par \par 9. Followup in 6 months with a wellness evaluation.

## 2019-10-28 ENCOUNTER — MEDICATION RENEWAL (OUTPATIENT)
Age: 84
End: 2019-10-28

## 2019-10-29 DIAGNOSIS — R19.7 DIARRHEA, UNSPECIFIED: ICD-10-CM

## 2019-10-29 DIAGNOSIS — I50.30 UNSPECIFIED DIASTOLIC (CONGESTIVE) HEART FAILURE: ICD-10-CM

## 2019-10-29 DIAGNOSIS — I48.91 UNSPECIFIED ATRIAL FIBRILLATION: ICD-10-CM

## 2019-10-29 RX ORDER — FLASH GLUCOSE SCANNING READER
EACH MISCELLANEOUS
Qty: 1 | Refills: 0 | Status: ACTIVE | COMMUNITY
Start: 2019-10-28 | End: 1900-01-01

## 2019-10-29 RX ORDER — FLASH GLUCOSE SENSOR
KIT MISCELLANEOUS
Qty: 2 | Refills: 1 | Status: ACTIVE | COMMUNITY
Start: 2019-10-28 | End: 1900-01-01

## 2019-11-08 RX ORDER — ESOMEPRAZOLE MAGNESIUM 40 MG/1
40 CAPSULE, DELAYED RELEASE ORAL
Qty: 30 | Refills: 11 | Status: DISCONTINUED | COMMUNITY
Start: 2017-10-24 | End: 2019-11-08

## 2019-11-21 ENCOUNTER — MEDICATION RENEWAL (OUTPATIENT)
Age: 84
End: 2019-11-21

## 2019-11-22 ENCOUNTER — MEDICATION RENEWAL (OUTPATIENT)
Age: 84
End: 2019-11-22

## 2019-11-22 RX ORDER — ESOMEPRAZOLE MAGNESIUM 40 MG/1
40 CAPSULE, DELAYED RELEASE ORAL DAILY
Qty: 1 | Refills: 3 | Status: ACTIVE | COMMUNITY
Start: 2019-10-03 | End: 1900-01-01

## 2020-01-27 RX ORDER — SIMVASTATIN 40 MG/1
40 TABLET, FILM COATED ORAL
Qty: 30 | Refills: 5 | Status: ACTIVE | COMMUNITY
Start: 2018-12-18 | End: 1900-01-01

## 2020-01-29 DIAGNOSIS — I50.9 HEART FAILURE, UNSPECIFIED: ICD-10-CM

## 2020-02-03 RX ORDER — TORSEMIDE 20 MG/1
20 TABLET ORAL TWICE DAILY
Qty: 120 | Refills: 0 | Status: ACTIVE | COMMUNITY
Start: 2018-02-12

## 2020-02-06 RX ORDER — POTASSIUM CHLORIDE 750 MG/1
10 TABLET, FILM COATED, EXTENDED RELEASE ORAL
Qty: 90 | Refills: 3 | Status: ACTIVE | COMMUNITY
Start: 2019-10-04 | End: 1900-01-01

## 2020-02-13 DIAGNOSIS — R32 UNSPECIFIED URINARY INCONTINENCE: ICD-10-CM

## 2020-02-19 ENCOUNTER — APPOINTMENT (OUTPATIENT)
Dept: DERMATOLOGY | Facility: CLINIC | Age: 85
End: 2020-02-19
Payer: MEDICARE

## 2020-02-19 DIAGNOSIS — D48.5 NEOPLASM OF UNCERTAIN BEHAVIOR OF SKIN: ICD-10-CM

## 2020-02-19 PROCEDURE — 11102 TANGNTL BX SKIN SINGLE LES: CPT

## 2020-02-19 NOTE — HISTORY OF PRESENT ILLNESS
[FreeTextEntry1] : Fit in for lesion on the nose. [de-identified] : Recurrent crusting, over the past year.  Occasional bleeding.  No pain or tenderness.

## 2020-02-26 LAB — CORE LAB BIOPSY: NORMAL

## 2020-03-03 ENCOUNTER — APPOINTMENT (OUTPATIENT)
Dept: DERMATOLOGY | Facility: CLINIC | Age: 85
End: 2020-03-03
Payer: MEDICARE

## 2020-03-03 PROCEDURE — 12052 INTMD RPR FACE/MM 2.6-5.0 CM: CPT

## 2020-03-03 PROCEDURE — 11642 EXC F/E/E/N/L MAL+MRG 1.1-2: CPT

## 2020-03-10 ENCOUNTER — APPOINTMENT (OUTPATIENT)
Dept: DERMATOLOGY | Facility: CLINIC | Age: 85
End: 2020-03-10
Payer: MEDICARE

## 2020-03-10 DIAGNOSIS — D04.30 CARCINOMA IN SITU OF SKIN OF UNSPECIFIED PART OF FACE: ICD-10-CM

## 2020-03-10 PROCEDURE — 99024 POSTOP FOLLOW-UP VISIT: CPT

## 2020-03-10 NOTE — HISTORY OF PRESENT ILLNESS
[FreeTextEntry1] : Suture removal. [de-identified] : Nose is well healed, without signs of infection.\par Sutures all removed.\par Path still pending.\par f/u for skin exams or as needed.

## 2020-03-12 ENCOUNTER — INPATIENT (INPATIENT)
Facility: HOSPITAL | Age: 85
LOS: 5 days | Discharge: NURSING, REHAB OR LONG-TERM CARE | DRG: 391 | End: 2020-03-18
Attending: INTERNAL MEDICINE | Admitting: INTERNAL MEDICINE
Payer: MEDICARE

## 2020-03-12 VITALS
HEART RATE: 102 BPM | RESPIRATION RATE: 18 BRPM | WEIGHT: 119.93 LBS | DIASTOLIC BLOOD PRESSURE: 84 MMHG | HEIGHT: 64 IN | TEMPERATURE: 97 F | OXYGEN SATURATION: 99 % | SYSTOLIC BLOOD PRESSURE: 137 MMHG

## 2020-03-12 DIAGNOSIS — Z90.49 ACQUIRED ABSENCE OF OTHER SPECIFIED PARTS OF DIGESTIVE TRACT: Chronic | ICD-10-CM

## 2020-03-12 DIAGNOSIS — Z95.828 PRESENCE OF OTHER VASCULAR IMPLANTS AND GRAFTS: Chronic | ICD-10-CM

## 2020-03-12 DIAGNOSIS — Z96.652 PRESENCE OF LEFT ARTIFICIAL KNEE JOINT: Chronic | ICD-10-CM

## 2020-03-12 DIAGNOSIS — J18.9 PNEUMONIA, UNSPECIFIED ORGANISM: ICD-10-CM

## 2020-03-12 LAB
ADD ON TEST-SPECIMEN IN LAB: SIGNIFICANT CHANGE UP
ALBUMIN SERPL ELPH-MCNC: 3.1 G/DL — LOW (ref 3.3–5)
ALP SERPL-CCNC: 87 U/L — SIGNIFICANT CHANGE UP (ref 40–120)
ALT FLD-CCNC: 16 U/L — SIGNIFICANT CHANGE UP (ref 12–78)
ANION GAP SERPL CALC-SCNC: 6 MMOL/L — SIGNIFICANT CHANGE UP (ref 5–17)
AST SERPL-CCNC: 17 U/L — SIGNIFICANT CHANGE UP (ref 15–37)
BASOPHILS # BLD AUTO: 0.03 K/UL — SIGNIFICANT CHANGE UP (ref 0–0.2)
BASOPHILS NFR BLD AUTO: 0.3 % — SIGNIFICANT CHANGE UP (ref 0–2)
BILIRUB SERPL-MCNC: 0.8 MG/DL — SIGNIFICANT CHANGE UP (ref 0.2–1.2)
BUN SERPL-MCNC: 28 MG/DL — HIGH (ref 7–23)
CALCIUM SERPL-MCNC: 8.7 MG/DL — SIGNIFICANT CHANGE UP (ref 8.5–10.1)
CHLORIDE SERPL-SCNC: 107 MMOL/L — SIGNIFICANT CHANGE UP (ref 96–108)
CO2 SERPL-SCNC: 27 MMOL/L — SIGNIFICANT CHANGE UP (ref 22–31)
CREAT SERPL-MCNC: 1.18 MG/DL — SIGNIFICANT CHANGE UP (ref 0.5–1.3)
EOSINOPHIL # BLD AUTO: 0.17 K/UL — SIGNIFICANT CHANGE UP (ref 0–0.5)
EOSINOPHIL NFR BLD AUTO: 2 % — SIGNIFICANT CHANGE UP (ref 0–6)
GLUCOSE SERPL-MCNC: 212 MG/DL — HIGH (ref 70–99)
HCT VFR BLD CALC: 37.8 % — SIGNIFICANT CHANGE UP (ref 34.5–45)
HGB BLD-MCNC: 12.2 G/DL — SIGNIFICANT CHANGE UP (ref 11.5–15.5)
IMM GRANULOCYTES NFR BLD AUTO: 0.2 % — SIGNIFICANT CHANGE UP (ref 0–1.5)
LACTATE SERPL-SCNC: 3 MMOL/L — HIGH (ref 0.7–2)
LYMPHOCYTES # BLD AUTO: 0.37 K/UL — LOW (ref 1–3.3)
LYMPHOCYTES # BLD AUTO: 4.3 % — LOW (ref 13–44)
MCHC RBC-ENTMCNC: 31.9 PG — SIGNIFICANT CHANGE UP (ref 27–34)
MCHC RBC-ENTMCNC: 32.3 GM/DL — SIGNIFICANT CHANGE UP (ref 32–36)
MCV RBC AUTO: 99 FL — SIGNIFICANT CHANGE UP (ref 80–100)
MONOCYTES # BLD AUTO: 0.49 K/UL — SIGNIFICANT CHANGE UP (ref 0–0.9)
MONOCYTES NFR BLD AUTO: 5.7 % — SIGNIFICANT CHANGE UP (ref 2–14)
NEUTROPHILS # BLD AUTO: 7.55 K/UL — HIGH (ref 1.8–7.4)
NEUTROPHILS NFR BLD AUTO: 87.5 % — HIGH (ref 43–77)
PLATELET # BLD AUTO: 132 K/UL — LOW (ref 150–400)
POTASSIUM SERPL-MCNC: 5.2 MMOL/L — SIGNIFICANT CHANGE UP (ref 3.5–5.3)
POTASSIUM SERPL-SCNC: 5.2 MMOL/L — SIGNIFICANT CHANGE UP (ref 3.5–5.3)
PROT SERPL-MCNC: 7 GM/DL — SIGNIFICANT CHANGE UP (ref 6–8.3)
RAPID RVP RESULT: SIGNIFICANT CHANGE UP
RBC # BLD: 3.82 M/UL — SIGNIFICANT CHANGE UP (ref 3.8–5.2)
RBC # FLD: 16.7 % — HIGH (ref 10.3–14.5)
SODIUM SERPL-SCNC: 140 MMOL/L — SIGNIFICANT CHANGE UP (ref 135–145)
WBC # BLD: 8.63 K/UL — SIGNIFICANT CHANGE UP (ref 3.8–10.5)
WBC # FLD AUTO: 8.63 K/UL — SIGNIFICANT CHANGE UP (ref 3.8–10.5)

## 2020-03-12 PROCEDURE — 71045 X-RAY EXAM CHEST 1 VIEW: CPT | Mod: 26

## 2020-03-12 PROCEDURE — 80048 BASIC METABOLIC PNL TOTAL CA: CPT

## 2020-03-12 PROCEDURE — 36415 COLL VENOUS BLD VENIPUNCTURE: CPT

## 2020-03-12 PROCEDURE — 83735 ASSAY OF MAGNESIUM: CPT

## 2020-03-12 PROCEDURE — 99223 1ST HOSP IP/OBS HIGH 75: CPT

## 2020-03-12 PROCEDURE — 87798 DETECT AGENT NOS DNA AMP: CPT

## 2020-03-12 PROCEDURE — 87040 BLOOD CULTURE FOR BACTERIA: CPT

## 2020-03-12 PROCEDURE — 85025 COMPLETE CBC W/AUTO DIFF WBC: CPT

## 2020-03-12 PROCEDURE — 83605 ASSAY OF LACTIC ACID: CPT

## 2020-03-12 PROCEDURE — 74177 CT ABD & PELVIS W/CONTRAST: CPT | Mod: 26

## 2020-03-12 PROCEDURE — 82962 GLUCOSE BLOOD TEST: CPT

## 2020-03-12 PROCEDURE — 87486 CHLMYD PNEUM DNA AMP PROBE: CPT

## 2020-03-12 PROCEDURE — 85610 PROTHROMBIN TIME: CPT

## 2020-03-12 PROCEDURE — 73030 X-RAY EXAM OF SHOULDER: CPT | Mod: LT

## 2020-03-12 PROCEDURE — 92610 EVALUATE SWALLOWING FUNCTION: CPT | Mod: GN

## 2020-03-12 PROCEDURE — 83036 HEMOGLOBIN GLYCOSYLATED A1C: CPT

## 2020-03-12 PROCEDURE — 84100 ASSAY OF PHOSPHORUS: CPT

## 2020-03-12 PROCEDURE — 80053 COMPREHEN METABOLIC PANEL: CPT

## 2020-03-12 PROCEDURE — 71250 CT THORAX DX C-: CPT

## 2020-03-12 PROCEDURE — 87633 RESP VIRUS 12-25 TARGETS: CPT

## 2020-03-12 PROCEDURE — 83880 ASSAY OF NATRIURETIC PEPTIDE: CPT

## 2020-03-12 PROCEDURE — 97530 THERAPEUTIC ACTIVITIES: CPT | Mod: GP

## 2020-03-12 PROCEDURE — 87581 M.PNEUMON DNA AMP PROBE: CPT

## 2020-03-12 PROCEDURE — 85730 THROMBOPLASTIN TIME PARTIAL: CPT

## 2020-03-12 PROCEDURE — 87507 IADNA-DNA/RNA PROBE TQ 12-25: CPT

## 2020-03-12 PROCEDURE — 71250 CT THORAX DX C-: CPT | Mod: 26

## 2020-03-12 PROCEDURE — 92523 SPEECH SOUND LANG COMPREHEN: CPT | Mod: GN

## 2020-03-12 RX ORDER — ESCITALOPRAM OXALATE 10 MG/1
10 TABLET ORAL DAILY
Qty: 90 | Refills: 1 | Status: ACTIVE | COMMUNITY
Start: 1900-01-01 | End: 1900-01-01

## 2020-03-12 RX ORDER — DOCUSATE SODIUM 100 MG
2 CAPSULE ORAL
Qty: 0 | Refills: 0 | DISCHARGE

## 2020-03-12 RX ORDER — SODIUM CHLORIDE 9 MG/ML
500 INJECTION INTRAMUSCULAR; INTRAVENOUS; SUBCUTANEOUS ONCE
Refills: 0 | Status: COMPLETED | OUTPATIENT
Start: 2020-03-12 | End: 2020-03-12

## 2020-03-12 RX ORDER — CEFTRIAXONE 500 MG/1
1000 INJECTION, POWDER, FOR SOLUTION INTRAMUSCULAR; INTRAVENOUS ONCE
Refills: 0 | Status: COMPLETED | OUTPATIENT
Start: 2020-03-12 | End: 2020-03-12

## 2020-03-12 RX ORDER — ONDANSETRON 8 MG/1
4 TABLET, FILM COATED ORAL ONCE
Refills: 0 | Status: COMPLETED | OUTPATIENT
Start: 2020-03-12 | End: 2020-03-12

## 2020-03-12 RX ORDER — SODIUM CHLORIDE 9 MG/ML
1000 INJECTION INTRAMUSCULAR; INTRAVENOUS; SUBCUTANEOUS ONCE
Refills: 0 | Status: COMPLETED | OUTPATIENT
Start: 2020-03-12 | End: 2020-03-12

## 2020-03-12 RX ORDER — AZITHROMYCIN 500 MG/1
500 TABLET, FILM COATED ORAL ONCE
Refills: 0 | Status: COMPLETED | OUTPATIENT
Start: 2020-03-12 | End: 2020-03-12

## 2020-03-12 RX ORDER — LEVOTHYROXINE SODIUM 0.05 MG/1
50 TABLET ORAL DAILY
Qty: 90 | Refills: 1 | Status: ACTIVE | COMMUNITY
Start: 2017-11-17 | End: 1900-01-01

## 2020-03-12 RX ORDER — METFORMIN HYDROCHLORIDE 850 MG/1
1 TABLET ORAL
Qty: 0 | Refills: 0 | DISCHARGE

## 2020-03-12 RX ORDER — DICLOFENAC SODIUM 30 MG/G
1 GEL TOPICAL
Qty: 0 | Refills: 0 | DISCHARGE

## 2020-03-12 RX ORDER — APIXABAN 2.5 MG/1
1 TABLET, FILM COATED ORAL
Qty: 0 | Refills: 0 | DISCHARGE

## 2020-03-12 RX ADMIN — ONDANSETRON 4 MILLIGRAM(S): 8 TABLET, FILM COATED ORAL at 17:54

## 2020-03-12 RX ADMIN — AZITHROMYCIN 255 MILLIGRAM(S): 500 TABLET, FILM COATED ORAL at 23:50

## 2020-03-12 RX ADMIN — SODIUM CHLORIDE 500 MILLILITER(S): 9 INJECTION INTRAMUSCULAR; INTRAVENOUS; SUBCUTANEOUS at 20:39

## 2020-03-12 RX ADMIN — SODIUM CHLORIDE 1000 MILLILITER(S): 9 INJECTION INTRAMUSCULAR; INTRAVENOUS; SUBCUTANEOUS at 23:48

## 2020-03-12 RX ADMIN — CEFTRIAXONE 1000 MILLIGRAM(S): 500 INJECTION, POWDER, FOR SOLUTION INTRAMUSCULAR; INTRAVENOUS at 23:48

## 2020-03-12 RX ADMIN — SODIUM CHLORIDE 500 MILLILITER(S): 9 INJECTION INTRAMUSCULAR; INTRAVENOUS; SUBCUTANEOUS at 17:54

## 2020-03-12 NOTE — ED ADULT NURSE REASSESSMENT NOTE - NS ED NURSE REASSESS COMMENT FT1
Received in stretcher, gown on. Alert & responsive. VSS.  No distress noted. Quietly resting. Awaiting admission. Will continue to monitor

## 2020-03-12 NOTE — ED PROVIDER NOTE - NS ED MD DISPO SPECIAL CONSIDERATION1
Chart reviewed, pt had robotic hyst.  On 10/10/18. Recent post op visit on 11/29/18. Small scabs noted on incisions but no s/s of infection.   Printed for MD.
From: Karol Jimenez  To: Frankey Hanson, MD  Sent: 12/6/2018 7:40 PM CST  Subject: Non-Urgent Medical Question    My right side incision opened up and pus was coming out I don't know if you want me to go back on the cephalexin that I was on before the metronidazole
Per Dr. Laney Shields Rx Keflex 250mg TID x 7 days, keep f/u visit as scheduled. Message sent to pt.
None

## 2020-03-12 NOTE — ED PROVIDER NOTE - OBJECTIVE STATEMENT
89 y/o female with a PMHx of Afib, aortic stenosis, CAD, DM, dyslipidemia, GERD, HLD, HTN, hypothyroid, osteoarthritis, pancreatitis, presents to the ED c/o n/v/d starting today. Denies fevers. Allergies: Sulfa drugs, Penicillin. No other complaints at this time.

## 2020-03-12 NOTE — ED PROVIDER NOTE - PMH
Afib    Aortic stenosis    CAD (coronary artery disease)    Diabetes Mellitus Type II    Dyslipidemia    GERD (Gastroesophageal Reflux Disease)    History of Osteoarthritis    History of pancreatitis    HLD (hyperlipidemia)    Hypertension    Hypothyroid

## 2020-03-13 DIAGNOSIS — R09.02 HYPOXEMIA: ICD-10-CM

## 2020-03-13 DIAGNOSIS — R91.8 OTHER NONSPECIFIC ABNORMAL FINDING OF LUNG FIELD: ICD-10-CM

## 2020-03-13 DIAGNOSIS — I51.89 OTHER ILL-DEFINED HEART DISEASES: ICD-10-CM

## 2020-03-13 DIAGNOSIS — I48.11 LONGSTANDING PERSISTENT ATRIAL FIBRILLATION: ICD-10-CM

## 2020-03-13 DIAGNOSIS — I35.0 NONRHEUMATIC AORTIC (VALVE) STENOSIS: ICD-10-CM

## 2020-03-13 DIAGNOSIS — K52.9 NONINFECTIVE GASTROENTERITIS AND COLITIS, UNSPECIFIED: ICD-10-CM

## 2020-03-13 LAB
ALBUMIN SERPL ELPH-MCNC: 2.6 G/DL — LOW (ref 3.3–5)
ALP SERPL-CCNC: 75 U/L — SIGNIFICANT CHANGE UP (ref 40–120)
ALT FLD-CCNC: 16 U/L — SIGNIFICANT CHANGE UP (ref 12–78)
ANION GAP SERPL CALC-SCNC: 5 MMOL/L — SIGNIFICANT CHANGE UP (ref 5–17)
APTT BLD: 31.8 SEC — SIGNIFICANT CHANGE UP (ref 27.5–36.3)
AST SERPL-CCNC: 17 U/L — SIGNIFICANT CHANGE UP (ref 15–37)
BASOPHILS # BLD AUTO: 0 K/UL — SIGNIFICANT CHANGE UP (ref 0–0.2)
BASOPHILS NFR BLD AUTO: 0 % — SIGNIFICANT CHANGE UP (ref 0–2)
BILIRUB SERPL-MCNC: 0.6 MG/DL — SIGNIFICANT CHANGE UP (ref 0.2–1.2)
BUN SERPL-MCNC: 31 MG/DL — HIGH (ref 7–23)
CALCIUM SERPL-MCNC: 8.3 MG/DL — LOW (ref 8.5–10.1)
CHLORIDE SERPL-SCNC: 107 MMOL/L — SIGNIFICANT CHANGE UP (ref 96–108)
CO2 SERPL-SCNC: 25 MMOL/L — SIGNIFICANT CHANGE UP (ref 22–31)
CREAT SERPL-MCNC: 1.04 MG/DL — SIGNIFICANT CHANGE UP (ref 0.5–1.3)
CULTURE RESULTS: SIGNIFICANT CHANGE UP
EOSINOPHIL # BLD AUTO: 0.06 K/UL — SIGNIFICANT CHANGE UP (ref 0–0.5)
EOSINOPHIL NFR BLD AUTO: 1 % — SIGNIFICANT CHANGE UP (ref 0–6)
GLUCOSE SERPL-MCNC: 152 MG/DL — HIGH (ref 70–99)
HBA1C BLD-MCNC: 6.9 % — HIGH (ref 4–5.6)
HCT VFR BLD CALC: 34.1 % — LOW (ref 34.5–45)
HGB BLD-MCNC: 11 G/DL — LOW (ref 11.5–15.5)
INR BLD: 1.94 RATIO — HIGH (ref 0.88–1.16)
LYMPHOCYTES # BLD AUTO: 0.42 K/UL — LOW (ref 1–3.3)
LYMPHOCYTES # BLD AUTO: 7 % — LOW (ref 13–44)
MAGNESIUM SERPL-MCNC: 2.1 MG/DL — SIGNIFICANT CHANGE UP (ref 1.6–2.6)
MCHC RBC-ENTMCNC: 32 PG — SIGNIFICANT CHANGE UP (ref 27–34)
MCHC RBC-ENTMCNC: 32.3 GM/DL — SIGNIFICANT CHANGE UP (ref 32–36)
MCV RBC AUTO: 99.1 FL — SIGNIFICANT CHANGE UP (ref 80–100)
MONOCYTES # BLD AUTO: 0.42 K/UL — SIGNIFICANT CHANGE UP (ref 0–0.9)
MONOCYTES NFR BLD AUTO: 7 % — SIGNIFICANT CHANGE UP (ref 2–14)
NEUTROPHILS # BLD AUTO: 5.05 K/UL — SIGNIFICANT CHANGE UP (ref 1.8–7.4)
NEUTROPHILS NFR BLD AUTO: 83 % — HIGH (ref 43–77)
NRBC # BLD: SIGNIFICANT CHANGE UP /100 WBCS (ref 0–0)
PHOSPHATE SERPL-MCNC: 3.1 MG/DL — SIGNIFICANT CHANGE UP (ref 2.5–4.5)
PLATELET # BLD AUTO: 116 K/UL — LOW (ref 150–400)
POTASSIUM SERPL-MCNC: 4.7 MMOL/L — SIGNIFICANT CHANGE UP (ref 3.5–5.3)
POTASSIUM SERPL-SCNC: 4.7 MMOL/L — SIGNIFICANT CHANGE UP (ref 3.5–5.3)
PROT SERPL-MCNC: 6.4 GM/DL — SIGNIFICANT CHANGE UP (ref 6–8.3)
PROTHROM AB SERPL-ACNC: 22 SEC — HIGH (ref 10–12.9)
RBC # BLD: 3.44 M/UL — LOW (ref 3.8–5.2)
RBC # FLD: 16.8 % — HIGH (ref 10.3–14.5)
SODIUM SERPL-SCNC: 137 MMOL/L — SIGNIFICANT CHANGE UP (ref 135–145)
SPECIMEN SOURCE: SIGNIFICANT CHANGE UP
WBC # BLD: 5.94 K/UL — SIGNIFICANT CHANGE UP (ref 3.8–10.5)
WBC # FLD AUTO: 5.94 K/UL — SIGNIFICANT CHANGE UP (ref 3.8–10.5)

## 2020-03-13 PROCEDURE — 99232 SBSQ HOSP IP/OBS MODERATE 35: CPT

## 2020-03-13 RX ORDER — DEXTROSE 50 % IN WATER 50 %
25 SYRINGE (ML) INTRAVENOUS ONCE
Refills: 0 | Status: DISCONTINUED | OUTPATIENT
Start: 2020-03-13 | End: 2020-03-18

## 2020-03-13 RX ORDER — ASPIRIN/CALCIUM CARB/MAGNESIUM 324 MG
81 TABLET ORAL DAILY
Refills: 0 | Status: DISCONTINUED | OUTPATIENT
Start: 2020-03-13 | End: 2020-03-18

## 2020-03-13 RX ORDER — ACETAMINOPHEN 500 MG
650 TABLET ORAL EVERY 4 HOURS
Refills: 0 | Status: DISCONTINUED | OUTPATIENT
Start: 2020-03-13 | End: 2020-03-18

## 2020-03-13 RX ORDER — ONDANSETRON 8 MG/1
4 TABLET, FILM COATED ORAL ONCE
Refills: 0 | Status: COMPLETED | OUTPATIENT
Start: 2020-03-13 | End: 2020-03-13

## 2020-03-13 RX ORDER — APIXABAN 2.5 MG/1
5 TABLET, FILM COATED ORAL EVERY 12 HOURS
Refills: 0 | Status: DISCONTINUED | OUTPATIENT
Start: 2020-03-13 | End: 2020-03-18

## 2020-03-13 RX ORDER — SODIUM CHLORIDE 9 MG/ML
1000 INJECTION INTRAMUSCULAR; INTRAVENOUS; SUBCUTANEOUS
Refills: 0 | Status: COMPLETED | OUTPATIENT
Start: 2020-03-13 | End: 2020-03-13

## 2020-03-13 RX ORDER — PANTOPRAZOLE SODIUM 20 MG/1
40 TABLET, DELAYED RELEASE ORAL
Refills: 0 | Status: DISCONTINUED | OUTPATIENT
Start: 2020-03-13 | End: 2020-03-18

## 2020-03-13 RX ORDER — ALLOPURINOL 300 MG
200 TABLET ORAL DAILY
Refills: 0 | Status: DISCONTINUED | OUTPATIENT
Start: 2020-03-13 | End: 2020-03-18

## 2020-03-13 RX ORDER — DEXTROSE 50 % IN WATER 50 %
12.5 SYRINGE (ML) INTRAVENOUS ONCE
Refills: 0 | Status: DISCONTINUED | OUTPATIENT
Start: 2020-03-13 | End: 2020-03-18

## 2020-03-13 RX ORDER — SODIUM CHLORIDE 9 MG/ML
1000 INJECTION, SOLUTION INTRAVENOUS
Refills: 0 | Status: DISCONTINUED | OUTPATIENT
Start: 2020-03-13 | End: 2020-03-18

## 2020-03-13 RX ORDER — FAMOTIDINE 10 MG/ML
20 INJECTION INTRAVENOUS DAILY
Refills: 0 | Status: DISCONTINUED | OUTPATIENT
Start: 2020-03-13 | End: 2020-03-18

## 2020-03-13 RX ORDER — ESCITALOPRAM OXALATE 10 MG/1
10 TABLET, FILM COATED ORAL DAILY
Refills: 0 | Status: DISCONTINUED | OUTPATIENT
Start: 2020-03-13 | End: 2020-03-18

## 2020-03-13 RX ORDER — SIMVASTATIN 20 MG/1
40 TABLET, FILM COATED ORAL AT BEDTIME
Refills: 0 | Status: DISCONTINUED | OUTPATIENT
Start: 2020-03-13 | End: 2020-03-18

## 2020-03-13 RX ORDER — DEXTROSE 50 % IN WATER 50 %
15 SYRINGE (ML) INTRAVENOUS ONCE
Refills: 0 | Status: DISCONTINUED | OUTPATIENT
Start: 2020-03-13 | End: 2020-03-18

## 2020-03-13 RX ORDER — METOPROLOL TARTRATE 50 MG
25 TABLET ORAL DAILY
Refills: 0 | Status: DISCONTINUED | OUTPATIENT
Start: 2020-03-13 | End: 2020-03-18

## 2020-03-13 RX ORDER — VANCOMYCIN HCL 1 G
750 VIAL (EA) INTRAVENOUS EVERY 12 HOURS
Refills: 0 | Status: DISCONTINUED | OUTPATIENT
Start: 2020-03-13 | End: 2020-03-13

## 2020-03-13 RX ORDER — OXYCODONE AND ACETAMINOPHEN 5; 325 MG/1; MG/1
1 TABLET ORAL ONCE
Refills: 0 | Status: DISCONTINUED | OUTPATIENT
Start: 2020-03-13 | End: 2020-03-13

## 2020-03-13 RX ORDER — GLUCAGON INJECTION, SOLUTION 0.5 MG/.1ML
1 INJECTION, SOLUTION SUBCUTANEOUS ONCE
Refills: 0 | Status: DISCONTINUED | OUTPATIENT
Start: 2020-03-13 | End: 2020-03-18

## 2020-03-13 RX ORDER — AZTREONAM 2 G
1000 VIAL (EA) INJECTION EVERY 8 HOURS
Refills: 0 | Status: DISCONTINUED | OUTPATIENT
Start: 2020-03-13 | End: 2020-03-13

## 2020-03-13 RX ORDER — INSULIN LISPRO 100/ML
VIAL (ML) SUBCUTANEOUS
Refills: 0 | Status: DISCONTINUED | OUTPATIENT
Start: 2020-03-13 | End: 2020-03-18

## 2020-03-13 RX ORDER — SODIUM CHLORIDE 9 MG/ML
250 INJECTION INTRAMUSCULAR; INTRAVENOUS; SUBCUTANEOUS ONCE
Refills: 0 | Status: DISCONTINUED | OUTPATIENT
Start: 2020-03-13 | End: 2020-03-13

## 2020-03-13 RX ORDER — LEVOTHYROXINE SODIUM 125 MCG
50 TABLET ORAL DAILY
Refills: 0 | Status: DISCONTINUED | OUTPATIENT
Start: 2020-03-13 | End: 2020-03-18

## 2020-03-13 RX ADMIN — APIXABAN 5 MILLIGRAM(S): 2.5 TABLET, FILM COATED ORAL at 18:05

## 2020-03-13 RX ADMIN — Medication 250 MILLIGRAM(S): at 09:41

## 2020-03-13 RX ADMIN — FAMOTIDINE 20 MILLIGRAM(S): 10 INJECTION INTRAVENOUS at 11:37

## 2020-03-13 RX ADMIN — Medication 20 MILLIGRAM(S): at 18:05

## 2020-03-13 RX ADMIN — Medication 50 MICROGRAM(S): at 07:22

## 2020-03-13 RX ADMIN — OXYCODONE AND ACETAMINOPHEN 1 TABLET(S): 5; 325 TABLET ORAL at 16:14

## 2020-03-13 RX ADMIN — Medication 81 MILLIGRAM(S): at 11:37

## 2020-03-13 RX ADMIN — Medication 20 MILLIGRAM(S): at 07:22

## 2020-03-13 RX ADMIN — Medication 1 TABLET(S): at 11:37

## 2020-03-13 RX ADMIN — Medication 200 MILLIGRAM(S): at 11:37

## 2020-03-13 RX ADMIN — OXYCODONE AND ACETAMINOPHEN 1 TABLET(S): 5; 325 TABLET ORAL at 17:00

## 2020-03-13 RX ADMIN — ESCITALOPRAM OXALATE 10 MILLIGRAM(S): 10 TABLET, FILM COATED ORAL at 18:05

## 2020-03-13 RX ADMIN — ONDANSETRON 4 MILLIGRAM(S): 8 TABLET, FILM COATED ORAL at 01:45

## 2020-03-13 RX ADMIN — Medication 50 MILLIGRAM(S): at 07:21

## 2020-03-13 RX ADMIN — APIXABAN 5 MILLIGRAM(S): 2.5 TABLET, FILM COATED ORAL at 07:22

## 2020-03-13 RX ADMIN — SODIUM CHLORIDE 250 MILLILITER(S): 9 INJECTION INTRAMUSCULAR; INTRAVENOUS; SUBCUTANEOUS at 11:41

## 2020-03-13 RX ADMIN — PANTOPRAZOLE SODIUM 40 MILLIGRAM(S): 20 TABLET, DELAYED RELEASE ORAL at 09:41

## 2020-03-13 NOTE — PHARMACOTHERAPY INTERVENTION NOTE - COMMENTS
Recommended discontinuing vancomycin and aztreonam due to no evidence of pneumonia. Of note, patient tolerated one dose of ceftriaxone in the ED.

## 2020-03-13 NOTE — CHART NOTE - NSCHARTNOTEFT_GEN_A_CORE
89 y/o F with PMH of pancreatitis s/p ERCP, cirrhosis, DM2, a-fib on Eliquis, CAD s/p PCI, severe pulm HTN, chronic R heart failure, diastolic dysfunction, AS s/p TAVR, OA, HTN, dyslipidemia, hypothyroidism, p/w nausea / vomiting / diarrhea    *Pneumonia /interstitial infiltrates B/L/ vs  less likely CHF exacerbation clinically although BNP elevated    hypotensive from dehydration /diarrhea/ r/o gastroenetrities  -Vanco/Aztreonem  received IVF in ED  now stat 250ccIVF bolus , then  IVF maintenance fro ongoing diarrhea and use IVF cautiously   due to risk of volume overload/ chronicCHF/pulm HTN/R sided heart failure  send C diff/GI PCR  -ID consult-Speech and swallow  -Aspiration precautions    -F/u blood cultures  -RVP negative   -Lactic acidosis improved: 3->2  -Lymphadenopathy - f/u outpatient Pulm to check for resolution after infection improves. If not, will need further work up.         *Questionable pulmonary edema reported on CT??  -No respiratory distress at this time  -Pulse ox monitoring  -If stable -> f/u with cardio     *A-fib  -C/w Eliquis  -Rate controlled     *DM2  -Humalog ISS  -Oral meds held temporarily     H/o Pancreatitis / cirrhosis / OA / HTN / dyslipidemia / hypothyroidism / thrombocytopenia   -C/w home meds and f/u outpatient for further management       *Renal cysts noted on CT   -F/u outpatient for further management     *DVT ppx   -On Eliquis 89 y/o F with PMH of pancreatitis s/p ERCP, cirrhosis, DM2, a-fib on Eliquis, CAD s/p PCI, severe pulm HTN, chronic R heart failure, diastolic dysfunction, AS s/p TAVR, OA, HTN, dyslipidemia, hypothyroidism, p/w nausea / vomiting / diarrhea    *Pneumonia /interstitial infiltrates B/L/ vs  less likely CHF exacerbation clinically although BNP elevated    hypotensive from dehydration /diarrhea/ r/o gastroenetrities  -Vanco/Aztreonem  received IVF in ED  now stat 250ccIVF bolus , then  IVF maintenance fro ongoing diarrhea and use IVF cautiously   due to risk of volume overload/ chronicCHF/pulm HTN/R sided heart failure  send C diff/GI PCR  -ID consult-Speech and swallow  -Aspiration precautions    -F/u blood cultures  -RVP negative   -Lactic acidosis improved: 3->2  -Lymphadenopathy - f/u outpatient Pulm to check for resolution after infection improves. If not, will need further work up.         *Questionable pulmonary edema reported on CT??  -No respiratory distress at this time  -Pulse ox monitoring  -If stable -> f/u with cardio     *A-fib  -C/w Eliquis  -Rate controlled     *DM2  -Humalog ISS  -Oral meds held temporarily     H/o Pancreatitis / cirrhosis / OA / HTN / dyslipidemia / hypothyroidism / thrombocytopenia   -C/w home meds and f/u outpatient for further management       *Renal cysts noted on CT   -F/u outpatient for further management     *DVT ppx   -On Eliquis    I spoke with dr mina joshua son 89 y/o F with PMH of pancreatitis s/p ERCP, cirrhosis, DM2, a-fib on Eliquis, CAD s/p PCI, severe pulm HTN, chronic R heart failure, diastolic dysfunction, AS s/p TAVR, OA, HTN, dyslipidemia, hypothyroidism, p/w nausea / vomiting / diarrhea    *Pneumonia /interstitial infiltrates B/L/ vs  less likely CHF exacerbation clinically although BNP elevated    hypotensive from dehydration /diarrhea/ r/o gastroenetrities  -Vanco/Aztreonem  received IVF in ED  now stat 250ccIVF bolus , then  IVF maintenance fro ongoing diarrhea and use IVF cautiously   due to risk of volume overload/ chronicCHF/pulm HTN/R sided heart failure  send C diff/GI PCR  -ID consult-Speech and swallow  -Aspiration precautions    -F/u blood cultures  -RVP negative   -Lactic acidosis improved: 3->2  -Lymphadenopathy - f/u outpatient Pulm to check for resolution after infection improves. If not, will need further work up.     Mild metabolic encephalopathy/   at baseline pt is oriented x3        *Questionable pulmonary edema reported on CT??  -No respiratory distress at this time  -Pulse ox monitoring  -If stable -> f/u with cardio     *A-fib  -C/w Eliquis  -Rate controlled     *DM2  -Humalog ISS  -Oral meds held temporarily     H/o Pancreatitis / cirrhosis / OA / HTN / dyslipidemia / hypothyroidism / thrombocytopenia   -C/w home meds and f/u outpatient for further management       *Renal cysts noted on CT   -F/u outpatient for further management     *DVT ppx   -On Eliquis    I spoke with dr mina joshua son 89 y/o F with PMH of pancreatitis s/p ERCP, cirrhosis, DM2, a-fib on Eliquis, CAD s/p PCI, severe pulm HTN, chronic R heart failure, diastolic dysfunction, AS s/p TAVR, OA, HTN, dyslipidemia, hypothyroidism, p/w nausea / vomiting / diarrhea    *Pneumonia /interstitial infiltrates B/L/ vs  less likely CHF exacerbation clinically although BNP elevated    hypotensive from dehydration /diarrhea/ r/o gastroenetrities  -Vanco/Aztreonem  received IVF in ED  now stat 250ccIVF bolus , then  IVF maintenance fro ongoing diarrhea and use IVF cautiously   due to risk of volume overload/ chronicCHF/pulm HTN/R sided heart failure  send C diff/GI PCR  -ID consult-Speech and swallow  -Aspiration precautions    -F/u blood cultures  -RVP negative   -Lactic acidosis improved: 3->2  -Lymphadenopathy - f/u outpatient Pulm to check for resolution after infection improves. If not, will need further work up.     Mild metabolic encephalopathy/   at baseline pt is oriented x3 per Dr joshua son            *Questionable pulmonary edema reported on CT??  -No respiratory distress at this time  -Pulse ox monitoring  -If stable -> f/u with cardio     *A-fib  -C/w Eliquis  -Rate controlled     *DM2  -Humalog ISS  -Oral meds held temporarily     H/o Pancreatitis / cirrhosis / OA / HTN / dyslipidemia / hypothyroidism / thrombocytopenia   -C/w home meds and f/u outpatient for further management       *Renal cysts noted on CT   -F/u outpatient for further management     *DVT ppx   -On Eliquis    I spoke with dr mina joshua son 89 y/o F with PMH of pancreatitis s/p ERCP, cirrhosis, DM2, a-fib on Eliquis, CAD s/p PCI, severe pulm HTN, chronic R heart failure, diastolic dysfunction, AS s/p TAVR, OA, HTN, dyslipidemia, hypothyroidism, p/w nausea / vomiting / diarrhea    *?Pneumonia /interstitial infiltrates B/L vs  interstitial markings from  chronic CHF -BNP elevated    hypotensive from dehydration /diarrhea/ r/o gastroenteritis  -Vanco/Aztreonem  received IVF in ED  will avoid  further IVF   due to risk of volume overload/ chronicCHF/pulm HTN/R sided heart failure  Hold torsemide today in lieu of diarrhea  and then resume home dose of toresemide po from 3/14 if BP stable  send C diff/GI PCR  -ID consult-  I discussed with ID -does not meet criteria for testing for COVID  Speech and swallow  -Aspiration precautions    -F/u blood cultures  -RVP negative   -Lactic acidosis improved: 3->2  -Lymphadenopathy - f/u outpatient Pulm to check for resolution after infection improves. If not, will need further work up.      acute Mild metabolic encephalopathy/   at baseline pt is oriented x3 per Dr joshua son            *Questionable pulmonary edema reported on CT??  -No respiratory distress at this time  -Pulse ox monitoring  -If stable -> f/u with cardio     *A-fib  -C/w Eliquis  -Rate controlled     *DM2  -Humalog ISS  -Oral meds held temporarily     H/o Pancreatitis / cirrhosis / OA / HTN / dyslipidemia / hypothyroidism / thrombocytopenia   -C/w home meds and f/u outpatient for further management       *Renal cysts noted on CT   -F/u outpatient for further management     *DVT ppx   -On Eliquis    I spoke with dr mina joshua son

## 2020-03-13 NOTE — CONSULT NOTE ADULT - SUBJECTIVE AND OBJECTIVE BOX
Patient is a 89y old  Female who presents with a chief complaint of N/V, Diarrhea.       HPI:  89 y/o F with PMH of pancreatitis s/p ERCP, cirrhosis, DM2, a-fib on Eliquis, CAD s/p PCI, severe pulm HTN, chronic R heart failure, diastolic dysfunction, AS s/p TAVR, OA, HTN, dyslipidemia, hypothyroidism, p/w nausea / vomiting / diarrhea. Patient states her symptoms started day before admission.   In ED, patient had a soft BM, but no diarrhea. Also dry heaved in ED. Patient denies any cough, fever, chills, runny nose, sore throat, however, CT chest reports PNA. Denies CP, SOB.    PSH: knee replacement, hysterectomy, knee surgery, appendectomy, cholecystectomy, IVC filter     Social Hx: Denies x 3, lives at home by herself     Family Hx: DM, Heart disease.       PAST MEDICAL & SURGICAL HISTORY:  Aortic stenosis  History of pancreatitis  HLD (hyperlipidemia)  Hypothyroid  Afib  CAD (coronary artery disease)  Hypertension  History of Osteoarthritis  GERD (Gastroesophageal Reflux Disease)  Dyslipidemia  Diabetes Mellitus Type II  S/P IVC filter: Note that Pt does not have  h/o DVT, outPt doppler was read first as +, but after review reported as no DVT. Pt got IVC filer before that  H/O total knee replacement, left  History of appendectomy  History of cholecystectomy  H/O: Knee Surgery- right meniscus  H/O: Hysterectomy      MEDICATIONS  (STANDING):  allopurinol  Oral Tab/Cap - Peds 200 milliGRAM(s) Oral daily  apixaban 5 milliGRAM(s) Oral every 12 hours  aspirin  chewable 81 milliGRAM(s) Oral daily  dextrose 5%. 1000 milliLiter(s) (50 mL/Hr) IV Continuous <Continuous>  dextrose 50% Injectable 12.5 Gram(s) IV Push once  dextrose 50% Injectable 25 Gram(s) IV Push once  dextrose 50% Injectable 25 Gram(s) IV Push once  escitalopram 10 milliGRAM(s) Oral daily  famotidine    Tablet 20 milliGRAM(s) Oral daily  insulin lispro (HumaLOG) corrective regimen sliding scale   SubCutaneous three times a day before meals  levothyroxine 50 MICROGram(s) Oral daily  metoprolol succinate ER 25 milliGRAM(s) Oral daily  multivitamin 1 Tablet(s) Oral daily  pantoprazole    Tablet 40 milliGRAM(s) Oral before breakfast  sildenafil (REVATIO) 20 milliGRAM(s) Oral two times a day  simvastatin 40 milliGRAM(s) Oral at bedtime    MEDICATIONS  (PRN):  acetaminophen   Tablet .. 650 milliGRAM(s) Oral every 4 hours PRN Mild Pain (1 - 3)  dextrose 40% Gel 15 Gram(s) Oral once PRN Blood Glucose LESS THAN 70 milliGRAM(s)/deciliter  glucagon  Injectable 1 milliGRAM(s) IntraMuscular once PRN Glucose LESS THAN 70 milligrams/deciliter      FAMILY HISTORY:  FH: heart disease  FH: diabetes mellitus: both parents      SOCIAL HISTORY:  no recent smoking     REVIEW OF SYSTEMS:  CONSTITUTIONAL:    No fatigue, malaise, lethargy.  No fever or chills.  RESPIRATORY:  No cough.  No wheeze.  No hemoptysis.  No shortness of breath.  CARDIOVASCULAR:  No chest pains.  No palpitations. No shortness of breath, No orthopnea or PND.  GASTROINTESTINAL:  No abdominal pain.  No nausea or vomiting.    GENITOURINARY:    No hematuria.    MUSCULOSKELETAL:  c/o pain starting from L temple , l shoulder lower down on L side  NEUROLOGICAL:  No tingling or numbness or weakness.  PSYCHIATRIC:  No confusion          Vital Signs Last 24 Hrs  T(C): 36.8 (13 Mar 2020 12:38), Max: 37.3 (12 Mar 2020 17:15)  T(F): 98.2 (13 Mar 2020 12:38), Max: 99.2 (12 Mar 2020 17:15)  HR: 80 (13 Mar 2020 12:38) (76 - 102)  BP: 100/55 (13 Mar 2020 12:38) (81/43 - 137/84)  BP(mean): 62 (13 Mar 2020 11:55) (53 - 62)  RR: 18 (13 Mar 2020 12:38) (16 - 21)  SpO2: 100% (13 Mar 2020 12:38) (95% - 100%)    PHYSICAL EXAM-    Constitutional: no acute distress     Head: Head is normocephalic and atraumatic.      Neck:  + JVD.     Cardiovascular: Regular rate and rhythm without S3, S4. No murmurs or rubs are appreciated.      Respiratory: rales b/l more than half way up    Abdomen: Soft, nontender, nondistended with positive bowel sounds.      Extremity: No tenderness. trace  pitting edema with venous stasis changes    Neurologic: The patient is alert and oriented.      Psychiatric: The patient appears to be emotionally stable.      INTERPRETATION OF TELEMETRY: not on     ECG: ordered one     I&O's Detail      LABS:                        11.0   5.94  )-----------( 116      ( 13 Mar 2020 07:17 )             34.1     03-13    137  |  107  |  31<H>  ----------------------------<  152<H>  4.7   |  25  |  1.04    Ca    8.3<L>      13 Mar 2020 07:17  Phos  3.1     03-13  Mg     2.1     03-13    TPro  6.4  /  Alb  2.6<L>  /  TBili  0.6  /  DBili  x   /  AST  17  /  ALT  16  /  AlkPhos  75  03-13        PT/INR - ( 13 Mar 2020 07:17 )   PT: 22.0 sec;   INR: 1.94 ratio         PTT - ( 13 Mar 2020 07:17 )  PTT:31.8 sec    I&O's Summary    BNPSerum Pro-Brain Natriuretic Peptide: 6483 pg/mL (03-13 @ 07:17)    RADIOLOGY & ADDITIONAL STUDIES:  < from: Xray Chest 1 View- PORTABLE-Urgent (03.12.20 @ 19:06) >    EXAM:  XR CHEST PORTABLE URGENT 1V                            PROCEDURE DATE:  03/12/2020          INTERPRETATION:  Clinical history: Vomiting    Single portable view obtained. Comparison is made to priors    Heart and mediastinum are stable. Stable bilateral interstitial lung disease. No obvious effusions.    Impression: Stable bilateral interstitial lung disease                FIFI CHAVEZ   This document has been electronically signed. Mar 13 2020  9:55AM        < end of copied text >  < from: CT Chest No Cont (03.12.20 @ 19:01) >  IMPRESSION:     CHEST:  Diffuse bilateral groundglass opacities worsened since June 23, 2019. Findings could be due to due to worsening interstitial lung disease with acute inflammatory component versus pneumonia.  Pulmonary edema is also a possibility although lack of pleural fluid speaks against this diagnosis.    ABDOMEN/PELVIS:  Markedly dilated and fluid-filled stomach. No evidence of bowel obstruction.                    PEPE FINE M.D.,ATTENDING RADIOLOGIST  This document has been electronically signed. Mar 12 2020  7:23PM    < end of copied text >

## 2020-03-13 NOTE — CONSULT NOTE ADULT - SUBJECTIVE AND OBJECTIVE BOX
Patient is a 89y old  Female who presents with a chief complaint of N/V + Diarrhea    HPI:  89 y/o Female with h/o pancreatitis s/p ERCP, cirrhosis, DM type 2, A-fib on Eliquis, CAD s/p PCI, severe pulm HTN, chronic R heart failure, diastolic dysfunction, AS s/p TAVR, OA, HTN, dyslipidemia, hypothyroidism was admitted on 3/12 for nausea / vomiting / diarrhea. Patient states her symptoms started the day PTA, and is uncertain how many times she experienced vomiting / diarrhea. In ED, patient had a soft BM, but no diarrhea. Also dry heaved. Patient denies any cough, fever, chills, runny nose, sore throat. Denies CP, SOB. In ER she received vancomycin, ceftriaxone and aztreonam.     PSH: knee replacement, hysterectomy, knee surgery, appendectomy, cholecystectomy, IVC filter   PMH: as above    Meds: per reconciliation sheet, noted below  MEDICATIONS  (STANDING):  allopurinol  Oral Tab/Cap - Peds 200 milliGRAM(s) Oral daily  apixaban 5 milliGRAM(s) Oral every 12 hours  aspirin  chewable 81 milliGRAM(s) Oral daily  dextrose 5%. 1000 milliLiter(s) (50 mL/Hr) IV Continuous <Continuous>  dextrose 50% Injectable 12.5 Gram(s) IV Push once  dextrose 50% Injectable 25 Gram(s) IV Push once  dextrose 50% Injectable 25 Gram(s) IV Push once  escitalopram 10 milliGRAM(s) Oral daily  famotidine    Tablet 20 milliGRAM(s) Oral daily  insulin lispro (HumaLOG) corrective regimen sliding scale   SubCutaneous three times a day before meals  levothyroxine 50 MICROGram(s) Oral daily  metoprolol succinate ER 25 milliGRAM(s) Oral daily  multivitamin 1 Tablet(s) Oral daily  pantoprazole    Tablet 40 milliGRAM(s) Oral before breakfast  sildenafil (REVATIO) 20 milliGRAM(s) Oral two times a day  simvastatin 40 milliGRAM(s) Oral at bedtime  vancomycin  IVPB 750 milliGRAM(s) IV Intermittent every 12 hours    MEDICATIONS  (PRN):  acetaminophen   Tablet .. 650 milliGRAM(s) Oral every 4 hours PRN Mild Pain (1 - 3)  dextrose 40% Gel 15 Gram(s) Oral once PRN Blood Glucose LESS THAN 70 milliGRAM(s)/deciliter  glucagon  Injectable 1 milliGRAM(s) IntraMuscular once PRN Glucose LESS THAN 70 milligrams/deciliter    Allergies    penicillin (Rash)  penicillins (Other)  sulfa drugs (Rash; Other)    Intolerances      Social: no smoking, no alcohol, no illegal drugs; no recent travel, no exposure to TB  FAMILY HISTORY:  FH: heart disease  FH: diabetes mellitus: both parents    no history of premature cardiovascular disease in first degree relatives  parents had DM, heart disease    ROS: the patient denies fever, no chills, no HA, no seizures, no dizziness, no sore throat, no nasal congestion, no blurry vision, no CP, no palpitations, no SOB, no cough, no abdominal pain, has diarrhea, has N/V, no dysuria, no leg pain, no claudication, no rash, no joint aches, no rectal pain or bleeding, no night sweats  All other systems reviewed and are negative    Vital Signs Last 24 Hrs  T(C): 36.8 (13 Mar 2020 12:38), Max: 37.3 (12 Mar 2020 17:15)  T(F): 98.2 (13 Mar 2020 12:38), Max: 99.2 (12 Mar 2020 17:15)  HR: 80 (13 Mar 2020 12:38) (76 - 102)  BP: 100/55 (13 Mar 2020 12:38) (81/43 - 137/84)  BP(mean): 62 (13 Mar 2020 11:55) (53 - 62)  RR: 18 (13 Mar 2020 12:38) (16 - 21)  SpO2: 100% (13 Mar 2020 12:38) (95% - 100%)  Daily Height in cm: 162.56 (12 Mar 2020 16:03)    Daily     PE:    Constitutional:  No acute distress  HEENT: NC/AT, EOMI, PERRLA, conjunctivae clear; ears and nose atraumatic; pharynx benign  Neck: supple; thyroid not palpable  Back: no tenderness  Respiratory: respiratory effort normal; clear to auscultation  Cardiovascular: S1S2 regular, no murmurs  Abdomen: soft, not tender, not distended, positive BS; no liver or spleen organomegaly  Genitourinary: no suprapubic tenderness  Lymphatic: no LN palpable  Musculoskeletal: no muscle tenderness, no joint swelling or tenderness  Extremities: no pedal edema  Neurological/ Psychiatric: AxOx3, judgement and insight normal; moving all extremities  Skin: no rashes; no palpable lesions    Labs: all available labs reviewed                        11.0   5.94  )-----------( 116      ( 13 Mar 2020 07:17 )             34.1     03-13    137  |  107  |  31<H>  ----------------------------<  152<H>  4.7   |  25  |  1.04    Ca    8.3<L>      13 Mar 2020 07:17  Phos  3.1     03-13  Mg     2.1     03-13    TPro  6.4  /  Alb  2.6<L>  /  TBili  0.6  /  DBili  x   /  AST  17  /  ALT  16  /  AlkPhos  75  03-13     LIVER FUNCTIONS - ( 13 Mar 2020 07:17 )  Alb: 2.6 g/dL / Pro: 6.4 gm/dL / ALK PHOS: 75 U/L / ALT: 16 U/L / AST: 17 U/L / GGT: x             Radiology: all available radiological tests reviewed    < from: Xray Chest 1 View- PORTABLE-Urgent (03.12.20 @ 19:06) >  Stable bilateral interstitial lung disease    < end of copied text >    < from: CT Chest No Cont (03.12.20 @ 19:01) >  Diffuse bilateral groundglass opacities worsened since June 23, 2019. Findings could be due to due to worsening interstitial lung disease with acute inflammatory component versus pneumonia.  Pulmonary edema is also a possibility although lack of pleural fluid speaks against this diagnosis.    < end of copied text >      Advanced directives addressed: full resuscitation

## 2020-03-13 NOTE — CONSULT NOTE ADULT - ASSESSMENT
89 y/o Female with h/o pancreatitis s/p ERCP, cirrhosis, DM type 2, A-fib on Eliquis, CAD s/p PCI, severe pulm HTN, chronic R heart failure, diastolic dysfunction, AS s/p TAVR, OA, HTN, dyslipidemia, hypothyroidism was admitted on 3/12 for nausea / vomiting / diarrhea. Patient states her symptoms started the day PTA, and is uncertain how many times she experienced vomiting / diarrhea. In ED, patient had a soft BM, but no diarrhea. Also dry heaved. Patient denies any cough, fever, chills, runny nose, sore throat. Denies CP, SOB. In ER she received vancomycin, ceftriaxone and aztreonam.    1. Low grade fever. Acute gastroenteritis. 89 y/o Female with h/o pancreatitis s/p ERCP, cirrhosis, DM type 2, A-fib on Eliquis, CAD s/p PCI, severe pulm HTN, chronic R heart failure, diastolic dysfunction, AS s/p TAVR, OA, HTN, dyslipidemia, hypothyroidism was admitted on 3/12 for nausea / vomiting / diarrhea. Patient states her symptoms started the day PTA, and is uncertain how many times she experienced vomiting / diarrhea. In ED, patient had a soft BM, but no diarrhea. Also dry heaved. Patient denies any cough, fever, chills, runny nose, sore throat. Denies CP, SOB. In ER she received vancomycin, ceftriaxone and aztreonam.    1. Low grade fever. Acute gastroenteritis. ?food poisoning. Pulmonary interstitial fibrosis. Allergy to PCN.  -obtain stool for GI PCR  -no fever, no cough or SOB  -no lobar pulmonary infiltrates noted; doubt bacterial or viral pneumonia  -contact isolation  -observe off abx therapy for now  -old chart reviewed to assess prior cultures  -monitor temps  -f/u CBC  -supportive care  2. Other issues:   -care per medicine

## 2020-03-13 NOTE — CONSULT NOTE ADULT - ASSESSMENT
Chronic HFPEF- based on BNP she appears mildly volume overloaded.  Volume status assesment is difficult based on physical exam in light of her obesity.  Avoid IVF.  Will monitor now off diuretics for another day.    Diarrhea , vomiting- resolved  ID team input appreciated.    Atrial fibrillation- continue full dose anticoagulation.  Rate controlled based on exa.  Check EKG.    Other medical issues- Management per primary team.   Thank you for allowing me to participate in the care of this patient. Please feel free to contact me with any questions.

## 2020-03-13 NOTE — ED ADULT NURSE REASSESSMENT NOTE - NS ED NURSE REASSESS COMMENT FT1
spoke with MD villarreal regarding loose bm and hypotension, send stool specimen to lab for cdiff and GI pcr, initiated  ml bolus as ordered.

## 2020-03-13 NOTE — SWALLOW BEDSIDE ASSESSMENT ADULT - SLP GENERAL OBSERVATIONS
On encounter, ot has a band aid on the bridge of her nose. O2 was being provided. Her oral mucosa was dry.  The pt was awake but somewhat fatigued. She was able to verbalize during communicative probes and in conversation. At these times, her speech integrity was negatively hindered by dry oral mucosa as well as fatigue. However, her motor speech abilities were felt to be functional. Further, her speech intelligibility was functional, her verbalizations were linguistically intact/contextually appropriate and pt was able to verbalize her intents.  Note that the pt denied aspiration signs or odynophagia with meals. On encounter, patient has a band aid on the bridge of her nose. O2 was being provided. Her oral mucosa was dry.  The pt was awake but somewhat fatigued. She was able to verbalize during communicative probes and in conversation. At these times, her speech integrity was negatively hindered by dry oral mucosa as well as fatigue. However, her motor speech abilities were felt to be functional. Further, her speech intelligibility was functional, her verbalizations were linguistically intact/contextually appropriate and pt was able to verbalize her intents.  Note that the pt denied aspiration signs or odynophagia with meals.

## 2020-03-13 NOTE — H&P ADULT - HISTORY OF PRESENT ILLNESS
89 y/o F with PMH of pancreatitis s/p ERCP, cirrhosis, DM2, a-fib on Eliquis, CAD s/p PCI, severe pulm HTN, chronic R heart failure, diastolic dysfunction, AS s/p TAVR, OA, HTN, dyslipidemia, hypothyroidism, p/w nausea / vomiting / diarrhea. Patient states her symptoms started yesterday, and is uncertain how many times she experienced vomiting / diarrhea. In ED, patient had a soft BM, but no diarrhea. Also dry heaved in ED. Patient denies any cough, fever, chills, runny nose, sore throat, however, CT chest reports PNA. Denies CP, SOB.    PSH: knee replacement, hysterectomy, knee surgery, appendectomy, cholecystectomy, IVC filter     Social Hx: Denies x 3, lives at home by herself     Family Hx: DM, Heart disease

## 2020-03-13 NOTE — H&P ADULT - ASSESSMENT
89 y/o F with PMH of pancreatitis s/p ERCP, cirrhosis, DM2, a-fib on Eliquis, CAD s/p PCI, severe pulm HTN, chronic R heart failure, diastolic dysfunction, AS s/p TAVR, OA, HTN, dyslipidemia, hypothyroidism, p/w nausea / vomiting / diarrhea    *Pneumonia - R/p aspiration PNA   -Zosyn  -ID consult  -Speech and swallow  -Aspiration precautions    -F/u blood cultures  -RVP negative   -Does not meet Sepsis criteria  -Lactic acidosis improved: 3->2  -IVF   -Lymphadenopathy - f/u outpatient Pulm to check for resolution after infection improves. If not, will need further work up.     *N/V/Diarrhea  -Zofran PRN  -Possible gastroenteritis?  -Last BM in ED was soft, but no diarrhea     *Questionable pulmonary edema reported on CT??  -No respiratory distress at this time  -Pulse ox monitoring  -If stable -> f/u with cardio     *A-fib  -C/w Eliquis  -Rate controlled     *DM2  -Humalog ISS  -Oral meds held temporarily     H/o Pancreatitis / cirrhosis / OA / HTN / dyslipidemia / hypothyroidism / thrombocytopenia   -C/w home meds and f/u outpatient for further management     *Renal cysts noted on CT   -F/u outpatient for furhter management     *DVT ppx  -On Eliquis 89 y/o F with PMH of pancreatitis s/p ERCP, cirrhosis, DM2, a-fib on Eliquis, CAD s/p PCI, severe pulm HTN, chronic R heart failure, diastolic dysfunction, AS s/p TAVR, OA, HTN, dyslipidemia, hypothyroidism, p/w nausea / vomiting / diarrhea    *Pneumonia - R/p aspiration PNA   -Vanco/Aztreonem  -ID consult  -Speech and swallow  -Aspiration precautions    -F/u blood cultures  -RVP negative   -Does not meet Sepsis criteria  -Lactic acidosis improved: 3->2  -S/p IVF   -Lymphadenopathy - f/u outpatient Pulm to check for resolution after infection improves. If not, will need further work up.     *N/V/Diarrhea  -Zofran PRN  -Possible gastroenteritis?  -Last BM in ED was soft, but no diarrhea     *Questionable pulmonary edema reported on CT??  -No respiratory distress at this time  -Pulse ox monitoring  -If stable -> f/u with cardio     *A-fib  -C/w Eliquis  -Rate controlled     *DM2  -Humalog ISS  -Oral meds held temporarily     H/o Pancreatitis / cirrhosis / OA / HTN / dyslipidemia / hypothyroidism / thrombocytopenia   -C/w home meds and f/u outpatient for further management     *Renal cysts noted on CT   -F/u outpatient for further management     *DVT ppx  -On Eliquis 89 y/o F with PMH of pancreatitis s/p ERCP, cirrhosis, DM2, a-fib on Eliquis, CAD s/p PCI, severe pulm HTN, chronic R heart failure, diastolic dysfunction, AS s/p TAVR, OA, HTN, dyslipidemia, hypothyroidism, p/w nausea / vomiting / diarrhea    *Pneumonia - R/p aspiration PNA   -Vanco/Aztreonem  -ID consult  -Speech and swallow  -Aspiration precautions    -F/u blood cultures  -RVP negative   -Does not meet Sepsis criteria  -Lactic acidosis improved: 3->2  -S/p IVF   -Lymphadenopathy - f/u outpatient Pulm to check for resolution after infection improves. If not, will need further work up.     *N/V/Diarrhea  -Zofran PRN  -Possible gastroenteritis?  -Last BM in ED was soft, but no diarrhea     *Questionable pulmonary edema reported on CT??  -No respiratory distress at this time  -Pulse ox monitoring  -If stable -> f/u with cardio     *A-fib  -C/w Eliquis  -Rate controlled     *DM2  -Humalog ISS  -Oral meds held temporarily     H/o Pancreatitis / cirrhosis / OA / HTN / dyslipidemia / hypothyroidism / thrombocytopenia   -C/w home meds and f/u outpatient for further management     *Renal cysts noted on CT   -F/u outpatient for further management     *DVT ppx   -On Eliquis

## 2020-03-13 NOTE — CONSULT NOTE ADULT - SUBJECTIVE AND OBJECTIVE BOX
HPI:  89 y/o F with PMH of pancreatitis s/p ERCP, cirrhosis, DM2, a-fib on Eliquis, CAD s/p PCI, severe pulm HTN, chronic R heart failure, diastolic dysfunction, AS s/p TAVR, OA, HTN, dyslipidemia, hypothyroidism, p/w nausea / vomiting / diarrhea. Patient states her symptoms started yesterday, and is uncertain how many times she experienced vomiting / diarrhea. In ED, patient had a soft BM, but no diarrhea. Also dry heaved in ED. Patient denies any cough, fever, chills, runny nose, sore throat, however, CT chest reports PNA. Denies CP, SOB.    The patient states that she had no infectious type prodrome prior to admission. No real contacts with sick people. Lives alone. She was in a dermatologists office on 3/10/20. She notes that her weight has been stable at approx 150lbs, which is where we have been trying to keep her. She denies cough or sputum production. There has been no chest pain.     PSH: knee replacement, hysterectomy, knee surgery, appendectomy, cholecystectomy, IVC filter     Social Hx: Denies x 3, lives at home by herself     Family Hx: DM, Heart disease (13 Mar 2020 00:44)      PAST MEDICAL & SURGICAL HISTORY:  Aortic stenosis  History of pancreatitis  HLD (hyperlipidemia)  Hypothyroid  Afib  CAD (coronary artery disease)  Hypertension  History of Osteoarthritis  GERD (Gastroesophageal Reflux Disease)  Dyslipidemia  Diabetes Mellitus Type II  S/P IVC filter: Note that Pt does not have  h/o DVT, outPt doppler was read first as +, but after review reported as no DVT. Pt got IVC filer before that  H/O total knee replacement, left  History of appendectomy  History of cholecystectomy  H/O: Knee Surgery- right meniscus  H/O: Hysterectomy      MEDICATIONS  (STANDING):  allopurinol  Oral Tab/Cap - Peds 200 milliGRAM(s) Oral daily  apixaban 5 milliGRAM(s) Oral every 12 hours  aspirin  chewable 81 milliGRAM(s) Oral daily  aztreonam  IVPB 1000 milliGRAM(s) IV Intermittent every 8 hours  dextrose 5%. 1000 milliLiter(s) (50 mL/Hr) IV Continuous <Continuous>  dextrose 50% Injectable 12.5 Gram(s) IV Push once  dextrose 50% Injectable 25 Gram(s) IV Push once  dextrose 50% Injectable 25 Gram(s) IV Push once  escitalopram 10 milliGRAM(s) Oral daily  famotidine    Tablet 20 milliGRAM(s) Oral daily  insulin lispro (HumaLOG) corrective regimen sliding scale   SubCutaneous three times a day before meals  levothyroxine 50 MICROGram(s) Oral daily  metoprolol succinate ER 25 milliGRAM(s) Oral daily  multivitamin 1 Tablet(s) Oral daily  pantoprazole    Tablet 40 milliGRAM(s) Oral before breakfast  sildenafil (REVATIO) 20 milliGRAM(s) Oral two times a day  simvastatin 40 milliGRAM(s) Oral at bedtime  vancomycin  IVPB 750 milliGRAM(s) IV Intermittent every 12 hours    MEDICATIONS  (PRN):  acetaminophen   Tablet .. 650 milliGRAM(s) Oral every 4 hours PRN Mild Pain (1 - 3)  dextrose 40% Gel 15 Gram(s) Oral once PRN Blood Glucose LESS THAN 70 milliGRAM(s)/deciliter  glucagon  Injectable 1 milliGRAM(s) IntraMuscular once PRN Glucose LESS THAN 70 milligrams/deciliter      Allergies    penicillin (Rash)  penicillins (Other)  sulfa drugs (Rash; Other)    Intolerances        SOCIAL HISTORY: Denies tobacco, etoh abuse or illicit drug use    FAMILY HISTORY:  FH: heart disease  FH: diabetes mellitus: both parents      Vital Signs Last 24 Hrs  T(C): 36.4 (13 Mar 2020 08:32), Max: 37.3 (12 Mar 2020 17:15)  T(F): 97.6 (13 Mar 2020 08:32), Max: 99.2 (12 Mar 2020 17:15)  HR: 77 (13 Mar 2020 08:32) (77 - 102)  BP: 81/43 (13 Mar 2020 08:32) (81/43 - 137/84)  BP(mean): 53 (13 Mar 2020 08:32) (53 - 53)  RR: 20 (13 Mar 2020 08:32) (16 - 20)  SpO2: 95% (13 Mar 2020 08:32) (95% - 99%)    REVIEW OF SYSTEMS:    CONSTITUTIONAL:  As per HPI.  HEENT:  Eyes:  No diplopia or blurred vision. ENT:  No earache, sore throat or runny nose.  CARDIOVASCULAR:  No pressure, squeezing, tightness, heaviness or aching about the chest, neck, axilla or epigastrium.  RESPIRATORY:  No cough, shortness of breath, PND or orthopnea.  GASTROINTESTINAL:  No nausea, vomiting or diarrhea.  GENITOURINARY:  See HPI  MUSCULOSKELETAL:  As per HPI.  SKIN:  No change in skin, hair or nails.  NEUROLOGIC:  No paresthesias, fasciculations, seizures or weakness.  PSYCHIATRIC:  No disorder of thought or mood.  ENDOCRINE:  No heat or cold intolerance, polyuria or polydipsia.  HEMATOLOGICAL:  No easy bruising or bleedings:  .     PHYSICAL EXAMINATION:    GENERAL APPEARANCE:  Pt. is not currently dyspneic, in no distress. Pt. is alert, oriented, and pleasant.  HEENT:  Pupils are normal and react normally. No icterus. Mucous membranes well colored.  NECK:  Supple. No lymphadenopathy. Jugular venous pressure not elevated. Carotids equal.   HEART:   The cardiac impulse has a normal quality. Irregular, irreg.. Normal S1 and S2. There is a 2/6 J LUIS at LSB  CHEST:  Chest with bilateral crackles approx 1/2 way up. Normal respiratory effort.  ABDOMEN:  Soft and nontender. No rebound or guarding.  EXTREMITIES:  There is no cyanosis, clubbing or edema. Chronic venous stasis changes.  SKIN:  No rash or significant lesions are noted.    LABS:                        11.0   5.94  )-----------( 116      ( 13 Mar 2020 07:17 )             34.1     03-13    137  |  107  |  31<H>  ----------------------------<  152<H>  4.7   |  25  |  1.04    Ca    8.3<L>      13 Mar 2020 07:17  Phos  3.1     03-13  Mg     2.1     03-13    TPro  6.4  /  Alb  2.6<L>  /  TBili  0.6  /  DBili  x   /  AST  17  /  ALT  16  /  AlkPhos  75  03-13    LIVER FUNCTIONS - ( 13 Mar 2020 07:17 )  Alb: 2.6 g/dL / Pro: 6.4 gm/dL / ALK PHOS: 75 U/L / ALT: 16 U/L / AST: 17 U/L / GGT: x           PT/INR - ( 13 Mar 2020 07:17 )   PT: 22.0 sec;   INR: 1.94 ratio         PTT - ( 13 Mar 2020 07:17 )  PTT:31.8 sec            RADIOLOGY & ADDITIONAL STUDIES: < from: CT Chest No Cont (03.12.20 @ 19:01) >  EXAM:  CT ABDOMEN AND PELVIS IC                          EXAM:  CT CHEST                            PROCEDURE DATE:  03/12/2020          INTERPRETATION:  CLINICAL INFORMATION: Cough, vomiting, low po2    COMPARISON: CT chest June 23, 2019, March 08, 2018. CT abdomen/pelvis December 20, 2017    PROCEDURE:   CT of the Chest was performed without intravenous contrast, followed by CT abdomen and Pelvis performed with intravenous contrast.   Intravenous contrast: 90 ml Omnipaque 350. 10 ml discarded.  Oral contrast: None.  Sagittal and coronal reformats were performed.    FINDINGS:    CHEST:     LUNGS AND LARGE AIRWAYS: Patent central airways. Diffuse groundglass opacities with upper lobe predominance worsened since the prior study. Areas of normal intervening lung possibly due to air trapping. No discrete nodule or mass.  PLEURA: No pleural effusion.  VESSELS: Atherosclerotic changes of the aorta and coronary arteries.  HEART: Cardiomegaly. No pericardial effusion. Transcatheter aortic valve replacement.  MEDIASTINUM AND SHANTI: Stable mildly enlarged lymph nodes, for example a 1.3 cm right upper paratracheal node (3; 1).  CHEST WALL AND LOWER NECK: Interval.    ABDOMEN AND PELVIS:    LIVER: Mildly nodular liver contour with "posterior notch" sign compatible with cirrhosis. No focal lesion.  BILE DUCTS: Normal caliber.  GALLBLADDER: Within normal limits.  SPLEEN: Within normal limits.  PANCREAS: Within normal limits.  ADRENALS: Within normal limits.  KIDNEYS/URETERS: Bilateral renal cysts.    BLADDER: Within normal limits.  REPRODUCTIVE ORGANS: Hysterectomy.    BOWEL: Markedly dilated and fluid-filled stomach. No evidence of bowel obstruction. Appendix not identified. Colonic diverticulosis.  PERITONEUM: No ascites.  VESSELS: Atherosclerotic changes. IVC filter in place.  RETROPERITONEUM/LYMPH NODES: No lymphadenopathy.    ABDOMINAL WALL: Within normal limits.  BONES: No acute abnormality. L3 vertebral body hemangioma.    IMPRESSION:     CHEST:  Diffuse bilateral groundglass opacities worsened since June 23, 2019. Findings could be due to due to worsening interstitial lung disease with acute inflammatory component versus pneumonia.  Pulmonary edema is also a possibility although lack of pleural fluid speaks against this diagnosis.    ABDOMEN/PELVIS:  Markedly dilated and fluid-filled stomach. No evidence of bowel obstruction.      PEPE FINE M.D.,ATTENDING RADIOLOGIST

## 2020-03-13 NOTE — SWALLOW BEDSIDE ASSESSMENT ADULT - SWALLOW EVAL: CRITERIA FOR SKILLED INTERVENTION MET
DO NOT FEEL THAT ACUTE SPEECH PATHOLOGY INTERVENTION WOULD CHANGE CLINICAL MANAGEMENT/OUTCOME WHILE IN HOSPITAL. PT'S SPEECH-LANGUAGE ABILITIES AND OROPHARYNGEAL SWALLOWING ABILITIES ARE FELT TO BE WITHIN FUNCTIONAL PARAMETERS FOR AGE/AT USUAL STATE. GIVEN ABOVE, WILL NOT ACTIVELY FOLLOW. RECONSULT PRN.

## 2020-03-13 NOTE — SWALLOW BEDSIDE ASSESSMENT ADULT - SWALLOW EVAL: RECOMMENDED DIET
SUGGEST A REGULAR TEXTURE DIET WITH THIN LIQUID CONSISTENCIES AS THE PATIENT APPEARS TO TOLERATE THESE FOOD CONSISTENCIES FROM AN OROPHARYNGEAL SWALLOWING STANCE AND PO TEXTURES ON THIS DIET TYPE ACCOMMODATE PT'S EXPRESSED FOOD PREFERENCES. NOTE THAT IF PT NEEDS TO BE ON A LIQUID DIET FOR GI REASONS, SHE IS ABLE TO TOLERATE THIN LIQUIDS FROM AN OROPHARYNGEAL SWALLOWING STANCE AS WELL. SUGGEST A REGULAR TEXTURE DIET WITH THIN LIQUID CONSISTENCIES AS THE PATIENT APPEARS TO TOLERATE THESE FOOD CONSISTENCIES FROM AN OROPHARYNGEAL SWALLOWING STANCE AND PO TEXTURES ON THIS DIET TYPE ACCOMMODATES PT'S EXPRESSED FOOD PREFERENCES. NOTE THAT IF PT NEEDS TO BE ON A LIQUID DIET FOR GI REASONS, SHE IS ABLE TO TOLERATE THIN LIQUIDS FROM AN OROPHARYNGEAL SWALLOWING STANCE AS WELL.

## 2020-03-13 NOTE — SWALLOW BEDSIDE ASSESSMENT ADULT - DATE
S: This is a 45 13-Mar-2020

## 2020-03-13 NOTE — CONSULT NOTE ADULT - ASSESSMENT
Assessment/Plan    - Panculture.  - Maintain supplemental O2  - Obtain BNP  - Daily weights  - ?Need for antibiotics-cover for now pending cultures. ?Aspiration, but CT not suggestive of this process. Patient has bilateral mosaic pattern on CT which can be seen with infectious/inflammatory processes. Patient has a similar process in the past (2018), but it is a bit more prominent at this time.  - Follow BUN/Cr   - Will most likely need to resume torsemide 20mg QD Assessment/Plan    - Panculture.  - Maintain supplemental O2  - Obtain BNP  - Daily weights  - ?Need for antibiotics-cover for now pending cultures. ?Aspiration, but CT not suggestive of this process. Patient has bilateral mosaic pattern on CT which can be seen with infectious/inflammatory processes. Patient has a similar process in the past (2018), but it is a bit more prominent at this time.  - Follow BUN/Cr   - Will most likely need to resume torsemide 40mg QD (was on 40mg BID as outpatient)

## 2020-03-13 NOTE — SWALLOW BEDSIDE ASSESSMENT ADULT - NS SPL SWALLOW CLINIC TRIAL FT
Bolus formation/transfer were grossly mechanically functional for age and laryngeal lift on palpation during swallowing trials was grossly within functional parameters for age as well. NO behavioral aspiration signs exhibited on exam. Odynophagia was denied.

## 2020-03-13 NOTE — SWALLOW BEDSIDE ASSESSMENT ADULT - SWALLOW EVAL: DIAGNOSIS
1) The pt demonstrates Oropharyngeal Swallowing abilities which subjectively appear to be grossly within functional parameters for age. NO behavioral aspiration signs exhibited on exam. Odynophagia was denied.    2) The pt was awake but somewhat fatigued. She was able to verbalize during communicative probes and in conversation. At these times, her speech integrity was negatively hindered by dry oral mucosa as well as fatigue. However, her motor speech abilities were felt to be functional. Further, her speech intelligibility was functional, her verbalizations were linguistically intact/contextually appropriate and pt was able to verbalize her intents.

## 2020-03-13 NOTE — SWALLOW BEDSIDE ASSESSMENT ADULT - COMMENTS
The pt was admitted to  with N/V and fever, She was diagnosed with gastroenteritis. Work up in progress. She has an underlying history of CAD s/p stent placement, A-Fib, AS, CHF, pulmonary hypertension, HLD, HTN, DM type 2, GERD, pancreatitis, pulmonary fibrosis, DVT s/p IVC filter placement, prior left TKR, previous choley, past appy, and prior hysterectomy. See below for additional prior medical information. The patient was admitted to  with N/V and fever, She was diagnosed with gastroenteritis. Work up in progress. She has an underlying history of CAD s/p stent placement, A-Fib, AS, CHF, pulmonary hypertension, HLD, HTN, DM type 2, GERD, pancreatitis, pulmonary fibrosis, DVT s/p IVC filter placement, prior left TKR, previous choley, past appy, and prior hysterectomy. See below for additional prior medical information.

## 2020-03-14 DIAGNOSIS — A08.11 ACUTE GASTROENTEROPATHY DUE TO NORWALK AGENT: ICD-10-CM

## 2020-03-14 DIAGNOSIS — J81.1 CHRONIC PULMONARY EDEMA: ICD-10-CM

## 2020-03-14 DIAGNOSIS — R19.7 DIARRHEA, UNSPECIFIED: ICD-10-CM

## 2020-03-14 LAB
ANION GAP SERPL CALC-SCNC: 3 MMOL/L — LOW (ref 5–17)
BASOPHILS # BLD AUTO: 0.02 K/UL — SIGNIFICANT CHANGE UP (ref 0–0.2)
BASOPHILS NFR BLD AUTO: 0.4 % — SIGNIFICANT CHANGE UP (ref 0–2)
BUN SERPL-MCNC: 21 MG/DL — SIGNIFICANT CHANGE UP (ref 7–23)
CALCIUM SERPL-MCNC: 8.6 MG/DL — SIGNIFICANT CHANGE UP (ref 8.5–10.1)
CHLORIDE SERPL-SCNC: 108 MMOL/L — SIGNIFICANT CHANGE UP (ref 96–108)
CO2 SERPL-SCNC: 26 MMOL/L — SIGNIFICANT CHANGE UP (ref 22–31)
CREAT SERPL-MCNC: 0.88 MG/DL — SIGNIFICANT CHANGE UP (ref 0.5–1.3)
EOSINOPHIL # BLD AUTO: 0.31 K/UL — SIGNIFICANT CHANGE UP (ref 0–0.5)
EOSINOPHIL NFR BLD AUTO: 6.6 % — HIGH (ref 0–6)
GLUCOSE SERPL-MCNC: 115 MG/DL — HIGH (ref 70–99)
HCT VFR BLD CALC: 35.8 % — SIGNIFICANT CHANGE UP (ref 34.5–45)
HGB BLD-MCNC: 11.5 G/DL — SIGNIFICANT CHANGE UP (ref 11.5–15.5)
IMM GRANULOCYTES NFR BLD AUTO: 0.6 % — SIGNIFICANT CHANGE UP (ref 0–1.5)
LYMPHOCYTES # BLD AUTO: 0.85 K/UL — LOW (ref 1–3.3)
LYMPHOCYTES # BLD AUTO: 18 % — SIGNIFICANT CHANGE UP (ref 13–44)
MCHC RBC-ENTMCNC: 32.1 GM/DL — SIGNIFICANT CHANGE UP (ref 32–36)
MCHC RBC-ENTMCNC: 32.2 PG — SIGNIFICANT CHANGE UP (ref 27–34)
MCV RBC AUTO: 100.3 FL — HIGH (ref 80–100)
MONOCYTES # BLD AUTO: 0.8 K/UL — SIGNIFICANT CHANGE UP (ref 0–0.9)
MONOCYTES NFR BLD AUTO: 16.9 % — HIGH (ref 2–14)
NEUTROPHILS # BLD AUTO: 2.71 K/UL — SIGNIFICANT CHANGE UP (ref 1.8–7.4)
NEUTROPHILS NFR BLD AUTO: 57.5 % — SIGNIFICANT CHANGE UP (ref 43–77)
PLATELET # BLD AUTO: 107 K/UL — LOW (ref 150–400)
POTASSIUM SERPL-MCNC: 4.3 MMOL/L — SIGNIFICANT CHANGE UP (ref 3.5–5.3)
POTASSIUM SERPL-SCNC: 4.3 MMOL/L — SIGNIFICANT CHANGE UP (ref 3.5–5.3)
RBC # BLD: 3.57 M/UL — LOW (ref 3.8–5.2)
RBC # FLD: 16.9 % — HIGH (ref 10.3–14.5)
SODIUM SERPL-SCNC: 137 MMOL/L — SIGNIFICANT CHANGE UP (ref 135–145)
WBC # BLD: 4.72 K/UL — SIGNIFICANT CHANGE UP (ref 3.8–10.5)
WBC # FLD AUTO: 4.72 K/UL — SIGNIFICANT CHANGE UP (ref 3.8–10.5)

## 2020-03-14 PROCEDURE — 99232 SBSQ HOSP IP/OBS MODERATE 35: CPT

## 2020-03-14 PROCEDURE — 99233 SBSQ HOSP IP/OBS HIGH 50: CPT

## 2020-03-14 RX ORDER — ACETAMINOPHEN 500 MG
1000 TABLET ORAL ONCE
Refills: 0 | Status: COMPLETED | OUTPATIENT
Start: 2020-03-14 | End: 2020-03-14

## 2020-03-14 RX ORDER — LIDOCAINE 4 G/100G
1 CREAM TOPICAL EVERY 24 HOURS
Refills: 0 | Status: DISCONTINUED | OUTPATIENT
Start: 2020-03-14 | End: 2020-03-18

## 2020-03-14 RX ORDER — MORPHINE SULFATE 50 MG/1
1 CAPSULE, EXTENDED RELEASE ORAL ONCE
Refills: 0 | Status: DISCONTINUED | OUTPATIENT
Start: 2020-03-14 | End: 2020-03-14

## 2020-03-14 RX ADMIN — Medication 50 MICROGRAM(S): at 06:11

## 2020-03-14 RX ADMIN — Medication 650 MILLIGRAM(S): at 21:24

## 2020-03-14 RX ADMIN — LIDOCAINE 1 PATCH: 4 CREAM TOPICAL at 19:30

## 2020-03-14 RX ADMIN — LIDOCAINE 1 PATCH: 4 CREAM TOPICAL at 17:58

## 2020-03-14 RX ADMIN — Medication 81 MILLIGRAM(S): at 11:41

## 2020-03-14 RX ADMIN — Medication 40 MILLIGRAM(S): at 13:52

## 2020-03-14 RX ADMIN — APIXABAN 5 MILLIGRAM(S): 2.5 TABLET, FILM COATED ORAL at 06:11

## 2020-03-14 RX ADMIN — Medication 1 TABLET(S): at 11:42

## 2020-03-14 RX ADMIN — Medication 400 MILLIGRAM(S): at 23:27

## 2020-03-14 RX ADMIN — SIMVASTATIN 40 MILLIGRAM(S): 20 TABLET, FILM COATED ORAL at 21:24

## 2020-03-14 RX ADMIN — Medication 25 MILLIGRAM(S): at 06:11

## 2020-03-14 RX ADMIN — APIXABAN 5 MILLIGRAM(S): 2.5 TABLET, FILM COATED ORAL at 17:57

## 2020-03-14 RX ADMIN — Medication 1: at 17:57

## 2020-03-14 RX ADMIN — Medication 650 MILLIGRAM(S): at 22:14

## 2020-03-14 RX ADMIN — SIMVASTATIN 40 MILLIGRAM(S): 20 TABLET, FILM COATED ORAL at 00:04

## 2020-03-14 RX ADMIN — Medication 20 MILLIGRAM(S): at 06:11

## 2020-03-14 RX ADMIN — Medication 200 MILLIGRAM(S): at 11:42

## 2020-03-14 RX ADMIN — FAMOTIDINE 20 MILLIGRAM(S): 10 INJECTION INTRAVENOUS at 11:42

## 2020-03-14 RX ADMIN — Medication 20 MILLIGRAM(S): at 17:58

## 2020-03-14 RX ADMIN — PANTOPRAZOLE SODIUM 40 MILLIGRAM(S): 20 TABLET, DELAYED RELEASE ORAL at 06:11

## 2020-03-14 RX ADMIN — ESCITALOPRAM OXALATE 10 MILLIGRAM(S): 10 TABLET, FILM COATED ORAL at 11:42

## 2020-03-14 NOTE — DIETITIAN INITIAL EVALUATION ADULT. - ENERGY NEEDS
Ht.  64  "      Wt. 119.9   #  (pt on an air mattress wt inaccurate). Will use IBW for needs.         BMI   20.6      IBW  125  #      Pt is at  96  %  IBW

## 2020-03-14 NOTE — DIETITIAN INITIAL EVALUATION ADULT. - PHYSICAL APPEARANCE
other (specify) NFPE Findings:  MUSCLE WASTING: MILD: ( x )Temporalis ( x ) Clavicle (x  )   Deltoid (x  )Scapula (x  ) Interosseous  FAT LOSS: MILD: ( x ) Ocular   Skin: coccyx stage 1 w/ +3 R/L ankle edema. Nathan score=16 (high risk for skin breakdown)

## 2020-03-14 NOTE — CHART NOTE - NSCHARTNOTEFT_GEN_A_CORE
Upon Nutritional Assessment by the Registered Dietitian your patient was determined to meet criteria / has evidence of the following diagnosis/diagnoses:          [ ]  Mild Protein Calorie Malnutrition        [ x]  Moderate Protein Calorie Malnutrition        [ ] Severe Protein Calorie Malnutrition        [ ] Unspecified Protein Calorie Malnutrition        [ ] Underweight / BMI <19        [ ] Morbid Obesity / BMI > 40      Findings as based on:  •  Comprehensive nutrition assessment and consultation  •  Calorie counts (nutrient intake analysis)  •  Food acceptance and intake status from observations by staff  •  Follow up  •  Patient education  •  Intervention secondary to interdisciplinary rounds  •   concerns      *********Malnutrition moderate PCM in context of acute on chronic illness.   Etiology AEB inability to consume adequate po intake to meet ENN 2/2 norovirus.   Signs/Symptoms emesis, diarrhea, poor po intake, mild muscle/fat wasting, stage 1 pressure ulcer, +3 edema.   Goal/Expected Outcome Optimal po intake to meet >80% of ENN. Reduced s/s of malnutrition.    · Additional Recommendations  1) liberalize diet to low sodium   2) monitor daily weights   3) monitor lytes/labs replete prn.   4) Maintain aspiration precautions, back of bed >35 degrees.   5) MVI w/ minerals to meet RDI's   6) obtain accurate weight and monitor daily   7) Gelatein plus jello TID (60 gm protein)           PROVIDER Section:     By signing this assessment you are acknowledging and agree with the diagnosis/diagnoses assigned by the Registered Dietitian    Comments:

## 2020-03-14 NOTE — DIETITIAN INITIAL EVALUATION ADULT. - ADD RECOMMEND
1) liberalize diet to low sodium 2) monitor daily weights 3) monitor lytes/labs replete prn. 4) Maintain aspiration precautions, back of bed >35 degrees. 5) MVI w/ minerals to meet RDI's 6) obtain accurate weight and monitor daily 7) Gelatein plus jello TID (60 gm protein)

## 2020-03-14 NOTE — DIETITIAN INITIAL EVALUATION ADULT. - OTHER INFO
89 y/o Female with h/o pancreatitis s/p ERCP, cirrhosis, DM type 2, A-fib on Eliquis, CAD s/p PCI, severe pulm HTN, chronic R heart failure, diastolic dysfunction, AS s/p TAVR, OA, HTN, dyslipidemia, hypothyroidism was admitted on 3/12 for nausea / vomiting / diarrhea. Patient states her symptoms started the day PTA, and is uncertain how many times she experienced vomiting / diarrhea. In ED, patient had a soft BM, but no diarrhea.   Low grade fever. Acute gastroenteritis. ?food poisoning. Pulmonary interstitial fibrosis. Pt with fair po intake </=50% of po consumption. Breakfast reported by aide 50% intake. Pt reports weight loss however due to cognitive status unable to provide how much and duration. A1C 6.9%, POCT range: 116-162mg/dl. Suggest liberalizing diet due to poor intake and advancing age. Pt denies any difficulty chewing or swallowing. Pt willing to consume additional protein supplement Jello (60gm protein). Current weight inaccurate due to bed bound at this time Utilized IBW for nutrient needs. Request accurate weight for further weight assessment. Will follow up prn.

## 2020-03-14 NOTE — DIETITIAN INITIAL EVALUATION ADULT. - PERTINENT MEDS FT
MEDICATIONS  (STANDING):  allopurinol  Oral Tab/Cap - Peds 200 milliGRAM(s) Oral daily  apixaban 5 milliGRAM(s) Oral every 12 hours  aspirin  chewable 81 milliGRAM(s) Oral daily  dextrose 5%. 1000 milliLiter(s) (50 mL/Hr) IV Continuous <Continuous>  dextrose 50% Injectable 12.5 Gram(s) IV Push once  dextrose 50% Injectable 25 Gram(s) IV Push once  dextrose 50% Injectable 25 Gram(s) IV Push once  escitalopram 10 milliGRAM(s) Oral daily  famotidine    Tablet 20 milliGRAM(s) Oral daily  insulin lispro (HumaLOG) corrective regimen sliding scale   SubCutaneous three times a day before meals  levothyroxine 50 MICROGram(s) Oral daily  metoprolol succinate ER 25 milliGRAM(s) Oral daily  morphine  - Injectable 1 milliGRAM(s) IV Push once  multivitamin 1 Tablet(s) Oral daily  pantoprazole    Tablet 40 milliGRAM(s) Oral before breakfast  sildenafil (REVATIO) 20 milliGRAM(s) Oral two times a day  simvastatin 40 milliGRAM(s) Oral at bedtime  torsemide 40 milliGRAM(s) Oral daily    MEDICATIONS  (PRN):  acetaminophen   Tablet .. 650 milliGRAM(s) Oral every 4 hours PRN Mild Pain (1 - 3)  dextrose 40% Gel 15 Gram(s) Oral once PRN Blood Glucose LESS THAN 70 milliGRAM(s)/deciliter  glucagon  Injectable 1 milliGRAM(s) IntraMuscular once PRN Glucose LESS THAN 70 milligrams/deciliter

## 2020-03-14 NOTE — DIETITIAN INITIAL EVALUATION ADULT. - PERTINENT LABORATORY DATA
03-14 Na137 mmol/L Glu 115 mg/dL<H> K+ 4.3 mmol/L Cr  0.88 mg/dL BUN 21 mg/dL Phos n/a   Alb n/a   PAB n/a

## 2020-03-15 LAB
ALBUMIN SERPL ELPH-MCNC: 2.2 G/DL — LOW (ref 3.3–5)
ALP SERPL-CCNC: 71 U/L — SIGNIFICANT CHANGE UP (ref 40–120)
ALT FLD-CCNC: 16 U/L — SIGNIFICANT CHANGE UP (ref 12–78)
ANION GAP SERPL CALC-SCNC: 5 MMOL/L — SIGNIFICANT CHANGE UP (ref 5–17)
AST SERPL-CCNC: 21 U/L — SIGNIFICANT CHANGE UP (ref 15–37)
BASOPHILS # BLD AUTO: 0.03 K/UL — SIGNIFICANT CHANGE UP (ref 0–0.2)
BASOPHILS NFR BLD AUTO: 0.6 % — SIGNIFICANT CHANGE UP (ref 0–2)
BILIRUB SERPL-MCNC: 0.4 MG/DL — SIGNIFICANT CHANGE UP (ref 0.2–1.2)
BUN SERPL-MCNC: 17 MG/DL — SIGNIFICANT CHANGE UP (ref 7–23)
CALCIUM SERPL-MCNC: 8 MG/DL — LOW (ref 8.5–10.1)
CHLORIDE SERPL-SCNC: 103 MMOL/L — SIGNIFICANT CHANGE UP (ref 96–108)
CO2 SERPL-SCNC: 27 MMOL/L — SIGNIFICANT CHANGE UP (ref 22–31)
CREAT SERPL-MCNC: 0.81 MG/DL — SIGNIFICANT CHANGE UP (ref 0.5–1.3)
EOSINOPHIL # BLD AUTO: 0.35 K/UL — SIGNIFICANT CHANGE UP (ref 0–0.5)
EOSINOPHIL NFR BLD AUTO: 7.5 % — HIGH (ref 0–6)
GLUCOSE SERPL-MCNC: 123 MG/DL — HIGH (ref 70–99)
HCT VFR BLD CALC: 31.5 % — LOW (ref 34.5–45)
HGB BLD-MCNC: 10.2 G/DL — LOW (ref 11.5–15.5)
IMM GRANULOCYTES NFR BLD AUTO: 0.4 % — SIGNIFICANT CHANGE UP (ref 0–1.5)
LYMPHOCYTES # BLD AUTO: 0.86 K/UL — LOW (ref 1–3.3)
LYMPHOCYTES # BLD AUTO: 18.5 % — SIGNIFICANT CHANGE UP (ref 13–44)
MCHC RBC-ENTMCNC: 32 PG — SIGNIFICANT CHANGE UP (ref 27–34)
MCHC RBC-ENTMCNC: 32.4 GM/DL — SIGNIFICANT CHANGE UP (ref 32–36)
MCV RBC AUTO: 98.7 FL — SIGNIFICANT CHANGE UP (ref 80–100)
MONOCYTES # BLD AUTO: 0.66 K/UL — SIGNIFICANT CHANGE UP (ref 0–0.9)
MONOCYTES NFR BLD AUTO: 14.2 % — HIGH (ref 2–14)
NEUTROPHILS # BLD AUTO: 2.73 K/UL — SIGNIFICANT CHANGE UP (ref 1.8–7.4)
NEUTROPHILS NFR BLD AUTO: 58.8 % — SIGNIFICANT CHANGE UP (ref 43–77)
NT-PROBNP SERPL-SCNC: 4879 PG/ML — HIGH (ref 0–450)
PLATELET # BLD AUTO: 100 K/UL — LOW (ref 150–400)
POTASSIUM SERPL-MCNC: 3.4 MMOL/L — LOW (ref 3.5–5.3)
POTASSIUM SERPL-SCNC: 3.4 MMOL/L — LOW (ref 3.5–5.3)
PROT SERPL-MCNC: 5.8 GM/DL — LOW (ref 6–8.3)
RBC # BLD: 3.19 M/UL — LOW (ref 3.8–5.2)
RBC # FLD: 16.3 % — HIGH (ref 10.3–14.5)
SODIUM SERPL-SCNC: 135 MMOL/L — SIGNIFICANT CHANGE UP (ref 135–145)
WBC # BLD: 4.65 K/UL — SIGNIFICANT CHANGE UP (ref 3.8–10.5)
WBC # FLD AUTO: 4.65 K/UL — SIGNIFICANT CHANGE UP (ref 3.8–10.5)

## 2020-03-15 PROCEDURE — 99233 SBSQ HOSP IP/OBS HIGH 50: CPT

## 2020-03-15 PROCEDURE — 99232 SBSQ HOSP IP/OBS MODERATE 35: CPT

## 2020-03-15 RX ORDER — TRAMADOL HYDROCHLORIDE 50 MG/1
50 TABLET ORAL EVERY 12 HOURS
Refills: 0 | Status: DISCONTINUED | OUTPATIENT
Start: 2020-03-15 | End: 2020-03-18

## 2020-03-15 RX ORDER — MORPHINE SULFATE 50 MG/1
2 CAPSULE, EXTENDED RELEASE ORAL EVERY 6 HOURS
Refills: 0 | Status: DISCONTINUED | OUTPATIENT
Start: 2020-03-15 | End: 2020-03-15

## 2020-03-15 RX ORDER — POTASSIUM CHLORIDE 20 MEQ
40 PACKET (EA) ORAL ONCE
Refills: 0 | Status: COMPLETED | OUTPATIENT
Start: 2020-03-15 | End: 2020-03-15

## 2020-03-15 RX ADMIN — Medication 81 MILLIGRAM(S): at 11:26

## 2020-03-15 RX ADMIN — FAMOTIDINE 20 MILLIGRAM(S): 10 INJECTION INTRAVENOUS at 11:26

## 2020-03-15 RX ADMIN — Medication 40 MILLIEQUIVALENT(S): at 11:26

## 2020-03-15 RX ADMIN — Medication 1 TABLET(S): at 11:26

## 2020-03-15 RX ADMIN — APIXABAN 5 MILLIGRAM(S): 2.5 TABLET, FILM COATED ORAL at 05:46

## 2020-03-15 RX ADMIN — PANTOPRAZOLE SODIUM 40 MILLIGRAM(S): 20 TABLET, DELAYED RELEASE ORAL at 05:47

## 2020-03-15 RX ADMIN — Medication 1000 MILLIGRAM(S): at 00:27

## 2020-03-15 RX ADMIN — Medication 200 MILLIGRAM(S): at 11:27

## 2020-03-15 RX ADMIN — Medication 25 MILLIGRAM(S): at 05:47

## 2020-03-15 RX ADMIN — ESCITALOPRAM OXALATE 10 MILLIGRAM(S): 10 TABLET, FILM COATED ORAL at 11:26

## 2020-03-15 RX ADMIN — LIDOCAINE 1 PATCH: 4 CREAM TOPICAL at 05:00

## 2020-03-15 RX ADMIN — Medication 1: at 11:27

## 2020-03-15 RX ADMIN — LIDOCAINE 1 PATCH: 4 CREAM TOPICAL at 19:30

## 2020-03-15 RX ADMIN — Medication 20 MILLIGRAM(S): at 17:35

## 2020-03-15 RX ADMIN — Medication 40 MILLIGRAM(S): at 11:27

## 2020-03-15 RX ADMIN — APIXABAN 5 MILLIGRAM(S): 2.5 TABLET, FILM COATED ORAL at 17:35

## 2020-03-15 RX ADMIN — Medication 50 MICROGRAM(S): at 05:46

## 2020-03-15 RX ADMIN — LIDOCAINE 1 PATCH: 4 CREAM TOPICAL at 11:25

## 2020-03-15 RX ADMIN — Medication 20 MILLIGRAM(S): at 05:46

## 2020-03-15 RX ADMIN — SIMVASTATIN 40 MILLIGRAM(S): 20 TABLET, FILM COATED ORAL at 21:11

## 2020-03-15 RX ADMIN — LIDOCAINE 1 PATCH: 4 CREAM TOPICAL at 23:21

## 2020-03-16 LAB
ANION GAP SERPL CALC-SCNC: 5 MMOL/L — SIGNIFICANT CHANGE UP (ref 5–17)
BUN SERPL-MCNC: 11 MG/DL — SIGNIFICANT CHANGE UP (ref 7–23)
CALCIUM SERPL-MCNC: 8.2 MG/DL — LOW (ref 8.5–10.1)
CHLORIDE SERPL-SCNC: 105 MMOL/L — SIGNIFICANT CHANGE UP (ref 96–108)
CO2 SERPL-SCNC: 28 MMOL/L — SIGNIFICANT CHANGE UP (ref 22–31)
CREAT SERPL-MCNC: 0.7 MG/DL — SIGNIFICANT CHANGE UP (ref 0.5–1.3)
GLUCOSE SERPL-MCNC: 145 MG/DL — HIGH (ref 70–99)
POTASSIUM SERPL-MCNC: 3.9 MMOL/L — SIGNIFICANT CHANGE UP (ref 3.5–5.3)
POTASSIUM SERPL-SCNC: 3.9 MMOL/L — SIGNIFICANT CHANGE UP (ref 3.5–5.3)
SODIUM SERPL-SCNC: 138 MMOL/L — SIGNIFICANT CHANGE UP (ref 135–145)

## 2020-03-16 PROCEDURE — 73030 X-RAY EXAM OF SHOULDER: CPT | Mod: 26,LT

## 2020-03-16 PROCEDURE — 99232 SBSQ HOSP IP/OBS MODERATE 35: CPT

## 2020-03-16 PROCEDURE — 99233 SBSQ HOSP IP/OBS HIGH 50: CPT

## 2020-03-16 RX ORDER — OXYCODONE AND ACETAMINOPHEN 5; 325 MG/1; MG/1
1 TABLET ORAL ONCE
Refills: 0 | Status: DISCONTINUED | OUTPATIENT
Start: 2020-03-16 | End: 2020-03-16

## 2020-03-16 RX ADMIN — TRAMADOL HYDROCHLORIDE 50 MILLIGRAM(S): 50 TABLET ORAL at 18:18

## 2020-03-16 RX ADMIN — Medication 81 MILLIGRAM(S): at 11:27

## 2020-03-16 RX ADMIN — LIDOCAINE 1 PATCH: 4 CREAM TOPICAL at 22:37

## 2020-03-16 RX ADMIN — APIXABAN 5 MILLIGRAM(S): 2.5 TABLET, FILM COATED ORAL at 06:01

## 2020-03-16 RX ADMIN — ESCITALOPRAM OXALATE 10 MILLIGRAM(S): 10 TABLET, FILM COATED ORAL at 11:27

## 2020-03-16 RX ADMIN — Medication 40 MILLIGRAM(S): at 11:27

## 2020-03-16 RX ADMIN — FAMOTIDINE 20 MILLIGRAM(S): 10 INJECTION INTRAVENOUS at 11:27

## 2020-03-16 RX ADMIN — OXYCODONE AND ACETAMINOPHEN 1 TABLET(S): 5; 325 TABLET ORAL at 22:26

## 2020-03-16 RX ADMIN — Medication 50 MICROGRAM(S): at 06:01

## 2020-03-16 RX ADMIN — OXYCODONE AND ACETAMINOPHEN 1 TABLET(S): 5; 325 TABLET ORAL at 22:59

## 2020-03-16 RX ADMIN — LIDOCAINE 1 PATCH: 4 CREAM TOPICAL at 17:41

## 2020-03-16 RX ADMIN — Medication 200 MILLIGRAM(S): at 11:27

## 2020-03-16 RX ADMIN — APIXABAN 5 MILLIGRAM(S): 2.5 TABLET, FILM COATED ORAL at 17:41

## 2020-03-16 RX ADMIN — TRAMADOL HYDROCHLORIDE 50 MILLIGRAM(S): 50 TABLET ORAL at 17:48

## 2020-03-16 RX ADMIN — SIMVASTATIN 40 MILLIGRAM(S): 20 TABLET, FILM COATED ORAL at 22:28

## 2020-03-16 RX ADMIN — PANTOPRAZOLE SODIUM 40 MILLIGRAM(S): 20 TABLET, DELAYED RELEASE ORAL at 06:01

## 2020-03-16 RX ADMIN — Medication 20 MILLIGRAM(S): at 17:41

## 2020-03-16 RX ADMIN — Medication 20 MILLIGRAM(S): at 06:01

## 2020-03-16 RX ADMIN — Medication 1 TABLET(S): at 11:27

## 2020-03-16 RX ADMIN — Medication 25 MILLIGRAM(S): at 06:01

## 2020-03-16 NOTE — PHYSICAL THERAPY INITIAL EVALUATION ADULT - ADDITIONAL COMMENTS
Patient reported sons x2 are involved in her care and assist with shopping, cooking. info obtained from eval 6/2019.

## 2020-03-16 NOTE — PHYSICAL THERAPY INITIAL EVALUATION ADULT - LEVEL OF INDEPENDENCE: SUPINE/SIT, REHAB EVAL
Pt did not want to attempt OOB at this time secondary to fatigue. Pt did not want to attempt OOB at this time secondary to fatigue and 9/10 pain left shoulder.

## 2020-03-16 NOTE — PHYSICAL THERAPY INITIAL EVALUATION ADULT - PERTINENT HX OF CURRENT PROBLEM, REHAB EVAL
Pt admitted to  secondary to diarrhea, vomiting, and left shoulder pain. CT chest,CT abd/pelvis: dilated and fluid filled stomach. no evidence of bowel obstruction.

## 2020-03-16 NOTE — PHYSICAL THERAPY INITIAL EVALUATION ADULT - ACTIVE RANGE OF MOTION EXAMINATION, REHAB EVAL
Left UE unable to assess secondary to pain. Bilateral hip flex ~ 90 degrees, and bilateral DF neutral./deficits as listed below/Left LE Active ROM was WFL (within functional limits)/Right UE Active ROM was WFL (within functional limits)/Right LE Active ROM was WFL (within functional limits)

## 2020-03-16 NOTE — PHYSICAL THERAPY INITIAL EVALUATION ADULT - MANUAL MUSCLE TESTING RESULTS, REHAB EVAL
Right UE strength 4/5 throughout grossly. Left UE strength unable to fully assess at this time. Bilateral LE strength 3-3+/5 throughout grossly.

## 2020-03-16 NOTE — H&P ADULT - PMH
Afib    CAD (coronary artery disease)    Diabetes Mellitus Type II    Dyslipidemia    GERD (Gastroesophageal Reflux Disease)    History of Atrial Fibrillation    History of Osteoarthritis    HLD (hyperlipidemia)    Hypertension    Hypothyroid No

## 2020-03-17 LAB
ANION GAP SERPL CALC-SCNC: 6 MMOL/L — SIGNIFICANT CHANGE UP (ref 5–17)
BUN SERPL-MCNC: 14 MG/DL — SIGNIFICANT CHANGE UP (ref 7–23)
CALCIUM SERPL-MCNC: 8.6 MG/DL — SIGNIFICANT CHANGE UP (ref 8.5–10.1)
CHLORIDE SERPL-SCNC: 104 MMOL/L — SIGNIFICANT CHANGE UP (ref 96–108)
CO2 SERPL-SCNC: 28 MMOL/L — SIGNIFICANT CHANGE UP (ref 22–31)
CORE LAB BIOPSY: NORMAL
CREAT SERPL-MCNC: 0.65 MG/DL — SIGNIFICANT CHANGE UP (ref 0.5–1.3)
GLUCOSE SERPL-MCNC: 136 MG/DL — HIGH (ref 70–99)
NT-PROBNP SERPL-SCNC: 3294 PG/ML — HIGH (ref 0–450)
POTASSIUM SERPL-MCNC: 3.5 MMOL/L — SIGNIFICANT CHANGE UP (ref 3.5–5.3)
POTASSIUM SERPL-SCNC: 3.5 MMOL/L — SIGNIFICANT CHANGE UP (ref 3.5–5.3)
SODIUM SERPL-SCNC: 138 MMOL/L — SIGNIFICANT CHANGE UP (ref 135–145)

## 2020-03-17 PROCEDURE — 99232 SBSQ HOSP IP/OBS MODERATE 35: CPT

## 2020-03-17 PROCEDURE — 71250 CT THORAX DX C-: CPT | Mod: 26

## 2020-03-17 PROCEDURE — 99233 SBSQ HOSP IP/OBS HIGH 50: CPT

## 2020-03-17 RX ADMIN — PANTOPRAZOLE SODIUM 40 MILLIGRAM(S): 20 TABLET, DELAYED RELEASE ORAL at 06:17

## 2020-03-17 RX ADMIN — TRAMADOL HYDROCHLORIDE 50 MILLIGRAM(S): 50 TABLET ORAL at 10:27

## 2020-03-17 RX ADMIN — APIXABAN 5 MILLIGRAM(S): 2.5 TABLET, FILM COATED ORAL at 06:27

## 2020-03-17 RX ADMIN — ESCITALOPRAM OXALATE 10 MILLIGRAM(S): 10 TABLET, FILM COATED ORAL at 10:27

## 2020-03-17 RX ADMIN — Medication 40 MILLIGRAM(S): at 10:28

## 2020-03-17 RX ADMIN — SIMVASTATIN 40 MILLIGRAM(S): 20 TABLET, FILM COATED ORAL at 22:07

## 2020-03-17 RX ADMIN — Medication 20 MILLIGRAM(S): at 06:17

## 2020-03-17 RX ADMIN — Medication 1 TABLET(S): at 10:27

## 2020-03-17 RX ADMIN — Medication 20 MILLIGRAM(S): at 17:26

## 2020-03-17 RX ADMIN — FAMOTIDINE 20 MILLIGRAM(S): 10 INJECTION INTRAVENOUS at 10:27

## 2020-03-17 RX ADMIN — Medication 200 MILLIGRAM(S): at 10:27

## 2020-03-17 RX ADMIN — Medication 1: at 12:14

## 2020-03-17 RX ADMIN — LIDOCAINE 1 PATCH: 4 CREAM TOPICAL at 06:23

## 2020-03-17 RX ADMIN — Medication 25 MILLIGRAM(S): at 06:17

## 2020-03-17 RX ADMIN — LIDOCAINE 1 PATCH: 4 CREAM TOPICAL at 17:27

## 2020-03-17 RX ADMIN — Medication 50 MICROGRAM(S): at 06:17

## 2020-03-17 RX ADMIN — Medication 81 MILLIGRAM(S): at 10:27

## 2020-03-17 RX ADMIN — APIXABAN 5 MILLIGRAM(S): 2.5 TABLET, FILM COATED ORAL at 17:27

## 2020-03-17 RX ADMIN — LIDOCAINE 1 PATCH: 4 CREAM TOPICAL at 20:00

## 2020-03-17 NOTE — PROGRESS NOTE ADULT - ASSESSMENT
- has norovirus  - no diarrhea today  - CT scan consistent with pulmonary edema. No pneumonia  - restart torsemide 40mg once per day  - BNP > 6K  - labs in am
- has norovirus  - no diarrhea today  - CT scan consistent with pulmonary edema. No pneumonia  - restart torsemide 40mg once per day  - supplement K+  - BNP down  - on eliquis  - labs in am
1. Chronic HFPEF- based on BNP she appears mildly volume overloaded.  Volume status assesment is difficult based on physical exam in light of her obesity.  Avoid IVF. Strict I/Os,   Avoid diuretics with diarrhea.    2. Diarrhea , vomiting- Improved.  ID team input appreciated.    3. Atrial fibrillation- continue full dose anticoagulation.  Rate controlled based on exa.    4. DVT proph.
89 y/o F with PMH of pancreatitis s/p ERCP, cirrhosis, DM2, a-fib on Eliquis, CAD s/p PCI, severe pulm HTN, chronic R heart failure, diastolic dysfunction, AS s/p TAVR, OA, HTN, dyslipidemia, hypothyroidism, p/w nausea / vomiting / diarrhea    *Pulm edema.   -on torsemide  -Pneumonia ruled out. RVP neg.   -Lymphadenopathy - f/u outpatient Pulm to check for resolution after infection improves. If not, will need further work up.   -cardiology and pulm consults appreciated  -d/w Dr. Camacho who recommends ordering CT chest w/o contrast for tomorrow to f/u mosaic pattern and edema. 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 gets steroid injections every 2 weeks  -pain meds: tylenol prn, tramadol prn    *A-fib  -C/w Eliquis  -Rate controlled     *DM2  -Humalog ISS  -Oral meds held temporarily     H/o Pancreatitis / cirrhosis / OA / HTN / dyslipidemia / hypothyroidism / thrombocytopenia   -C/w home meds and f/u outpatient for further management     *Renal cysts noted on CT   -F/u outpatient for further management     *DVT ppx   -On Eliquis          all questions answered
89 y/o Female with h/o pancreatitis s/p ERCP, cirrhosis, DM type 2, A-fib on Eliquis, CAD s/p PCI, severe pulm HTN, chronic R heart failure, diastolic dysfunction, AS s/p TAVR, OA, HTN, dyslipidemia, hypothyroidism was admitted on 3/12 for nausea / vomiting / diarrhea. Patient states her symptoms started the day PTA, and is uncertain how many times she experienced vomiting / diarrhea. In ED, patient had a soft BM, but no diarrhea. Also dry heaved. Patient denies any cough, fever, chills, runny nose, sore throat. Denies CP, SOB. In ER she received vancomycin, ceftriaxone and aztreonam.    1. Low grade fever resolved. Acute gastroenteritis with norovirus. Pulmonary interstitial fibrosis. Allergy to PCN.  -obtain stool for GI PCR  -no fever, no cough or SOB  -no lobar pulmonary infiltrates noted; doubt bacterial or viral pneumonia  -contact isolation  -continue to observe off abx therapy   -abdominal symptoms are resolving  -monitor temps  -f/u CBC  -supportive care  2. Other issues:   -care per medicine
89 y/o F with PMH of pancreatitis s/p ERCP, cirrhosis, DM2, a-fib on Eliquis, CAD s/p PCI, severe pulm HTN, chronic R heart failure, diastolic dysfunction, AS s/p TAVR, OA, HTN, dyslipidemia, hypothyroidism, p/w nausea / vomiting / diarrhea    *Pulm edema.   -BNP trending down  -on torsemide, pulm rec's changing back to home dose upon discharge to rehab  -Pneumonia ruled out. RVP neg.   -Lymphadenopathy - f/u outpatient Pulm to check for resolution after infection improves. If not, will need further work up.   -cardiology and pulm consults appreciated  -d/w Dr. Camacho, repeat CT chest today shows improvement compared to admission.      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 gets steroid injections every 2 weeks  -pain meds: tylenol prn, tramadol prn    *A-fib  -C/w Eliquis  -Rate controlled     *DM2  -Humalog ISS  -Oral meds held temporarily     H/o Pancreatitis / cirrhosis / OA / HTN / dyslipidemia / hypothyroidism / thrombocytopenia   -C/w home meds and f/u outpatient for further management     *Renal cysts noted on CT   -F/u outpatient for further management     *DVT ppx   -On Eliquis          all questions answered     Dispo: plan to discharge to [facility], however, [facility] unable to accept patient today. [facility] want 24hrs off of norovirus isolation before accepting patient, so d/c tomorrow
89 y/o F with PMH of pancreatitis s/p ERCP, cirrhosis, DM2, a-fib on Eliquis, CAD s/p PCI, severe pulm HTN, chronic R heart failure, diastolic dysfunction, AS s/p TAVR, OA, HTN, dyslipidemia, hypothyroidism, p/w nausea / vomiting / diarrhea    *Pulm edema.   -on torsemide  -Pneumonia ruled out. RVP neg.   -Lymphadenopathy - f/u outpatient Pulm to check for resolution after infection improves. If not, will need further work up.   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 gets steroid injections every 2 weeks  -pain meds: tylenol prn, tramadol prn    *A-fib  -C/w Eliquis  -Rate controlled     *DM2  -Humalog ISS  -Oral meds held temporarily     H/o Pancreatitis / cirrhosis / OA / HTN / dyslipidemia / hypothyroidism / thrombocytopenia   -C/w home meds and f/u outpatient for further management     *Renal cysts noted on CT   -F/u outpatient for further management     *DVT ppx   -On Eliquis          all questions answered
89 y/o F with PMH of pancreatitis s/p ERCP, cirrhosis, DM2, a-fib on Eliquis, CAD s/p PCI, severe pulm HTN, chronic R heart failure, diastolic dysfunction, AS s/p TAVR, OA, HTN, dyslipidemia, hypothyroidism, p/w nausea / vomiting / diarrhea    *Pulm edema.   -started on lasix per pulmonary. monitor BP's  -Pneumonia ruled out. RVP neg.   -Lymphadenopathy - f/u outpatient Pulm to check for resolution after infection improves. If not, will need further work up.   -cardiology and pulm consults appreciated    *Norovirus enteritis, N/V/Diarrhea  -Zofran PRN  -GI pcr positive for noro  -supportive care, isolation precautions   -monitor off antbx  -ID consult appreciated    *A-fib  -C/w Eliquis  -Rate controlled     *DM2  -Humalog ISS  -Oral meds held temporarily     H/o Pancreatitis / cirrhosis / OA / HTN / dyslipidemia / hypothyroidism / thrombocytopenia   -C/w home meds and f/u outpatient for further management     *Renal cysts noted on CT   -F/u outpatient for further management     *DVT ppx   -On Eliquis          all questions answered
Chronic HFPEF- based on BNP she appears mildly volume overloaded.  Volume status assesment is difficult based on physical exam in light of her obesity.  Diarrhea resolved.  Restart home dose of torsemide.  She will need close monitoring of the volume status after discharge.       Diarrhea , vomiting- resolved  ID team input appreciated.    Atrial fibrillation- continue full dose anticoagulation.  Rate controlled based on exa.      Other medical issues- Management per primary team.   Thank you for allowing me to participate in the care of this patient. Please feel free to contact me with any questions.

## 2020-03-17 NOTE — PROGRESS NOTE ADULT - NSHPATTENDINGPLANDISCUSS_GEN_ALL_CORE
Dr. Mir
RN and patient. d/w pt's son on the phone
RN and patient. d/w pt's son on the phone
RN and patient. d/w pt's son on the phone. D/w Dr. Hwang and Dr. Camacho
RN and patient. d/w pt's son, Dr. Mojica, on the phone. D/w Dr. Camacho

## 2020-03-17 NOTE — PROGRESS NOTE ADULT - REASON FOR ADMISSION
N/V + Diarrhea

## 2020-03-17 NOTE — PROGRESS NOTE ADULT - SUBJECTIVE AND OBJECTIVE BOX
CC/reason for follow up: diarrhea    S: no more diarrhea or vomiting. still having L shoulder pain    ROS: no chest pain, no dyspnea    T(C): 36.7 (03-15-20 @ 21:00), Max: 36.7 (03-15-20 @ 21:00)  HR: 83 (03-15-20 @ 21:00) (69 - 105)  BP: 115/- (03-15-20 @ 21:00) (101/60 - 115/59)  RR: 18 (03-15-20 @ 21:00) (18 - 18)  SpO2: 95% (03-15-20 @ 21:00) (95% - 98%)    Gen - Pleasant, cooperative, in no acute distress  HEENT- PERRL, moist mucus membranes, OP clear  CV - irregularly irregular, No m/r/g, +pulses, no edema  Lungs - Good effort, Clear to auscultation bilaterally  Abdomen - Soft, nontender, nondistended, +BS, No rebound/rigidity/guarding  Ext - No cyanosis/clubbing. LUE discomfort w/ movement, limited ROM  Skin - No rashes, No jaundice. several ecchymotic areas on extremities  Psych- Alert & oriented x 3  Neuro- fluent speech, no facial droop, EOMI. moves all ext    MEDICATIONS  (STANDING):  allopurinol  Oral Tab/Cap - Peds 200 milliGRAM(s) Oral daily  apixaban 5 milliGRAM(s) Oral every 12 hours  aspirin  chewable 81 milliGRAM(s) Oral daily  dextrose 5%. 1000 milliLiter(s) (50 mL/Hr) IV Continuous <Continuous>  dextrose 50% Injectable 12.5 Gram(s) IV Push once  dextrose 50% Injectable 25 Gram(s) IV Push once  dextrose 50% Injectable 25 Gram(s) IV Push once  escitalopram 10 milliGRAM(s) Oral daily  famotidine    Tablet 20 milliGRAM(s) Oral daily  insulin lispro (HumaLOG) corrective regimen sliding scale   SubCutaneous three times a day before meals  levothyroxine 50 MICROGram(s) Oral daily  lidocaine   Patch 1 Patch Transdermal every 24 hours  metoprolol succinate ER 25 milliGRAM(s) Oral daily  multivitamin 1 Tablet(s) Oral daily  pantoprazole    Tablet 40 milliGRAM(s) Oral before breakfast  sildenafil (REVATIO) 20 milliGRAM(s) Oral two times a day  simvastatin 40 milliGRAM(s) Oral at bedtime  torsemide 40 milliGRAM(s) Oral daily    MEDICATIONS  (PRN):  acetaminophen   Tablet .. 650 milliGRAM(s) Oral every 4 hours PRN Mild Pain (1 - 3)  dextrose 40% Gel 15 Gram(s) Oral once PRN Blood Glucose LESS THAN 70 milliGRAM(s)/deciliter  glucagon  Injectable 1 milliGRAM(s) IntraMuscular once PRN Glucose LESS THAN 70 milligrams/deciliter      Diagnostic studies: personally reviewed  LABS: All Labs Reviewed:                        11.5   4.72  )-----------( 107      ( 14 Mar 2020 06:54 )             35.8     03-14    137  |  108  |  21  ----------------------------<  115<H>  4.3   |  26  |  0.88    Ca    8.6      14 Mar 2020 06:54  Phos  3.1     03-13  Mg     2.1     03-13    TPro  6.4  /  Alb  2.6<L>  /  TBili  0.6  /  DBili  x   /  AST  17  /  ALT  16  /  AlkPhos  75  03-13    PT/INR - ( 13 Mar 2020 07:17 )   PT: 22.0 sec;   INR: 1.94 ratio         PTT - ( 13 Mar 2020 07:17 )  PTT:31.8 sec        Blood Culture: 03-13 @ 11:27  Organism --  Gram Stain Blood -- Gram Stain --  Specimen Source .Stool Feces  Culture-Blood --    03-12 @ 22:26  Organism --  Gram Stain Blood -- Gram Stain --  Specimen Source .Blood None  Culture-Blood --      RADIOLOGY/EKG:    < from: CT Chest No Cont (03.12.20 @ 19:01) >  IMPRESSION:     CHEST:  Diffuse bilateral groundglass opacities worsened since June 23, 2019. Findings could be due to due to worsening interstitial lung disease with acute inflammatory component versus pneumonia.  Pulmonary edema is also a possibility although lack of pleural fluid speaks against this diagnosis.    ABDOMEN/PELVIS:  Markedly dilated and fluid-filled stomach. No evidence of bowel obstruction.    < end of copied text >
CC/reason for follow up: diarrhea    S: no n/v/d. asking for L shoulder xray, still has shoulder pain    ROS: no chest pain, no dyspnea    T(C): 36.7 (03-16-20 @ 12:13), Max: 36.7 (03-15-20 @ 21:00)  HR: 65 (03-16-20 @ 12:13) (65 - 83)  BP: 123/64 (03-16-20 @ 12:13) (115/60 - 123/64)  RR: 18 (03-16-20 @ 12:13) (16 - 18)  SpO2: 100% (03-16-20 @ 12:13) (95% - 100%)    Gen - Pleasant, cooperative, in no acute distress  HEENT- PERRL, moist mucus membranes, OP clear  CV - irregularly irregular, No m/r/g, +pulses, no edema  Lungs - Good effort, Crackles in the bases bilaterally  Abdomen - Soft, nontender, nondistended, +BS, No rebound/rigidity/guarding  Ext - No cyanosis/clubbing. LUE discomfort w/ movement, limited ROM  Skin - No rashes, No jaundice. several ecchymotic areas on extremities  Psych- Alert & oriented x 3  Neuro- fluent speech, no facial droop, EOMI. moves all ext    MEDICATIONS  (STANDING):  allopurinol  Oral Tab/Cap - Peds 200 milliGRAM(s) Oral daily  apixaban 5 milliGRAM(s) Oral every 12 hours  aspirin  chewable 81 milliGRAM(s) Oral daily  dextrose 5%. 1000 milliLiter(s) (50 mL/Hr) IV Continuous <Continuous>  dextrose 50% Injectable 12.5 Gram(s) IV Push once  dextrose 50% Injectable 25 Gram(s) IV Push once  dextrose 50% Injectable 25 Gram(s) IV Push once  escitalopram 10 milliGRAM(s) Oral daily  famotidine    Tablet 20 milliGRAM(s) Oral daily  insulin lispro (HumaLOG) corrective regimen sliding scale   SubCutaneous three times a day before meals  levothyroxine 50 MICROGram(s) Oral daily  lidocaine   Patch 1 Patch Transdermal every 24 hours  metoprolol succinate ER 25 milliGRAM(s) Oral daily  multivitamin 1 Tablet(s) Oral daily  pantoprazole    Tablet 40 milliGRAM(s) Oral before breakfast  sildenafil (REVATIO) 20 milliGRAM(s) Oral two times a day  simvastatin 40 milliGRAM(s) Oral at bedtime  torsemide 40 milliGRAM(s) Oral daily    MEDICATIONS  (PRN):  acetaminophen   Tablet .. 650 milliGRAM(s) Oral every 4 hours PRN Mild Pain (1 - 3)  dextrose 40% Gel 15 Gram(s) Oral once PRN Blood Glucose LESS THAN 70 milliGRAM(s)/deciliter  glucagon  Injectable 1 milliGRAM(s) IntraMuscular once PRN Glucose LESS THAN 70 milligrams/deciliter      Diagnostic studies: personally reviewed  LABS: All Labs Reviewed:                        11.5   4.72  )-----------( 107      ( 14 Mar 2020 06:54 )             35.8     03-14    137  |  108  |  21  ----------------------------<  115<H>  4.3   |  26  |  0.88    Ca    8.6      14 Mar 2020 06:54  Phos  3.1     03-13  Mg     2.1     03-13    TPro  6.4  /  Alb  2.6<L>  /  TBili  0.6  /  DBili  x   /  AST  17  /  ALT  16  /  AlkPhos  75  03-13    PT/INR - ( 13 Mar 2020 07:17 )   PT: 22.0 sec;   INR: 1.94 ratio         PTT - ( 13 Mar 2020 07:17 )  PTT:31.8 sec        Blood Culture: 03-13 @ 11:27  Organism --  Gram Stain Blood -- Gram Stain --  Specimen Source .Stool Feces  Culture-Blood --    03-12 @ 22:26  Organism --  Gram Stain Blood -- Gram Stain --  Specimen Source .Blood None  Culture-Blood --      RADIOLOGY/EKG:    < from: CT Chest No Cont (03.12.20 @ 19:01) >  IMPRESSION:     CHEST:  Diffuse bilateral groundglass opacities worsened since June 23, 2019. Findings could be due to due to worsening interstitial lung disease with acute inflammatory component versus pneumonia.  Pulmonary edema is also a possibility although lack of pleural fluid speaks against this diagnosis.    ABDOMEN/PELVIS:  Markedly dilated and fluid-filled stomach. No evidence of bowel obstruction.    < end of copied text >
CC/reason for follow up: diarrhea    S: norovirus positive. no more diarrhea. no vomiting. tolerating diet. c/o LUE pain. 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 gets steroid injections every 2 weeks    ROS: no chest pain, no dyspnea    T(C): 36.6 (03-14-20 @ 21:24), Max: 36.7 (03-14-20 @ 13:27)  HR: 72 (03-14-20 @ 21:24) (72 - 85)  BP: 110/60 (03-14-20 @ 21:24) (90/45 - 121/53)  RR: 18 (03-14-20 @ 21:24) (16 - 18)  SpO2: 93% (03-14-20 @ 21:24) (93% - 100%)    Gen - Pleasant, cooperative, in no acute distress  HEENT- PERRL, moist mucus membranes, OP clear  CV - irregularly irregular, No m/r/g, +pulses, no edema  Lungs - Good effort, Clear to auscultation bilaterally  Abdomen - Soft, nontender, nondistended, +BS, No rebound/rigidity/guarding  Ext - No cyanosis/clubbing. LUE discomfort w/ movement, limited ROM  Skin - No rashes, No jaundice. several ecchymotic areas on extremities  Psych- Alert & oriented x 3  Neuro- fluent speech, no facial droop, EOMI. moves all ext    MEDICATIONS  (STANDING):  allopurinol  Oral Tab/Cap - Peds 200 milliGRAM(s) Oral daily  apixaban 5 milliGRAM(s) Oral every 12 hours  aspirin  chewable 81 milliGRAM(s) Oral daily  dextrose 5%. 1000 milliLiter(s) (50 mL/Hr) IV Continuous <Continuous>  dextrose 50% Injectable 12.5 Gram(s) IV Push once  dextrose 50% Injectable 25 Gram(s) IV Push once  dextrose 50% Injectable 25 Gram(s) IV Push once  escitalopram 10 milliGRAM(s) Oral daily  famotidine    Tablet 20 milliGRAM(s) Oral daily  insulin lispro (HumaLOG) corrective regimen sliding scale   SubCutaneous three times a day before meals  levothyroxine 50 MICROGram(s) Oral daily  lidocaine   Patch 1 Patch Transdermal every 24 hours  metoprolol succinate ER 25 milliGRAM(s) Oral daily  multivitamin 1 Tablet(s) Oral daily  pantoprazole    Tablet 40 milliGRAM(s) Oral before breakfast  sildenafil (REVATIO) 20 milliGRAM(s) Oral two times a day  simvastatin 40 milliGRAM(s) Oral at bedtime  torsemide 40 milliGRAM(s) Oral daily    MEDICATIONS  (PRN):  acetaminophen   Tablet .. 650 milliGRAM(s) Oral every 4 hours PRN Mild Pain (1 - 3)  dextrose 40% Gel 15 Gram(s) Oral once PRN Blood Glucose LESS THAN 70 milliGRAM(s)/deciliter  glucagon  Injectable 1 milliGRAM(s) IntraMuscular once PRN Glucose LESS THAN 70 milligrams/deciliter      Diagnostic studies: personally reviewed  LABS: All Labs Reviewed:                        11.5   4.72  )-----------( 107      ( 14 Mar 2020 06:54 )             35.8     03-14    137  |  108  |  21  ----------------------------<  115<H>  4.3   |  26  |  0.88    Ca    8.6      14 Mar 2020 06:54  Phos  3.1     03-13  Mg     2.1     03-13    TPro  6.4  /  Alb  2.6<L>  /  TBili  0.6  /  DBili  x   /  AST  17  /  ALT  16  /  AlkPhos  75  03-13    PT/INR - ( 13 Mar 2020 07:17 )   PT: 22.0 sec;   INR: 1.94 ratio         PTT - ( 13 Mar 2020 07:17 )  PTT:31.8 sec        Blood Culture: 03-13 @ 11:27  Organism --  Gram Stain Blood -- Gram Stain --  Specimen Source .Stool Feces  Culture-Blood --    03-12 @ 22:26  Organism --  Gram Stain Blood -- Gram Stain --  Specimen Source .Blood None  Culture-Blood --      RADIOLOGY/EKG:    < from: CT Chest No Cont (03.12.20 @ 19:01) >  IMPRESSION:     CHEST:  Diffuse bilateral groundglass opacities worsened since June 23, 2019. Findings could be due to due to worsening interstitial lung disease with acute inflammatory component versus pneumonia.  Pulmonary edema is also a possibility although lack of pleural fluid speaks against this diagnosis.    ABDOMEN/PELVIS:  Markedly dilated and fluid-filled stomach. No evidence of bowel obstruction.    < end of copied text >
CC/reason for follow up: norovirus, pulm edema    S: no new events, no complaints. no diarrhea.    ROS: no chest pain, no dyspnea    T(C): 36.3 (03-17-20 @ 11:27), Max: 36.8 (03-16-20 @ 21:24)  HR: 59 (03-17-20 @ 11:27) (59 - 73)  BP: 104/60 (03-17-20 @ 11:27) (104/60 - 112/60)  RR: 18 (03-17-20 @ 11:27) (17 - 18)  SpO2: 99% (03-17-20 @ 11:27) (92% - 100%)    Gen - Pleasant, cooperative, in no acute distress  HEENT- PERRL, moist mucus membranes, OP clear  CV - irregularly irregular, No m/r/g, +pulses, no edema  Lungs - Good effort, minimal Crackles in the bases bilaterally  Abdomen - Soft, nontender, nondistended, +BS, No rebound/rigidity/guarding  Ext - No cyanosis/clubbing. LUE discomfort w/ movement, limited ROM  Skin - No rashes, No jaundice. several ecchymotic areas on extremities  Psych- Alert & oriented x 3  Neuro- fluent speech, no facial droop, EOMI. moves all ext    MEDICATIONS  (STANDING):  allopurinol  Oral Tab/Cap - Peds 200 milliGRAM(s) Oral daily  apixaban 5 milliGRAM(s) Oral every 12 hours  aspirin  chewable 81 milliGRAM(s) Oral daily  dextrose 5%. 1000 milliLiter(s) (50 mL/Hr) IV Continuous <Continuous>  dextrose 50% Injectable 12.5 Gram(s) IV Push once  dextrose 50% Injectable 25 Gram(s) IV Push once  dextrose 50% Injectable 25 Gram(s) IV Push once  escitalopram 10 milliGRAM(s) Oral daily  famotidine    Tablet 20 milliGRAM(s) Oral daily  insulin lispro (HumaLOG) corrective regimen sliding scale   SubCutaneous three times a day before meals  levothyroxine 50 MICROGram(s) Oral daily  lidocaine   Patch 1 Patch Transdermal every 24 hours  metoprolol succinate ER 25 milliGRAM(s) Oral daily  multivitamin 1 Tablet(s) Oral daily  pantoprazole    Tablet 40 milliGRAM(s) Oral before breakfast  sildenafil (REVATIO) 20 milliGRAM(s) Oral two times a day  simvastatin 40 milliGRAM(s) Oral at bedtime  torsemide 40 milliGRAM(s) Oral daily    MEDICATIONS  (PRN):  acetaminophen   Tablet .. 650 milliGRAM(s) Oral every 4 hours PRN Mild Pain (1 - 3)  dextrose 40% Gel 15 Gram(s) Oral once PRN Blood Glucose LESS THAN 70 milliGRAM(s)/deciliter  glucagon  Injectable 1 milliGRAM(s) IntraMuscular once PRN Glucose LESS THAN 70 milligrams/deciliter      Diagnostic studies: personally reviewed  LABS: All Labs Reviewed:                        11.5   4.72  )-----------( 107      ( 14 Mar 2020 06:54 )             35.8     03-14    137  |  108  |  21  ----------------------------<  115<H>  4.3   |  26  |  0.88    Ca    8.6      14 Mar 2020 06:54  Phos  3.1     03-13  Mg     2.1     03-13    TPro  6.4  /  Alb  2.6<L>  /  TBili  0.6  /  DBili  x   /  AST  17  /  ALT  16  /  AlkPhos  75  03-13    PT/INR - ( 13 Mar 2020 07:17 )   PT: 22.0 sec;   INR: 1.94 ratio         PTT - ( 13 Mar 2020 07:17 )  PTT:31.8 sec        Blood Culture: 03-13 @ 11:27  Organism --  Gram Stain Blood -- Gram Stain --  Specimen Source .Stool Feces  Culture-Blood --    03-12 @ 22:26  Organism --  Gram Stain Blood -- Gram Stain --  Specimen Source .Blood None  Culture-Blood --      RADIOLOGY/EKG:    < from: CT Chest No Cont (03.12.20 @ 19:01) >  IMPRESSION:     CHEST:  Diffuse bilateral groundglass opacities worsened since June 23, 2019. Findings could be due to due to worsening interstitial lung disease with acute inflammatory component versus pneumonia.  Pulmonary edema is also a possibility although lack of pleural fluid speaks against this diagnosis.    ABDOMEN/PELVIS:  Markedly dilated and fluid-filled stomach. No evidence of bowel obstruction.    < end of copied text >
Cardiology Progress Note    HPI: 89 y/o F with PMH of pancreatitis s/p ERCP, cirrhosis, DM2, a-fib on Eliquis, CAD s/p PCI, severe pulm HTN, chronic R heart failure, diastolic dysfunction, AS s/p TAVR, OA, HTN, dyslipidemia, hypothyroidism, p/w nausea / vomiting / diarrhea. Patient states her symptoms started day before admission.   In ED, patient had a soft BM, but no diarrhea. Also dry heaved in ED. Patient denies any cough, fever, chills, runny nose, sore throat, however, CT chest reports PNA. Denies CP, SOB.    3/14. Not on tele. No CP/SOB. Feels weak/tired.     PSH: knee replacement, hysterectomy, knee surgery, appendectomy, cholecystectomy, IVC filter     Social Hx: Denies x 3, lives at home by herself     Family Hx: DM, Heart disease.       PAST MEDICAL & SURGICAL HISTORY:  Aortic stenosis  History of pancreatitis  HLD (hyperlipidemia)  Hypothyroid  Afib  CAD (coronary artery disease)  Hypertension  History of Osteoarthritis  GERD (Gastroesophageal Reflux Disease)  Dyslipidemia  Diabetes Mellitus Type II  S/P IVC filter: Note that Pt does not have  h/o DVT, outPt doppler was read first as +, but after review reported as no DVT. Pt got IVC filer before that  H/O total knee replacement, left  History of appendectomy  History of cholecystectomy  H/O: Knee Surgery- right meniscus  H/O: Hysterectomy      MEDICATIONS  (STANDING):  allopurinol  Oral Tab/Cap - Peds 200 milliGRAM(s) Oral daily  apixaban 5 milliGRAM(s) Oral every 12 hours  aspirin  chewable 81 milliGRAM(s) Oral daily  dextrose 5%. 1000 milliLiter(s) (50 mL/Hr) IV Continuous <Continuous>  dextrose 50% Injectable 12.5 Gram(s) IV Push once  dextrose 50% Injectable 25 Gram(s) IV Push once  dextrose 50% Injectable 25 Gram(s) IV Push once  escitalopram 10 milliGRAM(s) Oral daily  famotidine    Tablet 20 milliGRAM(s) Oral daily  insulin lispro (HumaLOG) corrective regimen sliding scale   SubCutaneous three times a day before meals  levothyroxine 50 MICROGram(s) Oral daily  metoprolol succinate ER 25 milliGRAM(s) Oral daily  multivitamin 1 Tablet(s) Oral daily  pantoprazole    Tablet 40 milliGRAM(s) Oral before breakfast  sildenafil (REVATIO) 20 milliGRAM(s) Oral two times a day  simvastatin 40 milliGRAM(s) Oral at bedtime    MEDICATIONS  (PRN):  acetaminophen   Tablet .. 650 milliGRAM(s) Oral every 4 hours PRN Mild Pain (1 - 3)  dextrose 40% Gel 15 Gram(s) Oral once PRN Blood Glucose LESS THAN 70 milliGRAM(s)/deciliter  glucagon  Injectable 1 milliGRAM(s) IntraMuscular once PRN Glucose LESS THAN 70 milligrams/deciliter      FAMILY HISTORY:  FH: heart disease  FH: diabetes mellitus: both parents      SOCIAL HISTORY:  no recent smoking     REVIEW OF SYSTEMS:  CONSTITUTIONAL:    No fatigue, malaise, lethargy.  No fever or chills.  RESPIRATORY:  No cough.  No wheeze.  No hemoptysis.  No shortness of breath.  CARDIOVASCULAR:  No chest pains.  No palpitations. No shortness of breath, No orthopnea or PND.  GASTROINTESTINAL:  No abdominal pain.  No nausea or vomiting.    GENITOURINARY:    No hematuria.    MUSCULOSKELETAL:  c/o pain starting from L temple , l shoulder lower down on L side  NEUROLOGICAL:  No tingling or numbness or weakness.  PSYCHIATRIC:  No confusion          Vital Signs Last 24 Hrs  T(C): 36.8 (13 Mar 2020 12:38), Max: 37.3 (12 Mar 2020 17:15)  T(F): 98.2 (13 Mar 2020 12:38), Max: 99.2 (12 Mar 2020 17:15)  HR: 80 (13 Mar 2020 12:38) (76 - 102)  BP: 100/55 (13 Mar 2020 12:38) (81/43 - 137/84)  BP(mean): 62 (13 Mar 2020 11:55) (53 - 62)  RR: 18 (13 Mar 2020 12:38) (16 - 21)  SpO2: 100% (13 Mar 2020 12:38) (95% - 100%)    PHYSICAL EXAM-    Constitutional: no acute distress     Head: Head is normocephalic and atraumatic.      Neck:  + JVD.     Cardiovascular: Regular rate and rhythm without S3, S4. No murmurs or rubs are appreciated.      Respiratory: rales b/l more than half way up    Abdomen: Soft, nontender, nondistended with positive bowel sounds.      Extremity: No tenderness. trace  pitting edema with venous stasis changes    Neurologic: The patient is alert and oriented.      Psychiatric: The patient appears to be emotionally stable.      INTERPRETATION OF TELEMETRY: not on     ECG: ordered one     I&O's Detail      LABS:                        11.0   5.94  )-----------( 116      ( 13 Mar 2020 07:17 )             34.1     03-13    137  |  107  |  31<H>  ----------------------------<  152<H>  4.7   |  25  |  1.04    Ca    8.3<L>      13 Mar 2020 07:17  Phos  3.1     03-13  Mg     2.1     03-13    TPro  6.4  /  Alb  2.6<L>  /  TBili  0.6  /  DBili  x   /  AST  17  /  ALT  16  /  AlkPhos  75  03-13        PT/INR - ( 13 Mar 2020 07:17 )   PT: 22.0 sec;   INR: 1.94 ratio         PTT - ( 13 Mar 2020 07:17 )  PTT:31.8 sec    I&O's Summary    BNPSerum Pro-Brain Natriuretic Peptide: 6483 pg/mL (03-13 @ 07:17)    RADIOLOGY & ADDITIONAL STUDIES:  < from: Xray Chest 1 View- PORTABLE-Urgent (03.12.20 @ 19:06) >    EXAM:  XR CHEST PORTABLE URGENT 1V                            PROCEDURE DATE:  03/12/2020          INTERPRETATION:  Clinical history: Vomiting    Single portable view obtained. Comparison is made to priors    Heart and mediastinum are stable. Stable bilateral interstitial lung disease. No obvious effusions.    Impression: Stable bilateral interstitial lung disease                FIFI CHAVEZ   This document has been electronically signed. Mar 13 2020  9:55AM        < end of copied text >  < from: CT Chest No Cont (03.12.20 @ 19:01) >  IMPRESSION:     CHEST:  Diffuse bilateral groundglass opacities worsened since June 23, 2019. Findings could be due to due to worsening interstitial lung disease with acute inflammatory component versus pneumonia.  Pulmonary edema is also a possibility although lack of pleural fluid speaks against this diagnosis.    ABDOMEN/PELVIS:  Markedly dilated and fluid-filled stomach. No evidence of bowel obstruction.                    PEPE FINE M.D.,ATTENDING RADIOLOGIST  This document has been electronically signed. Mar 12 2020  7:23PM    < end of copied text >
Date of service: 03-16-20 @ 15:28    Lying in bed in NAD  Weak looking  Diarrhea is resolving  Denies pain  No SOB at rest    ROS: no fever or chills; denies dizziness, no HA, no SOB or cough, no dysuria, no legs pain, no rashes    MEDICATIONS  (STANDING):  allopurinol  Oral Tab/Cap - Peds 200 milliGRAM(s) Oral daily  apixaban 5 milliGRAM(s) Oral every 12 hours  aspirin  chewable 81 milliGRAM(s) Oral daily  dextrose 5%. 1000 milliLiter(s) (50 mL/Hr) IV Continuous <Continuous>  dextrose 50% Injectable 12.5 Gram(s) IV Push once  dextrose 50% Injectable 25 Gram(s) IV Push once  dextrose 50% Injectable 25 Gram(s) IV Push once  escitalopram 10 milliGRAM(s) Oral daily  famotidine    Tablet 20 milliGRAM(s) Oral daily  insulin lispro (HumaLOG) corrective regimen sliding scale   SubCutaneous three times a day before meals  levothyroxine 50 MICROGram(s) Oral daily  lidocaine   Patch 1 Patch Transdermal every 24 hours  metoprolol succinate ER 25 milliGRAM(s) Oral daily  multivitamin 1 Tablet(s) Oral daily  pantoprazole    Tablet 40 milliGRAM(s) Oral before breakfast  sildenafil (REVATIO) 20 milliGRAM(s) Oral two times a day  simvastatin 40 milliGRAM(s) Oral at bedtime  torsemide 40 milliGRAM(s) Oral daily      Vital Signs Last 24 Hrs  T(C): 36.7 (16 Mar 2020 12:13), Max: 36.7 (15 Mar 2020 21:00)  T(F): 98 (16 Mar 2020 12:13), Max: 98.1 (15 Mar 2020 21:00)  HR: 65 (16 Mar 2020 12:13) (65 - 83)  BP: 123/64 (16 Mar 2020 12:13) (115/60 - 123/64)  BP(mean): 78 (16 Mar 2020 12:13) (78 - 78)  RR: 18 (16 Mar 2020 12:13) (16 - 18)  SpO2: 100% (16 Mar 2020 12:13) (95% - 100%)    Physical Exam:      Constitutional:  No acute distress  HEENT: NC/AT, EOMI, PERRLA, conjunctivae clear  Neck: supple; thyroid not palpable  Back: no tenderness  Respiratory: respiratory effort normal; clear to auscultation  Cardiovascular: S1S2 regular, no murmurs  Abdomen: soft, not tender, not distended, positive BS  Genitourinary: no suprapubic tenderness  Lymphatic: no LN palpable  Musculoskeletal: no muscle tenderness, no joint swelling or tenderness  Extremities: no pedal edema  Neurological/ Psychiatric: AxOx3, moving all extremities  Skin: no rashes; no palpable lesions    Labs: all available labs reviewed                        11.0   5.94  )-----------( 116      ( 13 Mar 2020 07:17 )             34.1     03-13    137  |  107  |  31<H>  ----------------------------<  152<H>  4.7   |  25  |  1.04    Ca    8.3<L>      13 Mar 2020 07:17  Phos  3.1     03-13  Mg     2.1     03-13    TPro  6.4  /  Alb  2.6<L>  /  TBili  0.6  /  DBili  x   /  AST  17  /  ALT  16  /  AlkPhos  75  03-13     LIVER FUNCTIONS - ( 13 Mar 2020 07:17 )  Alb: 2.6 g/dL / Pro: 6.4 gm/dL / ALK PHOS: 75 U/L / ALT: 16 U/L / AST: 17 U/L / GGT: x           GI PCR Panel, Stool (03.13.20 @ 11:27)    Specimen Source: .Stool Feces    Culture Results:   Norovirus GI/GII  DETECTED by PCR      Radiology: all available radiological tests reviewed    < from: Xray Chest 1 View- PORTABLE-Urgent (03.12.20 @ 19:06) >  Stable bilateral interstitial lung disease    < end of copied text >    < from: CT Chest No Cont (03.12.20 @ 19:01) >  Diffuse bilateral groundglass opacities worsened since June 23, 2019. Findings could be due to due to worsening interstitial lung disease with acute inflammatory component versus pneumonia.  Pulmonary edema is also a possibility although lack of pleural fluid speaks against this diagnosis.    < end of copied text >      Advanced directives addressed: full resuscitation
Subjective:    Awake, alert. Comfortable at rest. C/O generalized weakness. No diarrhea    MEDICATIONS  (STANDING):  allopurinol  Oral Tab/Cap - Peds 200 milliGRAM(s) Oral daily  apixaban 5 milliGRAM(s) Oral every 12 hours  aspirin  chewable 81 milliGRAM(s) Oral daily  dextrose 5%. 1000 milliLiter(s) (50 mL/Hr) IV Continuous <Continuous>  dextrose 50% Injectable 12.5 Gram(s) IV Push once  dextrose 50% Injectable 25 Gram(s) IV Push once  dextrose 50% Injectable 25 Gram(s) IV Push once  escitalopram 10 milliGRAM(s) Oral daily  famotidine    Tablet 20 milliGRAM(s) Oral daily  insulin lispro (HumaLOG) corrective regimen sliding scale   SubCutaneous three times a day before meals  levothyroxine 50 MICROGram(s) Oral daily  lidocaine   Patch 1 Patch Transdermal every 24 hours  metoprolol succinate ER 25 milliGRAM(s) Oral daily  multivitamin 1 Tablet(s) Oral daily  pantoprazole    Tablet 40 milliGRAM(s) Oral before breakfast  sildenafil (REVATIO) 20 milliGRAM(s) Oral two times a day  simvastatin 40 milliGRAM(s) Oral at bedtime  torsemide 40 milliGRAM(s) Oral daily    MEDICATIONS  (PRN):  acetaminophen   Tablet .. 650 milliGRAM(s) Oral every 4 hours PRN Mild Pain (1 - 3)  dextrose 40% Gel 15 Gram(s) Oral once PRN Blood Glucose LESS THAN 70 milliGRAM(s)/deciliter  glucagon  Injectable 1 milliGRAM(s) IntraMuscular once PRN Glucose LESS THAN 70 milligrams/deciliter  traMADol 50 milliGRAM(s) Oral every 12 hours PRN Moderate Pain (4 - 6)      Allergies    penicillin (Rash)  penicillins (Other)  sulfa drugs (Rash; Other)    Intolerances        Vital Signs Last 24 Hrs  T(C): 36.5 (17 Mar 2020 06:06), Max: 36.8 (16 Mar 2020 21:24)  T(F): 97.7 (17 Mar 2020 06:06), Max: 98.2 (16 Mar 2020 21:24)  HR: 64 (17 Mar 2020 06:06) (64 - 73)  BP: 110/50 (17 Mar 2020 06:06) (110/50 - 123/64)  BP(mean): 78 (16 Mar 2020 12:13) (78 - 78)  RR: 17 (17 Mar 2020 06:06) (17 - 18)  SpO2: 92% (17 Mar 2020 06:06) (92% - 100%)    PHYSICAL EXAMINATION:    NECK:  Supple. No lymphadenopathy. Jugular venous pressure not elevated.   HEART:   The cardiac impulse has a normal quality. Reg., Nl S1 and S2.    CHEST:  Chest with bilateral crackles approx 1/2 up. Normal respiratory effort.  ABDOMEN:  Soft and nontender.   EXTREMITIES:  There is tr edema w/ chronic changes.       LABS:    03-16    138  |  105  |  11  ----------------------------<  145<H>  3.9   |  28  |  0.70    Ca    8.2<L>      16 Mar 2020 06:54            RADIOLOGY & ADDITIONAL TESTS:< from: Xray Shoulder 2 Views, Left (03.16.20 @ 12:38) >  EXAM:  XR SHOULDER COMP MIN 2V LT                            PROCEDURE DATE:  03/16/2020          INTERPRETATION:  Left shoulder. 3 views. Patient has local pain. Patient is admitted with coronal virus.    Incidentally noted are sternotomy clips and a prosthetic aortic valve. The mitral valve area is also calcified.    There is diffusely increased interstitial pattern in the visualized left lung.    There are significant degenerative changes around the left shoulder. There is no bone destruction or fracture.    IMPRESSION: Sternotomy, prosthetic aortic valve, calcified mitral area, and diffuse left lung infiltrate. Degeneration around the shoulder.      [place]       Assessment and Plan:   · Assessment		  - has norovirus  - no diarrhea today  - continue torsemide 40mg once per day  - supplement K+  - BNP noted-check repeat  - on eliquis  - labs PND  - repeat CT chest to re-evaluate interstitial/mosaic infiltrates  - O2 sat on 21%    Problem/Plan - 1:  ·  Problem: Norovirus.     Problem/Plan - 2:  ·  Problem: Diarrhea, unspecified.     Problem/Plan - 3:  ·  Problem: Gastroenteritis.     Problem/Plan - 4:  ·  Problem: Pulmonary edema.     Problem/Plan - 5:  ·  Problem: Longstanding persistent atrial fibrillation.     Problem/Plan - 6:  Problem: Nonrheumatic aortic valve stenosis.
Subjective:    Awake, alert. No diarrhea. Scant sputum.    MEDICATIONS  (STANDING):  allopurinol  Oral Tab/Cap - Peds 200 milliGRAM(s) Oral daily  apixaban 5 milliGRAM(s) Oral every 12 hours  aspirin  chewable 81 milliGRAM(s) Oral daily  dextrose 5%. 1000 milliLiter(s) (50 mL/Hr) IV Continuous <Continuous>  dextrose 50% Injectable 12.5 Gram(s) IV Push once  dextrose 50% Injectable 25 Gram(s) IV Push once  dextrose 50% Injectable 25 Gram(s) IV Push once  escitalopram 10 milliGRAM(s) Oral daily  famotidine    Tablet 20 milliGRAM(s) Oral daily  insulin lispro (HumaLOG) corrective regimen sliding scale   SubCutaneous three times a day before meals  levothyroxine 50 MICROGram(s) Oral daily  lidocaine   Patch 1 Patch Transdermal every 24 hours  metoprolol succinate ER 25 milliGRAM(s) Oral daily  multivitamin 1 Tablet(s) Oral daily  pantoprazole    Tablet 40 milliGRAM(s) Oral before breakfast  sildenafil (REVATIO) 20 milliGRAM(s) Oral two times a day  simvastatin 40 milliGRAM(s) Oral at bedtime  torsemide 40 milliGRAM(s) Oral daily    MEDICATIONS  (PRN):  acetaminophen   Tablet .. 650 milliGRAM(s) Oral every 4 hours PRN Mild Pain (1 - 3)  dextrose 40% Gel 15 Gram(s) Oral once PRN Blood Glucose LESS THAN 70 milliGRAM(s)/deciliter  glucagon  Injectable 1 milliGRAM(s) IntraMuscular once PRN Glucose LESS THAN 70 milligrams/deciliter  traMADol 50 milliGRAM(s) Oral every 12 hours PRN Moderate Pain (4 - 6)      Allergies    penicillin (Rash)  penicillins (Other)  sulfa drugs (Rash; Other)    Intolerances        Vital Signs Last 24 Hrs  T(C): 36.6 (16 Mar 2020 05:55), Max: 36.7 (15 Mar 2020 21:00)  T(F): 97.9 (16 Mar 2020 05:55), Max: 98.1 (15 Mar 2020 21:00)  HR: 71 (16 Mar 2020 05:55) (71 - 105)  BP: 121/74 (16 Mar 2020 05:55) (115/59 - 121/74)  BP(mean): --  RR: 16 (16 Mar 2020 05:55) (16 - 18)  SpO2: 95% (16 Mar 2020 05:55) (95% - 95%)    PHYSICAL EXAMINATION:    NECK:  Supple. No lymphadenopathy. Jugular venous pressure not elevated.   HEART:   The cardiac impulse has a normal quality. Reg. irreg, Nl S1 and S2.    CHEST:  Chest with bilateral crackles approx 1/3 up. Normal respiratory effort.  ABDOMEN:  Soft and nontender.   EXTREMITIES:  There is tr edema.       LABS:                        10.2   4.65  )-----------( 100      ( 15 Mar 2020 07:02 )             31.5     03-16    138  |  105  |  11  ----------------------------<  145<H>  3.9   |  28  |  0.70    Ca    8.2<L>      16 Mar 2020 06:54    TPro  5.8<L>  /  Alb  2.2<L>  /  TBili  0.4  /  DBili  x   /  AST  21  /  ALT  16  /  AlkPhos  71  03-15          RADIOLOGY & ADDITIONAL TESTS:    Assessment and Plan:   · Assessment		  - has norovirus  - no diarrhea today  - restart torsemide 40mg once per day  - supplement K+  - BNP noted-will follow  - on eliquis  - labs PND    Problem/Plan - 1:  ·  Problem: Norovirus.     Problem/Plan - 2:  ·  Problem: Diarrhea, unspecified.     Problem/Plan - 3:  ·  Problem: Gastroenteritis.     Problem/Plan - 4:  ·  Problem: Pulmonary edema.     Problem/Plan - 5:  ·  Problem: Longstanding persistent atrial fibrillation.     Problem/Plan - 6:  Problem: Nonrheumatic aortic valve stenosis.
Subjective:  feels weak  c/o left arm pain  norovirus positive    MEDICATIONS  (STANDING):  allopurinol  Oral Tab/Cap - Peds 200 milliGRAM(s) Oral daily  apixaban 5 milliGRAM(s) Oral every 12 hours  aspirin  chewable 81 milliGRAM(s) Oral daily  dextrose 5%. 1000 milliLiter(s) (50 mL/Hr) IV Continuous <Continuous>  dextrose 50% Injectable 12.5 Gram(s) IV Push once  dextrose 50% Injectable 25 Gram(s) IV Push once  dextrose 50% Injectable 25 Gram(s) IV Push once  escitalopram 10 milliGRAM(s) Oral daily  famotidine    Tablet 20 milliGRAM(s) Oral daily  insulin lispro (HumaLOG) corrective regimen sliding scale   SubCutaneous three times a day before meals  levothyroxine 50 MICROGram(s) Oral daily  metoprolol succinate ER 25 milliGRAM(s) Oral daily  multivitamin 1 Tablet(s) Oral daily  pantoprazole    Tablet 40 milliGRAM(s) Oral before breakfast  sildenafil (REVATIO) 20 milliGRAM(s) Oral two times a day  simvastatin 40 milliGRAM(s) Oral at bedtime    MEDICATIONS  (PRN):  acetaminophen   Tablet .. 650 milliGRAM(s) Oral every 4 hours PRN Mild Pain (1 - 3)  dextrose 40% Gel 15 Gram(s) Oral once PRN Blood Glucose LESS THAN 70 milliGRAM(s)/deciliter  glucagon  Injectable 1 milliGRAM(s) IntraMuscular once PRN Glucose LESS THAN 70 milligrams/deciliter      Allergies    penicillin (Rash)  penicillins (Other)  sulfa drugs (Rash; Other)    Intolerances        REVIEW OF SYSTEMS:    CONSTITUTIONAL:  As per HPI.  HEENT:  Eyes:  No diplopia or blurred vision. ENT:  No earache, sore throat or runny nose.  CARDIOVASCULAR:  No pressure, squeezing, tightness, heaviness or aching about the chest, neck, axilla or epigastrium.  RESPIRATORY:  No cough, shortness of breath, PND or orthopnea.  GASTROINTESTINAL:  No nausea, vomiting or diarrhea.  GENITOURINARY:  No dysuria, frequency or urgency.  MUSCULOSKELETAL:  no joint pain, deformity, tenderness  EXTREMITIES: no clubbing cyanosis,edema  SKIN:  No change in skin, hair or nails.  NEUROLOGIC:  No paresthesias, fasciculations, seizures or weakness.  PSYCHIATRIC:  No disorder of thought or mood.  ENDOCRINE:  No heat or cold intolerance, polyuria or polydipsia.  HEMATOLOGICAL:  No easy bruising or bleedings:    Vital Signs Last 24 Hrs  T(C): 36.3 (14 Mar 2020 06:49), Max: 37.1 (13 Mar 2020 16:29)  T(F): 97.3 (14 Mar 2020 06:49), Max: 98.7 (13 Mar 2020 16:29)  HR: 85 (14 Mar 2020 06:49) (76 - 86)  BP: 121/53 (14 Mar 2020 06:49) (84/54 - 121/53)  BP(mean): 62 (13 Mar 2020 11:55) (62 - 62)  RR: 16 (14 Mar 2020 06:49) (16 - 21)  SpO2: 100% (14 Mar 2020 06:49) (96% - 100%)    PHYSICAL EXAMINATION:  SKIN: no rashes  HEAD: NC/AT  EYES: PERRLA, EOMI  EARS: TM's intact  NOSE: no abnormalities  NECK:  Supple. No lymphadenopathy. Jugular venous pressure not elevated. Carotids equal.   HEART:   s1s2 reg  CHEST:  bilat ronchi w basilar crackles  ABDOMEN:  Soft and nontender.   EXTREMITIES:  ecchymoses  NEURO: AAO x 3, no focal deficts       LABS:                        11.5   4.72  )-----------( 107      ( 14 Mar 2020 06:54 )             35.8     03-14    137  |  108  |  21  ----------------------------<  115<H>  4.3   |  26  |  0.88    Ca    8.6      14 Mar 2020 06:54  Phos  3.1     03-13  Mg     2.1     03-13    TPro  6.4  /  Alb  2.6<L>  /  TBili  0.6  /  DBili  x   /  AST  17  /  ALT  16  /  AlkPhos  75  03-13    PT/INR - ( 13 Mar 2020 07:17 )   PT: 22.0 sec;   INR: 1.94 ratio         PTT - ( 13 Mar 2020 07:17 )  PTT:31.8 sec      RADIOLOGY & ADDITIONAL TESTS:
Subjective:  looks better  no diarrhea or nausea    MEDICATIONS  (STANDING):  allopurinol  Oral Tab/Cap - Peds 200 milliGRAM(s) Oral daily  apixaban 5 milliGRAM(s) Oral every 12 hours  aspirin  chewable 81 milliGRAM(s) Oral daily  dextrose 5%. 1000 milliLiter(s) (50 mL/Hr) IV Continuous <Continuous>  dextrose 50% Injectable 12.5 Gram(s) IV Push once  dextrose 50% Injectable 25 Gram(s) IV Push once  dextrose 50% Injectable 25 Gram(s) IV Push once  escitalopram 10 milliGRAM(s) Oral daily  famotidine    Tablet 20 milliGRAM(s) Oral daily  insulin lispro (HumaLOG) corrective regimen sliding scale   SubCutaneous three times a day before meals  levothyroxine 50 MICROGram(s) Oral daily  lidocaine   Patch 1 Patch Transdermal every 24 hours  metoprolol succinate ER 25 milliGRAM(s) Oral daily  multivitamin 1 Tablet(s) Oral daily  pantoprazole    Tablet 40 milliGRAM(s) Oral before breakfast  potassium chloride    Tablet ER 40 milliEquivalent(s) Oral once  sildenafil (REVATIO) 20 milliGRAM(s) Oral two times a day  simvastatin 40 milliGRAM(s) Oral at bedtime  torsemide 40 milliGRAM(s) Oral daily    MEDICATIONS  (PRN):  acetaminophen   Tablet .. 650 milliGRAM(s) Oral every 4 hours PRN Mild Pain (1 - 3)  dextrose 40% Gel 15 Gram(s) Oral once PRN Blood Glucose LESS THAN 70 milliGRAM(s)/deciliter  glucagon  Injectable 1 milliGRAM(s) IntraMuscular once PRN Glucose LESS THAN 70 milligrams/deciliter      Allergies    penicillin (Rash)  penicillins (Other)  sulfa drugs (Rash; Other)    Intolerances        REVIEW OF SYSTEMS:    CONSTITUTIONAL:  As per HPI.  HEENT:  Eyes:  No diplopia or blurred vision. ENT:  No earache, sore throat or runny nose.  CARDIOVASCULAR:  No pressure, squeezing, tightness, heaviness or aching about the chest, neck, axilla or epigastrium.  RESPIRATORY:  No cough, shortness of breath, PND or orthopnea.  GASTROINTESTINAL:  No nausea, vomiting or diarrhea.  GENITOURINARY:  No dysuria, frequency or urgency.  MUSCULOSKELETAL:  no joint pain, deformity, tenderness  EXTREMITIES: no clubbing cyanosis,edema  SKIN:  No change in skin, hair or nails.  NEUROLOGIC:  No paresthesias, fasciculations, seizures or weakness.  PSYCHIATRIC:  No disorder of thought or mood.  ENDOCRINE:  No heat or cold intolerance, polyuria or polydipsia.  HEMATOLOGICAL:  No easy bruising or bleedings:    Vital Signs Last 24 Hrs  T(C): 36.6 (15 Mar 2020 05:43), Max: 36.7 (14 Mar 2020 13:27)  T(F): 97.8 (15 Mar 2020 05:43), Max: 98.1 (14 Mar 2020 14:16)  HR: 69 (15 Mar 2020 05:43) (69 - 81)  BP: 101/60 (15 Mar 2020 05:43) (90/45 - 118/62)  BP(mean): --  RR: 18 (15 Mar 2020 05:43) (17 - 18)  SpO2: 98% (15 Mar 2020 05:43) (93% - 100%)    PHYSICAL EXAMINATION:  SKIN: no rashes  HEAD: NC/AT  EYES: PERRLA, EOMI  EARS: TM's intact  NOSE: no abnormalities  NECK:  Supple. No lymphadenopathy. Jugular venous pressure not elevated. Carotids equal.   HEART:   S1S2 2/6 systolic murmur  CHEST:  basilar crackles  ABDOMEN:  Soft and nontender.   EXTREMITIES:  no C/C/E  NEURO: AAO x 3, no focal deficts       LABS:                        10.2   4.65  )-----------( 100      ( 15 Mar 2020 07:02 )             31.5     03-15    135  |  103  |  17  ----------------------------<  123<H>  3.4<L>   |  27  |  0.81    Ca    8.0<L>      15 Mar 2020 07:02    TPro  5.8<L>  /  Alb  2.2<L>  /  TBili  0.4  /  DBili  x   /  AST  21  /  ALT  16  /  AlkPhos  71  03-15          RADIOLOGY & ADDITIONAL TESTS:
Patient is a 89y old  Female who presents with a chief complaint of N/V, Diarrhea.       HPI:  89 y/o F with PMH of pancreatitis s/p ERCP, cirrhosis, DM2, a-fib on Eliquis, CAD s/p PCI, severe pulm HTN, chronic R heart failure, diastolic dysfunction, AS s/p TAVR, OA, HTN, dyslipidemia, hypothyroidism, p/w nausea / vomiting / diarrhea. Patient states her symptoms started day before admission.   In ED, patient had a soft BM, but no diarrhea. Also dry heaved in ED. Patient denies any cough, fever, chills, runny nose, sore throat, however, CT chest reports PNA. Denies CP, SOB.    3/16- pt seen and examined by me today. Pt c/o L shoulder pain .   PSH: knee replacement, hysterectomy, knee surgery, appendectomy, cholecystectomy, IVC filter     Social Hx: Denies x 3, lives at home by herself     Family Hx: DM, Heart disease.       PAST MEDICAL & SURGICAL HISTORY:  Aortic stenosis  History of pancreatitis  HLD (hyperlipidemia)  Hypothyroid  Afib  CAD (coronary artery disease)  Hypertension  History of Osteoarthritis  GERD (Gastroesophageal Reflux Disease)  Dyslipidemia  Diabetes Mellitus Type II  S/P IVC filter: Note that Pt does not have  h/o DVT, outPt doppler was read first as +, but after review reported as no DVT. Pt got IVC filer before that  H/O total knee replacement, left  History of appendectomy  History of cholecystectomy  H/O: Knee Surgery- right meniscus  H/O: Hysterectomy      MEDICATIONS  (STANDING):  allopurinol  Oral Tab/Cap - Peds 200 milliGRAM(s) Oral daily  apixaban 5 milliGRAM(s) Oral every 12 hours  aspirin  chewable 81 milliGRAM(s) Oral daily  dextrose 5%. 1000 milliLiter(s) (50 mL/Hr) IV Continuous <Continuous>  dextrose 50% Injectable 12.5 Gram(s) IV Push once  dextrose 50% Injectable 25 Gram(s) IV Push once  dextrose 50% Injectable 25 Gram(s) IV Push once  escitalopram 10 milliGRAM(s) Oral daily  famotidine    Tablet 20 milliGRAM(s) Oral daily  insulin lispro (HumaLOG) corrective regimen sliding scale   SubCutaneous three times a day before meals  levothyroxine 50 MICROGram(s) Oral daily  metoprolol succinate ER 25 milliGRAM(s) Oral daily  multivitamin 1 Tablet(s) Oral daily  pantoprazole    Tablet 40 milliGRAM(s) Oral before breakfast  sildenafil (REVATIO) 20 milliGRAM(s) Oral two times a day  simvastatin 40 milliGRAM(s) Oral at bedtime    MEDICATIONS  (PRN):  acetaminophen   Tablet .. 650 milliGRAM(s) Oral every 4 hours PRN Mild Pain (1 - 3)  dextrose 40% Gel 15 Gram(s) Oral once PRN Blood Glucose LESS THAN 70 milliGRAM(s)/deciliter  glucagon  Injectable 1 milliGRAM(s) IntraMuscular once PRN Glucose LESS THAN 70 milligrams/deciliter      FAMILY HISTORY:  FH: heart disease  FH: diabetes mellitus: both parents      SOCIAL HISTORY:  no recent smoking     REVIEW OF SYSTEMS:  CONSTITUTIONAL:    No fatigue, malaise, lethargy.  No fever or chills.  RESPIRATORY:  No cough.  No wheeze.  No hemoptysis.  No shortness of breath.  CARDIOVASCULAR:  No chest pains.  No palpitations. No shortness of breath, No orthopnea or PND.  GASTROINTESTINAL:  No abdominal pain.  No nausea or vomiting.    GENITOURINARY:    No hematuria.    MUSCULOSKELETAL:  c/o pain starting from  l shoulder lower down on L side  NEUROLOGICAL:  No tingling or numbness or weakness.  PSYCHIATRIC:  No confusion          Vital Signs Last 24 Hrs  T(C): 36.8 (13 Mar 2020 12:38), Max: 37.3 (12 Mar 2020 17:15)  T(F): 98.2 (13 Mar 2020 12:38), Max: 99.2 (12 Mar 2020 17:15)  HR: 80 (13 Mar 2020 12:38) (76 - 102)  BP: 100/55 (13 Mar 2020 12:38) (81/43 - 137/84)  BP(mean): 62 (13 Mar 2020 11:55) (53 - 62)  RR: 18 (13 Mar 2020 12:38) (16 - 21)  SpO2: 100% (13 Mar 2020 12:38) (95% - 100%)    PHYSICAL EXAM-    Constitutional: no acute distress     Head: Head is normocephalic and atraumatic.      Neck:  + JVD.     Cardiovascular: Regular rate and rhythm without S3, S4. No murmurs or rubs are appreciated.      Respiratory: rales b/l more than half way up    Abdomen: Soft, nontender, nondistended with positive bowel sounds.      Extremity: No tenderness. trace  pitting edema with venous stasis changes    Neurologic: The patient is alert and oriented.      Psychiatric: The patient appears to be emotionally stable.      INTERPRETATION OF TELEMETRY: not on     ECG: ordered one     I&O's Detail      LABS:                        11.0   5.94  )-----------( 116      ( 13 Mar 2020 07:17 )             34.1     03-13    137  |  107  |  31<H>  ----------------------------<  152<H>  4.7   |  25  |  1.04    Ca    8.3<L>      13 Mar 2020 07:17  Phos  3.1     03-13  Mg     2.1     03-13    TPro  6.4  /  Alb  2.6<L>  /  TBili  0.6  /  DBili  x   /  AST  17  /  ALT  16  /  AlkPhos  75  03-13        PT/INR - ( 13 Mar 2020 07:17 )   PT: 22.0 sec;   INR: 1.94 ratio         PTT - ( 13 Mar 2020 07:17 )  PTT:31.8 sec    I&O's Summary    BNPSerum Pro-Brain Natriuretic Peptide: 6483 pg/mL (03-13 @ 07:17)    RADIOLOGY & ADDITIONAL STUDIES:  < from: Xray Chest 1 View- PORTABLE-Urgent (03.12.20 @ 19:06) >    EXAM:  XR CHEST PORTABLE URGENT 1V                            PROCEDURE DATE:  03/12/2020          INTERPRETATION:  Clinical history: Vomiting    Single portable view obtained. Comparison is made to priors    Heart and mediastinum are stable. Stable bilateral interstitial lung disease. No obvious effusions.    Impression: Stable bilateral interstitial lung disease                FIFI CHAVEZ   This document has been electronically signed. Mar 13 2020  9:55AM        < end of copied text >  < from: CT Chest No Cont (03.12.20 @ 19:01) >  IMPRESSION:     CHEST:  Diffuse bilateral groundglass opacities worsened since June 23, 2019. Findings could be due to due to worsening interstitial lung disease with acute inflammatory component versus pneumonia.  Pulmonary edema is also a possibility although lack of pleural fluid speaks against this diagnosis.    ABDOMEN/PELVIS:  Markedly dilated and fluid-filled stomach. No evidence of bowel obstruction.                    PEPE FINE M.D.,ATTENDING RADIOLOGIST  This document has been electronically signed. Mar 12 2020  7:23PM    < end of copied text >

## 2020-03-18 ENCOUNTER — TRANSCRIPTION ENCOUNTER (OUTPATIENT)
Age: 85
End: 2020-03-18

## 2020-03-18 VITALS
SYSTOLIC BLOOD PRESSURE: 100 MMHG | TEMPERATURE: 98 F | DIASTOLIC BLOOD PRESSURE: 64 MMHG | RESPIRATION RATE: 18 BRPM | OXYGEN SATURATION: 97 % | HEART RATE: 83 BPM

## 2020-03-18 LAB
CULTURE RESULTS: SIGNIFICANT CHANGE UP
CULTURE RESULTS: SIGNIFICANT CHANGE UP
SPECIMEN SOURCE: SIGNIFICANT CHANGE UP
SPECIMEN SOURCE: SIGNIFICANT CHANGE UP

## 2020-03-18 PROCEDURE — 99239 HOSP IP/OBS DSCHRG MGMT >30: CPT

## 2020-03-18 RX ORDER — ASPIRIN/CALCIUM CARB/MAGNESIUM 324 MG
1 TABLET ORAL
Qty: 0 | Refills: 0 | DISCHARGE
Start: 2020-03-18

## 2020-03-18 RX ORDER — APIXABAN 2.5 MG/1
1 TABLET, FILM COATED ORAL
Qty: 0 | Refills: 0 | DISCHARGE
Start: 2020-03-18

## 2020-03-18 RX ORDER — ALLOPURINOL 300 MG
2 TABLET ORAL
Qty: 0 | Refills: 0 | DISCHARGE
Start: 2020-03-18

## 2020-03-18 RX ORDER — LEVOTHYROXINE SODIUM 125 MCG
1 TABLET ORAL
Qty: 0 | Refills: 0 | DISCHARGE
Start: 2020-03-18

## 2020-03-18 RX ORDER — ALLOPURINOL 300 MG
2 TABLET ORAL
Qty: 0 | Refills: 0 | DISCHARGE

## 2020-03-18 RX ORDER — METOPROLOL TARTRATE 50 MG
1 TABLET ORAL
Qty: 0 | Refills: 0 | DISCHARGE
Start: 2020-03-18

## 2020-03-18 RX ORDER — ACETAMINOPHEN 500 MG
2 TABLET ORAL
Qty: 0 | Refills: 0 | DISCHARGE
Start: 2020-03-18

## 2020-03-18 RX ORDER — ESCITALOPRAM OXALATE 10 MG/1
1 TABLET, FILM COATED ORAL
Qty: 0 | Refills: 0 | DISCHARGE
Start: 2020-03-18

## 2020-03-18 RX ORDER — TRAMADOL HYDROCHLORIDE 50 MG/1
1 TABLET ORAL
Qty: 0 | Refills: 0 | DISCHARGE
Start: 2020-03-18

## 2020-03-18 RX ORDER — APIXABAN 2.5 MG/1
1 TABLET, FILM COATED ORAL
Qty: 0 | Refills: 0 | DISCHARGE

## 2020-03-18 RX ORDER — ESCITALOPRAM OXALATE 10 MG/1
1 TABLET, FILM COATED ORAL
Qty: 0 | Refills: 0 | DISCHARGE

## 2020-03-18 RX ORDER — SIMVASTATIN 20 MG/1
1 TABLET, FILM COATED ORAL
Qty: 0 | Refills: 0 | DISCHARGE
Start: 2020-03-18

## 2020-03-18 RX ORDER — LIDOCAINE 4 G/100G
1 CREAM TOPICAL
Qty: 0 | Refills: 0 | DISCHARGE
Start: 2020-03-18

## 2020-03-18 RX ORDER — LEVOTHYROXINE SODIUM 125 MCG
1 TABLET ORAL
Qty: 0 | Refills: 0 | DISCHARGE

## 2020-03-18 RX ADMIN — APIXABAN 5 MILLIGRAM(S): 2.5 TABLET, FILM COATED ORAL at 05:29

## 2020-03-18 RX ADMIN — Medication 1: at 12:17

## 2020-03-18 RX ADMIN — Medication 40 MILLIGRAM(S): at 12:19

## 2020-03-18 RX ADMIN — Medication 650 MILLIGRAM(S): at 12:20

## 2020-03-18 RX ADMIN — Medication 1 TABLET(S): at 12:19

## 2020-03-18 RX ADMIN — Medication 50 MICROGRAM(S): at 05:29

## 2020-03-18 RX ADMIN — Medication 200 MILLIGRAM(S): at 12:18

## 2020-03-18 RX ADMIN — PANTOPRAZOLE SODIUM 40 MILLIGRAM(S): 20 TABLET, DELAYED RELEASE ORAL at 06:03

## 2020-03-18 RX ADMIN — Medication 20 MILLIGRAM(S): at 05:29

## 2020-03-18 RX ADMIN — ESCITALOPRAM OXALATE 10 MILLIGRAM(S): 10 TABLET, FILM COATED ORAL at 12:19

## 2020-03-18 RX ADMIN — LIDOCAINE 1 PATCH: 4 CREAM TOPICAL at 05:34

## 2020-03-18 RX ADMIN — FAMOTIDINE 20 MILLIGRAM(S): 10 INJECTION INTRAVENOUS at 12:19

## 2020-03-18 RX ADMIN — Medication 25 MILLIGRAM(S): at 05:29

## 2020-03-18 RX ADMIN — Medication 81 MILLIGRAM(S): at 12:18

## 2020-03-18 NOTE — DISCHARGE NOTE PROVIDER - CARE PROVIDER_API CALL
Samy Camacho)  Internal Medicine; Pulmonary Disease  [address] [place] [address]  Phone: [phone]  Fax: [phone]  Follow Up Time:     Marisa Saldivar)  Cardiovascular Disease; Internal Medicine  [address] [place] [address]  Phone: [phone]  Fax: [phone]  Follow Up Time:

## 2020-03-18 NOTE — DISCHARGE NOTE NURSING/CASE MANAGEMENT/SOCIAL WORK - PATIENT PORTAL LINK FT
You can access the FollowMyHealth Patient Portal offered by [facility] by registering at the following website: [email or web address]. By joining [facility]’s FollowMyHealth portal, you will also be able to view your health information using other applications (apps) compatible with our system.

## 2020-03-18 NOTE — DISCHARGE NOTE PROVIDER - NSDCCPCAREPLAN_GEN_ALL_CORE_FT
PRINCIPAL DISCHARGE DIAGNOSIS  Diagnosis: Pulmonary edema  Assessment and Plan of Treatment: Pneumonia ruled out.  On torsemide - contineu taking this medication and dont skip or miss doses.  f/u with Dr Camacho as an outpatient.   f/u with PCP.         SECONDARY DISCHARGE DIAGNOSES  Diagnosis: Type 2 diabetes mellitus with complication  Assessment and Plan of Treatment: conitnue oral hypoglycemic agents    Diagnosis: Renal cyst  Assessment and Plan of Treatment: outpatient f/u    Diagnosis: Atrial fibrillation  Assessment and Plan of Treatment: on eliquis    Diagnosis: Norovirus  Assessment and Plan of Treatment: symptoms free for more than 48 hours PRINCIPAL DISCHARGE DIAGNOSIS  Diagnosis: Pulmonary edema  Assessment and Plan of Treatment: Pneumonia ruled out.  On torsemide - contineu taking this medication and dont skip or miss doses.  f/u with Dr Camacho as an outpatient.   f/u with PCP.         SECONDARY DISCHARGE DIAGNOSES  Diagnosis: Gout  Assessment and Plan of Treatment: you will need prednisone 30mg for 3 more days. Take ibuprofen for pain.    Diagnosis: Type 2 diabetes mellitus with complication  Assessment and Plan of Treatment: conitnue oral hypoglycemic agents    Diagnosis: Renal cyst  Assessment and Plan of Treatment: outpatient f/u    Diagnosis: Atrial fibrillation  Assessment and Plan of Treatment: on eliquis    Diagnosis: Norovirus  Assessment and Plan of Treatment: symptoms free for more than 48 hours

## 2020-03-18 NOTE — DISCHARGE NOTE PROVIDER - NSDCMRMEDTOKEN_GEN_ALL_CORE_FT
acetaminophen 325 mg oral tablet: 2 tab(s) orally every 4 hours, As needed, Mild Pain (1 - 3)  allopurinol 100 mg oral tablet: 2 tab(s) orally once a day  apixaban 5 mg oral tablet: 1 tab(s) orally every 12 hours  aspirin 81 mg oral tablet, chewable: 1 tab(s) orally once a day  Co-Q10 200 mg oral capsule: 1 cap(s) orally once a day  docusate sodium 100 mg oral capsule: 1 cap(s) orally once a day (at bedtime)  escitalopram 10 mg oral tablet: 1 tab(s) orally once a day  famotidine 20 mg oral tablet: 1 tab(s) orally once a day (at bedtime)  Florastor 250 mg oral capsule: 2 cap(s) orally once a day (at bedtime)  Glucosamine Chondroitin oral capsule: 3 cap(s) orally once a day  levothyroxine 50 mcg (0.05 mg) oral tablet: 1 tab(s) orally once a day  lidocaine 5% topical film: Apply topically to affected area once a day  metFORMIN 1000 mg oral tablet: 1 tab(s) orally 2 times a day  metoprolol succinate 25 mg oral tablet, extended release: 1 tab(s) orally once a day  Multiple Vitamins oral tablet: 1 tab(s) orally once a day  NexIUM 40 mg oral delayed release capsule: 1 cap(s) orally once a day  potassium chloride 10 mEq oral tablet, extended release: 2 tab(s) orally 2 times a day  PreserVision AREDS 2 oral capsule: 2 cap(s) orally once a day  sildenafil 20 mg oral tablet: 1 tab(s) orally 2 times a day  simvastatin 40 mg oral tablet: 1 tab(s) orally once a day (at bedtime)  torsemide 20 mg oral tablet: 2 tab(s) orally 2 times a day  traMADol 50 mg oral tablet: 1 tab(s) orally every 12 hours, As needed, Moderate Pain (4 - 6) acetaminophen 325 mg oral tablet: 2 tab(s) orally every 4 hours, As needed, Mild Pain (1 - 3)  allopurinol 100 mg oral tablet: 2 tab(s) orally once a day  apixaban 5 mg oral tablet: 1 tab(s) orally every 12 hours  aspirin 81 mg oral tablet, chewable: 1 tab(s) orally once a day  Co-Q10 200 mg oral capsule: 1 cap(s) orally once a day  docusate sodium 100 mg oral capsule: 1 cap(s) orally once a day (at bedtime)  escitalopram 10 mg oral tablet: 1 tab(s) orally once a day  famotidine 20 mg oral tablet: 1 tab(s) orally once a day (at bedtime)  Florastor 250 mg oral capsule: 2 cap(s) orally once a day (at bedtime)  Glucosamine Chondroitin oral capsule: 3 cap(s) orally once a day  levothyroxine 50 mcg (0.05 mg) oral tablet: 1 tab(s) orally once a day  lidocaine 5% topical film: Apply topically to affected area once a day  metFORMIN 1000 mg oral tablet: 1 tab(s) orally 2 times a day  metoprolol succinate 25 mg oral tablet, extended release: 1 tab(s) orally once a day  Multiple Vitamins oral tablet: 1 tab(s) orally once a day  NexIUM 40 mg oral delayed release capsule: 1 cap(s) orally once a day  potassium chloride 10 mEq oral tablet, extended release: 2 tab(s) orally 2 times a day  predniSONE 10 mg oral tablet: 3 tab(s) orally once a day start 3/19  PreserVision AREDS 2 oral capsule: 2 cap(s) orally once a day  sildenafil 20 mg oral tablet: 1 tab(s) orally 2 times a day  simvastatin 40 mg oral tablet: 1 tab(s) orally once a day (at bedtime)  torsemide 20 mg oral tablet: 2 tab(s) orally 2 times a day  traMADol 50 mg oral tablet: 1 tab(s) orally every 12 hours, As needed, Moderate Pain (4 - 6)

## 2020-03-18 NOTE — DISCHARGE NOTE PROVIDER - HOSPITAL COURSE
89 y/o F with PMH of pancreatitis s/p ERCP, cirrhosis, DM2, a-fib on Eliquis, CAD s/p PCI, severe pulm HTN, chronic R heart failure, diastolic dysfunction, AS s/p TAVR, OA, HTN, dyslipidemia, hypothyroidism, p/w nausea / vomiting / diarrhea        *Pulm edema.     -BNP trending down    -on torsemide, pulm rec's changing back to home dose upon discharge to rehab    -Pneumonia ruled out. RVP neg.     -Lymphadenopathy - f/u outpatient Pulm to check for resolution after infection improves. If not, will need further work up.     -cardiology and pulm consults appreciated    -d/w Dr. Camacho, repeat CT chest today shows improvement compared to admission.          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 gets steroid injections every 2 weeks    -pain meds: tylenol prn, tramadol prn        *A-fib    -C/w Eliquis    -Rate controlled         *DM2    -Humalog ISS    -Oral meds held temporarily         H/o Pancreatitis / cirrhosis / OA / HTN / dyslipidemia / hypothyroidism / thrombocytopenia     -C/w home meds and f/u outpatient for further management         *Renal cysts noted on CT     -F/u outpatient for further management         Vital Signs Last 24 Hrs    T(C): 36.6 (18 Mar 2020 11:55), Max: 36.6 (18 Mar 2020 11:55)    T(F): 97.9 (18 Mar 2020 11:55), Max: 97.9 (18 Mar 2020 11:55)    HR: 83 (18 Mar 2020 11:55) (74 - 83)    BP: 100/64 (18 Mar 2020 11:55) (100/64 - 127/65)    BP(mean): --    RR: 18 (18 Mar 2020 11:55) (17 - 18)    SpO2: 97% (18 Mar 2020 11:55) (96% - 100%)            Gen - Pleasant, cooperative, in no acute distress    HEENT- PERRL, moist mucus membranes, OP clear    CV - irregularly irregular, No m/r/g, +pulses, no edema    Lungs - Good effort, minimal Crackles in the bases bilaterally    Abdomen - Soft, nontender, nondistended, +BS, No rebound/rigidity/guarding    Ext - No cyanosis/clubbing. LUE discomfort w/ movement, limited ROM    Skin - No rashes, No jaundice. several ecchymotic areas on extremities    Psych- Alert & oriented x 3    Neuro- fluent speech, no facial droop, EOMI. moves all ext 89 y/o F with PMH of pancreatitis s/p ERCP, cirrhosis, DM2, a-fib on Eliquis, CAD s/p PCI, severe pulm HTN, chronic R heart failure, diastolic dysfunction, AS s/p TAVR, OA, HTN, dyslipidemia, hypothyroidism, p/w nausea / vomiting / diarrhea        *Pulm edema.     -BNP trending down    -on torsemide, pulm rec's changing back to home dose upon discharge to rehab    -Pneumonia ruled out. RVP neg.     -Lymphadenopathy - f/u outpatient Pulm to check for resolution after infection improves. If not, will need further work up.     -cardiology and pulm consults appreciated    -d/w Dr. Camacho, repeat CT chest today shows improvement compared to admission.          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 gets steroid injections every 2 weeks    -pain meds: tylenol prn, tramadol prn        * Gout attack    - IV steroid x1 then dc on prednisone 30mg x3 more days         *A-fib    -C/w Eliquis    -Rate controlled         *DM2    -Humalog ISS    -Oral meds held temporarily         H/o Pancreatitis / cirrhosis / OA / HTN / dyslipidemia / hypothyroidism / thrombocytopenia     -C/w home meds and f/u outpatient for further management         *Renal cysts noted on CT     -F/u outpatient for further management         Vital Signs Last 24 Hrs    T(C): 36.6 (18 Mar 2020 11:55), Max: 36.6 (18 Mar 2020 11:55)    T(F): 97.9 (18 Mar 2020 11:55), Max: 97.9 (18 Mar 2020 11:55)    HR: 83 (18 Mar 2020 11:55) (74 - 83)    BP: 100/64 (18 Mar 2020 11:55) (100/64 - 127/65)    BP(mean): --    RR: 18 (18 Mar 2020 11:55) (17 - 18)    SpO2: 97% (18 Mar 2020 11:55) (96% - 100%)            Gen - Pleasant, cooperative, in no acute distress    HEENT- PERRL, moist mucus membranes, OP clear    CV - irregularly irregular, No m/r/g, +pulses, no edema    Lungs - Good effort, minimal Crackles in the bases bilaterally    Abdomen - Soft, nontender, nondistended, +BS, No rebound/rigidity/guarding    Ext - No cyanosis/clubbing. LUE discomfort w/ movement, limited ROM    Skin - No rashes, No jaundice. several ecchymotic areas on extremities    Psych- Alert & oriented x 3    Neuro- fluent speech, no facial droop, EOMI. moves all ext 89 y/o F with PMH of pancreatitis s/p ERCP, cirrhosis, DM2, a-fib on Eliquis, CAD s/p PCI, severe pulm HTN, chronic R heart failure, diastolic dysfunction, AS s/p TAVR, OA, HTN, dyslipidemia, hypothyroidism, p/w nausea / vomiting / diarrhea        *Pulm edema.     -BNP trending down    -on torsemide, pulm rec's changing back to home dose upon discharge to rehab    -Pneumonia ruled out. RVP neg.     -Lymphadenopathy - f/u outpatient Pulm to check for resolution after infection improves. If not, will need further work up.     -cardiology and pulm consults appreciated    -d/w Dr. Camacho, repeat CT chest today shows improvement compared to admission.          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 gets steroid injections every 2 weeks    -pain meds: tylenol prn, tramadol prn            *A-fib    -C/w Eliquis    -Rate controlled         *DM2    -Humalog ISS    -Oral meds held temporarily         H/o Pancreatitis / cirrhosis / OA / HTN / dyslipidemia / hypothyroidism / thrombocytopenia     -C/w home meds and f/u outpatient for further management         *Renal cysts noted on CT     -F/u outpatient for further management         Vital Signs Last 24 Hrs    T(C): 36.6 (18 Mar 2020 11:55), Max: 36.6 (18 Mar 2020 11:55)    T(F): 97.9 (18 Mar 2020 11:55), Max: 97.9 (18 Mar 2020 11:55)    HR: 83 (18 Mar 2020 11:55) (74 - 83)    BP: 100/64 (18 Mar 2020 11:55) (100/64 - 127/65)    BP(mean): --    RR: 18 (18 Mar 2020 11:55) (17 - 18)    SpO2: 97% (18 Mar 2020 11:55) (96% - 100%)            Gen - Pleasant, cooperative, in no acute distress    HEENT- PERRL, moist mucus membranes, OP clear    CV - irregularly irregular, No m/r/g, +pulses, no edema    Lungs - Good effort, minimal Crackles in the bases bilaterally    Abdomen - Soft, nontender, nondistended, +BS, No rebound/rigidity/guarding    Ext - No cyanosis/clubbing. LUE discomfort w/ movement, limited ROM    Skin - No rashes, No jaundice. several ecchymotic areas on extremities    Psych- Alert & oriented x 3    Neuro- fluent speech, no facial droop, EOMI. moves all ext            discharge time spent 47 mins    i discussed with NP , RN team and patient and son Dr joshua

## 2020-03-19 RX ORDER — ALLOPURINOL 100 MG/1
100 TABLET ORAL
Qty: 90 | Refills: 1 | Status: ACTIVE | COMMUNITY
Start: 2020-01-14 | End: 1900-01-01

## 2020-03-19 RX ORDER — METOPROLOL SUCCINATE 25 MG/1
25 TABLET, EXTENDED RELEASE ORAL
Qty: 90 | Refills: 1 | Status: ACTIVE | COMMUNITY
Start: 2019-04-02 | End: 1900-01-01

## 2020-03-19 RX ORDER — POTASSIUM CHLORIDE 750 MG/1
10 TABLET, FILM COATED, EXTENDED RELEASE ORAL DAILY
Qty: 180 | Refills: 1 | Status: DISCONTINUED | COMMUNITY
End: 2020-03-19

## 2020-03-19 NOTE — DISCUSSION/SUMMARY
[Rehab] : patient was discharged to rehab [Follow Up Call to Patient's Family] : follow up call to patient's family

## 2020-03-24 DIAGNOSIS — Z95.2 PRESENCE OF PROSTHETIC HEART VALVE: ICD-10-CM

## 2020-03-24 DIAGNOSIS — A08.11 ACUTE GASTROENTEROPATHY DUE TO NORWALK AGENT: ICD-10-CM

## 2020-03-24 DIAGNOSIS — Z90.710 ACQUIRED ABSENCE OF BOTH CERVIX AND UTERUS: ICD-10-CM

## 2020-03-24 DIAGNOSIS — Z88.0 ALLERGY STATUS TO PENICILLIN: ICD-10-CM

## 2020-03-24 DIAGNOSIS — Z79.01 LONG TERM (CURRENT) USE OF ANTICOAGULANTS: ICD-10-CM

## 2020-03-24 DIAGNOSIS — G93.41 METABOLIC ENCEPHALOPATHY: ICD-10-CM

## 2020-03-24 DIAGNOSIS — K74.60 UNSPECIFIED CIRRHOSIS OF LIVER: ICD-10-CM

## 2020-03-24 DIAGNOSIS — G89.29 OTHER CHRONIC PAIN: ICD-10-CM

## 2020-03-24 DIAGNOSIS — M25.512 PAIN IN LEFT SHOULDER: ICD-10-CM

## 2020-03-24 DIAGNOSIS — K21.9 GASTRO-ESOPHAGEAL REFLUX DISEASE WITHOUT ESOPHAGITIS: ICD-10-CM

## 2020-03-24 DIAGNOSIS — Z88.2 ALLERGY STATUS TO SULFONAMIDES: ICD-10-CM

## 2020-03-24 DIAGNOSIS — Z79.891 LONG TERM (CURRENT) USE OF OPIATE ANALGESIC: ICD-10-CM

## 2020-03-24 DIAGNOSIS — E03.9 HYPOTHYROIDISM, UNSPECIFIED: ICD-10-CM

## 2020-03-24 DIAGNOSIS — Z90.49 ACQUIRED ABSENCE OF OTHER SPECIFIED PARTS OF DIGESTIVE TRACT: ICD-10-CM

## 2020-03-24 DIAGNOSIS — I25.10 ATHEROSCLEROTIC HEART DISEASE OF NATIVE CORONARY ARTERY WITHOUT ANGINA PECTORIS: ICD-10-CM

## 2020-03-24 DIAGNOSIS — M19.90 UNSPECIFIED OSTEOARTHRITIS, UNSPECIFIED SITE: ICD-10-CM

## 2020-03-24 DIAGNOSIS — R09.02 HYPOXEMIA: ICD-10-CM

## 2020-03-24 DIAGNOSIS — Z96.652 PRESENCE OF LEFT ARTIFICIAL KNEE JOINT: ICD-10-CM

## 2020-03-24 DIAGNOSIS — Z79.899 OTHER LONG TERM (CURRENT) DRUG THERAPY: ICD-10-CM

## 2020-03-24 DIAGNOSIS — E86.0 DEHYDRATION: ICD-10-CM

## 2020-03-24 DIAGNOSIS — E78.5 HYPERLIPIDEMIA, UNSPECIFIED: ICD-10-CM

## 2020-03-24 DIAGNOSIS — N28.1 CYST OF KIDNEY, ACQUIRED: ICD-10-CM

## 2020-03-24 DIAGNOSIS — Z79.4 LONG TERM (CURRENT) USE OF INSULIN: ICD-10-CM

## 2020-03-24 DIAGNOSIS — E44.0 MODERATE PROTEIN-CALORIE MALNUTRITION: ICD-10-CM

## 2020-03-24 DIAGNOSIS — I50.32 CHRONIC DIASTOLIC (CONGESTIVE) HEART FAILURE: ICD-10-CM

## 2020-03-24 DIAGNOSIS — E11.9 TYPE 2 DIABETES MELLITUS WITHOUT COMPLICATIONS: ICD-10-CM

## 2020-03-24 DIAGNOSIS — Z83.3 FAMILY HISTORY OF DIABETES MELLITUS: ICD-10-CM

## 2020-03-24 DIAGNOSIS — Z95.828 PRESENCE OF OTHER VASCULAR IMPLANTS AND GRAFTS: ICD-10-CM

## 2020-03-24 DIAGNOSIS — J84.10 PULMONARY FIBROSIS, UNSPECIFIED: ICD-10-CM

## 2020-03-24 DIAGNOSIS — R59.9 ENLARGED LYMPH NODES, UNSPECIFIED: ICD-10-CM

## 2020-03-24 DIAGNOSIS — I48.11 LONGSTANDING PERSISTENT ATRIAL FIBRILLATION: ICD-10-CM

## 2020-03-24 DIAGNOSIS — D69.6 THROMBOCYTOPENIA, UNSPECIFIED: ICD-10-CM

## 2020-03-24 DIAGNOSIS — Z79.82 LONG TERM (CURRENT) USE OF ASPIRIN: ICD-10-CM

## 2020-03-24 DIAGNOSIS — E87.2 ACIDOSIS: ICD-10-CM

## 2020-03-24 DIAGNOSIS — Z82.49 FAMILY HISTORY OF ISCHEMIC HEART DISEASE AND OTHER DISEASES OF THE CIRCULATORY SYSTEM: ICD-10-CM

## 2020-03-24 DIAGNOSIS — I27.20 PULMONARY HYPERTENSION, UNSPECIFIED: ICD-10-CM

## 2020-03-24 DIAGNOSIS — I11.0 HYPERTENSIVE HEART DISEASE WITH HEART FAILURE: ICD-10-CM

## 2020-04-09 ENCOUNTER — APPOINTMENT (OUTPATIENT)
Dept: INTERNAL MEDICINE | Facility: CLINIC | Age: 85
End: 2020-04-09

## 2020-07-15 ENCOUNTER — INPATIENT (INPATIENT)
Facility: HOSPITAL | Age: 85
LOS: 1 days | Discharge: HOME | DRG: 377 | End: 2020-07-17
Attending: INTERNAL MEDICINE | Admitting: INTERNAL MEDICINE
Payer: MEDICARE

## 2020-07-15 VITALS
OXYGEN SATURATION: 96 % | HEART RATE: 81 BPM | TEMPERATURE: 99 F | WEIGHT: 139.99 LBS | HEIGHT: 64 IN | SYSTOLIC BLOOD PRESSURE: 94 MMHG | DIASTOLIC BLOOD PRESSURE: 51 MMHG | RESPIRATION RATE: 18 BRPM

## 2020-07-15 DIAGNOSIS — Z96.652 PRESENCE OF LEFT ARTIFICIAL KNEE JOINT: Chronic | ICD-10-CM

## 2020-07-15 DIAGNOSIS — Z95.828 PRESENCE OF OTHER VASCULAR IMPLANTS AND GRAFTS: Chronic | ICD-10-CM

## 2020-07-15 DIAGNOSIS — Z90.49 ACQUIRED ABSENCE OF OTHER SPECIFIED PARTS OF DIGESTIVE TRACT: Chronic | ICD-10-CM

## 2020-07-15 DIAGNOSIS — K92.2 GASTROINTESTINAL HEMORRHAGE, UNSPECIFIED: ICD-10-CM

## 2020-07-15 LAB
ALBUMIN SERPL ELPH-MCNC: 2.6 G/DL — LOW (ref 3.3–5)
ALP SERPL-CCNC: 83 U/L — SIGNIFICANT CHANGE UP (ref 40–120)
ALT FLD-CCNC: 9 U/L — LOW (ref 12–78)
ANION GAP SERPL CALC-SCNC: 7 MMOL/L — SIGNIFICANT CHANGE UP (ref 5–17)
APPEARANCE UR: CLEAR — SIGNIFICANT CHANGE UP
APTT BLD: 37.2 SEC — HIGH (ref 27.5–35.5)
AST SERPL-CCNC: 8 U/L — LOW (ref 15–37)
BASOPHILS # BLD AUTO: 0.04 K/UL — SIGNIFICANT CHANGE UP (ref 0–0.2)
BASOPHILS NFR BLD AUTO: 0.6 % — SIGNIFICANT CHANGE UP (ref 0–2)
BILIRUB SERPL-MCNC: 0.4 MG/DL — SIGNIFICANT CHANGE UP (ref 0.2–1.2)
BILIRUB UR-MCNC: NEGATIVE — SIGNIFICANT CHANGE UP
BUN SERPL-MCNC: 12 MG/DL — SIGNIFICANT CHANGE UP (ref 7–23)
CALCIUM SERPL-MCNC: 8.6 MG/DL — SIGNIFICANT CHANGE UP (ref 8.5–10.1)
CHLORIDE SERPL-SCNC: 101 MMOL/L — SIGNIFICANT CHANGE UP (ref 96–108)
CO2 SERPL-SCNC: 27 MMOL/L — SIGNIFICANT CHANGE UP (ref 22–31)
COLOR SPEC: YELLOW — SIGNIFICANT CHANGE UP
CREAT SERPL-MCNC: 0.86 MG/DL — SIGNIFICANT CHANGE UP (ref 0.5–1.3)
DIFF PNL FLD: NEGATIVE — SIGNIFICANT CHANGE UP
EOSINOPHIL # BLD AUTO: 0.39 K/UL — SIGNIFICANT CHANGE UP (ref 0–0.5)
EOSINOPHIL NFR BLD AUTO: 5.4 % — SIGNIFICANT CHANGE UP (ref 0–6)
GLUCOSE SERPL-MCNC: 159 MG/DL — HIGH (ref 70–99)
GLUCOSE UR QL: NEGATIVE MG/DL — SIGNIFICANT CHANGE UP
HCT VFR BLD CALC: 24.9 % — LOW (ref 34.5–45)
HGB BLD-MCNC: 7.6 G/DL — LOW (ref 11.5–15.5)
IMM GRANULOCYTES NFR BLD AUTO: 0.3 % — SIGNIFICANT CHANGE UP (ref 0–1.5)
INR BLD: 2.57 RATIO — HIGH (ref 0.88–1.16)
KETONES UR-MCNC: NEGATIVE — SIGNIFICANT CHANGE UP
LEUKOCYTE ESTERASE UR-ACNC: NEGATIVE — SIGNIFICANT CHANGE UP
LYMPHOCYTES # BLD AUTO: 0.94 K/UL — LOW (ref 1–3.3)
LYMPHOCYTES # BLD AUTO: 13.1 % — SIGNIFICANT CHANGE UP (ref 13–44)
MCHC RBC-ENTMCNC: 27.2 PG — SIGNIFICANT CHANGE UP (ref 27–34)
MCHC RBC-ENTMCNC: 30.5 GM/DL — LOW (ref 32–36)
MCV RBC AUTO: 89.2 FL — SIGNIFICANT CHANGE UP (ref 80–100)
MONOCYTES # BLD AUTO: 0.97 K/UL — HIGH (ref 0–0.9)
MONOCYTES NFR BLD AUTO: 13.5 % — SIGNIFICANT CHANGE UP (ref 2–14)
NEUTROPHILS # BLD AUTO: 4.81 K/UL — SIGNIFICANT CHANGE UP (ref 1.8–7.4)
NEUTROPHILS NFR BLD AUTO: 67.1 % — SIGNIFICANT CHANGE UP (ref 43–77)
NITRITE UR-MCNC: NEGATIVE — SIGNIFICANT CHANGE UP
PH UR: 6 — SIGNIFICANT CHANGE UP (ref 5–8)
PLATELET # BLD AUTO: 246 K/UL — SIGNIFICANT CHANGE UP (ref 150–400)
POTASSIUM SERPL-MCNC: 3.7 MMOL/L — SIGNIFICANT CHANGE UP (ref 3.5–5.3)
POTASSIUM SERPL-SCNC: 3.7 MMOL/L — SIGNIFICANT CHANGE UP (ref 3.5–5.3)
PROT SERPL-MCNC: 7.1 GM/DL — SIGNIFICANT CHANGE UP (ref 6–8.3)
PROT UR-MCNC: NEGATIVE MG/DL — SIGNIFICANT CHANGE UP
PROTHROM AB SERPL-ACNC: 28.7 SEC — HIGH (ref 10.6–13.6)
RBC # BLD: 2.79 M/UL — LOW (ref 3.8–5.2)
RBC # FLD: 16.6 % — HIGH (ref 10.3–14.5)
SODIUM SERPL-SCNC: 135 MMOL/L — SIGNIFICANT CHANGE UP (ref 135–145)
SP GR SPEC: 1.01 — SIGNIFICANT CHANGE UP (ref 1.01–1.02)
TROPONIN I SERPL-MCNC: <0.015 NG/ML — SIGNIFICANT CHANGE UP (ref 0.01–0.04)
UROBILINOGEN FLD QL: NEGATIVE MG/DL — SIGNIFICANT CHANGE UP
WBC # BLD: 7.17 K/UL — SIGNIFICANT CHANGE UP (ref 3.8–10.5)
WBC # FLD AUTO: 7.17 K/UL — SIGNIFICANT CHANGE UP (ref 3.8–10.5)

## 2020-07-15 PROCEDURE — 85027 COMPLETE CBC AUTOMATED: CPT

## 2020-07-15 PROCEDURE — 85045 AUTOMATED RETICULOCYTE COUNT: CPT

## 2020-07-15 PROCEDURE — 74176 CT ABD & PELVIS W/O CONTRAST: CPT

## 2020-07-15 PROCEDURE — 82728 ASSAY OF FERRITIN: CPT

## 2020-07-15 PROCEDURE — 83540 ASSAY OF IRON: CPT

## 2020-07-15 PROCEDURE — 99223 1ST HOSP IP/OBS HIGH 75: CPT

## 2020-07-15 PROCEDURE — 93010 ELECTROCARDIOGRAM REPORT: CPT

## 2020-07-15 PROCEDURE — 83036 HEMOGLOBIN GLYCOSYLATED A1C: CPT

## 2020-07-15 PROCEDURE — 82746 ASSAY OF FOLIC ACID SERUM: CPT

## 2020-07-15 PROCEDURE — 71045 X-RAY EXAM CHEST 1 VIEW: CPT | Mod: 26

## 2020-07-15 PROCEDURE — 36415 COLL VENOUS BLD VENIPUNCTURE: CPT

## 2020-07-15 PROCEDURE — 36430 TRANSFUSION BLD/BLD COMPNT: CPT

## 2020-07-15 PROCEDURE — 82607 VITAMIN B-12: CPT

## 2020-07-15 PROCEDURE — 83550 IRON BINDING TEST: CPT

## 2020-07-15 PROCEDURE — C9113: CPT

## 2020-07-15 PROCEDURE — 97116 GAIT TRAINING THERAPY: CPT | Mod: GP

## 2020-07-15 PROCEDURE — 80048 BASIC METABOLIC PNL TOTAL CA: CPT

## 2020-07-15 PROCEDURE — 97162 PT EVAL MOD COMPLEX 30 MIN: CPT | Mod: GP

## 2020-07-15 PROCEDURE — P9016: CPT

## 2020-07-15 RX ORDER — ALLOPURINOL 300 MG
100 TABLET ORAL DAILY
Refills: 0 | Status: DISCONTINUED | OUTPATIENT
Start: 2020-07-15 | End: 2020-07-17

## 2020-07-15 RX ORDER — PANTOPRAZOLE SODIUM 20 MG/1
40 TABLET, DELAYED RELEASE ORAL
Refills: 0 | Status: DISCONTINUED | OUTPATIENT
Start: 2020-07-15 | End: 2020-07-17

## 2020-07-15 RX ORDER — METOPROLOL TARTRATE 50 MG
25 TABLET ORAL DAILY
Refills: 0 | Status: DISCONTINUED | OUTPATIENT
Start: 2020-07-15 | End: 2020-07-17

## 2020-07-15 RX ORDER — PANTOPRAZOLE SODIUM 20 MG/1
1 TABLET, DELAYED RELEASE ORAL
Qty: 0 | Refills: 0 | DISCHARGE

## 2020-07-15 RX ORDER — SACCHAROMYCES BOULARDII 250 MG
250 POWDER IN PACKET (EA) ORAL
Refills: 0 | Status: DISCONTINUED | OUTPATIENT
Start: 2020-07-15 | End: 2020-07-17

## 2020-07-15 RX ORDER — ONDANSETRON 8 MG/1
4 TABLET, FILM COATED ORAL EVERY 6 HOURS
Refills: 0 | Status: DISCONTINUED | OUTPATIENT
Start: 2020-07-15 | End: 2020-07-17

## 2020-07-15 RX ORDER — GLUCOSAMINE/CHONDROITIN/C/MANG 500-400 MG
3 CAPSULE ORAL
Qty: 0 | Refills: 0 | DISCHARGE

## 2020-07-15 RX ORDER — FAMOTIDINE 10 MG/ML
1 INJECTION INTRAVENOUS
Qty: 0 | Refills: 0 | DISCHARGE

## 2020-07-15 RX ORDER — MULTIVIT-MIN/FERROUS GLUCONATE 9 MG/15 ML
1 LIQUID (ML) ORAL DAILY
Refills: 0 | Status: DISCONTINUED | OUTPATIENT
Start: 2020-07-15 | End: 2020-07-17

## 2020-07-15 RX ORDER — UBIDECARENONE 100 MG
1 CAPSULE ORAL
Qty: 0 | Refills: 0 | DISCHARGE

## 2020-07-15 RX ORDER — SIMVASTATIN 20 MG/1
40 TABLET, FILM COATED ORAL AT BEDTIME
Refills: 0 | Status: DISCONTINUED | OUTPATIENT
Start: 2020-07-15 | End: 2020-07-17

## 2020-07-15 RX ORDER — ESCITALOPRAM OXALATE 10 MG/1
10 TABLET, FILM COATED ORAL DAILY
Refills: 0 | Status: DISCONTINUED | OUTPATIENT
Start: 2020-07-15 | End: 2020-07-17

## 2020-07-15 RX ORDER — DOCUSATE SODIUM 100 MG
1 CAPSULE ORAL
Qty: 0 | Refills: 0 | DISCHARGE

## 2020-07-15 RX ORDER — PSYLLIUM SEED (WITH DEXTROSE)
1 POWDER (GRAM) ORAL
Qty: 0 | Refills: 0 | DISCHARGE

## 2020-07-15 RX ORDER — METFORMIN HYDROCHLORIDE 850 MG/1
1 TABLET ORAL
Qty: 0 | Refills: 0 | DISCHARGE

## 2020-07-15 RX ORDER — PANTOPRAZOLE SODIUM 20 MG/1
40 TABLET, DELAYED RELEASE ORAL ONCE
Refills: 0 | Status: COMPLETED | OUTPATIENT
Start: 2020-07-15 | End: 2020-07-15

## 2020-07-15 RX ORDER — LEVOTHYROXINE SODIUM 125 MCG
50 TABLET ORAL DAILY
Refills: 0 | Status: DISCONTINUED | OUTPATIENT
Start: 2020-07-15 | End: 2020-07-17

## 2020-07-15 RX ADMIN — SIMVASTATIN 40 MILLIGRAM(S): 20 TABLET, FILM COATED ORAL at 23:14

## 2020-07-15 RX ADMIN — Medication 250 MILLIGRAM(S): at 23:14

## 2020-07-15 RX ADMIN — Medication 20 MILLIGRAM(S): at 23:14

## 2020-07-15 RX ADMIN — PANTOPRAZOLE SODIUM 40 MILLIGRAM(S): 20 TABLET, DELAYED RELEASE ORAL at 19:00

## 2020-07-15 NOTE — PATIENT PROFILE ADULT - SURGICAL SITE INCISION
SUBJECT This is a 2 year old White  male who presents today for a lot of congestion for 5 days crusty eyes for 2 days and fever for 3 days. He has not been sleeping well last 2 nights and not been eating very well last 2 days but he is drinking fluids well for dad. Dad said he had 104.6 fever 2 days ago and they've been using ibuprofen to keep it down since then.    Chief Complaint   Patient presents with   • Fever   • Cold Symptoms   • Eye Problem        PEDIATRIC HISTORY:   Birth History   • Birth     Length: 21\" (53.3 cm)     Weight: 4.026 kg     HC 35 cm (13.78\")   • Apgar     One: 9     Five: 9   • Discharge Weight: 3.884 kg   • Delivery Method: , Low Transverse   • Gestation Age: 38 wks   • Feeding: Breast Fed   • Hospital Name: [facility]     Nuchal cord times one  Mom is O Rh+  Babe is B Rh+  Kirsten neg.  TransCu Bili 9.2@62 h       No past medical history on file.     Past Surgical History:   Procedure Laterality Date   • CIRCUMCISION, PRIMARY          Social History     Social History   • Marital status: Single     Spouse name: N/A   • Number of children: N/A   • Years of education: N/A     Social History Main Topics   • Smoking status: Never Smoker   • Smokeless tobacco: Not on file   • Alcohol use Not on file   • Drug use: Not on file   • Sexual activity: Not on file     Other Topics Concern   • Not on file     Social History Narrative    Dad     Stanley       6 feet 2 inches            Mom    Stacey   5 feet 4 inches        [employer]        REVIEW OF SYSTEMS:   [x]   CONSTITUTIONAL: Denies weight loss, generalized fatigue, chills, night sweats, excessive sweating  []   EYES:  Denies change in visual acuity, diplopia,  photophobia, blurring, visual disturbance  []   HEENT: Denies hearing loss, tinnitus, chronic nasal congestion, sore throat, change in voice, hoarseness, nasal obstruction or discharge, painful swallowing  [x]   RESPIRATORY: Denies shortness of breath,  chronic cough, sputum production, hemoptysis, wheezing  [x]   CARDIOVASCULAR:  Denies chest pain, chest pain with exertion, palpitations, dyspnea on exertion, claudication symptoms, swelling in legs, shortness of breath at night  [x]   GI:  Denies  Poor appetite, heartburn,vomiting of blood, melena, nausea, vomiting,   Constipation, diarrhea  changes in bowel habits, rectal bleeding,  [x]   :  Denies dysuria, nocturia, recurrent urinary infection, difficulty starting urinary stream,urinary urgency, history of stones, hematuria, urinary incontinence  [x]   MUSCULOSKELETAL:  Denies back pain, joint pain, joint stiffness, joint swelling, neck pain   [x]   INTEGUMENT:  Denies rash, changes in moles, abnormal hair growth  [x]   NEUROLOGIC:  Denies headache, loss of balance, dizziness, loss of consciousness, head injury, recurrent weakness/numbness in hands or feet, tremors, episode of difficulty talking  [x]   ENDOCRINE: denies temperature intolerance, excessive thirst, excessive urination  [x]   HEMATOLOGIC/LYMPHATIC:  Denies swollen glands , anemia, easy bruising, bleeding tendencies, history of clots  [x]   IMMUNOLOGIC: denies frequent infection, asthma, blood transfusion  [x]   PSYCHIATRIC:  Denies difficulty relaxing, insomnia, worrying often, irritability and mood swings, prolonged depression, suicidal thoughts, difficulty concentrating, personal problems causing concern, problems with sexual relations, low self esteem/worth, hopelessness, memory problems  []   SEXUAL: Denies history of sexually transmitted disease, denies HPV, denies more than 5 partners in lifetime, denies reason to believe exposure to HIV/AIDS    OBJECTIVE:  Visit Vitals   • Temp 99 °F (37.2 °C) (Temporal Artery)   • Wt 15.9 kg        GROWTH CURVE PARAMETERS: 96 %ile (Z= 1.75) based on CDC 2-20 Years weight-for-age data using vitals from 3/17/2017. No height on file for this encounter.     (Check if normal, discuss abnormals)  [x]   General   Appearance      [x]   Skin    [x]   Head    []   Eyes    []   Ears    []   Nose    [x]   Oral pharynx    [x]   Neck    [x]   Nodes    [x]   Chest    []   Lungs    [x]   Heart    [x]   Abdomen    [x]   External Genitalia    [x]   Neurological    [x]   Hips/Extremities      Notes:  He is alert active male who is mildly ill-appearing and he has moderate cloudy thick rhinorrhea and TM on the left has cloudy thick fluid red and immobile  TM right TM has mild clear fluid mobile TM. Posterior pharynx has moderate erythema and cloudy thick postnasal drip. Eyes have injected conjunctiva bilaterally with yellow discharge bilaterally lungs have mild coarse breath sounds but good aeration and no rales nor wheeze and mild upper respiratory noise.    ASSESSMENT:  1. Bacterial conjunctivitis    2. Acute suppurative otitis media of left ear without spontaneous rupture of tympanic membrane, recurrence not specified           PLAN:  Orders Placed This Encounter   • amoxicillin (AMOXIL) 400 MG/5ML suspension   • ciprofloxacin (CIPRO) 0.3 % ophthalmic solution      Dad will use the Cipro eyedrops for another 4-5 days until eyes are clear. Finish out the amoxicillin ×10 days and then do an ear recheck in about 2-3 weeks.    Return in about 2 weeks (around 3/31/2017) for ear recheck.   no

## 2020-07-15 NOTE — ED ADULT NURSE NOTE - OBJECTIVE STATEMENT
pt presents to ed via ems from [facility]  for low hemoglobin and + guiac. pt presents to ed a&o x4, castillo x 4, bp 100s systolic, in no resp distress, afebrile rectally, negative guaic by md omalley. pt denies blood in stool

## 2020-07-15 NOTE — H&P ADULT - HISTORY OF PRESENT ILLNESS
Patient is 89yo female with PMhx  pancreatitis s/p ERCP, cirrhosis, DM2, a-fib on Eliquis, CAD s/p PCI, severe pulm HTN, chronic R heart failure, diastolic dysfunction, AS s/p TAVR, OA, HTN, dyslipidemia, hypothyroidism, p/w anemia, and guiac positive at [facility]. Pt denies fever, chills, cough, CP, SOB, melena. Reports stools are brown in color, without any blood. Denies abd pain. In the ER Guiac NEG, HH 7.6, baseline 10  No other constitutional symptoms.

## 2020-07-15 NOTE — H&P ADULT - NSHPLABSRESULTS_GEN_ALL_CORE
CARDIAC MARKERS ( 15 Jul 2020 17:49 )  <0.015 ng/mL / x     / x     / x     / x                                7.6    7.17  )-----------( 246      ( 15 Jul 2020 17:49 )             24.9     15 Jul 2020 17:49    135    |  101    |  12     ----------------------------<  159    3.7     |  27     |  0.86     Ca    8.6        15 Jul 2020 17:49    TPro  7.1    /  Alb  2.6    /  TBili  0.4    /  DBili  x      /  AST  8      /  ALT  9      /  AlkPhos  83     15 Jul 2020 17:49    PT/INR - ( 15 Jul 2020 17:49 )   PT: 28.7 sec;   INR: 2.57 ratio         PTT - ( 15 Jul 2020 17:49 )  PTT:37.2 sec  CAPILLARY BLOOD GLUCOSE        LIVER FUNCTIONS - ( 15 Jul 2020 17:49 )  Alb: 2.6 g/dL / Pro: 7.1 gm/dL / ALK PHOS: 83 U/L / ALT: 9 U/L / AST: 8 U/L / GGT: x           Urinalysis Basic - ( 15 Jul 2020 18:54 )    Color: Yellow / Appearance: Clear / S.015 / pH: x  Gluc: x / Ketone: Negative  / Bili: Negative / Urobili: Negative mg/dL   Blood: x / Protein: Negative mg/dL / Nitrite: Negative   Leuk Esterase: Negative / RBC: x / WBC x   Sq Epi: x / Non Sq Epi: x / Bacteria: x

## 2020-07-15 NOTE — H&P ADULT - ASSESSMENT
89yo female with PMhx s/p ERCP, cirrhosis, DM2, a-fib on Eliquis, CAD s/p PCI, severe pulm HTN, chronic R heart failure, diastolic dysfunction, AS s/p TAVR, OA, HTN, dyslipidemia, hypothyroidism, p/w anemia, guiac positive as out patient, but in the ER guiac NEG      Anemia  DM  Afib on Eliquis  CAD s/p PCI  Severe Pulm HTN  Diastolic dysfunction  AS s/p TAVR    PLAN:  - supp o2 as needed MDI PRN  - NO ID issues  - transfuse 1u PRBC, may need Lasix IV thereafter  - obtain iron studies, Folate levels, B12  - retic count  - HOLD Eliquis, ASA  - Sildenafil PO  - A[place]IN  - PPI IV BID  - DVT PPx, SCDs  - Admit, med surg

## 2020-07-15 NOTE — ED ADULT TRIAGE NOTE - CHIEF COMPLAINT QUOTE
Pt brought in by ambulance from [facility] for low HGB/HCT. Pt was also found to be guiac positive. No evidence of active bleeding.

## 2020-07-15 NOTE — ED ADULT NURSE NOTE - NSIMPLEMENTINTERV_GEN_ALL_ED
Implemented All Fall with Harm Risk Interventions:  [place] to call system. Call bell, personal items and telephone within reach. Instruct patient to call for assistance. Room bathroom lighting operational. Non-slip footwear when patient is off stretcher. Physically safe environment: no spills, clutter or unnecessary equipment. Stretcher in lowest position, wheels locked, appropriate side rails in place. Provide visual cue, wrist band, yellow gown, etc. Monitor gait and stability. Monitor for mental status changes and reorient to person, place, and time. Review medications for side effects contributing to fall risk. Reinforce activity limits and safety measures with patient and family. Provide visual clues: red socks.

## 2020-07-15 NOTE — ED ADULT NURSE NOTE - CHIEF COMPLAINT QUOTE
Pt brought in by ambulance from [facility] for low HGB/HCT. Pt was also found to be guiac positive. No evidence of active bleeding.

## 2020-07-15 NOTE — ED PROVIDER NOTE - CLINICAL SUMMARY MEDICAL DECISION MAKING FREE TEXT BOX
Pt with guaiac positive stook prior, with anemia.  Given PRBC in ED, admit to medicine for further evaluation and management.

## 2020-07-15 NOTE — ED PROVIDER NOTE - PROGRESS NOTE DETAILS
Maya Richardson for attending Dr. Balderas. Guaiac test performed. Brown stool, negative. Lot #[id number], expires 8/31/2020.

## 2020-07-15 NOTE — ED ADULT NURSE REASSESSMENT NOTE - NS ED NURSE REASSESS COMMENT FT1
pt blood transfusion started.  no reaction at this time.  vss, will monitor for first 15 min then reassess.

## 2020-07-15 NOTE — H&P ADULT - NSHPPHYSICALEXAM_GEN_ALL_CORE
T(C): 36.6 (07-15-20 @ 20:54), Max: 37.1 (07-15-20 @ 17:16)  HR: 72 (07-15-20 @ 20:54) (72 - 81)  BP: 101/56 (07-15-20 @ 20:54) (94/51 - 115/77)  RR: 19 (07-15-20 @ 20:54) (18 - 21)  SpO2: 98% (07-15-20 @ 20:54) (96% - 100%)  Wt(kg): --    Gen: AAOx3, NAD  HEENT: NCAT, EOMI  Neck: Supple  CV: nml S1S2, RRR  Lungs: CTABL  Abd: Soft, NT, ND, BS+  Ext: 2+ edema  Neuro: Non focal  Skin: normal  Psych: normal affect, pleasant

## 2020-07-15 NOTE — ED PROVIDER NOTE - OBJECTIVE STATEMENT
89 y/o female with a PMHx of aortic stenosis, pancreatitis, HLD, hypothyroidism, afib (on Eliquis), CAD, HTN, OA, GERD, and T2DM presents BIBEMS to the ED from [facility] for low H&H. Pt was also found to be guaiac positive. Pt denies abd pain, CP, SOB, lightheadedness, and prior occurrence of hematochezia.

## 2020-07-15 NOTE — PHARMACOTHERAPY INTERVENTION NOTE - COMMENTS
med history complete, reviewed medications with patients med list from [facility] and confirmed with doctor first med profile

## 2020-07-16 LAB
ADD ON TEST-SPECIMEN IN LAB: SIGNIFICANT CHANGE UP
ADD ON TEST-SPECIMEN IN LAB: SIGNIFICANT CHANGE UP
ANION GAP SERPL CALC-SCNC: 6 MMOL/L — SIGNIFICANT CHANGE UP (ref 5–17)
BUN SERPL-MCNC: 12 MG/DL — SIGNIFICANT CHANGE UP (ref 7–23)
CALCIUM SERPL-MCNC: 9.1 MG/DL — SIGNIFICANT CHANGE UP (ref 8.5–10.1)
CHLORIDE SERPL-SCNC: 101 MMOL/L — SIGNIFICANT CHANGE UP (ref 96–108)
CO2 SERPL-SCNC: 29 MMOL/L — SIGNIFICANT CHANGE UP (ref 22–31)
CREAT SERPL-MCNC: 0.73 MG/DL — SIGNIFICANT CHANGE UP (ref 0.5–1.3)
CULTURE RESULTS: SIGNIFICANT CHANGE UP
FOLATE SERPL-MCNC: 19.1 NG/ML — SIGNIFICANT CHANGE UP
GLUCOSE SERPL-MCNC: 125 MG/DL — HIGH (ref 70–99)
HCT VFR BLD CALC: 28.3 % — LOW (ref 34.5–45)
HGB BLD-MCNC: 9 G/DL — LOW (ref 11.5–15.5)
IRON SATN MFR SERPL: 30 UG/DL — SIGNIFICANT CHANGE UP (ref 30–160)
IRON SATN MFR SERPL: 8 % — LOW (ref 14–50)
MCHC RBC-ENTMCNC: 28.2 PG — SIGNIFICANT CHANGE UP (ref 27–34)
MCHC RBC-ENTMCNC: 31.8 GM/DL — LOW (ref 32–36)
MCV RBC AUTO: 88.7 FL — SIGNIFICANT CHANGE UP (ref 80–100)
PLATELET # BLD AUTO: 223 K/UL — SIGNIFICANT CHANGE UP (ref 150–400)
POTASSIUM SERPL-MCNC: 3.7 MMOL/L — SIGNIFICANT CHANGE UP (ref 3.5–5.3)
POTASSIUM SERPL-SCNC: 3.7 MMOL/L — SIGNIFICANT CHANGE UP (ref 3.5–5.3)
RBC # BLD: 3.19 M/UL — LOW (ref 3.8–5.2)
RBC # BLD: 3.19 M/UL — LOW (ref 3.8–5.2)
RBC # FLD: 16.3 % — HIGH (ref 10.3–14.5)
RETICS #: 48.2 K/UL — SIGNIFICANT CHANGE UP (ref 25–125)
RETICS/RBC NFR: 1.5 % — SIGNIFICANT CHANGE UP (ref 0.5–2.5)
SARS-COV-2 IGG SERPL QL IA: POSITIVE
SARS-COV-2 IGM SERPL IA-ACNC: 5.2 INDEX — HIGH
SARS-COV-2 RNA SPEC QL NAA+PROBE: SIGNIFICANT CHANGE UP
SODIUM SERPL-SCNC: 136 MMOL/L — SIGNIFICANT CHANGE UP (ref 135–145)
SPECIMEN SOURCE: SIGNIFICANT CHANGE UP
TIBC SERPL-MCNC: 377 UG/DL — SIGNIFICANT CHANGE UP (ref 220–430)
UIBC SERPL-MCNC: 348 UG/DL — SIGNIFICANT CHANGE UP (ref 110–370)
VIT B12 SERPL-MCNC: 578 PG/ML — SIGNIFICANT CHANGE UP (ref 232–1245)
WBC # BLD: 6.97 K/UL — SIGNIFICANT CHANGE UP (ref 3.8–10.5)
WBC # FLD AUTO: 6.97 K/UL — SIGNIFICANT CHANGE UP (ref 3.8–10.5)

## 2020-07-16 PROCEDURE — 74176 CT ABD & PELVIS W/O CONTRAST: CPT | Mod: 26

## 2020-07-16 PROCEDURE — 99233 SBSQ HOSP IP/OBS HIGH 50: CPT

## 2020-07-16 RX ORDER — ACETAMINOPHEN 500 MG
650 TABLET ORAL EVERY 6 HOURS
Refills: 0 | Status: DISCONTINUED | OUTPATIENT
Start: 2020-07-16 | End: 2020-07-17

## 2020-07-16 RX ORDER — ACETAMINOPHEN 500 MG
650 TABLET ORAL EVERY 6 HOURS
Refills: 0 | Status: DISCONTINUED | OUTPATIENT
Start: 2020-07-16 | End: 2020-07-16

## 2020-07-16 RX ADMIN — Medication 650 MILLIGRAM(S): at 21:25

## 2020-07-16 RX ADMIN — ESCITALOPRAM OXALATE 10 MILLIGRAM(S): 10 TABLET, FILM COATED ORAL at 10:38

## 2020-07-16 RX ADMIN — Medication 20 MILLIGRAM(S): at 21:24

## 2020-07-16 RX ADMIN — Medication 1 DROP(S): at 00:17

## 2020-07-16 RX ADMIN — Medication 1 DROP(S): at 13:10

## 2020-07-16 RX ADMIN — Medication 20 MILLIGRAM(S): at 10:38

## 2020-07-16 RX ADMIN — Medication 1 TABLET(S): at 10:38

## 2020-07-16 RX ADMIN — Medication 1 DROP(S): at 05:12

## 2020-07-16 RX ADMIN — Medication 1 DROP(S): at 21:25

## 2020-07-16 RX ADMIN — Medication 25 MILLIGRAM(S): at 10:38

## 2020-07-16 RX ADMIN — Medication 250 MILLIGRAM(S): at 21:24

## 2020-07-16 RX ADMIN — Medication 250 MILLIGRAM(S): at 10:38

## 2020-07-16 RX ADMIN — SIMVASTATIN 40 MILLIGRAM(S): 20 TABLET, FILM COATED ORAL at 21:24

## 2020-07-16 RX ADMIN — Medication 650 MILLIGRAM(S): at 22:30

## 2020-07-16 RX ADMIN — Medication 50 MICROGRAM(S): at 05:12

## 2020-07-16 RX ADMIN — PANTOPRAZOLE SODIUM 40 MILLIGRAM(S): 20 TABLET, DELAYED RELEASE ORAL at 21:24

## 2020-07-16 RX ADMIN — Medication 100 MILLIGRAM(S): at 10:38

## 2020-07-16 RX ADMIN — PANTOPRAZOLE SODIUM 40 MILLIGRAM(S): 20 TABLET, DELAYED RELEASE ORAL at 10:38

## 2020-07-16 NOTE — PROGRESS NOTE ADULT - SUBJECTIVE AND OBJECTIVE BOX
HPI:  Patient is 89yo female with PMhx  pancreatitis s/p ERCP, cirrhosis, DM2, a-fib on Eliquis, CAD s/p PCI, severe pulm HTN, chronic R heart failure, diastolic dysfunction, AS s/p TAVR, OA, HTN, dyslipidemia, hypothyroidism, p/w anemia, and guiac positive at [facility]. Pt denies fever, chills, cough, CP, SOB, melena. Reports stools are brown in color, without any blood. Denies abd pain. In the ER Guiac NEG, HH 7.6, baseline 10  No other constitutional symptoms. (15 Jul 2020 20:59)  ------------------------  No bleeding overnight  She is generally agreeable to endoscopic evaluation as needed    PAST MEDICAL & SURGICAL HISTORY:  Aortic stenosis  History of pancreatitis  HLD (hyperlipidemia)  Hypothyroid  Afib  CAD (coronary artery disease)  Hypertension  History of Osteoarthritis  GERD (Gastroesophageal Reflux Disease)  Dyslipidemia  Diabetes Mellitus Type II  S/P IVC filter: Note that Pt does not have  h/o DVT, outPt doppler was read first as +, but after review reported as no DVT. Pt got IVC filer before that  H/O total knee replacement, left  History of appendectomy  History of cholecystectomy  H/O: Knee Surgery- right meniscus  H/O: Hysterectomy      Home Medications:  allopurinol 100 mg oral tablet: 2 tab(s) orally once a day (15 Jul 2020 18:37)  Artificial Tears ophthalmic solution: 1 drop(s) to each affected eye 3 times a day (15 Jul 2020 20:16)  escitalopram 10 mg oral tablet: 1 tab(s) orally once a day (15 Jul 2020 18:37)  Florastor 250 mg oral capsule: 2 cap(s) orally once a day (at bedtime) (15 Jul 2020 18:37)  levothyroxine 50 mcg (0.05 mg) oral tablet: 1 tab(s) orally once a day (15 Jul 2020 18:37)  metoprolol succinate 25 mg oral tablet, extended release: 1 tab(s) orally once a day (15 Jul 2020 18:37)  Multiple Vitamins oral tablet: 1 tab(s) orally once a day (15 Jul 2020 18:37)  PreserVision AREDS 2 oral capsule: 2 cap(s) orally once a day (15 Jul 2020 18:37)  sildenafil 20 mg oral tablet: 1 tab(s) orally 2 times a day (15 Jul 2020 18:37)  simvastatin 40 mg oral tablet: 1 tab(s) orally once a day (at bedtime) (15 Jul 2020 18:37)  torsemide 20 mg oral tablet: 2 tab(s) orally 2 times a day (15 Jul 2020 18:37)      MEDICATIONS  (STANDING):  allopurinol 100 milliGRAM(s) Oral daily  artificial  tears Solution 1 Drop(s) Both EYES three times a day  escitalopram 10 milliGRAM(s) Oral daily  levothyroxine 50 MICROGram(s) Oral daily  metoprolol succinate ER 25 milliGRAM(s) Oral daily  multivitamin/minerals 1 Tablet(s) Oral daily  pantoprazole  Injectable 40 milliGRAM(s) IV Push two times a day  saccharomyces boulardii 250 milliGRAM(s) Oral two times a day  sildenafil (REVATIO) 20 milliGRAM(s) Oral two times a day  simvastatin 40 milliGRAM(s) Oral at bedtime  torsemide 20 milliGRAM(s) Oral two times a day    MEDICATIONS  (PRN):  ondansetron Injectable 4 milliGRAM(s) IV Push every 6 hours PRN Nausea      Allergies    penicillin (Rash)  penicillins (Other)  sulfa drugs (Rash; Other)    Intolerances        SOCIAL HISTORY:    FAMILY HISTORY:  FH: heart disease  FH: diabetes mellitus: both parents      ROS  As above  Otherwise unremarkable    Vital Signs Last 24 Hrs  T(C): 36.6 (15 Jul 2020 23:29), Max: 37.1 (15 Jul 2020 17:16)  T(F): 97.8 (15 Jul 2020 23:29), Max: 98.7 (15 Jul 2020 17:16)  HR: 76 (15 Jul 2020 23:29) (68 - 81)  BP: 107/52 (15 Jul 2020 23:29) (94/51 - 115/77)  BP(mean): 86 (15 Jul 2020 19:07) (86 - 86)  RR: 17 (15 Jul 2020 23:29) (17 - 21)  SpO2: 95% (15 Jul 2020 23:29) (95% - 100%)    Constitutional: NAD, well-developed  Respiratory: CTAB  Cardiovascular: S1 and S2, RRR  Gastrointestinal: BS+, soft, NT/ND  Extremities: No peripheral edema  Psychiatric: Normal mood, normal affect  Skin: No rashes    LABS:                        7.6    7.17  )-----------( 246      ( 15 Jul 2020 17:49 )             24.9     07-15    135  |  101  |  12  ----------------------------<  159<H>  3.7   |  27  |  0.86    Ca    8.6      15 Jul 2020 17:49    TPro  7.1  /  Alb  2.6<L>  /  TBili  0.4  /  DBili  x   /  AST  8<L>  /  ALT  9<L>  /  AlkPhos  83  07-15    PT/INR - ( 15 Jul 2020 17:49 )   PT: 28.7 sec;   INR: 2.57 ratio         PTT - ( 15 Jul 2020 17:49 )  PTT:37.2 sec  LIVER FUNCTIONS - ( 15 Jul 2020 17:49 )  Alb: 2.6 g/dL / Pro: 7.1 gm/dL / ALK PHOS: 83 U/L / ALT: 9 U/L / AST: 8 U/L / GGT: x             RADIOLOGY & ADDITIONAL STUDIES:

## 2020-07-16 NOTE — PROGRESS NOTE ADULT - SUBJECTIVE AND OBJECTIVE BOX
Cheif complaints and Diagnosis: anemia/ possible gi bleed    Subjective:       REVIEW OF SYSTEMS:    CONSTITUTIONAL: No weakness, fevers or chills  EYES/ENT: No visual changes;  No vertigo or throat pain   NECK: No pain or stiffness  RESPIRATORY: No cough, wheezing, hemoptysis; No shortness of breath  CARDIOVASCULAR: No chest pain or palpitations  GASTROINTESTINAL: No abdominal or epigastric pain. No nausea, vomiting, or hematemesis; No diarrhea or constipation. No melena or hematochezia.  GENITOURINARY: No dysuria, frequency or hematuria  NEUROLOGICAL: No numbness or weakness  SKIN: No itching, burning, rashes, or lesions   All other review of systems is negative unless indicated above      Vital Signs Last 24 Hrs  T(C): 36.5 (2020 07:56), Max: 37.1 (15 Jul 2020 17:16)  T(F): 97.7 (2020 07:56), Max: 98.7 (15 Jul 2020 17:16)  HR: 77 (2020 07:56) (68 - 81)  BP: 119/56 (2020 07:56) (94/51 - 119/56)  BP(mean): 86 (15 Jul 2020 19:07) (86 - 86)  RR: 18 (2020 07:56) (17 - 21)  SpO2: 99% (2020 07:56) (95% - 100%)    HEENT:   pupils equal and reactive, EOMI, no oropharyngeal lesions, erythema, exudates, oral thrush    NECK:   supple, no carotid bruits, no palpable lymph nodes, no thyromegaly    CV:  +S1, +S2, regular, no murmurs or rubs    RESP:   lungs clear to auscultation bilaterally, no wheezing, rales, rhonchi, good air entry bilaterally    BREAST:  not examined    GI:  abdomen soft, non-tender, non-distended, normal BS, no bruits, no abdominal masses, no palpable masses    RECTAL:  not examined    :  not examined    MSK:   normal muscle tone, no atrophy, no rigidity, no contractions    EXT:   no clubbing, no cyanosis, no edema, no calf pain, swelling or erythema    VASCULAR:  pulses equal and symmetric in the upper and lower extremities    NEURO:  AAOX3, no focal neurological deficits, follows all commands, able to move extremities spontaneously    SKIN:  no ulcers, lesions or rashes    MEDICATIONS  (STANDING):  allopurinol 100 milliGRAM(s) Oral daily  artificial  tears Solution 1 Drop(s) Both EYES three times a day  escitalopram 10 milliGRAM(s) Oral daily  levothyroxine 50 MICROGram(s) Oral daily  metoprolol succinate ER 25 milliGRAM(s) Oral daily  multivitamin/minerals 1 Tablet(s) Oral daily  pantoprazole  Injectable 40 milliGRAM(s) IV Push two times a day  saccharomyces boulardii 250 milliGRAM(s) Oral two times a day  sildenafil (REVATIO) 20 milliGRAM(s) Oral two times a day  simvastatin 40 milliGRAM(s) Oral at bedtime  torsemide 20 milliGRAM(s) Oral two times a day    MEDICATIONS  (PRN):  ondansetron Injectable 4 milliGRAM(s) IV Push every 6 hours PRN Nausea      Urinalysis Basic - ( 15 Jul 2020 18:54 )    Color: Yellow / Appearance: Clear / S.015 / pH: x  Gluc: x / Ketone: Negative  / Bili: Negative / Urobili: Negative mg/dL   Blood: x / Protein: Negative mg/dL / Nitrite: Negative   Leuk Esterase: Negative / RBC: x / WBC x   Sq Epi: x / Non Sq Epi: x / Bacteria: x    15 Jul 2020 17:49    135    |  101    |  12     ----------------------------<  159    3.7     |  27     |  0.86     Ca    8.6        15 Jul 2020 17:49    TPro  7.1    /  Alb  2.6    /  TBili  0.4    /  DBili  x      /  AST  8      /  ALT  9      /  AlkPhos  83     15 Jul 2020 17:49  LIVER FUNCTIONS - ( 15 Jul 2020 17:49 )  Alb: 2.6 g/dL / Pro: 7.1 gm/dL / ALK PHOS: 83 U/L / ALT: 9 U/L / AST: 8 U/L / GGT: x         PT/INR - ( 15 Jul 2020 17:49 )   PT: 28.7 sec;   INR: 2.57 ratio         PTT - ( 15 Jul 2020 17:49 )  PTT:37.2 secCBC Full  -  ( 15 Jul 2020 17:49 )  WBC Count : 7.17 K/uL  Hemoglobin : 7.6 g/dL  Hematocrit : 24.9 %  Platelet Count - Automated : 246 K/uL  Mean Cell Volume : 89.2 fl  Mean Cell Hemoglobin : 27.2 pg  Mean Cell Hemoglobin Concentration : 30.5 gm/dL  Auto Neutrophil # : 4.81 K/uL  Auto Lymphocyte # : 0.94 K/uL  Auto Monocyte # : 0.97 K/uL  Auto Eosinophil # : 0.39 K/uL  Auto Basophil # : 0.04 K/uL  Auto Neutrophil % : 67.1 %  Auto Lymphocyte % : 13.1 %  Auto Monocyte % : 13.5 %  Auto Eosinophil % : 5.4 %  Auto Basophil % : 0.6 %  CARDIAC MARKERS ( 15 Jul 2020 17:49 )  <0.015 ng/mL / x     / x     / x     / x            Blood, Urine: Negative (07-15 @ 18:54)      PT/INR - ( 15 Jul 2020 17:49 )   PT: 28.7 sec;   INR: 2.57 ratio         PTT - ( 15 Jul 2020 17:49 )  PTT:37.2 sec    RADIOLOGY RESULTS:          Assessment and Plan:        Patient is 89yo female with PMhx  pancreatitis s/p ERCP, cirrhosis, DM2, a-fib on Eliquis, CAD s/p PCI, severe pulm HTN, chronic R heart failure, diastolic dysfunction, AS s/p TAVR, OA, HTN, dyslipidemia, hypothyroidism, p/w anemia, and guiac positive at [facility]. Pt denies fever, chills, cough, CP, SOB, melena. Reports stools are brown in color, without any blood. Denies abd pain. In the ER Guiac NEG, HH 7.6, baseline 10  No other constitutional symptoms.     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 gets steroid injections every 2 weeks  -pain meds: tylenol prn, tramadol prn    *A-fib  -C/w Eliquis  -Rate controlled     *DM2  -Humalog ISS  -Oral meds held temporarily     H/o Pancreatitis / cirrhosis / OA / HTN / dyslipidemia / hypothyroidism / thrombocytopenia   -C/w home meds and f/u outpatient for further management     *Renal cysts noted on CT   -F/u outpatient for further management     *DVT ppx   -scds only secondary to eliquis on hold Cheif complaints and Diagnosis: anemia/ possible gi bleed    Subjective:       REVIEW OF SYSTEMS:    CONSTITUTIONAL: No weakness, fevers or chills  EYES/ENT: No visual changes;  No vertigo or throat pain   NECK: No pain or stiffness  RESPIRATORY: No cough, wheezing, hemoptysis; No shortness of breath  CARDIOVASCULAR: No chest pain or palpitations  GASTROINTESTINAL: No abdominal or epigastric pain. No nausea, vomiting, or hematemesis; No diarrhea or constipation. No melena or hematochezia.  GENITOURINARY: No dysuria, frequency or hematuria  NEUROLOGICAL: No numbness or weakness  SKIN: No itching, burning, rashes, or lesions   All other review of systems is negative unless indicated above      Vital Signs Last 24 Hrs  T(C): 36.5 (2020 07:56), Max: 37.1 (15 Jul 2020 17:16)  T(F): 97.7 (2020 07:56), Max: 98.7 (15 Jul 2020 17:16)  HR: 77 (2020 07:56) (68 - 81)  BP: 119/56 (2020 07:56) (94/51 - 119/56)  BP(mean): 86 (15 Jul 2020 19:07) (86 - 86)  RR: 18 (2020 07:56) (17 - 21)  SpO2: 99% (2020 07:56) (95% - 100%)    HEENT:   pupils equal and reactive, EOMI, no oropharyngeal lesions, erythema, exudates, oral thrush    NECK:   supple, no carotid bruits, no palpable lymph nodes, no thyromegaly    CV:  +S1, +S2, regular, no murmurs or rubs    RESP:   lungs clear to auscultation bilaterally, no wheezing, rales, rhonchi, good air entry bilaterally    BREAST:  not examined    GI:  abdomen soft, non-tender, non-distended, normal BS, no bruits, no abdominal masses, no palpable masses    RECTAL:  not examined    :  not examined    MSK:   normal muscle tone, no atrophy, no rigidity, no contractions    EXT:   no clubbing, no cyanosis, no edema, no calf pain, swelling or erythema    VASCULAR:  pulses equal and symmetric in the upper and lower extremities    NEURO:  AAOX3, no focal neurological deficits, follows all commands, able to move extremities spontaneously    SKIN:  no ulcers, lesions or rashes    MEDICATIONS  (STANDING):  allopurinol 100 milliGRAM(s) Oral daily  artificial  tears Solution 1 Drop(s) Both EYES three times a day  escitalopram 10 milliGRAM(s) Oral daily  levothyroxine 50 MICROGram(s) Oral daily  metoprolol succinate ER 25 milliGRAM(s) Oral daily  multivitamin/minerals 1 Tablet(s) Oral daily  pantoprazole  Injectable 40 milliGRAM(s) IV Push two times a day  saccharomyces boulardii 250 milliGRAM(s) Oral two times a day  sildenafil (REVATIO) 20 milliGRAM(s) Oral two times a day  simvastatin 40 milliGRAM(s) Oral at bedtime  torsemide 20 milliGRAM(s) Oral two times a day    MEDICATIONS  (PRN):  ondansetron Injectable 4 milliGRAM(s) IV Push every 6 hours PRN Nausea      Urinalysis Basic - ( 15 Jul 2020 18:54 )    Color: Yellow / Appearance: Clear / S.015 / pH: x  Gluc: x / Ketone: Negative  / Bili: Negative / Urobili: Negative mg/dL   Blood: x / Protein: Negative mg/dL / Nitrite: Negative   Leuk Esterase: Negative / RBC: x / WBC x   Sq Epi: x / Non Sq Epi: x / Bacteria: x    15 Jul 2020 17:49    135    |  101    |  12     ----------------------------<  159    3.7     |  27     |  0.86     Ca    8.6        15 Jul 2020 17:49    TPro  7.1    /  Alb  2.6    /  TBili  0.4    /  DBili  x      /  AST  8      /  ALT  9      /  AlkPhos  83     15 Jul 2020 17:49  LIVER FUNCTIONS - ( 15 Jul 2020 17:49 )  Alb: 2.6 g/dL / Pro: 7.1 gm/dL / ALK PHOS: 83 U/L / ALT: 9 U/L / AST: 8 U/L / GGT: x         PT/INR - ( 15 Jul 2020 17:49 )   PT: 28.7 sec;   INR: 2.57 ratio         PTT - ( 15 Jul 2020 17:49 )  PTT:37.2 secCBC Full  -  ( 15 Jul 2020 17:49 )  WBC Count : 7.17 K/uL  Hemoglobin : 7.6 g/dL  Hematocrit : 24.9 %  Platelet Count - Automated : 246 K/uL  Mean Cell Volume : 89.2 fl  Mean Cell Hemoglobin : 27.2 pg  Mean Cell Hemoglobin Concentration : 30.5 gm/dL  Auto Neutrophil # : 4.81 K/uL  Auto Lymphocyte # : 0.94 K/uL  Auto Monocyte # : 0.97 K/uL  Auto Eosinophil # : 0.39 K/uL  Auto Basophil # : 0.04 K/uL  Auto Neutrophil % : 67.1 %  Auto Lymphocyte % : 13.1 %  Auto Monocyte % : 13.5 %  Auto Eosinophil % : 5.4 %  Auto Basophil % : 0.6 %  CARDIAC MARKERS ( 15 Jul 2020 17:49 )  <0.015 ng/mL / x     / x     / x     / x            Blood, Urine: Negative (07-15 @ 18:54)      PT/INR - ( 15 Jul 2020 17:49 )   PT: 28.7 sec;   INR: 2.57 ratio         PTT - ( 15 Jul 2020 17:49 )  PTT:37.2 sec    RADIOLOGY RESULTS:          Assessment and Plan:        Patient is 89yo female with PMhx  pancreatitis s/p ERCP, cirrhosis, DM2, a-fib, CAD s/p PCI, severe pulm HTN, chronic R heart failure, diastolic dysfunction, AS s/p TAVR, OA, HTN, dyslipidemia, hypothyroidism, p/w anemia, and guiac positive at [facility]. Pt denies fever, chills, cough, CP, SOB, melena. Reports stools are brown in color, without any blood. Denies abd pain. In the ER Guiac NEG, HH 7.6, baseline 10  No other constitutional symptoms.     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 gets steroid injections every 2 weeks  -pain meds: tylenol prn, tramadol prn    *A-fib  -Rate controlled     *DM2  -Humalog ISS  -Oral meds held temporarily     H/o Pancreatitis / cirrhosis / OA / HTN / dyslipidemia / hypothyroidism / thrombocytopenia   -C/w home meds and f/u outpatient for further management     *Renal cysts noted on CT   -F/u outpatient for further management     *DVT ppx   -scds only Cheif complaints and Diagnosis: anemia/ possible gi bleed    Subjective: no complaints      REVIEW OF SYSTEMS:    CONSTITUTIONAL: No weakness, fevers or chills  EYES/ENT: No visual changes;  No vertigo or throat pain   NECK: No pain or stiffness  RESPIRATORY: No cough, wheezing, hemoptysis; No shortness of breath  CARDIOVASCULAR: No chest pain or palpitations  GASTROINTESTINAL: No abdominal or epigastric pain. No nausea, vomiting, or hematemesis; No diarrhea or constipation. No melena or hematochezia.  GENITOURINARY: No dysuria, frequency or hematuria  NEUROLOGICAL: No numbness or weakness  SKIN: No itching, burning, rashes, or lesions   All other review of systems is negative unless indicated above      Vital Signs Last 24 Hrs  T(C): 36.5 (2020 07:56), Max: 37.1 (15 Jul 2020 17:16)  T(F): 97.7 (2020 07:56), Max: 98.7 (15 Jul 2020 17:16)  HR: 77 (2020 07:56) (68 - 81)  BP: 119/56 (2020 07:56) (94/51 - 119/56)  BP(mean): 86 (15 Jul 2020 19:07) (86 - 86)  RR: 18 (2020 07:56) (17 - 21)  SpO2: 99% (2020 07:56) (95% - 100%)    HEENT:   pupils equal and reactive, EOMI, no oropharyngeal lesions, erythema, exudates, oral thrush    NECK:   supple, no carotid bruits, no palpable lymph nodes, no thyromegaly    CV:  +S1, +S2, regular, no murmurs or rubs    RESP:   lungs clear to auscultation bilaterally, no wheezing, rales, rhonchi, good air entry bilaterally    BREAST:  not examined    GI:  abdomen soft, non-tender, non-distended, normal BS, no bruits, no abdominal masses, no palpable masses    RECTAL:  not examined    :  not examined    MSK:   normal muscle tone, no atrophy, no rigidity, no contractions    EXT:   no clubbing, no cyanosis, no edema, no calf pain, swelling or erythema    VASCULAR:  pulses equal and symmetric in the upper and lower extremities    NEURO:  AAOX3, no focal neurological deficits, follows all commands, able to move extremities spontaneously    SKIN:  no ulcers, lesions or rashes    MEDICATIONS  (STANDING):  allopurinol 100 milliGRAM(s) Oral daily  artificial  tears Solution 1 Drop(s) Both EYES three times a day  escitalopram 10 milliGRAM(s) Oral daily  levothyroxine 50 MICROGram(s) Oral daily  metoprolol succinate ER 25 milliGRAM(s) Oral daily  multivitamin/minerals 1 Tablet(s) Oral daily  pantoprazole  Injectable 40 milliGRAM(s) IV Push two times a day  saccharomyces boulardii 250 milliGRAM(s) Oral two times a day  sildenafil (REVATIO) 20 milliGRAM(s) Oral two times a day  simvastatin 40 milliGRAM(s) Oral at bedtime  torsemide 20 milliGRAM(s) Oral two times a day    MEDICATIONS  (PRN):  ondansetron Injectable 4 milliGRAM(s) IV Push every 6 hours PRN Nausea      Urinalysis Basic - ( 15 Jul 2020 18:54 )    Color: Yellow / Appearance: Clear / S.015 / pH: x  Gluc: x / Ketone: Negative  / Bili: Negative / Urobili: Negative mg/dL   Blood: x / Protein: Negative mg/dL / Nitrite: Negative   Leuk Esterase: Negative / RBC: x / WBC x   Sq Epi: x / Non Sq Epi: x / Bacteria: x    15 Jul 2020 17:49    135    |  101    |  12     ----------------------------<  159    3.7     |  27     |  0.86     Ca    8.6        15 Jul 2020 17:49    TPro  7.1    /  Alb  2.6    /  TBili  0.4    /  DBili  x      /  AST  8      /  ALT  9      /  AlkPhos  83     15 Jul 2020 17:49  LIVER FUNCTIONS - ( 15 Jul 2020 17:49 )  Alb: 2.6 g/dL / Pro: 7.1 gm/dL / ALK PHOS: 83 U/L / ALT: 9 U/L / AST: 8 U/L / GGT: x         PT/INR - ( 15 Jul 2020 17:49 )   PT: 28.7 sec;   INR: 2.57 ratio         PTT - ( 15 Jul 2020 17:49 )  PTT:37.2 secCBC Full  -  ( 15 Jul 2020 17:49 )  WBC Count : 7.17 K/uL  Hemoglobin : 7.6 g/dL  Hematocrit : 24.9 %  Platelet Count - Automated : 246 K/uL  Mean Cell Volume : 89.2 fl  Mean Cell Hemoglobin : 27.2 pg  Mean Cell Hemoglobin Concentration : 30.5 gm/dL  Auto Neutrophil # : 4.81 K/uL  Auto Lymphocyte # : 0.94 K/uL  Auto Monocyte # : 0.97 K/uL  Auto Eosinophil # : 0.39 K/uL  Auto Basophil # : 0.04 K/uL  Auto Neutrophil % : 67.1 %  Auto Lymphocyte % : 13.1 %  Auto Monocyte % : 13.5 %  Auto Eosinophil % : 5.4 %  Auto Basophil % : 0.6 %  CARDIAC MARKERS ( 15 Jul 2020 17:49 )  <0.015 ng/mL / x     / x     / x     / x            Blood, Urine: Negative (07-15 @ 18:54)      PT/INR - ( 15 Jul 2020 17:49 )   PT: 28.7 sec;   INR: 2.57 ratio         PTT - ( 15 Jul 2020 17:49 )  PTT:37.2 sec    RADIOLOGY RESULTS:          Assessment and Plan:        Patient is 89yo female with PMhx  pancreatitis s/p ERCP, cirrhosis, DM2, a-fib, CAD s/p PCI, severe pulm HTN, chronic R heart failure, diastolic dysfunction, AS s/p TAVR, OA, HTN, dyslipidemia, hypothyroidism, p/w anemia, and guiac positive at [facility]. Pt denies fever, chills, cough, CP, SOB, melena. Reports stools are brown in color, without any blood. Denies abd pain. In the ER Guiac NEG, HH 7.6, baseline 10  No other constitutional symptoms.     Anemia, possible acute blood loss   -EGD planned for [facility]  -f/u iron panel  -CT abd pelvis did not show any significant abnormality    Severe Pulmonary HTN  w/ history of pulmonary edema in the past  -currently stable.     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 gets steroid injections every 2 weeks  -pain meds: tylenol prn, tramadol prn    *A-fib  -Rate controlled     *DM2  -Humalog ISS  -Oral meds held temporarily     H/o Pancreatitis / cirrhosis / OA / HTN / dyslipidemia / hypothyroidism / thrombocytopenia   -C/w home meds and f/u outpatient for further management     *Renal cysts noted on CT   -F/u outpatient for further management     *DVT ppx   -scds only

## 2020-07-16 NOTE — CONSULT NOTE ADULT - SUBJECTIVE AND OBJECTIVE BOX
Patient is a 89y old  Female who presents with a chief complaint of Anemia, Guiac+ as outpatient.       HPI:  Patient is 89yo female with PMhx  pancreatitis s/p ERCP, cirrhosis, DM2, a-fib on Eliquis, CAD s/p PCI, severe pulm HTN, chronic R heart failure, diastolic dysfunction, AS s/p TAVR, OA, HTN, dyslipidemia, hypothyroidism, p/w anemia, and guiac positive at [facility]. Pt denies fever, chills, cough, CP, SOB, melena. Reports stools are brown in color, without any blood.  In the ER Guiac NEG, HH 7.6, baseline 10  No other constitutional symptoms.      Pt seen and examined by me this am . pt denies any CP or SOB.        PAST MEDICAL & SURGICAL HISTORY:  Aortic stenosis  History of pancreatitis  HLD (hyperlipidemia)  Hypothyroid  Afib  CAD (coronary artery disease)  Hypertension  History of Osteoarthritis  GERD (Gastroesophageal Reflux Disease)  Dyslipidemia  Diabetes Mellitus Type II  S/P IVC filter: Note that Pt does not have  h/o DVT, outPt doppler was read first as +, but after review reported as no DVT. Pt got IVC filer before that  H/O total knee replacement, left  History of appendectomy  History of cholecystectomy  H/O: Knee Surgery- right meniscus  H/O: Hysterectomy      MEDICATIONS  (STANDING):  allopurinol 100 milliGRAM(s) Oral daily  artificial  tears Solution 1 Drop(s) Both EYES three times a day  escitalopram 10 milliGRAM(s) Oral daily  levothyroxine 50 MICROGram(s) Oral daily  metoprolol succinate ER 25 milliGRAM(s) Oral daily  multivitamin/minerals 1 Tablet(s) Oral daily  pantoprazole  Injectable 40 milliGRAM(s) IV Push two times a day  saccharomyces boulardii 250 milliGRAM(s) Oral two times a day  sildenafil (REVATIO) 20 milliGRAM(s) Oral two times a day  simvastatin 40 milliGRAM(s) Oral at bedtime  torsemide 20 milliGRAM(s) Oral two times a day    MEDICATIONS  (PRN):  ondansetron Injectable 4 milliGRAM(s) IV Push every 6 hours PRN Nausea      FAMILY HISTORY:  FH: heart disease  FH: diabetes mellitus: both parents      SOCIAL HISTORY:    REVIEW OF SYSTEMS:  CONSTITUTIONAL:    No fatigue, malaise, lethargy.  No fever or chills.  RESPIRATORY:  No cough.  No wheeze.  No hemoptysis.  No shortness of breath.  CARDIOVASCULAR:  No chest pains.  No palpitations. No shortness of breath, No orthopnea or PND.  GASTROINTESTINAL:  No abdominal pain.  No nausea or vomiting.    GENITOURINARY:    No hematuria.    MUSCULOSKELETAL:  c/o musculoskeletal pain.  No joint swelling.  No arthritis.  NEUROLOGICAL:  No tingling or numbness or weakness.  PSYCHIATRIC:  No confusion  SKIN:  No rashes.          Vital Signs Last 24 Hrs  T(C): 36.5 (2020 07:56), Max: 37.1 (15 Jul 2020 17:16)  T(F): 97.7 (2020 07:56), Max: 98.7 (15 Jul 2020 17:16)  HR: 77 (2020 07:56) (68 - 81)  BP: 119/56 (2020 07:56) (94/51 - 119/56)  BP(mean): 86 (15 Jul 2020 19:07) (86 - 86)  RR: 18 (2020 07:56) (17 - 21)  SpO2: 99% (2020 07:56) (95% - 100%)    PHYSICAL EXAM-    Constitutional: elderly frail female in no acute distress     Head: Head is normocephalic and atraumatic.      Neck:  No JVD.     Cardiovascular: irregular rate and rhythm without S3, S4. No murmurs or rubs are appreciated.      Respiratory: Breathsounds are normal. No rales. No wheezing.    Abdomen: Soft, nontender, nondistended with positive bowel sounds.      Extremity: No tenderness. No  pitting edema     Neurologic: The patient is alert and oriented.      Skin: No rash, no obvious lesions noted.      Psychiatric: The patient appears to be emotionally stable.      INTERPRETATION OF TELEMETRY: no ton     ECG: atrial fibrillation, L axis, no st t changes.     I&O's Detail    15 Jul 2020 07:01  -  2020 07:00  --------------------------------------------------------  IN:    Packed Red Blood Cells: 339 mL  Total IN: 339 mL    OUT:  Total OUT: 0 mL    Total NET: 339 mL          LABS:                        7.6    7.17  )-----------( 246      ( 15 Jul 2020 17:49 )             24.9     07-15    135  |  101  |  12  ----------------------------<  159<H>  3.7   |  27  |  0.86    Ca    8.6      15 Jul 2020 17:49    TPro  7.1  /  Alb  2.6<L>  /  TBili  0.4  /  DBili  x   /  AST  8<L>  /  ALT  9<L>  /  AlkPhos  83  15    CARDIAC MARKERS ( 15 Jul 2020 17:49 )  <0.015 ng/mL / x     / x     / x     / x          PT/INR - ( 15 Jul 2020 17:49 )   PT: 28.7 sec;   INR: 2.57 ratio         PTT - ( 15 Jul 2020 17:49 )  PTT:37.2 sec  Urinalysis Basic - ( 15 Jul 2020 18:54 )    Color: Yellow / Appearance: Clear / S.015 / pH: x  Gluc: x / Ketone: Negative  / Bili: Negative / Urobili: Negative mg/dL   Blood: x / Protein: Negative mg/dL / Nitrite: Negative   Leuk Esterase: Negative / RBC: x / WBC x   Sq Epi: x / Non Sq Epi: x / Bacteria: x      I&O's Summary    15 Jul 2020 07:01  -  2020 07:00  --------------------------------------------------------  IN: 339 mL / OUT: 0 mL / NET: 339 mL      BNP  RADIOLOGY & ADDITIONAL STUDIES:  < from: Xray Chest 1 View- PORTABLE-Urgent (07.15.20 @ 17:37) >    EXAM:  XR CHEST PORTABLE URGENT 1V                            PROCEDURE DATE:  07/15/2020          INTERPRETATION:  Portable chest radiograph        CLINICAL INFORMATION:   Upper admission screening chest radiograph    TECHNIQUE:  Portable  AP viewof the chest was obtained.    COMPARISON: 3/12/2020 available for review.    FINDINGS:   The lungs are clear of airspace consolidations or effusions. No pneumothorax.    The  heart is enlarged in transverse diameter. No hilar mass. Trachea midline.  Status post TAVR procedure.   Visualized osseous structures are intact.        IMPRESSION:   No evidence of active chest disease.                    CLEVE ARAYA M.D., ATTENDING RADIOLOGIST  This document has been electronically signed. Jul 15 2020  7:34PM              < end of copied text >  < from: Transthoracic Echocardiogram (19 @ 14:28) >   Impression     Summary     Paradoxical septal motion. Mildly reduced left ventricular systolic   function. Mild concentric left ventricular hypertrophy is present.   Estimated left ventricular ejection fraction is 50 %.   The left atrium is moderately dilated.   The right atrium appears moderately dilated. A device wire is seen in the   RV and RA.   The right ventricle is mildly dilated. Mild, diffuse hypokinesis of the   right ventricle is present.   An aortic bio prosthetic valve is present.   Mild (1+) mitral regurgitation is present.Mild mitral stenosis is   present. Moderate mitral annular calcification is present.   Moderate (2+) tricuspid valve regurgitation is present.   Moderate pulmonary hypertension.   Mild pulmonic valvular regurgitation (1+) is present.     Signature     ----------------------------------------------------------------   Electronically signed by Oxana Foss MD(Interpreting   physician) on 2019 09:04 PM    < end of copied text >

## 2020-07-16 NOTE — CONSULT NOTE ADULT - ASSESSMENT
Preoperative cardiac evaluation- Pt this am denies any active cardiac symptoms.  SHe appears euvolemic on exam.  SHe has hx of hFpEF and severe pulmonary hypertension.  Atrial fib rate controlled.  She did not have a stress test in the last 3 yrs.  She is optimized from cardiac standpoint to proceed with anticipated EGD.  Discussed with Dr Mobley.  Close monitoring of the volume status periop.  COntinue periop beta blockers.  She is a moderate risk pt for a low risk procedure with poor functional tolerance.    Atrial fibrillation- rate controlled.  not on full dose anticoagulation as outpt. ? reason  She had hx of DVT as well.  Per Dr ace notes last yr she was on it.  Resume if no contraindications.   Her INR level is therapeutic. ? outpt coumadin use    DVT s/p IVC- I called her son Laith Mojica and left a message.     AS s/p TAVR- last echo showed normal valve function from 4/2019.     Other medical issues- Management per primary team.   Thank you for allowing me to participate in the care of this patient. Please feel free to contact me with any questions.

## 2020-07-16 NOTE — PROGRESS NOTE ADULT - ASSESSMENT
Imp:  Anemia and guaiac + in setting of Eliquis use    Plan:  CT abd/pel today (defer IV contrast given age)  EGD tomorrow morning. Risks and benefits reviewed.  Cardiology evaluation

## 2020-07-17 ENCOUNTER — TRANSCRIPTION ENCOUNTER (OUTPATIENT)
Age: 85
End: 2020-07-17

## 2020-07-17 VITALS
HEART RATE: 76 BPM | SYSTOLIC BLOOD PRESSURE: 100 MMHG | OXYGEN SATURATION: 95 % | TEMPERATURE: 98 F | RESPIRATION RATE: 18 BRPM | DIASTOLIC BLOOD PRESSURE: 42 MMHG

## 2020-07-17 LAB
HCT VFR BLD CALC: 26.8 % — LOW (ref 34.5–45)
HGB BLD-MCNC: 8.5 G/DL — LOW (ref 11.5–15.5)
MCHC RBC-ENTMCNC: 27.5 PG — SIGNIFICANT CHANGE UP (ref 27–34)
MCHC RBC-ENTMCNC: 31.7 GM/DL — LOW (ref 32–36)
MCV RBC AUTO: 86.7 FL — SIGNIFICANT CHANGE UP (ref 80–100)
PLATELET # BLD AUTO: 204 K/UL — SIGNIFICANT CHANGE UP (ref 150–400)
RBC # BLD: 3.09 M/UL — LOW (ref 3.8–5.2)
RBC # FLD: 16.4 % — HIGH (ref 10.3–14.5)
WBC # BLD: 7.1 K/UL — SIGNIFICANT CHANGE UP (ref 3.8–10.5)
WBC # FLD AUTO: 7.1 K/UL — SIGNIFICANT CHANGE UP (ref 3.8–10.5)

## 2020-07-17 PROCEDURE — 99239 HOSP IP/OBS DSCHRG MGMT >30: CPT

## 2020-07-17 RX ORDER — METOPROLOL TARTRATE 50 MG
0.5 TABLET ORAL
Qty: 0 | Refills: 0 | DISCHARGE
Start: 2020-07-17

## 2020-07-17 RX ORDER — PANTOPRAZOLE SODIUM 20 MG/1
1 TABLET, DELAYED RELEASE ORAL
Qty: 0 | Refills: 0 | DISCHARGE
Start: 2020-07-17

## 2020-07-17 RX ORDER — FERROUS SULFATE 325(65) MG
325 TABLET ORAL DAILY
Refills: 0 | Status: DISCONTINUED | OUTPATIENT
Start: 2020-07-17 | End: 2020-07-17

## 2020-07-17 RX ORDER — ASPIRIN/CALCIUM CARB/MAGNESIUM 324 MG
81 TABLET ORAL DAILY
Refills: 0 | Status: DISCONTINUED | OUTPATIENT
Start: 2020-07-17 | End: 2020-07-17

## 2020-07-17 RX ORDER — PANTOPRAZOLE SODIUM 20 MG/1
40 TABLET, DELAYED RELEASE ORAL DAILY
Refills: 0 | Status: DISCONTINUED | OUTPATIENT
Start: 2020-07-17 | End: 2020-07-17

## 2020-07-17 RX ORDER — ASCORBIC ACID 60 MG
500 TABLET,CHEWABLE ORAL DAILY
Refills: 0 | Status: DISCONTINUED | OUTPATIENT
Start: 2020-07-17 | End: 2020-07-17

## 2020-07-17 RX ORDER — APIXABAN 2.5 MG/1
1 TABLET, FILM COATED ORAL
Qty: 0 | Refills: 0 | DISCHARGE
Start: 2020-07-17

## 2020-07-17 RX ORDER — ASPIRIN/CALCIUM CARB/MAGNESIUM 324 MG
1 TABLET ORAL
Qty: 0 | Refills: 0 | DISCHARGE
Start: 2020-07-17

## 2020-07-17 RX ORDER — APIXABAN 2.5 MG/1
5 TABLET, FILM COATED ORAL
Refills: 0 | Status: DISCONTINUED | OUTPATIENT
Start: 2020-07-17 | End: 2020-07-17

## 2020-07-17 RX ORDER — BENZOCAINE AND MENTHOL 5; 1 G/100ML; G/100ML
1 LIQUID ORAL
Refills: 0 | Status: DISCONTINUED | OUTPATIENT
Start: 2020-07-17 | End: 2020-07-17

## 2020-07-17 RX ADMIN — Medication 1 DROP(S): at 05:59

## 2020-07-17 RX ADMIN — BENZOCAINE AND MENTHOL 1 LOZENGE: 5; 1 LIQUID ORAL at 12:11

## 2020-07-17 RX ADMIN — Medication 250 MILLIGRAM(S): at 10:09

## 2020-07-17 RX ADMIN — Medication 650 MILLIGRAM(S): at 10:15

## 2020-07-17 RX ADMIN — ESCITALOPRAM OXALATE 10 MILLIGRAM(S): 10 TABLET, FILM COATED ORAL at 10:09

## 2020-07-17 RX ADMIN — Medication 1 TABLET(S): at 10:09

## 2020-07-17 RX ADMIN — PANTOPRAZOLE SODIUM 40 MILLIGRAM(S): 20 TABLET, DELAYED RELEASE ORAL at 10:09

## 2020-07-17 RX ADMIN — Medication 81 MILLIGRAM(S): at 14:33

## 2020-07-17 RX ADMIN — Medication 20 MILLIGRAM(S): at 10:09

## 2020-07-17 RX ADMIN — Medication 325 MILLIGRAM(S): at 10:09

## 2020-07-17 RX ADMIN — Medication 50 MICROGRAM(S): at 05:59

## 2020-07-17 RX ADMIN — Medication 20 MILLIGRAM(S): at 10:08

## 2020-07-17 RX ADMIN — Medication 500 MILLIGRAM(S): at 12:10

## 2020-07-17 RX ADMIN — Medication 100 MILLIGRAM(S): at 10:09

## 2020-07-17 NOTE — PHYSICAL THERAPY INITIAL EVALUATION ADULT - DIAGNOSIS, PT EVAL
With recent normal potassium value I would not recommend increasing potassium as this is unlikely to relieve cramping type pains.    Recommend:   Trial discontinuation of rosuvastatin (cholesterol medication).  Trial of stretching.       GIB

## 2020-07-17 NOTE — DISCHARGE NOTE PROVIDER - NSDCMRMEDTOKEN_GEN_ALL_CORE_FT
allopurinol 100 mg oral tablet: 2 tab(s) orally once a day  apixaban 5 mg oral tablet: 1 tab(s) orally 2 times a day  Artificial Tears ophthalmic solution: 1 drop(s) to each affected eye 3 times a day  escitalopram 10 mg oral tablet: 1 tab(s) orally once a day  ferrous sulfate 325 mg (65 mg elemental iron) oral tablet: 1 tab(s) orally once a day  Florastor 250 mg oral capsule: 2 cap(s) orally once a day (at bedtime)  levothyroxine 50 mcg (0.05 mg) oral tablet: 1 tab(s) orally once a day  metoprolol succinate 25 mg oral tablet, extended release: 0.5 tab(s) orally once a day  Multiple Vitamins oral tablet: 1 tab(s) orally once a day  PreserVision AREDS 2 oral capsule: 2 cap(s) orally once a day  sildenafil 20 mg oral tablet: 1 tab(s) orally 2 times a day  simvastatin 40 mg oral tablet: 1 tab(s) orally once a day (at bedtime)  torsemide 20 mg oral tablet: 2 tab(s) orally 2 times a day

## 2020-07-17 NOTE — DISCHARGE NOTE PROVIDER - HOSPITAL COURSE
Vital Signs Last 24 Hrs    T(C): 36.4 (2020 09:48), Max: 36.7 (2020 15:15)    T(F): 97.5 (2020 09:48), Max: 98.1 (2020 15:15)    HR: 76 (2020 09:48) (72 - 77)    BP: 100/42 (2020 09:48) (95/51 - 100/42)    BP(mean): --    RR: 18 (2020 09:48) (18 - 18)    SpO2: 95% (2020 09:48) (95% - 100%)        HEENT:   pupils equal and reactive, EOMI, no oropharyngeal lesions, erythema, exudates, oral thrush        NECK:   supple, no carotid bruits, no palpable lymph nodes, no thyromegaly        CV:  +S1, +S2, regular, no murmurs or rubs        RESP:   lungs clear to auscultation bilaterally, no wheezing, rales, rhonchi, good air entry bilaterally        BREAST:  not examined        GI:  abdomen soft, non-tender, non-distended, normal BS, no bruits, no abdominal masses, no palpable masses        RECTAL:  not examined        :  not examined        MSK:   normal muscle tone, no atrophy, no rigidity, no contractions        EXT:   no clubbing, no cyanosis, no edema, no calf pain, swelling or erythema        VASCULAR:  pulses equal and symmetric in the upper and lower extremities        NEURO:  AAOX3, no focal neurological deficits, follows all commands, able to move extremities spontaneously        SKIN:  no ulcers, lesions or rashes        Urinalysis Basic - ( 15 Jul 2020 18:54 )        Color: Yellow / Appearance: Clear / S.015 / pH: x    Gluc: x / Ketone: Negative  / Bili: Negative / Urobili: Negative mg/dL     Blood: x / Protein: Negative mg/dL / Nitrite: Negative     Leuk Esterase: Negative / RBC: x / WBC x     Sq Epi: x / Non Sq Epi: x / Bacteria: x        2020 10:02        136    |  101    |  12       ----------------------------<  125      3.7     |  29     |  0.73         Ca    9.1        2020 10:02        TPro  7.1    /  Alb  2.6    /  TBili  0.4    /  DBili  x      /  AST  8      /  ALT  9      /  AlkPhos  83     15 Jul 2020 17:49    LIVER FUNCTIONS - ( 15 Jul 2020 17:49 )    Alb: 2.6 g/dL / Pro: 7.1 gm/dL / ALK PHOS: 83 U/L / ALT: 9 U/L / AST: 8 U/L / GGT: x           PT/INR - ( 15 Jul 2020 17:49 )   PT: 28.7 sec;   INR: 2.57 ratio               PTT - ( 15 Jul 2020 17:49 )  PTT:37.2 secCBC Full  -  ( 2020 10:54 )    WBC Count : 7.10 K/uL    Hemoglobin : 8.5 g/dL    Hematocrit : 26.8 %    Platelet Count - Automated : 204 K/uL    Mean Cell Volume : 86.7 fl    Mean Cell Hemoglobin : 27.5 pg    Mean Cell Hemoglobin Concentration : 31.7 gm/dL                Hospital course:        Patient is 89yo female with PMhx  pancreatitis s/p ERCP, cirrhosis, DM2, a-fib(on Eliquis), CAD s/p PCI, severe pulm HTN, chronic R heart failure, diastolic dysfunction, AS s/p TAVR, OA, HTN, dyslipidemia, hypothyroidism, p/w anemia, and guiac positive at [facility]. Pt denies fever, chills, cough, CP, SOB, melena. Reports stools are brown in color, without any blood. Denies abd pain. In the ER Guiac NEG, HH 7.6, baseline 10    No other constitutional symptoms.         Anemia, possible acute blood loss , s/p EGD no findings.  Hb has been stable.     Recco patient be discharged on iron supplements with weekly cbc. Start protonix daily.

## 2020-07-17 NOTE — DISCHARGE NOTE PROVIDER - CARE PROVIDER_API CALL
Samy Camacho  INTERNAL MEDICINE  [address] [place] [address]  Phone: [phone]  Fax: [phone]  Follow Up Time:

## 2020-07-17 NOTE — PHYSICAL THERAPY INITIAL EVALUATION ADULT - ACTIVE RANGE OF MOTION EXAMINATION, REHAB EVAL
L shoulder elevation to 10*/bilateral upper extremity Active ROM was WFL (within functional limits)/bilateral  lower extremity Active ROM was WFL (within functional limits)/deficits as listed below

## 2020-07-17 NOTE — DISCHARGE NOTE PROVIDER - NSDCCPCAREPLAN_GEN_ALL_CORE_FT
PRINCIPAL DISCHARGE DIAGNOSIS  Diagnosis: GI bleed  Assessment and Plan of Treatment: Anemia secondary to GI bleed, likely. EGD was unrevealing.   due to age, high risk for colonoscopy. Recco conservative managment with daily Ferrous sulfate 325mg, oral protonix, and weekly cbc for one month to ensure improving hemaglobin.      SECONDARY DISCHARGE DIAGNOSES  Diagnosis: Afib  Assessment and Plan of Treatment: Restart Eliquis for afib.   Decrease dose of Toprolol due to  consisently low end blood pressure. toprolol lowered to 12.5mg daily.

## 2020-07-17 NOTE — PROGRESS NOTE ADULT - REASON FOR ADMISSION
Anemia, Guiac+ as outpatient

## 2020-07-17 NOTE — PROGRESS NOTE ADULT - SUBJECTIVE AND OBJECTIVE BOX
Patient is a 89y old  Female who presents with a chief complaint of Anemia, Guiac+ as outpatient.       HPI:  Patient is 89yo female with PMhx  pancreatitis s/p ERCP, cirrhosis, DM2, a-fib on Eliquis, CAD s/p PCI, severe pulm HTN, chronic R heart failure, diastolic dysfunction, AS s/p TAVR, OA, HTN, dyslipidemia, hypothyroidism, p/w anemia, and guiac positive at [facility]. Pt denies fever, chills, cough, CP, SOB, melena. Reports stools are brown in color, without any blood.  In the ER Guiac NEG, HH 7.6, baseline 10  No other constitutional symptoms.    -   Pt seen and examined by me this am . pt denies any CP or SOB.  - pt seen and examined by me this am.  Pt denies any symptoms sitting in chair.       PAST MEDICAL & SURGICAL HISTORY:  Aortic stenosis  History of pancreatitis  HLD (hyperlipidemia)  Hypothyroid  Afib  CAD (coronary artery disease)  Hypertension  History of Osteoarthritis  GERD (Gastroesophageal Reflux Disease)  Dyslipidemia  Diabetes Mellitus Type II  S/P IVC filter: Note that Pt does not have  h/o DVT, outPt doppler was read first as +, but after review reported as no DVT. Pt got IVC filer before that  H/O total knee replacement, left  History of appendectomy  History of cholecystectomy  H/O: Knee Surgery- right meniscus  H/O: Hysterectomy      MEDICATIONS  (STANDING):  allopurinol 100 milliGRAM(s) Oral daily  artificial  tears Solution 1 Drop(s) Both EYES three times a day  escitalopram 10 milliGRAM(s) Oral daily  levothyroxine 50 MICROGram(s) Oral daily  metoprolol succinate ER 25 milliGRAM(s) Oral daily  multivitamin/minerals 1 Tablet(s) Oral daily  pantoprazole  Injectable 40 milliGRAM(s) IV Push two times a day  saccharomyces boulardii 250 milliGRAM(s) Oral two times a day  sildenafil (REVATIO) 20 milliGRAM(s) Oral two times a day  simvastatin 40 milliGRAM(s) Oral at bedtime  torsemide 20 milliGRAM(s) Oral two times a day    MEDICATIONS  (PRN):  ondansetron Injectable 4 milliGRAM(s) IV Push every 6 hours PRN Nausea      FAMILY HISTORY:  FH: heart disease  FH: diabetes mellitus: both parents      SOCIAL HISTORY:    REVIEW OF SYSTEMS:  CONSTITUTIONAL:    No fatigue, malaise, lethargy.  No fever or chills.  RESPIRATORY:  No cough.  No wheeze.  No hemoptysis.  No shortness of breath.  CARDIOVASCULAR:  No chest pains.  No palpitations. No shortness of breath, No orthopnea or PND.  GASTROINTESTINAL:  No abdominal pain.  No nausea or vomiting.    GENITOURINARY:    No hematuria.    MUSCULOSKELETAL:  c/o musculoskeletal pain.  No joint swelling.  No arthritis.  NEUROLOGICAL:  No tingling or numbness or weakness.  PSYCHIATRIC:  No confusion  SKIN:  No rashes.          ICU Vital Signs Last 24 Hrs  T(C): 36.4 (2020 09:48), Max: 36.7 (2020 15:15)  T(F): 97.5 (2020 09:48), Max: 98.1 (2020 15:15)  HR: 76 (2020 09:48) (72 - 77)  BP: 100/42 (2020 09:48) (95/51 - 100/42)  BP(mean): --  ABP: --  ABP(mean): --  RR: 18 (2020 09:48) (18 - 18)  SpO2: 95% (2020 09:48) (95% - 100%)      PHYSICAL EXAM-    Constitutional: elderly frail female in no acute distress     Head: Head is normocephalic and atraumatic.      Neck:  No JVD.     Cardiovascular: irregular rate and rhythm without S3, S4. No murmurs or rubs are appreciated.      Respiratory: basal b/l rales. No wheezing.    Abdomen: Soft, nontender, nondistended with positive bowel sounds.      Extremity: No tenderness. No  pitting edema     Neurologic: The patient is alert and oriented.      Skin: No rash, no obvious lesions noted.      Psychiatric: The patient appears to be emotionally stable.      INTERPRETATION OF TELEMETRY: no ton     ECG: atrial fibrillation, L axis, no st t changes.     I&O's Detail    15 Jul 2020 07:01  -  2020 07:00  --------------------------------------------------------  IN:    Packed Red Blood Cells: 339 mL  Total IN: 339 mL    OUT:  Total OUT: 0 mL    Total NET: 339 mL          LABS:                                   8.5    7.10  )-----------( 204      ( 2020 10:54 )             26.8     07-16    136  |  101  |  12  ----------------------------<  125<H>  3.7   |  29  |  0.73    Ca    9.1      2020 10:02    TPro  7.1  /  Alb  2.6<L>  /  TBili  0.4  /  DBili  x   /  AST  8<L>  /  ALT  9<L>  /  AlkPhos  83  07-15    CARDIAC MARKERS ( 15 Jul 2020 17:49 )  <0.015 ng/mL / x     / x     / x     / x          LIVER FUNCTIONS - ( 15 Jul 2020 17:49 )  Alb: 2.6 g/dL / Pro: 7.1 gm/dL / ALK PHOS: 83 U/L / ALT: 9 U/L / AST: 8 U/L / GGT: x           PT/INR - ( 15 Jul 2020 17:49 )   PT: 28.7 sec;   INR: 2.57 ratio         PTT - ( 15 Jul 2020 17:49 )  PTT:37.2 sec         PTT - ( 15 Jul 2020 17:49 )  PTT:37.2 sec  Urinalysis Basic - ( 15 Jul 2020 18:54 )    Color: Yellow / Appearance: Clear / S.015 / pH: x  Gluc: x / Ketone: Negative  / Bili: Negative / Urobili: Negative mg/dL   Blood: x / Protein: Negative mg/dL / Nitrite: Negative   Leuk Esterase: Negative / RBC: x / WBC x   Sq Epi: x / Non Sq Epi: x / Bacteria: x      I&O's Summary    15 Jul 2020 07:01  -  2020 07:00  --------------------------------------------------------  IN: 339 mL / OUT: 0 mL / NET: 339 mL      BNP  RADIOLOGY & ADDITIONAL STUDIES:  < from: Xray Chest 1 View- PORTABLE-Urgent (07.15.20 @ 17:37) >    EXAM:  XR CHEST PORTABLE URGENT 1V                            PROCEDURE DATE:  07/15/2020          INTERPRETATION:  Portable chest radiograph        CLINICAL INFORMATION:   Upper admission screening chest radiograph    TECHNIQUE:  Portable  AP viewof the chest was obtained.    COMPARISON: 3/12/2020 available for review.    FINDINGS:   The lungs are clear of airspace consolidations or effusions. No pneumothorax.    The  heart is enlarged in transverse diameter. No hilar mass. Trachea midline.  Status post TAVR procedure.   Visualized osseous structures are intact.        IMPRESSION:   No evidence of active chest disease.                    CLEVE ARAYA M.D., ATTENDING RADIOLOGIST  This document has been electronically signed. Jul 15 2020  7:34PM              < end of copied text >  < from: Transthoracic Echocardiogram (19 @ 14:28) >   Impression     Summary     Paradoxical septal motion. Mildly reduced left ventricular systolic   function. Mild concentric left ventricular hypertrophy is present.   Estimated left ventricular ejection fraction is 50 %.   The left atrium is moderately dilated.   The right atrium appears moderately dilated. A device wire is seen in the   RV and RA.   The right ventricle is mildly dilated. Mild, diffuse hypokinesis of the   right ventricle is present.   An aortic bio prosthetic valve is present.   Mild (1+) mitral regurgitation is present.Mild mitral stenosis is   present. Moderate mitral annular calcification is present.   Moderate (2+) tricuspid valve regurgitation is present.   Moderate pulmonary hypertension.   Mild pulmonic valvular regurgitation (1+) is present.     Signature     ----------------------------------------------------------------   Electronically signed by Oxana oFss MD(Interpreting   physician) on 2019 09:04 PM    < end of copied text >

## 2020-07-17 NOTE — PROGRESS NOTE ADULT - ASSESSMENT
Acute on chronic decompensated HFPEF- resume torsemide.    anemia-s/p EGD.normal results.  Appreciate GI team input .  continue full dose anticoagulation with Elliquis.    Atrial fibrillation- rate controlled.  continue current meds including elliquis.  DVT s/p IVC- d/w Laith Mojcia and called NH yesterday to obtain report about anticoagulation     AS s/p TAVR- last echo showed normal valve function from 4/2019.     Other medical issues- Management per primary team.   Thank you for allowing me to participate in the care of this patient. Please feel free to contact me with any questions.

## 2020-07-17 NOTE — PROGRESS NOTE ADULT - SUBJECTIVE AND OBJECTIVE BOX
EGD:  Normal  No biopsies done d/t anticoagulation    Rec:  Advance diet  I think it would be best to defer colonoscopy at this point  Instead, trial of iron/vit C supp for iron deficiency anemia  OK to continue anticoag  I would continue a PPI if she's going to remain on ASA

## 2020-07-17 NOTE — DISCHARGE NOTE NURSING/CASE MANAGEMENT/SOCIAL WORK - PATIENT PORTAL LINK FT
You can access the FollowMyHealth Patient Portal offered by [facility] by registering at the following website: [email or web address]. By joining [employer]’s FollowMyHealth portal, you will also be able to view your health information using other applications (apps) compatible with our system.

## 2020-07-18 LAB
A1C WITH ESTIMATED AVERAGE GLUCOSE RESULT: 6.2 % — HIGH (ref 4–5.6)
ESTIMATED AVERAGE GLUCOSE: 131 MG/DL — HIGH (ref 68–114)

## 2020-07-21 DIAGNOSIS — I50.33 ACUTE ON CHRONIC DIASTOLIC (CONGESTIVE) HEART FAILURE: ICD-10-CM

## 2020-07-21 DIAGNOSIS — E78.5 HYPERLIPIDEMIA, UNSPECIFIED: ICD-10-CM

## 2020-07-21 DIAGNOSIS — Z79.01 LONG TERM (CURRENT) USE OF ANTICOAGULANTS: ICD-10-CM

## 2020-07-21 DIAGNOSIS — I27.20 PULMONARY HYPERTENSION, UNSPECIFIED: ICD-10-CM

## 2020-07-21 DIAGNOSIS — Z95.2 PRESENCE OF PROSTHETIC HEART VALVE: ICD-10-CM

## 2020-07-21 DIAGNOSIS — E03.9 HYPOTHYROIDISM, UNSPECIFIED: ICD-10-CM

## 2020-07-21 DIAGNOSIS — Z98.61 CORONARY ANGIOPLASTY STATUS: ICD-10-CM

## 2020-07-21 DIAGNOSIS — K74.60 UNSPECIFIED CIRRHOSIS OF LIVER: ICD-10-CM

## 2020-07-21 DIAGNOSIS — I11.0 HYPERTENSIVE HEART DISEASE WITH HEART FAILURE: ICD-10-CM

## 2020-07-21 DIAGNOSIS — Z88.2 ALLERGY STATUS TO SULFONAMIDES: ICD-10-CM

## 2020-07-21 DIAGNOSIS — T45.515A ADVERSE EFFECT OF ANTICOAGULANTS, INITIAL ENCOUNTER: ICD-10-CM

## 2020-07-21 DIAGNOSIS — I48.91 UNSPECIFIED ATRIAL FIBRILLATION: ICD-10-CM

## 2020-07-21 DIAGNOSIS — M19.90 UNSPECIFIED OSTEOARTHRITIS, UNSPECIFIED SITE: ICD-10-CM

## 2020-07-21 DIAGNOSIS — Z88.0 ALLERGY STATUS TO PENICILLIN: ICD-10-CM

## 2020-07-21 DIAGNOSIS — N28.1 CYST OF KIDNEY, ACQUIRED: ICD-10-CM

## 2020-07-21 DIAGNOSIS — Z96.652 PRESENCE OF LEFT ARTIFICIAL KNEE JOINT: ICD-10-CM

## 2020-07-21 DIAGNOSIS — E11.9 TYPE 2 DIABETES MELLITUS WITHOUT COMPLICATIONS: ICD-10-CM

## 2020-07-21 DIAGNOSIS — D62 ACUTE POSTHEMORRHAGIC ANEMIA: ICD-10-CM

## 2020-07-21 DIAGNOSIS — Z95.828 PRESENCE OF OTHER VASCULAR IMPLANTS AND GRAFTS: ICD-10-CM

## 2020-07-21 DIAGNOSIS — M75.02 ADHESIVE CAPSULITIS OF LEFT SHOULDER: ICD-10-CM

## 2020-07-21 DIAGNOSIS — I25.10 ATHEROSCLEROTIC HEART DISEASE OF NATIVE CORONARY ARTERY WITHOUT ANGINA PECTORIS: ICD-10-CM

## 2020-07-21 DIAGNOSIS — K92.2 GASTROINTESTINAL HEMORRHAGE, UNSPECIFIED: ICD-10-CM

## 2020-09-22 NOTE — ED PROVIDER NOTE - LATERALITY
in no acute distress,  well developed, well nourished,  ambulating without difficulty,  normal communication ability
bilateral

## 2020-12-29 NOTE — PROGRESS NOTE ADULT - SUBJECTIVE AND OBJECTIVE BOX
CHIEF COMPLAINT: Epigastric pain    SUBJECTIVE:  HPI:  89 year old female with past medical history of CAD s/p stent, Atrial Fibrillation, HFpEF, TAVR, T2DM, Hyperlipidemia , Pulmonary HTN discharged this AM with the diagnosis of the Idiopathic pancreatitis comes with the CC of the abdominal pain for couple of hours.  Patient states that she went home and felt like hunger pain soon after a bannana she started severe pain similar kind of the pain which he had in the previous admission. No nausea ,vomiting, diarrhoea, fever, chills, chest pain or SOB, palpitations.    In the ED she received 1l bolus and Dilaudid felt better after the Dilaudid    h/o of the attack of the pancreatitis with discharge diagnosis secondary to the Januvia and Metolazone (23 Dec 2017 21:25)    12/26: patient is well known to me. readmission with acute pancreatitis. lipase levels trending down. abdominal pain, nausea free. patient is NPO, anticipated transfer to [facility] under the care of Dr. Elder for ERCP. Care discussed with Dr. Riggins - patient's son. pickup time from  (3PM on 12/26)  and notes that procedure will take place tomorrow 12/27.    REVIEW OF SYSTEMS:  CONSTITUTIONAL: No weakness, fevers or chills  EYES/ENT: No visual changes;  No vertigo or throat pain   NECK: No pain or stiffness  RESPIRATORY: No cough, wheezing, hemoptysis; No shortness of breath  CARDIOVASCULAR: No chest pain or palpitations  GASTROINTESTINAL: No abdominal or epigastric pain. No nausea, vomiting, or hematemesis; No diarrhea or constipation. No melena or hematochezia.  GENITOURINARY: No dysuria, frequency or hematuria  NEUROLOGICAL: No numbness or weakness  SKIN: No itching, burning, rashes, or lesions   All other review of systems is negative unless indicated above    PHYSICAL EXAM:  ICU Vital Signs Last 24 Hrs  T(C): 36.7 (26 Dec 2017 11:05), Max: 36.7 (25 Dec 2017 16:37)  T(F): 98 (26 Dec 2017 11:05), Max: 98.1 (26 Dec 2017 05:38)  HR: 78 (26 Dec 2017 11:05) (78 - 86)  BP: 121/45 (26 Dec 2017 11:05) (116/57 - 122/50)  RR: 17 (26 Dec 2017 11:05) (17 - 17)  SpO2: 97% (26 Dec 2017 11:05) (91% - 97%)  Constitutional: NAD, awake and alert, well-developed  HEENT: PERR, EOMI, Normal Hearing, MMM  Neck: Soft and supple, No LAD, No JVD  Respiratory: Mild crackles heard at lung bases but good air entry   Cardiovascular: S1 and S2, irregular rate and rhythm, no Murmurs, gallops or rubs  Gastrointestinal: Bowel Sounds present, soft, nontender, nondistended, no guarding, no rebound  Extremities: Slight peripheral edema  Vascular: 2+ peripheral pulses  Neurological: A/O x 3, no focal deficits  Musculoskeletal: 5/5 strength b/l upper and lower extremities  Skin: No rashes      Labs:     CBC Full  -  ( 26 Dec 2017 07:17 )  WBC Count : 7.6 K/uL  Hemoglobin : 10.1 g/dL  Hematocrit : 31.1 %  Platelet Count - Automated : 244 K/uL    137  |  104  |  8   ----------------------------<  105<H>  3.6   |  21<L>  |  0.55    Ca    8.2<L>      26 Dec 2017 07:17    TPro  6.6  /  Alb  2.4<L>  /  TBili  1.3<H>  /  DBili  x   /  AST  41<H>  /  ALT  50  /  AlkPhos  187<H>  12-26 27-Dec-2020 04:05

## 2021-01-06 NOTE — ED PROVIDER NOTE - CPE EDP [facility] NORM
Hide Additional Notes?: No
Detail Level: Detailed
Additional Notes (Optional): Previously biopsied Compound Nevus
normal...

## 2021-02-18 NOTE — ED PROVIDER NOTE - ENMT NEGATIVE STATEMENT, MLM
77.1
Ears: no ear pain and no hearing problems.Nose: no nasal congestion and no nasal drainage.Mouth/Throat: no dysphagia, no hoarseness and no throat pain.Neck: no lumps, no pain, no stiffness and no swollen glands.

## 2021-03-26 NOTE — CONSULT NOTE ADULT - PROBLEM/RECOMMENDATION-3
Initial SW/CM Assessment/Plan of Care Note     Baseline Assessment  79 year old admitted 3/24/2021 as Inpatient with a diagnosis of s/p Exploratory laparotomy,  tumor debulking, omentectomy 3/25/21 .   Prior to admission patient was living with Spouse/significant other and residing at House.  Patient’s Primary Care Provider is Kari Linares MD.     Medical History  Past Medical History:   Diagnosis Date   • Breast cancer genetic susceptibility 1975   • Breast cancer genetic susceptibility 2007    right breast   • DVT (deep venous thrombosis) (CMS/HCC) 03/2021   • Essential (primary) hypertension    • PONV (postoperative nausea and vomiting)    • Thyroid condition     hypothyroidism       Prior to Admission Status  Functional Status  Ambulation: Independent/Self  Transportation: Independent/Self    Agency/Support  Type of Services Prior to Hospitalization: None  Support Systems: Spouse/Significant other  Home Devices/Equipment: None  Mobility Assist Devices: Cane  Sensory Support Devices: None    Current Mental Status: Cooperative, Pleasant    Insurance  Primary: MEDICARE  Secondary: [facility]    Barriers to Discharge  Identified Barriers to Discharge/Transition Planning: No barriers    Progress Note  Chart reviewed. Case discussed in AM Transition rounds w/ primary RN and Charge RN.Met w/ pt, introduced CM role. Plan home w/ sps, if needed HH , prefers [facility] , [facility] and [facility] following, CM will follow for safe and appropriate d/c.      Plan  SW/CM - Recommendations for Discharge: Home   PT - Recommendations for Discharge: pending   OT - Recommendations for Discharge: pending      Anticipate patient will not need post-hospital services. Necessary services are available.  Anticipate patient can return to the environment from which patient entered the hospital.   Anticipate patient can provide self-care at discharge.    Refer to SW/CM Flowsheet for Goals and objective data.      DISPLAY PLAN FREE TEXT

## 2021-08-11 ENCOUNTER — INPATIENT (INPATIENT)
Facility: HOSPITAL | Age: 86
LOS: 11 days | Discharge: NURSING, REHAB OR LONG-TERM CARE | DRG: 871 | End: 2021-08-23
Attending: FAMILY MEDICINE | Admitting: FAMILY MEDICINE
Payer: MEDICARE

## 2021-08-11 VITALS
DIASTOLIC BLOOD PRESSURE: 46 MMHG | WEIGHT: 134.04 LBS | TEMPERATURE: 98 F | HEART RATE: 62 BPM | RESPIRATION RATE: 15 BRPM | SYSTOLIC BLOOD PRESSURE: 72 MMHG | OXYGEN SATURATION: 91 % | HEIGHT: 64 IN

## 2021-08-11 DIAGNOSIS — Z90.49 ACQUIRED ABSENCE OF OTHER SPECIFIED PARTS OF DIGESTIVE TRACT: Chronic | ICD-10-CM

## 2021-08-11 DIAGNOSIS — A41.9 SEPSIS, UNSPECIFIED ORGANISM: ICD-10-CM

## 2021-08-11 DIAGNOSIS — Z95.828 PRESENCE OF OTHER VASCULAR IMPLANTS AND GRAFTS: Chronic | ICD-10-CM

## 2021-08-11 DIAGNOSIS — Z96.652 PRESENCE OF LEFT ARTIFICIAL KNEE JOINT: Chronic | ICD-10-CM

## 2021-08-11 LAB
ADD ON TEST-SPECIMEN IN LAB: SIGNIFICANT CHANGE UP
ALBUMIN SERPL ELPH-MCNC: 3 G/DL — LOW (ref 3.3–5)
ALP SERPL-CCNC: 80 U/L — SIGNIFICANT CHANGE UP (ref 40–120)
ALT FLD-CCNC: 19 U/L — SIGNIFICANT CHANGE UP (ref 12–78)
ANION GAP SERPL CALC-SCNC: 8 MMOL/L — SIGNIFICANT CHANGE UP (ref 5–17)
APPEARANCE UR: ABNORMAL
APTT BLD: 32.5 SEC — SIGNIFICANT CHANGE UP (ref 27.5–35.5)
AST SERPL-CCNC: 20 U/L — SIGNIFICANT CHANGE UP (ref 15–37)
BASE EXCESS BLDV CALC-SCNC: 1 MMOL/L — SIGNIFICANT CHANGE UP (ref -2–2)
BASOPHILS # BLD AUTO: 0.03 K/UL — SIGNIFICANT CHANGE UP (ref 0–0.2)
BASOPHILS NFR BLD AUTO: 0.4 % — SIGNIFICANT CHANGE UP (ref 0–2)
BILIRUB SERPL-MCNC: 0.6 MG/DL — SIGNIFICANT CHANGE UP (ref 0.2–1.2)
BILIRUB UR-MCNC: NEGATIVE — SIGNIFICANT CHANGE UP
BUN SERPL-MCNC: 26 MG/DL — HIGH (ref 7–23)
CALCIUM SERPL-MCNC: 8.9 MG/DL — SIGNIFICANT CHANGE UP (ref 8.5–10.1)
CHLORIDE SERPL-SCNC: 92 MMOL/L — LOW (ref 96–108)
CO2 SERPL-SCNC: 27 MMOL/L — SIGNIFICANT CHANGE UP (ref 22–31)
COLOR SPEC: YELLOW — SIGNIFICANT CHANGE UP
CREAT SERPL-MCNC: 1.4 MG/DL — HIGH (ref 0.5–1.3)
DIFF PNL FLD: ABNORMAL
EOSINOPHIL # BLD AUTO: 0.03 K/UL — SIGNIFICANT CHANGE UP (ref 0–0.5)
EOSINOPHIL NFR BLD AUTO: 0.4 % — SIGNIFICANT CHANGE UP (ref 0–6)
GLUCOSE SERPL-MCNC: 156 MG/DL — HIGH (ref 70–99)
GLUCOSE UR QL: NEGATIVE MG/DL — SIGNIFICANT CHANGE UP
HCO3 BLDV-SCNC: 26 MMOL/L — SIGNIFICANT CHANGE UP (ref 21–29)
HCT VFR BLD CALC: 33.1 % — LOW (ref 34.5–45)
HGB BLD-MCNC: 11.2 G/DL — LOW (ref 11.5–15.5)
IMM GRANULOCYTES NFR BLD AUTO: 0.7 % — SIGNIFICANT CHANGE UP (ref 0–1.5)
INR BLD: 2.19 RATIO — HIGH (ref 0.88–1.16)
KETONES UR-MCNC: NEGATIVE — SIGNIFICANT CHANGE UP
LACTATE SERPL-SCNC: 2.7 MMOL/L — HIGH (ref 0.7–2)
LACTATE SERPL-SCNC: 3 MMOL/L — HIGH (ref 0.7–2)
LEUKOCYTE ESTERASE UR-ACNC: ABNORMAL
LYMPHOCYTES # BLD AUTO: 0.65 K/UL — LOW (ref 1–3.3)
LYMPHOCYTES # BLD AUTO: 8.7 % — LOW (ref 13–44)
MCHC RBC-ENTMCNC: 32.6 PG — SIGNIFICANT CHANGE UP (ref 27–34)
MCHC RBC-ENTMCNC: 33.8 GM/DL — SIGNIFICANT CHANGE UP (ref 32–36)
MCV RBC AUTO: 96.2 FL — SIGNIFICANT CHANGE UP (ref 80–100)
MONOCYTES # BLD AUTO: 0.77 K/UL — SIGNIFICANT CHANGE UP (ref 0–0.9)
MONOCYTES NFR BLD AUTO: 10.3 % — SIGNIFICANT CHANGE UP (ref 2–14)
NEUTROPHILS # BLD AUTO: 5.98 K/UL — SIGNIFICANT CHANGE UP (ref 1.8–7.4)
NEUTROPHILS NFR BLD AUTO: 79.5 % — HIGH (ref 43–77)
NITRITE UR-MCNC: NEGATIVE — SIGNIFICANT CHANGE UP
PCO2 BLDV: 43 MMHG — SIGNIFICANT CHANGE UP (ref 35–50)
PH BLDV: 7.39 — SIGNIFICANT CHANGE UP (ref 7.35–7.45)
PH UR: 5 — SIGNIFICANT CHANGE UP (ref 5–8)
PLATELET # BLD AUTO: 162 K/UL — SIGNIFICANT CHANGE UP (ref 150–400)
PO2 BLDV: 148 MMHG — HIGH (ref 25–45)
POTASSIUM SERPL-MCNC: 4.5 MMOL/L — SIGNIFICANT CHANGE UP (ref 3.5–5.3)
POTASSIUM SERPL-SCNC: 4.5 MMOL/L — SIGNIFICANT CHANGE UP (ref 3.5–5.3)
PROT SERPL-MCNC: 6.9 GM/DL — SIGNIFICANT CHANGE UP (ref 6–8.3)
PROT UR-MCNC: 15 MG/DL
PROTHROM AB SERPL-ACNC: 24.5 SEC — HIGH (ref 10.6–13.6)
RBC # BLD: 3.44 M/UL — LOW (ref 3.8–5.2)
RBC # FLD: 15.6 % — HIGH (ref 10.3–14.5)
SAO2 % BLDV: 99 % — HIGH (ref 67–88)
SODIUM SERPL-SCNC: 127 MMOL/L — LOW (ref 135–145)
SP GR SPEC: 1.01 — SIGNIFICANT CHANGE UP (ref 1.01–1.02)
TSH SERPL-MCNC: 1.18 UU/ML — SIGNIFICANT CHANGE UP (ref 0.34–4.82)
UROBILINOGEN FLD QL: NEGATIVE MG/DL — SIGNIFICANT CHANGE UP
WBC # BLD: 7.51 K/UL — SIGNIFICANT CHANGE UP (ref 3.8–10.5)
WBC # FLD AUTO: 7.51 K/UL — SIGNIFICANT CHANGE UP (ref 3.8–10.5)

## 2021-08-11 PROCEDURE — 83036 HEMOGLOBIN GLYCOSYLATED A1C: CPT

## 2021-08-11 PROCEDURE — 99291 CRITICAL CARE FIRST HOUR: CPT | Mod: CS

## 2021-08-11 PROCEDURE — 82378 CARCINOEMBRYONIC ANTIGEN: CPT

## 2021-08-11 PROCEDURE — 83540 ASSAY OF IRON: CPT

## 2021-08-11 PROCEDURE — 97116 GAIT TRAINING THERAPY: CPT | Mod: GP

## 2021-08-11 PROCEDURE — C8929: CPT

## 2021-08-11 PROCEDURE — 99223 1ST HOSP IP/OBS HIGH 75: CPT

## 2021-08-11 PROCEDURE — 71250 CT THORAX DX C-: CPT | Mod: 26

## 2021-08-11 PROCEDURE — 99222 1ST HOSP IP/OBS MODERATE 55: CPT

## 2021-08-11 PROCEDURE — 36415 COLL VENOUS BLD VENIPUNCTURE: CPT

## 2021-08-11 PROCEDURE — 88342 IMHCHEM/IMCYTCHM 1ST ANTB: CPT

## 2021-08-11 PROCEDURE — 71045 X-RAY EXAM CHEST 1 VIEW: CPT | Mod: 26

## 2021-08-11 PROCEDURE — 74177 CT ABD & PELVIS W/CONTRAST: CPT

## 2021-08-11 PROCEDURE — 83550 IRON BINDING TEST: CPT

## 2021-08-11 PROCEDURE — 87040 BLOOD CULTURE FOR BACTERIA: CPT

## 2021-08-11 PROCEDURE — 86769 SARS-COV-2 COVID-19 ANTIBODY: CPT

## 2021-08-11 PROCEDURE — 93010 ELECTROCARDIOGRAM REPORT: CPT

## 2021-08-11 PROCEDURE — 80053 COMPREHEN METABOLIC PANEL: CPT

## 2021-08-11 PROCEDURE — 83605 ASSAY OF LACTIC ACID: CPT

## 2021-08-11 PROCEDURE — U0005: CPT

## 2021-08-11 PROCEDURE — 84100 ASSAY OF PHOSPHORUS: CPT

## 2021-08-11 PROCEDURE — 88341 IMHCHEM/IMCYTCHM EA ADD ANTB: CPT

## 2021-08-11 PROCEDURE — 85025 COMPLETE CBC W/AUTO DIFF WBC: CPT

## 2021-08-11 PROCEDURE — U0003: CPT

## 2021-08-11 PROCEDURE — 82728 ASSAY OF FERRITIN: CPT

## 2021-08-11 PROCEDURE — 97530 THERAPEUTIC ACTIVITIES: CPT | Mod: GP

## 2021-08-11 PROCEDURE — 71045 X-RAY EXAM CHEST 1 VIEW: CPT

## 2021-08-11 PROCEDURE — 88305 TISSUE EXAM BY PATHOLOGIST: CPT

## 2021-08-11 PROCEDURE — 82962 GLUCOSE BLOOD TEST: CPT

## 2021-08-11 PROCEDURE — 97163 PT EVAL HIGH COMPLEX 45 MIN: CPT | Mod: GP

## 2021-08-11 PROCEDURE — 80048 BASIC METABOLIC PNL TOTAL CA: CPT

## 2021-08-11 PROCEDURE — 85027 COMPLETE CBC AUTOMATED: CPT

## 2021-08-11 PROCEDURE — 84145 PROCALCITONIN (PCT): CPT

## 2021-08-11 PROCEDURE — 83735 ASSAY OF MAGNESIUM: CPT

## 2021-08-11 PROCEDURE — 71250 CT THORAX DX C-: CPT

## 2021-08-11 PROCEDURE — 84443 ASSAY THYROID STIM HORMONE: CPT

## 2021-08-11 RX ORDER — ASPIRIN/CALCIUM CARB/MAGNESIUM 324 MG
81 TABLET ORAL DAILY
Refills: 0 | Status: DISCONTINUED | OUTPATIENT
Start: 2021-08-11 | End: 2021-08-23

## 2021-08-11 RX ORDER — ACETAMINOPHEN 500 MG
2 TABLET ORAL
Qty: 0 | Refills: 0 | DISCHARGE

## 2021-08-11 RX ORDER — AZTREONAM 2 G
1000 VIAL (EA) INJECTION EVERY 12 HOURS
Refills: 0 | Status: DISCONTINUED | OUTPATIENT
Start: 2021-08-11 | End: 2021-08-13

## 2021-08-11 RX ORDER — METFORMIN HYDROCHLORIDE 850 MG/1
1 TABLET ORAL
Qty: 0 | Refills: 0 | DISCHARGE

## 2021-08-11 RX ORDER — SACCHAROMYCES BOULARDII 250 MG
2 POWDER IN PACKET (EA) ORAL
Qty: 0 | Refills: 0 | DISCHARGE

## 2021-08-11 RX ORDER — INSULIN LISPRO 100/ML
VIAL (ML) SUBCUTANEOUS
Refills: 0 | Status: DISCONTINUED | OUTPATIENT
Start: 2021-08-11 | End: 2021-08-23

## 2021-08-11 RX ORDER — GLUCAGON INJECTION, SOLUTION 0.5 MG/.1ML
1 INJECTION, SOLUTION SUBCUTANEOUS ONCE
Refills: 0 | Status: DISCONTINUED | OUTPATIENT
Start: 2021-08-11 | End: 2021-08-23

## 2021-08-11 RX ORDER — LANOLIN ALCOHOL/MO/W.PET/CERES
3 CREAM (GRAM) TOPICAL AT BEDTIME
Refills: 0 | Status: DISCONTINUED | OUTPATIENT
Start: 2021-08-11 | End: 2021-08-23

## 2021-08-11 RX ORDER — SIMVASTATIN 20 MG/1
40 TABLET, FILM COATED ORAL AT BEDTIME
Refills: 0 | Status: DISCONTINUED | OUTPATIENT
Start: 2021-08-11 | End: 2021-08-17

## 2021-08-11 RX ORDER — KETOROLAC TROMETHAMINE 30 MG/ML
15 SYRINGE (ML) INJECTION ONCE
Refills: 0 | Status: DISCONTINUED | OUTPATIENT
Start: 2021-08-11 | End: 2021-08-11

## 2021-08-11 RX ORDER — DEXTROSE 50 % IN WATER 50 %
25 SYRINGE (ML) INTRAVENOUS ONCE
Refills: 0 | Status: DISCONTINUED | OUTPATIENT
Start: 2021-08-11 | End: 2021-08-23

## 2021-08-11 RX ORDER — APIXABAN 2.5 MG/1
2.5 TABLET, FILM COATED ORAL
Refills: 0 | Status: DISCONTINUED | OUTPATIENT
Start: 2021-08-11 | End: 2021-08-18

## 2021-08-11 RX ORDER — DOCUSATE SODIUM 100 MG
2 CAPSULE ORAL
Qty: 0 | Refills: 0 | DISCHARGE

## 2021-08-11 RX ORDER — LEVOTHYROXINE SODIUM 125 MCG
50 TABLET ORAL DAILY
Refills: 0 | Status: DISCONTINUED | OUTPATIENT
Start: 2021-08-11 | End: 2021-08-23

## 2021-08-11 RX ORDER — ESCITALOPRAM OXALATE 10 MG/1
10 TABLET, FILM COATED ORAL DAILY
Refills: 0 | Status: DISCONTINUED | OUTPATIENT
Start: 2021-08-11 | End: 2021-08-23

## 2021-08-11 RX ORDER — VANCOMYCIN HCL 1 G
1000 VIAL (EA) INTRAVENOUS EVERY 12 HOURS
Refills: 0 | Status: DISCONTINUED | OUTPATIENT
Start: 2021-08-11 | End: 2021-08-12

## 2021-08-11 RX ORDER — PSYLLIUM SEED (WITH DEXTROSE)
1 POWDER (GRAM) ORAL
Qty: 0 | Refills: 0 | DISCHARGE

## 2021-08-11 RX ORDER — SODIUM CHLORIDE 9 MG/ML
1000 INJECTION, SOLUTION INTRAVENOUS
Refills: 0 | Status: DISCONTINUED | OUTPATIENT
Start: 2021-08-11 | End: 2021-08-23

## 2021-08-11 RX ORDER — ONDANSETRON 8 MG/1
4 TABLET, FILM COATED ORAL EVERY 8 HOURS
Refills: 0 | Status: DISCONTINUED | OUTPATIENT
Start: 2021-08-11 | End: 2021-08-16

## 2021-08-11 RX ORDER — TRAMADOL HYDROCHLORIDE 50 MG/1
1 TABLET ORAL
Qty: 0 | Refills: 0 | DISCHARGE

## 2021-08-11 RX ORDER — ACETAMINOPHEN 500 MG
975 TABLET ORAL ONCE
Refills: 0 | Status: COMPLETED | OUTPATIENT
Start: 2021-08-11 | End: 2021-08-11

## 2021-08-11 RX ORDER — SODIUM CHLORIDE 9 MG/ML
1900 INJECTION INTRAMUSCULAR; INTRAVENOUS; SUBCUTANEOUS ONCE
Refills: 0 | Status: COMPLETED | OUTPATIENT
Start: 2021-08-11 | End: 2021-08-11

## 2021-08-11 RX ORDER — NOREPINEPHRINE BITARTRATE/D5W 8 MG/250ML
0.05 PLASTIC BAG, INJECTION (ML) INTRAVENOUS
Qty: 8 | Refills: 0 | Status: DISCONTINUED | OUTPATIENT
Start: 2021-08-11 | End: 2021-08-11

## 2021-08-11 RX ORDER — SODIUM CHLORIDE 9 MG/ML
500 INJECTION INTRAMUSCULAR; INTRAVENOUS; SUBCUTANEOUS ONCE
Refills: 0 | Status: COMPLETED | OUTPATIENT
Start: 2021-08-11 | End: 2021-08-11

## 2021-08-11 RX ORDER — MULTIVIT-MIN/FERROUS GLUCONATE 9 MG/15 ML
2 LIQUID (ML) ORAL
Qty: 0 | Refills: 0 | DISCHARGE

## 2021-08-11 RX ORDER — SPIRONOLACTONE 25 MG/1
1 TABLET, FILM COATED ORAL
Qty: 0 | Refills: 0 | DISCHARGE

## 2021-08-11 RX ORDER — MULTIVIT-MIN/FERROUS GLUCONATE 9 MG/15 ML
1 LIQUID (ML) ORAL
Qty: 0 | Refills: 0 | DISCHARGE

## 2021-08-11 RX ORDER — FERROUS SULFATE 325(65) MG
325 TABLET ORAL
Refills: 0 | Status: DISCONTINUED | OUTPATIENT
Start: 2021-08-11 | End: 2021-08-23

## 2021-08-11 RX ORDER — ALLOPURINOL 300 MG
200 TABLET ORAL DAILY
Refills: 0 | Status: DISCONTINUED | OUTPATIENT
Start: 2021-08-11 | End: 2021-08-23

## 2021-08-11 RX ORDER — DEXTROSE 50 % IN WATER 50 %
15 SYRINGE (ML) INTRAVENOUS ONCE
Refills: 0 | Status: DISCONTINUED | OUTPATIENT
Start: 2021-08-11 | End: 2021-08-23

## 2021-08-11 RX ORDER — VANCOMYCIN HCL 1 G
1000 VIAL (EA) INTRAVENOUS ONCE
Refills: 0 | Status: COMPLETED | OUTPATIENT
Start: 2021-08-11 | End: 2021-08-11

## 2021-08-11 RX ORDER — DICLOFENAC SODIUM 30 MG/G
2 GEL TOPICAL
Qty: 0 | Refills: 0 | DISCHARGE

## 2021-08-11 RX ORDER — AZTREONAM 2 G
1000 VIAL (EA) INJECTION ONCE
Refills: 0 | Status: COMPLETED | OUTPATIENT
Start: 2021-08-11 | End: 2021-08-11

## 2021-08-11 RX ORDER — GLUCAGON INJECTION, SOLUTION 0.5 MG/.1ML
0 INJECTION, SOLUTION SUBCUTANEOUS
Qty: 0 | Refills: 0 | DISCHARGE

## 2021-08-11 RX ORDER — ACETAMINOPHEN 500 MG
650 TABLET ORAL EVERY 6 HOURS
Refills: 0 | Status: DISCONTINUED | OUTPATIENT
Start: 2021-08-11 | End: 2021-08-23

## 2021-08-11 RX ORDER — POTASSIUM CHLORIDE 20 MEQ
2 PACKET (EA) ORAL
Qty: 0 | Refills: 0 | DISCHARGE

## 2021-08-11 RX ORDER — PANTOPRAZOLE SODIUM 20 MG/1
40 TABLET, DELAYED RELEASE ORAL DAILY
Refills: 0 | Status: DISCONTINUED | OUTPATIENT
Start: 2021-08-11 | End: 2021-08-23

## 2021-08-11 RX ORDER — FERROUS SULFATE 325(65) MG
1 TABLET ORAL
Qty: 0 | Refills: 0 | DISCHARGE

## 2021-08-11 RX ORDER — MIDODRINE HYDROCHLORIDE 2.5 MG/1
10 TABLET ORAL THREE TIMES A DAY
Refills: 0 | Status: DISCONTINUED | OUTPATIENT
Start: 2021-08-11 | End: 2021-08-17

## 2021-08-11 RX ADMIN — MIDODRINE HYDROCHLORIDE 10 MILLIGRAM(S): 2.5 TABLET ORAL at 22:23

## 2021-08-11 RX ADMIN — SODIUM CHLORIDE 1 MILLILITER(S): 9 INJECTION INTRAMUSCULAR; INTRAVENOUS; SUBCUTANEOUS at 19:47

## 2021-08-11 RX ADMIN — Medication 1000 MILLIGRAM(S): at 14:44

## 2021-08-11 RX ADMIN — Medication 20 MILLIGRAM(S): at 23:45

## 2021-08-11 RX ADMIN — Medication 15 MILLIGRAM(S): at 23:45

## 2021-08-11 RX ADMIN — Medication 325 MILLIGRAM(S): at 22:31

## 2021-08-11 RX ADMIN — SODIUM CHLORIDE 1900 MILLILITER(S): 9 INJECTION INTRAMUSCULAR; INTRAVENOUS; SUBCUTANEOUS at 12:09

## 2021-08-11 RX ADMIN — Medication 1 DROP(S): at 22:30

## 2021-08-11 RX ADMIN — Medication 975 MILLIGRAM(S): at 14:35

## 2021-08-11 RX ADMIN — APIXABAN 2.5 MILLIGRAM(S): 2.5 TABLET, FILM COATED ORAL at 22:30

## 2021-08-11 RX ADMIN — Medication 3 MILLIGRAM(S): at 23:46

## 2021-08-11 RX ADMIN — Medication 650 MILLIGRAM(S): at 21:11

## 2021-08-11 RX ADMIN — Medication 50 MILLIGRAM(S): at 13:29

## 2021-08-11 RX ADMIN — Medication 975 MILLIGRAM(S): at 13:35

## 2021-08-11 RX ADMIN — Medication 1000 MILLIGRAM(S): at 14:50

## 2021-08-11 RX ADMIN — Medication 250 MILLIGRAM(S): at 13:41

## 2021-08-11 RX ADMIN — SIMVASTATIN 40 MILLIGRAM(S): 20 TABLET, FILM COATED ORAL at 22:30

## 2021-08-11 RX ADMIN — SODIUM CHLORIDE 1900 MILLILITER(S): 9 INJECTION INTRAMUSCULAR; INTRAVENOUS; SUBCUTANEOUS at 13:37

## 2021-08-11 RX ADMIN — Medication 5.7 MICROGRAM(S)/KG/MIN: at 15:19

## 2021-08-11 NOTE — ED PROVIDER NOTE - OBJECTIVE STATEMENT
89F presents to the ED from [facility] for fever hypotension and hypoxia. pt found to be hypoxic at [facility] and sent in for further eval. pt denies cp sob abd pain dysuria. pt unsure why she is here. hx limited.

## 2021-08-11 NOTE — ED PROVIDER NOTE - PROGRESS NOTE DETAILS
spoke with Dr. Dill patients son - states pt normally without mental status issues since yesterday change in mental status now with fever. pt full code. Osman Castrejon M.D., Attending Physician spoke with Dr. Dill (/[phone]) patients son - states pt normally without mental status issues since yesterday change in mental status now with fever. pt full code.  Osman Castrejon M.D., Attending Physician MAP 70.  systolic. pt endorsed to Dr. Irby for sepsis 2/2 pna. accepts DNM. Osman Castrejon M.D., Attending Physician bp dropped to map 60 systolic of 80. received 3L fluids. pressors started ICU consulted hospitalist updated. Osman Castrejon M.D., Attending Physician pt seen by ICU states pt does not need pressors and can go to step down. map 65 even though bp in 80-90. states lactate improving and good urine output. hospitalist updated. Osman Castrejon M.D., Attending Physician

## 2021-08-11 NOTE — ED PROVIDER NOTE - CARE PLAN
1 Principal Discharge DX:	Severe sepsis  Secondary Diagnosis:	Pneumonia  Secondary Diagnosis:	Hypoxia

## 2021-08-11 NOTE — ED ADULT NURSE NOTE - OBJECTIVE STATEMENT
89 y/o F presents to the ED c/o fever, hypotension and hypoxia. Pt sent in from [facility].

## 2021-08-11 NOTE — H&P ADULT - NSHPLABSRESULTS_GEN_ALL_CORE
LABS: All Labs Reviewed:                        11.2   7.51  )-----------( 162      ( 11 Aug 2021 12:31 )             33.1     08-11    127<L>  |  92<L>  |  26<H>  ----------------------------<  156<H>  4.5   |  27  |  1.40<H>    Ca    8.9      11 Aug 2021 12:31    TPro  6.9  /  Alb  3.0<L>  /  TBili  0.6  /  DBili  x   /  AST  20  /  ALT  19  /  AlkPhos  80  08-11    PT/INR - ( 11 Aug 2021 12:31 )   PT: 24.5 sec;   INR: 2.19 ratio      Lactate, Blood: 2.7: Elevated lactate. Consider ordering follow-up lactate to trend. mmol/L (08.11.21 @ 15:07)     < from: Xray Chest 1 View-PORTABLE IMMEDIATE (08.11.21 @ 12:42) >    FINDINGS: Heart sizeappears enlarged. Patient status post TAVR. Tortuosity and calcification of the thoracic aorta is identified. Increased interstitial opacities are identified with comparison to prior examinations suggesting an acute interstitial process superimposed on chronic interstitial lung disease. No pleural effusion. No pneumothorax. No mediastinal shift. No acute osseous fractures.    < end of copied text >

## 2021-08-11 NOTE — ED PROVIDER NOTE - CLINICAL SUMMARY MEDICAL DECISION MAKING FREE TEXT BOX
89F presents to the ED from [facility] for fever hypotension and hypoxia. pt found to be hypoxic at [facility] and sent in for further eval. pt denies cp sob abd pain dysuria. pt unsure why she is here. hx limited. exam with course breath sounds b/l. concern for sepsis will r/o infectious causes covid and admit. Osman Castrejon M.D., Attending Physician

## 2021-08-11 NOTE — PATIENT PROFILE ADULT - DOES PATIENT HAVE ADVANCE DIRECTIVE
Per [facility] records, patient is documented as a full code. Pt has hx of dementia. AxOx2-3 at present/No

## 2021-08-11 NOTE — PATIENT PROFILE ADULT - BRAND OF COVID-19 VACCINATION
Pt with hx of dementia. States she had two doses of Pfizer at [facility]./Pfizer dose 1 and 2

## 2021-08-11 NOTE — H&P ADULT - NSHPPHYSICALEXAM_GEN_ALL_CORE
Vital Signs Last 24 Hrs  T(C): 36.5 (11 Aug 2021 17:13), Max: 38.5 (11 Aug 2021 12:16)  T(F): 97.7 (11 Aug 2021 17:13), Max: 101.3 (11 Aug 2021 12:16)  HR: 71 (11 Aug 2021 17:23) (62 - 98)  BP: 91/55 (11 Aug 2021 17:23) (72/46 - 98/51)  BP(mean): 67 (11 Aug 2021 17:23) (62 - 72)  RR: 21 (11 Aug 2021 17:23) (15 - 21)  SpO2: 100% (11 Aug 2021 17:23) (91% - 100%)    I&O's Summary    11 Aug 2021 07:01  -  11 Aug 2021 18:21  --------------------------------------------------------  IN: 0 mL / OUT: 450 mL / NET: -450 mL  PHYSICAL EXAM:  Constitutional: NAD, awake and alert, frail  HEENT: PERR, EOMI, Normal Hearing, MMM  Neck: Soft and supple, No LAD, No JVD  Respiratory: Breath sounds are clear bilaterally, No wheezing, rales or rhonchi  Cardiovascular: S1 and S2, irregular rate and rhythm, + Murmurs, No gallops or rubs  Gastrointestinal: Bowel Sounds present, soft, nontender, nondistended, no guarding, no rebound  Extremities: No peripheral edema  Vascular: 2+ peripheral pulses  Neurological: A/O x 1, no focal deficits  Musculoskeletal: 5/5 strength b/l upper and lower extremities  Skin: No rashes

## 2021-08-11 NOTE — H&P ADULT - ASSESSMENT
Patient is 89 yo female with PMhx pancreatitis s/p ERCP, cirrhosis, DM2, a-fib on Eliquis, CAD s/p PCI, severe pulm HTN, chronic R heart failure, diastolic dysfunction, AS s/p TAVR, OA, HTN, dyslipidemia, hypothyroidism, presents with sob and fatigue.    1) Severe Sepsis Secondary to Pneumonia    - Fu Cultures, Urine/Blood and Sputum    - Continue Vanco and Aztreonam    - ID and Pulm Consult    - Repeat Lactate and trend    - Additional 500 cc NS bolus given    - History of Pulm HTN and CHF, monitor for fluid       overload    - Ins and outs    - Hold Antihypertenisives/Diuretics   - May need pressors depending on BP and HR    2) Pulmonary HTN   - Continue revatio   - Pulmonary Consult    3) History of Afib     - Continue Eliquis 2.5 bid     - Hold Metoprolol due to hypotension  4) HLD     - Continue simvastatin  5) Gout    - Continue allopurinol  6) Hyponatremia    - Likely due to fluid resuscitation    - Repeat in AM  7) DM     ISS for now  8) DVT proph     Eliquis  Patient is 89 yo female with PMhx pancreatitis s/p ERCP, cirrhosis, DM2, a-fib on Eliquis, CAD s/p PCI, severe pulm HTN, chronic R heart failure, diastolic dysfunction, AS s/p TAVR, OA, HTN, dyslipidemia, hypothyroidism, presents with sob and fatigue.    1) Severe Sepsis Secondary to Pneumonia    - Fu Cultures, Urine/Blood and Sputum    - Continue Vanco and Aztreonam    - ID and Pulm Consult    - Check CT Chest    - Repeat Lactate, add procalcitonin    - Additional 500 cc NS bolus given    - History of Pulm HTN and CHF, monitor for fluid       overload    - Ins and outs    - Hold Antihypertenisives/Diuretics   - May need pressors depending on BP and HR   - Cardiology consult    2) Pulmonary HTN   - Continue revatio   - Pulmonary Consult    3) History of Afib     - Continue Eliquis 2.5 bid     - Hold Metoprolol due to hypotension  4) HLD     - Continue simvastatin  5) Gout    - Continue allopurinol  6) Hyponatremia    - Likely due to fluid resuscitation    - Repeat in AM  7) DM     ISS for now  8) DVT proph     Eliquis

## 2021-08-11 NOTE — CONSULT NOTE ADULT - ASSESSMENT
Severe Sepsis  PNA  Dehydration  MRAIA G      - currently febrile, but HD stable, /63, in NAD, comfortable on 2LNC, sat 100%, s/p 3L NS bolus, not on vasopressor support      Recommend:  - supp o2 as needed MDI PRN  - Abx Aztreonam (PCN allergy), Vanco, Doxy  - IVF 100ml hr  - HOLD BP meds  - panculture, UCx, BCx, sputum culture, check Urine Lg, Strep  - GI ppx  - DVT ppx  - Does not require ICU care at this time, re-consult as needed

## 2021-08-11 NOTE — ED PROVIDER NOTE - PHYSICAL EXAMINATION
Constitutional: NAD   Eyes: PERRLA EOMI  Head: Normocephalic atraumatic  Mouth: MMM  Cardiac: tachycardic  Resp: course breath sounds b/l  GI: Abd s/nt/nd  Neuro: CN2-12 intact  Skin: No rashes

## 2021-08-11 NOTE — H&P ADULT - HISTORY OF PRESENT ILLNESS
Patient is 89 yo female with PMhx pancreatitis s/p ERCP, cirrhosis, DM2, a-fib on Eliquis, CAD s/p PCI, severe pulm HTN, chronic R heart failure, diastolic dysfunction, AS s/p TAVR, OA, HTN, dyslipidemia, hypothyroidism, presents with sob and fatigue. Patient sent from [facility] for further evaluation. Chart reviewed. Patient seen in ED, pleasant with baseline confusion. Does not know why she is here.  In ED, patient found to be septic, given 3 Liter bolus and for a short time started on pressors. Now she is off pressors with BP 98/70 in no distress.

## 2021-08-12 DIAGNOSIS — I48.20 CHRONIC ATRIAL FIBRILLATION, UNSPECIFIED: ICD-10-CM

## 2021-08-12 DIAGNOSIS — I50.32 CHRONIC DIASTOLIC (CONGESTIVE) HEART FAILURE: ICD-10-CM

## 2021-08-12 DIAGNOSIS — I27.0 PRIMARY PULMONARY HYPERTENSION: ICD-10-CM

## 2021-08-12 DIAGNOSIS — N18.30 CHRONIC KIDNEY DISEASE, STAGE 3 UNSPECIFIED: ICD-10-CM

## 2021-08-12 DIAGNOSIS — A41.4 SEPSIS DUE TO ANAEROBES: ICD-10-CM

## 2021-08-12 LAB
-  STREPTOCOCCUS SP. (NOT GRP A, B OR S PNEUMONIAE): SIGNIFICANT CHANGE UP
A1C WITH ESTIMATED AVERAGE GLUCOSE RESULT: 6.8 % — HIGH (ref 4–5.6)
ALBUMIN SERPL ELPH-MCNC: 2.3 G/DL — LOW (ref 3.3–5)
ALP SERPL-CCNC: 67 U/L — SIGNIFICANT CHANGE UP (ref 40–120)
ALT FLD-CCNC: 19 U/L — SIGNIFICANT CHANGE UP (ref 12–78)
ANION GAP SERPL CALC-SCNC: 5 MMOL/L — SIGNIFICANT CHANGE UP (ref 5–17)
AST SERPL-CCNC: 28 U/L — SIGNIFICANT CHANGE UP (ref 15–37)
BILIRUB SERPL-MCNC: 0.4 MG/DL — SIGNIFICANT CHANGE UP (ref 0.2–1.2)
BUN SERPL-MCNC: 23 MG/DL — SIGNIFICANT CHANGE UP (ref 7–23)
CALCIUM SERPL-MCNC: 7.8 MG/DL — LOW (ref 8.5–10.1)
CHLORIDE SERPL-SCNC: 103 MMOL/L — SIGNIFICANT CHANGE UP (ref 96–108)
CO2 SERPL-SCNC: 27 MMOL/L — SIGNIFICANT CHANGE UP (ref 22–31)
COVID-19 SPIKE DOMAIN AB INTERP: POSITIVE
COVID-19 SPIKE DOMAIN ANTIBODY RESULT: >250 U/ML — HIGH
CREAT SERPL-MCNC: 1.02 MG/DL — SIGNIFICANT CHANGE UP (ref 0.5–1.3)
CULTURE RESULTS: SIGNIFICANT CHANGE UP
ESTIMATED AVERAGE GLUCOSE: 148 MG/DL — HIGH (ref 68–114)
GLUCOSE SERPL-MCNC: 125 MG/DL — HIGH (ref 70–99)
GRAM STN FLD: SIGNIFICANT CHANGE UP
HCT VFR BLD CALC: 30.7 % — LOW (ref 34.5–45)
HGB BLD-MCNC: 10.1 G/DL — LOW (ref 11.5–15.5)
LACTATE SERPL-SCNC: 2.1 MMOL/L — HIGH (ref 0.7–2)
MCHC RBC-ENTMCNC: 32.3 PG — SIGNIFICANT CHANGE UP (ref 27–34)
MCHC RBC-ENTMCNC: 32.9 GM/DL — SIGNIFICANT CHANGE UP (ref 32–36)
MCV RBC AUTO: 98.1 FL — SIGNIFICANT CHANGE UP (ref 80–100)
METHOD TYPE: SIGNIFICANT CHANGE UP
PLATELET # BLD AUTO: 117 K/UL — LOW (ref 150–400)
POTASSIUM SERPL-MCNC: 3.9 MMOL/L — SIGNIFICANT CHANGE UP (ref 3.5–5.3)
POTASSIUM SERPL-SCNC: 3.9 MMOL/L — SIGNIFICANT CHANGE UP (ref 3.5–5.3)
PROT SERPL-MCNC: 5.7 GM/DL — LOW (ref 6–8.3)
RBC # BLD: 3.13 M/UL — LOW (ref 3.8–5.2)
RBC # FLD: 15.6 % — HIGH (ref 10.3–14.5)
SARS-COV-2 IGG+IGM SERPL QL IA: >250 U/ML — HIGH
SARS-COV-2 IGG+IGM SERPL QL IA: POSITIVE
SODIUM SERPL-SCNC: 135 MMOL/L — SIGNIFICANT CHANGE UP (ref 135–145)
SPECIMEN SOURCE: SIGNIFICANT CHANGE UP
WBC # BLD: 5.59 K/UL — SIGNIFICANT CHANGE UP (ref 3.8–10.5)
WBC # FLD AUTO: 5.59 K/UL — SIGNIFICANT CHANGE UP (ref 3.8–10.5)

## 2021-08-12 PROCEDURE — 99232 SBSQ HOSP IP/OBS MODERATE 35: CPT

## 2021-08-12 PROCEDURE — 71045 X-RAY EXAM CHEST 1 VIEW: CPT | Mod: 26

## 2021-08-12 PROCEDURE — 99223 1ST HOSP IP/OBS HIGH 75: CPT

## 2021-08-12 RX ORDER — TRAMADOL HYDROCHLORIDE 50 MG/1
50 TABLET ORAL EVERY 8 HOURS
Refills: 0 | Status: DISCONTINUED | OUTPATIENT
Start: 2021-08-12 | End: 2021-08-19

## 2021-08-12 RX ORDER — SODIUM CHLORIDE 9 MG/ML
1000 INJECTION INTRAMUSCULAR; INTRAVENOUS; SUBCUTANEOUS
Refills: 0 | Status: DISCONTINUED | OUTPATIENT
Start: 2021-08-12 | End: 2021-08-13

## 2021-08-12 RX ORDER — VANCOMYCIN HCL 1 G
750 VIAL (EA) INTRAVENOUS EVERY 24 HOURS
Refills: 0 | Status: DISCONTINUED | OUTPATIENT
Start: 2021-08-13 | End: 2021-08-13

## 2021-08-12 RX ADMIN — Medication 50 MILLIGRAM(S): at 01:12

## 2021-08-12 RX ADMIN — Medication 325 MILLIGRAM(S): at 21:37

## 2021-08-12 RX ADMIN — Medication 325 MILLIGRAM(S): at 10:45

## 2021-08-12 RX ADMIN — Medication 81 MILLIGRAM(S): at 10:44

## 2021-08-12 RX ADMIN — PANTOPRAZOLE SODIUM 40 MILLIGRAM(S): 20 TABLET, DELAYED RELEASE ORAL at 10:45

## 2021-08-12 RX ADMIN — Medication 20 MILLIGRAM(S): at 21:36

## 2021-08-12 RX ADMIN — Medication 1: at 12:09

## 2021-08-12 RX ADMIN — MIDODRINE HYDROCHLORIDE 10 MILLIGRAM(S): 2.5 TABLET ORAL at 18:01

## 2021-08-12 RX ADMIN — TRAMADOL HYDROCHLORIDE 50 MILLIGRAM(S): 50 TABLET ORAL at 21:37

## 2021-08-12 RX ADMIN — MIDODRINE HYDROCHLORIDE 10 MILLIGRAM(S): 2.5 TABLET ORAL at 06:23

## 2021-08-12 RX ADMIN — SODIUM CHLORIDE 80 MILLILITER(S): 9 INJECTION INTRAMUSCULAR; INTRAVENOUS; SUBCUTANEOUS at 21:44

## 2021-08-12 RX ADMIN — Medication 20 MILLIGRAM(S): at 10:45

## 2021-08-12 RX ADMIN — Medication 1 DROP(S): at 21:43

## 2021-08-12 RX ADMIN — APIXABAN 2.5 MILLIGRAM(S): 2.5 TABLET, FILM COATED ORAL at 10:48

## 2021-08-12 RX ADMIN — APIXABAN 2.5 MILLIGRAM(S): 2.5 TABLET, FILM COATED ORAL at 21:37

## 2021-08-12 RX ADMIN — Medication 15 MILLIGRAM(S): at 01:00

## 2021-08-12 RX ADMIN — Medication 50 MILLIGRAM(S): at 12:08

## 2021-08-12 RX ADMIN — ESCITALOPRAM OXALATE 10 MILLIGRAM(S): 10 TABLET, FILM COATED ORAL at 10:44

## 2021-08-12 RX ADMIN — SIMVASTATIN 40 MILLIGRAM(S): 20 TABLET, FILM COATED ORAL at 21:37

## 2021-08-12 RX ADMIN — Medication 3 MILLIGRAM(S): at 21:36

## 2021-08-12 RX ADMIN — Medication 1 DROP(S): at 06:23

## 2021-08-12 RX ADMIN — Medication 200 MILLIGRAM(S): at 10:44

## 2021-08-12 RX ADMIN — Medication 50 MICROGRAM(S): at 06:23

## 2021-08-12 RX ADMIN — Medication 250 MILLIGRAM(S): at 02:12

## 2021-08-12 NOTE — CONSULT NOTE ADULT - ASSESSMENT
SOB- likely from pneumonia.  She appers euvolemic on exam.  Avoid IVF.    Afib- now rate controlled.  continue home meds including full dose anticoaguation.    Sepsis - hypotension resolved.  On abx.    Other medical issues- Management per primary team.   Thank you for allowing me to participate in the care of this patient. Please feel free to contact me with any questions.

## 2021-08-12 NOTE — CONSULT NOTE ADULT - ASSESSMENT
Patient is 89 yo female with PMhx pancreatitis s/p ERCP, cirrhosis, DM2, a-fib on Eliquis, CAD s/p PCI, severe pulm HTN, chronic R heart failure, diastolic dysfunction, AS s/p TAVR, OA, HTN, dyslipidemia, hypothyroidism, presents with sob and fatigue. Patient sent from [facility] for further evaluation. Chart reviewed. Patient seen in ED, pleasant with baseline confusion. Does not know why she is here.  In ED, patient found to be septic, given 3 Liter bolus and for a short time started on pressors. Now she is off pressors with BP 98/70 in no distress. Here blood cultures growing streptococcus species, CT chest with some groundglass opacities, mild bronchiectasis was eval by pulmonary, was given vanco/aztreonam.     1. sepsis with streptococcus. hypotension. bronchiectasis. chf. pcn allergy  - pulmonary/cardiology eval noted  - imagining reviewed, no obvious pna f/u repeat xray  - agree with vancomycin [id number] check trough prior to 4th dose  - on aztreonam [id number]  - clarify pcn allergy and will try to switch to cephalosporin  - check TTE  - repeat blood cx   - monitor temps  - tolerating abx well so far; no side effects noted  - reason for abx use and side effects reviewed with patient  - supportive care  - fu cbc    2. other issues - care per medicine

## 2021-08-12 NOTE — PROGRESS NOTE ADULT - SUBJECTIVE AND OBJECTIVE BOX
CC:  Patient is a 89y old  Female who presents with a chief complaint of SOB (12 Aug 2021 13:35)    SUBJECTIVE:     -no new complaints or issues at current time.    ROS:  all other review of systems are negative unless indicated above.    acetaminophen   Tablet .. 650 milliGRAM(s) Oral every 6 hours PRN  allopurinol  Oral Tab/Cap - Peds 200 milliGRAM(s) Oral daily  aluminum hydroxide/magnesium hydroxide/simethicone Suspension 30 milliLiter(s) Oral every 4 hours PRN  apixaban 2.5 milliGRAM(s) Oral two times a day  artificial  tears Solution 1 Drop(s) Both EYES three times a day  aspirin  chewable 81 milliGRAM(s) Oral daily  aztreonam  IVPB 1000 milliGRAM(s) IV Intermittent every 12 hours  dextrose 40% Gel 15 Gram(s) Oral once  dextrose 5%. 1000 milliLiter(s) IV Continuous <Continuous>  dextrose 50% Injectable 25 Gram(s) IV Push once  escitalopram 10 milliGRAM(s) Oral daily  ferrous    sulfate 325 milliGRAM(s) Oral two times a day  glucagon  Injectable 1 milliGRAM(s) IntraMuscular once  insulin lispro (ADMELOG) corrective regimen sliding scale   SubCutaneous three times a day before meals  levothyroxine 50 MICROGram(s) Oral daily  melatonin 3 milliGRAM(s) Oral at bedtime PRN  midodrine. 10 milliGRAM(s) Oral three times a day  ondansetron Injectable 4 milliGRAM(s) IV Push every 8 hours PRN  pantoprazole    Tablet 40 milliGRAM(s) Oral daily  sildenafil (REVATIO) 20 milliGRAM(s) Oral two times a day  simvastatin 40 milliGRAM(s) Oral at bedtime    T(C): 36.5 (08-12-21 @ 17:45), Max: 36.7 (08-11-21 @ 21:07)  HR: 72 (08-12-21 @ 13:00) (61 - 84)  BP: 109/50 (08-12-21 @ 13:00) (86/46 - 127/59)  RR: 20 (08-12-21 @ 13:00) (15 - 30)  SpO2: 99% (08-12-21 @ 13:00) (97% - 100%)    Constitutional: NAD.   HEENT: PERRL, EOMI, MMM.  Neck: Soft and supple, No carotid bruit, No JVD  Respiratory:  Chest with bibasilar crackles, w/ L>R. Normal respiratory effort.  Cardiovascular: S1 and S2, regular rate and rhythm, no murmur, rub or gallop.  Gastrointestinal: Bowel Sounds present, soft, nontender, nondistended, no guarding, no rebound, no mass.  Extremities: No peripheral edema  Vascular: 2+ peripheral pulses  Neurological: A/O x , no focal deficits  Musculoskeletal: 5/5 strength b/l upper and lower extremities  Skin:  no visible rashes.                         10.1   5.59  )-----------( 117      ( 12 Aug 2021 06:07 )             30.7     PT/INR - ( 11 Aug 2021 12:31 )   PT: 24.5 sec;   INR: 2.19 ratio         PTT - ( 11 Aug 2021 12:31 )  PTT:32.5 sec  08-12    135  |  103  |  23  ----------------------------<  125<H>  3.9   |  27  |  1.02    Ca    7.8<L>      12 Aug 2021 06:07    TPro  5.7<L>  /  Alb  2.3<L>  /  TBili  0.4  /  DBili  x   /  AST  28  /  ALT  19  /  AlkPhos  67  08-12    < from: CT Chest No Cont (08.11.21 @ 20:53) >    IMPRESSION:  Peripheral reticulation, ill-defined linear and groundglass opacities with mild bronchiectasis is similar compared to the prior study and may be related to interstitial lung disease. No honeycombing.    < end of copied text >    < from: Xray Chest 1 View-PORTABLE IMMEDIATE (08.11.21 @ 12:42) >  heart sizeappears enlarged. Patient status post TAVR. Tortuosity and calcification of the thoracic aorta is identified. Increased interstitial opacities are identified with comparison to prior examinations suggesting an acute interstitial process superimposed on chronic interstitial lung disease. No pleural effusion. No pneumothorax. No mediastinal shift. No acute osseous fractures.    < end of copied text >

## 2021-08-12 NOTE — CONSULT NOTE ADULT - ASSESSMENT
Assessment/Plan    - Fluid rescucitation  - Broad spectrum Abx  - Await final results of Blood Cx's  - Monitor I+O's  - ID Evaluation  - Repeat CXR today-look for development of infiltrate after hydration  - Check repeat CBC, CMP

## 2021-08-12 NOTE — PROGRESS NOTE ADULT - ASSESSMENT
Patient is 89 yo female with PMhx pancreatitis s/p ERCP, cirrhosis, DM2, a-fib on Eliquis, CAD s/p PCI, severe pulm HTN, chronic R heart failure, diastolic dysfunction, AS s/p TAVR, OA, HTN, dyslipidemia, hypothyroidism, presents with sob and fatigue. Patient sent from [facility] for further evaluation. Chart reviewed. Patient seen in ED, pleasant with baseline confusion. Does not know why she is here.  In ED, patient found to be septic, given 3 Liter bolus and for a short time started on pressors. Now she is off pressors with BP 98/70 in no distress.     severe sepsis/septic shock POA.  - off vasopressors.  - midodrine.  - BCx:  Streptococcus.  - repeat BCx:  pending.  - repeat CXR:  look for development of infiltrate after hydration.  - TTE pending.  - imaging, no over pneumonia.  - vancomycin + aztreonam.  - ID.    AF.  - AC:  apixaban.  - RTC:  metoprolol held due to hypotension.    HFpEF.  - appears euvolemic on exam.    hx DM2.  - FS target 140-180mg/dL.  - consisent CHO diet.  - correction insulin coverage.    hx hypothyroidism.  - TSH.  - levothyroxine.    disposition.  - CICU.    communication.  - CICADRIEL RN.  - Pulmonology.` Patient is 89 yo female with PMhx pancreatitis s/p ERCP, cirrhosis, DM2, a-fib on Eliquis, CAD s/p PCI, severe pulm HTN, chronic R heart failure, diastolic dysfunction, AS s/p TAVR, OA, HTN, dyslipidemia, hypothyroidism, presents with sob and fatigue. Patient sent from [facility] for further evaluation. Chart reviewed. Patient seen in ED, pleasant with baseline confusion. Does not know why she is here.  In ED, patient found to be septic, given 3 Liter bolus and for a short time started on pressors. Now she is off pressors with BP 98/70 in no distress.     severe sepsis/septic shock POA.  - off vasopressors.  - IVFs until LA normalizes.  - midodrine.  - BCx:  Streptococcus.  - repeat BCx:  pending.  - repeat CXR:  look for development of infiltrate after hydration.  - TTE pending.  - imaging, no over pneumonia.  - vancomycin + aztreonam.  - ID.    AF.  - AC:  apixaban.  - RTC:  metoprolol held due to hypotension.    HFpEF.  - appears euvolemic on exam.  - monitor closely while receiving IVFs.  - I/O.  - daily weights.    hx DM2.  - FS target 140-180mg/dL.  - consisent CHO diet.  - correction insulin coverage.    hx hypothyroidism.  - TSH:  wnl.  - levothyroxine.    disposition.  - CICU.    communication.  - CICU RN.  - Pulmonology.` Patient is 89 yo female with PMhx pancreatitis s/p ERCP, cirrhosis, DM2, a-fib on Eliquis, CAD s/p PCI, severe pulm HTN, chronic R heart failure, diastolic dysfunction, AS s/p TAVR, OA, HTN, dyslipidemia, hypothyroidism, presents with sob and fatigue. Patient sent from [facility] for further evaluation. Chart reviewed. Patient seen in ED, pleasant with baseline confusion. Does not know why she is here.  In ED, patient found to be septic, given 3 Liter bolus and for a short time started on pressors. Now she is off pressors with BP 98/70 in no distress.     severe sepsis/septic shock POA.  - off vasopressors.  - IVFs until LA normalizes.  - midodrine.  - BCx:  Streptococcus.  - repeat BCx:  pending.  - repeat CXR:  look for development of infiltrate after hydration.  - TTE pending.  - imaging, no over pneumonia.  - vancomycin + aztreonam.  - ID.    AF.  - AC:  apixaban.  - RTC:  metoprolol held due to hypotension.    HFpEF.  - appears euvolemic on exam.  - monitor closely while receiving IVFs.  - I/O.  - daily weights.    hx DM2.  - FS target 140-180mg/dL.  - consisent CHO diet.  - correction insulin coverage.    hx hypothyroidism.  - TSH:  wnl.  - levothyroxine.    disposition.  - CCU.    communication.  - PAULETTE RN.  - Pulmonology. Patient is 89 yo female with PMhx pancreatitis s/p ERCP, cirrhosis, DM2, a-fib on Eliquis, CAD s/p PCI, severe pulm HTN, chronic R heart failure, diastolic dysfunction, AS s/p TAVR, OA, HTN, dyslipidemia, hypothyroidism, presents with sob and fatigue. Patient sent from [facility] for further evaluation. Chart reviewed. Patient seen in ED, pleasant with baseline confusion. Does not know why she is here.  In ED, patient found to be septic, given 3 Liter bolus and for a short time started on pressors. Now she is off pressors with BP 98/70 in no distress.     severe sepsis/septic shock POA.  - off vasopressors.  - IVFs until LA normalizes.  - midodrine.  - BCx:  Streptococcus.  - repeat BCx:  pending.  - repeat CXR:  look for development of infiltrate after hydration.  - TTE pending.  - imaging, no over pneumonia.  - vancomycin + aztreonam.  - ID.    AF.  - AC:  apixaban.  - RTC:  metoprolol held due to hypotension.    HFpEF.  - appears euvolemic on exam.  - monitor closely while receiving IVFs.  - I/O.  - daily weights.    hx DM2.  - FS target 140-180mg/dL.  - consisent CHO diet.  - correction insulin coverage.    hx hypothyroidism.  - TSH:  wnl.  - levothyroxine.    disposition.  - CCU.    communication.  - CCU RN.  - Pulmonology.

## 2021-08-12 NOTE — CHART NOTE - NSCHARTNOTEFT_GEN_A_CORE
Called by RN to report positive blood cx of gram + cocci in pairs and chain. Will c/w IV antibiotic and have ID consult in am.

## 2021-08-13 LAB
ANION GAP SERPL CALC-SCNC: 5 MMOL/L — SIGNIFICANT CHANGE UP (ref 5–17)
BUN SERPL-MCNC: 19 MG/DL — SIGNIFICANT CHANGE UP (ref 7–23)
CALCIUM SERPL-MCNC: 8.2 MG/DL — LOW (ref 8.5–10.1)
CHLORIDE SERPL-SCNC: 104 MMOL/L — SIGNIFICANT CHANGE UP (ref 96–108)
CO2 SERPL-SCNC: 26 MMOL/L — SIGNIFICANT CHANGE UP (ref 22–31)
CREAT SERPL-MCNC: 0.68 MG/DL — SIGNIFICANT CHANGE UP (ref 0.5–1.3)
CULTURE RESULTS: SIGNIFICANT CHANGE UP
GLUCOSE SERPL-MCNC: 185 MG/DL — HIGH (ref 70–99)
HCT VFR BLD CALC: 28.6 % — LOW (ref 34.5–45)
HGB BLD-MCNC: 9.8 G/DL — LOW (ref 11.5–15.5)
LACTATE SERPL-SCNC: 2 MMOL/L — SIGNIFICANT CHANGE UP (ref 0.7–2)
MCHC RBC-ENTMCNC: 33.3 PG — SIGNIFICANT CHANGE UP (ref 27–34)
MCHC RBC-ENTMCNC: 34.3 GM/DL — SIGNIFICANT CHANGE UP (ref 32–36)
MCV RBC AUTO: 97.3 FL — SIGNIFICANT CHANGE UP (ref 80–100)
PLATELET # BLD AUTO: 128 K/UL — LOW (ref 150–400)
POTASSIUM SERPL-MCNC: 3.5 MMOL/L — SIGNIFICANT CHANGE UP (ref 3.5–5.3)
POTASSIUM SERPL-SCNC: 3.5 MMOL/L — SIGNIFICANT CHANGE UP (ref 3.5–5.3)
RBC # BLD: 2.94 M/UL — LOW (ref 3.8–5.2)
RBC # FLD: 15.5 % — HIGH (ref 10.3–14.5)
SODIUM SERPL-SCNC: 135 MMOL/L — SIGNIFICANT CHANGE UP (ref 135–145)
SPECIMEN SOURCE: SIGNIFICANT CHANGE UP
WBC # BLD: 5.64 K/UL — SIGNIFICANT CHANGE UP (ref 3.8–10.5)
WBC # FLD AUTO: 5.64 K/UL — SIGNIFICANT CHANGE UP (ref 3.8–10.5)

## 2021-08-13 PROCEDURE — 99232 SBSQ HOSP IP/OBS MODERATE 35: CPT

## 2021-08-13 PROCEDURE — 99231 SBSQ HOSP IP/OBS SF/LOW 25: CPT

## 2021-08-13 RX ORDER — CEFTRIAXONE 500 MG/1
2000 INJECTION, POWDER, FOR SOLUTION INTRAMUSCULAR; INTRAVENOUS EVERY 24 HOURS
Refills: 0 | Status: DISCONTINUED | OUTPATIENT
Start: 2021-08-13 | End: 2021-08-13

## 2021-08-13 RX ORDER — CEFTRIAXONE 500 MG/1
2000 INJECTION, POWDER, FOR SOLUTION INTRAMUSCULAR; INTRAVENOUS EVERY 24 HOURS
Refills: 0 | Status: DISCONTINUED | OUTPATIENT
Start: 2021-08-13 | End: 2021-08-16

## 2021-08-13 RX ADMIN — Medication 250 MILLIGRAM(S): at 01:49

## 2021-08-13 RX ADMIN — Medication 50 MICROGRAM(S): at 06:29

## 2021-08-13 RX ADMIN — Medication 1: at 12:31

## 2021-08-13 RX ADMIN — TRAMADOL HYDROCHLORIDE 50 MILLIGRAM(S): 50 TABLET ORAL at 10:15

## 2021-08-13 RX ADMIN — ESCITALOPRAM OXALATE 10 MILLIGRAM(S): 10 TABLET, FILM COATED ORAL at 10:08

## 2021-08-13 RX ADMIN — TRAMADOL HYDROCHLORIDE 50 MILLIGRAM(S): 50 TABLET ORAL at 17:29

## 2021-08-13 RX ADMIN — MIDODRINE HYDROCHLORIDE 10 MILLIGRAM(S): 2.5 TABLET ORAL at 06:29

## 2021-08-13 RX ADMIN — Medication 3 MILLIGRAM(S): at 22:42

## 2021-08-13 RX ADMIN — Medication 81 MILLIGRAM(S): at 10:07

## 2021-08-13 RX ADMIN — CEFTRIAXONE 2000 MILLIGRAM(S): 500 INJECTION, POWDER, FOR SOLUTION INTRAMUSCULAR; INTRAVENOUS at 12:30

## 2021-08-13 RX ADMIN — APIXABAN 2.5 MILLIGRAM(S): 2.5 TABLET, FILM COATED ORAL at 22:42

## 2021-08-13 RX ADMIN — Medication 325 MILLIGRAM(S): at 10:08

## 2021-08-13 RX ADMIN — PANTOPRAZOLE SODIUM 40 MILLIGRAM(S): 20 TABLET, DELAYED RELEASE ORAL at 10:08

## 2021-08-13 RX ADMIN — Medication 50 MILLIGRAM(S): at 01:15

## 2021-08-13 RX ADMIN — SIMVASTATIN 40 MILLIGRAM(S): 20 TABLET, FILM COATED ORAL at 22:42

## 2021-08-13 RX ADMIN — Medication 200 MILLIGRAM(S): at 10:09

## 2021-08-13 RX ADMIN — MIDODRINE HYDROCHLORIDE 10 MILLIGRAM(S): 2.5 TABLET ORAL at 12:32

## 2021-08-13 RX ADMIN — Medication 20 MILLIGRAM(S): at 10:07

## 2021-08-13 RX ADMIN — Medication 650 MILLIGRAM(S): at 20:42

## 2021-08-13 RX ADMIN — Medication 650 MILLIGRAM(S): at 06:30

## 2021-08-13 RX ADMIN — TRAMADOL HYDROCHLORIDE 50 MILLIGRAM(S): 50 TABLET ORAL at 10:08

## 2021-08-13 RX ADMIN — Medication 325 MILLIGRAM(S): at 22:42

## 2021-08-13 RX ADMIN — APIXABAN 2.5 MILLIGRAM(S): 2.5 TABLET, FILM COATED ORAL at 10:09

## 2021-08-13 RX ADMIN — Medication 1: at 17:29

## 2021-08-13 RX ADMIN — MIDODRINE HYDROCHLORIDE 10 MILLIGRAM(S): 2.5 TABLET ORAL at 20:42

## 2021-08-13 RX ADMIN — Medication 1 DROP(S): at 06:30

## 2021-08-13 RX ADMIN — Medication 20 MILLIGRAM(S): at 22:42

## 2021-08-13 NOTE — PROGRESS NOTE ADULT - ASSESSMENT
SOB- likely from pneumonia.  She appears euvolemic on exam.  Avoid IVF- Adviced RN to DC IVF today. She is prone to recurrent decompensated heart failure.       Afib- now rate controlled.  continue home meds including full dose anticoagulation    Sepsis - hypotension resolved.  On abx.    Other medical issues- Management per primary team.   Thank you for allowing me to participate in the care of this patient. Please feel free to contact me with any questions.

## 2021-08-13 NOTE — PROGRESS NOTE ADULT - ASSESSMENT
Patient is 89 yo female with PMhx pancreatitis s/p ERCP, cirrhosis, DM2, a-fib on Eliquis, CAD s/p PCI, severe pulm HTN, chronic R heart failure, diastolic dysfunction, AS s/p TAVR, OA, HTN, dyslipidemia, hypothyroidism, presents with sob and fatigue. Patient sent from [facility] for further evaluation. Chart reviewed. Patient seen in ED, pleasant with baseline confusion. Does not know why she is here.  In ED, patient found to be septic, given 3 Liter bolus and for a short time started on pressors. Now she is off pressors with BP 98/70 in no distress.     severe sepsis/septic shock POA.  - off vasopressors.  - midodrine.  - BCx:  Streptococcus.  - repeat BCx:  pending.  - repeat CXR:  look for development of infiltrate after hydration.  - TTE pending.  - imaging, no overt pneumonia.  - vancomycin + aztreonam d/maddy; iv ceftriaxone as per ID  - ID.    AF.  - AC:  apixaban.  - RTC:  metoprolol held due to hypotension.    HFpEF.  - appears euvolemic on exam.  - d/c iv fluids  - I/O.  - daily weights.    hx DM2.  - FS target 140-180mg/dL.  - consistent CHO diet.  - correction insulin coverage.    hx hypothyroidism.  - TSH:  wnl.  - levothyroxine.    DVT PPX: on apixaban    poc discussed with pt, team.

## 2021-08-13 NOTE — PROGRESS NOTE ADULT - SUBJECTIVE AND OBJECTIVE BOX
Date of service: 21 @ 11:47    pt seen and examined  feels better  has epistaxis this am  laying in bed, nad  no resp distress    ROS: no fever or chills; denies dizziness, no HA, no SOB, no abdominal pain, no diarrhea or constipation; no dysuria, no urinary frequency, no legs pain, no rashes    MEDICATIONS  (STANDING):  allopurinol  Oral Tab/Cap - Peds 200 milliGRAM(s) Oral daily  apixaban 2.5 milliGRAM(s) Oral two times a day  artificial  tears Solution 1 Drop(s) Both EYES three times a day  aspirin  chewable 81 milliGRAM(s) Oral daily  cefTRIAXone   IVPB 2000 milliGRAM(s) IV Intermittent every 24 hours  dextrose 40% Gel 15 Gram(s) Oral once  dextrose 5%. 1000 milliLiter(s) (50 mL/Hr) IV Continuous <Continuous>  dextrose 50% Injectable 25 Gram(s) IV Push once  escitalopram 10 milliGRAM(s) Oral daily  ferrous    sulfate 325 milliGRAM(s) Oral two times a day  glucagon  Injectable 1 milliGRAM(s) IntraMuscular once  insulin lispro (ADMELOG) corrective regimen sliding scale   SubCutaneous three times a day before meals  levothyroxine 50 MICROGram(s) Oral daily  midodrine. 10 milliGRAM(s) Oral three times a day  pantoprazole    Tablet 40 milliGRAM(s) Oral daily  sildenafil (REVATIO) 20 milliGRAM(s) Oral two times a day  simvastatin 40 milliGRAM(s) Oral at bedtime  sodium chloride 0.9%. 1000 milliLiter(s) (80 mL/Hr) IV Continuous <Continuous>    Vital Signs Last 24 Hrs  T(C): 36 (13 Aug 2021 08:03), Max: 36.5 (12 Aug 2021 17:45)  T(F): 96.8 (13 Aug 2021 08:03), Max: 97.7 (12 Aug 2021 17:45)  HR: 79 (13 Aug 2021 04:00) (71 - 95)  BP: 105/53 (13 Aug 2021 04:00) (89/38 - 125/54)  BP(mean): 66 (13 Aug 2021 04:00) (51 - 101)  RR: 22 (13 Aug 2021 04:00) (18 - 34)  SpO2: 99% (12 Aug 2021 13:00) (97% - 99%)    PE:  Constitutional: frail looking  HEENT: NC/AT, EOMI, PERRLA, conjunctivae clear; ears and nose atraumatic; pharynx benign  Neck: supple; thyroid not palpable  Back: no tenderness  Respiratory: respiratory effort normal; clear to auscultation  Cardiovascular: S1S2 regular, no murmurs  Abdomen: soft, not tender, not distended, positive BS; liver and spleen WNL  Genitourinary: no suprapubic tenderness  Lymphatic: no LN palpable  Musculoskeletal: no muscle tenderness, no joint swelling or tenderness  Extremities: no pedal edema  Neurological/ Psychiatric: AxOx3, Judgement and insight normal;  moving all extremities  Skin: no rashes; no palpable lesions    Labs: all available labs reviewed                                   9.8    5.64  )-----------( 128      ( 13 Aug 2021 05:41 )             28.6     08-    135  |  104  |  19  ----------------------------<  185<H>  3.5   |  26  |  0.68    Ca    8.2<L>      13 Aug 2021 05:41    TPro  5.7<L>  /  Alb  2.3<L>  /  TBili  0.4  /  DBili  x   /  AST  28  /  ALT  19  /  AlkPhos  67  0812        Urinalysis Basic - ( 11 Aug 2021 12:31 )    Color: Yellow / Appearance: Slightly Turbid / S.010 / pH: x  Gluc: x / Ketone: Negative  / Bili: Negative / Urobili: Negative mg/dL   Blood: x / Protein: 15 mg/dL / Nitrite: Negative   Leuk Esterase: Trace / RBC: 0-2 /HPF / WBC 0-2   Sq Epi: x / Non Sq Epi: Few / Bacteria: Moderate      Culture - Blood (21 @ 12:31)   - Streptococcus sp. (Not Grp A, B or S pneumoniae): Detec   Gram Stain:   Growth in aerobic bottle: Gram Positive Cocci in Pairs and Chains   Growth in anaerobic bottle: Gram Positive Cocci in Pairs and Chains   Specimen Source: .Blood None   Organism: Blood Culture PCR   Culture Results:   Growth in aerobic bottle: Gram Positive Cocci in Pairs and Chains   Growth in anaerobic bottle: Gram Positive Cocci in Pairs and Chains   ***Blood Panel PCR results on this specimen are available   approximately 3 hours after the Gram stain result.***   Gram stain, PCR, and/or culture results may not always   correspond due to difference in methodologies.   ************************************************************   This PCR assay was performed by multiplex PCR. This   Assay tests for 66 bacterial and resistance gene targets.   Please refer to the [facility] test directory   at [email or web address] for details.   Organism Identification: Blood Culture PCR   Method Type: PCR     Culture - Blood (21 @ 12:31)   Gram Stain:   Growth in anaerobic bottle: Gram Positive Cocci in Pairs and Chains   Specimen Source: .Blood None   Culture Results:   Growth in anaerobic bottle: Gram Positive Cocci in Pairs and Chains     Radiology: all available radiological tests reviewed  < from: CT Chest No Cont (21 @ 20:53) >    EXAM:  CT CHEST                            PROCEDURE DATE:  2021          INTERPRETATION:  EXAMINATION: CT CHEST    CLINICAL INDICATION: Dyspnea.    TECHNIQUE: Noncontrast CT of the chest was obtained.    COMPARISON: Multiple CT, most recent 2020.    FINDINGS:    AIRWAYS AND LUNGS: The central tracheobronchial tree is patent.  Peripheral reticulation, ill-defined linear and groundglass opacities with mild bronchiectasis is similar compared to the prior study. No honeycombing.    MEDIASTINUM AND PLEURA: Increased number of nonenlarged mediastinal lymph nodes are decreased in size compared to the prior study. The visualized portion of the thyroid gland is unremarkable. There is no pleural effusion. There is no pneumothorax.    HEART AND VESSELS: There is cardiomegaly.  There are atherosclerotic calcifications of the aorta and coronary arteries.  There is no pericardial effusion.  TAVR.    UPPER ABDOMEN: Images of the upper abdomen demonstrate diverticulosis.    BONES AND SOFT TISSUES: There are mild degenerative changes of the spine. Severe degenerative changes left shoulder The soft tissues are unremarkable.    TUBES/LINES: None.    IMPRESSION:  Peripheral reticulation, ill-defined linear and groundglass opacities with mild bronchiectasis is similar compared to the prior study and may be related to interstitial lung disease. No honeycombing.    Advanced directives addressed: full resuscitation

## 2021-08-13 NOTE — PROGRESS NOTE ADULT - SUBJECTIVE AND OBJECTIVE BOX
Patient is a 89y old  Female who presents with a chief complaint of SOB.       HPI:  Patient is 89 yo female with PMhx pancreatitis s/p ERCP, cirrhosis, DM2, a-fib on Eliquis, CAD s/p PCI, severe pulm HTN, chronic R heart failure, diastolic dysfunction, AS s/p TAVR, OA, HTN, dyslipidemia, hypothyroidism, presents with sob and fatigue. Patient sent from [facility] for further evaluation. Chart reviewed. Patient seen in ED, pleasant with baseline confusion. Does not know why she is here.  In ED, patient found to be septic, given 3 Liter bolus and for a short time started on pressors. Now she is off pressors with BP 98/70 in no distress.     -   Pt seen this am. Pt denies any symptoms this am.     - Pt seen this am.  Pt denies any symptoms this am.  Pleasantly confused.   PAST MEDICAL & SURGICAL HISTORY:  Diabetes Mellitus Type II    Dyslipidemia    GERD (Gastroesophageal Reflux Disease)    History of Osteoarthritis    Hypertension    CAD (coronary artery disease)    Afib    Hypothyroid    HLD (hyperlipidemia)    History of pancreatitis    Aortic stenosis    H/O: Hysterectomy    H/O: Knee Surgery- right meniscus    History of cholecystectomy    History of appendectomy    H/O total knee replacement, left    S/P IVC filter  Note that Pt does not have  h/o DVT, outPt doppler was read first as +, but after review reported as no DVT. Pt got IVC filer before that        MEDICATIONS  (STANDING):  allopurinol  Oral Tab/Cap - Peds 200 milliGRAM(s) Oral daily  apixaban 2.5 milliGRAM(s) Oral two times a day  artificial  tears Solution 1 Drop(s) Both EYES three times a day  aspirin  chewable 81 milliGRAM(s) Oral daily  aztreonam  IVPB 1000 milliGRAM(s) IV Intermittent every 12 hours  dextrose 40% Gel 15 Gram(s) Oral once  dextrose 5%. 1000 milliLiter(s) (50 mL/Hr) IV Continuous <Continuous>  dextrose 50% Injectable 25 Gram(s) IV Push once  escitalopram 10 milliGRAM(s) Oral daily  ferrous    sulfate 325 milliGRAM(s) Oral two times a day  glucagon  Injectable 1 milliGRAM(s) IntraMuscular once  insulin lispro (ADMELOG) corrective regimen sliding scale   SubCutaneous three times a day before meals  levothyroxine 50 MICROGram(s) Oral daily  midodrine. 10 milliGRAM(s) Oral three times a day  pantoprazole    Tablet 40 milliGRAM(s) Oral daily  sildenafil (REVATIO) 20 milliGRAM(s) Oral two times a day  simvastatin 40 milliGRAM(s) Oral at bedtime  vancomycin  IVPB 1000 milliGRAM(s) IV Intermittent every 12 hours    MEDICATIONS  (PRN):  acetaminophen   Tablet .. 650 milliGRAM(s) Oral every 6 hours PRN Temp greater or equal to 38.5C (101.3F), Mild Pain (1 - 3)  aluminum hydroxide/magnesium hydroxide/simethicone Suspension 30 milliLiter(s) Oral every 4 hours PRN Dyspepsia  melatonin 3 milliGRAM(s) Oral at bedtime PRN Insomnia  ondansetron Injectable 4 milliGRAM(s) IV Push every 8 hours PRN Nausea and/or Vomiting      FAMILY HISTORY:  FH: diabetes mellitus  both parents    FH: heart disease        SOCIAL HISTORY: no recent smoking     REVIEW OF SYSTEMS:  CONSTITUTIONAL:    No fatigue, malaise, lethargy.  no fever or chills.  RESPIRATORY:  No cough.  No wheeze.  No hemoptysis.  no shortness of breath.  CARDIOVASCULAR:  No chest pains.  No palpitations. no shortness of breath, No orthopnea or PND.  GASTROINTESTINAL:  No abdominal pain.  No nausea or vomiting.    GENITOURINARY:    No hematuria.    MUSCULOSKELETAL:  No musculoskeletal pain.  No joint swelling.  No arthritis.  NEUROLOGICAL:  No tingling or numbness or weakness.  PSYCHIATRIC:  No confusion  SKIN:  No rashes.          ICU Vital Signs Last 24 Hrs  T(C): 36 (13 Aug 2021 08:03), Max: 36.5 (12 Aug 2021 17:45)  T(F): 96.8 (13 Aug 2021 08:03), Max: 97.7 (12 Aug 2021 17:45)  HR: 79 (13 Aug 2021 04:00) (71 - 95)  BP: 105/53 (13 Aug 2021 04:00) (89/38 - 125/54)  BP(mean): 66 (13 Aug 2021 04:00) (51 - 101)  ABP: --  ABP(mean): --  RR: 22 (13 Aug 2021 04:00) (17 - 34)  SpO2: 99% (12 Aug 2021 13:00) (97% - 99%)    PHYSICAL EXAM-    Constitutional: elderly female in no acute distress     Head: Head is normocephalic and atraumatic.      Neck: No jugular venous distention. No audible carotid bruits. There are strong carotid pulses bilaterally. No JVD.     Cardiovascular: Regular rate and rhythm without S3, S4. No murmurs or rubs are appreciated.      Respiratory: Breath sounds are normal. No rales. No wheezing.    Abdomen: Soft, nontender, nondistended with positive bowel sounds.      Extremity: No tenderness. No  pitting edema     Neurologic: The patient is alert    Skin: No rash, no obvious lesions noted.      Psychiatric: The patient appears to be emotionally stable.      INTERPRETATION OF TELEMETRY: SR     ECG:    I&O's Detail    11 Aug 2021 07:01  -  12 Aug 2021 07:00  --------------------------------------------------------  IN:    IV PiggyBack: 350 mL    Oral Fluid: 100 mL  Total IN: 450 mL    OUT:    Indwelling Catheter - Urethral (mL): 125 mL    Voided (mL): 450 mL  Total OUT: 575 mL    Total NET: -125 mL          LABS:                        10.1   5.59  )-----------( 117      ( 12 Aug 2021 06:07 )             30.7     08-12    135  |  103  |  23  ----------------------------<  125<H>  3.9   |  27  |  1.02    Ca    7.8<L>      12 Aug 2021 06:07    TPro  5.7<L>  /  Alb  2.3<L>  /  TBili  0.4  /  DBili  x   /  AST  28  /  ALT  19  /  AlkPhos  67  08-12        PT/INR - ( 11 Aug 2021 12:31 )   PT: 24.5 sec;   INR: 2.19 ratio         PTT - ( 11 Aug 2021 12:31 )  PTT:32.5 sec  Urinalysis Basic - ( 11 Aug 2021 12:31 )    Color: Yellow / Appearance: Slightly Turbid / S.010 / pH: x  Gluc: x / Ketone: Negative  / Bili: Negative / Urobili: Negative mg/dL   Blood: x / Protein: 15 mg/dL / Nitrite: Negative   Leuk Esterase: Trace / RBC: 0-2 /HPF / WBC 0-2   Sq Epi: x / Non Sq Epi: Few / Bacteria: Moderate      I&O's Summary    11 Aug 2021 07:01  -  12 Aug 2021 07:00  --------------------------------------------------------  IN: 450 mL / OUT: 575 mL / NET: -125 mL      BNP  RADIOLOGY & ADDITIONAL STUDIES:  < from: CT Chest No Cont (21 @ 20:53) >  TUBES/LINES: None.    IMPRESSION:  Peripheral reticulation, ill-defined linear and groundglass opacities with mild bronchiectasis is similar compared to the prior study and may be related to interstitial lung disease. No honeycombing.    --- End of Report ---            ARMAND COON MD; Attending Radiologist  This document has been electronically signed. Aug 11 2021  9:25PM    < end of copied text >

## 2021-08-13 NOTE — PROGRESS NOTE ADULT - SUBJECTIVE AND OBJECTIVE BOX
CC:  Patient is a 89y old  Female who presents with a chief complaint of SOB (12 Aug 2021 13:35)    SUBJECTIVE:     8/13: no complaints; no chest pain/sob.  BP improved  d/maddy iv fluids    ROS:  all other review of systems are negative unless indicated above.      PHYSICAL EXAM:    Daily     Daily     ICU Vital Signs Last 24 Hrs  T(C): 36 (13 Aug 2021 08:03), Max: 36.5 (12 Aug 2021 17:45)  T(F): 96.8 (13 Aug 2021 08:03), Max: 97.7 (12 Aug 2021 17:45)  HR: 79 (13 Aug 2021 04:00) (78 - 95)  BP: 105/53 (13 Aug 2021 04:00) (92/46 - 125/54)  BP(mean): 66 (13 Aug 2021 04:00) (56 - 101)  ABP: --  ABP(mean): --  RR: 22 (13 Aug 2021 04:00) (18 - 34)  SpO2: --      Constitutional: Well appearing  HEENT: Atraumatic, CYNDEE, Normal, No congestion  Respiratory: Breath Sounds normal, no rhonchi/wheeze  Cardiovascular: N S1S2; J LUIS present  Gastrointestinal: Abdomen soft, non tender, Bowel Sounds present  Extremities: No edema, peripheral pulses present  Neurological: AAO x 3, no gross focal motor deficits  Skin: Non cellulitic, no rash, ulcers  Lymph Nodes: No lymphadenopathy noted  Back: No CVA tenderness   Musculoskeletal: non tender  Breasts: Deferred  Genitourinary: deferred  Rectal: Deferred                          9.8    5.64  )-----------( 128      ( 13 Aug 2021 05:41 )             28.6       CBC Full  -  ( 13 Aug 2021 05:41 )  WBC Count : 5.64 K/uL  RBC Count : 2.94 M/uL  Hemoglobin : 9.8 g/dL  Hematocrit : 28.6 %  Platelet Count - Automated : 128 K/uL  Mean Cell Volume : 97.3 fl  Mean Cell Hemoglobin : 33.3 pg  Mean Cell Hemoglobin Concentration : 34.3 gm/dL  Auto Neutrophil # : x  Auto Lymphocyte # : x  Auto Monocyte # : x  Auto Eosinophil # : x  Auto Basophil # : x  Auto Neutrophil % : x  Auto Lymphocyte % : x  Auto Monocyte % : x  Auto Eosinophil % : x  Auto Basophil % : x      08-13    135  |  104  |  19  ----------------------------<  185<H>  3.5   |  26  |  0.68    Ca    8.2<L>      13 Aug 2021 05:41    TPro  5.7<L>  /  Alb  2.3<L>  /  TBili  0.4  /  DBili  x   /  AST  28  /  ALT  19  /  AlkPhos  67  08-12      LIVER FUNCTIONS - ( 12 Aug 2021 06:07 )  Alb: 2.3 g/dL / Pro: 5.7 gm/dL / ALK PHOS: 67 U/L / ALT: 19 U/L / AST: 28 U/L / GGT: x                       < from: CT Chest No Cont (08.11.21 @ 20:53) >  IMPRESSION:  Peripheral reticulation, ill-defined linear and groundglass opacities with mild bronchiectasis is similar compared to the prior study and may be related to interstitial lung disease. No honeycombing.    < end of copied text >          MEDICATIONS  (STANDING):  allopurinol  Oral Tab/Cap - Peds 200 milliGRAM(s) Oral daily  apixaban 2.5 milliGRAM(s) Oral two times a day  artificial  tears Solution 1 Drop(s) Both EYES three times a day  aspirin  chewable 81 milliGRAM(s) Oral daily  cefTRIAXone Injectable. 2000 milliGRAM(s) IV Push every 24 hours  dextrose 40% Gel 15 Gram(s) Oral once  dextrose 5%. 1000 milliLiter(s) (50 mL/Hr) IV Continuous <Continuous>  dextrose 50% Injectable 25 Gram(s) IV Push once  escitalopram 10 milliGRAM(s) Oral daily  ferrous    sulfate 325 milliGRAM(s) Oral two times a day  glucagon  Injectable 1 milliGRAM(s) IntraMuscular once  insulin lispro (ADMELOG) corrective regimen sliding scale   SubCutaneous three times a day before meals  levothyroxine 50 MICROGram(s) Oral daily  midodrine. 10 milliGRAM(s) Oral three times a day  pantoprazole    Tablet 40 milliGRAM(s) Oral daily  sildenafil (REVATIO) 20 milliGRAM(s) Oral two times a day  simvastatin 40 milliGRAM(s) Oral at bedtime

## 2021-08-13 NOTE — PROGRESS NOTE ADULT - SUBJECTIVE AND OBJECTIVE BOX
Subjective:    Awake, comfortable. Sl confused. N sputum.    MEDICATIONS  (STANDING):  allopurinol  Oral Tab/Cap - Peds 200 milliGRAM(s) Oral daily  apixaban 2.5 milliGRAM(s) Oral two times a day  artificial  tears Solution 1 Drop(s) Both EYES three times a day  aspirin  chewable 81 milliGRAM(s) Oral daily  aztreonam  IVPB 1000 milliGRAM(s) IV Intermittent every 12 hours  dextrose 40% Gel 15 Gram(s) Oral once  dextrose 5%. 1000 milliLiter(s) (50 mL/Hr) IV Continuous <Continuous>  dextrose 50% Injectable 25 Gram(s) IV Push once  escitalopram 10 milliGRAM(s) Oral daily  ferrous    sulfate 325 milliGRAM(s) Oral two times a day  glucagon  Injectable 1 milliGRAM(s) IntraMuscular once  insulin lispro (ADMELOG) corrective regimen sliding scale   SubCutaneous three times a day before meals  levothyroxine 50 MICROGram(s) Oral daily  midodrine. 10 milliGRAM(s) Oral three times a day  pantoprazole    Tablet 40 milliGRAM(s) Oral daily  sildenafil (REVATIO) 20 milliGRAM(s) Oral two times a day  simvastatin 40 milliGRAM(s) Oral at bedtime  sodium chloride 0.9%. 1000 milliLiter(s) (80 mL/Hr) IV Continuous <Continuous>  vancomycin  IVPB 750 milliGRAM(s) IV Intermittent every 24 hours    MEDICATIONS  (PRN):  acetaminophen   Tablet .. 650 milliGRAM(s) Oral every 6 hours PRN Temp greater or equal to 38.5C (101.3F), Mild Pain (1 - 3)  aluminum hydroxide/magnesium hydroxide/simethicone Suspension 30 milliLiter(s) Oral every 4 hours PRN Dyspepsia  melatonin 3 milliGRAM(s) Oral at bedtime PRN Insomnia  ondansetron Injectable 4 milliGRAM(s) IV Push every 8 hours PRN Nausea and/or Vomiting  traMADol 50 milliGRAM(s) Oral every 8 hours PRN Moderate Pain (4 - 6)      Allergies    penicillin (Rash)  penicillins (Other)  sulfa drugs (Rash; Other)    Intolerances        Vital Signs Last 24 Hrs  T(C): 36 (13 Aug 2021 08:03), Max: 36.5 (12 Aug 2021 17:45)  T(F): 96.8 (13 Aug 2021 08:03), Max: 97.7 (12 Aug 2021 17:45)  HR: 79 (13 Aug 2021 04:00) (71 - 95)  BP: 105/53 (13 Aug 2021 04:00) (89/38 - 125/54)  BP(mean): 66 (13 Aug 2021 04:00) (51 - 101)  RR: 22 (13 Aug 2021 04:00) (18 - 34)  SpO2: 99% (12 Aug 2021 13:00) (97% - 99%)    PHYSICAL EXAMINATION:    NECK:  Supple. No lymphadenopathy. Jugular venous pressure not elevated.    HEART:   The cardiac impulse has a normal quality. Irreg., irreg., Nl S1 and S2.    CHEST:  Chest with bibasilar crackles posteriorly. Normal respiratory effort.  ABDOMEN:  Soft and nontender.   EXTREMITIES:  There is no edema.       LABS:                        9.8    5.64  )-----------( 128      ( 13 Aug 2021 05:41 )             28.6     08-13    135  |  104  |  19  ----------------------------<  185<H>  3.5   |  26  |  0.68    Ca    8.2<L>      13 Aug 2021 05:41    TPro  5.7<L>  /  Alb  2.3<L>  /  TBili  0.4  /  DBili  x   /  AST  28  /  ALT  19  /  AlkPhos  67  08-12    PT/INR - ( 11 Aug 2021 12:31 )   PT: 24.5 sec;   INR: 2.19 ratio         PTT - ( 11 Aug 2021 12:31 )  PTT:32.5 sec  Urinalysis Basic - ( 11 Aug 2021 12:31 )    Color: Yellow / Appearance: Slightly Turbid / S.010 / pH: x  Gluc: x / Ketone: Negative  / Bili: Negative / Urobili: Negative mg/dL   Blood: x / Protein: 15 mg/dL / Nitrite: Negative   Leuk Esterase: Trace / RBC: 0-2 /HPF / WBC 0-2   Sq Epi: x / Non Sq Epi: Few / Bacteria: Moderate        RADIOLOGY & ADDITIONAL TESTS:    Assessment and Recommendation:   · Assessment	  Assessment/Plan    - Maintenance fluids   - Broad spectrum Abx per ID  - Await final results of Blood Cx's  - Monitor I+O's  - ID Evaluation  - Repeat CXR PND  - Follow CBC, BMP  - Daily weights if possible  - Check echo result-r/o vegetation given positive blood Cx's    Problem/Recommendation - 1:  Stage 3 chronic kidney disease.  Problem/Recommendation - 2:  ·  Sepsis due to anaerobes.   Problem/Recommendation - 3:  ·  Chronic atrial fibrillation.   Problem/Recommendation - 4:  ·  Chronic diastolic heart failure.   Problem/Recommendation - 5:  ·  Primary pulmonary hypertension.

## 2021-08-13 NOTE — PROGRESS NOTE ADULT - ASSESSMENT
Patient is 89 yo female with PMhx pancreatitis s/p ERCP, cirrhosis, DM2, a-fib on Eliquis, CAD s/p PCI, severe pulm HTN, chronic R heart failure, diastolic dysfunction, AS s/p TAVR, OA, HTN, dyslipidemia, hypothyroidism, presents with sob and fatigue. Patient sent from [facility] for further evaluation. Chart reviewed. Patient seen in ED, pleasant with baseline confusion. Does not know why she is here.  In ED, patient found to be septic, given 3 Liter bolus and for a short time started on pressors. Now she is off pressors with BP 98/70 in no distress. Here blood cultures growing streptococcus species, CT chest with some groundglass opacities, mild bronchiectasis was eval by pulmonary, was given vanco/aztreonam.     1. sepsis with streptococcus. hypotension. bronchiectasis. chf. pcn allergy  - pulmonary/cardiology eval noted  - imagining reviewed, no obvious pna f/u repeat xray  - s/p vancomycin/aztreonam #2  - pcn allergy - edema ? no rash or anaphylaxis  - switch to rocephin 2gm daily (she has tolerated in past)   - check TTE r/o SBE  - repeat blood cx pending  - monitor temps  - tolerating abx well so far; no side effects noted  - reason for abx use and side effects reviewed with patient  - supportive care  - fu cbc    2. other issues - care per medicine

## 2021-08-14 DIAGNOSIS — A41.9 SEPSIS, UNSPECIFIED ORGANISM: ICD-10-CM

## 2021-08-14 DIAGNOSIS — R78.81 BACTEREMIA: ICD-10-CM

## 2021-08-14 LAB
-  CEFTRIAXONE: SIGNIFICANT CHANGE UP
-  CLINDAMYCIN: SIGNIFICANT CHANGE UP
-  ERYTHROMYCIN: SIGNIFICANT CHANGE UP
-  LEVOFLOXACIN: SIGNIFICANT CHANGE UP
-  PENICILLIN: SIGNIFICANT CHANGE UP
-  VANCOMYCIN: SIGNIFICANT CHANGE UP
CULTURE RESULTS: SIGNIFICANT CHANGE UP
METHOD TYPE: SIGNIFICANT CHANGE UP
METHOD TYPE: SIGNIFICANT CHANGE UP
ORGANISM # SPEC MICROSCOPIC CNT: SIGNIFICANT CHANGE UP
SPECIMEN SOURCE: SIGNIFICANT CHANGE UP

## 2021-08-14 PROCEDURE — 99232 SBSQ HOSP IP/OBS MODERATE 35: CPT

## 2021-08-14 PROCEDURE — 99233 SBSQ HOSP IP/OBS HIGH 50: CPT

## 2021-08-14 RX ADMIN — Medication 20 MILLIGRAM(S): at 21:31

## 2021-08-14 RX ADMIN — Medication 2: at 11:45

## 2021-08-14 RX ADMIN — APIXABAN 2.5 MILLIGRAM(S): 2.5 TABLET, FILM COATED ORAL at 10:45

## 2021-08-14 RX ADMIN — Medication 1 DROP(S): at 21:32

## 2021-08-14 RX ADMIN — APIXABAN 2.5 MILLIGRAM(S): 2.5 TABLET, FILM COATED ORAL at 21:30

## 2021-08-14 RX ADMIN — TRAMADOL HYDROCHLORIDE 50 MILLIGRAM(S): 50 TABLET ORAL at 21:51

## 2021-08-14 RX ADMIN — TRAMADOL HYDROCHLORIDE 50 MILLIGRAM(S): 50 TABLET ORAL at 11:47

## 2021-08-14 RX ADMIN — Medication 325 MILLIGRAM(S): at 21:31

## 2021-08-14 RX ADMIN — CEFTRIAXONE 2000 MILLIGRAM(S): 500 INJECTION, POWDER, FOR SOLUTION INTRAMUSCULAR; INTRAVENOUS at 10:46

## 2021-08-14 RX ADMIN — Medication 650 MILLIGRAM(S): at 06:26

## 2021-08-14 RX ADMIN — SIMVASTATIN 40 MILLIGRAM(S): 20 TABLET, FILM COATED ORAL at 21:31

## 2021-08-14 RX ADMIN — MIDODRINE HYDROCHLORIDE 10 MILLIGRAM(S): 2.5 TABLET ORAL at 17:23

## 2021-08-14 RX ADMIN — MIDODRINE HYDROCHLORIDE 10 MILLIGRAM(S): 2.5 TABLET ORAL at 06:26

## 2021-08-14 RX ADMIN — PANTOPRAZOLE SODIUM 40 MILLIGRAM(S): 20 TABLET, DELAYED RELEASE ORAL at 10:45

## 2021-08-14 RX ADMIN — Medication 1 DROP(S): at 06:26

## 2021-08-14 RX ADMIN — Medication 50 MICROGRAM(S): at 06:26

## 2021-08-14 RX ADMIN — TRAMADOL HYDROCHLORIDE 50 MILLIGRAM(S): 50 TABLET ORAL at 21:31

## 2021-08-14 RX ADMIN — MIDODRINE HYDROCHLORIDE 10 MILLIGRAM(S): 2.5 TABLET ORAL at 10:46

## 2021-08-14 RX ADMIN — Medication 325 MILLIGRAM(S): at 10:45

## 2021-08-14 RX ADMIN — ESCITALOPRAM OXALATE 10 MILLIGRAM(S): 10 TABLET, FILM COATED ORAL at 10:46

## 2021-08-14 RX ADMIN — Medication 1 DROP(S): at 13:32

## 2021-08-14 RX ADMIN — TRAMADOL HYDROCHLORIDE 50 MILLIGRAM(S): 50 TABLET ORAL at 03:35

## 2021-08-14 RX ADMIN — Medication 200 MILLIGRAM(S): at 10:45

## 2021-08-14 RX ADMIN — Medication 81 MILLIGRAM(S): at 10:45

## 2021-08-14 RX ADMIN — Medication 20 MILLIGRAM(S): at 10:45

## 2021-08-14 RX ADMIN — TRAMADOL HYDROCHLORIDE 50 MILLIGRAM(S): 50 TABLET ORAL at 12:15

## 2021-08-14 NOTE — PROGRESS NOTE ADULT - SUBJECTIVE AND OBJECTIVE BOX
CC:  Patient is a 89y old  Female who presents with a chief complaint of SOB (12 Aug 2021 13:35)    SUBJECTIVE:     8/13: no complaints; no chest pain/sob.  BP improved  d/maddy iv fluids    8/14: no complaints  afebrile  echo: no vegetation  repeat blood cx: neg    ROS:  all other review of systems are negative unless indicated above.      PHYSICAL EXAM:    Vital Signs Last 24 Hrs  T(C): 36.6 (14 Aug 2021 09:20), Max: 36.6 (14 Aug 2021 09:20)  T(F): 97.8 (14 Aug 2021 09:20), Max: 97.8 (14 Aug 2021 09:20)  HR: 88 (14 Aug 2021 09:20) (73 - 91)  BP: 110/62 (14 Aug 2021 09:20) (95/52 - 117/61)  BP(mean): 52 (13 Aug 2021 20:53) (47 - 99)  RR: 18 (14 Aug 2021 09:20) (18 - 23)  SpO2: 95% (14 Aug 2021 09:20) (92% - 95%)    Constitutional: Well appearing  HEENT: Atraumatic, CYNDEE, Normal, No congestion  Respiratory: Breath Sounds normal, no rhonchi/wheeze  Cardiovascular: N S1S2; J LUIS present  Gastrointestinal: Abdomen soft, non tender, Bowel Sounds present  Extremities: No edema, peripheral pulses present  Neurological: AAO x 3, no gross focal motor deficits  Skin: Non cellulitic, no rash, ulcers  Lymph Nodes: No lymphadenopathy noted  Back: No CVA tenderness   Musculoskeletal: non tender  Breasts: Deferred  Genitourinary: deferred  Rectal: Deferred                         Lab Results:  CBC  CBC Full  -  ( 13 Aug 2021 05:41 )  WBC Count : 5.64 K/uL  RBC Count : 2.94 M/uL  Hemoglobin : 9.8 g/dL  Hematocrit : 28.6 %  Platelet Count - Automated : 128 K/uL  Mean Cell Volume : 97.3 fl  Mean Cell Hemoglobin : 33.3 pg  Mean Cell Hemoglobin Concentration : 34.3 gm/dL  Auto Neutrophil # : x  Auto Lymphocyte # : x  Auto Monocyte # : x  Auto Eosinophil # : x  Auto Basophil # : x  Auto Neutrophil % : x  Auto Lymphocyte % : x  Auto Monocyte % : x  Auto Eosinophil % : x  Auto Basophil % : x    .		Differential:	[] Automated		[] Manual  Chemistry                        9.8    5.64  )-----------( 128      ( 13 Aug 2021 05:41 )             28.6     08-13    135  |  104  |  19  ----------------------------<  185<H>  3.5   |  26  |  0.68    Ca    8.2<L>      13 Aug 2021 05:41                  MICROBIOLOGY/CULTURES:  Culture Results:   No growth to date. (08-13 @ 05:42)  Culture Results:   No growth to date. (08-13 @ 05:41)  Culture Results:   Growth in aerobic and anaerobic bottles: Streptococcus lutetiensis  See previous culture [id number] (08-11 @ 12:31)  Culture Results:   Growth in aerobic and anaerobic bottles: Streptococcus lutetiensis  ***Blood Panel PCR results on this specimen are available  approximately 3 hours after the Gram stain result.***  Gram stain, PCR, and/or culture results may not always  corresponddue to difference in methodologies.  ************************************************************  This PCR assay was performed by multiplex PCR. This  Assay tests for 66 bacterial and resistance gene targets.  Please refer to the [facility] test directory  at [email or web address] for details. (08-11 @ 12:31)  Culture Results:   <10,000 CFU/mL Normal Urogenital Ira (08-11 @ 12:31)      RADIOLOGY RESULTS:              < from: CT Chest No Cont (08.11.21 @ 20:53) >  IMPRESSION:  Peripheral reticulation, ill-defined linear and groundglass opacities with mild bronchiectasis is similar compared to the prior study and may be related to interstitial lung disease. No honeycombing.    < end of copied text >          MEDICATIONS  (STANDING):  allopurinol  Oral Tab/Cap - Peds 200 milliGRAM(s) Oral daily  apixaban 2.5 milliGRAM(s) Oral two times a day  artificial  tears Solution 1 Drop(s) Both EYES three times a day  aspirin  chewable 81 milliGRAM(s) Oral daily  cefTRIAXone Injectable. 2000 milliGRAM(s) IV Push every 24 hours  dextrose 40% Gel 15 Gram(s) Oral once  dextrose 5%. 1000 milliLiter(s) (50 mL/Hr) IV Continuous <Continuous>  dextrose 50% Injectable 25 Gram(s) IV Push once  escitalopram 10 milliGRAM(s) Oral daily  ferrous    sulfate 325 milliGRAM(s) Oral two times a day  glucagon  Injectable 1 milliGRAM(s) IntraMuscular once  insulin lispro (ADMELOG) corrective regimen sliding scale   SubCutaneous three times a day before meals  levothyroxine 50 MICROGram(s) Oral daily  midodrine. 10 milliGRAM(s) Oral three times a day  pantoprazole    Tablet 40 milliGRAM(s) Oral daily  sildenafil (REVATIO) 20 milliGRAM(s) Oral two times a day  simvastatin 40 milliGRAM(s) Oral at bedtime

## 2021-08-14 NOTE — PROGRESS NOTE ADULT - ASSESSMENT
SOB- likely from pneumonia. improved  She appears euvolemic on exam.      Afib- now rate controlled.  continue home meds including full dose anticoagulation    Sepsis - hypotension resolved.  On abx.

## 2021-08-14 NOTE — PROGRESS NOTE ADULT - SUBJECTIVE AND OBJECTIVE BOX
Patient seen and examined at bedside this AM.  Sitting upright in chair, no complaints. Denies shorntess of breath, cough, wheezing.    ROS - otherwise negative.       Vital Signs Last 24 Hrs  T(C): 36.6 (14 Aug 2021 09:20), Max: 36.6 (14 Aug 2021 09:20)  T(F): 97.8 (14 Aug 2021 09:20), Max: 97.8 (14 Aug 2021 09:20)  HR: 88 (14 Aug 2021 09:20) (73 - 91)  BP: 110/62 (14 Aug 2021 09:20) (95/52 - 117/61)  BP(mean): 52 (13 Aug 2021 20:53) (47 - 99)  RR: 18 (14 Aug 2021 09:20) (15 - 23)  SpO2: 95% (14 Aug 2021 09:20) (92% - 95%)    PHYSICAL EXAMINATION:  GENERAL: Elderly, frail appearing, no distress.  HEAD: Normocephalic atraumatic   EYES: Pupils are normal. No icterus. Sclera white.   HEENT:  Mucus membranes moist. Oropharynx normal.   NECK:  Supple. No stridor.  HEART:  Normal S1 and S2.   CHEST:  Clear to ausculation bilaterally. Normal chest excursion. No wheezing. No crackles. No rhonchi   ABDOMEN:  Soft and nontender. Nondistended.   EXTREMITIES:  There is no cyanosis, clubbing or edema.   PSYCH: Pleasant affect.    MEDICATIONS  (STANDING):  allopurinol  Oral Tab/Cap - Peds 200 milliGRAM(s) Oral daily  apixaban 2.5 milliGRAM(s) Oral two times a day  artificial  tears Solution 1 Drop(s) Both EYES three times a day  aspirin  chewable 81 milliGRAM(s) Oral daily  cefTRIAXone Injectable. 2000 milliGRAM(s) IV Push every 24 hours  dextrose 40% Gel 15 Gram(s) Oral once  dextrose 5%. 1000 milliLiter(s) (50 mL/Hr) IV Continuous <Continuous>  dextrose 50% Injectable 25 Gram(s) IV Push once  escitalopram 10 milliGRAM(s) Oral daily  ferrous    sulfate 325 milliGRAM(s) Oral two times a day  glucagon  Injectable 1 milliGRAM(s) IntraMuscular once  insulin lispro (ADMELOG) corrective regimen sliding scale   SubCutaneous three times a day before meals  levothyroxine 50 MICROGram(s) Oral daily  midodrine. 10 milliGRAM(s) Oral three times a day  pantoprazole    Tablet 40 milliGRAM(s) Oral daily  sildenafil (REVATIO) 20 milliGRAM(s) Oral two times a day  simvastatin 40 milliGRAM(s) Oral at bedtime    MEDICATIONS  (PRN):  acetaminophen   Tablet .. 650 milliGRAM(s) Oral every 6 hours PRN Temp greater or equal to 38.5C (101.3F), Mild Pain (1 - 3)  aluminum hydroxide/magnesium hydroxide/simethicone Suspension 30 milliLiter(s) Oral every 4 hours PRN Dyspepsia  melatonin 3 milliGRAM(s) Oral at bedtime PRN Insomnia  ondansetron Injectable 4 milliGRAM(s) IV Push every 8 hours PRN Nausea and/or Vomiting  traMADol 50 milliGRAM(s) Oral every 8 hours PRN Moderate Pain (4 - 6)        LABS:                        9.8    5.64  )-----------( 128      ( 13 Aug 2021 05:41 )             28.6     08-13    135  |  104  |  19  ----------------------------<  185<H>  3.5   |  26  |  0.68    Ca    8.2<L>      13 Aug 2021 05:41

## 2021-08-14 NOTE — PROGRESS NOTE ADULT - SUBJECTIVE AND OBJECTIVE BOX
Patient is a 89y old  Female who presents with a chief complaint of SOB (13 Aug 2021 13:23)      HPI:  Patient is 89 yo female with PMhx pancreatitis s/p ERCP, cirrhosis, DM2, a-fib on Eliquis, CAD s/p PCI, severe pulm HTN, chronic R heart failure, diastolic dysfunction, AS s/p TAVR, OA, HTN, dyslipidemia, hypothyroidism, presents with sob and fatigue. Patient sent from [facility] for further evaluation. Chart reviewed. Patient seen in ED, pleasant with baseline confusion. Does not know why she is here.  In ED, patient found to be septic, given 3 Liter bolus and for a short time started on pressors. Now she is off pressors with BP 98/70 in no distress.    (11 Aug 2021 18:12)  Patient is 89 yo female with PMhx pancreatitis s/p ERCP, cirrhosis, DM2, a-fib on Eliquis, CAD s/p PCI, severe pulm HTN, chronic R heart failure, diastolic dysfunction, AS s/p TAVR, OA, HTN, dyslipidemia, hypothyroidism, presents with sob and fatigue. Patient sent from [facility] for further evaluation. Chart reviewed. Patient seen in ED, pleasant with baseline confusion. Does not know why she is here.  In ED, patient found to be septic, given 3 Liter bolus and for a short time started on pressors. Now she is off pressors with BP 98/70 in no distress.     8/12-   Pt seen this am. Pt denies any symptoms this am.     8/13- Pt seen this am.  Pt denies any symptoms this am.  Pleasantly confused.   8/14 alert, no complaints    PAST MEDICAL & SURGICAL HISTORY:  Diabetes Mellitus Type II    Dyslipidemia    GERD (Gastroesophageal Reflux Disease)    History of Osteoarthritis    Hypertension    CAD (coronary artery disease)    Afib    Hypothyroid    HLD (hyperlipidemia)    History of pancreatitis    Aortic stenosis    H/O: Hysterectomy    H/O: Knee Surgery- right meniscus    History of cholecystectomy    History of appendectomy    H/O total knee replacement, left    S/P IVC filter  Note that Pt does not have  h/o DVT, outPt doppler was read first as +, but after review reported as no DVT. Pt got IVC filer before that          MEDICATIONS  (STANDING):  allopurinol  Oral Tab/Cap - Peds 200 milliGRAM(s) Oral daily  apixaban 2.5 milliGRAM(s) Oral two times a day  artificial  tears Solution 1 Drop(s) Both EYES three times a day  aspirin  chewable 81 milliGRAM(s) Oral daily  cefTRIAXone Injectable. 2000 milliGRAM(s) IV Push every 24 hours  dextrose 40% Gel 15 Gram(s) Oral once  dextrose 5%. 1000 milliLiter(s) (50 mL/Hr) IV Continuous <Continuous>  dextrose 50% Injectable 25 Gram(s) IV Push once  escitalopram 10 milliGRAM(s) Oral daily  ferrous    sulfate 325 milliGRAM(s) Oral two times a day  glucagon  Injectable 1 milliGRAM(s) IntraMuscular once  insulin lispro (ADMELOG) corrective regimen sliding scale   SubCutaneous three times a day before meals  levothyroxine 50 MICROGram(s) Oral daily  midodrine. 10 milliGRAM(s) Oral three times a day  pantoprazole    Tablet 40 milliGRAM(s) Oral daily  sildenafil (REVATIO) 20 milliGRAM(s) Oral two times a day  simvastatin 40 milliGRAM(s) Oral at bedtime    MEDICATIONS  (PRN):  acetaminophen   Tablet .. 650 milliGRAM(s) Oral every 6 hours PRN Temp greater or equal to 38.5C (101.3F), Mild Pain (1 - 3)  aluminum hydroxide/magnesium hydroxide/simethicone Suspension 30 milliLiter(s) Oral every 4 hours PRN Dyspepsia  melatonin 3 milliGRAM(s) Oral at bedtime PRN Insomnia  ondansetron Injectable 4 milliGRAM(s) IV Push every 8 hours PRN Nausea and/or Vomiting  traMADol 50 milliGRAM(s) Oral every 8 hours PRN Moderate Pain (4 - 6)          Vital Signs Last 24 Hrs  T(C): 36.4 (13 Aug 2021 21:12), Max: 36.4 (13 Aug 2021 21:12)  T(F): 97.6 (13 Aug 2021 21:12), Max: 97.6 (13 Aug 2021 21:12)  HR: 90 (14 Aug 2021 06:31) (73 - 91)  BP: 109/59 (14 Aug 2021 06:31) (85/66 - 117/61)  BP(mean): 52 (13 Aug 2021 20:53) (47 - 99)  RR: 18 (14 Aug 2021 06:31) (15 - 23)  SpO2: 94% (14 Aug 2021 06:31) (92% - 94%)    I&O's Summary    13 Aug 2021 07:01  -  14 Aug 2021 07:00  --------------------------------------------------------  IN: 0 mL / OUT: 950 mL / NET: -950 mL        PHYSICAL EXAM  General Appearance:nad  HEENT: ncat  Neck:   Back:   Lungs: right basilar crackles  Heart: rrs1s2  Abdomen:   Extremities:no edema   Skin:   Neurologic:       INTERPRETATION OF TELEMETRY:    ECG:        LABS:                          9.8    5.64  )-----------( 128      ( 13 Aug 2021 05:41 )             28.6     08-13    135  |  104  |  19  ----------------------------<  185<H>  3.5   |  26  |  0.68    Ca    8.2<L>      13 Aug 2021 05:41                        RADIOLOGY & ADDITIONAL STUDIES:

## 2021-08-14 NOTE — PROGRESS NOTE ADULT - ASSESSMENT
Patient is 89 yo female with PMhx pancreatitis s/p ERCP, cirrhosis, DM2, a-fib on Eliquis, CAD s/p PCI, severe pulm HTN, chronic R heart failure, diastolic dysfunction, AS s/p TAVR, OA, HTN, dyslipidemia, hypothyroidism, presents with sob and fatigue. Patient sent from [facility] for further evaluation. Chart reviewed. Patient seen in ED, pleasant with baseline confusion. Does not know why she is here.  In ED, patient found to be septic, given 3 Liter bolus and for a short time started on pressors. Now she is off pressors with BP 98/70 in no distress.     severe sepsis/septic shock POA.  - off vasopressors.  - midodrine.  - BCx:  Streptococcus.; repeat blood cx, neg  - repeat BCx:  neg  - TTE; no vegetation  - imaging, no overt pneumonia.  - cont  iv ceftriaxone as per ID    AF.  - AC:  apixaban.  - RTC:  metoprolol held due to hypotension.    HFpEF.  - appears euvolemic on exam.  - I/O.  - daily weights.    hx DM2.  - FS target 140-180mg/dL.  - consistent CHO diet.  - correction insulin coverage.    hx hypothyroidism.  - TSH:  wnl.  - levothyroxine.    DVT PPX: on apixaban    PTx    poc discussed with pt, team, Dr. Mojica ( son)

## 2021-08-14 NOTE — PROGRESS NOTE ADULT - ASSESSMENT
89 year-old woman presented with shorntess of breath and fatigue, foudn to have bacteremia, managed with, IVF and antibiotics in consultation with ID.  --Doing well today, no complaints.     Plan:  --Continue antibiotics  --Repeat cultures negative to date  --Echocardiogram shows no vegetation    Will follow      89 year-old woman presented with shortness of breath and fatigue, found to have bacteremia, managed with, IVF and antibiotics in consultation with ID.  --Doing well today, no complaints.     Plan:  --Continue antibiotics  --Repeat cultures negative to date  --Echocardiogram shows no vegetation    Will follow

## 2021-08-15 LAB
ANION GAP SERPL CALC-SCNC: 5 MMOL/L — SIGNIFICANT CHANGE UP (ref 5–17)
BUN SERPL-MCNC: 10 MG/DL — SIGNIFICANT CHANGE UP (ref 7–23)
CALCIUM SERPL-MCNC: 8.8 MG/DL — SIGNIFICANT CHANGE UP (ref 8.5–10.1)
CHLORIDE SERPL-SCNC: 105 MMOL/L — SIGNIFICANT CHANGE UP (ref 96–108)
CO2 SERPL-SCNC: 25 MMOL/L — SIGNIFICANT CHANGE UP (ref 22–31)
CREAT SERPL-MCNC: 0.56 MG/DL — SIGNIFICANT CHANGE UP (ref 0.5–1.3)
GLUCOSE SERPL-MCNC: 148 MG/DL — HIGH (ref 70–99)
HCT VFR BLD CALC: 30.8 % — LOW (ref 34.5–45)
HGB BLD-MCNC: 10.4 G/DL — LOW (ref 11.5–15.5)
MCHC RBC-ENTMCNC: 32.5 PG — SIGNIFICANT CHANGE UP (ref 27–34)
MCHC RBC-ENTMCNC: 33.8 GM/DL — SIGNIFICANT CHANGE UP (ref 32–36)
MCV RBC AUTO: 96.3 FL — SIGNIFICANT CHANGE UP (ref 80–100)
PLATELET # BLD AUTO: 156 K/UL — SIGNIFICANT CHANGE UP (ref 150–400)
POTASSIUM SERPL-MCNC: 3.7 MMOL/L — SIGNIFICANT CHANGE UP (ref 3.5–5.3)
POTASSIUM SERPL-SCNC: 3.7 MMOL/L — SIGNIFICANT CHANGE UP (ref 3.5–5.3)
RBC # BLD: 3.2 M/UL — LOW (ref 3.8–5.2)
RBC # FLD: 15.6 % — HIGH (ref 10.3–14.5)
SODIUM SERPL-SCNC: 135 MMOL/L — SIGNIFICANT CHANGE UP (ref 135–145)
WBC # BLD: 6.61 K/UL — SIGNIFICANT CHANGE UP (ref 3.8–10.5)
WBC # FLD AUTO: 6.61 K/UL — SIGNIFICANT CHANGE UP (ref 3.8–10.5)

## 2021-08-15 PROCEDURE — 99232 SBSQ HOSP IP/OBS MODERATE 35: CPT

## 2021-08-15 PROCEDURE — 99233 SBSQ HOSP IP/OBS HIGH 50: CPT

## 2021-08-15 RX ADMIN — TRAMADOL HYDROCHLORIDE 50 MILLIGRAM(S): 50 TABLET ORAL at 07:15

## 2021-08-15 RX ADMIN — Medication 2: at 11:27

## 2021-08-15 RX ADMIN — CEFTRIAXONE 2000 MILLIGRAM(S): 500 INJECTION, POWDER, FOR SOLUTION INTRAMUSCULAR; INTRAVENOUS at 10:48

## 2021-08-15 RX ADMIN — Medication 1: at 17:28

## 2021-08-15 RX ADMIN — SIMVASTATIN 40 MILLIGRAM(S): 20 TABLET, FILM COATED ORAL at 22:08

## 2021-08-15 RX ADMIN — PANTOPRAZOLE SODIUM 40 MILLIGRAM(S): 20 TABLET, DELAYED RELEASE ORAL at 10:47

## 2021-08-15 RX ADMIN — Medication 20 MILLIGRAM(S): at 10:47

## 2021-08-15 RX ADMIN — Medication 200 MILLIGRAM(S): at 10:48

## 2021-08-15 RX ADMIN — Medication 1 DROP(S): at 17:20

## 2021-08-15 RX ADMIN — Medication 325 MILLIGRAM(S): at 22:07

## 2021-08-15 RX ADMIN — TRAMADOL HYDROCHLORIDE 50 MILLIGRAM(S): 50 TABLET ORAL at 22:22

## 2021-08-15 RX ADMIN — APIXABAN 2.5 MILLIGRAM(S): 2.5 TABLET, FILM COATED ORAL at 22:07

## 2021-08-15 RX ADMIN — Medication 50 MICROGRAM(S): at 06:52

## 2021-08-15 RX ADMIN — Medication 1 DROP(S): at 06:51

## 2021-08-15 RX ADMIN — TRAMADOL HYDROCHLORIDE 50 MILLIGRAM(S): 50 TABLET ORAL at 22:50

## 2021-08-15 RX ADMIN — Medication 0: at 08:05

## 2021-08-15 RX ADMIN — TRAMADOL HYDROCHLORIDE 50 MILLIGRAM(S): 50 TABLET ORAL at 06:52

## 2021-08-15 RX ADMIN — Medication 20 MILLIGRAM(S): at 22:08

## 2021-08-15 RX ADMIN — Medication 1 DROP(S): at 22:07

## 2021-08-15 RX ADMIN — Medication 81 MILLIGRAM(S): at 10:47

## 2021-08-15 RX ADMIN — Medication 325 MILLIGRAM(S): at 10:47

## 2021-08-15 RX ADMIN — ESCITALOPRAM OXALATE 10 MILLIGRAM(S): 10 TABLET, FILM COATED ORAL at 10:48

## 2021-08-15 RX ADMIN — APIXABAN 2.5 MILLIGRAM(S): 2.5 TABLET, FILM COATED ORAL at 10:47

## 2021-08-15 RX ADMIN — MIDODRINE HYDROCHLORIDE 10 MILLIGRAM(S): 2.5 TABLET ORAL at 17:28

## 2021-08-15 NOTE — PROGRESS NOTE ADULT - ASSESSMENT
89 year-old woman presented with shortness of breath and fatigue, found to have bacteremia, managed with, IVF and antibiotics in consultation with ID.  --Doing well today, no complaints.     Plan:  --Continue antibiotics  --Repeat cultures negative to date  --Echocardiogram shows no vegetation

## 2021-08-15 NOTE — PROGRESS NOTE ADULT - ASSESSMENT
1. pneummonia and sepsis. improved and stable  2. afib, rate is ok,. stable  3. HFpEF appears euvolemic    Plan:   cont eliquis, asa  consider taper midodrine unless this is chronic home medication.

## 2021-08-15 NOTE — PROGRESS NOTE ADULT - SUBJECTIVE AND OBJECTIVE BOX
Patient is a 89y old  Female who presents with a chief complaint of SOB (14 Aug 2021 13:31)      HPI:  Patient is 89 yo female with PMhx pancreatitis s/p ERCP, cirrhosis, DM2, a-fib on Eliquis, CAD s/p PCI, severe pulm HTN, chronic R heart failure, diastolic dysfunction, AS s/p TAVR, OA, HTN, dyslipidemia, hypothyroidism, presents with sob and fatigue. Patient sent from [facility] for further evaluation. Chart reviewed. Patient seen in ED, pleasant with baseline confusion. Does not know why she is here.  In ED, patient found to be septic, given 3 Liter bolus and for a short time started on pressors. Now she is off pressors with BP 98/70 in no distress.    (11 Aug 2021 18:12)    Patient is 89 yo female with PMhx pancreatitis s/p ERCP, cirrhosis, DM2, a-fib on Eliquis, CAD s/p PCI, severe pulm HTN, chronic R heart failure, diastolic dysfunction, AS s/p TAVR, OA, HTN, dyslipidemia, hypothyroidism, presents with sob and fatigue. Patient sent from [facility] for further evaluation. Chart reviewed. Patient seen in ED, pleasant with baseline confusion. Does not know why she is here.  In ED, patient found to be septic, given 3 Liter bolus and for a short time started on pressors. Now she is off pressors with BP 98/70 in no distress.     8/12-   Pt seen this am. Pt denies any symptoms this am.     8/13- Pt seen this am.  Pt denies any symptoms this am.  Pleasantly confused.   8/14 alert, no complaints  8/15 "feels great"  PAST MEDICAL & SURGICAL HISTORY:  Diabetes Mellitus Type II    Dyslipidemia    GERD (Gastroesophageal Reflux Disease)    History of Osteoarthritis    Hypertension    CAD (coronary artery disease)    Afib    Hypothyroid    HLD (hyperlipidemia)    History of pancreatitis    Aortic stenosis    H/O: Hysterectomy    H/O: Knee Surgery- right meniscus    History of cholecystectomy    History of appendectomy    H/O total knee replacement, left    S/P IVC filter  Note that Pt does not have  h/o DVT, outPt doppler was read first as +, but after review reported as no DVT. Pt got IVC filer before that          MEDICATIONS  (STANDING):  allopurinol  Oral Tab/Cap - Peds 200 milliGRAM(s) Oral daily  apixaban 2.5 milliGRAM(s) Oral two times a day  artificial  tears Solution 1 Drop(s) Both EYES three times a day  aspirin  chewable 81 milliGRAM(s) Oral daily  cefTRIAXone Injectable. 2000 milliGRAM(s) IV Push every 24 hours  dextrose 40% Gel 15 Gram(s) Oral once  dextrose 5%. 1000 milliLiter(s) (50 mL/Hr) IV Continuous <Continuous>  dextrose 50% Injectable 25 Gram(s) IV Push once  escitalopram 10 milliGRAM(s) Oral daily  ferrous    sulfate 325 milliGRAM(s) Oral two times a day  glucagon  Injectable 1 milliGRAM(s) IntraMuscular once  insulin lispro (ADMELOG) corrective regimen sliding scale   SubCutaneous three times a day before meals  levothyroxine 50 MICROGram(s) Oral daily  midodrine. 10 milliGRAM(s) Oral three times a day  pantoprazole    Tablet 40 milliGRAM(s) Oral daily  sildenafil (REVATIO) 20 milliGRAM(s) Oral two times a day  simvastatin 40 milliGRAM(s) Oral at bedtime    MEDICATIONS  (PRN):  acetaminophen   Tablet .. 650 milliGRAM(s) Oral every 6 hours PRN Temp greater or equal to 38.5C (101.3F), Mild Pain (1 - 3)  aluminum hydroxide/magnesium hydroxide/simethicone Suspension 30 milliLiter(s) Oral every 4 hours PRN Dyspepsia  melatonin 3 milliGRAM(s) Oral at bedtime PRN Insomnia  ondansetron Injectable 4 milliGRAM(s) IV Push every 8 hours PRN Nausea and/or Vomiting  traMADol 50 milliGRAM(s) Oral every 8 hours PRN Moderate Pain (4 - 6)          Vital Signs Last 24 Hrs  T(C): 36 (15 Aug 2021 06:50), Max: 36.6 (14 Aug 2021 17:19)  T(F): 96.8 (15 Aug 2021 06:50), Max: 97.9 (14 Aug 2021 17:19)  HR: 89 (15 Aug 2021 06:50) (86 - 89)  BP: 127/73 (15 Aug 2021 06:50) (109/65 - 127/73)  BP(mean): --  RR: 17 (15 Aug 2021 06:50) (17 - 18)  SpO2: 93% (15 Aug 2021 06:50) (93% - 95%)    I&O's Summary      PHYSICAL EXAM  General Appearance: NAD  HEENT:   Neck: no jvd  Back:   Lungs: right basilar rales  Heart: irr s1s2 2/6 radha base  Abdomen:   Extremities: no edema  Skin:   Neurologic:       INTERPRETATION OF TELEMETRY:    ECG:        LABS:                          10.4   6.61  )-----------( 156      ( 15 Aug 2021 08:56 )             30.8     08-15    135  |  105  |  10  ----------------------------<  148<H>  3.7   |  25  |  0.56    Ca    8.8      15 Aug 2021 08:56                        RADIOLOGY & ADDITIONAL STUDIES:

## 2021-08-15 NOTE — PROGRESS NOTE ADULT - ASSESSMENT
Patient is 89 yo female with PMhx pancreatitis s/p ERCP, cirrhosis, DM2, a-fib on Eliquis, CAD s/p PCI, severe pulm HTN, chronic R heart failure, diastolic dysfunction, AS s/p TAVR, OA, HTN, dyslipidemia, hypothyroidism, presents with sob and fatigue. Patient sent from [facility] for further evaluation. Chart reviewed. Patient seen in ED, pleasant with baseline confusion. Does not know why she is here.  In ED, patient found to be septic, given 3 Liter bolus and for a short time started on pressors. Now she is off pressors with BP 98/70 in no distress.     severe sepsis/septic shock POA.: all resolved now  - off vasopressors.  - midodrine taper  - BCx:  Streptococcus.; repeat blood cx, neg  - repeat BCx:  neg  - TTE; no vegetation  - imaging, no overt pneumonia.  - cont  iv ceftriaxone as per ID    AF.  - AC:  apixaban.  - RTC:  metoprolol held due to hypotension.    HFpEF.  - appears euvolemic on exam.  - I/O.  - daily weights.    hx DM2.  - FS target 140-180mg/dL.  - consistent CHO diet.  - correction insulin coverage.    hx hypothyroidism.  - TSH:  wnl.  - levothyroxine.    DVT PPX: on apixaban    PTx eval awaited    poc discussed with pt, team

## 2021-08-15 NOTE — PROGRESS NOTE ADULT - SUBJECTIVE AND OBJECTIVE BOX
Patient seen and examined at bedside this AM.  She is pleasant, in good spirits.   Sitting upright in chair, no complaints.   Denies shortness of breath, cough, wheezing.    ROS - otherwise negative.         PHYSICAL EXAMINATION:  GENERAL: Elderly, frail appearing, no distress.  HEAD: Normocephalic atraumatic   EYES: Pupils are normal. No icterus. Sclera white.   HEENT:  Mucus membranes moist. Oropharynx normal.   NECK:  Supple. No stridor.  HEART:  Normal S1 and S2.   CHEST:  Slight crackles at right lower lung fields.   ABDOMEN:  Soft and nontender. Nondistended.   EXTREMITIES:  There is no cyanosis, clubbing or edema.   PSYCH: Pleasant affect.      Vital Signs Last 24 Hrs  T(C): 36 (15 Aug 2021 06:50), Max: 36.6 (14 Aug 2021 17:19)  T(F): 96.8 (15 Aug 2021 06:50), Max: 97.9 (14 Aug 2021 17:19)  HR: 89 (15 Aug 2021 06:50) (86 - 89)  BP: 127/73 (15 Aug 2021 06:50) (109/65 - 127/73)  BP(mean): --  RR: 17 (15 Aug 2021 06:50) (17 - 18)  SpO2: 93% (15 Aug 2021 06:50) (93% - 95%)    MEDICATIONS  (STANDING):  allopurinol  Oral Tab/Cap - Peds 200 milliGRAM(s) Oral daily  apixaban 2.5 milliGRAM(s) Oral two times a day  artificial  tears Solution 1 Drop(s) Both EYES three times a day  aspirin  chewable 81 milliGRAM(s) Oral daily  cefTRIAXone Injectable. 2000 milliGRAM(s) IV Push every 24 hours  dextrose 40% Gel 15 Gram(s) Oral once  dextrose 5%. 1000 milliLiter(s) (50 mL/Hr) IV Continuous <Continuous>  dextrose 50% Injectable 25 Gram(s) IV Push once  escitalopram 10 milliGRAM(s) Oral daily  ferrous    sulfate 325 milliGRAM(s) Oral two times a day  glucagon  Injectable 1 milliGRAM(s) IntraMuscular once  insulin lispro (ADMELOG) corrective regimen sliding scale   SubCutaneous three times a day before meals  levothyroxine 50 MICROGram(s) Oral daily  midodrine. 10 milliGRAM(s) Oral three times a day  pantoprazole    Tablet 40 milliGRAM(s) Oral daily  sildenafil (REVATIO) 20 milliGRAM(s) Oral two times a day  simvastatin 40 milliGRAM(s) Oral at bedtime    MEDICATIONS  (PRN):  acetaminophen   Tablet .. 650 milliGRAM(s) Oral every 6 hours PRN Temp greater or equal to 38.5C (101.3F), Mild Pain (1 - 3)  aluminum hydroxide/magnesium hydroxide/simethicone Suspension 30 milliLiter(s) Oral every 4 hours PRN Dyspepsia  melatonin 3 milliGRAM(s) Oral at bedtime PRN Insomnia  ondansetron Injectable 4 milliGRAM(s) IV Push every 8 hours PRN Nausea and/or Vomiting  traMADol 50 milliGRAM(s) Oral every 8 hours PRN Moderate Pain (4 - 6)        LABS:                        10.4   6.61  )-----------( 156      ( 15 Aug 2021 08:56 )             30.8     08-15    135  |  105  |  10  ----------------------------<  148<H>  3.7   |  25  |  0.56    Ca    8.8      15 Aug 2021 08:56                  RADIOLOGY & ADDITIONAL STUDIES:   *Imaging personally reviewed.

## 2021-08-15 NOTE — PROGRESS NOTE ADULT - SUBJECTIVE AND OBJECTIVE BOX
CC:  Patient is a 89y old  Female who presents with a chief complaint of SOB (12 Aug 2021 13:35)    SUBJECTIVE:     8/13: no complaints; no chest pain/sob.  BP improved  d/maddy iv fluids    8/14: no complaints  afebrile  echo: no vegetation  repeat blood cx: neg    8/15: had BM which was slightly hard  PT awaited  no other complaints    ROS:  all other review of systems are negative unless indicated above.      PHYSICAL EXAM:    Vital Signs Last 24 Hrs  T(C): 36.6 (15 Aug 2021 16:12), Max: 36.6 (14 Aug 2021 17:19)  T(F): 97.9 (15 Aug 2021 16:12), Max: 97.9 (14 Aug 2021 17:19)  HR: 71 (15 Aug 2021 16:12) (71 - 89)  BP: 107/52 (15 Aug 2021 16:12) (107/52 - 127/73)  BP(mean): --  RR: 17 (15 Aug 2021 06:50) (17 - 18)  SpO2: 95% (15 Aug 2021 16:12) (93% - 95%)    Constitutional: Well appearing  HEENT: Atraumatic, CYNDEE, Normal, No congestion  Respiratory: Breath Sounds normal, no rhonchi/wheeze  Cardiovascular: N S1S2; J LUSI present  Gastrointestinal: Abdomen soft, non tender, Bowel Sounds present  Extremities: No edema, peripheral pulses present  Neurological: AAO x 3, no gross focal motor deficits  Skin: Non cellulitic, no rash, ulcers  Lymph Nodes: No lymphadenopathy noted  Back: No CVA tenderness   Musculoskeletal: non tender  Breasts: Deferred  Genitourinary: deferred  Rectal: Deferred                         Lab Results:  CBC  CBC Full  -  ( 15 Aug 2021 08:56 )  WBC Count : 6.61 K/uL  RBC Count : 3.20 M/uL  Hemoglobin : 10.4 g/dL  Hematocrit : 30.8 %  Platelet Count - Automated : 156 K/uL  Mean Cell Volume : 96.3 fl  Mean Cell Hemoglobin : 32.5 pg  Mean Cell Hemoglobin Concentration : 33.8 gm/dL  Auto Neutrophil # : x  Auto Lymphocyte # : x  Auto Monocyte # : x  Auto Eosinophil # : x  Auto Basophil # : x  Auto Neutrophil % : x  Auto Lymphocyte % : x  Auto Monocyte % : x  Auto Eosinophil % : x  Auto Basophil % : x    .		Differential:	[] Automated		[] Manual  Chemistry                        10.4   6.61  )-----------( 156      ( 15 Aug 2021 08:56 )             30.8     08-15    135  |  105  |  10  ----------------------------<  148<H>  3.7   |  25  |  0.56    Ca    8.8      15 Aug 2021 08:56                  MICROBIOLOGY/CULTURES:  Culture Results:   No growth to date. (08-13 @ 05:42)  Culture Results:   No growth to date. (08-13 @ 05:41)  Culture Results:   Growth in aerobic and anaerobic bottles: Streptococcus lutetiensis  See previous culture [id number] (08-11 @ 12:31)  Culture Results:   Growth in aerobic and anaerobic bottles: Streptococcus lutetiensis  ***Blood Panel PCR results on this specimen are available  approximately 3 hours after the Gram stain result.***  Gram stain, PCR, and/or culture results may not always  corresponddue to difference in methodologies.  ************************************************************  This PCR assay was performed by multiplex PCR. This  Assay tests for 66 bacterial and resistance gene targets.  Please refer to the [facility] Labs test directory  at [email or web address] for details. (08-11 @ 12:31)  Culture Results:   <10,000 CFU/mL Normal Urogenital Ira (08-11 @ 12:31)            RADIOLOGY RESULTS:    < from: CT Chest No Cont (08.11.21 @ 20:53) >  IMPRESSION:  Peripheral reticulation, ill-defined linear and groundglass opacities with mild bronchiectasis is similar compared to the prior study and may be related to interstitial lung disease. No honeycombing.    < end of copied text >        MEDICATIONS  (STANDING):  allopurinol  Oral Tab/Cap - Peds 200 milliGRAM(s) Oral daily  apixaban 2.5 milliGRAM(s) Oral two times a day  artificial  tears Solution 1 Drop(s) Both EYES three times a day  aspirin  chewable 81 milliGRAM(s) Oral daily  cefTRIAXone Injectable. 2000 milliGRAM(s) IV Push every 24 hours  dextrose 40% Gel 15 Gram(s) Oral once  dextrose 5%. 1000 milliLiter(s) (50 mL/Hr) IV Continuous <Continuous>  dextrose 50% Injectable 25 Gram(s) IV Push once  escitalopram 10 milliGRAM(s) Oral daily  ferrous    sulfate 325 milliGRAM(s) Oral two times a day  glucagon  Injectable 1 milliGRAM(s) IntraMuscular once  insulin lispro (ADMELOG) corrective regimen sliding scale   SubCutaneous three times a day before meals  levothyroxine 50 MICROGram(s) Oral daily  midodrine. 10 milliGRAM(s) Oral three times a day  pantoprazole    Tablet 40 milliGRAM(s) Oral daily  sildenafil (REVATIO) 20 milliGRAM(s) Oral two times a day  simvastatin 40 milliGRAM(s) Oral at bedtime    MEDICATIONS  (PRN):  acetaminophen   Tablet .. 650 milliGRAM(s) Oral every 6 hours PRN Temp greater or equal to 38.5C (101.3F), Mild Pain (1 - 3)  aluminum hydroxide/magnesium hydroxide/simethicone Suspension 30 milliLiter(s) Oral every 4 hours PRN Dyspepsia  melatonin 3 milliGRAM(s) Oral at bedtime PRN Insomnia  ondansetron Injectable 4 milliGRAM(s) IV Push every 8 hours PRN Nausea and/or Vomiting  traMADol 50 milliGRAM(s) Oral every 8 hours PRN Moderate Pain (4 - 6)

## 2021-08-16 LAB — SARS-COV-2 RNA SPEC QL NAA+PROBE: SIGNIFICANT CHANGE UP

## 2021-08-16 PROCEDURE — 99233 SBSQ HOSP IP/OBS HIGH 50: CPT

## 2021-08-16 PROCEDURE — 99232 SBSQ HOSP IP/OBS MODERATE 35: CPT

## 2021-08-16 RX ORDER — SPIRONOLACTONE 25 MG/1
25 TABLET, FILM COATED ORAL DAILY
Refills: 0 | Status: DISCONTINUED | OUTPATIENT
Start: 2021-08-16 | End: 2021-08-17

## 2021-08-16 RX ORDER — POLYETHYLENE GLYCOL 3350 17 G/17G
17 POWDER, FOR SOLUTION ORAL DAILY
Refills: 0 | Status: DISCONTINUED | OUTPATIENT
Start: 2021-08-16 | End: 2021-08-23

## 2021-08-16 RX ORDER — CEFUROXIME AXETIL 250 MG
500 TABLET ORAL EVERY 12 HOURS
Refills: 0 | Status: DISCONTINUED | OUTPATIENT
Start: 2021-08-17 | End: 2021-08-23

## 2021-08-16 RX ADMIN — ESCITALOPRAM OXALATE 10 MILLIGRAM(S): 10 TABLET, FILM COATED ORAL at 10:44

## 2021-08-16 RX ADMIN — Medication 3 MILLIGRAM(S): at 20:31

## 2021-08-16 RX ADMIN — APIXABAN 2.5 MILLIGRAM(S): 2.5 TABLET, FILM COATED ORAL at 20:31

## 2021-08-16 RX ADMIN — TRAMADOL HYDROCHLORIDE 50 MILLIGRAM(S): 50 TABLET ORAL at 11:06

## 2021-08-16 RX ADMIN — TRAMADOL HYDROCHLORIDE 50 MILLIGRAM(S): 50 TABLET ORAL at 20:31

## 2021-08-16 RX ADMIN — Medication 325 MILLIGRAM(S): at 10:43

## 2021-08-16 RX ADMIN — Medication 650 MILLIGRAM(S): at 02:02

## 2021-08-16 RX ADMIN — TRAMADOL HYDROCHLORIDE 50 MILLIGRAM(S): 50 TABLET ORAL at 12:00

## 2021-08-16 RX ADMIN — Medication 200 MILLIGRAM(S): at 10:44

## 2021-08-16 RX ADMIN — APIXABAN 2.5 MILLIGRAM(S): 2.5 TABLET, FILM COATED ORAL at 10:43

## 2021-08-16 RX ADMIN — CEFTRIAXONE 2000 MILLIGRAM(S): 500 INJECTION, POWDER, FOR SOLUTION INTRAMUSCULAR; INTRAVENOUS at 10:56

## 2021-08-16 RX ADMIN — MIDODRINE HYDROCHLORIDE 10 MILLIGRAM(S): 2.5 TABLET ORAL at 10:56

## 2021-08-16 RX ADMIN — Medication 50 MICROGRAM(S): at 06:18

## 2021-08-16 RX ADMIN — POLYETHYLENE GLYCOL 3350 17 GRAM(S): 17 POWDER, FOR SOLUTION ORAL at 10:44

## 2021-08-16 RX ADMIN — Medication 20 MILLIGRAM(S): at 17:29

## 2021-08-16 RX ADMIN — MIDODRINE HYDROCHLORIDE 10 MILLIGRAM(S): 2.5 TABLET ORAL at 17:29

## 2021-08-16 RX ADMIN — PANTOPRAZOLE SODIUM 40 MILLIGRAM(S): 20 TABLET, DELAYED RELEASE ORAL at 10:43

## 2021-08-16 RX ADMIN — SIMVASTATIN 40 MILLIGRAM(S): 20 TABLET, FILM COATED ORAL at 20:31

## 2021-08-16 RX ADMIN — Medication 2: at 11:06

## 2021-08-16 RX ADMIN — Medication 81 MILLIGRAM(S): at 10:43

## 2021-08-16 RX ADMIN — Medication 325 MILLIGRAM(S): at 20:31

## 2021-08-16 RX ADMIN — Medication 1 DROP(S): at 06:18

## 2021-08-16 RX ADMIN — Medication 20 MILLIGRAM(S): at 20:31

## 2021-08-16 RX ADMIN — Medication 1 DROP(S): at 12:57

## 2021-08-16 RX ADMIN — Medication 1 DROP(S): at 20:33

## 2021-08-16 RX ADMIN — Medication 1: at 17:29

## 2021-08-16 NOTE — PROGRESS NOTE ADULT - SUBJECTIVE AND OBJECTIVE BOX
CC:  Patient is a 89y old  Female who presents with a chief complaint of SOB (12 Aug 2021 13:35)    SUBJECTIVE:     8/13: no complaints; no chest pain/sob.  BP improved  d/maddy iv fluids    8/14: no complaints  afebrile  echo: no vegetation  repeat blood cx: neg    8/15: had BM which was slightly hard  PT awaited  no other complaints    8/16: feels better  c/o constipation, Miralax added    ROS:  all other review of systems are negative unless indicated above.      PHYSICAL EXAM:    Vital Signs Last 24 Hrs  T(C): 36.3 (16 Aug 2021 08:00), Max: 36.6 (15 Aug 2021 16:12)  T(F): 97.4 (16 Aug 2021 08:00), Max: 97.9 (15 Aug 2021 16:12)  HR: 90 (16 Aug 2021 10:58) (71 - 90)  BP: 103/52 (16 Aug 2021 10:58) (103/52 - 137/75)  BP(mean): --  RR: 18 (16 Aug 2021 08:00) (17 - 18)  SpO2: 91% (16 Aug 2021 08:00) (91% - 95%)    Constitutional: Well appearing  HEENT: Atraumatic, CYNDEE, Normal, No congestion  Respiratory: Breath Sounds normal, b/l rhonchi; no wheeze  Cardiovascular: N S1S2; J LUIS present  Gastrointestinal: Abdomen soft, non tender, Bowel Sounds present  Extremities: No edema, peripheral pulses present  Neurological: AAO x 3, no gross focal motor deficits  Skin: Non cellulitic, no rash, ulcers  Lymph Nodes: No lymphadenopathy noted  Back: No CVA tenderness   Musculoskeletal: non tender  Breasts: Deferred  Genitourinary: deferred  Rectal: Deferred                         Lab Results:  CBC  CBC Full  -  ( 15 Aug 2021 08:56 )  WBC Count : 6.61 K/uL  RBC Count : 3.20 M/uL  Hemoglobin : 10.4 g/dL  Hematocrit : 30.8 %  Platelet Count - Automated : 156 K/uL  Mean Cell Volume : 96.3 fl  Mean Cell Hemoglobin : 32.5 pg  Mean Cell Hemoglobin Concentration : 33.8 gm/dL  Auto Neutrophil # : x  Auto Lymphocyte # : x  Auto Monocyte # : x  Auto Eosinophil # : x  Auto Basophil # : x  Auto Neutrophil % : x  Auto Lymphocyte % : x  Auto Monocyte % : x  Auto Eosinophil % : x  Auto Basophil % : x    .		Differential:	[] Automated		[] Manual  Chemistry                        10.4   6.61  )-----------( 156      ( 15 Aug 2021 08:56 )             30.8     08-15    135  |  105  |  10  ----------------------------<  148<H>  3.7   |  25  |  0.56    Ca    8.8      15 Aug 2021 08:56                  MICROBIOLOGY/CULTURES:  Culture Results:   No growth to date. (08-13 @ 05:42)  Culture Results:   No growth to date. (08-13 @ 05:41)  Culture Results:   Growth in aerobic and anaerobic bottles: Streptococcus lutetiensis  See previous culture [id number] (08-11 @ 12:31)  Culture Results:   Growth in aerobic and anaerobic bottles: Streptococcus lutetiensis  ***Blood Panel PCR results on this specimen are available  approximately 3 hours after the Gram stain result.***  Gram stain, PCR, and/or culture results may not always  corresponddue to difference in methodologies.  ************************************************************  This PCR assay was performed by multiplex PCR. This  Assay tests for 66 bacterial and resistance gene targets.  Please refer to the [facility] Labs test directory  at [email or web address] for details. (08-11 @ 12:31)  Culture Results:   <10,000 CFU/mL Normal Urogenital Ira (08-11 @ 12:31)            RADIOLOGY RESULTS:    < from: CT Chest No Cont (08.11.21 @ 20:53) >  IMPRESSION:  Peripheral reticulation, ill-defined linear and groundglass opacities with mild bronchiectasis is similar compared to the prior study and may be related to interstitial lung disease. No honeycombing.    < end of copied text >        MEDICATIONS  (STANDING):  allopurinol  Oral Tab/Cap - Peds 200 milliGRAM(s) Oral daily  apixaban 2.5 milliGRAM(s) Oral two times a day  artificial  tears Solution 1 Drop(s) Both EYES three times a day  aspirin  chewable 81 milliGRAM(s) Oral daily  cefTRIAXone Injectable. 2000 milliGRAM(s) IV Push every 24 hours  dextrose 40% Gel 15 Gram(s) Oral once  dextrose 5%. 1000 milliLiter(s) (50 mL/Hr) IV Continuous <Continuous>  dextrose 50% Injectable 25 Gram(s) IV Push once  escitalopram 10 milliGRAM(s) Oral daily  ferrous    sulfate 325 milliGRAM(s) Oral two times a day  glucagon  Injectable 1 milliGRAM(s) IntraMuscular once  insulin lispro (ADMELOG) corrective regimen sliding scale   SubCutaneous three times a day before meals  levothyroxine 50 MICROGram(s) Oral daily  midodrine. 10 milliGRAM(s) Oral three times a day  pantoprazole    Tablet 40 milliGRAM(s) Oral daily  polyethylene glycol 3350 17 Gram(s) Oral daily  sildenafil (REVATIO) 20 milliGRAM(s) Oral two times a day  simvastatin 40 milliGRAM(s) Oral at bedtime  spironolactone 25 milliGRAM(s) Oral daily  torsemide 20 milliGRAM(s) Oral two times a day    MEDICATIONS  (PRN):  acetaminophen   Tablet .. 650 milliGRAM(s) Oral every 6 hours PRN Temp greater or equal to 38.5C (101.3F), Mild Pain (1 - 3)  aluminum hydroxide/magnesium hydroxide/simethicone Suspension 30 milliLiter(s) Oral every 4 hours PRN Dyspepsia  melatonin 3 milliGRAM(s) Oral at bedtime PRN Insomnia  traMADol 50 milliGRAM(s) Oral every 8 hours PRN Moderate Pain (4 - 6)     CC:  Patient is a 89y old  Female who presents with a chief complaint of SOB (12 Aug 2021 13:35)    SUBJECTIVE:     8/13: no complaints; no chest pain/sob.  BP improved  d/maddy iv fluids    8/14: no complaints  afebrile  echo: no vegetation  repeat blood cx: neg    8/15: had BM which was slightly hard  PT awaited  no other complaints    8/16: feels better  c/o constipation, Miralax added  spironolactone 25 and half dose torsemide at 20 mg bid re started    ROS:  all other review of systems are negative unless indicated above.      PHYSICAL EXAM:    Vital Signs Last 24 Hrs  T(C): 36.3 (16 Aug 2021 08:00), Max: 36.6 (15 Aug 2021 16:12)  T(F): 97.4 (16 Aug 2021 08:00), Max: 97.9 (15 Aug 2021 16:12)  HR: 90 (16 Aug 2021 10:58) (71 - 90)  BP: 103/52 (16 Aug 2021 10:58) (103/52 - 137/75)  BP(mean): --  RR: 18 (16 Aug 2021 08:00) (17 - 18)  SpO2: 91% (16 Aug 2021 08:00) (91% - 95%)    Constitutional: Well appearing  HEENT: Atraumatic, CYNDEE, Normal, No congestion  Respiratory: Breath Sounds normal, b/l rhonchi; no wheeze  Cardiovascular: N S1S2; J LUIS present  Gastrointestinal: Abdomen soft, non tender, Bowel Sounds present  Extremities: No edema, peripheral pulses present  Neurological: AAO x 3, no gross focal motor deficits  Skin: Non cellulitic, no rash, ulcers  Lymph Nodes: No lymphadenopathy noted  Back: No CVA tenderness   Musculoskeletal: non tender  Breasts: Deferred  Genitourinary: deferred  Rectal: Deferred                         Lab Results:  CBC  CBC Full  -  ( 15 Aug 2021 08:56 )  WBC Count : 6.61 K/uL  RBC Count : 3.20 M/uL  Hemoglobin : 10.4 g/dL  Hematocrit : 30.8 %  Platelet Count - Automated : 156 K/uL  Mean Cell Volume : 96.3 fl  Mean Cell Hemoglobin : 32.5 pg  Mean Cell Hemoglobin Concentration : 33.8 gm/dL  Auto Neutrophil # : x  Auto Lymphocyte # : x  Auto Monocyte # : x  Auto Eosinophil # : x  Auto Basophil # : x  Auto Neutrophil % : x  Auto Lymphocyte % : x  Auto Monocyte % : x  Auto Eosinophil % : x  Auto Basophil % : x    .		Differential:	[] Automated		[] Manual  Chemistry                        10.4   6.61  )-----------( 156      ( 15 Aug 2021 08:56 )             30.8     08-15    135  |  105  |  10  ----------------------------<  148<H>  3.7   |  25  |  0.56    Ca    8.8      15 Aug 2021 08:56                  MICROBIOLOGY/CULTURES:  Culture Results:   No growth to date. (08-13 @ 05:42)  Culture Results:   No growth to date. (08-13 @ 05:41)  Culture Results:   Growth in aerobic and anaerobic bottles: Streptococcus lutetiensis  See previous culture [id number] (08-11 @ 12:31)  Culture Results:   Growth in aerobic and anaerobic bottles: Streptococcus lutetiensis  ***Blood Panel PCR results on this specimen are available  approximately 3 hours after the Gram stain result.***  Gram stain, PCR, and/or culture results may not always  corresponddue to difference in methodologies.  ************************************************************  This PCR assay was performed by multiplex PCR. This  Assay tests for 66 bacterial and resistance gene targets.  Please refer to the [facility] Labs test directory  at [email or web address] for details. (08-11 @ 12:31)  Culture Results:   <10,000 CFU/mL Normal Urogenital Ira (08-11 @ 12:31)            RADIOLOGY RESULTS:    < from: CT Chest No Cont (08.11.21 @ 20:53) >  IMPRESSION:  Peripheral reticulation, ill-defined linear and groundglass opacities with mild bronchiectasis is similar compared to the prior study and may be related to interstitial lung disease. No honeycombing.    < end of copied text >        MEDICATIONS  (STANDING):  allopurinol  Oral Tab/Cap - Peds 200 milliGRAM(s) Oral daily  apixaban 2.5 milliGRAM(s) Oral two times a day  artificial  tears Solution 1 Drop(s) Both EYES three times a day  aspirin  chewable 81 milliGRAM(s) Oral daily  cefTRIAXone Injectable. 2000 milliGRAM(s) IV Push every 24 hours  dextrose 40% Gel 15 Gram(s) Oral once  dextrose 5%. 1000 milliLiter(s) (50 mL/Hr) IV Continuous <Continuous>  dextrose 50% Injectable 25 Gram(s) IV Push once  escitalopram 10 milliGRAM(s) Oral daily  ferrous    sulfate 325 milliGRAM(s) Oral two times a day  glucagon  Injectable 1 milliGRAM(s) IntraMuscular once  insulin lispro (ADMELOG) corrective regimen sliding scale   SubCutaneous three times a day before meals  levothyroxine 50 MICROGram(s) Oral daily  midodrine. 10 milliGRAM(s) Oral three times a day  pantoprazole    Tablet 40 milliGRAM(s) Oral daily  polyethylene glycol 3350 17 Gram(s) Oral daily  sildenafil (REVATIO) 20 milliGRAM(s) Oral two times a day  simvastatin 40 milliGRAM(s) Oral at bedtime  spironolactone 25 milliGRAM(s) Oral daily  torsemide 20 milliGRAM(s) Oral two times a day    MEDICATIONS  (PRN):  acetaminophen   Tablet .. 650 milliGRAM(s) Oral every 6 hours PRN Temp greater or equal to 38.5C (101.3F), Mild Pain (1 - 3)  aluminum hydroxide/magnesium hydroxide/simethicone Suspension 30 milliLiter(s) Oral every 4 hours PRN Dyspepsia  melatonin 3 milliGRAM(s) Oral at bedtime PRN Insomnia  traMADol 50 milliGRAM(s) Oral every 8 hours PRN Moderate Pain (4 - 6)

## 2021-08-16 NOTE — PROGRESS NOTE ADULT - ASSESSMENT
Patient is 89 yo female with PMhx pancreatitis s/p ERCP, cirrhosis, DM2, a-fib on Eliquis, CAD s/p PCI, severe pulm HTN, chronic R heart failure, diastolic dysfunction, AS s/p TAVR, OA, HTN, dyslipidemia, hypothyroidism, presents with sob and fatigue. Patient sent from [facility] for further evaluation. Chart reviewed. Patient seen in ED, pleasant with baseline confusion. Does not know why she is here.  In ED, patient found to be septic, given 3 Liter bolus and for a short time started on pressors. Now she is off pressors with BP 98/70 in no distress.     severe sepsis/septic shock POA.: all resolved now  - off vasopressors.  - midodrine taper  - BCx:  Streptococcus.; repeat blood cx, neg  - repeat BCx:  neg  - TTE; no vegetation  - imaging, no overt pneumonia.  - cont  iv ceftriaxone as per ID, switch to po ceftin    AF.  - AC:  apixaban.  - RTC:  metoprolol held due to hypotension.    HFpEF.  - appears some lower chest rales on exam.  restart spironolactone and torsemide 20 bid  monitor BP, Cr    hx DM2.  - FS target 140-180mg/dL.  - consistent CHO diet.  - correction insulin coverage.    hx hypothyroidism.  - TSH:  wnl.  - levothyroxine.    DVT PPX: on apixaban    PTx eval awaited    poc discussed with pt, team, Dr. Mojica     Patient is 89 yo female with PMhx pancreatitis s/p ERCP, cirrhosis, DM2, a-fib on Eliquis, CAD s/p PCI, severe pulm HTN, chronic R heart failure, diastolic dysfunction, AS s/p TAVR, OA, HTN, dyslipidemia, hypothyroidism, presents with sob and fatigue. Patient sent from [facility] for further evaluation. Chart reviewed. Patient seen in ED, pleasant with baseline confusion. Does not know why she is here.  In ED, patient found to be septic, given 3 Liter bolus and for a short time started on pressors. Now she is off pressors with BP 98/70 in no distress.     severe sepsis/septic shock POA.: all resolved now  - off vasopressors.  - midodrine taper  - BCx:  Streptococcus.; repeat blood cx, neg  - repeat BCx:  neg  - TTE; no vegetation  - imaging, no overt pneumonia.  - cont  iv ceftriaxone as per ID, switch to po ceftin for a total of 14 days since last Blood cx were neg, till 8/27    AF.  - AC:  apixaban.  - RTC:  metoprolol held due to hypotension.    HFpEF.  - appears some lower chest rales on exam.  restart spironolactone and torsemide 20 bid  monitor BP, Cr    Constipation: miralax started  document BM    hx DM2.  - FS target 140-180mg/dL.  - consistent CHO diet.  - correction insulin coverage.    hx hypothyroidism.  - TSH:  wnl.  - levothyroxine.    DVT PPX: on apixaban    PTx eval awaited    poc discussed with pt, team, Dr. Mojica     Patient is 89 yo female with PMhx pancreatitis s/p ERCP, cirrhosis, DM2, a-fib on Eliquis, CAD s/p PCI, severe pulm HTN, chronic R heart failure, diastolic dysfunction, AS s/p TAVR, OA, HTN, dyslipidemia, hypothyroidism, presents with sob and fatigue. Patient sent from [facility] for further evaluation. Chart reviewed. Patient seen in ED, pleasant with baseline confusion. Does not know why she is here.  In ED, patient found to be septic, given 3 Liter bolus and for a short time started on pressors. Now she is off pressors with BP 98/70 in no distress.     severe sepsis/septic shock POA.: all resolved now  - off vasopressors.  - midodrine taper  - BCx:  Streptococcus.; repeat blood cx, neg  - repeat BCx:  neg  - TTE; no vegetation  - imaging, no overt pneumonia.  - cont  iv ceftriaxone as per ID, switch to po ceftin for a total of 14 days since last Blood cx were neg, till 8/27  GI consult to r/o colon CA    AF.  - AC:  apixaban.  - RTC:  metoprolol held due to hypotension.    HFpEF.  - appears some lower chest rales on exam.  restart spironolactone and torsemide 20 bid  monitor BP, Cr    Constipation: miralax started  document BM    hx DM2.  - FS target 140-180mg/dL.  - consistent CHO diet.  - correction insulin coverage.    hx hypothyroidism.  - TSH:  wnl.  - levothyroxine.    DVT PPX: on apixaban    PTx eval awaited    poc discussed with pt, team, Dr. Mojica

## 2021-08-16 NOTE — PROGRESS NOTE ADULT - ASSESSMENT
Patient is 89 yo female with PMhx pancreatitis s/p ERCP, cirrhosis, DM2, a-fib on Eliquis, CAD s/p PCI, severe pulm HTN, chronic R heart failure, diastolic dysfunction, AS s/p TAVR, OA, HTN, dyslipidemia, hypothyroidism, presents with sob and fatigue. Patient sent from [facility] for further evaluation. Chart reviewed. Patient seen in ED, pleasant with baseline confusion. Does not know why she is here.  In ED, patient found to be septic, given 3 Liter bolus and for a short time started on pressors. Now she is off pressors with BP 98/70 in no distress. Here blood cultures growing streptococcus species, CT chest with some groundglass opacities, mild bronchiectasis was eval by pulmonary, was given vanco/aztreonam.     1. Sepsis with Streptococcus lutetiensis. bronchiectasis. chf. pcn allergy  - pulmonary/cardiology eval noted  - imagining reviewed  - s/p vancomycin/aztreonam #2  - pcn allergy - edema ? no rash or anaphylaxis  - on rocephin 2gm daily #4  - TTE no vegetations  - repeat blood cx 8/13 no growth  - dc with oral ceftin 500mg BID complete 14 day course until 8/27  - this strep bug is bovis group - can be associated with gi tract malignancies advise gi eval outpt - can consider endoscopy  - monitor temps  - tolerating abx well so far; no side effects noted  - reason for abx use and side effects reviewed with patient  - supportive care  - fu cbc    2. other issues - care per medicine

## 2021-08-16 NOTE — PHYSICAL THERAPY INITIAL EVALUATION ADULT - HEALTH SCREEN CRITERIA
Note dictated. Job code [id number].    Leonard Thakkar MD    [facility]  Division of Gastroenterology, Hepatology and Nutrition    Answers for HPI/ROS submitted by the patient on 2/6/2017   General Symptoms: No  Skin Symptoms: No  HENT Symptoms: No  EYE SYMPTOMS: No  HEART SYMPTOMS: No  LUNG SYMPTOMS: No  INTESTINAL SYMPTOMS: Yes  URINARY SYMPTOMS: No  REPRODUCTIVE SYMPTOMS: No  SKELETAL SYMPTOMS: No  BLOOD SYMPTOMS: No  NERVOUS SYSTEM SYMPTOMS: No  MENTAL HEALTH SYMPTOMS: No  Heart burn or indigestion: No  Nausea: No  Vomiting: No  Abdominal pain: No  Bloating: Yes  Constipation: Yes  Diarrhea: No  Blood in stool: No  Black stools: No  Rectal or Anal pain: No  Rectal bleeding: No  Yellowing of skin or eyes: No  Vomit with blood: No  Change in stools: Yes  Hemorrhoids: No       yes

## 2021-08-16 NOTE — PROGRESS NOTE ADULT - SUBJECTIVE AND OBJECTIVE BOX
Date of service: 21 @ 11:24    pt seen and examined  feels better  laying in bed, nad  no resp distress    ROS: no fever or chills; denies dizziness, no HA, no SOB, no abdominal pain, no diarrhea or constipation; no dysuria, no urinary frequency, no legs pain, no rashes      MEDICATIONS  (STANDING):  allopurinol  Oral Tab/Cap - Peds 200 milliGRAM(s) Oral daily  apixaban 2.5 milliGRAM(s) Oral two times a day  artificial  tears Solution 1 Drop(s) Both EYES three times a day  aspirin  chewable 81 milliGRAM(s) Oral daily  cefTRIAXone Injectable. 2000 milliGRAM(s) IV Push every 24 hours  dextrose 40% Gel 15 Gram(s) Oral once  dextrose 5%. 1000 milliLiter(s) (50 mL/Hr) IV Continuous <Continuous>  dextrose 50% Injectable 25 Gram(s) IV Push once  escitalopram 10 milliGRAM(s) Oral daily  ferrous    sulfate 325 milliGRAM(s) Oral two times a day  glucagon  Injectable 1 milliGRAM(s) IntraMuscular once  insulin lispro (ADMELOG) corrective regimen sliding scale   SubCutaneous three times a day before meals  levothyroxine 50 MICROGram(s) Oral daily  midodrine. 10 milliGRAM(s) Oral three times a day  pantoprazole    Tablet 40 milliGRAM(s) Oral daily  polyethylene glycol 3350 17 Gram(s) Oral daily  sildenafil (REVATIO) 20 milliGRAM(s) Oral two times a day  simvastatin 40 milliGRAM(s) Oral at bedtime    Vital Signs Last 24 Hrs  T(C): 36.3 (16 Aug 2021 08:00), Max: 36.6 (15 Aug 2021 16:12)  T(F): 97.4 (16 Aug 2021 08:00), Max: 97.9 (15 Aug 2021 16:12)  HR: 90 (16 Aug 2021 10:58) (71 - 90)  BP: 103/52 (16 Aug 2021 10:58) (103/52 - 137/75)  BP(mean): --  RR: 18 (16 Aug 2021 08:00) (17 - 18)  SpO2: 91% (16 Aug 2021 08:00) (91% - 95%)    PE:  Constitutional: frail looking  HEENT: NC/AT, EOMI, PERRLA, conjunctivae clear; ears and nose atraumatic; pharynx benign  Neck: supple; thyroid not palpable  Back: no tenderness  Respiratory: respiratory effort normal; clear to auscultation  Cardiovascular: S1S2 regular, no murmurs  Abdomen: soft, not tender, not distended, positive BS; liver and spleen WNL  Genitourinary: no suprapubic tenderness  Lymphatic: no LN palpable  Musculoskeletal: no muscle tenderness, no joint swelling or tenderness  Extremities: no pedal edema  Neurological/ Psychiatric: AxOx3, Judgement and insight normal;  moving all extremities  Skin: no rashes; no palpable lesions    Labs: all available labs reviewed                                   10.4   6.61  )-----------( 156      ( 15 Aug 2021 08:56 )             30.8     08-15    135  |  105  |  10  ----------------------------<  148<H>  3.7   |  25  |  0.56    Ca    8.8      15 Aug 2021 08:56        Urinalysis Basic - ( 11 Aug 2021 12:31 )    Color: Yellow / Appearance: Slightly Turbid / S.010 / pH: x  Gluc: x / Ketone: Negative  / Bili: Negative / Urobili: Negative mg/dL   Blood: x / Protein: 15 mg/dL / Nitrite: Negative   Leuk Esterase: Trace / RBC: 0-2 /HPF / WBC 0-2   Sq Epi: x / Non Sq Epi: Few / Bacteria: Moderate    Culture - Blood (21 @ 12:31)   - Streptococcus sp. (Not Grp A, B or S pneumoniae): Detec   - Levofloxacin: S   - Vancomycin: S   - Penicillin: S 0.064   Gram Stain:   Growth in aerobic bottle: Gram Positive Cocci in Pairs and Chains   Growth in anaerobic bottle: Gram Positive Cocci in Pairs and Chains   - Ceftriaxone: S 0.023   - Clindamycin: S   - Erythromycin: S   Specimen Source: .Blood None   Organism: Blood Culture PCR   Organism: Streptococcus lutetiensis   Organism: Streptococcus lutetiensis   Culture Results:   Growth in aerobic and anaerobic bottles: Streptococcus lutetiensis   ***Blood Panel PCR results on this specimen are available   approximately 3 hours after the Gram stain result.***   Gram stain, PCR, and/or culture results may not always   corresponddue to difference in methodologies.   ************************************************************   This PCR assay was performed by multiplex PCR. This   Assay tests for 66 bacterial and resistance gene targets.   Please refer to the [facility] test directory   at [email or web address] for details. Culture - Blood in AM (21 @ 05:41)   Specimen Source: .  Blood None   Culture Results:   No growth to date. Culture - Blood in AM (21 @ 05:42)   Specimen Source: .Blood None   Culture Results:   No growth to date.     Radiology: all available radiological tests reviewed  < from: CT Chest No Cont (21 @ 20:53) >    EXAM:  CT CHEST                            PROCEDURE DATE:  2021          INTERPRETATION:  EXAMINATION: CT CHEST    CLINICAL INDICATION: Dyspnea.    TECHNIQUE: Noncontrast CT of the chest was obtained.    COMPARISON: Multiple CT, most recent 2020.    FINDINGS:    AIRWAYS AND LUNGS: The central tracheobronchial tree is patent.  Peripheral reticulation, ill-defined linear and groundglass opacities with mild bronchiectasis is similar compared to the prior study. No honeycombing.    MEDIASTINUM AND PLEURA: Increased number of nonenlarged mediastinal lymph nodes are decreased in size compared to the prior study. The visualized portion of the thyroid gland is unremarkable. There is no pleural effusion. There is no pneumothorax.    HEART AND VESSELS: There is cardiomegaly.  There are atherosclerotic calcifications of the aorta and coronary arteries.  There is no pericardial effusion.  TAVR.    UPPER ABDOMEN: Images of the upper abdomen demonstrate diverticulosis.    BONES AND SOFT TISSUES: There are mild degenerative changes of the spine. Severe degenerative changes left shoulder The soft tissues are unremarkable.    TUBES/LINES: None.    IMPRESSION:  Peripheral reticulation, ill-defined linear and groundglass opacities with mild bronchiectasis is similar compared to the prior study and may be related to interstitial lung disease. No honeycombing.    Advanced directives addressed: full resuscitation

## 2021-08-16 NOTE — PHYSICAL THERAPY INITIAL EVALUATION ADULT - ACTIVE RANGE OF MOTION EXAMINATION, REHAB EVAL
Except L Shoulder Flex AROM Limited due to stiffness/pain from L Shoulder OA/bilateral upper extremity Active ROM was WFL (within functional limits)/bilateral  lower extremity Active ROM was WFL (within functional limits)

## 2021-08-16 NOTE — PROGRESS NOTE ADULT - SUBJECTIVE AND OBJECTIVE BOX
Subjective:    Awake, comfortable at rest. No dyspnea.    MEDICATIONS  (STANDING):  allopurinol  Oral Tab/Cap - Peds 200 milliGRAM(s) Oral daily  apixaban 2.5 milliGRAM(s) Oral two times a day  artificial  tears Solution 1 Drop(s) Both EYES three times a day  aspirin  chewable 81 milliGRAM(s) Oral daily  cefTRIAXone Injectable. 2000 milliGRAM(s) IV Push every 24 hours  dextrose 40% Gel 15 Gram(s) Oral once  dextrose 5%. 1000 milliLiter(s) (50 mL/Hr) IV Continuous <Continuous>  dextrose 50% Injectable 25 Gram(s) IV Push once  escitalopram 10 milliGRAM(s) Oral daily  ferrous    sulfate 325 milliGRAM(s) Oral two times a day  glucagon  Injectable 1 milliGRAM(s) IntraMuscular once  insulin lispro (ADMELOG) corrective regimen sliding scale   SubCutaneous three times a day before meals  levothyroxine 50 MICROGram(s) Oral daily  midodrine. 10 milliGRAM(s) Oral three times a day  pantoprazole    Tablet 40 milliGRAM(s) Oral daily  sildenafil (REVATIO) 20 milliGRAM(s) Oral two times a day  simvastatin 40 milliGRAM(s) Oral at bedtime    MEDICATIONS  (PRN):  acetaminophen   Tablet .. 650 milliGRAM(s) Oral every 6 hours PRN Temp greater or equal to 38.5C (101.3F), Mild Pain (1 - 3)  aluminum hydroxide/magnesium hydroxide/simethicone Suspension 30 milliLiter(s) Oral every 4 hours PRN Dyspepsia  melatonin 3 milliGRAM(s) Oral at bedtime PRN Insomnia  ondansetron Injectable 4 milliGRAM(s) IV Push every 8 hours PRN Nausea and/or Vomiting  traMADol 50 milliGRAM(s) Oral every 8 hours PRN Moderate Pain (4 - 6)      Allergies    penicillin (Rash)  penicillins (Other)  sulfa drugs (Rash; Other)    Intolerances        Vital Signs Last 24 Hrs  T(C): 36.3 (16 Aug 2021 08:00), Max: 36.6 (15 Aug 2021 16:12)  T(F): 97.4 (16 Aug 2021 08:00), Max: 97.9 (15 Aug 2021 16:12)  HR: 78 (16 Aug 2021 08:00) (71 - 88)  BP: 118/68 (16 Aug 2021 08:00) (107/52 - 137/75)  BP(mean): --  RR: 18 (16 Aug 2021 08:00) (17 - 18)  SpO2: 91% (16 Aug 2021 08:00) (91% - 95%)    PHYSICAL EXAMINATION:    NECK:  Supple. No lymphadenopathy. Jugular venous pressure not elevated.   HEART:   The cardiac impulse has a normal quality. Irreg. irreg, Nl S1 and S2.    CHEST:  Chest with bibasilar crackles, R>L. Normal respiratory effort.  ABDOMEN:  Soft and nontender.   EXTREMITIES:  There is tr edema.       LABS:                        10.4   6.61  )-----------( 156      ( 15 Aug 2021 08:56 )             30.8     08-15    135  |  105  |  10  ----------------------------<  148<H>  3.7   |  25  |  0.56    Ca    8.8      15 Aug 2021 08:56            RADIOLOGY & ADDITIONAL TESTS:    Assessment and Recommendation:   · Assessment	  Assessment/Plan    - Consider resumption of diuretics   - Broad spectrum Abx per ID-on Rocephin. To consider switch to Ceftin x 14 days  - Results of repeat Blood Cx's-neg  - Monitor I+O's  - Repeat CXR PND  - Follow CBC, BMP  - Daily weights if possible  - Echo result-no vegetations    Problem/Recommendation - 1:  Stage 3 chronic kidney disease.  Problem/Recommendation - 2:  ·  Sepsis due to anaerobes.   Problem/Recommendation - 3:  ·  Chronic atrial fibrillation.   Problem/Recommendation - 4:  ·  Chronic diastolic heart failure.   Problem/Recommendation - 5:  ·  Primary pulmonary hypertension.

## 2021-08-16 NOTE — PROGRESS NOTE ADULT - ASSESSMENT
SOB- likely from pneumonia.  She appears euvolemic on exam.  Avoid IVF- Adviced RN to DC IVF today. She is prone to recurrent decompensated heart failure.     Sepsis- strep bacteremia- repeat cx negative and clinically and hemodynamically pt stable.  TTE did not reveal any IE.   onabx.     Afib- now rate controlled.  continue home meds including full dose anticoagulation    Sepsis - hypotension resolved.  On abx.    Other medical issues- Management per primary team.   Thank you for allowing me to participate in the care of this patient. Please feel free to contact me with any questions.

## 2021-08-16 NOTE — PROGRESS NOTE ADULT - SUBJECTIVE AND OBJECTIVE BOX
Patient is a 89y old  Female who presents with a chief complaint of SOB.       HPI:  Patient is 89 yo female with PMhx pancreatitis s/p ERCP, cirrhosis, DM2, a-fib on Eliquis, CAD s/p PCI, severe pulm HTN, chronic R heart failure, diastolic dysfunction, AS s/p TAVR, OA, HTN, dyslipidemia, hypothyroidism, presents with sob and fatigue. Patient sent from [facility] for further evaluation. Chart reviewed. Patient seen in ED, pleasant with baseline confusion. Does not know why she is here.  In ED, patient found to be septic, given 3 Liter bolus and for a short time started on pressors. Now she is off pressors with BP 98/70 in no distress.     -   Pt seen this am. Pt denies any symptoms this am.     - Pt seen this am.  Pt denies any symptoms this am.  Pleasantly confused.     - pt seen this am. No symptoms.    PAST MEDICAL & SURGICAL HISTORY:  Diabetes Mellitus Type II    Dyslipidemia    GERD (Gastroesophageal Reflux Disease)    History of Osteoarthritis    Hypertension    CAD (coronary artery disease)    Afib    Hypothyroid    HLD (hyperlipidemia)    History of pancreatitis    Aortic stenosis    H/O: Hysterectomy    H/O: Knee Surgery- right meniscus    History of cholecystectomy    History of appendectomy    H/O total knee replacement, left    S/P IVC filter  Note that Pt does not have  h/o DVT, outPt doppler was read first as +, but after review reported as no DVT. Pt got IVC filer before that        MEDICATIONS  (STANDING):  allopurinol  Oral Tab/Cap - Peds 200 milliGRAM(s) Oral daily  apixaban 2.5 milliGRAM(s) Oral two times a day  artificial  tears Solution 1 Drop(s) Both EYES three times a day  aspirin  chewable 81 milliGRAM(s) Oral daily  aztreonam  IVPB 1000 milliGRAM(s) IV Intermittent every 12 hours  dextrose 40% Gel 15 Gram(s) Oral once  dextrose 5%. 1000 milliLiter(s) (50 mL/Hr) IV Continuous <Continuous>  dextrose 50% Injectable 25 Gram(s) IV Push once  escitalopram 10 milliGRAM(s) Oral daily  ferrous    sulfate 325 milliGRAM(s) Oral two times a day  glucagon  Injectable 1 milliGRAM(s) IntraMuscular once  insulin lispro (ADMELOG) corrective regimen sliding scale   SubCutaneous three times a day before meals  levothyroxine 50 MICROGram(s) Oral daily  midodrine. 10 milliGRAM(s) Oral three times a day  pantoprazole    Tablet 40 milliGRAM(s) Oral daily  sildenafil (REVATIO) 20 milliGRAM(s) Oral two times a day  simvastatin 40 milliGRAM(s) Oral at bedtime  vancomycin  IVPB 1000 milliGRAM(s) IV Intermittent every 12 hours    MEDICATIONS  (PRN):  acetaminophen   Tablet .. 650 milliGRAM(s) Oral every 6 hours PRN Temp greater or equal to 38.5C (101.3F), Mild Pain (1 - 3)  aluminum hydroxide/magnesium hydroxide/simethicone Suspension 30 milliLiter(s) Oral every 4 hours PRN Dyspepsia  melatonin 3 milliGRAM(s) Oral at bedtime PRN Insomnia  ondansetron Injectable 4 milliGRAM(s) IV Push every 8 hours PRN Nausea and/or Vomiting      FAMILY HISTORY:  FH: diabetes mellitus  both parents    FH: heart disease        SOCIAL HISTORY: no recent smoking     REVIEW OF SYSTEMS:  CONSTITUTIONAL:    No fatigue, malaise, lethargy.  no fever or chills.  RESPIRATORY:  No cough.  No wheeze.  No hemoptysis.  no shortness of breath.  CARDIOVASCULAR:  No chest pains.  No palpitations. no shortness of breath, No orthopnea or PND.  GASTROINTESTINAL:  No abdominal pain.  No nausea or vomiting.    GENITOURINARY:    No hematuria.    MUSCULOSKELETAL:  No musculoskeletal pain.  No joint swelling.  No arthritis.  NEUROLOGICAL:  No tingling or numbness or weakness.  PSYCHIATRIC:  No confusion  SKIN:  No rashes.          ICU Vital Signs Last 24 Hrs  T(C): 36.3 (16 Aug 2021 08:00), Max: 36.6 (15 Aug 2021 16:12)  T(F): 97.4 (16 Aug 2021 08:00), Max: 97.9 (15 Aug 2021 16:12)  HR: 78 (16 Aug 2021 08:00) (71 - 88)  BP: 118/68 (16 Aug 2021 08:00) (107/52 - 137/75)  BP(mean): --  ABP: --  ABP(mean): --  RR: 18 (16 Aug 2021 08:00) (17 - 18)  SpO2: 91% (16 Aug 2021 08:00) (91% - 95%)      PHYSICAL EXAM-    Constitutional: elderly female in no acute distress     Head: Head is normocephalic and atraumatic.      Neck: No jugular venous distention. No audible carotid bruits. There are strong carotid pulses bilaterally. No JVD.     Cardiovascular: Regular rate and rhythm without S3, S4. No murmurs or rubs are appreciated.      Respiratory: Breath sounds are normal. No rales. No wheezing.    Abdomen: Soft, nontender, nondistended with positive bowel sounds.      Extremity: No tenderness. No  pitting edema     Neurologic: The patient is alert    Skin: No rash, no obvious lesions noted.      Psychiatric: The patient appears to be emotionally stable.      INTERPRETATION OF TELEMETRY: not on     ECG:    I&O's Detail    11 Aug 2021 07:01  -  12 Aug 2021 07:00  --------------------------------------------------------  IN:    IV PiggyBack: 350 mL    Oral Fluid: 100 mL  Total IN: 450 mL    OUT:    Indwelling Catheter - Urethral (mL): 125 mL    Voided (mL): 450 mL  Total OUT: 575 mL    Total NET: -125 mL          LABS:                        10.1   5.59  )-----------( 117      ( 12 Aug 2021 06:07 )             30.7     08-12    135  |  103  |  23  ----------------------------<  125<H>  3.9   |  27  |  1.02    Ca    7.8<L>      12 Aug 2021 06:07    TPro  5.7<L>  /  Alb  2.3<L>  /  TBili  0.4  /  DBili  x   /  AST  28  /  ALT  19  /  AlkPhos  67  08-12        PT/INR - ( 11 Aug 2021 12:31 )   PT: 24.5 sec;   INR: 2.19 ratio         PTT - ( 11 Aug 2021 12:31 )  PTT:32.5 sec  Urinalysis Basic - ( 11 Aug 2021 12:31 )    Color: Yellow / Appearance: Slightly Turbid / S.010 / pH: x  Gluc: x / Ketone: Negative  / Bili: Negative / Urobili: Negative mg/dL   Blood: x / Protein: 15 mg/dL / Nitrite: Negative   Leuk Esterase: Trace / RBC: 0-2 /HPF / WBC 0-2   Sq Epi: x / Non Sq Epi: Few / Bacteria: Moderate      I&O's Summary    11 Aug 2021 07:01  -  12 Aug 2021 07:00  --------------------------------------------------------  IN: 450 mL / OUT: 575 mL / NET: -125 mL      BNP  RADIOLOGY & ADDITIONAL STUDIES:  < from: CT Chest No Cont (21 @ 20:53) >  TUBES/LINES: None.    IMPRESSION:  Peripheral reticulation, ill-defined linear and groundglass opacities with mild bronchiectasis is similar compared to the prior study and may be related to interstitial lung disease. No honeycombing.    --- End of Report ---      < from: TTE Echo Complete w/ Contrast w/ Doppler (21 @ 16:09) >     EXAM:  ECHO TTE WITH CON COMP W DOPP         PROCEDURE DATE:  2021        INTERPRETATION:  Transthoracic Echocardiography Report (TTE)     Demographics     Patient name         CHARY DURANT   Age           89 year(s)     Med Rec #        [id number]              Gender        Female     Account #            [id number]           Date of Birth 10/30/1927     Interpreting         Venugopal Palla, MD    Room Number   0037   Physician     Referring Physician  Marisa Saldivar, Sonographer   Fabiana Perez MD     Date of study        2021 02:43 PM     Height                                      Weight    Type of Study:     TTE procedure: ECHO TTE W C COMP W DOPP     BP: 105/53 mmHg     Study Location: [facility] Quality: Fair    M-Mode Measurements (cm)     LVEDd: 3.49 cm            LVESd: 2.82 cm   IVSEd: 1.03 cm   LVPWd: 1.04 cm            AO Root Dimension: 2.6 cm                             ACS: 1.4 cm                             LA: 4.9 cm                             LVOT: 1.7 cm    Doppler Measurements:     AV Mean Gradient: 5 mmHg             MV Peak E-Wave: 183 cm/s   AV Area (Continuity):0.95 cm^2   TR Velocity:446 cm/s                 MV Peak Gradient: 13.4 mmHg   TR Gradient:79.5664 mmHg             MV P1/2t: 98 msec   Estimated RAP:10 mmHg                MVA by PHT2.24   RVSP:90 mmHg     Findings     Mitral Valve   Moderate mitral annular calcification is present.   Moderate mitral stenosis is present.   Mild (1+) mitral regurgitation is present.     Aortic Valve   Well seated prosthetic valve in the aortic position. suboptimal doppler   interrogation     Tricuspid Valve   Severe (4+) tricuspid valve regurgitation is present.   The tricuspid valve leaflets appear mildly thickened open well.   Severe pulmonary hypertension.     Pulmonic Valve   Normal appearing pulmonic valve structure.   Mild pulmonic valvular regurgitation (1+) is present.     Left Atrium   The left atrium is moderately dilated.     Left Ventricle   Mild concentric left ventricular hypertrophy is present.     Right Atrium   The right atrium appears moderately dilated.     Right Ventricle   The right ventricle is mildly dilated.     Pericardial Effusion   A pericardial effusion is not present.     Pleural Effusion   Pleural effusion cannot be ruled out.     Miscellaneous   The IVC is dilated.     Impression     Summary     Moderate mitral annular calcification is present.   Moderate mitral stenosis is present.   Mild (1+) mitral regurgitation is present.   Well seated prosthetic valve in the aortic position. suboptimal doppler   interrogation   Severe (4+) tricuspid valve regurgitation is present.   The tricuspid valve leaflets appear mildly thickened open well.   Severe pulmonary hypertension.   Normal appearing pulmonic valve structure.   Mild pulmonic valvular regurgitation (1+) is present.     technical difficult study     Signature     ----------------------------------------------------------------   Electronically signed by Venugopal Palla, MD(Interpreting   physician) on 2021 05:38 PM   ----------------------------------------------------------------    < end of copied text >        ARMAND COON MD; Attending Radiologist  This document has been electronically signed. Aug 11 2021  9:25PM    < end of copied text >

## 2021-08-16 NOTE — PHYSICAL THERAPY INITIAL EVALUATION ADULT - MODALITIES TREATMENT COMMENTS
Pt left as rec'd sitting OOB in chair in NAD with CBIR; Pt instructed to not get up alone; Chair alarm activated; YADIRA Marley made aware

## 2021-08-17 LAB
ANION GAP SERPL CALC-SCNC: 7 MMOL/L — SIGNIFICANT CHANGE UP (ref 5–17)
BUN SERPL-MCNC: 9 MG/DL — SIGNIFICANT CHANGE UP (ref 7–23)
CALCIUM SERPL-MCNC: 8.6 MG/DL — SIGNIFICANT CHANGE UP (ref 8.5–10.1)
CHLORIDE SERPL-SCNC: 106 MMOL/L — SIGNIFICANT CHANGE UP (ref 96–108)
CO2 SERPL-SCNC: 25 MMOL/L — SIGNIFICANT CHANGE UP (ref 22–31)
CREAT SERPL-MCNC: 0.81 MG/DL — SIGNIFICANT CHANGE UP (ref 0.5–1.3)
GLUCOSE SERPL-MCNC: 254 MG/DL — HIGH (ref 70–99)
HCT VFR BLD CALC: 29.9 % — LOW (ref 34.5–45)
HGB BLD-MCNC: 9.8 G/DL — LOW (ref 11.5–15.5)
MAGNESIUM SERPL-MCNC: 1.9 MG/DL — SIGNIFICANT CHANGE UP (ref 1.6–2.6)
MCHC RBC-ENTMCNC: 32.1 PG — SIGNIFICANT CHANGE UP (ref 27–34)
MCHC RBC-ENTMCNC: 32.8 GM/DL — SIGNIFICANT CHANGE UP (ref 32–36)
MCV RBC AUTO: 98 FL — SIGNIFICANT CHANGE UP (ref 80–100)
PLATELET # BLD AUTO: 201 K/UL — SIGNIFICANT CHANGE UP (ref 150–400)
POTASSIUM SERPL-MCNC: 3.9 MMOL/L — SIGNIFICANT CHANGE UP (ref 3.5–5.3)
POTASSIUM SERPL-SCNC: 3.9 MMOL/L — SIGNIFICANT CHANGE UP (ref 3.5–5.3)
RBC # BLD: 3.05 M/UL — LOW (ref 3.8–5.2)
RBC # FLD: 15.7 % — HIGH (ref 10.3–14.5)
SODIUM SERPL-SCNC: 138 MMOL/L — SIGNIFICANT CHANGE UP (ref 135–145)
WBC # BLD: 6.96 K/UL — SIGNIFICANT CHANGE UP (ref 3.8–10.5)
WBC # FLD AUTO: 6.96 K/UL — SIGNIFICANT CHANGE UP (ref 3.8–10.5)

## 2021-08-17 PROCEDURE — 99233 SBSQ HOSP IP/OBS HIGH 50: CPT

## 2021-08-17 PROCEDURE — 99232 SBSQ HOSP IP/OBS MODERATE 35: CPT

## 2021-08-17 RX ORDER — ATORVASTATIN CALCIUM 80 MG/1
10 TABLET, FILM COATED ORAL AT BEDTIME
Refills: 0 | Status: DISCONTINUED | OUTPATIENT
Start: 2021-08-17 | End: 2021-08-23

## 2021-08-17 RX ORDER — MAGNESIUM HYDROXIDE 400 MG/1
30 TABLET, CHEWABLE ORAL ONCE
Refills: 0 | Status: COMPLETED | OUTPATIENT
Start: 2021-08-17 | End: 2021-08-17

## 2021-08-17 RX ORDER — MIDODRINE HYDROCHLORIDE 2.5 MG/1
5 TABLET ORAL THREE TIMES A DAY
Refills: 0 | Status: DISCONTINUED | OUTPATIENT
Start: 2021-08-17 | End: 2021-08-21

## 2021-08-17 RX ORDER — MAGNESIUM HYDROXIDE 400 MG/1
30 TABLET, CHEWABLE ORAL DAILY
Refills: 0 | Status: DISCONTINUED | OUTPATIENT
Start: 2021-08-17 | End: 2021-08-23

## 2021-08-17 RX ADMIN — ESCITALOPRAM OXALATE 10 MILLIGRAM(S): 10 TABLET, FILM COATED ORAL at 10:18

## 2021-08-17 RX ADMIN — TRAMADOL HYDROCHLORIDE 50 MILLIGRAM(S): 50 TABLET ORAL at 18:41

## 2021-08-17 RX ADMIN — Medication 1 DROP(S): at 11:25

## 2021-08-17 RX ADMIN — MIDODRINE HYDROCHLORIDE 5 MILLIGRAM(S): 2.5 TABLET ORAL at 11:28

## 2021-08-17 RX ADMIN — MIDODRINE HYDROCHLORIDE 5 MILLIGRAM(S): 2.5 TABLET ORAL at 16:32

## 2021-08-17 RX ADMIN — Medication 3 MILLIGRAM(S): at 21:31

## 2021-08-17 RX ADMIN — Medication 20 MILLIGRAM(S): at 21:39

## 2021-08-17 RX ADMIN — PANTOPRAZOLE SODIUM 40 MILLIGRAM(S): 20 TABLET, DELAYED RELEASE ORAL at 10:17

## 2021-08-17 RX ADMIN — Medication 20 MILLIGRAM(S): at 10:19

## 2021-08-17 RX ADMIN — Medication 20 MILLIGRAM(S): at 21:41

## 2021-08-17 RX ADMIN — ATORVASTATIN CALCIUM 10 MILLIGRAM(S): 80 TABLET, FILM COATED ORAL at 21:33

## 2021-08-17 RX ADMIN — Medication 500 MILLIGRAM(S): at 21:31

## 2021-08-17 RX ADMIN — APIXABAN 2.5 MILLIGRAM(S): 2.5 TABLET, FILM COATED ORAL at 21:30

## 2021-08-17 RX ADMIN — MAGNESIUM HYDROXIDE 30 MILLILITER(S): 400 TABLET, CHEWABLE ORAL at 11:36

## 2021-08-17 RX ADMIN — APIXABAN 2.5 MILLIGRAM(S): 2.5 TABLET, FILM COATED ORAL at 10:17

## 2021-08-17 RX ADMIN — Medication 1 DROP(S): at 21:33

## 2021-08-17 RX ADMIN — Medication 81 MILLIGRAM(S): at 10:17

## 2021-08-17 RX ADMIN — Medication 20 MILLIGRAM(S): at 10:17

## 2021-08-17 RX ADMIN — Medication 500 MILLIGRAM(S): at 10:17

## 2021-08-17 RX ADMIN — Medication 50 MICROGRAM(S): at 06:39

## 2021-08-17 RX ADMIN — MIDODRINE HYDROCHLORIDE 10 MILLIGRAM(S): 2.5 TABLET ORAL at 06:39

## 2021-08-17 RX ADMIN — Medication 3: at 11:26

## 2021-08-17 RX ADMIN — Medication 1 DROP(S): at 06:38

## 2021-08-17 RX ADMIN — POLYETHYLENE GLYCOL 3350 17 GRAM(S): 17 POWDER, FOR SOLUTION ORAL at 10:17

## 2021-08-17 RX ADMIN — TRAMADOL HYDROCHLORIDE 50 MILLIGRAM(S): 50 TABLET ORAL at 12:42

## 2021-08-17 RX ADMIN — Medication 325 MILLIGRAM(S): at 21:31

## 2021-08-17 RX ADMIN — Medication 325 MILLIGRAM(S): at 10:17

## 2021-08-17 RX ADMIN — TRAMADOL HYDROCHLORIDE 50 MILLIGRAM(S): 50 TABLET ORAL at 11:36

## 2021-08-17 RX ADMIN — Medication 200 MILLIGRAM(S): at 10:17

## 2021-08-17 NOTE — PROGRESS NOTE ADULT - ASSESSMENT
Patient is 89 yo female with PMhx pancreatitis s/p ERCP, cirrhosis, DM2, a-fib on Eliquis, CAD s/p PCI, severe pulm HTN, chronic R heart failure, diastolic dysfunction, AS s/p TAVR, OA, HTN, dyslipidemia, hypothyroidism, presents with sob and fatigue. Patient sent from [facility] for further evaluation. Chart reviewed. Patient seen in ED, pleasant with baseline confusion. Does not know why she is here.  In ED, patient found to be septic, given 3 Liter bolus and for a short time started on pressors. Now she is off pressors with BP 98/70 in no distress. Here blood cultures growing streptococcus species, CT chest with some groundglass opacities, mild bronchiectasis was eval by pulmonary, was given vanco/aztreonam.     1. Sepsis with Streptococcus lutetiensis. bronchiectasis. chf. pcn allergy  - pulmonary/cardiology eval noted  - imagining reviewed  - s/p vancomycin/aztreonam #2  - pcn allergy - edema ? no rash or anaphylaxis  - s/p rocephin 2gm daily #4  - TTE no vegetations  - repeat blood cx 8/13 no growth  - switch to oral ceftin 500mg BID complete 14 day course until 8/27  - this strep bug is bovis group - can be associated with gi tract malignancies advise gi eval outpt - can consider endoscopy  - monitor temps  - tolerating abx well so far; no side effects noted  - reason for abx use and side effects reviewed with patient  - supportive care  - fu cbc    2. other issues - care per medicine

## 2021-08-17 NOTE — PROGRESS NOTE ADULT - ASSESSMENT
SOB- likely from pneumonia.  She appears euvolemic on exam.  Torsemide restarted.  DC spironolactone In her case the risk outweighs the benefit in using two diuretics.      Hyperlipidemia- on simvastatin 40mg and she is prone for drug drug interreactions  Will switch her to atorvastatin.       Sepsis- strep bacteremia- repeat cx negative and clinically and hemodynamically pt stable.  TTE did not reveal any IE.   on abx.     Afib- now rate controlled.  continue home meds including full dose anticoagulation    Sepsis - hypotension resolved.  Midodrine decreased to 5mg po TID.  Will continue to down titrate the midodrine as tolerated.  On abx.    Other medical issues- Management per primary team.   Thank you for allowing me to participate in the care of this patient. Please feel free to contact me with any questions.

## 2021-08-17 NOTE — PROGRESS NOTE ADULT - SUBJECTIVE AND OBJECTIVE BOX
Subjective:    Awake, alert. Comfortable lying flat    MEDICATIONS  (STANDING):  allopurinol  Oral Tab/Cap - Peds 200 milliGRAM(s) Oral daily  apixaban 2.5 milliGRAM(s) Oral two times a day  artificial  tears Solution 1 Drop(s) Both EYES three times a day  aspirin  chewable 81 milliGRAM(s) Oral daily  cefuroxime   Tablet 500 milliGRAM(s) Oral every 12 hours  dextrose 40% Gel 15 Gram(s) Oral once  dextrose 5%. 1000 milliLiter(s) (50 mL/Hr) IV Continuous <Continuous>  dextrose 50% Injectable 25 Gram(s) IV Push once  escitalopram 10 milliGRAM(s) Oral daily  ferrous    sulfate 325 milliGRAM(s) Oral two times a day  glucagon  Injectable 1 milliGRAM(s) IntraMuscular once  insulin lispro (ADMELOG) corrective regimen sliding scale   SubCutaneous three times a day before meals  levothyroxine 50 MICROGram(s) Oral daily  midodrine. 10 milliGRAM(s) Oral three times a day  pantoprazole    Tablet 40 milliGRAM(s) Oral daily  polyethylene glycol 3350 17 Gram(s) Oral daily  sildenafil (REVATIO) 20 milliGRAM(s) Oral two times a day  simvastatin 40 milliGRAM(s) Oral at bedtime  spironolactone 25 milliGRAM(s) Oral daily  torsemide 20 milliGRAM(s) Oral two times a day    MEDICATIONS  (PRN):  acetaminophen   Tablet .. 650 milliGRAM(s) Oral every 6 hours PRN Temp greater or equal to 38.5C (101.3F), Mild Pain (1 - 3)  aluminum hydroxide/magnesium hydroxide/simethicone Suspension 30 milliLiter(s) Oral every 4 hours PRN Dyspepsia  melatonin 3 milliGRAM(s) Oral at bedtime PRN Insomnia  traMADol 50 milliGRAM(s) Oral every 8 hours PRN Moderate Pain (4 - 6)      Allergies    penicillin (Rash)  penicillins (Other)  sulfa drugs (Rash; Other)    Intolerances        Vital Signs Last 24 Hrs  T(C): 36.4 (17 Aug 2021 07:40), Max: 36.8 (16 Aug 2021 15:50)  T(F): 97.6 (17 Aug 2021 07:40), Max: 98.3 (16 Aug 2021 15:50)  HR: 77 (17 Aug 2021 07:40) (77 - 92)  BP: 123/64 (17 Aug 2021 07:40) (103/52 - 123/64)  BP(mean): --  RR: 19 (17 Aug 2021 07:40) (18 - 19)  SpO2: 99% (17 Aug 2021 07:40) (95% - 99%)    PHYSICAL EXAMINATION:    NECK:  Supple. No lymphadenopathy. Jugular venous pressure not elevated.   HEART:   The cardiac impulse has a normal quality. Irreg., Nl S1 and S2.    CHEST:  Chest with bilateral crackles-R>L. Normal respiratory effort.  ABDOMEN:  Soft and nontender.   EXTREMITIES:  There is tr edema.     LABS:                        10.4   6.61  )-----------( 156      ( 15 Aug 2021 08:56 )             30.8     08-15    135  |  105  |  10  ----------------------------<  148<H>  3.7   |  25  |  0.56    Ca    8.8      15 Aug 2021 08:56            RADIOLOGY & ADDITIONAL TESTS:< from: Xray Chest 1 View- PORTABLE-Routine (Xray Chest 1 View- PORTABLE-Routine .) (08.12.21 @ 09:38) >  EXAM:  XR CHEST PORTABLE ROUTINE 1V                            PROCEDURE DATE:  08/12/2021          INTERPRETATION:  History: Sepsis.    TECHNIQUE: A portable upright frontal radiograph of the chest was obtained.    COMPARISON: Comparison is made to prior study and noncontrast CT of the thorax dated August 11, 2021.    FINDINGS: Status post TAVR. Heart and mediastinal contours are stable. Again is noted mild peripheral linear opacities throughout the lungs, compatible with known fibrosis. Therehas been slight increase in prominence of the central pulmonary vasculature extending to the periphery, suggestive of worsening pulmonary vascular congestion. No confluent airspace opacity is identified. There is trace blunting of the bilateral costophrenic sulci, compatible with tiny effusions. There is no pneumothorax or free air under the hemidiaphragms.    There are advanced degenerative changes of the left shoulder.    IMPRESSION: Slightly increased pulmonary vascular congestion on a background of pulmonary fibrosis.        ELIAZAR REBOLLEDO MD; Attending Radiologist          Assessment and Recommendation:   · Assessment	  Assessment/Plan    - Diuretics restarted- will use torsemide alone and monitor lytes carefully   - Broad spectrum Abx per ID-on Ceftin x 14 days  - Results of repeat Blood Cx's-neg  - Monitor I+O's  - Follow CBC, BMP  - Daily weights if possible  - Repeat CXR on 8/18 to re-assess interstitial changes  - Await decision on colonoscopy to evaluate for possible source of Strep Lutentiesis (Bovis family)    Problem/Recommendation - 1:  Stage 3 chronic kidney disease.  Problem/Recommendation - 2:  ·  Sepsis due to anaerobes.   Problem/Recommendation - 3:  ·  Chronic atrial fibrillation.   Problem/Recommendation - 4:  ·  Chronic diastolic heart failure.   Problem/Recommendation - 5:  ·  Primary pulmonary hypertension.

## 2021-08-17 NOTE — PROGRESS NOTE ADULT - ASSESSMENT
Patient is 89 yo female with PMhx pancreatitis s/p ERCP, cirrhosis, DM2, a-fib on Eliquis, CAD s/p PCI, severe pulm HTN, chronic R heart failure, diastolic dysfunction, AS s/p TAVR, OA, HTN, dyslipidemia, hypothyroidism, presents with sob and fatigue. Patient sent from [facility] for further evaluation. Chart reviewed. Patient seen in ED, pleasant with baseline confusion. Does not know why she is here.  In ED, patient found to be septic, given 3 Liter bolus and for a short time started on pressors. Now she is off pressors with BP 98/70 in no distress.     severe sepsis/septic shock POA.: all resolved now  - off vasopressors.  - midodrine taper  - BCx:  Streptococcus.; repeat blood cx, neg  - repeat BCx:  neg  - TTE; no vegetation  - imaging, no overt pneumonia.  - cont  iv ceftriaxone as per ID, switch to po ceftin for a total of 14 days since last Blood cx were neg, till 8/27  GI consult to r/o colon CA; in pt colonoscopy    AF.  - AC:  apixaban.  - RTC:  metoprolol held due to hypotension.    HFpEF.  - appears some lower chest rales on exam.  restart spironolactone and torsemide 20 bid  monitor BP, Cr    Constipation: miralax started  document BM  no BM; MOM added    hx DM2.  - FS target 140-180mg/dL.  - consistent CHO diet.  - correction insulin coverage.    hx hypothyroidism.  - TSH:  wnl.  - levothyroxine.    DVT PPX: on apixaban    PTx eval awaited    poc discussed with pt, team, Dr. Mojica

## 2021-08-17 NOTE — PROGRESS NOTE ADULT - SUBJECTIVE AND OBJECTIVE BOX
Patient is a 89y old  Female who presents with a chief complaint of SOB.       HPI:  Patient is 89 yo female with PMhx pancreatitis s/p ERCP, cirrhosis, DM2, a-fib on Eliquis, CAD s/p PCI, severe pulm HTN, chronic R heart failure, diastolic dysfunction, AS s/p TAVR, OA, HTN, dyslipidemia, hypothyroidism, presents with sob and fatigue. Patient sent from [facility] for further evaluation. Chart reviewed. Patient seen in ED, pleasant with baseline confusion. Does not know why she is here.  In ED, patient found to be septic, given 3 Liter bolus and for a short time started on pressors. Now she is off pressors with BP 98/70 in no distress.     -   Pt seen this am. Pt denies any symptoms this am.     - Pt seen this am.  Pt denies any symptoms this am.  Pleasantly confused.     - pt seen this am. No symptoms.    - pt seen this am.  She denies any SOB.     PAST MEDICAL & SURGICAL HISTORY:  Diabetes Mellitus Type II    Dyslipidemia    GERD (Gastroesophageal Reflux Disease)    History of Osteoarthritis    Hypertension    CAD (coronary artery disease)    Afib    Hypothyroid    HLD (hyperlipidemia)    History of pancreatitis    Aortic stenosis    H/O: Hysterectomy    H/O: Knee Surgery- right meniscus    History of cholecystectomy    History of appendectomy    H/O total knee replacement, left    S/P IVC filter  Note that Pt does not have  h/o DVT, outPt doppler was read first as +, but after review reported as no DVT. Pt got IVC filer before that        MEDICATIONS  (STANDING):  allopurinol  Oral Tab/Cap - Peds 200 milliGRAM(s) Oral daily  apixaban 2.5 milliGRAM(s) Oral two times a day  artificial  tears Solution 1 Drop(s) Both EYES three times a day  aspirin  chewable 81 milliGRAM(s) Oral daily  aztreonam  IVPB 1000 milliGRAM(s) IV Intermittent every 12 hours  dextrose 40% Gel 15 Gram(s) Oral once  dextrose 5%. 1000 milliLiter(s) (50 mL/Hr) IV Continuous <Continuous>  dextrose 50% Injectable 25 Gram(s) IV Push once  escitalopram 10 milliGRAM(s) Oral daily  ferrous    sulfate 325 milliGRAM(s) Oral two times a day  glucagon  Injectable 1 milliGRAM(s) IntraMuscular once  insulin lispro (ADMELOG) corrective regimen sliding scale   SubCutaneous three times a day before meals  levothyroxine 50 MICROGram(s) Oral daily  midodrine. 10 milliGRAM(s) Oral three times a day  pantoprazole    Tablet 40 milliGRAM(s) Oral daily  sildenafil (REVATIO) 20 milliGRAM(s) Oral two times a day  simvastatin 40 milliGRAM(s) Oral at bedtime  vancomycin  IVPB 1000 milliGRAM(s) IV Intermittent every 12 hours    MEDICATIONS  (PRN):  acetaminophen   Tablet .. 650 milliGRAM(s) Oral every 6 hours PRN Temp greater or equal to 38.5C (101.3F), Mild Pain (1 - 3)  aluminum hydroxide/magnesium hydroxide/simethicone Suspension 30 milliLiter(s) Oral every 4 hours PRN Dyspepsia  melatonin 3 milliGRAM(s) Oral at bedtime PRN Insomnia  ondansetron Injectable 4 milliGRAM(s) IV Push every 8 hours PRN Nausea and/or Vomiting      FAMILY HISTORY:  FH: diabetes mellitus  both parents    FH: heart disease        SOCIAL HISTORY: no recent smoking     REVIEW OF SYSTEMS:  CONSTITUTIONAL:    No fatigue, malaise, lethargy.  no fever or chills.  RESPIRATORY:  No cough.  No wheeze.  No hemoptysis.  no shortness of breath.  CARDIOVASCULAR:  No chest pains.  No palpitations. no shortness of breath, No orthopnea or PND.  GASTROINTESTINAL:  No abdominal pain.  No nausea or vomiting.    GENITOURINARY:    No hematuria.    MUSCULOSKELETAL:  No musculoskeletal pain.  No joint swelling.  No arthritis.  NEUROLOGICAL:  No tingling or numbness or weakness.  PSYCHIATRIC:  No confusion  SKIN:  No rashes.          ICU Vital Signs Last 24 Hrs  T(C): 36.4 (17 Aug 2021 07:40), Max: 36.8 (16 Aug 2021 15:50)  T(F): 97.6 (17 Aug 2021 07:40), Max: 98.3 (16 Aug 2021 15:50)  HR: 77 (17 Aug 2021 07:40) (77 - 92)  BP: 123/64 (17 Aug 2021 07:40) (103/52 - 123/64)  BP(mean): --  ABP: --  ABP(mean): --  RR: 19 (17 Aug 2021 07:40) (18 - 19)  SpO2: 99% (17 Aug 2021 07:40) (95% - 99%)        PHYSICAL EXAM-    Constitutional: elderly female in no acute distress     Head: Head is normocephalic and atraumatic.      Neck: No JVD.     Cardiovascular: Regular rate and rhythm without S3, S4. No murmurs or rubs are appreciated.      Respiratory: B/l rales. No wheezing.    Abdomen: Soft, nontender, nondistended with positive bowel sounds.      Extremity: No tenderness. No  pitting edema     Neurologic: The patient is alert    Skin: No rash, no obvious lesions noted.      Psychiatric: The patient appears to be emotionally stable.      INTERPRETATION OF TELEMETRY: not on     ECG:    I&O's Detail    11 Aug 2021 07:01  -  12 Aug 2021 07:00  --------------------------------------------------------  IN:    IV PiggyBack: 350 mL    Oral Fluid: 100 mL  Total IN: 450 mL    OUT:    Indwelling Catheter - Urethral (mL): 125 mL    Voided (mL): 450 mL  Total OUT: 575 mL    Total NET: -125 mL          LABS:                                   10.4   6.61  )-----------( 156      ( 15 Aug 2021 08:56 )             30.8     -15    135  |  105  |  10  ----------------------------<  148<H>  3.7   |  25  |  0.56    Ca    8.8      15 Aug 2021 08:56             PTT - ( 11 Aug 2021 12:31 )  PTT:32.5 sec  Urinalysis Basic - ( 11 Aug 2021 12:31 )    Color: Yellow / Appearance: Slightly Turbid / S.010 / pH: x  Gluc: x / Ketone: Negative  / Bili: Negative / Urobili: Negative mg/dL   Blood: x / Protein: 15 mg/dL / Nitrite: Negative   Leuk Esterase: Trace / RBC: 0-2 /HPF / WBC 0-2   Sq Epi: x / Non Sq Epi: Few / Bacteria: Moderate      I&O's Summary    11 Aug 2021 07:01  -  12 Aug 2021 07:00  --------------------------------------------------------  IN: 450 mL / OUT: 575 mL / NET: -125 mL      BNP  RADIOLOGY & ADDITIONAL STUDIES:  < from: CT Chest No Cont (21 @ 20:53) >  TUBES/LINES: None.    IMPRESSION:  Peripheral reticulation, ill-defined linear and groundglass opacities with mild bronchiectasis is similar compared to the prior study and may be related to interstitial lung disease. No honeycombing.    --- End of Report ---      < from: TTE Echo Complete w/ Contrast w/ Doppler (21 @ 16:09) >     EXAM:  ECHO TTE WITH CON COMP W DOPP         PROCEDURE DATE:  2021        INTERPRETATION:  Transthoracic Echocardiography Report (TTE)     Demographics     Patient name         CHARY DURANT   Age           89 year(s)     Med Rec #        [id number]              Gender        Female     Account #            [id number]           Date of Birth 10/30/1927     Interpreting         Fredi Palla, MD    Room Number   0037   Physician     Referring Physician  Marisa Saldivar, Sonographer   Fabiana Perez MD     Date of study        2021 02:43 PM     Height                                      Weight    Type of Study:     TTE procedure: ECHO TTE W C COMP W DOPP     BP: 105/53 mmHg     Study Location: [facility] Quality: Fair    M-Mode Measurements (cm)     LVEDd: 3.49 cm            LVESd: 2.82 cm   IVSEd: 1.03 cm   LVPWd: 1.04 cm            AO Root Dimension: 2.6 cm                             ACS: 1.4 cm                             LA: 4.9 cm                             LVOT: 1.7 cm    Doppler Measurements:     AV Mean Gradient: 5 mmHg             MV Peak E-Wave: 183 cm/s   AV Area (Continuity):0.95 cm^2   TR Velocity:446 cm/s                 MV Peak Gradient: 13.4 mmHg   TR Gradient:79.5664 mmHg             MV P1/2t: 98 msec   Estimated RAP:10 mmHg                MVA by PHT2.24   RVSP:90 mmHg     Findings     Mitral Valve   Moderate mitral annular calcification is present.   Moderate mitral stenosis is present.   Mild (1+) mitral regurgitation is present.     Aortic Valve   Well seated prosthetic valve in the aortic position. suboptimal doppler   interrogation     Tricuspid Valve   Severe (4+) tricuspid valve regurgitation is present.   The tricuspid valve leaflets appear mildly thickened open well.   Severe pulmonary hypertension.     Pulmonic Valve   Normal appearing pulmonic valve structure.   Mild pulmonic valvular regurgitation (1+) is present.     Left Atrium   The left atrium is moderately dilated.     Left Ventricle   Mild concentric left ventricular hypertrophy is present.     Right Atrium   The right atrium appears moderately dilated.     Right Ventricle   The right ventricle is mildly dilated.     Pericardial Effusion   A pericardial effusion is not present.     Pleural Effusion   Pleural effusion cannot be ruled out.     Miscellaneous   The IVC is dilated.     Impression     Summary     Moderate mitral annular calcification is present.   Moderate mitral stenosis is present.   Mild (1+) mitral regurgitation is present.   Well seated prosthetic valve in the aortic position. suboptimal doppler   interrogation   Severe (4+) tricuspid valve regurgitation is present.   The tricuspid valve leaflets appear mildly thickened open well.   Severe pulmonary hypertension.   Normal appearing pulmonic valve structure.   Mild pulmonic valvular regurgitation (1+) is present.     technical difficult study     Signature     ----------------------------------------------------------------   Electronically signed by Venugopal Palla, MD(Interpreting   physician) on 2021 05:38 PM   ----------------------------------------------------------------    < end of copied text >        ARMAND COON MD; Attending Radiologist  This document has been electronically signed. Aug 11 2021  9:25PM    < end of copied text >

## 2021-08-17 NOTE — CONSULT NOTE ADULT - ASSESSMENT
Imp:  Sepsis with Streptococcus lutetiensis  R/O underlying colon cancer    Rec:  Obviously the risks and benefits are the question here, given age and comorbidities  I left a message with Dr. Mojica (patient requested I review options and plan with him) and we will decide regarding colonoscopy.   I would prefer to do inpatient if possible as outpatient would be difficult (although not impossible)  Check iron studies Imp:  Sepsis with Streptococcus lutetiensis  R/O underlying colon cancer  Severe pulm HTN by echo    Rec:  Obviously the risks and benefits are the question here, given age and comorbidities  I left a message with Dr. Mojica (patient requested I review options and plan with him) and we will decide regarding colonoscopy.   I would prefer to do inpatient if possible as outpatient would be difficult (although not impossible)  Check iron studies

## 2021-08-17 NOTE — PROGRESS NOTE ADULT - SUBJECTIVE AND OBJECTIVE BOX
CC:  Patient is a 89y old  Female who presents with a chief complaint of SOB (12 Aug 2021 13:35)    SUBJECTIVE:     8/13: no complaints; no chest pain/sob.  BP improved  d/maddy iv fluids    8/14: no complaints  afebrile  echo: no vegetation  repeat blood cx: neg    8/15: had BM which was slightly hard  PT awaited  no other complaints    8/16: feels better  c/o constipation, Miralax added  spironolactone 25 and half dose torsemide at 20 mg bid re started    8/17: c/o constipation  MOM added  colonoscopy as in pt    ROS:  all other review of systems are negative unless indicated above.      PHYSICAL EXAM:    Vital Signs Last 24 Hrs  T(C): 36.7 (17 Aug 2021 15:27), Max: 36.7 (17 Aug 2021 15:27)  T(F): 98 (17 Aug 2021 15:27), Max: 98 (17 Aug 2021 15:27)  HR: 93 (17 Aug 2021 15:27) (77 - 93)  BP: 98/57 (17 Aug 2021 15:27) (98/57 - 123/64)  BP(mean): --  RR: 19 (17 Aug 2021 15:27) (18 - 19)  SpO2: 97% (17 Aug 2021 15:27) (95% - 99%)    Constitutional: Well appearing  HEENT: Atraumatic, CYNDEE, Normal, No congestion  Respiratory: Breath Sounds normal, b/l rhonchi; no wheeze  Cardiovascular: N S1S2; J LUIS present  Gastrointestinal: Abdomen soft, non tender, Bowel Sounds present  Extremities: No edema, peripheral pulses present  Neurological: AAO x 3, no gross focal motor deficits  Skin: Non cellulitic, no rash, ulcers  Lymph Nodes: No lymphadenopathy noted  Back: No CVA tenderness   Musculoskeletal: non tender  Breasts: Deferred  Genitourinary: deferred  Rectal: Deferred                                9.8    6.96  )-----------( 201      ( 17 Aug 2021 10:55 )             29.9           08-17    138  |  106  |  9   ----------------------------<  254<H>  3.9   |  25  |  0.81    Ca    8.6      17 Aug 2021 10:55  Mg     1.9     08-17    MICROBIOLOGY/CULTURES:    Culture Results:   No growth to date. (08-13 @ 05:42)  Culture Results:   No growth to date. (08-13 @ 05:41)  Culture Results:   Growth in aerobic and anaerobic bottles: Streptococcus lutetiensis  See previous culture [id number] (08-11 @ 12:31)  Culture Results:   Growth in aerobic and anaerobic bottles: Streptococcus lutetiensis  ***Blood Panel PCR results on this specimen are available  approximately 3 hours after the Gram stain result.***  Gram stain, PCR, and/or culture results may not always  corresponddue to difference in methodologies.  ************************************************************  This PCR assay was performed by multiplex PCR. This  Assay tests for 66 bacterial and resistance gene targets.  Please refer to the [facility] test directory  at [email or web address] for details. (08-11 @ 12:31)  Culture Results:   <10,000 CFU/mL Normal Urogenital Ira (08-11 @ 12:31)            RADIOLOGY RESULTS:    < from: CT Chest No Cont (08.11.21 @ 20:53) >  IMPRESSION:  Peripheral reticulation, ill-defined linear and groundglass opacities with mild bronchiectasis is similar compared to the prior study and may be related to interstitial lung disease. No honeycombing.    < end of copied text >        MEDICATIONS  (STANDING):  allopurinol  Oral Tab/Cap - Peds 200 milliGRAM(s) Oral daily  apixaban 2.5 milliGRAM(s) Oral two times a day  artificial  tears Solution 1 Drop(s) Both EYES three times a day  aspirin  chewable 81 milliGRAM(s) Oral daily  cefTRIAXone Injectable. 2000 milliGRAM(s) IV Push every 24 hours  dextrose 40% Gel 15 Gram(s) Oral once  dextrose 5%. 1000 milliLiter(s) (50 mL/Hr) IV Continuous <Continuous>  dextrose 50% Injectable 25 Gram(s) IV Push once  escitalopram 10 milliGRAM(s) Oral daily  ferrous    sulfate 325 milliGRAM(s) Oral two times a day  glucagon  Injectable 1 milliGRAM(s) IntraMuscular once  insulin lispro (ADMELOG) corrective regimen sliding scale   SubCutaneous three times a day before meals  levothyroxine 50 MICROGram(s) Oral daily  midodrine. 10 milliGRAM(s) Oral three times a day  pantoprazole    Tablet 40 milliGRAM(s) Oral daily  polyethylene glycol 3350 17 Gram(s) Oral daily  sildenafil (REVATIO) 20 milliGRAM(s) Oral two times a day  simvastatin 40 milliGRAM(s) Oral at bedtime  torsemide 20 milliGRAM(s) Oral two times a day    MEDICATIONS  (PRN):  acetaminophen   Tablet .. 650 milliGRAM(s) Oral every 6 hours PRN Temp greater or equal to 38.5C (101.3F), Mild Pain (1 - 3)  aluminum hydroxide/magnesium hydroxide/simethicone Suspension 30 milliLiter(s) Oral every 4 hours PRN Dyspepsia  melatonin 3 milliGRAM(s) Oral at bedtime PRN Insomnia  traMADol 50 milliGRAM(s) Oral every 8 hours PRN Moderate Pain (4 - 6)

## 2021-08-18 LAB
ANION GAP SERPL CALC-SCNC: 5 MMOL/L — SIGNIFICANT CHANGE UP (ref 5–17)
BUN SERPL-MCNC: 8 MG/DL — SIGNIFICANT CHANGE UP (ref 7–23)
CALCIUM SERPL-MCNC: 8.4 MG/DL — LOW (ref 8.5–10.1)
CHLORIDE SERPL-SCNC: 103 MMOL/L — SIGNIFICANT CHANGE UP (ref 96–108)
CO2 SERPL-SCNC: 29 MMOL/L — SIGNIFICANT CHANGE UP (ref 22–31)
CREAT SERPL-MCNC: 0.63 MG/DL — SIGNIFICANT CHANGE UP (ref 0.5–1.3)
CULTURE RESULTS: SIGNIFICANT CHANGE UP
CULTURE RESULTS: SIGNIFICANT CHANGE UP
FERRITIN SERPL-MCNC: 82 NG/ML — SIGNIFICANT CHANGE UP (ref 15–150)
GLUCOSE SERPL-MCNC: 144 MG/DL — HIGH (ref 70–99)
IRON SATN MFR SERPL: 20 % — SIGNIFICANT CHANGE UP (ref 14–50)
IRON SATN MFR SERPL: 56 UG/DL — SIGNIFICANT CHANGE UP (ref 30–160)
MAGNESIUM SERPL-MCNC: 2 MG/DL — SIGNIFICANT CHANGE UP (ref 1.6–2.6)
POTASSIUM SERPL-MCNC: 3.5 MMOL/L — SIGNIFICANT CHANGE UP (ref 3.5–5.3)
POTASSIUM SERPL-SCNC: 3.5 MMOL/L — SIGNIFICANT CHANGE UP (ref 3.5–5.3)
SARS-COV-2 RNA SPEC QL NAA+PROBE: SIGNIFICANT CHANGE UP
SODIUM SERPL-SCNC: 137 MMOL/L — SIGNIFICANT CHANGE UP (ref 135–145)
SPECIMEN SOURCE: SIGNIFICANT CHANGE UP
SPECIMEN SOURCE: SIGNIFICANT CHANGE UP
TIBC SERPL-MCNC: 281 UG/DL — SIGNIFICANT CHANGE UP (ref 220–430)
UIBC SERPL-MCNC: 226 UG/DL — SIGNIFICANT CHANGE UP (ref 110–370)

## 2021-08-18 PROCEDURE — 99233 SBSQ HOSP IP/OBS HIGH 50: CPT

## 2021-08-18 PROCEDURE — 99232 SBSQ HOSP IP/OBS MODERATE 35: CPT

## 2021-08-18 PROCEDURE — 71045 X-RAY EXAM CHEST 1 VIEW: CPT | Mod: 26

## 2021-08-18 RX ORDER — SOD SULF/SODIUM/NAHCO3/KCL/PEG
4000 SOLUTION, RECONSTITUTED, ORAL ORAL ONCE
Refills: 0 | Status: COMPLETED | OUTPATIENT
Start: 2021-08-18 | End: 2021-08-18

## 2021-08-18 RX ORDER — SODIUM CHLORIDE 9 MG/ML
1000 INJECTION INTRAMUSCULAR; INTRAVENOUS; SUBCUTANEOUS
Refills: 0 | Status: DISCONTINUED | OUTPATIENT
Start: 2021-08-18 | End: 2021-08-18

## 2021-08-18 RX ADMIN — Medication 650 MILLIGRAM(S): at 22:39

## 2021-08-18 RX ADMIN — Medication 20 MILLIGRAM(S): at 22:08

## 2021-08-18 RX ADMIN — Medication 325 MILLIGRAM(S): at 22:07

## 2021-08-18 RX ADMIN — SODIUM CHLORIDE 50 MILLILITER(S): 9 INJECTION INTRAMUSCULAR; INTRAVENOUS; SUBCUTANEOUS at 12:30

## 2021-08-18 RX ADMIN — Medication 1 DROP(S): at 15:19

## 2021-08-18 RX ADMIN — MIDODRINE HYDROCHLORIDE 5 MILLIGRAM(S): 2.5 TABLET ORAL at 12:33

## 2021-08-18 RX ADMIN — TRAMADOL HYDROCHLORIDE 50 MILLIGRAM(S): 50 TABLET ORAL at 07:50

## 2021-08-18 RX ADMIN — PANTOPRAZOLE SODIUM 40 MILLIGRAM(S): 20 TABLET, DELAYED RELEASE ORAL at 10:06

## 2021-08-18 RX ADMIN — MIDODRINE HYDROCHLORIDE 5 MILLIGRAM(S): 2.5 TABLET ORAL at 17:55

## 2021-08-18 RX ADMIN — Medication 1 DROP(S): at 05:28

## 2021-08-18 RX ADMIN — Medication 81 MILLIGRAM(S): at 10:05

## 2021-08-18 RX ADMIN — Medication 650 MILLIGRAM(S): at 22:09

## 2021-08-18 RX ADMIN — Medication 500 MILLIGRAM(S): at 10:05

## 2021-08-18 RX ADMIN — Medication 1 DROP(S): at 22:06

## 2021-08-18 RX ADMIN — Medication 2: at 12:30

## 2021-08-18 RX ADMIN — Medication 200 MILLIGRAM(S): at 10:05

## 2021-08-18 RX ADMIN — Medication 4000 MILLILITER(S): at 17:55

## 2021-08-18 RX ADMIN — Medication 325 MILLIGRAM(S): at 10:06

## 2021-08-18 RX ADMIN — Medication 500 MILLIGRAM(S): at 22:07

## 2021-08-18 RX ADMIN — Medication 50 MICROGRAM(S): at 05:28

## 2021-08-18 RX ADMIN — TRAMADOL HYDROCHLORIDE 50 MILLIGRAM(S): 50 TABLET ORAL at 08:28

## 2021-08-18 RX ADMIN — ATORVASTATIN CALCIUM 10 MILLIGRAM(S): 80 TABLET, FILM COATED ORAL at 22:07

## 2021-08-18 RX ADMIN — ESCITALOPRAM OXALATE 10 MILLIGRAM(S): 10 TABLET, FILM COATED ORAL at 10:06

## 2021-08-18 RX ADMIN — MIDODRINE HYDROCHLORIDE 5 MILLIGRAM(S): 2.5 TABLET ORAL at 05:28

## 2021-08-18 RX ADMIN — TRAMADOL HYDROCHLORIDE 50 MILLIGRAM(S): 50 TABLET ORAL at 17:55

## 2021-08-18 NOTE — PROGRESS NOTE ADULT - SUBJECTIVE AND OBJECTIVE BOX
Patient is a 89y old  Female who presents with a chief complaint of SOB (17 Aug 2021 16:40)      Subective:  No CP or SOB. Feels good.    PAST MEDICAL & SURGICAL HISTORY:  Diabetes Mellitus Type II    Dyslipidemia    GERD (Gastroesophageal Reflux Disease)    History of Osteoarthritis    Hypertension    CAD (coronary artery disease)    Afib    Hypothyroid    HLD (hyperlipidemia)    History of pancreatitis    Aortic stenosis    H/O: Hysterectomy    H/O: Knee Surgery- right meniscus    History of cholecystectomy    History of appendectomy    H/O total knee replacement, left    S/P IVC filter  Note that Pt does not have  h/o DVT, outPt doppler was read first as +, but after review reported as no DVT. Pt got IVC filer before that        MEDICATIONS  (STANDING):  allopurinol  Oral Tab/Cap - Peds 200 milliGRAM(s) Oral daily  apixaban 2.5 milliGRAM(s) Oral two times a day  artificial  tears Solution 1 Drop(s) Both EYES three times a day  aspirin  chewable 81 milliGRAM(s) Oral daily  atorvastatin 10 milliGRAM(s) Oral at bedtime  cefuroxime   Tablet 500 milliGRAM(s) Oral every 12 hours  dextrose 40% Gel 15 Gram(s) Oral once  dextrose 5%. 1000 milliLiter(s) (50 mL/Hr) IV Continuous <Continuous>  dextrose 50% Injectable 25 Gram(s) IV Push once  escitalopram 10 milliGRAM(s) Oral daily  ferrous    sulfate 325 milliGRAM(s) Oral two times a day  glucagon  Injectable 1 milliGRAM(s) IntraMuscular once  insulin lispro (ADMELOG) corrective regimen sliding scale   SubCutaneous three times a day before meals  levothyroxine 50 MICROGram(s) Oral daily  midodrine. 5 milliGRAM(s) Oral three times a day  pantoprazole    Tablet 40 milliGRAM(s) Oral daily  polyethylene glycol 3350 17 Gram(s) Oral daily  sildenafil (REVATIO) 20 milliGRAM(s) Oral two times a day  torsemide 20 milliGRAM(s) Oral two times a day    MEDICATIONS  (PRN):  acetaminophen   Tablet .. 650 milliGRAM(s) Oral every 6 hours PRN Temp greater or equal to 38.5C (101.3F), Mild Pain (1 - 3)  aluminum hydroxide/magnesium hydroxide/simethicone Suspension 30 milliLiter(s) Oral every 4 hours PRN Dyspepsia  magnesium hydroxide Suspension 30 milliLiter(s) Oral daily PRN Constipation  melatonin 3 milliGRAM(s) Oral at bedtime PRN Insomnia  traMADol 50 milliGRAM(s) Oral every 8 hours PRN Moderate Pain (4 - 6)      REVIEW OF SYSTEMS:    RESPIRATORY: No shortness of breath  CARDIOVASCULAR: No chest pain  All other review of systems is negative unless indicated above.    Vital Signs Last 24 Hrs  T(C): 36.6 (17 Aug 2021 21:34), Max: 36.7 (17 Aug 2021 15:27)  T(F): 97.8 (17 Aug 2021 21:34), Max: 98 (17 Aug 2021 15:27)  HR: 72 (18 Aug 2021 05:42) (54 - 93)  BP: 110/66 (18 Aug 2021 05:42) (98/57 - 130/75)  BP(mean): --  RR: 18 (17 Aug 2021 21:34) (18 - 19)  SpO2: 97% (17 Aug 2021 21:34) (97% - 99%)    PHYSICAL EXAM:    Constitutional: NAD,  Respiratory: CTAB  Cardiovascular: S1 and S2  Gastrointestinal: BS+, soft, NT/ND  Extremities: No peripheral edema  Psychiatric: Normal mood, normal affect    LABS:                        9.8    6.96  )-----------( 201      ( 17 Aug 2021 10:55 )             29.9     08-17    138  |  106  |  9   ----------------------------<  254<H>  3.9   |  25  |  0.81    Ca    8.6      17 Aug 2021 10:55  Mg     1.9     08-17            RADIOLOGY & ADDITIONAL STUDIES:

## 2021-08-18 NOTE — PROGRESS NOTE ADULT - ASSESSMENT
89 year-old woman presented with shortness of breath and fatigue, found to have bacteremia.  --Cultures speciated to Strep. Lutetiensis, as per ID this is related to BOVIS and can be associated GI tract malignancies.   --Repeat cultures negative to date. Echo shows no evidence of vegetations     Plan:  Continue antibiotics - cefuroxime will need for 14 days - last date 8/27/21.  Seen by GI scheduled for colonoscopy tomorrow to rule/out malignancy   Currently on torsemide 20mg twice daily - Cardio consult appreciated patient appears euvolemic.     Notify with any questions or concerns.    89 year-old woman presented with shortness of breath and fatigue, found to have bacteremia.  --Cultures speciated to Strep. Lutetiensis, as per ID this is related to BOVIS and can be associated GI tract malignancies.   --Repeat cultures negative to date. Echo shows no evidence of vegetations     Plan:  Continue antibiotics - cefuroxime will need for 14 days - last date 8/27/21.  Seen by GI scheduled for colonoscopy tomorrow to rule/out malignancy   Currently on torsemide 20mg twice daily - Cardio consult appreciated patient appears euvolemic. Consider holding dose of torsemide tomorrow AM prior to colonoscopy.     Notify with any questions or concerns.

## 2021-08-18 NOTE — PROGRESS NOTE ADULT - SUBJECTIVE AND OBJECTIVE BOX
Cardiology Progress Note    HPI: Patient is 89 yo female with PMhx pancreatitis s/p ERCP, cirrhosis, DM2, a-fib on Eliquis, CAD s/p PCI, severe pulm HTN, chronic R heart failure, diastolic dysfunction, AS s/p TAVR, OA, HTN, dyslipidemia, hypothyroidism, presents with sob and fatigue. Patient sent from [facility] for further evaluation. Chart reviewed. Patient seen in ED, pleasant with baseline confusion. Does not know why she is here.  In ED, patient found to be septic, given 3 Liter bolus and for a short time started on pressors. Now she is off pressors with BP 98/70 in no distress.     . No events last pm. No CP/SOB.   Awaiting colonoscopy for tomorrow.   Not on tele.     PAST MEDICAL & SURGICAL HISTORY:  Diabetes Mellitus Type II    Dyslipidemia    GERD (Gastroesophageal Reflux Disease)    History of Osteoarthritis    Hypertension    CAD (coronary artery disease)    Afib    Hypothyroid    HLD (hyperlipidemia)    History of pancreatitis    Aortic stenosis    H/O: Hysterectomy    H/O: Knee Surgery- right meniscus    History of cholecystectomy    History of appendectomy    H/O total knee replacement, left    S/P IVC filter  Note that Pt does not have  h/o DVT, outPt doppler was read first as +, but after review reported as no DVT. Pt got IVC filer before that        MEDICATIONS  (STANDING):  allopurinol  Oral Tab/Cap - Peds 200 milliGRAM(s) Oral daily  apixaban 2.5 milliGRAM(s) Oral two times a day  artificial  tears Solution 1 Drop(s) Both EYES three times a day  aspirin  chewable 81 milliGRAM(s) Oral daily  aztreonam  IVPB 1000 milliGRAM(s) IV Intermittent every 12 hours  dextrose 40% Gel 15 Gram(s) Oral once  dextrose 5%. 1000 milliLiter(s) (50 mL/Hr) IV Continuous <Continuous>  dextrose 50% Injectable 25 Gram(s) IV Push once  escitalopram 10 milliGRAM(s) Oral daily  ferrous    sulfate 325 milliGRAM(s) Oral two times a day  glucagon  Injectable 1 milliGRAM(s) IntraMuscular once  insulin lispro (ADMELOG) corrective regimen sliding scale   SubCutaneous three times a day before meals  levothyroxine 50 MICROGram(s) Oral daily  midodrine. 10 milliGRAM(s) Oral three times a day  pantoprazole    Tablet 40 milliGRAM(s) Oral daily  sildenafil (REVATIO) 20 milliGRAM(s) Oral two times a day  simvastatin 40 milliGRAM(s) Oral at bedtime  vancomycin  IVPB 1000 milliGRAM(s) IV Intermittent every 12 hours    MEDICATIONS  (PRN):  acetaminophen   Tablet .. 650 milliGRAM(s) Oral every 6 hours PRN Temp greater or equal to 38.5C (101.3F), Mild Pain (1 - 3)  aluminum hydroxide/magnesium hydroxide/simethicone Suspension 30 milliLiter(s) Oral every 4 hours PRN Dyspepsia  melatonin 3 milliGRAM(s) Oral at bedtime PRN Insomnia  ondansetron Injectable 4 milliGRAM(s) IV Push every 8 hours PRN Nausea and/or Vomiting      FAMILY HISTORY:  FH: diabetes mellitus  both parents    FH: heart disease        SOCIAL HISTORY: no recent smoking     REVIEW OF SYSTEMS:  CONSTITUTIONAL:    No fatigue, malaise, lethargy.  no fever or chills.  RESPIRATORY:  No cough.  No wheeze.  No hemoptysis.  no shortness of breath.  CARDIOVASCULAR:  No chest pains.  No palpitations. no shortness of breath, No orthopnea or PND.  GASTROINTESTINAL:  No abdominal pain.  No nausea or vomiting.    GENITOURINARY:    No hematuria.    MUSCULOSKELETAL:  No musculoskeletal pain.  No joint swelling.  No arthritis.  NEUROLOGICAL:  No tingling or numbness or weakness.  PSYCHIATRIC:  No confusion  SKIN:  No rashes.          ICU Vital Signs Last 24 Hrs  T(C): 36.4 (17 Aug 2021 07:40), Max: 36.8 (16 Aug 2021 15:50)  T(F): 97.6 (17 Aug 2021 07:40), Max: 98.3 (16 Aug 2021 15:50)  HR: 77 (17 Aug 2021 07:40) (77 - 92)  BP: 123/64 (17 Aug 2021 07:40) (103/52 - 123/64)  BP(mean): --  ABP: --  ABP(mean): --  RR: 19 (17 Aug 2021 07:40) (18 - 19)  SpO2: 99% (17 Aug 2021 07:40) (95% - 99%)        PHYSICAL EXAM-    Constitutional: elderly female in no acute distress     Head: Head is normocephalic and atraumatic.      Neck: No JVD.     Cardiovascular: Regular rate and rhythm without S3, S4. No murmurs or rubs are appreciated.      Respiratory: B/l rales. No wheezing.    Abdomen: Soft, nontender, nondistended with positive bowel sounds.      Extremity: No tenderness. No  pitting edema     Neurologic: The patient is alert    Skin: No rash, no obvious lesions noted.      Psychiatric: The patient appears to be emotionally stable.      INTERPRETATION OF TELEMETRY: not on     ECG:    I&O's Detail    11 Aug 2021 07:01  -  12 Aug 2021 07:00  --------------------------------------------------------  IN:    IV PiggyBack: 350 mL    Oral Fluid: 100 mL  Total IN: 450 mL    OUT:    Indwelling Catheter - Urethral (mL): 125 mL    Voided (mL): 450 mL  Total OUT: 575 mL    Total NET: -125 mL          LABS:                                   10.4   6.61  )-----------( 156      ( 15 Aug 2021 08:56 )             30.8     08-15    135  |  105  |  10  ----------------------------<  148<H>  3.7   |  25  |  0.56    Ca    8.8      15 Aug 2021 08:56             PTT - ( 11 Aug 2021 12:31 )  PTT:32.5 sec  Urinalysis Basic - ( 11 Aug 2021 12:31 )    Color: Yellow / Appearance: Slightly Turbid / S.010 / pH: x  Gluc: x / Ketone: Negative  / Bili: Negative / Urobili: Negative mg/dL   Blood: x / Protein: 15 mg/dL / Nitrite: Negative   Leuk Esterase: Trace / RBC: 0-2 /HPF / WBC 0-2   Sq Epi: x / Non Sq Epi: Few / Bacteria: Moderate      I&O's Summary    11 Aug 2021 07:01  -  12 Aug 2021 07:00  --------------------------------------------------------  IN: 450 mL / OUT: 575 mL / NET: -125 mL      BNP  RADIOLOGY & ADDITIONAL STUDIES:  < from: CT Chest No Cont (21 @ 20:53) >  TUBES/LINES: None.    IMPRESSION:  Peripheral reticulation, ill-defined linear and groundglass opacities with mild bronchiectasis is similar compared to the prior study and may be related to interstitial lung disease. No honeycombing.    --- End of Report ---      < from: TTE Echo Complete w/ Contrast w/ Doppler (21 @ 16:09) >     EXAM:  ECHO TTE WITH CON COMP W DOPP         PROCEDURE DATE:  2021        INTERPRETATION:  Transthoracic Echocardiography Report (TTE)     Demographics     Patient name         CHARY DURANT   Age           89 year(s)     Med Rec #        [id number]              Gender        Female     Account #            [id number]           Date of Birth 10/30/1927     Interpreting         Frediopal Palla, MD    Room Number   0037   Physician     Referring Physician  Marisa Saldivar, Sonographer   Fabiana Perez MD     Date of study        2021 02:43 PM     Height                                      Weight    Type of Study:     TTE procedure: ECHO TTE W C COMP W DOPP     BP: 105/53 mmHg     Study Location: [facility] Quality: Fair    M-Mode Measurements (cm)     LVEDd: 3.49 cm            LVESd: 2.82 cm   IVSEd: 1.03 cm   LVPWd: 1.04 cm            AO Root Dimension: 2.6 cm                             ACS: 1.4 cm                             LA: 4.9 cm                             LVOT: 1.7 cm    Doppler Measurements:     AV Mean Gradient: 5 mmHg             MV Peak E-Wave: 183 cm/s   AV Area (Continuity):0.95 cm^2   TR Velocity:446 cm/s                 MV Peak Gradient: 13.4 mmHg   TR Gradient:79.5664 mmHg             MV P1/2t: 98 msec   Estimated RAP:10 mmHg                MVA by PHT2.24   RVSP:90 mmHg     Findings     Mitral Valve   Moderate mitral annular calcification is present.   Moderate mitral stenosis is present.   Mild (1+) mitral regurgitation is present.     Aortic Valve   Well seated prosthetic valve in the aortic position. suboptimal doppler   interrogation     Tricuspid Valve   Severe (4+) tricuspid valve regurgitation is present.   The tricuspid valve leaflets appear mildly thickened open well.   Severe pulmonary hypertension.     Pulmonic Valve   Normal appearing pulmonic valve structure.   Mild pulmonic valvular regurgitation (1+) is present.     Left Atrium   The left atrium is moderately dilated.     Left Ventricle   Mild concentric left ventricular hypertrophy is present.     Right Atrium   The right atrium appears moderately dilated.     Right Ventricle   The right ventricle is mildly dilated.     Pericardial Effusion   A pericardial effusion is not present.     Pleural Effusion   Pleural effusion cannot be ruled out.     Miscellaneous   The IVC is dilated.     Impression     Summary     Moderate mitral annular calcification is present.   Moderate mitral stenosis is present.   Mild (1+) mitral regurgitation is present.   Well seated prosthetic valve in the aortic position. suboptimal doppler   interrogation   Severe (4+) tricuspid valve regurgitation is present.   The tricuspid valve leaflets appear mildly thickened open well.   Severe pulmonary hypertension.   Normal appearing pulmonic valve structure.   Mild pulmonic valvular regurgitation (1+) is present.     technical difficult study     Signature     ----------------------------------------------------------------   Electronically signed by Venugopal Palla, MD(Interpreting   physician) on 2021 05:38 PM   ----------------------------------------------------------------    < end of copied text >        ARMAND COON MD; Attending Radiologist  This document has been electronically signed. Aug 11 2021  9:25PM    < end of copied text >

## 2021-08-18 NOTE — PROGRESS NOTE ADULT - ASSESSMENT
Imp:  Srep infection, r/o colon cancer    Plan:  Colonoscopy tomorrow morning  Patient agreeable. D/W son, Dr. Camacho and Dr. Hwang

## 2021-08-18 NOTE — PROGRESS NOTE ADULT - SUBJECTIVE AND OBJECTIVE BOX
CC:  Patient is a 89y old  Female who presents with a chief complaint of SOB (12 Aug 2021 13:35)    SUBJECTIVE:     8/13: no complaints; no chest pain/sob.  BP improved  d/maddy iv fluids    8/14: no complaints  afebrile  echo: no vegetation  repeat blood cx: neg    8/15: had BM which was slightly hard  PT awaited  no other complaints    8/16: feels better  c/o constipation, Miralax added  spironolactone 25 and half dose torsemide at 20 mg bid re started    8/17: c/o constipation  MOM added  colonoscopy as in pt    8/18: BP low in 80s; sildenafil held; morning dose of torsemide held; ns 50 ml x 5 hrs given; BP better  no complaints  colonoscopy tomorrow    ROS:  all other review of systems are negative unless indicated above.      PHYSICAL EXAM:    Vital Signs Last 24 Hrs  T(C): 36.6 (18 Aug 2021 15:07), Max: 36.6 (17 Aug 2021 21:34)  T(F): 97.8 (18 Aug 2021 15:07), Max: 97.8 (17 Aug 2021 21:34)  HR: 84 (18 Aug 2021 15:07) (54 - 91)  BP: 99/49 (18 Aug 2021 15:07) (86/42 - 130/75)  BP(mean): --  RR: 19 (18 Aug 2021 15:07) (18 - 19)  SpO2: 98% (18 Aug 2021 15:07) (94% - 98%)    Constitutional: Well appearing  HEENT: Atraumatic, CYNDEE, Normal, No congestion  Respiratory: Breath Sounds normal, b/l rhonchi; no wheeze  Cardiovascular: N S1S2; J LUIS present  Gastrointestinal: Abdomen soft, non tender, Bowel Sounds present  Extremities: No edema, peripheral pulses present  Neurological: AAO x 3, no gross focal motor deficits  Skin: Non cellulitic, no rash, ulcers  Lymph Nodes: No lymphadenopathy noted  Back: No CVA tenderness   Musculoskeletal: non tender  Breasts: Deferred  Genitourinary: deferred  Rectal: Deferred                                9.8    6.96  )-----------( 201      ( 17 Aug 2021 10:55 )             29.9           08-17    138  |  106  |  9   ----------------------------<  254<H>  3.9   |  25  |  0.81    Ca    8.6      17 Aug 2021 10:55  Mg     1.9     08-17    MICROBIOLOGY/CULTURES:    Culture Results:   No growth to date. (08-13 @ 05:42)  Culture Results:   No growth to date. (08-13 @ 05:41)  Culture Results:   Growth in aerobic and anaerobic bottles: Streptococcus lutetiensis  See previous culture [id number] (08-11 @ 12:31)  Culture Results:   Growth in aerobic and anaerobic bottles: Streptococcus lutetiensis  ***Blood Panel PCR results on this specimen are available  approximately 3 hours after the Gram stain result.***  Gram stain, PCR, and/or culture results may not always  corresponddue to difference in methodologies.  ************************************************************  This PCR assay was performed by multiplex PCR. This  Assay tests for 66 bacterial and resistance gene targets.  Please refer to the [facility] test directory  at [email or web address] for details. (08-11 @ 12:31)  Culture Results:   <10,000 CFU/mL Normal Urogenital Ira (08-11 @ 12:31)            RADIOLOGY RESULTS:    < from: CT Chest No Cont (08.11.21 @ 20:53) >  IMPRESSION:  Peripheral reticulation, ill-defined linear and groundglass opacities with mild bronchiectasis is similar compared to the prior study and may be related to interstitial lung disease. No honeycombing.    < end of copied text >        MEDICATIONS  (STANDING):  allopurinol  Oral Tab/Cap - Peds 200 milliGRAM(s) Oral daily  apixaban 2.5 milliGRAM(s) Oral two times a day  artificial  tears Solution 1 Drop(s) Both EYES three times a day  aspirin  chewable 81 milliGRAM(s) Oral daily  cefTRIAXone Injectable. 2000 milliGRAM(s) IV Push every 24 hours  dextrose 40% Gel 15 Gram(s) Oral once  dextrose 5%. 1000 milliLiter(s) (50 mL/Hr) IV Continuous <Continuous>  dextrose 50% Injectable 25 Gram(s) IV Push once  escitalopram 10 milliGRAM(s) Oral daily  ferrous    sulfate 325 milliGRAM(s) Oral two times a day  glucagon  Injectable 1 milliGRAM(s) IntraMuscular once  insulin lispro (ADMELOG) corrective regimen sliding scale   SubCutaneous three times a day before meals  levothyroxine 50 MICROGram(s) Oral daily  midodrine. 10 milliGRAM(s) Oral three times a day  pantoprazole    Tablet 40 milliGRAM(s) Oral daily  polyethylene glycol 3350 17 Gram(s) Oral daily  sildenafil (REVATIO) 20 milliGRAM(s) Oral two times a day  simvastatin 40 milliGRAM(s) Oral at bedtime  torsemide 20 milliGRAM(s) Oral two times a day    MEDICATIONS  (PRN):  acetaminophen   Tablet .. 650 milliGRAM(s) Oral every 6 hours PRN Temp greater or equal to 38.5C (101.3F), Mild Pain (1 - 3)  aluminum hydroxide/magnesium hydroxide/simethicone Suspension 30 milliLiter(s) Oral every 4 hours PRN Dyspepsia  melatonin 3 milliGRAM(s) Oral at bedtime PRN Insomnia  traMADol 50 milliGRAM(s) Oral every 8 hours PRN Moderate Pain (4 - 6)

## 2021-08-18 NOTE — PROGRESS NOTE ADULT - ASSESSMENT
Patient is 89 yo female with PMhx pancreatitis s/p ERCP, cirrhosis, DM2, a-fib on Eliquis, CAD s/p PCI, severe pulm HTN, chronic R heart failure, diastolic dysfunction, AS s/p TAVR, OA, HTN, dyslipidemia, hypothyroidism, presents with sob and fatigue. Patient sent from [facility] for further evaluation. Chart reviewed. Patient seen in ED, pleasant with baseline confusion. Does not know why she is here.  In ED, patient found to be septic, given 3 Liter bolus and for a short time started on pressors. Now she is off pressors with BP 98/70 in no distress.     severe sepsis/septic shock POA.: all resolved now  - off vasopressors.  - midodrine taper  - BCx:  Streptococcus.; repeat blood cx, neg  - repeat BCx:  neg  - TTE; no vegetation  - imaging, no overt pneumonia.  - cont  iv ceftriaxone as per ID, switch to po ceftin for a total of 14 days since last Blood cx were neg, till 8/27  GI consult to r/o colon CA; in pt colonoscopy  colonoscopy on 8/19    AF.  - AC:  apixaban on hold for colonoscopy   - RTC:  metoprolol held due to hypotension.    HFpEF.  - appears some lower chest rales on exam.  restart spironolactone and torsemide 20 bid  monitor BP, Cr    Constipation: miralax started  document BM  no BM; MOM added    hx DM2.  - FS target 140-180mg/dL.  - consistent CHO diet.  - correction insulin coverage.    hx hypothyroidism.  - TSH:  wnl.  - levothyroxine.    DVT PPX:  apixaban on hold ; venodynes    poc discussed with pt, team, Dr. Mojica

## 2021-08-18 NOTE — PROGRESS NOTE ADULT - SUBJECTIVE AND OBJECTIVE BOX
She is awake, alert and sitting upright in her chair.   She has no complaints this AM.   Denies shortness of breath.       ROS otherwise negative.     Vital Signs Last 24 Hrs  T(C): 36.1 (18 Aug 2021 08:13), Max: 36.7 (17 Aug 2021 15:27)  T(F): 97 (18 Aug 2021 08:13), Max: 98 (17 Aug 2021 15:27)  HR: 80 (18 Aug 2021 08:13) (54 - 93)  BP: 105/58 (18 Aug 2021 08:13) (98/57 - 130/75)  BP(mean): --  RR: 18 (18 Aug 2021 08:13) (18 - 19)  SpO2: 94% (18 Aug 2021 08:13) (94% - 97%)    PHYSICAL EXAMINATION:  GENERAL: Elderly, frail appearing, no distress.  EYES: Pupils are normal. No icterus. Sclera white.   HEENT:  Mucus membranes moist. Oropharynx normal.   NECK:  Supple. No stridor. No JVD.   HEART:  Normal S1 and S2 - no murmurs appreciated.   CHEST:  Crackles at bilateral bases in posterior lung fields. No wheezing.   ABDOMEN:  Soft and nontender. Nondistended.   EXTREMITIES:  There is no cyanosis, clubbing or edema.   PSYCH: Pleasant affect.    MEDICATIONS  (STANDING):  allopurinol  Oral Tab/Cap - Peds 200 milliGRAM(s) Oral daily  artificial  tears Solution 1 Drop(s) Both EYES three times a day  aspirin  chewable 81 milliGRAM(s) Oral daily  atorvastatin 10 milliGRAM(s) Oral at bedtime  cefuroxime   Tablet 500 milliGRAM(s) Oral every 12 hours  dextrose 40% Gel 15 Gram(s) Oral once  dextrose 5%. 1000 milliLiter(s) (50 mL/Hr) IV Continuous <Continuous>  dextrose 50% Injectable 25 Gram(s) IV Push once  escitalopram 10 milliGRAM(s) Oral daily  ferrous    sulfate 325 milliGRAM(s) Oral two times a day  glucagon  Injectable 1 milliGRAM(s) IntraMuscular once  insulin lispro (ADMELOG) corrective regimen sliding scale   SubCutaneous three times a day before meals  levothyroxine 50 MICROGram(s) Oral daily  midodrine. 5 milliGRAM(s) Oral three times a day  pantoprazole    Tablet 40 milliGRAM(s) Oral daily  polyethylene glycol 3350 17 Gram(s) Oral daily  polyethylene glycol/electrolyte Solution. 4000 milliLiter(s) Oral once  sildenafil (REVATIO) 20 milliGRAM(s) Oral two times a day  torsemide 20 milliGRAM(s) Oral two times a day    LABS:                        9.8    6.96  )-----------( 201      ( 17 Aug 2021 10:55 )             29.9     08-18    137  |  103  |  8   ----------------------------<  144<H>  3.5   |  29  |  0.63    Ca    8.4<L>      18 Aug 2021 06:44  Mg     2.0     08-18        RADIOLOGY & ADDITIONAL STUDIES:   *Images personally reviewed   CXR 8/18/21 - bilateral intersitial  infiltrates c/w pulmonary edema overall unchanged since prior.

## 2021-08-18 NOTE — PROGRESS NOTE ADULT - ASSESSMENT
1. SOB- likely from pneumonia.  She appears euvolemic on exam.  Torsemide restarted.  DC spironolactone In her case the risk outweighs the benefit in using two diuretics.    2. Hyperlipidemia- on simvastatin 40mg and she is prone for drug drug interreactions  Will switch her to atorvastatin.     3. Sepsis- strep bacteremia- repeat cx negative and clinically and hemodynamically pt stable.  TTE did not reveal any IE.   on abx.     4. Afib- now rate controlled.  continue home meds including full dose anticoagulation    5. Sepsis - hypotension resolved.  Midodrine decreased to 5mg po TID.  Will continue to down titrate the midodrine as tolerated.  On abx.    6. Pt awaiting colonoscopy tomorrow. GI following.

## 2021-08-19 ENCOUNTER — RESULT REVIEW (OUTPATIENT)
Age: 86
End: 2021-08-19

## 2021-08-19 LAB
GLUCOSE BLDC GLUCOMTR-MCNC: 138 MG/DL — HIGH (ref 70–99)
GLUCOSE BLDC GLUCOMTR-MCNC: 198 MG/DL — HIGH (ref 70–99)
GLUCOSE BLDC GLUCOMTR-MCNC: 280 MG/DL — HIGH (ref 70–99)

## 2021-08-19 PROCEDURE — 88341 IMHCHEM/IMCYTCHM EA ADD ANTB: CPT | Mod: 26

## 2021-08-19 PROCEDURE — 74177 CT ABD & PELVIS W/CONTRAST: CPT | Mod: 26

## 2021-08-19 PROCEDURE — 99233 SBSQ HOSP IP/OBS HIGH 50: CPT

## 2021-08-19 PROCEDURE — 88342 IMHCHEM/IMCYTCHM 1ST ANTB: CPT | Mod: 26

## 2021-08-19 PROCEDURE — 99232 SBSQ HOSP IP/OBS MODERATE 35: CPT

## 2021-08-19 PROCEDURE — 88305 TISSUE EXAM BY PATHOLOGIST: CPT | Mod: 26

## 2021-08-19 RX ORDER — ACETAMINOPHEN 500 MG
1000 TABLET ORAL ONCE
Refills: 0 | Status: COMPLETED | OUTPATIENT
Start: 2021-08-19 | End: 2021-08-19

## 2021-08-19 RX ADMIN — Medication 81 MILLIGRAM(S): at 14:21

## 2021-08-19 RX ADMIN — Medication 500 MILLIGRAM(S): at 23:05

## 2021-08-19 RX ADMIN — PANTOPRAZOLE SODIUM 40 MILLIGRAM(S): 20 TABLET, DELAYED RELEASE ORAL at 14:22

## 2021-08-19 RX ADMIN — Medication 20 MILLIGRAM(S): at 14:20

## 2021-08-19 RX ADMIN — Medication 650 MILLIGRAM(S): at 17:21

## 2021-08-19 RX ADMIN — Medication 500 MILLIGRAM(S): at 14:21

## 2021-08-19 RX ADMIN — TRAMADOL HYDROCHLORIDE 50 MILLIGRAM(S): 50 TABLET ORAL at 23:06

## 2021-08-19 RX ADMIN — Medication 325 MILLIGRAM(S): at 23:05

## 2021-08-19 RX ADMIN — MIDODRINE HYDROCHLORIDE 5 MILLIGRAM(S): 2.5 TABLET ORAL at 14:21

## 2021-08-19 RX ADMIN — Medication 200 MILLIGRAM(S): at 14:21

## 2021-08-19 RX ADMIN — Medication 400 MILLIGRAM(S): at 12:06

## 2021-08-19 RX ADMIN — Medication 325 MILLIGRAM(S): at 14:21

## 2021-08-19 RX ADMIN — TRAMADOL HYDROCHLORIDE 50 MILLIGRAM(S): 50 TABLET ORAL at 14:49

## 2021-08-19 RX ADMIN — Medication 1 DROP(S): at 14:24

## 2021-08-19 RX ADMIN — Medication 20 MILLIGRAM(S): at 23:06

## 2021-08-19 RX ADMIN — TRAMADOL HYDROCHLORIDE 50 MILLIGRAM(S): 50 TABLET ORAL at 02:22

## 2021-08-19 RX ADMIN — Medication 650 MILLIGRAM(S): at 17:45

## 2021-08-19 RX ADMIN — Medication 1 DROP(S): at 06:48

## 2021-08-19 RX ADMIN — Medication 1 DROP(S): at 23:04

## 2021-08-19 RX ADMIN — Medication 50 MICROGRAM(S): at 06:47

## 2021-08-19 RX ADMIN — MIDODRINE HYDROCHLORIDE 5 MILLIGRAM(S): 2.5 TABLET ORAL at 06:48

## 2021-08-19 RX ADMIN — ATORVASTATIN CALCIUM 10 MILLIGRAM(S): 80 TABLET, FILM COATED ORAL at 23:04

## 2021-08-19 RX ADMIN — MIDODRINE HYDROCHLORIDE 5 MILLIGRAM(S): 2.5 TABLET ORAL at 23:27

## 2021-08-19 RX ADMIN — Medication 20 MILLIGRAM(S): at 23:05

## 2021-08-19 RX ADMIN — Medication 3 MILLIGRAM(S): at 23:06

## 2021-08-19 RX ADMIN — TRAMADOL HYDROCHLORIDE 50 MILLIGRAM(S): 50 TABLET ORAL at 23:50

## 2021-08-19 RX ADMIN — ESCITALOPRAM OXALATE 10 MILLIGRAM(S): 10 TABLET, FILM COATED ORAL at 14:21

## 2021-08-19 RX ADMIN — Medication 3: at 17:14

## 2021-08-19 RX ADMIN — TRAMADOL HYDROCHLORIDE 50 MILLIGRAM(S): 50 TABLET ORAL at 02:52

## 2021-08-19 RX ADMIN — TRAMADOL HYDROCHLORIDE 50 MILLIGRAM(S): 50 TABLET ORAL at 14:27

## 2021-08-19 NOTE — PROGRESS NOTE ADULT - SUBJECTIVE AND OBJECTIVE BOX
Patient is a 89y old  Female who presents with a chief complaint of SOB (18 Aug 2021 16:18)    8/19- denies CP or SOB still with peripheral edema     MEDICATIONS  (STANDING):  allopurinol  Oral Tab/Cap - Peds 200 milliGRAM(s) Oral daily  artificial  tears Solution 1 Drop(s) Both EYES three times a day  aspirin  chewable 81 milliGRAM(s) Oral daily  atorvastatin 10 milliGRAM(s) Oral at bedtime  cefuroxime   Tablet 500 milliGRAM(s) Oral every 12 hours  dextrose 40% Gel 15 Gram(s) Oral once  dextrose 5%. 1000 milliLiter(s) (50 mL/Hr) IV Continuous <Continuous>  dextrose 50% Injectable 25 Gram(s) IV Push once  escitalopram 10 milliGRAM(s) Oral daily  ferrous    sulfate 325 milliGRAM(s) Oral two times a day  glucagon  Injectable 1 milliGRAM(s) IntraMuscular once  insulin lispro (ADMELOG) corrective regimen sliding scale   SubCutaneous three times a day before meals  levothyroxine 50 MICROGram(s) Oral daily  midodrine. 5 milliGRAM(s) Oral three times a day  pantoprazole    Tablet 40 milliGRAM(s) Oral daily  polyethylene glycol 3350 17 Gram(s) Oral daily  sildenafil (REVATIO) 20 milliGRAM(s) Oral two times a day  torsemide 20 milliGRAM(s) Oral two times a day    MEDICATIONS  (PRN):  acetaminophen   Tablet .. 650 milliGRAM(s) Oral every 6 hours PRN Temp greater or equal to 38.5C (101.3F), Mild Pain (1 - 3)  aluminum hydroxide/magnesium hydroxide/simethicone Suspension 30 milliLiter(s) Oral every 4 hours PRN Dyspepsia  magnesium hydroxide Suspension 30 milliLiter(s) Oral daily PRN Constipation  melatonin 3 milliGRAM(s) Oral at bedtime PRN Insomnia  traMADol 50 milliGRAM(s) Oral every 8 hours PRN Moderate Pain (4 - 6)            Vital Signs Last 24 Hrs  T(C): 36.4 (19 Aug 2021 06:45), Max: 36.6 (18 Aug 2021 15:07)  T(F): 97.5 (19 Aug 2021 06:45), Max: 97.8 (18 Aug 2021 15:07)  HR: 79 (19 Aug 2021 06:45) (79 - 91)  BP: 103/50 (19 Aug 2021 06:45) (86/42 - 134/60)  BP(mean): --  RR: 17 (19 Aug 2021 06:45) (17 - 19)  SpO2: 94% (19 Aug 2021 06:45) (94% - 98%)            INTERPRETATION OF TELEMETRY:    ECG:        LABS:                        9.8    6.96  )-----------( 201      ( 17 Aug 2021 10:55 )             29.9     08-18    137  |  103  |  8   ----------------------------<  144<H>  3.5   |  29  |  0.63    Ca    8.4<L>      18 Aug 2021 06:44  Mg     2.0     08-18              I&O's Summary    BNP  RADIOLOGY & ADDITIONAL STUDIES:

## 2021-08-19 NOTE — DIETITIAN NUTRITION RISK NOTIFICATION - ADDITIONAL COMMENTS/DIETITIAN RECOMMENDATIONS
· Additional Recommendations  1) Advance po diet to regular. 2) monitor weights and track trends 3) Suggest add MVI w/minerals daily 4) monitor lytes and hydration replete as needed. 5) when diet is advanced add Ensure enlive 8oz po TID- in between meals. 6) monitor BM if none x >3 days initiate bowel meds 7) Maintain aspiration precautions, back of bed >35 degrees.

## 2021-08-19 NOTE — CONSULT NOTE ADULT - ASSESSMENT
89 yo F presents with a cecal mass found on colonoscopy    Plan:  F/u CT scan and CEA  Rest of management as per primary team    Plan discussed with Dr. Man.

## 2021-08-19 NOTE — PROGRESS NOTE ADULT - ASSESSMENT
1. SOB- likely from pneumonia.  She appears euvolemic on exam.  Torsemide restarted.  DC spironolactone In her case the risk outweighs the benefit in using two diuretics.    2. Hyperlipidemia- on simvastatin 40mg and she is prone for drug drug interreactions  Will switch her to atorvastatin.     3. Sepsis- strep bacteremia- repeat cx negative and clinically and hemodynamically pt stable.  TTE did not reveal any IE.   on abx.   for colonoscopy today to look for underlying malignancy in the setting of strep BOVIS     4. Afib- now rate controlled.  continue home meds including full dose anticoagulation    5. Sepsis - hypotension resolved.  Midodrine decreased to 5mg po TID.  Will continue to down titrate the midodrine as tolerated. would cont today as SBP only 100 systolic  On abx.

## 2021-08-19 NOTE — PROGRESS NOTE ADULT - SUBJECTIVE AND OBJECTIVE BOX
Subjective:    Awake, comfortable at rest. No dyspnea.    MEDICATIONS  (STANDING):  allopurinol  Oral Tab/Cap - Peds 200 milliGRAM(s) Oral daily  artificial  tears Solution 1 Drop(s) Both EYES three times a day  aspirin  chewable 81 milliGRAM(s) Oral daily  atorvastatin 10 milliGRAM(s) Oral at bedtime  cefuroxime   Tablet 500 milliGRAM(s) Oral every 12 hours  dextrose 40% Gel 15 Gram(s) Oral once  dextrose 5%. 1000 milliLiter(s) (50 mL/Hr) IV Continuous <Continuous>  dextrose 50% Injectable 25 Gram(s) IV Push once  escitalopram 10 milliGRAM(s) Oral daily  ferrous    sulfate 325 milliGRAM(s) Oral two times a day  glucagon  Injectable 1 milliGRAM(s) IntraMuscular once  insulin lispro (ADMELOG) corrective regimen sliding scale   SubCutaneous three times a day before meals  levothyroxine 50 MICROGram(s) Oral daily  midodrine. 5 milliGRAM(s) Oral three times a day  pantoprazole    Tablet 40 milliGRAM(s) Oral daily  polyethylene glycol 3350 17 Gram(s) Oral daily  sildenafil (REVATIO) 20 milliGRAM(s) Oral two times a day  torsemide 20 milliGRAM(s) Oral two times a day    MEDICATIONS  (PRN):  acetaminophen   Tablet .. 650 milliGRAM(s) Oral every 6 hours PRN Temp greater or equal to 38.5C (101.3F), Mild Pain (1 - 3)  aluminum hydroxide/magnesium hydroxide/simethicone Suspension 30 milliLiter(s) Oral every 4 hours PRN Dyspepsia  magnesium hydroxide Suspension 30 milliLiter(s) Oral daily PRN Constipation  melatonin 3 milliGRAM(s) Oral at bedtime PRN Insomnia  traMADol 50 milliGRAM(s) Oral every 8 hours PRN Moderate Pain (4 - 6)      Allergies    penicillin (Rash)  penicillins (Other)  sulfa drugs (Rash; Other)    Intolerances        Vital Signs Last 24 Hrs  T(C): 36.4 (19 Aug 2021 06:45), Max: 36.6 (18 Aug 2021 15:07)  T(F): 97.5 (19 Aug 2021 06:45), Max: 97.8 (18 Aug 2021 15:07)  HR: 79 (19 Aug 2021 06:45) (79 - 91)  BP: 103/50 (19 Aug 2021 06:45) (86/42 - 134/60)  BP(mean): --  RR: 17 (19 Aug 2021 06:45) (17 - 19)  SpO2: 94% (19 Aug 2021 06:45) (94% - 98%)    PHYSICAL EXAMINATION:    NECK:  Supple. No lymphadenopathy. Jugular venous pressure not elevated.   HEART:   The cardiac impulse has a normal quality. Reg., Nl S1 and S2.    CHEST:  Chest with basilar crackles, R>L. Normal respiratory effort.  ABDOMEN:  Soft and nontender.   EXTREMITIES:  There is tr edema.       LABS:                        9.8    6.96  )-----------( 201      ( 17 Aug 2021 10:55 )             29.9     08-18    137  |  103  |  8   ----------------------------<  144<H>  3.5   |  29  |  0.63    Ca    8.4<L>      18 Aug 2021 06:44  Mg     2.0     08-18            RADIOLOGY & ADDITIONAL TESTS:< from: Xray Chest 1 View- PORTABLE-Routine (Xray Chest 1 View- PORTABLE-Routine in AM.) (08.18.21 @ 07:49) >  EXAM:  XR CHEST PORTABLE ROUTINE 1V                            PROCEDURE DATE:  08/18/2021          INTERPRETATION:  History: Dyspnea CHF, diastolic dysfunction    Chest:  one view.    Comparison: 08/12/2021    AP radiograph of the chest demonstrates mild vascular congestion with mild interstitial edema. The cardiac silhouette is mildly enlarged in size. Aortic valve prosthesis noted. Osseous structures are intact.    Impression:Mild congestive heart failure with mild interstitial edema, minimally improved from prior examination.        FRANCESCA BOSTON MD; Attending Radiologist      Assessment and Recommendation:   · Assessment	  Assessment/Plan    - Continue torsemide and monitor lytes carefully   - Broad spectrum Abx per ID-on Ceftin x 14 days  - Monitor I+O's  - Follow CBC, BMP  - Daily weights if possible  - Repeat CXR noted  - Colonoscopy to evaluate for possible source of Strep Lutentiesis (Bovis family)  - Taper midodrine as pancho    Problem/Recommendation - 1:  Stage 3 chronic kidney disease.  Problem/Recommendation - 2:  ·  Sepsis due to anaerobes.   Problem/Recommendation - 3:  ·  Chronic atrial fibrillation.   Problem/Recommendation - 4:  ·  Chronic diastolic heart failure.   Problem/Recommendation - 5:  ·  Primary pulmonary hypertension.

## 2021-08-19 NOTE — PROGRESS NOTE ADULT - SUBJECTIVE AND OBJECTIVE BOX
Colon to TI  cecal mass arising from a large polyp  Diverticulosis  O/W normal    Rec:  CT abd/pel  CEA  CR surg eval  D/W Dr. Laith joshua

## 2021-08-19 NOTE — DIETITIAN INITIAL EVALUATION ADULT. - PERTINENT LABORATORY DATA
08-18    137  |  103  |  8   ----------------------------<  144<H>  3.5   |  29  |  0.63    Ca    8.4<L>      18 Aug 2021 06:44  Mg     2.0     08-18    BMI: BMI (kg/m2): 23 (08-11-21 @ 11:57)  HbA1c: A1C with Estimated Average Glucose Result: 6.8 % (08-12-21 @ 06:07)    Glucose: POCT Blood Glucose.: 138 mg/dL (08-19-21 @ 13:08)    BP: 97/51 (08-19-21 @ 07:40) (86/42 - 134/60)  Lipid Panel:

## 2021-08-19 NOTE — PROGRESS NOTE ADULT - ASSESSMENT
MEDICATIONS  (STANDING):  allopurinol  Oral Tab/Cap - Peds 200 milliGRAM(s) Oral daily  artificial  tears Solution 1 Drop(s) Both EYES three times a day  aspirin  chewable 81 milliGRAM(s) Oral daily  atorvastatin 10 milliGRAM(s) Oral at bedtime  cefuroxime   Tablet 500 milliGRAM(s) Oral every 12 hours  dextrose 40% Gel 15 Gram(s) Oral once  dextrose 5%. 1000 milliLiter(s) (50 mL/Hr) IV Continuous <Continuous>  dextrose 50% Injectable 25 Gram(s) IV Push once  escitalopram 10 milliGRAM(s) Oral daily  ferrous    sulfate 325 milliGRAM(s) Oral two times a day  glucagon  Injectable 1 milliGRAM(s) IntraMuscular once  insulin lispro (ADMELOG) corrective regimen sliding scale   SubCutaneous three times a day before meals  levothyroxine 50 MICROGram(s) Oral daily  midodrine. 5 milliGRAM(s) Oral three times a day  pantoprazole    Tablet 40 milliGRAM(s) Oral daily  polyethylene glycol 3350 17 Gram(s) Oral daily  sildenafil (REVATIO) 20 milliGRAM(s) Oral two times a day  torsemide 20 milliGRAM(s) Oral two times a day    MEDICATIONS  (PRN):  acetaminophen   Tablet .. 650 milliGRAM(s) Oral every 6 hours PRN Temp greater or equal to 38.5C (101.3F), Mild Pain (1 - 3)  aluminum hydroxide/magnesium hydroxide/simethicone Suspension 30 milliLiter(s) Oral every 4 hours PRN Dyspepsia  magnesium hydroxide Suspension 30 milliLiter(s) Oral daily PRN Constipation  melatonin 3 milliGRAM(s) Oral at bedtime PRN Insomnia  traMADol 50 milliGRAM(s) Oral every 8 hours PRN Moderate Pain (4 - 6)      Patient is 89 yo female with PMhx pancreatitis s/p ERCP, cirrhosis, DM2, a-fib on Eliquis, CAD s/p PCI, severe pulm HTN, chronic R heart failure, diastolic dysfunction, AS s/p TAVR, OA, HTN, dyslipidemia, hypothyroidism, presents with sob and fatigue. Patient sent from [facility] for further evaluation. Chart reviewed. Patient seen in ED, pleasant with baseline confusion. Does not know why she is here.  In ED, patient found to be septic, given 3 Liter bolus and for a short time started on pressors. Now she is off pressors with BP 98/70 in no distress.     severe sepsis/septic shock POA.: all resolved now  - off vasopressors.  - midodrine taper  - BCx:  Streptococcus.; repeat blood cx, neg  - repeat BCx:  neg  - TTE; no vegetation  - imaging, no overt pneumonia.  - cont  iv ceftriaxone as per ID, switch to po ceftin for a total of 14 days since last Blood cx were neg, till 8/27  GI consult to r/o colon CA; in pt colonoscopy  colonoscopy on 8/19    AF.  - AC:  apixaban on hold for colonoscopy   - RTC:  metoprolol held due to hypotension.    HFpEF.  - appears some lower chest rales on exam.  restart spironolactone and torsemide 20 bid  monitor BP, Cr    Constipation: miralax started  document BM  no BM; MOM added    hx DM2.  - FS target 140-180mg/dL.  - consistent CHO diet.  - correction insulin coverage.    hx hypothyroidism.  - TSH:  wnl.  - levothyroxine.    DVT PPX:  apixaban on hold ; venodynes    poc discussed with pt, team, Dr. Mojica     MEDICATIONS  (STANDING):  allopurinol  Oral Tab/Cap - Peds 200 milliGRAM(s) Oral daily  artificial  tears Solution 1 Drop(s) Both EYES three times a day  aspirin  chewable 81 milliGRAM(s) Oral daily  atorvastatin 10 milliGRAM(s) Oral at bedtime  cefuroxime   Tablet 500 milliGRAM(s) Oral every 12 hours  dextrose 40% Gel 15 Gram(s) Oral once  dextrose 5%. 1000 milliLiter(s) (50 mL/Hr) IV Continuous <Continuous>  dextrose 50% Injectable 25 Gram(s) IV Push once  escitalopram 10 milliGRAM(s) Oral daily  ferrous    sulfate 325 milliGRAM(s) Oral two times a day  glucagon  Injectable 1 milliGRAM(s) IntraMuscular once  insulin lispro (ADMELOG) corrective regimen sliding scale   SubCutaneous three times a day before meals  levothyroxine 50 MICROGram(s) Oral daily  midodrine. 5 milliGRAM(s) Oral three times a day  pantoprazole    Tablet 40 milliGRAM(s) Oral daily  polyethylene glycol 3350 17 Gram(s) Oral daily  sildenafil (REVATIO) 20 milliGRAM(s) Oral two times a day  torsemide 20 milliGRAM(s) Oral two times a day    MEDICATIONS  (PRN):  acetaminophen   Tablet .. 650 milliGRAM(s) Oral every 6 hours PRN Temp greater or equal to 38.5C (101.3F), Mild Pain (1 - 3)  aluminum hydroxide/magnesium hydroxide/simethicone Suspension 30 milliLiter(s) Oral every 4 hours PRN Dyspepsia  magnesium hydroxide Suspension 30 milliLiter(s) Oral daily PRN Constipation  melatonin 3 milliGRAM(s) Oral at bedtime PRN Insomnia  traMADol 50 milliGRAM(s) Oral every 8 hours PRN Moderate Pain (4 - 6)      Patient is 89 yo female with PMhx pancreatitis s/p ERCP, cirrhosis, DM2, a-fib on Eliquis, CAD s/p PCI, severe pulm HTN, chronic R heart failure, diastolic dysfunction, AS s/p TAVR, OA, HTN, dyslipidemia, hypothyroidism, presents with sob and fatigue. Patient sent from [facility] for further evaluation. Chart reviewed. Patient seen in ED, pleasant with baseline confusion. Does not know why she is here.  In ED, patient found to be septic, given 3 Liter bolus and for a short time started on pressors. Now she is off pressors with BP 98/70 in no distress.     severe sepsis/septic shock POA.: all resolved now  - off vasopressors.  - midodrine taper  - BCx:  Streptococcus.; repeat blood cx, neg  - repeat BCx:  neg  - TTE; no vegetation  - imaging, no overt pneumonia.  - cont  iv ceftriaxone as per ID, switch to po ceftin for a total of 14 days since last Blood cx were neg, till 8/27  -GI consult to r/o colon CA; s/p colonscopy 8/19-> Cecal mass.   -CT Ab/Pel w Oral/IV contrast(8/19): no evidence of metastatic disease; cecal mass found on colonscopy poorly visualized  -CRS consulted. CEA ordered      AF.  - AC:  apixaban on hold for colonoscopy. Restart when cleared by GI.  - RTC:  metoprolol held due to hypotension.    HFpEF.  - appears some lower chest rales on exam.  -spironolactone and torsemide 20 bid  monitor BP, Cr    Constipation: miralax started  document BM  no BM; MOM added    hx DM2.  - FS target 140-180mg/dL.  - consistent CHO diet.  - correction insulin coverage.    hx hypothyroidism.  - TSH:  wnl.  - levothyroxine.    DVT PPX:  apixaban on hold ; venodynes    poc discussed with pt, team, Dr. Mojica

## 2021-08-19 NOTE — PROVIDER CONTACT NOTE (OTHER) - SITUATION
MD is aware of pt admission
ANSWERING SERVICE AWARE OF CONSULT.
DR. ROMANO'S ANSWERING SERVICE AWARE OF CONSULT.
called md office spoke with Luli
called md service spoke with Stephane

## 2021-08-19 NOTE — DIETITIAN INITIAL EVALUATION ADULT. - MALNUTRITION
severe protein/calorie malnutrition in context of acute illness severe protein/calorie malnutrition in context of acute illness AEB suboptimal nutrition 2/2 Sepsis.

## 2021-08-19 NOTE — PROGRESS NOTE ADULT - SUBJECTIVE AND OBJECTIVE BOX
CC:  Patient is a 89y old  Female who presents with a chief complaint of SOB (12 Aug 2021 13:35)    SUBJECTIVE:     8/13: no complaints; no chest pain/sob.  BP improved  d/maddy iv fluids    8/14: no complaints  afebrile  echo: no vegetation  repeat blood cx: neg    8/15: had BM which was slightly hard  PT awaited  no other complaints    8/16: feels better  c/o constipation, Miralax added  spironolactone 25 and half dose torsemide at 20 mg bid re started    8/17: c/o constipation  MOM added  colonoscopy as in pt    8/18: BP low in 80s; sildenafil held; morning dose of torsemide held; ns 50 ml x 5 hrs given; BP better  no complaints  colonoscopy tomorrow    ROS:  all other review of systems are negative unless indicated above.      PHYSICAL EXAM:    Vital Signs Last 24 Hrs  T(C): 36.4 (08-19-21 @ 16:22), Max: 36.4 (08-19-21 @ 06:45)  HR: 84 (08-19-21 @ 16:22) (79 - 90)  BP: 101/50 (08-19-21 @ 16:22) (97/51 - 134/60)  RR: 18 (08-19-21 @ 16:22) (17 - 18)  SpO2: 94% (08-19-21 @ 16:22) (92% - 95%)    Constitutional: Well appearing  HEENT: Atraumatic, CYNDEE, Normal, No congestion  Respiratory: Breath Sounds normal, b/l rhonchi; no wheeze  Cardiovascular: N S1S2; J LUIS present  Gastrointestinal: Abdomen soft, non tender, Bowel Sounds present  Extremities: No edema, peripheral pulses present  Neurological: AAO x 3, no gross focal motor deficits  Skin: Non cellulitic, no rash, ulcers  Lymph Nodes: No lymphadenopathy noted  Back: No CVA tenderness   Musculoskeletal: non tender  Breasts: Deferred  Genitourinary: deferred  Rectal: Deferred        08-18    137  |  103  |  8   ----------------------------<  144<H>  3.5   |  29  |  0.63    Ca    8.4<L>      18 Aug 2021 06:44  Mg     2.0     08-18      COVID-19 PCR: NotDetec (18 Aug 2021 16:00)  COVID-19 PCR: NotDetec (16 Aug 2021 14:15)  SARS-CoV-2: NotDetec (11 Aug 2021 12:31)    CAPILLARY BLOOD GLUCOSE      POCT Blood Glucose.: 280 mg/dL (19 Aug 2021 16:44)  POCT Blood Glucose.: 138 mg/dL (19 Aug 2021 13:08)  POCT Blood Glucose.: 143 mg/dL (19 Aug 2021 06:43)  POCT Blood Glucose.: 144 mg/dL (18 Aug 2021 21:59)                              9.8    6.96  )-----------( 201      ( 17 Aug 2021 10:55 )             29.9           08-17    138  |  106  |  9   ----------------------------<  254<H>  3.9   |  25  |  0.81    Ca    8.6      17 Aug 2021 10:55  Mg     1.9     08-17    MICROBIOLOGY/CULTURES:    Culture Results:   No growth to date. (08-13 @ 05:42)  Culture Results:   No growth to date. (08-13 @ 05:41)  Culture Results:   Growth in aerobic and anaerobic bottles: Streptococcus lutetiensis  See previous culture [id number] (08-11 @ 12:31)  Culture Results:   Growth in aerobic and anaerobic bottles: Streptococcus lutetiensis  ***Blood Panel PCR results on this specimen are available  approximately 3 hours after the Gram stain result.***  Gram stain, PCR, and/or culture results may not always  corresponddue to difference in methodologies.  ************************************************************  This PCR assay was performed by multiplex PCR. This  Assay tests for 66 bacterial and resistance gene targets.  Please refer to the [facility] test directory  at [email or web address] for details. (08-11 @ 12:31)  Culture Results:   <10,000 CFU/mL Normal Urogenital Ira (08-11 @ 12:31)            RADIOLOGY RESULTS:    < from: CT Chest No Cont (08.11.21 @ 20:53) >  IMPRESSION:  Peripheral reticulation, ill-defined linear and groundglass opacities with mild bronchiectasis is similar compared to the prior study and may be related to interstitial lung disease. No honeycombing.    < end of copied text >    < from: CT Abdomen and Pelvis w/ Oral Cont and w/ IV Cont (08.19.21 @ 14:09) >    IMPRESSION:  Patient's known cecal mass not well-seen on this study.    No evidence of metastatic disease in the abdomen or pelvis.    Small bilateral pleural effusions.    < end of copied text >        I reviewed labs, imaging, orders and vitals.  CC:  Patient is a 89y old  Female who presents with a chief complaint of SOB (12 Aug 2021 13:35)    SUBJECTIVE:     8/13: no complaints; no chest pain/sob.  BP improved  d/maddy iv fluids    8/14: no complaints  afebrile  echo: no vegetation  repeat blood cx: neg    8/15: had BM which was slightly hard  PT awaited  no other complaints    8/16: feels better  c/o constipation, Miralax added  spironolactone 25 and half dose torsemide at 20 mg bid re started    8/17: c/o constipation  MOM added  colonoscopy as in pt    8/18: BP low in 80s; sildenafil held; morning dose of torsemide held; ns 50 ml x 5 hrs given; BP better  no complaints  colonoscopy tomorrow    8/19: BP improvedl; s/p colonoscopy (8/19). Tolerated procedure well. Findings reveal possible cecal mass. Patient denies fever, chills, dizziness, chest pain, SOB, nausea, vomiting, abdominal pain/discomfort.     ROS:  all other review of systems are negative unless indicated above.      PHYSICAL EXAM:    Vital Signs Last 24 Hrs  T(C): 36.4 (08-19-21 @ 16:22), Max: 36.4 (08-19-21 @ 06:45)  HR: 84 (08-19-21 @ 16:22) (79 - 90)  BP: 101/50 (08-19-21 @ 16:22) (97/51 - 134/60)  RR: 18 (08-19-21 @ 16:22) (17 - 18)  SpO2: 94% (08-19-21 @ 16:22) (92% - 95%)    Constitutional: Well appearing  HEENT: Atraumatic, JOSHUA, Normal, No congestion  Respiratory: Breath Sounds normal, b/l rhonchi; no wheeze  Cardiovascular: RRR S1S2; J LUIS present  Gastrointestinal: Abdomen soft, non tender, Bowel Sounds present; no guarding  Extremities: No edema, peripheral pulses present  Neurological: AAO x 3, no gross focal motor deficits  Skin: Non cellulitic, no rash, ulcers  Lymph Nodes: No lymphadenopathy noted  Back: No CVA tenderness   Musculoskeletal: non tender      08-18    137  |  103  |  8   ----------------------------<  144<H>  3.5   |  29  |  0.63    Ca    8.4<L>      18 Aug 2021 06:44  Mg     2.0     08-18      COVID-19 PCR: NotDetec (18 Aug 2021 16:00)  COVID-19 PCR: NotDetec (16 Aug 2021 14:15)  SARS-CoV-2: NotDetec (11 Aug 2021 12:31)    CAPILLARY BLOOD GLUCOSE      POCT Blood Glucose.: 280 mg/dL (19 Aug 2021 16:44)  POCT Blood Glucose.: 138 mg/dL (19 Aug 2021 13:08)  POCT Blood Glucose.: 143 mg/dL (19 Aug 2021 06:43)  POCT Blood Glucose.: 144 mg/dL (18 Aug 2021 21:59)            08-18    137  |  103  |  8   ----------------------------<  144<H>  3.5   |  29  |  0.63    Ca    8.4<L>      18 Aug 2021 06:44  Mg     2.0     08-18      COVID-19 PCR: NotDetec (18 Aug 2021 16:00)  COVID-19 PCR: NotDetec (16 Aug 2021 14:15)  SARS-CoV-2: NotDetec (11 Aug 2021 12:31)    CAPILLARY BLOOD GLUCOSE      POCT Blood Glucose.: 280 mg/dL (19 Aug 2021 16:44)  POCT Blood Glucose.: 138 mg/dL (19 Aug 2021 13:08)  POCT Blood Glucose.: 143 mg/dL (19 Aug 2021 06:43)  POCT Blood Glucose.: 144 mg/dL (18 Aug 2021 21:59)                              9.8    6.96  )-----------( 201      ( 17 Aug 2021 10:55 )             29.9           08-17    138  |  106  |  9   ----------------------------<  254<H>  3.9   |  25  |  0.81    Ca    8.6      17 Aug 2021 10:55  Mg     1.9     08-17    MICROBIOLOGY/CULTURES:    Culture Results:   No growth to date. (08-13 @ 05:42)  Culture Results:   No growth to date. (08-13 @ 05:41)  Culture Results:   Growth in aerobic and anaerobic bottles: Streptococcus lutetiensis  See previous culture [id number] (08-11 @ 12:31)  Culture Results:   Growth in aerobic and anaerobic bottles: Streptococcus lutetiensis  ***Blood Panel PCR results on this specimen are available  approximately 3 hours after the Gram stain result.***  Gram stain, PCR, and/or culture results may not always  corresponddue to difference in methodologies.  ************************************************************  This PCR assay was performed by multiplex PCR. This  Assay tests for 66 bacterial and resistance gene targets.  Please refer to the [facility] test directory  at [email or web address] for details. (08-11 @ 12:31)  Culture Results:   <10,000 CFU/mL Normal Urogenital Ira (08-11 @ 12:31)            RADIOLOGY RESULTS:    < from: CT Chest No Cont (08.11.21 @ 20:53) >  IMPRESSION:  Peripheral reticulation, ill-defined linear and groundglass opacities with mild bronchiectasis is similar compared to the prior study and may be related to interstitial lung disease. No honeycombing.    < end of copied text >    < from: CT Abdomen and Pelvis w/ Oral Cont and w/ IV Cont (08.19.21 @ 14:09) >    IMPRESSION:  Patient's known cecal mass not well-seen on this study.    No evidence of metastatic disease in the abdomen or pelvis.    Small bilateral pleural effusions.    < end of copied text >        I reviewed labs, imaging, orders and vitals.

## 2021-08-19 NOTE — CONSULT NOTE ADULT - CONSULT REQUESTED DATE/TIME
19-Aug-2021 15:50
17-Aug-2021 08:10
11-Aug-2021 15:48
12-Aug-2021 13:36
12-Aug-2021 10:31
12-Aug-2021 08:42

## 2021-08-19 NOTE — DIETITIAN INITIAL EVALUATION ADULT. - ADD RECOMMEND
1) Advance po diet to regular. 2) monitor weights and track trends 3) Suggest add MVI w/minerals daily 4) monitor lytes and hydration replete as needed. 5) when diet is advanced add Ensure enlive 8oz po TID- in between meals. 6) monitor BM if none x >3 days initiate bowel meds 7) Maintain aspiration precautions, back of bed >35 degrees.

## 2021-08-19 NOTE — CONSULT NOTE ADULT - SUBJECTIVE AND OBJECTIVE BOX
HPI:  Patient is 89 yo female with PMhx pancreatitis s/p ERCP, cirrhosis, DM2, a-fib on Eliquis, CAD s/p PCI, severe pulm HTN, chronic R heart failure, diastolic dysfunction, AS s/p TAVR, OA, HTN, dyslipidemia, hypothyroidism, presents with sob and fatigue. Patient sent from [facility] for further evaluation. Chart reviewed. Patient seen in ED, pleasant with baseline confusion. Does not know why she is here.  In ED, patient found to be septic, given 3 Liter bolus and for a short time started on pressors. Now she is off pressors with BP 98/70 in no distress.     Patient states she is feeling well this AM. Denies cough or sputum production. No dysuria. She has responded to fluid rescucitation. Seems as if she is at her baseline.    (11 Aug 2021 18:12)      PAST MEDICAL & SURGICAL HISTORY:  Diabetes Mellitus Type II    Dyslipidemia    GERD (Gastroesophageal Reflux Disease)    History of Osteoarthritis    Hypertension    CAD (coronary artery disease)    Afib    Hypothyroid    HLD (hyperlipidemia)    History of pancreatitis    Aortic stenosis    H/O: Hysterectomy    H/O: Knee Surgery- right meniscus    History of cholecystectomy    History of appendectomy    H/O total knee replacement, left    S/P IVC filter  Note that Pt does not have  h/o DVT, outPt doppler was read first as +, but after review reported as no DVT. Pt got IVC filer before that        MEDICATIONS  (STANDING):  allopurinol  Oral Tab/Cap - Peds 200 milliGRAM(s) Oral daily  apixaban 2.5 milliGRAM(s) Oral two times a day  artificial  tears Solution 1 Drop(s) Both EYES three times a day  aspirin  chewable 81 milliGRAM(s) Oral daily  aztreonam  IVPB 1000 milliGRAM(s) IV Intermittent every 12 hours  dextrose 40% Gel 15 Gram(s) Oral once  dextrose 5%. 1000 milliLiter(s) (50 mL/Hr) IV Continuous <Continuous>  dextrose 50% Injectable 25 Gram(s) IV Push once  escitalopram 10 milliGRAM(s) Oral daily  ferrous    sulfate 325 milliGRAM(s) Oral two times a day  glucagon  Injectable 1 milliGRAM(s) IntraMuscular once  insulin lispro (ADMELOG) corrective regimen sliding scale   SubCutaneous three times a day before meals  levothyroxine 50 MICROGram(s) Oral daily  midodrine. 10 milliGRAM(s) Oral three times a day  pantoprazole    Tablet 40 milliGRAM(s) Oral daily  sildenafil (REVATIO) 20 milliGRAM(s) Oral two times a day  simvastatin 40 milliGRAM(s) Oral at bedtime  vancomycin  IVPB 1000 milliGRAM(s) IV Intermittent every 12 hours    MEDICATIONS  (PRN):  acetaminophen   Tablet .. 650 milliGRAM(s) Oral every 6 hours PRN Temp greater or equal to 38.5C (101.3F), Mild Pain (1 - 3)  aluminum hydroxide/magnesium hydroxide/simethicone Suspension 30 milliLiter(s) Oral every 4 hours PRN Dyspepsia  melatonin 3 milliGRAM(s) Oral at bedtime PRN Insomnia  ondansetron Injectable 4 milliGRAM(s) IV Push every 8 hours PRN Nausea and/or Vomiting      Allergies    penicillin (Rash)  penicillins (Other)  sulfa drugs (Rash; Other)    Intolerances        SOCIAL HISTORY: Denies tobacco, etoh abuse or illicit drug use    FAMILY HISTORY:  FH: diabetes mellitus  both parents    FH: heart disease        Vital Signs Last 24 Hrs  T(C): 36.1 (12 Aug 2021 06:10), Max: 38.5 (11 Aug 2021 12:16)  T(F): 96.9 (12 Aug 2021 06:10), Max: 101.3 (11 Aug 2021 12:16)  HR: 66 (12 Aug 2021 06:00) (61 - 98)  BP: 106/47 (12 Aug 2021 06:00) (72/46 - 127/59)  BP(mean): 62 (12 Aug 2021 06:00) (50 - 77)  RR: 18 (12 Aug 2021 06:00) (15 - 22)  SpO2: 100% (12 Aug 2021 06:00) (91% - 100%)    REVIEW OF SYSTEMS:    CONSTITUTIONAL:  As per HPI.  HEENT:  Eyes:  No diplopia or blurred vision. ENT:  No earache, sore throat or runny nose.  CARDIOVASCULAR:  No pressure, squeezing, tightness, heaviness or aching about the chest, neck, axilla or epigastrium.  RESPIRATORY:  See HPI  GASTROINTESTINAL:  No nausea, vomiting or diarrhea.  GENITOURINARY:  No dysuria, frequency or urgency.  MUSCULOSKELETAL:  As per HPI.  SKIN:  No change in skin, hair or nails.  NEUROLOGIC:  No paresthesias, fasciculations, seizures or weakness.  PSYCHIATRIC:  No disorder of thought or mood.  ENDOCRINE:  No heat or cold intolerance, polyuria or polydipsia.  HEMATOLOGICAL:  No easy bruising or bleedings:  .     PHYSICAL EXAMINATION:    GENERAL APPEARANCE:  Pt. is not currently dyspneic, in no distress. Pt. is alert, oriented, and pleasant.  HEENT:  Pupils are normal and react normally. No icterus. Mucous membranes well colored.  NECK:  Supple. No lymphadenopathy. Jugular venous pressure not elevated.    HEART:   The cardiac impulse has a normal quality. Irregular, irreg. Normal S1 and S2.   CHEST:  Chest with bibasilar crackles, w/ L>R. Normal respiratory effort.  ABDOMEN:  Soft and nontender.   EXTREMITIES:  There is no cyanosis, clubbing or edema.   SKIN:  No rash or significant lesions are noted.    LABS:                        10.1   5.59  )-----------( 117      ( 12 Aug 2021 06:07 )             30.7     08-12    135  |  103  |  23  ----------------------------<  125<H>  3.9   |  27  |  1.02    Ca    7.8<L>      12 Aug 2021 06:07    TPro  5.7<L>  /  Alb  2.3<L>  /  TBili  0.4  /  DBili  x   /  AST  28  /  ALT  19  /  AlkPhos  67  08-12    LIVER FUNCTIONS - ( 12 Aug 2021 06:07 )  Alb: 2.3 g/dL / Pro: 5.7 gm/dL / ALK PHOS: 67 U/L / ALT: 19 U/L / AST: 28 U/L / GGT: x           PT/INR - ( 11 Aug 2021 12:31 )   PT: 24.5 sec;   INR: 2.19 ratio         PTT - ( 11 Aug 2021 12:31 )  PTT:32.5 sec      Urinalysis Basic - ( 11 Aug 2021 12:31 )    Color: Yellow / Appearance: Slightly Turbid / S.010 / pH: x  Gluc: x / Ketone: Negative  / Bili: Negative / Urobili: Negative mg/dL   Blood: x / Protein: 15 mg/dL / Nitrite: Negative   Leuk Esterase: Trace / RBC: 0-2 /HPF / WBC 0-2   Sq Epi: x / Non Sq Epi: Few / Bacteria: Moderate          RADIOLOGY & ADDITIONAL STUDIES: < from: CT Chest No Cont (21 @ 20:53) >  EXAM:  CT CHEST                            PROCEDURE DATE:  2021          INTERPRETATION:  EXAMINATION: CT CHEST    CLINICAL INDICATION: Dyspnea.    TECHNIQUE: Noncontrast CT of the chest was obtained.    COMPARISON: Multiple CT, most recent 2020.    FINDINGS:    AIRWAYS AND LUNGS: The central tracheobronchial tree is patent.  Peripheral reticulation, ill-defined linear and groundglass opacities with mild bronchiectasis is similar compared to the prior study. No honeycombing.    MEDIASTINUM AND PLEURA: Increased number of nonenlarged mediastinal lymph nodes are decreased in size compared to the prior study. The visualized portion of the thyroid gland is unremarkable. There is no pleural effusion. There is no pneumothorax.    HEART AND VESSELS: There is cardiomegaly.  There are atherosclerotic calcifications of the aorta and coronary arteries.  There is no pericardial effusion.  TAVR.    UPPER ABDOMEN: Images of the upper abdomen demonstrate diverticulosis.    BONES AND SOFT TISSUES: There are mild degenerative changes of the spine. Severe degenerative changes left shoulder The soft tissues are unremarkable.    TUBES/LINES: None.    IMPRESSION:  Peripheral reticulation, ill-defined linear and groundglass opacities with mild bronchiectasis is similar compared to the prior study and may be related to interstitial lung disease. No honeycombing.      ARMAND COON MD; Attending Radiologist          
Patient is a 89y old  Female who presents with a chief complaint of SOB (12 Aug 2021 10:31)    HPI:  Patient is 89 yo female with PMhx pancreatitis s/p ERCP, cirrhosis, DM2, a-fib on Eliquis, CAD s/p PCI, severe pulm HTN, chronic R heart failure, diastolic dysfunction, AS s/p TAVR, OA, HTN, dyslipidemia, hypothyroidism, presents with sob and fatigue. Patient sent from [facility] for further evaluation. Chart reviewed. Patient seen in ED, pleasant with baseline confusion. Does not know why she is here.  In ED, patient found to be septic, given 3 Liter bolus and for a short time started on pressors. Now she is off pressors with BP 98/70 in no distress. Here blood cultures growing streptococcus species, CT chest with some groundglass opacities, mild bronchiectasis was eval by pulmonary, was given vanco/aztreonam.     PMH: as above  PSH: as above  Meds: per reconciliation sheet, noted below  MEDICATIONS  (STANDING):  allopurinol  Oral Tab/Cap - Peds 200 milliGRAM(s) Oral daily  apixaban 2.5 milliGRAM(s) Oral two times a day  artificial  tears Solution 1 Drop(s) Both EYES three times a day  aspirin  chewable 81 milliGRAM(s) Oral daily  aztreonam  IVPB 1000 milliGRAM(s) IV Intermittent every 12 hours  dextrose 40% Gel 15 Gram(s) Oral once  dextrose 5%. 1000 milliLiter(s) (50 mL/Hr) IV Continuous <Continuous>  dextrose 50% Injectable 25 Gram(s) IV Push once  escitalopram 10 milliGRAM(s) Oral daily  ferrous    sulfate 325 milliGRAM(s) Oral two times a day  glucagon  Injectable 1 milliGRAM(s) IntraMuscular once  insulin lispro (ADMELOG) corrective regimen sliding scale   SubCutaneous three times a day before meals  levothyroxine 50 MICROGram(s) Oral daily  midodrine. 10 milliGRAM(s) Oral three times a day  pantoprazole    Tablet 40 milliGRAM(s) Oral daily  sildenafil (REVATIO) 20 milliGRAM(s) Oral two times a day  simvastatin 40 milliGRAM(s) Oral at bedtime      Allergies    penicillin (Rash)  penicillins (Other)  sulfa drugs (Rash; Other)    Intolerances      Social: no smoking, no alcohol, no illegal drugs; no recent travel, no exposure to TB  FAMILY HISTORY:  FH: diabetes mellitus  both parents    FH: heart disease       no history of premature cardiovascular disease in first degree relatives    ROS: the patient denies fever, no chills, no HA, no dizziness, no sore throat, no blurry vision, no CP, no palpitations, no SOB, no cough, no abdominal pain, no diarrhea, no N/V, no dysuria, no leg pain, no claudication, no rash, no joint aches, no rectal pain or bleeding, no night sweats    All other systems reviewed and are negative    Vital Signs Last 24 Hrs  T(C): 36.1 (12 Aug 2021 06:10), Max: 36.7 (11 Aug 2021 21:07)  T(F): 96.9 (12 Aug 2021 06:10), Max: 98 (11 Aug 2021 21:07)  HR: 72 (12 Aug 2021 13:00) (61 - 87)  BP: 109/50 (12 Aug 2021 13:00) (81/51 - 127/59)  BP(mean): 66 (12 Aug 2021 13:00) (50 - 77)  RR: 20 (12 Aug 2021 13:00) (15 - 30)  SpO2: 99% (12 Aug 2021 13:00) (95% - 100%)  Daily     Daily Weight in k.9 (12 Aug 2021 06:10)    PE:  Constitutional: frail looking  HEENT: NC/AT, EOMI, PERRLA, conjunctivae clear; ears and nose atraumatic; pharynx benign  Neck: supple; thyroid not palpable  Back: no tenderness  Respiratory: respiratory effort normal; clear to auscultation  Cardiovascular: S1S2 regular, no murmurs  Abdomen: soft, not tender, not distended, positive BS; liver and spleen WNL  Genitourinary: no suprapubic tenderness  Lymphatic: no LN palpable  Musculoskeletal: no muscle tenderness, no joint swelling or tenderness  Extremities: no pedal edema  Neurological/ Psychiatric: AxOx3, Judgement and insight normal;  moving all extremities  Skin: no rashes; no palpable lesions    Labs: all available labs reviewed                        10.1   5.59  )-----------( 117      ( 12 Aug 2021 06:07 )             30.7     08-12    135  |  103  |  23  ----------------------------<  125<H>  3.9   |  27  |  1.02    Ca    7.8<L>      12 Aug 2021 06:07    TPro  5.7<L>  /  Alb  2.3<L>  /  TBili  0.4  /  DBili  x   /  AST  28  /  ALT  19  /  AlkPhos  67  08-12     LIVER FUNCTIONS - ( 12 Aug 2021 06:07 )  Alb: 2.3 g/dL / Pro: 5.7 gm/dL / ALK PHOS: 67 U/L / ALT: 19 U/L / AST: 28 U/L / GGT: x           Urinalysis Basic - ( 11 Aug 2021 12:31 )    Color: Yellow / Appearance: Slightly Turbid / S.010 / pH: x  Gluc: x / Ketone: Negative  / Bili: Negative / Urobili: Negative mg/dL   Blood: x / Protein: 15 mg/dL / Nitrite: Negative   Leuk Esterase: Trace / RBC: 0-2 /HPF / WBC 0-2   Sq Epi: x / Non Sq Epi: Few / Bacteria: Moderate      Culture - Blood (21 @ 12:31)   - Streptococcus sp. (Not Grp A, B or S pneumoniae): Detec   Gram Stain:   Growth in aerobic bottle: Gram Positive Cocci in Pairs and Chains   Growth in anaerobic bottle: Gram Positive Cocci in Pairs and Chains   Specimen Source: .Blood None   Organism: Blood Culture PCR   Culture Results:   Growth in aerobic bottle: Gram Positive Cocci in Pairs and Chains   Growth in anaerobic bottle: Gram Positive Cocci in Pairs and Chains   ***Blood Panel PCR results on this specimen are available   approximately 3 hours after the Gram stain result.***   Gram stain, PCR, and/or culture results may not always   correspond due to difference in methodologies.   ************************************************************   This PCR assay was performed by multiplex PCR. This   Assay tests for 66 bacterial and resistance gene targets.   Please refer to the [facility] [employer] test directory   at [email or web address] for details.   Organism Identification: Blood Culture PCR   Method Type: PCR     Culture - Blood (21 @ 12:31)   Gram Stain:   Growth in anaerobic bottle: Gram Positive Cocci in Pairs and Chains   Specimen Source: .Blood None   Culture Results:   Growth in anaerobic bottle: Gram Positive Cocci in Pairs and Chains     Radiology: all available radiological tests reviewed  < from: CT Chest No Cont (21 @ 20:53) >    EXAM:  CT CHEST                            PROCEDURE DATE:  2021          INTERPRETATION:  EXAMINATION: CT CHEST    CLINICAL INDICATION: Dyspnea.    TECHNIQUE: Noncontrast CT of the chest was obtained.    COMPARISON: Multiple CT, most recent 2020.    FINDINGS:    AIRWAYS AND LUNGS: The central tracheobronchial tree is patent.  Peripheral reticulation, ill-defined linear and groundglass opacities with mild bronchiectasis is similar compared to the prior study. No honeycombing.    MEDIASTINUM AND PLEURA: Increased number of nonenlarged mediastinal lymph nodes are decreased in size compared to the prior study. The visualized portion of the thyroid gland is unremarkable. There is no pleural effusion. There is no pneumothorax.    HEART AND VESSELS: There is cardiomegaly.  There are atherosclerotic calcifications of the aorta and coronary arteries.  There is no pericardial effusion.  TAVR.    UPPER ABDOMEN: Images of the upper abdomen demonstrate diverticulosis.    BONES AND SOFT TISSUES: There are mild degenerative changes of the spine. Severe degenerative changes left shoulder The soft tissues are unremarkable.    TUBES/LINES: None.    IMPRESSION:  Peripheral reticulation, ill-defined linear and groundglass opacities with mild bronchiectasis is similar compared to the prior study and may be related to interstitial lung disease. No honeycombing.    Advanced directives addressed: full resuscitation
Patient is 89yo female with PMhx of skin cancer, gout, GERD, LBBB, HLD, CAD, presented with fever, AMS. Upon intial workup found to be febrile, CXR with PNA, given ABx. Pt was transiently hypotensive, given 3L NS bolus.   Latest /63, not on vasopressor support.     PMhx as above  PSHx as above  Meds as per EMR  FHx NC  Social hx denies smoking, etoh, drug use  ROS as above    T(C): 36.5 (21 @ 13:42), Max: 38.5 (21 @ 12:16)  HR: 87 (21 @ 13:42) (62 - 98)  BP: 98/51 (21 @ 13:42) (72/46 - 98/51)  RR: 20 (21 @ 13:42) (15 - 21)  SpO2: 95% (21 @ 13:42) (91% - 100%)  Wt(kg): --      Gen: Awake, alert, NAD  HEENT: NCAT, EOMI  Neck: Supple  CV: nml S1S2, RRR  Lungs: diminished breath sound R>L  Abd: Soft, NT, ND, BS+  Ext: No edema  Neuro: Non focal  Psych: anxious                        11.2   7.51  )-----------( 162      ( 11 Aug 2021 12:31 )             33.1     11 Aug 2021 12:31    127    |  92     |  26     ----------------------------<  156    4.5     |  27     |  1.40     Ca    8.9        11 Aug 2021 12:31    TPro  6.9    /  Alb  3.0    /  TBili  0.6    /  DBili  x      /  AST  20     /  ALT  19     /  AlkPhos  80     11 Aug 2021 12:31    PT/INR - ( 11 Aug 2021 12:31 )   PT: 24.5 sec;   INR: 2.19 ratio         PTT - ( 11 Aug 2021 12:31 )  PTT:32.5 sec  CAPILLARY BLOOD GLUCOSE        LIVER FUNCTIONS - ( 11 Aug 2021 12:31 )  Alb: 3.0 g/dL / Pro: 6.9 gm/dL / ALK PHOS: 80 U/L / ALT: 19 U/L / AST: 20 U/L / GGT: x           Urinalysis Basic - ( 11 Aug 2021 12:31 )    Color: Yellow / Appearance: Slightly Turbid / S.010 / pH: x  Gluc: x / Ketone: Negative  / Bili: Negative / Urobili: Negative mg/dL   Blood: x / Protein: 15 mg/dL / Nitrite: Negative   Leuk Esterase: Trace / RBC: 0-2 /HPF / WBC 0-2   Sq Epi: x / Non Sq Epi: Few / Bacteria: Moderate            
89 yo F presents with a cecal mass found on colonoscopy today. Patient had a bld cx + for Strep lutetiensis, with suspicion for colon cancer, hence GI was called to evaluate. Patient reports no complaints, no change in bowel habits, diarrhea, or constipation, weight loss, fever or chills. Patient states last colonoscopy was many years ago and it was normal.     ROS neg except as above    PMH: pancreatitis s/p ERCP, cirrhosis, DM2, a-fib on Eliquis, CAD s/p PCI, severe pulm HTN, chronic R heart failure, diastolic dysfunction, AS s/p TAVR, OA, HTN, dyslipidemia, hypothyroidism  Allergies: PCN, sulfa  Meds: as per chart  PSH: Knee Surgery- right meniscus  History of cholecystectomy  History of appendectomy  H/O total knee replacement, left  S/P IVC filter  Sohx: no tobacco, etoh, or illicit drugs    Vitals:  T(C): 36.4 ( @ 16:22), Max: 36.4 ( @ 06:45)  HR: 84 ( @ 16:22) (79 - 90)  BP: 101/50 ( @ 16:22) (97/51 - 134/60)  RR: 18 ( @ 16:22) (17 - 18)  SpO2: 94% ( @ 16:22) (92% - 95%)      Physical Exam:  General: AAOx3, Well developed, NAD  Chest: Normal respiratory effort  Heart: RRR  Abdomen: Soft, NTND  Rectal: stool in vault, no masses palpated, no blood  Neuro/Psych: No localized deficits. Normal speech, normal tone  Skin: Normal, no rashes, no lesions noted.   Extremities: Warm, well perfused, no edema, Pulses intact     @ 06:44                    -  CBC: ->)-------(<-                     -                 137 | 103 | 8    CMP:  ----------------------< 144               3.5 | 29 | 0.63                      Ca:8.4  Phos:-  M.0               -|      |-        LFTs:  ------|-|-----             -|      |-      Current Inpatient Medications:  acetaminophen   Tablet .. 650 milliGRAM(s) Oral every 6 hours PRN  allopurinol  Oral Tab/Cap - Peds 200 milliGRAM(s) Oral daily  aluminum hydroxide/magnesium hydroxide/simethicone Suspension 30 milliLiter(s) Oral every 4 hours PRN  artificial  tears Solution 1 Drop(s) Both EYES three times a day  aspirin  chewable 81 milliGRAM(s) Oral daily  atorvastatin 10 milliGRAM(s) Oral at bedtime  cefuroxime   Tablet 500 milliGRAM(s) Oral every 12 hours  dextrose 40% Gel 15 Gram(s) Oral once  dextrose 5%. 1000 milliLiter(s) (50 mL/Hr) IV Continuous <Continuous>  dextrose 50% Injectable 25 Gram(s) IV Push once  escitalopram 10 milliGRAM(s) Oral daily  ferrous    sulfate 325 milliGRAM(s) Oral two times a day  glucagon  Injectable 1 milliGRAM(s) IntraMuscular once  insulin lispro (ADMELOG) corrective regimen sliding scale   SubCutaneous three times a day before meals  levothyroxine 50 MICROGram(s) Oral daily  magnesium hydroxide Suspension 30 milliLiter(s) Oral daily PRN  melatonin 3 milliGRAM(s) Oral at bedtime PRN  midodrine. 5 milliGRAM(s) Oral three times a day  pantoprazole    Tablet 40 milliGRAM(s) Oral daily  polyethylene glycol 3350 17 Gram(s) Oral daily  sildenafil (REVATIO) 20 milliGRAM(s) Oral two times a day  torsemide 20 milliGRAM(s) Oral two times a day  traMADol 50 milliGRAM(s) Oral every 8 hours PRN      
HPI:  Patient is 89 yo female with PMhx pancreatitis s/p ERCP, cirrhosis, DM2, a-fib on Eliquis, CAD s/p PCI, severe pulm HTN, chronic R heart failure, diastolic dysfunction, AS s/p TAVR, OA, HTN, dyslipidemia, hypothyroidism, presents with sob and fatigue. Patient sent from [facility] for further evaluation. Chart reviewed. Patient seen in ED, pleasant with baseline confusion. Does not know why she is here.  In ED, patient found to be septic, given 3 Liter bolus and for a short time started on pressors. Now she is off pressors with BP 98/70 in no distress.    (11 Aug 2021 18:12)  ------------------------------  Patient was found to have Sepsis with Streptococcus lutetiensis but doing well now, repeat culture negative.  This is in the family of Rasheed Bovis    PAST MEDICAL & SURGICAL HISTORY:  Diabetes Mellitus Type II    Dyslipidemia    GERD (Gastroesophageal Reflux Disease)    History of Osteoarthritis    Hypertension    CAD (coronary artery disease)    Afib    Hypothyroid    HLD (hyperlipidemia)    History of pancreatitis    Aortic stenosis    H/O: Hysterectomy    H/O: Knee Surgery- right meniscus    History of cholecystectomy    History of appendectomy    H/O total knee replacement, left    S/P IVC filter  Note that Pt does not have  h/o DVT, outPt doppler was read first as +, but after review reported as no DVT. Pt got IVC filer before that        Home Medications:  allopurinol 100 mg oral tablet: 2 tab(s) orally once a day (11 Aug 2021 13:43)  Artificial Tears ophthalmic solution: 1 drop(s) to each affected eye 3 times a day (11 Aug 2021 13:43)  Aspirin Low Dose 81 mg oral tablet, chewable: 1 tab(s) orally once a day (11 Aug 2021 13:43)  Eliquis 2.5 mg oral tablet: 1 tab(s) orally 2 times a day (11 Aug 2021 13:44)  escitalopram 10 mg oral tablet: 1 tab(s) orally once a day (11 Aug 2021 13:43)  ferrous sulfate 325 mg (65 mg elemental iron) oral tablet: 1 tab(s) orally 2 times a day (11 Aug 2021 13:44)  levothyroxine 50 mcg (0.05 mg) oral tablet: 1 tab(s) orally once a day (11 Aug 2021 13:43)  pantoprazole 40 mg oral delayed release tablet: 1 tab(s) orally 2 times a day (11 Aug 2021 13:43)  sildenafil 20 mg oral tablet: 1 tab(s) orally 2 times a day (11 Aug 2021 13:44)  simvastatin 40 mg oral tablet: 1 tab(s) orally once a day (at bedtime) (11 Aug 2021 13:43)      MEDICATIONS  (STANDING):  allopurinol  Oral Tab/Cap - Peds 200 milliGRAM(s) Oral daily  apixaban 2.5 milliGRAM(s) Oral two times a day  artificial  tears Solution 1 Drop(s) Both EYES three times a day  aspirin  chewable 81 milliGRAM(s) Oral daily  cefuroxime   Tablet 500 milliGRAM(s) Oral every 12 hours  dextrose 40% Gel 15 Gram(s) Oral once  dextrose 5%. 1000 milliLiter(s) (50 mL/Hr) IV Continuous <Continuous>  dextrose 50% Injectable 25 Gram(s) IV Push once  escitalopram 10 milliGRAM(s) Oral daily  ferrous    sulfate 325 milliGRAM(s) Oral two times a day  glucagon  Injectable 1 milliGRAM(s) IntraMuscular once  insulin lispro (ADMELOG) corrective regimen sliding scale   SubCutaneous three times a day before meals  levothyroxine 50 MICROGram(s) Oral daily  midodrine. 10 milliGRAM(s) Oral three times a day  pantoprazole    Tablet 40 milliGRAM(s) Oral daily  polyethylene glycol 3350 17 Gram(s) Oral daily  sildenafil (REVATIO) 20 milliGRAM(s) Oral two times a day  simvastatin 40 milliGRAM(s) Oral at bedtime  spironolactone 25 milliGRAM(s) Oral daily  torsemide 20 milliGRAM(s) Oral two times a day    MEDICATIONS  (PRN):  acetaminophen   Tablet .. 650 milliGRAM(s) Oral every 6 hours PRN Temp greater or equal to 38.5C (101.3F), Mild Pain (1 - 3)  aluminum hydroxide/magnesium hydroxide/simethicone Suspension 30 milliLiter(s) Oral every 4 hours PRN Dyspepsia  melatonin 3 milliGRAM(s) Oral at bedtime PRN Insomnia  traMADol 50 milliGRAM(s) Oral every 8 hours PRN Moderate Pain (4 - 6)      Allergies    penicillin (Rash)  penicillins (Other)  sulfa drugs (Rash; Other)    Intolerances        SOCIAL HISTORY:    FAMILY HISTORY:  FH: diabetes mellitus  both parents    FH: heart disease        ROS  As above  Otherwise unremarkable    Vital Signs Last 24 Hrs  T(C): 36.4 (17 Aug 2021 07:40), Max: 36.8 (16 Aug 2021 15:50)  T(F): 97.6 (17 Aug 2021 07:40), Max: 98.3 (16 Aug 2021 15:50)  HR: 77 (17 Aug 2021 07:40) (77 - 92)  BP: 123/64 (17 Aug 2021 07:40) (103/52 - 123/64)  BP(mean): --  RR: 19 (17 Aug 2021 07:40) (18 - 19)  SpO2: 99% (17 Aug 2021 07:40) (95% - 99%)    Constitutional: NAD, well-developed  Respiratory: CTAB  Cardiovascular: S1 and S2  Gastrointestinal: BS+, soft, NT/ND  Extremities: No peripheral edema  Psychiatric: Normal mood, normal affect  Skin: No rashes    LABS:                        10.4   6.61  )-----------( 156      ( 15 Aug 2021 08:56 )             30.8     08-15    135  |  105  |  10  ----------------------------<  148<H>  3.7   |  25  |  0.56    Ca    8.8      15 Aug 2021 08:56            RADIOLOGY & ADDITIONAL STUDIES:
Patient is a 89y old  Female who presents with a chief complaint of SOB.       HPI:  Patient is 89 yo female with PMhx pancreatitis s/p ERCP, cirrhosis, DM2, a-fib on Eliquis, CAD s/p PCI, severe pulm HTN, chronic R heart failure, diastolic dysfunction, AS s/p TAVR, OA, HTN, dyslipidemia, hypothyroidism, presents with sob and fatigue. Patient sent from [facility] for further evaluation. Chart reviewed. Patient seen in ED, pleasant with baseline confusion. Does not know why she is here.  In ED, patient found to be septic, given 3 Liter bolus and for a short time started on pressors. Now she is off pressors with BP 98/70 in no distress.     Pt seen this am. Pt denies any symptoms this am.       PAST MEDICAL & SURGICAL HISTORY:  Diabetes Mellitus Type II    Dyslipidemia    GERD (Gastroesophageal Reflux Disease)    History of Osteoarthritis    Hypertension    CAD (coronary artery disease)    Afib    Hypothyroid    HLD (hyperlipidemia)    History of pancreatitis    Aortic stenosis    H/O: Hysterectomy    H/O: Knee Surgery- right meniscus    History of cholecystectomy    History of appendectomy    H/O total knee replacement, left    S/P IVC filter  Note that Pt does not have  h/o DVT, outPt doppler was read first as +, but after review reported as no DVT. Pt got IVC filer before that        MEDICATIONS  (STANDING):  allopurinol  Oral Tab/Cap - Peds 200 milliGRAM(s) Oral daily  apixaban 2.5 milliGRAM(s) Oral two times a day  artificial  tears Solution 1 Drop(s) Both EYES three times a day  aspirin  chewable 81 milliGRAM(s) Oral daily  aztreonam  IVPB 1000 milliGRAM(s) IV Intermittent every 12 hours  dextrose 40% Gel 15 Gram(s) Oral once  dextrose 5%. 1000 milliLiter(s) (50 mL/Hr) IV Continuous <Continuous>  dextrose 50% Injectable 25 Gram(s) IV Push once  escitalopram 10 milliGRAM(s) Oral daily  ferrous    sulfate 325 milliGRAM(s) Oral two times a day  glucagon  Injectable 1 milliGRAM(s) IntraMuscular once  insulin lispro (ADMELOG) corrective regimen sliding scale   SubCutaneous three times a day before meals  levothyroxine 50 MICROGram(s) Oral daily  midodrine. 10 milliGRAM(s) Oral three times a day  pantoprazole    Tablet 40 milliGRAM(s) Oral daily  sildenafil (REVATIO) 20 milliGRAM(s) Oral two times a day  simvastatin 40 milliGRAM(s) Oral at bedtime  vancomycin  IVPB 1000 milliGRAM(s) IV Intermittent every 12 hours    MEDICATIONS  (PRN):  acetaminophen   Tablet .. 650 milliGRAM(s) Oral every 6 hours PRN Temp greater or equal to 38.5C (101.3F), Mild Pain (1 - 3)  aluminum hydroxide/magnesium hydroxide/simethicone Suspension 30 milliLiter(s) Oral every 4 hours PRN Dyspepsia  melatonin 3 milliGRAM(s) Oral at bedtime PRN Insomnia  ondansetron Injectable 4 milliGRAM(s) IV Push every 8 hours PRN Nausea and/or Vomiting      FAMILY HISTORY:  FH: diabetes mellitus  both parents    FH: heart disease        SOCIAL HISTORY:    REVIEW OF SYSTEMS:  CONSTITUTIONAL:    No fatigue, malaise, lethargy.  c/o fever or chills.  RESPIRATORY:  No cough.  No wheeze.  No hemoptysis.  c/o shortness of breath.  CARDIOVASCULAR:  No chest pains.  No palpitations. c/o shortness of breath, No orthopnea or PND.  GASTROINTESTINAL:  No abdominal pain.  No nausea or vomiting.    GENITOURINARY:    No hematuria.    MUSCULOSKELETAL:  No musculoskeletal pain.  No joint swelling.  No arthritis.  NEUROLOGICAL:  No tingling or numbness or weakness.  PSYCHIATRIC:  No confusion  SKIN:  No rashes.          Vital Signs Last 24 Hrs  T(C): 36.1 (12 Aug 2021 06:10), Max: 38.5 (11 Aug 2021 12:16)  T(F): 96.9 (12 Aug 2021 06:10), Max: 101.3 (11 Aug 2021 12:16)  HR: 66 (12 Aug 2021 06:00) (61 - 98)  BP: 106/47 (12 Aug 2021 06:00) (72/46 - 127/59)  BP(mean): 62 (12 Aug 2021 06:00) (50 - 77)  RR: 18 (12 Aug 2021 06:00) (15 - 22)  SpO2: 100% (12 Aug 2021 06:00) (91% - 100%)    PHYSICAL EXAM-    Constitutional: elderly female in no acute distress     Head: Head is normocephalic and atraumatic.      Neck: No jugular venous distention. No audible carotid bruits. There are strong carotid pulses bilaterally. No JVD.     Cardiovascular: Regular rate and rhythm without S3, S4. No murmurs or rubs are appreciated.      Respiratory: Breath sounds are normal. No rales. No wheezing.    Abdomen: Soft, nontender, nondistended with positive bowel sounds.      Extremity: No tenderness. No  pitting edema     Neurologic: The patient is alert    Skin: No rash, no obvious lesions noted.      Psychiatric: The patient appears to be emotionally stable.      INTERPRETATION OF TELEMETRY:    ECG: Sinus rythm , normal axis, no ST T changes.     I&O's Detail    11 Aug 2021 07:01  -  12 Aug 2021 07:00  --------------------------------------------------------  IN:    IV PiggyBack: 350 mL    Oral Fluid: 100 mL  Total IN: 450 mL    OUT:    Indwelling Catheter - Urethral (mL): 125 mL    Voided (mL): 450 mL  Total OUT: 575 mL    Total NET: -125 mL          LABS:                        10.1   5.59  )-----------( 117      ( 12 Aug 2021 06:07 )             30.7     08-12    135  |  103  |  23  ----------------------------<  125<H>  3.9   |  27  |  1.02    Ca    7.8<L>      12 Aug 2021 06:07    TPro  5.7<L>  /  Alb  2.3<L>  /  TBili  0.4  /  DBili  x   /  AST  28  /  ALT  19  /  AlkPhos  67  08-12        PT/INR - ( 11 Aug 2021 12:31 )   PT: 24.5 sec;   INR: 2.19 ratio         PTT - ( 11 Aug 2021 12:31 )  PTT:32.5 sec  Urinalysis Basic - ( 11 Aug 2021 12:31 )    Color: Yellow / Appearance: Slightly Turbid / S.010 / pH: x  Gluc: x / Ketone: Negative  / Bili: Negative / Urobili: Negative mg/dL   Blood: x / Protein: 15 mg/dL / Nitrite: Negative   Leuk Esterase: Trace / RBC: 0-2 /HPF / WBC 0-2   Sq Epi: x / Non Sq Epi: Few / Bacteria: Moderate      I&O's Summary    11 Aug 2021 07:01  -  12 Aug 2021 07:00  --------------------------------------------------------  IN: 450 mL / OUT: 575 mL / NET: -125 mL      BNP  RADIOLOGY & ADDITIONAL STUDIES:  < from: CT Chest No Cont (21 @ 20:53) >  TUBES/LINES: None.    IMPRESSION:  Peripheral reticulation, ill-defined linear and groundglass opacities with mild bronchiectasis is similar compared to the prior study and may be related to interstitial lung disease. No honeycombing.    --- End of Report ---            ARMAND COON MD; Attending Radiologist  This document has been electronically signed. Aug 11 2021  9:25PM    < end of copied text >

## 2021-08-19 NOTE — DIETITIAN INITIAL EVALUATION ADULT. - OTHER INFO
89 yo female with PMhx pancreatitis s/p ERCP, cirrhosis, DM2, a-fib on Eliquis, CAD s/p PCI, severe pulm HTN, chronic R heart failure, diastolic dysfunction, AS s/p TAVR, OA, HTN, dyslipidemia, hypothyroidism, presents with sob and fatigue. Patient sent from [facility] for further evaluation. Chart reviewed. Patient seen in ED, pleasant with baseline confusion. Does not know why she is here.  In ED, patient found to be septic, given 3 Liter bolus and for a short time started on pressors. Now she is off pressors with BP 98/70 in no distress.   severe sepsis/septic shock POA.: all resolved now. GI consult to r/o colon CA; in pt colonoscopy. Last BM 8/17. Bowel meds: magnesium hydroxide Suspension 30 milliLiter(s) Oral daily PRN Constipation. S/P Colonoscopy with results: cecal mass arising from a large polyp, Diverticulosis. Pt remains NPO x 2 days. Suggest advance po diet when feasible to Regular (no therapeutic restrictions 2/2 advancing age). Pt appears thin and frail. NFPE indicates severe muscle/fat wasting. Current admission weight appears inaccurate 146.8# last hospital weight 3/14/20 125# 22# weight discrepancy x 5 months. Will use IBW to determine nutritional needs. Pt meets criteria for severe protein/calorie malnutrition in context of acute illness AEB suboptimal nutrition 2/2 Sepsis.

## 2021-08-19 NOTE — DIETITIAN INITIAL EVALUATION ADULT. - PERTINENT MEDS FT
MEDICATIONS  (STANDING):  allopurinol  Oral Tab/Cap - Peds 200 milliGRAM(s) Oral daily  artificial  tears Solution 1 Drop(s) Both EYES three times a day  aspirin  chewable 81 milliGRAM(s) Oral daily  atorvastatin 10 milliGRAM(s) Oral at bedtime  cefuroxime   Tablet 500 milliGRAM(s) Oral every 12 hours  dextrose 40% Gel 15 Gram(s) Oral once  dextrose 5%. 1000 milliLiter(s) (50 mL/Hr) IV Continuous <Continuous>  dextrose 50% Injectable 25 Gram(s) IV Push once  escitalopram 10 milliGRAM(s) Oral daily  ferrous    sulfate 325 milliGRAM(s) Oral two times a day  glucagon  Injectable 1 milliGRAM(s) IntraMuscular once  insulin lispro (ADMELOG) corrective regimen sliding scale   SubCutaneous three times a day before meals  levothyroxine 50 MICROGram(s) Oral daily  midodrine. 5 milliGRAM(s) Oral three times a day  pantoprazole    Tablet 40 milliGRAM(s) Oral daily  polyethylene glycol 3350 17 Gram(s) Oral daily  sildenafil (REVATIO) 20 milliGRAM(s) Oral two times a day  torsemide 20 milliGRAM(s) Oral two times a day    MEDICATIONS  (PRN):  acetaminophen   Tablet .. 650 milliGRAM(s) Oral every 6 hours PRN Temp greater or equal to 38.5C (101.3F), Mild Pain (1 - 3)  aluminum hydroxide/magnesium hydroxide/simethicone Suspension 30 milliLiter(s) Oral every 4 hours PRN Dyspepsia  magnesium hydroxide Suspension 30 milliLiter(s) Oral daily PRN Constipation  melatonin 3 milliGRAM(s) Oral at bedtime PRN Insomnia  traMADol 50 milliGRAM(s) Oral every 8 hours PRN Moderate Pain (4 - 6)

## 2021-08-19 NOTE — DIETITIAN INITIAL EVALUATION ADULT. - PHYSCIAL ASSESSMENT
underweight Nathan scale- 17 (high risk for skin breakdown)  Skin: sacrum stage 1 and B/L heels stage 1

## 2021-08-20 LAB
ANION GAP SERPL CALC-SCNC: 7 MMOL/L — SIGNIFICANT CHANGE UP (ref 5–17)
ANION GAP SERPL CALC-SCNC: 8 MMOL/L — SIGNIFICANT CHANGE UP (ref 5–17)
BASOPHILS # BLD AUTO: 0.03 K/UL — SIGNIFICANT CHANGE UP (ref 0–0.2)
BASOPHILS NFR BLD AUTO: 0.5 % — SIGNIFICANT CHANGE UP (ref 0–2)
BUN SERPL-MCNC: 5 MG/DL — LOW (ref 7–23)
BUN SERPL-MCNC: 5 MG/DL — LOW (ref 7–23)
CALCIUM SERPL-MCNC: 8 MG/DL — LOW (ref 8.5–10.1)
CALCIUM SERPL-MCNC: 8.4 MG/DL — LOW (ref 8.5–10.1)
CEA SERPL-MCNC: 12.5 NG/ML — HIGH (ref 0–3.8)
CHLORIDE SERPL-SCNC: 102 MMOL/L — SIGNIFICANT CHANGE UP (ref 96–108)
CHLORIDE SERPL-SCNC: 103 MMOL/L — SIGNIFICANT CHANGE UP (ref 96–108)
CO2 SERPL-SCNC: 28 MMOL/L — SIGNIFICANT CHANGE UP (ref 22–31)
CO2 SERPL-SCNC: 28 MMOL/L — SIGNIFICANT CHANGE UP (ref 22–31)
CREAT SERPL-MCNC: 0.68 MG/DL — SIGNIFICANT CHANGE UP (ref 0.5–1.3)
CREAT SERPL-MCNC: 0.87 MG/DL — SIGNIFICANT CHANGE UP (ref 0.5–1.3)
EOSINOPHIL # BLD AUTO: 0.41 K/UL — SIGNIFICANT CHANGE UP (ref 0–0.5)
EOSINOPHIL NFR BLD AUTO: 7.1 % — HIGH (ref 0–6)
GLUCOSE BLDC GLUCOMTR-MCNC: 141 MG/DL — HIGH (ref 70–99)
GLUCOSE SERPL-MCNC: 139 MG/DL — HIGH (ref 70–99)
GLUCOSE SERPL-MCNC: 184 MG/DL — HIGH (ref 70–99)
HCT VFR BLD CALC: 29.9 % — LOW (ref 34.5–45)
HGB BLD-MCNC: 9.6 G/DL — LOW (ref 11.5–15.5)
IMM GRANULOCYTES NFR BLD AUTO: 1 % — SIGNIFICANT CHANGE UP (ref 0–1.5)
LYMPHOCYTES # BLD AUTO: 0.84 K/UL — LOW (ref 1–3.3)
LYMPHOCYTES # BLD AUTO: 14.6 % — SIGNIFICANT CHANGE UP (ref 13–44)
MAGNESIUM SERPL-MCNC: 1.9 MG/DL — SIGNIFICANT CHANGE UP (ref 1.6–2.6)
MCHC RBC-ENTMCNC: 31.2 PG — SIGNIFICANT CHANGE UP (ref 27–34)
MCHC RBC-ENTMCNC: 32.1 GM/DL — SIGNIFICANT CHANGE UP (ref 32–36)
MCV RBC AUTO: 97.1 FL — SIGNIFICANT CHANGE UP (ref 80–100)
MONOCYTES # BLD AUTO: 0.77 K/UL — SIGNIFICANT CHANGE UP (ref 0–0.9)
MONOCYTES NFR BLD AUTO: 13.4 % — SIGNIFICANT CHANGE UP (ref 2–14)
NEUTROPHILS # BLD AUTO: 3.64 K/UL — SIGNIFICANT CHANGE UP (ref 1.8–7.4)
NEUTROPHILS NFR BLD AUTO: 63.4 % — SIGNIFICANT CHANGE UP (ref 43–77)
PHOSPHATE SERPL-MCNC: 3.7 MG/DL — SIGNIFICANT CHANGE UP (ref 2.5–4.5)
PLATELET # BLD AUTO: 221 K/UL — SIGNIFICANT CHANGE UP (ref 150–400)
POTASSIUM SERPL-MCNC: 2.9 MMOL/L — CRITICAL LOW (ref 3.5–5.3)
POTASSIUM SERPL-MCNC: 3.8 MMOL/L — SIGNIFICANT CHANGE UP (ref 3.5–5.3)
POTASSIUM SERPL-SCNC: 2.9 MMOL/L — CRITICAL LOW (ref 3.5–5.3)
POTASSIUM SERPL-SCNC: 3.8 MMOL/L — SIGNIFICANT CHANGE UP (ref 3.5–5.3)
RBC # BLD: 3.08 M/UL — LOW (ref 3.8–5.2)
RBC # FLD: 15.9 % — HIGH (ref 10.3–14.5)
SARS-COV-2 RNA SPEC QL NAA+PROBE: SIGNIFICANT CHANGE UP
SODIUM SERPL-SCNC: 137 MMOL/L — SIGNIFICANT CHANGE UP (ref 135–145)
SODIUM SERPL-SCNC: 139 MMOL/L — SIGNIFICANT CHANGE UP (ref 135–145)
WBC # BLD: 5.75 K/UL — SIGNIFICANT CHANGE UP (ref 3.8–10.5)
WBC # FLD AUTO: 5.75 K/UL — SIGNIFICANT CHANGE UP (ref 3.8–10.5)

## 2021-08-20 PROCEDURE — 99233 SBSQ HOSP IP/OBS HIGH 50: CPT

## 2021-08-20 RX ORDER — APIXABAN 2.5 MG/1
2.5 TABLET, FILM COATED ORAL
Refills: 0 | Status: DISCONTINUED | OUTPATIENT
Start: 2021-08-20 | End: 2021-08-23

## 2021-08-20 RX ORDER — TRAMADOL HYDROCHLORIDE 50 MG/1
50 TABLET ORAL EVERY 8 HOURS
Refills: 0 | Status: DISCONTINUED | OUTPATIENT
Start: 2021-08-20 | End: 2021-08-23

## 2021-08-20 RX ORDER — POTASSIUM CHLORIDE 20 MEQ
40 PACKET (EA) ORAL EVERY 4 HOURS
Refills: 0 | Status: COMPLETED | OUTPATIENT
Start: 2021-08-20 | End: 2021-08-20

## 2021-08-20 RX ORDER — INSULIN LISPRO 100/ML
VIAL (ML) SUBCUTANEOUS AT BEDTIME
Refills: 0 | Status: DISCONTINUED | OUTPATIENT
Start: 2021-08-20 | End: 2021-08-23

## 2021-08-20 RX ADMIN — Medication 2: at 12:36

## 2021-08-20 RX ADMIN — Medication 650 MILLIGRAM(S): at 02:50

## 2021-08-20 RX ADMIN — Medication 40 MILLIEQUIVALENT(S): at 13:24

## 2021-08-20 RX ADMIN — Medication 650 MILLIGRAM(S): at 03:20

## 2021-08-20 RX ADMIN — POLYETHYLENE GLYCOL 3350 17 GRAM(S): 17 POWDER, FOR SOLUTION ORAL at 09:57

## 2021-08-20 RX ADMIN — Medication 500 MILLIGRAM(S): at 22:57

## 2021-08-20 RX ADMIN — Medication 650 MILLIGRAM(S): at 22:57

## 2021-08-20 RX ADMIN — TRAMADOL HYDROCHLORIDE 50 MILLIGRAM(S): 50 TABLET ORAL at 16:08

## 2021-08-20 RX ADMIN — Medication 3 MILLIGRAM(S): at 22:57

## 2021-08-20 RX ADMIN — Medication 1: at 16:08

## 2021-08-20 RX ADMIN — ESCITALOPRAM OXALATE 10 MILLIGRAM(S): 10 TABLET, FILM COATED ORAL at 09:54

## 2021-08-20 RX ADMIN — Medication 200 MILLIGRAM(S): at 09:54

## 2021-08-20 RX ADMIN — Medication 40 MILLIEQUIVALENT(S): at 18:31

## 2021-08-20 RX ADMIN — Medication 81 MILLIGRAM(S): at 09:54

## 2021-08-20 RX ADMIN — Medication 325 MILLIGRAM(S): at 09:54

## 2021-08-20 RX ADMIN — APIXABAN 2.5 MILLIGRAM(S): 2.5 TABLET, FILM COATED ORAL at 22:57

## 2021-08-20 RX ADMIN — Medication 20 MILLIGRAM(S): at 09:54

## 2021-08-20 RX ADMIN — Medication 500 MILLIGRAM(S): at 09:54

## 2021-08-20 RX ADMIN — PANTOPRAZOLE SODIUM 40 MILLIGRAM(S): 20 TABLET, DELAYED RELEASE ORAL at 09:54

## 2021-08-20 RX ADMIN — Medication 20 MILLIGRAM(S): at 22:58

## 2021-08-20 RX ADMIN — Medication 1 DROP(S): at 13:24

## 2021-08-20 RX ADMIN — MIDODRINE HYDROCHLORIDE 5 MILLIGRAM(S): 2.5 TABLET ORAL at 16:08

## 2021-08-20 RX ADMIN — Medication 1 DROP(S): at 22:56

## 2021-08-20 RX ADMIN — Medication 1 DROP(S): at 06:38

## 2021-08-20 RX ADMIN — Medication 50 MICROGRAM(S): at 06:38

## 2021-08-20 RX ADMIN — Medication 40 MILLIEQUIVALENT(S): at 09:54

## 2021-08-20 RX ADMIN — MIDODRINE HYDROCHLORIDE 5 MILLIGRAM(S): 2.5 TABLET ORAL at 06:38

## 2021-08-20 RX ADMIN — Medication 650 MILLIGRAM(S): at 23:27

## 2021-08-20 RX ADMIN — Medication 325 MILLIGRAM(S): at 22:57

## 2021-08-20 RX ADMIN — MIDODRINE HYDROCHLORIDE 5 MILLIGRAM(S): 2.5 TABLET ORAL at 12:36

## 2021-08-20 RX ADMIN — ATORVASTATIN CALCIUM 10 MILLIGRAM(S): 80 TABLET, FILM COATED ORAL at 22:57

## 2021-08-20 NOTE — PROVIDER CONTACT NOTE (CRITICAL VALUE NOTIFICATION) - RECOMMENDATIONS
Replace current K+ level?  Add standing potassium supplement to daily meds to compensate for K+ loss with Demadex?

## 2021-08-20 NOTE — PROGRESS NOTE ADULT - SUBJECTIVE AND OBJECTIVE BOX
Patient is a 89y old  Female who presents with a chief complaint of SOB.       HPI:  Patient is 89 yo female with PMhx pancreatitis s/p ERCP, cirrhosis, DM2, a-fib on Eliquis, CAD s/p PCI, severe pulm HTN, chronic R heart failure, diastolic dysfunction, AS s/p TAVR, OA, HTN, dyslipidemia, hypothyroidism, presents with sob and fatigue. Patient sent from [facility] for further evaluation. Chart reviewed. Patient seen in ED, pleasant with baseline confusion. Does not know why she is here.  In ED, patient found to be septic, given 3 Liter bolus and for a short time started on pressors. Now she is off pressors with BP 98/70 in no distress.     -   Pt seen this am. Pt denies any symptoms this am.     - Pt seen this am.  Pt denies any symptoms this am.  Pleasantly confused.     - pt seen this am. No symptoms.    - pt seen this am.  She denies any SOB.       - pt seen this am.  Pt denies any symptoms now.  son at bedside.    PAST MEDICAL & SURGICAL HISTORY:  Diabetes Mellitus Type II    Dyslipidemia    GERD (Gastroesophageal Reflux Disease)    History of Osteoarthritis    Hypertension    CAD (coronary artery disease)    Afib    Hypothyroid    HLD (hyperlipidemia)    History of pancreatitis    Aortic stenosis    H/O: Hysterectomy    H/O: Knee Surgery- right meniscus    History of cholecystectomy    History of appendectomy    H/O total knee replacement, left    S/P IVC filter  Note that Pt does not have  h/o DVT, outPt doppler was read first as +, but after review reported as no DVT. Pt got IVC filer before that        MEDICATIONS  (STANDING):  allopurinol  Oral Tab/Cap - Peds 200 milliGRAM(s) Oral daily  apixaban 2.5 milliGRAM(s) Oral two times a day  artificial  tears Solution 1 Drop(s) Both EYES three times a day  aspirin  chewable 81 milliGRAM(s) Oral daily  aztreonam  IVPB 1000 milliGRAM(s) IV Intermittent every 12 hours  dextrose 40% Gel 15 Gram(s) Oral once  dextrose 5%. 1000 milliLiter(s) (50 mL/Hr) IV Continuous <Continuous>  dextrose 50% Injectable 25 Gram(s) IV Push once  escitalopram 10 milliGRAM(s) Oral daily  ferrous    sulfate 325 milliGRAM(s) Oral two times a day  glucagon  Injectable 1 milliGRAM(s) IntraMuscular once  insulin lispro (ADMELOG) corrective regimen sliding scale   SubCutaneous three times a day before meals  levothyroxine 50 MICROGram(s) Oral daily  midodrine. 10 milliGRAM(s) Oral three times a day  pantoprazole    Tablet 40 milliGRAM(s) Oral daily  sildenafil (REVATIO) 20 milliGRAM(s) Oral two times a day  simvastatin 40 milliGRAM(s) Oral at bedtime  vancomycin  IVPB 1000 milliGRAM(s) IV Intermittent every 12 hours    MEDICATIONS  (PRN):  acetaminophen   Tablet .. 650 milliGRAM(s) Oral every 6 hours PRN Temp greater or equal to 38.5C (101.3F), Mild Pain (1 - 3)  aluminum hydroxide/magnesium hydroxide/simethicone Suspension 30 milliLiter(s) Oral every 4 hours PRN Dyspepsia  melatonin 3 milliGRAM(s) Oral at bedtime PRN Insomnia  ondansetron Injectable 4 milliGRAM(s) IV Push every 8 hours PRN Nausea and/or Vomiting      FAMILY HISTORY:  FH: diabetes mellitus  both parents    FH: heart disease        SOCIAL HISTORY: no recent smoking     REVIEW OF SYSTEMS:  CONSTITUTIONAL:    No fatigue, malaise, lethargy.  no fever or chills.  RESPIRATORY:  No cough.  No wheeze.  No hemoptysis.  no shortness of breath.  CARDIOVASCULAR:  No chest pains.  No palpitations. no shortness of breath, No orthopnea or PND.  GASTROINTESTINAL:  No abdominal pain.  No nausea or vomiting.    GENITOURINARY:    No hematuria.    MUSCULOSKELETAL:  No musculoskeletal pain.  No joint swelling.  No arthritis.  NEUROLOGICAL:  No tingling or numbness or weakness.  PSYCHIATRIC:  No confusion  SKIN:  No rashes.          ICU Vital Signs Last 24 Hrs  T(C): 36.4 (20 Aug 2021 15:23), Max: 36.4 (19 Aug 2021 23:00)  T(F): 97.5 (20 Aug 2021 15:23), Max: 97.6 (20 Aug 2021 06:35)  HR: 51 (20 Aug 2021 15:23) (51 - 90)  BP: 116/76 (20 Aug 2021 15:23) (100/51 - 132/65)  BP(mean): --  ABP: --  ABP(mean): --  RR: 17 (20 Aug 2021 15:23) (17 - 17)  SpO2: 93% (20 Aug 2021 15:23) (93% - 93%)        PHYSICAL EXAM-    Constitutional: elderly female in no acute distress     Head: Head is normocephalic and atraumatic.      Neck: No JVD.     Cardiovascular: Regular rate and rhythm without S3, S4. No murmurs or rubs are appreciated.      Respiratory: B/l rales. No wheezing.    Abdomen: Soft, nontender, nondistended with positive bowel sounds.      Extremity: No tenderness. No  pitting edema     Neurologic: The patient is alert    Skin: No rash, no obvious lesions noted.      Psychiatric: The patient appears to be emotionally stable.      INTERPRETATION OF TELEMETRY: not on     ECG:    I&O's Detail    11 Aug 2021 07:01  -  12 Aug 2021 07:00  --------------------------------------------------------  IN:    IV PiggyBack: 350 mL    Oral Fluid: 100 mL  Total IN: 450 mL    OUT:    Indwelling Catheter - Urethral (mL): 125 mL    Voided (mL): 450 mL  Total OUT: 575 mL    Total NET: -125 mL          LABS:                                              9.6    5.75  )-----------( 221      ( 20 Aug 2021 06:07 )             29.9     08-20    137  |  102  |  5<L>  ----------------------------<  139<H>  2.9<LL>   |  28  |  0.68    Ca    8.0<L>      20 Aug 2021 06:07  Phos  3.7     08-20  Mg     1.9     08-20                       PTT - ( 11 Aug 2021 12:31 )  PTT:32.5 sec  Urinalysis Basic - ( 11 Aug 2021 12:31 )    Color: Yellow / Appearance: Slightly Turbid / S.010 / pH: x  Gluc: x / Ketone: Negative  / Bili: Negative / Urobili: Negative mg/dL   Blood: x / Protein: 15 mg/dL / Nitrite: Negative   Leuk Esterase: Trace / RBC: 0-2 /HPF / WBC 0-2   Sq Epi: x / Non Sq Epi: Few / Bacteria: Moderate      I&O's Summary    11 Aug 2021 07:01  -  12 Aug 2021 07:00  --------------------------------------------------------  IN: 450 mL / OUT: 575 mL / NET: -125 mL      BNP  RADIOLOGY & ADDITIONAL STUDIES:  < from: CT Chest No Cont (21 @ 20:53) >  TUBES/LINES: None.    IMPRESSION:  Peripheral reticulation, ill-defined linear and groundglass opacities with mild bronchiectasis is similar compared to the prior study and may be related to interstitial lung disease. No honeycombing.    --- End of Report ---      < from: TTE Echo Complete w/ Contrast w/ Doppler (21 @ 16:09) >     EXAM:  ECHO TTE WITH CON COMP W DOPP         PROCEDURE DATE:  2021        INTERPRETATION:  Transthoracic Echocardiography Report (TTE)     Demographics     Patient name         CHARY DURANT   Age           89 year(s)     Med Rec #        [id number]              Gender        Female     Account #            [id number]           Date of Birth 10/30/1927     Interpreting         Venugopal Palla, MD    Room Number   0037   Physician     Referring Physician  Marisa Saldivar, Sonographer   Fabiana Perez MD     Date of study        2021 02:43 PM     Height                                      Weight    Type of Study:     TTE procedure: ECHO TTE W C COMP W DOPP     BP: 105/53 mmHg     Study Location: [facility] Quality: Fair    M-Mode Measurements (cm)     LVEDd: 3.49 cm            LVESd: 2.82 cm   IVSEd: 1.03 cm   LVPWd: 1.04 cm            AO Root Dimension: 2.6 cm                             ACS: 1.4 cm                             LA: 4.9 cm                             LVOT: 1.7 cm    Doppler Measurements:     AV Mean Gradient: 5 mmHg             MV Peak E-Wave: 183 cm/s   AV Area (Continuity):0.95 cm^2   TR Velocity:446 cm/s                 MV Peak Gradient: 13.4 mmHg   TR Gradient:79.5664 mmHg             MV P1/2t: 98 msec   Estimated RAP:10 mmHg                MVA by PHT2.24   RVSP:90 mmHg     Findings     Mitral Valve   Moderate mitral annular calcification is present.   Moderate mitral stenosis is present.   Mild (1+) mitral regurgitation is present.     Aortic Valve   Well seated prosthetic valve in the aortic position. suboptimal doppler   interrogation     Tricuspid Valve   Severe (4+) tricuspid valve regurgitation is present.   The tricuspid valve leaflets appear mildly thickened open well.   Severe pulmonary hypertension.     Pulmonic Valve   Normal appearing pulmonic valve structure.   Mild pulmonic valvular regurgitation (1+) is present.     Left Atrium   The left atrium is moderately dilated.     Left Ventricle   Mild concentric left ventricular hypertrophy is present.     Right Atrium   The right atrium appears moderately dilated.     Right Ventricle   The right ventricle is mildly dilated.     Pericardial Effusion   A pericardial effusion is not present.     Pleural Effusion   Pleural effusion cannot be ruled out.     Miscellaneous   The IVC is dilated.     Impression     Summary     Moderate mitral annular calcification is present.   Moderate mitral stenosis is present.   Mild (1+) mitral regurgitation is present.   Well seated prosthetic valve in the aortic position. suboptimal doppler   interrogation   Severe (4+) tricuspid valve regurgitation is present.   The tricuspid valve leaflets appear mildly thickened open well.   Severe pulmonary hypertension.   Normal appearing pulmonic valve structure.   Mild pulmonic valvular regurgitation (1+) is present.     technical difficult study     Signature     ----------------------------------------------------------------   Electronically signed by Venugopal Palla, MD(Interpreting   physician) on 2021 05:38 PM   ----------------------------------------------------------------    < end of copied text >        ARMAND COON MD; Attending Radiologist  This document has been electronically signed. Aug 11 2021  9:25PM    < end of copied text >  < from: Colonoscopy (21 @ 11:05) >    IMPRESS Impression:          - Diverticulosis in the sigmoid colon, in the descending     IMPRESS                      colon, in the transverse colon and in the ascending     IMPRESS                      colon.    IMPRESS                      - large sessile polyp was draped over the ileocecal     IMPRESS                      valve extending into a 3 cm ulcerated mass in the cecum    IMPRESS         - The examination was otherwise normal.    ENDORECOMMENDATION Recommendation:      - Continue present medications.    ENDORECOMMENDATION                      - Patient has a contact number available for     ENDORECOMMENDATION     emergencies. The signs and symptoms of potential delayed     ENDORECOMMENDATION                      complications were discussed with the patient. Return to     ENDORECOMMENDATION                      normal activities tomorrow. Written discharge     ENDORECOMMENDATION                      instructions were provided to the patient.    ENDORECOMMENDATION                      - Resume previous diet.    ENDORECOMMENDATION                      - F/U path    ENDORECOMMENDATION  - Surgical evaluation    SIGNATURENAME Stewart Mobley MD    < end of copied text >

## 2021-08-20 NOTE — PROGRESS NOTE ADULT - ASSESSMENT
Plan:  - No metastatic workup recommended  - Patient denied any abdominal pain, bleeding per rectum or weight loss, tolerating diet.  - Will follow up pathology report.  - Will follow [facility].  - Patient doesn't need any urgent surgical intervention at this point.   - Please follow up as an outpatient will colorectal surgery.    plan was discussed with Dr. Minor

## 2021-08-20 NOTE — PROGRESS NOTE ADULT - SUBJECTIVE AND OBJECTIVE BOX
Subjective:    Awake, alert.  Above noted. No abdominal pain.    MEDICATIONS  (STANDING):  allopurinol  Oral Tab/Cap - Peds 200 milliGRAM(s) Oral daily  artificial  tears Solution 1 Drop(s) Both EYES three times a day  aspirin  chewable 81 milliGRAM(s) Oral daily  atorvastatin 10 milliGRAM(s) Oral at bedtime  cefuroxime   Tablet 500 milliGRAM(s) Oral every 12 hours  dextrose 40% Gel 15 Gram(s) Oral once  dextrose 5%. 1000 milliLiter(s) (50 mL/Hr) IV Continuous <Continuous>  dextrose 50% Injectable 25 Gram(s) IV Push once  escitalopram 10 milliGRAM(s) Oral daily  ferrous    sulfate 325 milliGRAM(s) Oral two times a day  glucagon  Injectable 1 milliGRAM(s) IntraMuscular once  insulin lispro (ADMELOG) corrective regimen sliding scale   SubCutaneous three times a day before meals  insulin lispro (ADMELOG) corrective regimen sliding scale   SubCutaneous at bedtime  levothyroxine 50 MICROGram(s) Oral daily  midodrine. 5 milliGRAM(s) Oral three times a day  pantoprazole    Tablet 40 milliGRAM(s) Oral daily  polyethylene glycol 3350 17 Gram(s) Oral daily  potassium chloride    Tablet ER 40 milliEquivalent(s) Oral every 4 hours  sildenafil (REVATIO) 20 milliGRAM(s) Oral two times a day  torsemide 20 milliGRAM(s) Oral two times a day    MEDICATIONS  (PRN):  acetaminophen   Tablet .. 650 milliGRAM(s) Oral every 6 hours PRN Temp greater or equal to 38.5C (101.3F), Mild Pain (1 - 3)  aluminum hydroxide/magnesium hydroxide/simethicone Suspension 30 milliLiter(s) Oral every 4 hours PRN Dyspepsia  magnesium hydroxide Suspension 30 milliLiter(s) Oral daily PRN Constipation  melatonin 3 milliGRAM(s) Oral at bedtime PRN Insomnia  traMADol 50 milliGRAM(s) Oral every 8 hours PRN Moderate Pain (4 - 6)      Allergies    penicillin (Rash)  penicillins (Other)  sulfa drugs (Rash; Other)    Intolerances        Vital Signs Last 24 Hrs  T(C): 36.4 (20 Aug 2021 08:01), Max: 36.4 (19 Aug 2021 16:22)  T(F): 97.6 (20 Aug 2021 08:01), Max: 97.6 (20 Aug 2021 06:35)  HR: 90 (20 Aug 2021 08:01) (75 - 90)  BP: 105/52 (20 Aug 2021 08:01) (100/51 - 132/65)  BP(mean): --  RR: 17 (20 Aug 2021 08:01) (17 - 18)  SpO2: 93% (20 Aug 2021 08:01) (93% - 94%)    PHYSICAL EXAMINATION:    NECK:  Supple. No lymphadenopathy. Jugular venous pressure not elevated.   HEART:   The cardiac impulse has a normal quality. Reg., Nl S1 and S2.    CHEST:  Chest with few bibasilar crackles, R>L. Normal respiratory effort.  ABDOMEN:  Soft and nontender.   EXTREMITIES:  There is tr. edema.       LABS:                        9.6    5.75  )-----------( 221      ( 20 Aug 2021 06:07 )             29.9     08-20    137  |  102  |  5<L>  ----------------------------<  139<H>  2.9<LL>   |  28  |  0.68    Ca    8.0<L>      20 Aug 2021 06:07  Phos  3.7     08-20  Mg     1.9     08-20            RADIOLOGY & ADDITIONAL TESTS:< from: CT Abdomen and Pelvis w/ Oral Cont and w/ IV Cont (08.19.21 @ 14:09) >  EXAM:  CT ABDOMEN AND PELVIS OC IC                            PROCEDURE DATE:  08/19/2021          INTERPRETATION:  CLINICAL INFORMATION: Cecal mass on colonoscopy.    COMPARISON: CT abdomen/pelvis 7/16/2020 and MRI abdomen 12/14/2017    CONTRAST/COMPLICATIONS:  IV Contrast: Omnipaque 350  90 cc administered   10 cc discarded  Oral Contrast: Omnipaque 300  Complications: None reported at time of study completion    PROCEDURE:  CT of the Abdomen and Pelvis was performed.  Sagittal and coronal reformats were performed.    FINDINGS:  LOWER CHEST: Small bilateral pleural effusions, larger on the right. Interlobular septal thickening at the lung bases with reticular opacities, likely chronic interstitial lung disease. Aortic valve replacement.    LIVER: No focal hepatic lesion. Nodular surface contour again noted which can be seen in the setting of cirrhosis.  BILE DUCTS: Normal caliber.  GALLBLADDER: Not seen.  SPLEEN: Within normal limits.  PANCREAS: Atrophic.  ADRENALS: Within normal limits.  KIDNEYS/URETERS: No hydronephrosis. Bilateral renal cysts.    BLADDER: Unremarkable.  REPRODUCTIVE ORGANS: Hysterectomy.    BOWEL: Mucosal evaluation of the bowel is limited. Colonic diverticulosis without evidence of diverticulitis. Patient's known cecal mass is not well seen. No bowel obstruction. Appendix not seen.  PERITONEUM: Trace pelvic ascites.  VESSELS: IVC filter. Atherosclerotic changes. Abdominal aorta normal in caliber.  RETROPERITONEUM/LYMPH NODES: No lymphadenopathy.  ABDOMINALWALL: Unremarkable.  BONES: Degenerative changes in the spine. Mild anterolisthesis of L4 on L5. Hemangioma in L3.    IMPRESSION:  Patient's known cecal mass not well-seen on this study.    No evidence of metastatic disease in the abdomen or pelvis.    Small bilateral pleural effusions.    --- End of Report ---            SAL GAY MD; Attending Radiologist    < end of copied text >  < from: CT Abdomen and Pelvis w/ Oral Cont and w/ IV Cont (08.19.21 @ 14:09) >  EXAM:  CT ABDOMEN AND PELVIS OC IC                            PROCEDURE DATE:  08/19/2021          INTERPRETATION:  CLINICAL INFORMATION: Cecal mass on colonoscopy.    COMPARISON: CT abdomen/pelvis 7/16/2020 and MRI abdomen 12/14/2017    CONTRAST/COMPLICATIONS:  IV Contrast: Omnipaque 350  90 cc administered   10 cc discarded  Oral Contrast: Omnipaque 300  Complications: None reported at time of study completion    PROCEDURE:  CT of the Abdomen and Pelvis was performed.  Sagittal and coronal reformats were performed.    FINDINGS:  LOWER CHEST: Small bilateral pleural effusions, larger on the right. Interlobular septal thickening at the lung bases with reticular opacities, likely chronic interstitial lung disease. Aortic valve replacement.    LIVER: No focal hepatic lesion. Nodular surface contour again noted which can be seen in the setting of cirrhosis.  BILE DUCTS: Normal caliber.  GALLBLADDER: Not seen.  SPLEEN: Within normal limits.  PANCREAS: Atrophic.  ADRENALS: Within normal limits.  KIDNEYS/URETERS: No hydronephrosis. Bilateral renal cysts.    BLADDER: Unremarkable.  REPRODUCTIVE ORGANS: Hysterectomy.    BOWEL: Mucosal evaluation of the bowel is limited. Colonic diverticulosis without evidence of diverticulitis. Patient's known cecal mass is not well seen. No bowel obstruction. Appendix not seen.  PERITONEUM: Trace pelvic ascites.  VESSELS: IVC filter. Atherosclerotic changes. Abdominal aorta normal in caliber.  RETROPERITONEUM/LYMPH NODES: No lymphadenopathy.  ABDOMINALWALL: Unremarkable.  BONES: Degenerative changes in the spine. Mild anterolisthesis of L4 on L5. Hemangioma in L3.    IMPRESSION:  Patient's known cecal mass not well-seen on this study.    No evidence of metastatic disease in the abdomen or pelvis.    Small bilateral pleural effusions.        SAL GAY MD; Attending Radiologist          Assessment and Recommendation:   · Assessment	  Assessment/Plan    - Continue torsemide and monitor lytes carefully   - Continue Ceftin x 14 days  - Monitor I+O's  - Follow CBC, BMP-K+repletion  - Daily weights if possible  - Colonoscopy noted-cecal mass. Await pathology  - Taper midodrine as pancho  - Colorectal consult-noted  - Will obtain Anesthesia consult to assess risk given history of Pulmonary HTN-Mean PA pressure of 43(2017)    Problem/Recommendation - 1:  Stage 3 chronic kidney disease.  Problem/Recommendation - 2:  ·  Sepsis due to anaerobes.   Problem/Recommendation - 3:  ·  Chronic atrial fibrillation.   Problem/Recommendation - 4:  ·  Chronic diastolic heart failure.   Problem/Recommendation - 5:  ·  Primary pulmonary hypertension.

## 2021-08-20 NOTE — PROGRESS NOTE ADULT - ASSESSMENT
SOB- likely from pneumonia.  She appears euvolemic on exam.  Torsemide restarted.  DC spironolactone In her case the risk outweighs the benefit in using two diuretics.    Hypokalemia- severe   potassium repletion and recheck the level after repletion.  goal k of 4.     Hyperlipidemia- continue atorvastatin.       Sepsis- strep bacteremia-strep bovis  cecal mass- for anticipated laproscopic surgery in 3 weeks.  now she is euvolemic.  did not have ischemic workup in sometime.  she will see Dr Hernandez prior to the surgery outpt.  d/w son.    Afib- now rate controlled.  continue home meds including full dose anticoagulation    Sepsis - rsolved.  on abx    Other medical issues- Management per primary team.   Thank you for allowing me to participate in the care of this patient. Please feel free to contact me with any questions.

## 2021-08-20 NOTE — PROGRESS NOTE ADULT - SUBJECTIVE AND OBJECTIVE BOX
Pt was seen and examined today bedside, the patient denied any pain, nausea, vomiting, fever or chills. No acute events were reported from nursing staff.    ICU Vital Signs Last 24 Hrs  T(C): 36.4 (20 Aug 2021 08:01), Max: 36.4 (19 Aug 2021 16:22)  T(F): 97.6 (20 Aug 2021 08:01), Max: 97.6 (20 Aug 2021 06:35)  HR: 90 (20 Aug 2021 08:01) (75 - 90)  BP: 105/52 (20 Aug 2021 08:01) (100/51 - 132/65)  BP(mean): --  ABP: --  ABP(mean): --  RR: 17 (20 Aug 2021 08:01) (17 - 18)  SpO2: 93% (20 Aug 2021 08:01) (93% - 94%)    Physical Exam:  General: AAOx3, Well developed, NAD  Chest: Normal respiratory effort  Heart: RRR  Abdomen: Soft, NTND  Rectal: stool in vault, no masses palpated, no blood  Neuro/Psych: No localized deficits. Normal speech, normal tone                          9.6    5.75  )-----------( 221      ( 20 Aug 2021 06:07 )             29.9   08-20    137  |  102  |  5<L>  ----------------------------<  139<H>  2.9<LL>   |  28  |  0.68    Ca    8.0<L>      20 Aug 2021 06:07  Phos  3.7     08-20  Mg     1.9     08-20

## 2021-08-20 NOTE — PROGRESS NOTE ADULT - SUBJECTIVE AND OBJECTIVE BOX
CC:  Patient is a 89y old  Female who presents with a chief complaint of SOB (12 Aug 2021 13:35)    Patient is 89 yo female with PMhx pancreatitis s/p ERCP, cirrhosis, DM2, a-fib on Eliquis, CAD s/p PCI, severe pulm HTN, chronic R heart failure, diastolic dysfunction, AS s/p TAVR, OA, HTN, dyslipidemia, hypothyroidism, presents with sob and fatigue. Patient sent from [facility] for further evaluation. Chart reviewed. Patient seen in ED, pleasant with baseline confusion. Does not know why she is here.  In ED, patient found to be septic, given 3 Liter bolus and for a short time started on pressors. Now she is off pressors with BP 98/70 in no distress.     Patient is currently undergoing cecal mass evaluation. Denies any HA, CP, SOB. No rectal bleeding.     ROS:    - all other review of systems are negative unless indicated above.      PHYSICAL EXAM:  Vital Signs Last 24 Hrs  T(C): 36.4 (08-19-21 @ 16:22), Max: 36.4 (08-19-21 @ 06:45)  HR: 84 (08-19-21 @ 16:22) (79 - 90)  BP: 101/50 (08-19-21 @ 16:22) (97/51 - 134/60)  RR: 18 (08-19-21 @ 16:22) (17 - 18)  SpO2: 94% (08-19-21 @ 16:22) (92% - 95%)  Constitutional: Well appearing  HEENT: Atraumatic, JOSHUA, Normal, No congestion  Respiratory: Breath Sounds normal, b/l rhonchi; no wheeze  Cardiovascular: RRR S1S2; J LUIS present  Gastrointestinal: Abdomen soft, non tender, Bowel Sounds present; no guarding  Extremities: No edema, peripheral pulses present  Neurological: AAO x 3, no gross focal motor deficits  Skin: Non cellulitic, no rash, ulcers  Lymph Nodes: No lymphadenopathy noted  Back: No CVA tenderness   Musculoskeletal: non tender      CBC Full  -  ( 20 Aug 2021 06:07 )  WBC Count : 5.75 K/uL  RBC Count : 3.08 M/uL  Hemoglobin : 9.6 g/dL  Hematocrit : 29.9 %  Platelet Count - Automated : 221 K/uL  Mean Cell Volume : 97.1 fl  Mean Cell Hemoglobin : 31.2 pg  Mean Cell Hemoglobin Concentration : 32.1 gm/dL  Auto Neutrophil # : 3.64 K/uL  Auto Lymphocyte # : 0.84 K/uL  Auto Monocyte # : 0.77 K/uL  Auto Eosinophil # : 0.41 K/uL  Auto Basophil # : 0.03 K/uL  Auto Neutrophil % : 63.4 %  Auto Lymphocyte % : 14.6 %  Auto Monocyte % : 13.4 %  Auto Eosinophil % : 7.1 %  Auto Basophil % : 0.5 %        08-20    137  |  102  |  5<L>  ----------------------------<  139<H>  2.9<LL>   |  28  |  0.68    Ca    8.0<L>      20 Aug 2021 06:07  Phos  3.7     08-20  Mg     1.9     08-20        Patient is 89 yo female with PMhx pancreatitis s/p ERCP, cirrhosis, DM2, a-fib on Eliquis, CAD s/p PCI, severe pulm HTN, chronic R heart failure, diastolic dysfunction, AS s/p TAVR, OA, HTN, dyslipidemia, hypothyroidism, presents with sob and fatigue. Patient sent from [facility] for further evaluation. Chart reviewed. Patient seen in ED, pleasant with baseline confusion. Does not know why she is here.  In ED, patient found to be septic, given 3 Liter bolus and for a short time started on pressors. Now she is off pressors with BP 98/70 in no distress.     # Septic shock on admission  - resolved now  - off vasopressors.  - midodrine taper  - BCx:  Streptococcus.; repeat blood cx, neg  - repeat BCx:  neg  - TTE; no vegetation  - po ceftin for a total of 14 days since last Blood cx were neg, till 8/27  - CT Ab/Pel w Oral/IV contrast(8/19): no evidence of metastatic disease     # Cecal Mass  - s/p colonoscopy 8/19  - CEA is elevated   - Will follow up pathology report.  - elevated CEA  - colorectal; surgery appreciated / pending pathology report    # AF.  - AC:  apixaban on hold for colonoscopy. Restart when cleared by GI.  - RTC:  metoprolol held due to hypotension.    # HFpEF.  - appears some lower chest rales on exam.  -spironolactone and torsemide 20 bid  - monitor BP, Cr    # Constipation: miralax started  document BM  no BM; MOM added    # hx DM2.  - FS target 140-180mg/dL.  - consistent CHO diet.  - correction insulin coverage.    # hx hypothyroidism.  - TSH:  wnl.  - levothyroxine.            CC:  Patient is a 89y old  Female who presents with a chief complaint of SOB (12 Aug 2021 13:35)    Patient is 89 yo female with PMhx pancreatitis s/p ERCP, cirrhosis, DM2, a-fib on Eliquis, CAD s/p PCI, severe pulm HTN, chronic R heart failure, diastolic dysfunction, AS s/p TAVR, OA, HTN, dyslipidemia, hypothyroidism, presents with sob and fatigue. Patient sent from [facility] for further evaluation. Chart reviewed. Patient seen in ED, pleasant with baseline confusion. Does not know why she is here.  In ED, patient found to be septic, given 3 Liter bolus and for a short time started on pressors. Now she is off pressors with BP 98/70 in no distress.     Patient is currently undergoing cecal mass evaluation. Denies any HA, CP, SOB. No rectal bleeding. Care discussed with Dr. Camacho. If patient to have a surgery - it will be done in a [facility]. Arrangments will need to be made with Dr. Lange.     ROS:    - all other review of systems are negative unless indicated above.      PHYSICAL EXAM:  T(C): 36.4 (20 Aug 2021 08:01), Max: 36.4 (19 Aug 2021 16:22)  T(F): 97.6 (20 Aug 2021 08:01), Max: 97.6 (20 Aug 2021 06:35)  HR: 90 (20 Aug 2021 08:01) (75 - 90)  BP: 105/52 (20 Aug 2021 08:01) (100/51 - 132/65)  RR: 17 (20 Aug 2021 08:01) (17 - 18)  SpO2: 93% (20 Aug 2021 08:01) (93% - 94%)  Constitutional: Well appearing  HEENT: Atraumatic, JOSHUA, Normal, No congestion  Respiratory: Breath Sounds normal, b/l rhonchi; no wheeze  Cardiovascular: RRR S1S2; J LUIS present  Gastrointestinal: Abdomen soft, non tender, Bowel Sounds present; no guarding  Extremities: No edema, peripheral pulses present  Neurological: AAO x 3, no gross focal motor deficits  Skin: Non cellulitic, no rash, ulcers  Lymph Nodes: No lymphadenopathy noted  Back: No CVA tenderness   Musculoskeletal: non tender      CBC Full  -  ( 20 Aug 2021 06:07 )  WBC Count : 5.75 K/uL  RBC Count : 3.08 M/uL  Hemoglobin : 9.6 g/dL  Hematocrit : 29.9 %  Platelet Count - Automated : 221 K/uL  Mean Cell Volume : 97.1 fl  Mean Cell Hemoglobin : 31.2 pg  Mean Cell Hemoglobin Concentration : 32.1 gm/dL  Auto Neutrophil # : 3.64 K/uL  Auto Lymphocyte # : 0.84 K/uL  Auto Monocyte # : 0.77 K/uL  Auto Eosinophil # : 0.41 K/uL  Auto Basophil # : 0.03 K/uL  Auto Neutrophil % : 63.4 %  Auto Lymphocyte % : 14.6 %  Auto Monocyte % : 13.4 %  Auto Eosinophil % : 7.1 %  Auto Basophil % : 0.5 %        08-20    137  |  102  |  5<L>  ----------------------------<  139<H>  2.9<LL>   |  28  |  0.68    Ca    8.0<L>      20 Aug 2021 06:07  Phos  3.7     08-20  Mg     1.9     08-20        Patient is 89 yo female with PMhx pancreatitis s/p ERCP, cirrhosis, DM2, a-fib on Eliquis, CAD s/p PCI, severe pulm HTN, chronic R heart failure, diastolic dysfunction, AS s/p TAVR, OA, HTN, dyslipidemia, hypothyroidism, presents with sob and fatigue. Patient sent from [facility] for further evaluation. Chart reviewed. Patient seen in ED, pleasant with baseline confusion. Does not know why she is here.  In ED, patient found to be septic, given 3 Liter bolus and for a short time started on pressors. Now she is off pressors with BP 98/70 in no distress.     # Septic shock on admission  - resolved now  - off vasopressors.  - midodrine taper  - BCx:  Streptococcus.; repeat blood cx, neg  - repeat BCx:  neg  - TTE; no vegetation  - po ceftin for a total of 14 days since last Blood cx were neg, till 8/27  - CT Ab/Pel w Oral/IV contrast(8/19): no evidence of metastatic disease     # Cecal Mass  - s/p colonoscopy 8/19  - CEA is elevated   - Will follow up pathology report.  - elevated CEA  - colorectal; surgery appreciated / pending pathology report    # AF.  - AC:  restart eliquis colonoscopy   - RTC:  metoprolol held due to hypotension.    # HFpEF.  - appears some lower chest rales on exam.  -spironolactone and torsemide 20 bid  - monitor BP, Cr    # Constipation: miralax started  document BM  no BM; MOM added    # hx DM2.  - FS target 140-180mg/dL.  - consistent CHO diet.  - correction insulin coverage.    # hx hypothyroidism.  - TSH:  wnl.  - levothyroxine.            CC:  Patient is a 89y old  Female who presents with a chief complaint of SOB (12 Aug 2021 13:35)    Patient is 89 yo female with PMhx pancreatitis s/p ERCP, cirrhosis, DM2, a-fib on Eliquis, CAD s/p PCI, severe pulm HTN, chronic R heart failure, diastolic dysfunction, AS s/p TAVR, OA, HTN, dyslipidemia, hypothyroidism, presents with sob and fatigue. Patient sent from [facility] for further evaluation. Chart reviewed. Patient seen in ED, pleasant with baseline confusion. Does not know why she is here.  In ED, patient found to be septic, given 3 Liter bolus and for a short time started on pressors. Now she is off pressors with BP 98/70 in no distress.     Patient is currently undergoing cecal mass evaluation. Denies any HA, CP, SOB. No rectal bleeding. Care discussed with Dr. Camacho. If patient to have a surgery - it will be done in a [facility]. Arrangments will need to be made with Dr. Laneg. Patient can be d/c back to [facility] with a close follow up with Dr. Lange.     ROS:    - all other review of systems are negative unless indicated above.      PHYSICAL EXAM:  T(C): 36.4 (20 Aug 2021 08:01), Max: 36.4 (19 Aug 2021 16:22)  T(F): 97.6 (20 Aug 2021 08:01), Max: 97.6 (20 Aug 2021 06:35)  HR: 90 (20 Aug 2021 08:01) (75 - 90)  BP: 105/52 (20 Aug 2021 08:01) (100/51 - 132/65)  RR: 17 (20 Aug 2021 08:01) (17 - 18)  SpO2: 93% (20 Aug 2021 08:01) (93% - 94%)  Constitutional: Well appearing  HEENT: Atraumatic, JOSHUA, Normal, No congestion  Respiratory: Breath Sounds normal, b/l rhonchi; no wheeze  Cardiovascular: RRR S1S2; J LUIS present  Gastrointestinal: Abdomen soft, non tender, Bowel Sounds present; no guarding  Extremities: No edema, peripheral pulses present  Neurological: AAO x 3, no gross focal motor deficits  Skin: Non cellulitic, no rash, ulcers  Lymph Nodes: No lymphadenopathy noted  Back: No CVA tenderness   Musculoskeletal: non tender      CBC Full  -  ( 20 Aug 2021 06:07 )  WBC Count : 5.75 K/uL  RBC Count : 3.08 M/uL  Hemoglobin : 9.6 g/dL  Hematocrit : 29.9 %  Platelet Count - Automated : 221 K/uL  Mean Cell Volume : 97.1 fl  Mean Cell Hemoglobin : 31.2 pg  Mean Cell Hemoglobin Concentration : 32.1 gm/dL  Auto Neutrophil # : 3.64 K/uL  Auto Lymphocyte # : 0.84 K/uL  Auto Monocyte # : 0.77 K/uL  Auto Eosinophil # : 0.41 K/uL  Auto Basophil # : 0.03 K/uL  Auto Neutrophil % : 63.4 %  Auto Lymphocyte % : 14.6 %  Auto Monocyte % : 13.4 %  Auto Eosinophil % : 7.1 %  Auto Basophil % : 0.5 %        08-20    137  |  102  |  5<L>  ----------------------------<  139<H>  2.9<LL>   |  28  |  0.68    Ca    8.0<L>      20 Aug 2021 06:07  Phos  3.7     08-20  Mg     1.9     08-20        Patient is 89 yo female with PMhx pancreatitis s/p ERCP, cirrhosis, DM2, a-fib on Eliquis, CAD s/p PCI, severe pulm HTN, chronic R heart failure, diastolic dysfunction, AS s/p TAVR, OA, HTN, dyslipidemia, hypothyroidism, presents with sob and fatigue. Patient sent from [facility] for further evaluation. Chart reviewed. Patient seen in ED, pleasant with baseline confusion. Does not know why she is here.  In ED, patient found to be septic, given 3 Liter bolus and for a short time started on pressors. Now she is off pressors with BP 98/70 in no distress.     # Septic shock on admission  - resolved now  - off vasopressors.  - midodrine taper  - BCx:  Streptococcus.; repeat blood cx, neg  - repeat BCx:  neg  - TTE; no vegetation  - po ceftin for a total of 14 days since last Blood cx were neg, till 8/27  - CT Ab/Pel w Oral/IV contrast(8/19): no evidence of metastatic disease     # Cecal Mass  - s/p colonoscopy 8/19  - CEA is elevated   - Will follow up pathology report.  - elevated CEA  - colorectal; surgery appreciated / pending pathology report    # AF.  - AC:  restart eliquis colonoscopy   - RTC:  metoprolol held due to hypotension.    # HFpEF.  - appears some lower chest rales on exam.  -spironolactone and torsemide 20 bid  - monitor BP, Cr    # Constipation: miralax started  document BM  no BM; MOM added    # hx DM2.  - FS target 140-180mg/dL.  - consistent CHO diet.  - correction insulin coverage.    # hx hypothyroidism.  - TSH:  wnl.  - levothyroxine.

## 2021-08-21 ENCOUNTER — TRANSCRIPTION ENCOUNTER (OUTPATIENT)
Age: 86
End: 2021-08-21

## 2021-08-21 LAB
ANION GAP SERPL CALC-SCNC: 4 MMOL/L — LOW (ref 5–17)
BUN SERPL-MCNC: 5 MG/DL — LOW (ref 7–23)
CALCIUM SERPL-MCNC: 8.8 MG/DL — SIGNIFICANT CHANGE UP (ref 8.5–10.1)
CHLORIDE SERPL-SCNC: 106 MMOL/L — SIGNIFICANT CHANGE UP (ref 96–108)
CO2 SERPL-SCNC: 29 MMOL/L — SIGNIFICANT CHANGE UP (ref 22–31)
CREAT SERPL-MCNC: 0.71 MG/DL — SIGNIFICANT CHANGE UP (ref 0.5–1.3)
GLUCOSE SERPL-MCNC: 137 MG/DL — HIGH (ref 70–99)
HCT VFR BLD CALC: 32.8 % — LOW (ref 34.5–45)
HGB BLD-MCNC: 10.7 G/DL — LOW (ref 11.5–15.5)
MCHC RBC-ENTMCNC: 32.2 PG — SIGNIFICANT CHANGE UP (ref 27–34)
MCHC RBC-ENTMCNC: 32.6 GM/DL — SIGNIFICANT CHANGE UP (ref 32–36)
MCV RBC AUTO: 98.8 FL — SIGNIFICANT CHANGE UP (ref 80–100)
PLATELET # BLD AUTO: 230 K/UL — SIGNIFICANT CHANGE UP (ref 150–400)
POTASSIUM SERPL-MCNC: 4 MMOL/L — SIGNIFICANT CHANGE UP (ref 3.5–5.3)
POTASSIUM SERPL-SCNC: 4 MMOL/L — SIGNIFICANT CHANGE UP (ref 3.5–5.3)
RBC # BLD: 3.32 M/UL — LOW (ref 3.8–5.2)
RBC # FLD: 16.5 % — HIGH (ref 10.3–14.5)
SODIUM SERPL-SCNC: 139 MMOL/L — SIGNIFICANT CHANGE UP (ref 135–145)
WBC # BLD: 4.95 K/UL — SIGNIFICANT CHANGE UP (ref 3.8–10.5)
WBC # FLD AUTO: 4.95 K/UL — SIGNIFICANT CHANGE UP (ref 3.8–10.5)

## 2021-08-21 PROCEDURE — 99239 HOSP IP/OBS DSCHRG MGMT >30: CPT

## 2021-08-21 RX ORDER — CEFUROXIME AXETIL 250 MG
1 TABLET ORAL
Qty: 0 | Refills: 0 | DISCHARGE
Start: 2021-08-21

## 2021-08-21 RX ORDER — MIDODRINE HYDROCHLORIDE 2.5 MG/1
2.5 TABLET ORAL EVERY 8 HOURS
Refills: 0 | Status: DISCONTINUED | OUTPATIENT
Start: 2021-08-21 | End: 2021-08-23

## 2021-08-21 RX ADMIN — Medication 1 DROP(S): at 14:04

## 2021-08-21 RX ADMIN — TRAMADOL HYDROCHLORIDE 50 MILLIGRAM(S): 50 TABLET ORAL at 20:24

## 2021-08-21 RX ADMIN — APIXABAN 2.5 MILLIGRAM(S): 2.5 TABLET, FILM COATED ORAL at 22:02

## 2021-08-21 RX ADMIN — MIDODRINE HYDROCHLORIDE 2.5 MILLIGRAM(S): 2.5 TABLET ORAL at 10:32

## 2021-08-21 RX ADMIN — ESCITALOPRAM OXALATE 10 MILLIGRAM(S): 10 TABLET, FILM COATED ORAL at 10:00

## 2021-08-21 RX ADMIN — Medication 325 MILLIGRAM(S): at 10:00

## 2021-08-21 RX ADMIN — MIDODRINE HYDROCHLORIDE 2.5 MILLIGRAM(S): 2.5 TABLET ORAL at 22:01

## 2021-08-21 RX ADMIN — Medication 200 MILLIGRAM(S): at 10:00

## 2021-08-21 RX ADMIN — Medication 2: at 16:49

## 2021-08-21 RX ADMIN — TRAMADOL HYDROCHLORIDE 50 MILLIGRAM(S): 50 TABLET ORAL at 21:10

## 2021-08-21 RX ADMIN — MIDODRINE HYDROCHLORIDE 5 MILLIGRAM(S): 2.5 TABLET ORAL at 06:26

## 2021-08-21 RX ADMIN — Medication 1 DROP(S): at 06:25

## 2021-08-21 RX ADMIN — Medication 50 MICROGRAM(S): at 06:25

## 2021-08-21 RX ADMIN — Medication 500 MILLIGRAM(S): at 22:02

## 2021-08-21 RX ADMIN — Medication 650 MILLIGRAM(S): at 14:40

## 2021-08-21 RX ADMIN — Medication 325 MILLIGRAM(S): at 22:01

## 2021-08-21 RX ADMIN — Medication 650 MILLIGRAM(S): at 14:10

## 2021-08-21 RX ADMIN — Medication 1 DROP(S): at 22:01

## 2021-08-21 RX ADMIN — TRAMADOL HYDROCHLORIDE 50 MILLIGRAM(S): 50 TABLET ORAL at 06:26

## 2021-08-21 RX ADMIN — Medication 81 MILLIGRAM(S): at 10:00

## 2021-08-21 RX ADMIN — POLYETHYLENE GLYCOL 3350 17 GRAM(S): 17 POWDER, FOR SOLUTION ORAL at 10:00

## 2021-08-21 RX ADMIN — Medication 1: at 12:38

## 2021-08-21 RX ADMIN — TRAMADOL HYDROCHLORIDE 50 MILLIGRAM(S): 50 TABLET ORAL at 06:56

## 2021-08-21 RX ADMIN — ATORVASTATIN CALCIUM 10 MILLIGRAM(S): 80 TABLET, FILM COATED ORAL at 22:01

## 2021-08-21 RX ADMIN — Medication 650 MILLIGRAM(S): at 02:32

## 2021-08-21 RX ADMIN — Medication 500 MILLIGRAM(S): at 10:00

## 2021-08-21 RX ADMIN — PANTOPRAZOLE SODIUM 40 MILLIGRAM(S): 20 TABLET, DELAYED RELEASE ORAL at 10:00

## 2021-08-21 RX ADMIN — APIXABAN 2.5 MILLIGRAM(S): 2.5 TABLET, FILM COATED ORAL at 10:00

## 2021-08-21 NOTE — DISCHARGE NOTE NURSING/CASE MANAGEMENT/SOCIAL WORK - NSDCPEFALRISK_GEN_ALL_CORE
For information on Fall & injury Prevention, visit [email or web address]

## 2021-08-21 NOTE — DISCHARGE NOTE NURSING/CASE MANAGEMENT/SOCIAL WORK - PATIENT PORTAL LINK FT
You can access the FollowMyHealth Patient Portal offered by [facility] by registering at the following website: [email or web address]. By joining [employer]’s FollowMyHealth portal, you will also be able to view your health information using other applications (apps) compatible with our system.

## 2021-08-21 NOTE — DISCHARGE NOTE PROVIDER - CARE PROVIDERS DIRECT ADDRESSES
[email or web address]

## 2021-08-21 NOTE — DISCHARGE NOTE PROVIDER - HOSPITAL COURSE
CC:  Patient is a 89y old  Female who presents with a chief complaint of SOB (12 Aug 2021 13:35)    Patient is 89 yo female with PMhx pancreatitis s/p ERCP, cirrhosis, DM2, a-fib on Eliquis, CAD s/p PCI, severe pulm HTN, chronic R heart failure, diastolic dysfunction, AS s/p TAVR, OA, HTN, dyslipidemia, hypothyroidism, presents with sob and fatigue. Patient sent from [facility] for further evaluation. Chart reviewed. Patient seen in ED, pleasant with baseline confusion. Does not know why she is here.  In ED, patient found to be septic, given 3 Liter bolus and for a short time started on pressors. Now she is off pressors with BP 98/70 in no distress.     Patient is currently undergoing cecal mass evaluation. Denies any HA, CP, SOB. No rectal bleeding. Care discussed with Dr. Camacho. If patient to have a surgery - it will be done in a [facility]. Arrangments will need to be made with Dr. Lange. Patient can be d/c back to [facility] with a close follow up with Dr. Lange.     Physical Exam:   GENERAL APPEARANCE:  NAD, hemodynamically stable  T(C): 36.4 (08-21-21 @ 07:55), Max: 36.6 (08-20-21 @ 22:50)  HR: 84 (08-21-21 @ 07:55) (51 - 84)  BP: 125/66 (08-21-21 @ 07:55) (106/66 - 149/71)  RR: 17 (08-21-21 @ 07:55) (16 - 17)  SpO2: 93% (08-21-21 @ 07:55) (93% - 93%)  HEENT:  Head is normocephalic    Skin:  Warm and dry without any rash   NECK:  Supple without lymphadenopathy.   HEART:  Regular rate and rhythm. normal S1 and S2, No M/R/G  LUNGS:  Good ins/exp effort, no W/R/R/C  ABDOMEN:  Soft, nontender, nondistended with good bowel sounds heard  EXTREMITIES: muscle wasting   NEUROLOGICAL:  Gross nonfocal     care thoroughly discussed with patient's son. care discussed with Dr. guerrier -  recommended outpatient follow up with Dr. Lange, call office on monday. Care discussed with Dr. Vomero -  patient needs tertiary care facility for surgery.   CC:  Patient is a 89y old  Female who presents with a chief complaint of SOB (12 Aug 2021 13:35)    Patient is 89 yo female with PMhx pancreatitis s/p ERCP, cirrhosis, DM2, a-fib on Eliquis, CAD s/p PCI, severe pulm HTN, chronic R heart failure, diastolic dysfunction, AS s/p TAVR, OA, HTN, dyslipidemia, hypothyroidism, presents with sob and fatigue. Patient sent from [facility] for further evaluation. Chart reviewed. Patient seen in ED, pleasant with baseline confusion. Does not know why she is here.  In ED, patient found to be septic, given 3 Liter bolus and for a short time started on pressors. Now she is off pressors with BP 98/70 in no distress.     Patient is currently undergoing cecal mass evaluation. Denies any HA, CP, SOB. No rectal bleeding. Care discussed with Dr. Camacho. If patient to have a surgery - it will be done in a [facility], as patient is elderly with multiple comorbidities, which makes her a high risk patient for complications. care has been discussed with Anesthesia team -  who recommended surgery to be performed in a tertiary care center. . Arrangments will need to be made with Dr. Lange. Patient can be d/c back to [facility] with a close follow up with Dr. Lange.     Physical Exam:   GENERAL APPEARANCE:  NAD, hemodynamically stable  T(C): 36.4 (08-21-21 @ 07:55), Max: 36.6 (08-20-21 @ 22:50)  HR: 84 (08-21-21 @ 07:55) (51 - 84)  BP: 125/66 (08-21-21 @ 07:55) (106/66 - 149/71)  RR: 17 (08-21-21 @ 07:55) (16 - 17)  SpO2: 93% (08-21-21 @ 07:55) (93% - 93%)  HEENT:  Head is normocephalic    Skin:  Warm and dry without any rash   NECK:  Supple without lymphadenopathy.   HEART:  Regular rate and rhythm. normal S1 and S2, No M/R/G  LUNGS:  Good ins/exp effort, no W/R/R/C  ABDOMEN:  Soft, nontender, nondistended with good bowel sounds heard  EXTREMITIES: muscle wasting   NEUROLOGICAL:  Gross nonfocal     care thoroughly discussed with patient's son. care discussed with Dr. guerrier -  recommended outpatient follow up with Dr. Lange, call office on monday. Care discussed with Dr. Camacho -  patient needs tertiary care facility for surgery.

## 2021-08-21 NOTE — PROGRESS NOTE ADULT - SUBJECTIVE AND OBJECTIVE BOX
Subjective:    pat sitting in chair, no new complaint.    Home Medications:  allopurinol 100 mg oral tablet: 2 tab(s) orally once a day (11 Aug 2021 13:43)  Artificial Tears ophthalmic solution: 1 drop(s) to each affected eye 3 times a day (11 Aug 2021 13:43)  Aspirin Low Dose 81 mg oral tablet, chewable: 1 tab(s) orally once a day (11 Aug 2021 13:43)  cefuroxime 500 mg oral tablet: 1 tab(s) orally every 12 hours (21 Aug 2021 09:04)  Eliquis 2.5 mg oral tablet: 1 tab(s) orally 2 times a day (11 Aug 2021 13:44)  escitalopram 10 mg oral tablet: 1 tab(s) orally once a day (11 Aug 2021 13:43)  ferrous sulfate 325 mg (65 mg elemental iron) oral tablet: 1 tab(s) orally 2 times a day (11 Aug 2021 13:44)  levothyroxine 50 mcg (0.05 mg) oral tablet: 1 tab(s) orally once a day (11 Aug 2021 13:43)  pantoprazole 40 mg oral delayed release tablet: 1 tab(s) orally 2 times a day (11 Aug 2021 13:43)  sildenafil 20 mg oral tablet: 1 tab(s) orally 2 times a day (11 Aug 2021 13:44)  simvastatin 40 mg oral tablet: 1 tab(s) orally once a day (at bedtime) (11 Aug 2021 13:43)  torsemide 20 mg oral tablet: 1 tab(s) orally 2 times a day (21 Aug 2021 09:04)    MEDICATIONS  (STANDING):  allopurinol  Oral Tab/Cap - Peds 200 milliGRAM(s) Oral daily  apixaban 2.5 milliGRAM(s) Oral two times a day  artificial  tears Solution 1 Drop(s) Both EYES three times a day  aspirin  chewable 81 milliGRAM(s) Oral daily  atorvastatin 10 milliGRAM(s) Oral at bedtime  cefuroxime   Tablet 500 milliGRAM(s) Oral every 12 hours  dextrose 40% Gel 15 Gram(s) Oral once  dextrose 5%. 1000 milliLiter(s) (50 mL/Hr) IV Continuous <Continuous>  dextrose 50% Injectable 25 Gram(s) IV Push once  escitalopram 10 milliGRAM(s) Oral daily  ferrous    sulfate 325 milliGRAM(s) Oral two times a day  glucagon  Injectable 1 milliGRAM(s) IntraMuscular once  insulin lispro (ADMELOG) corrective regimen sliding scale   SubCutaneous three times a day before meals  insulin lispro (ADMELOG) corrective regimen sliding scale   SubCutaneous at bedtime  levothyroxine 50 MICROGram(s) Oral daily  midodrine 2.5 milliGRAM(s) Oral every 8 hours  pantoprazole    Tablet 40 milliGRAM(s) Oral daily  polyethylene glycol 3350 17 Gram(s) Oral daily  sildenafil (REVATIO) 20 milliGRAM(s) Oral two times a day  torsemide 20 milliGRAM(s) Oral two times a day    MEDICATIONS  (PRN):  acetaminophen   Tablet .. 650 milliGRAM(s) Oral every 6 hours PRN Temp greater or equal to 38.5C (101.3F), Mild Pain (1 - 3)  aluminum hydroxide/magnesium hydroxide/simethicone Suspension 30 milliLiter(s) Oral every 4 hours PRN Dyspepsia  magnesium hydroxide Suspension 30 milliLiter(s) Oral daily PRN Constipation  melatonin 3 milliGRAM(s) Oral at bedtime PRN Insomnia  traMADol 50 milliGRAM(s) Oral every 8 hours PRN Moderate Pain (4 - 6)      Allergies    penicillin (Rash)  penicillins (Other)  sulfa drugs (Rash; Other)    Intolerances        Vital Signs Last 24 Hrs  T(C): 36.3 (21 Aug 2021 10:10), Max: 36.6 (20 Aug 2021 22:50)  T(F): 97.3 (21 Aug 2021 10:10), Max: 97.8 (20 Aug 2021 22:50)  HR: 85 (21 Aug 2021 10:50) (51 - 85)  BP: 101/61 (21 Aug 2021 10:50) (93/43 - 149/71)  BP(mean): --  RR: 18 (21 Aug 2021 10:50) (16 - 18)  SpO2: 99% (21 Aug 2021 10:50) (93% - 99%)      PHYSICAL EXAMINATION:    NECK:  Supple. No lymphadenopathy. Jugular venous pressure not elevated. Carotids equal.   HEART:   The cardiac impulse has a normal quality. Reg., Nl S1 and S2.  There are no murmurs, rubs or gallops noted  CHEST:  Chest is clear to auscultation. Normal respiratory effort.  ABDOMEN:  Soft and nontender.   EXTREMITIES:  There is no edema.       LABS:                        10.7   4.95  )-----------( 230      ( 21 Aug 2021 08:46 )             32.8     08-21    139  |  106  |  5<L>  ----------------------------<  137<H>  4.0   |  29  |  0.71    Ca    8.8      21 Aug 2021 08:46  Phos  3.7     08-20  Mg     1.9     08-20

## 2021-08-21 NOTE — DISCHARGE NOTE PROVIDER - DETAILS OF MALNUTRITION DIAGNOSIS/DIAGNOSES
This patient has been assessed with a concern for Malnutrition and was treated during this hospitalization for the following Nutrition diagnosis/diagnoses:     -  08/19/2021: Severe protein-calorie malnutrition

## 2021-08-21 NOTE — PROGRESS NOTE ADULT - ASSESSMENT
Patient is 89 yo female with PMhx pancreatitis s/p ERCP, cirrhosis, DM2, a-fib on Eliquis, CAD s/p PCI, severe pulm HTN, chronic R heart failure, diastolic dysfunction, AS s/p TAVR, OA, HTN, dyslipidemia, hypothyroidism, presents with sob and fatigue.     PROBLEMS:    S/P Septic shock on admission-BCx:  Streptococcus  Cecal Mass-s/p colonoscopy 8/19-CEA is elevated   hx DM2  hx hypothyroidism  Stage 3 chronic kidney disease  Chronic atrial fibrillation  Chronic diastolic heart failure  Primary pulmonary hypertension    PLAN;    IV torsemide and monitor lytes   PO Ceftin x 14 days  Colonoscopy-cecal mass-Await pathology  Colorectal consult-FU  SUPPORTIVE CARE  DVT prophylasix

## 2021-08-21 NOTE — DISCHARGE NOTE PROVIDER - NSDCMRMEDTOKEN_GEN_ALL_CORE_FT
allopurinol 100 mg oral tablet: 2 tab(s) orally once a day  Artificial Tears ophthalmic solution: 1 drop(s) to each affected eye 3 times a day  Aspirin Low Dose 81 mg oral tablet, chewable: 1 tab(s) orally once a day  cefuroxime 500 mg oral tablet: 1 tab(s) orally every 12 hours  Eliquis 2.5 mg oral tablet: 1 tab(s) orally 2 times a day  escitalopram 10 mg oral tablet: 1 tab(s) orally once a day  ferrous sulfate 325 mg (65 mg elemental iron) oral tablet: 1 tab(s) orally 2 times a day  levothyroxine 50 mcg (0.05 mg) oral tablet: 1 tab(s) orally once a day  pantoprazole 40 mg oral delayed release tablet: 1 tab(s) orally 2 times a day  sildenafil 20 mg oral tablet: 1 tab(s) orally 2 times a day  simvastatin 40 mg oral tablet: 1 tab(s) orally once a day (at bedtime)  torsemide 20 mg oral tablet: 1 tab(s) orally 2 times a day

## 2021-08-21 NOTE — DISCHARGE NOTE PROVIDER - CARE PROVIDER_API CALL
Osman Man)  ColonRectal Surgery; Surgery  [address] [address]  Phone: [phone]  Fax: [phone]  Follow Up Time:     Sid Frazier)  Cardiovascular Disease; Nuclear Cardiology  [address] [place] [address]  Phone: [phone]  Fax: [phone]  Follow Up Time:

## 2021-08-21 NOTE — DISCHARGE NOTE PROVIDER - NSDCCPCAREPLAN_GEN_ALL_CORE_FT
PRINCIPAL DISCHARGE DIAGNOSIS  Diagnosis: Severe sepsis  Assessment and Plan of Treatment:   # Septic shock on admission  - resolved now  - off vasopressors.  - midodrine taper  - BCx:  Streptococcus.; repeat blood cx, neg  - repeat BCx:  neg  - TTE; no vegetation  - po ceftin for a total of 14 days since last Blood cx were neg, till 8/27  - CT Ab/Pel w Oral/IV contrast(8/19): no evidence of metastatic disease         SECONDARY DISCHARGE DIAGNOSES  Diagnosis: Hypoxia  Assessment and Plan of Treatment: - secondary to pneumonia  - patient euvolumic  - resolved    Diagnosis: Colorectal tumor  Assessment and Plan of Treatment: # Cecal Mass  - s/p colonoscopy 8/19  - CEA is elevated   - Will follow up pathology report.  - colorectal; surgery appreciated / pending pathology report  - please make an appointment with Dr. Lange -  by calling his office on monday    Diagnosis: Chronic atrial fibrillation  Assessment and Plan of Treatment: # AF.  - good rate control  - eliquis restarted on 8.21    Diagnosis: H/O hypotension  Assessment and Plan of Treatment: - in the hospital post pressors, patient has been on midodrine  - midodrine stopped day of discharge  - monitor blood pressures

## 2021-08-22 PROCEDURE — 99232 SBSQ HOSP IP/OBS MODERATE 35: CPT

## 2021-08-22 RX ADMIN — MIDODRINE HYDROCHLORIDE 2.5 MILLIGRAM(S): 2.5 TABLET ORAL at 15:00

## 2021-08-22 RX ADMIN — TRAMADOL HYDROCHLORIDE 50 MILLIGRAM(S): 50 TABLET ORAL at 05:48

## 2021-08-22 RX ADMIN — MIDODRINE HYDROCHLORIDE 2.5 MILLIGRAM(S): 2.5 TABLET ORAL at 21:58

## 2021-08-22 RX ADMIN — Medication 650 MILLIGRAM(S): at 09:47

## 2021-08-22 RX ADMIN — Medication 1: at 07:55

## 2021-08-22 RX ADMIN — Medication 20 MILLIGRAM(S): at 09:45

## 2021-08-22 RX ADMIN — MIDODRINE HYDROCHLORIDE 2.5 MILLIGRAM(S): 2.5 TABLET ORAL at 05:48

## 2021-08-22 RX ADMIN — TRAMADOL HYDROCHLORIDE 50 MILLIGRAM(S): 50 TABLET ORAL at 06:34

## 2021-08-22 RX ADMIN — Medication 500 MILLIGRAM(S): at 09:42

## 2021-08-22 RX ADMIN — Medication 2: at 12:15

## 2021-08-22 RX ADMIN — APIXABAN 2.5 MILLIGRAM(S): 2.5 TABLET, FILM COATED ORAL at 21:58

## 2021-08-22 RX ADMIN — Medication 1 DROP(S): at 21:59

## 2021-08-22 RX ADMIN — ATORVASTATIN CALCIUM 10 MILLIGRAM(S): 80 TABLET, FILM COATED ORAL at 21:58

## 2021-08-22 RX ADMIN — PANTOPRAZOLE SODIUM 40 MILLIGRAM(S): 20 TABLET, DELAYED RELEASE ORAL at 09:42

## 2021-08-22 RX ADMIN — ESCITALOPRAM OXALATE 10 MILLIGRAM(S): 10 TABLET, FILM COATED ORAL at 09:42

## 2021-08-22 RX ADMIN — Medication 50 MICROGRAM(S): at 05:48

## 2021-08-22 RX ADMIN — Medication 81 MILLIGRAM(S): at 09:42

## 2021-08-22 RX ADMIN — Medication 325 MILLIGRAM(S): at 21:58

## 2021-08-22 RX ADMIN — Medication 3 MILLIGRAM(S): at 21:58

## 2021-08-22 RX ADMIN — Medication 1: at 17:16

## 2021-08-22 RX ADMIN — Medication 200 MILLIGRAM(S): at 09:42

## 2021-08-22 RX ADMIN — Medication 500 MILLIGRAM(S): at 21:58

## 2021-08-22 RX ADMIN — POLYETHYLENE GLYCOL 3350 17 GRAM(S): 17 POWDER, FOR SOLUTION ORAL at 09:46

## 2021-08-22 RX ADMIN — TRAMADOL HYDROCHLORIDE 50 MILLIGRAM(S): 50 TABLET ORAL at 16:15

## 2021-08-22 RX ADMIN — Medication 20 MILLIGRAM(S): at 21:58

## 2021-08-22 RX ADMIN — Medication 325 MILLIGRAM(S): at 09:42

## 2021-08-22 RX ADMIN — Medication 650 MILLIGRAM(S): at 10:17

## 2021-08-22 RX ADMIN — APIXABAN 2.5 MILLIGRAM(S): 2.5 TABLET, FILM COATED ORAL at 09:42

## 2021-08-22 RX ADMIN — Medication 1 DROP(S): at 14:59

## 2021-08-22 RX ADMIN — TRAMADOL HYDROCHLORIDE 50 MILLIGRAM(S): 50 TABLET ORAL at 15:36

## 2021-08-22 RX ADMIN — Medication 1 DROP(S): at 05:48

## 2021-08-22 NOTE — PROGRESS NOTE ADULT - ASSESSMENT
Patient is 89 yo female with PMhx pancreatitis s/p ERCP, cirrhosis, DM2, a-fib on Eliquis, CAD s/p PCI, severe pulm HTN, chronic R heart failure, diastolic dysfunction, AS s/p TAVR, OA, HTN, dyslipidemia, hypothyroidism, presents with sob and fatigue.     PROBLEMS:    S/P Septic shock on admission-BCx:  Streptococcus  Cecal Mass-s/p colonoscopy 8/19-CEA is elevated   hx DM2  hx hypothyroidism  Stage 3 chronic kidney disease  Chronic atrial fibrillation  Chronic diastolic heart failure  Primary pulmonary hypertension    PLAN;    pulmonary stable  po torsemide and monitor lytes   PO Ceftin x 14 days  Colonoscopy-cecal mass-Await pathology  Colorectal consult-FU  SUPPORTIVE CARE  DVT prophylasix-abixan

## 2021-08-22 NOTE — PROGRESS NOTE ADULT - SUBJECTIVE AND OBJECTIVE BOX
CC:  Patient is a 89y old  Female who presents with a chief complaint of SOB (12 Aug 2021 13:35)    Patient is 89 yo female with PMhx pancreatitis s/p ERCP, cirrhosis, DM2, a-fib on Eliquis, CAD s/p PCI, severe pulm HTN, chronic R heart failure, diastolic dysfunction, AS s/p TAVR, OA, HTN, dyslipidemia, hypothyroidism, presents with sob and fatigue. Patient sent from [facility] for further evaluation. Chart reviewed. Patient seen in ED, pleasant with baseline confusion. Does not know why she is here.  In ED, patient found to be septic, given 3 Liter bolus and for a short time started on pressors. Now she is off pressors with BP 98/70 in no distress.     Patient is currently undergoing cecal mass evaluation. Denies any HA, CP, SOB. No rectal bleeding. Care discussed with Dr. Camacho. If patient to have a surgery - it will be done in a [facility]. Arrangments will need to be made with Dr. Lange. Patient can be d/c back to [facility] with a close follow up with Dr. Lange.     Doing well. Tolerating po diet. Patient had BM yesterday. daily weights. Dc planning tomorrow.     ROS:    - all other review of systems are negative unless indicated above.      PHYSICAL EXAM:  ICU Vital Signs Last 24 Hrs  T(C): 36.4 (22 Aug 2021 08:01), Max: 36.4 (22 Aug 2021 08:01)  T(F): 97.5 (22 Aug 2021 08:01), Max: 97.5 (22 Aug 2021 08:01)  HR: 93 (22 Aug 2021 09:45) (80 - 99)  BP: 109/53 (22 Aug 2021 09:45) (95/63 - 114/55)  BP(mean): --  ABP: --  ABP(mean): --  RR: 16 (22 Aug 2021 08:01) (16 - 17)  SpO2: 96% (22 Aug 2021 08:01) (96% - 96%)    Constitutional: Well appearing  HEENT: Atraumatic, JOSHUA, Normal, No congestion  Respiratory: Breath Sounds normal, b/l rhonchi; no wheeze  Cardiovascular: RRR S1S2; J LUIS present  Gastrointestinal: Abdomen soft, non tender, Bowel Sounds present; no guarding  Extremities: No edema, peripheral pulses present  Neurological: AAO x 3, no gross focal motor deficits  Skin: Non cellulitic, no rash, ulcers  Lymph Nodes: No lymphadenopathy noted  Back: No CVA tenderness   Musculoskeletal: non tender        CBC Full  -  ( 21 Aug 2021 08:46 )  WBC Count : 4.95 K/uL  RBC Count : 3.32 M/uL  Hemoglobin : 10.7 g/dL  Hematocrit : 32.8 %  Platelet Count - Automated : 230 K/uL  Mean Cell Volume : 98.8 fl  Mean Cell Hemoglobin : 32.2 pg  Mean Cell Hemoglobin Concentration : 32.6 gm/dL  Auto Neutrophil # : x  Auto Lymphocyte # : x  Auto Monocyte # : x  Auto Eosinophil # : x  Auto Basophil # : x  Auto Neutrophil % : x  Auto Lymphocyte % : x  Auto Monocyte % : x  Auto Eosinophil % : x  Auto Basophil % : x        08-21    139  |  106  |  5<L>  ----------------------------<  137<H>  4.0   |  29  |  0.71    Ca    8.8      21 Aug 2021 08:46            Patient is 89 yo female with PMhx pancreatitis s/p ERCP, cirrhosis, DM2, a-fib on Eliquis, CAD s/p PCI, severe pulm HTN, chronic R heart failure, diastolic dysfunction, AS s/p TAVR, OA, HTN, dyslipidemia, hypothyroidism, presents with sob and fatigue. Patient sent from [facility] for further evaluation. Chart reviewed. Patient seen in ED, pleasant with baseline confusion. Does not know why she is here.  In ED, patient found to be septic, given 3 Liter bolus and for a short time started on pressors. Now she is off pressors with BP 98/70 in no distress.     # Septic shock on admission  - resolved now  - off vasopressors.  - midodrine taper  - BCx:  Streptococcus.; repeat blood cx, neg  - repeat BCx:  neg  - TTE; no vegetation  - po ceftin for a total of 14 days since last Blood cx were neg, till 8/27  - CT Ab/Pel w Oral/IV contrast(8/19): no evidence of metastatic disease     # Cecal Mass  - s/p colonoscopy 8/19  - CEA is elevated   - Will follow up pathology report.  - elevated CEA  - colorectal; surgery appreciated / pending pathology report    # AF.  - AC:  restart eliquis colonoscopy   - RTC:  metoprolol held due to hypotension.    # HFpEF.  - appears some lower chest rales on exam.  -spironolactone and torsemide 20 bid  - monitor BP, Cr    # Constipation: miralax started  document BM  no BM; MOM added    # hx DM2.  - FS target 140-180mg/dL.  - consistent CHO diet.  - correction insulin coverage.    # hx hypothyroidism.  - TSH:  wnl.  - levothyroxine.

## 2021-08-22 NOTE — PROGRESS NOTE ADULT - NUTRITIONAL ASSESSMENT
This patient has been assessed with a concern for Malnutrition and has been determined to have a diagnosis/diagnoses of Severe protein-calorie malnutrition.    This patient is being managed with:   Diet Consistent Carbohydrate Clear Liquid-  Entered: Aug 19 2021  2:19PM    
This patient has been assessed with a concern for Malnutrition and has been determined to have a diagnosis/diagnoses of Severe protein-calorie malnutrition.    This patient is being managed with:   Diet Consistent Carbohydrate Clear Liquid-  Entered: Aug 19 2021  2:19PM    
This patient has been assessed with a concern for Malnutrition and has been determined to have a diagnosis/diagnoses of Severe protein-calorie malnutrition.    This patient is being managed with:   Diet DASH/TLC-  Sodium & Cholesterol Restricted  Entered: Aug 21 2021  8:51AM    
This patient has been assessed with a concern for Malnutrition and has been determined to have a diagnosis/diagnoses of Severe protein-calorie malnutrition.    This patient is being managed with:   Diet Consistent Carbohydrate Clear Liquid-  Entered: Aug 19 2021  2:19PM

## 2021-08-22 NOTE — PROGRESS NOTE ADULT - SUBJECTIVE AND OBJECTIVE BOX
Subjective:    pat c/o leg swelling, did not get torsemide yesterday for low BP, got today, no respiratory complaint.    Home Medications:  allopurinol 100 mg oral tablet: 2 tab(s) orally once a day (11 Aug 2021 13:43)  Artificial Tears ophthalmic solution: 1 drop(s) to each affected eye 3 times a day (11 Aug 2021 13:43)  Aspirin Low Dose 81 mg oral tablet, chewable: 1 tab(s) orally once a day (11 Aug 2021 13:43)  cefuroxime 500 mg oral tablet: 1 tab(s) orally every 12 hours (21 Aug 2021 09:04)  Eliquis 2.5 mg oral tablet: 1 tab(s) orally 2 times a day (11 Aug 2021 13:44)  escitalopram 10 mg oral tablet: 1 tab(s) orally once a day (11 Aug 2021 13:43)  ferrous sulfate 325 mg (65 mg elemental iron) oral tablet: 1 tab(s) orally 2 times a day (11 Aug 2021 13:44)  levothyroxine 50 mcg (0.05 mg) oral tablet: 1 tab(s) orally once a day (11 Aug 2021 13:43)  pantoprazole 40 mg oral delayed release tablet: 1 tab(s) orally 2 times a day (11 Aug 2021 13:43)  sildenafil 20 mg oral tablet: 1 tab(s) orally 2 times a day (11 Aug 2021 13:44)  simvastatin 40 mg oral tablet: 1 tab(s) orally once a day (at bedtime) (11 Aug 2021 13:43)  torsemide 20 mg oral tablet: 1 tab(s) orally 2 times a day (21 Aug 2021 09:04)    MEDICATIONS  (STANDING):  allopurinol  Oral Tab/Cap - Peds 200 milliGRAM(s) Oral daily  apixaban 2.5 milliGRAM(s) Oral two times a day  artificial  tears Solution 1 Drop(s) Both EYES three times a day  aspirin  chewable 81 milliGRAM(s) Oral daily  atorvastatin 10 milliGRAM(s) Oral at bedtime  cefuroxime   Tablet 500 milliGRAM(s) Oral every 12 hours  dextrose 40% Gel 15 Gram(s) Oral once  dextrose 5%. 1000 milliLiter(s) (50 mL/Hr) IV Continuous <Continuous>  dextrose 50% Injectable 25 Gram(s) IV Push once  escitalopram 10 milliGRAM(s) Oral daily  ferrous    sulfate 325 milliGRAM(s) Oral two times a day  glucagon  Injectable 1 milliGRAM(s) IntraMuscular once  insulin lispro (ADMELOG) corrective regimen sliding scale   SubCutaneous three times a day before meals  insulin lispro (ADMELOG) corrective regimen sliding scale   SubCutaneous at bedtime  levothyroxine 50 MICROGram(s) Oral daily  midodrine 2.5 milliGRAM(s) Oral every 8 hours  pantoprazole    Tablet 40 milliGRAM(s) Oral daily  polyethylene glycol 3350 17 Gram(s) Oral daily  sildenafil (REVATIO) 20 milliGRAM(s) Oral two times a day  torsemide 20 milliGRAM(s) Oral two times a day    MEDICATIONS  (PRN):  acetaminophen   Tablet .. 650 milliGRAM(s) Oral every 6 hours PRN Temp greater or equal to 38.5C (101.3F), Mild Pain (1 - 3)  aluminum hydroxide/magnesium hydroxide/simethicone Suspension 30 milliLiter(s) Oral every 4 hours PRN Dyspepsia  magnesium hydroxide Suspension 30 milliLiter(s) Oral daily PRN Constipation  melatonin 3 milliGRAM(s) Oral at bedtime PRN Insomnia  traMADol 50 milliGRAM(s) Oral every 8 hours PRN Moderate Pain (4 - 6)      Allergies    penicillin (Rash)  penicillins (Other)  sulfa drugs (Rash; Other)    Intolerances        Vital Signs Last 24 Hrs  T(C): 36.4 (22 Aug 2021 08:01), Max: 36.4 (22 Aug 2021 08:01)  T(F): 97.5 (22 Aug 2021 08:01), Max: 97.5 (22 Aug 2021 08:01)  HR: 93 (22 Aug 2021 09:45) (80 - 99)  BP: 109/53 (22 Aug 2021 09:45) (95/63 - 114/55)  BP(mean): --  RR: 16 (22 Aug 2021 08:01) (16 - 17)  SpO2: 96% (22 Aug 2021 08:01) (96% - 96%)      PHYSICAL EXAMINATION:    NECK:  Supple. No lymphadenopathy. Jugular venous pressure not elevated. Carotids equal.   HEART:   The cardiac impulse has a normal quality. Reg., Nl S1 and S2.  There are no murmurs, rubs or gallops noted  CHEST:  Chest crackles to auscultation. Normal respiratory effort.  ABDOMEN:  Soft and nontender.   EXTREMITIES:  There is no edema.       LABS:                        10.7   4.95  )-----------( 230      ( 21 Aug 2021 08:46 )             32.8     08-21    139  |  106  |  5<L>  ----------------------------<  137<H>  4.0   |  29  |  0.71    Ca    8.8      21 Aug 2021 08:46

## 2021-08-23 VITALS
HEART RATE: 62 BPM | DIASTOLIC BLOOD PRESSURE: 80 MMHG | SYSTOLIC BLOOD PRESSURE: 104 MMHG | TEMPERATURE: 97 F | RESPIRATION RATE: 18 BRPM | OXYGEN SATURATION: 94 %

## 2021-08-23 LAB
ANION GAP SERPL CALC-SCNC: 5 MMOL/L — SIGNIFICANT CHANGE UP (ref 5–17)
BUN SERPL-MCNC: 10 MG/DL — SIGNIFICANT CHANGE UP (ref 7–23)
CALCIUM SERPL-MCNC: 8.5 MG/DL — SIGNIFICANT CHANGE UP (ref 8.5–10.1)
CHLORIDE SERPL-SCNC: 104 MMOL/L — SIGNIFICANT CHANGE UP (ref 96–108)
CO2 SERPL-SCNC: 29 MMOL/L — SIGNIFICANT CHANGE UP (ref 22–31)
CREAT SERPL-MCNC: 0.78 MG/DL — SIGNIFICANT CHANGE UP (ref 0.5–1.3)
GLUCOSE SERPL-MCNC: 149 MG/DL — HIGH (ref 70–99)
HCT VFR BLD CALC: 31.7 % — LOW (ref 34.5–45)
HGB BLD-MCNC: 10.4 G/DL — LOW (ref 11.5–15.5)
MCHC RBC-ENTMCNC: 32.4 PG — SIGNIFICANT CHANGE UP (ref 27–34)
MCHC RBC-ENTMCNC: 32.8 GM/DL — SIGNIFICANT CHANGE UP (ref 32–36)
MCV RBC AUTO: 98.8 FL — SIGNIFICANT CHANGE UP (ref 80–100)
PLATELET # BLD AUTO: 224 K/UL — SIGNIFICANT CHANGE UP (ref 150–400)
POTASSIUM SERPL-MCNC: 3.5 MMOL/L — SIGNIFICANT CHANGE UP (ref 3.5–5.3)
POTASSIUM SERPL-SCNC: 3.5 MMOL/L — SIGNIFICANT CHANGE UP (ref 3.5–5.3)
RBC # BLD: 3.21 M/UL — LOW (ref 3.8–5.2)
RBC # FLD: 16.1 % — HIGH (ref 10.3–14.5)
SODIUM SERPL-SCNC: 138 MMOL/L — SIGNIFICANT CHANGE UP (ref 135–145)
WBC # BLD: 6.69 K/UL — SIGNIFICANT CHANGE UP (ref 3.8–10.5)
WBC # FLD AUTO: 6.69 K/UL — SIGNIFICANT CHANGE UP (ref 3.8–10.5)

## 2021-08-23 PROCEDURE — 99233 SBSQ HOSP IP/OBS HIGH 50: CPT

## 2021-08-23 PROCEDURE — 99238 HOSP IP/OBS DSCHRG MGMT 30/<: CPT

## 2021-08-23 RX ADMIN — TRAMADOL HYDROCHLORIDE 50 MILLIGRAM(S): 50 TABLET ORAL at 12:00

## 2021-08-23 RX ADMIN — MIDODRINE HYDROCHLORIDE 2.5 MILLIGRAM(S): 2.5 TABLET ORAL at 05:31

## 2021-08-23 RX ADMIN — Medication 20 MILLIGRAM(S): at 11:04

## 2021-08-23 RX ADMIN — Medication 1 DROP(S): at 05:31

## 2021-08-23 RX ADMIN — TRAMADOL HYDROCHLORIDE 50 MILLIGRAM(S): 50 TABLET ORAL at 04:06

## 2021-08-23 RX ADMIN — Medication 200 MILLIGRAM(S): at 11:04

## 2021-08-23 RX ADMIN — Medication 50 MICROGRAM(S): at 05:31

## 2021-08-23 RX ADMIN — Medication 81 MILLIGRAM(S): at 11:03

## 2021-08-23 RX ADMIN — TRAMADOL HYDROCHLORIDE 50 MILLIGRAM(S): 50 TABLET ORAL at 04:50

## 2021-08-23 RX ADMIN — ESCITALOPRAM OXALATE 10 MILLIGRAM(S): 10 TABLET, FILM COATED ORAL at 11:03

## 2021-08-23 RX ADMIN — APIXABAN 2.5 MILLIGRAM(S): 2.5 TABLET, FILM COATED ORAL at 11:03

## 2021-08-23 RX ADMIN — POLYETHYLENE GLYCOL 3350 17 GRAM(S): 17 POWDER, FOR SOLUTION ORAL at 11:03

## 2021-08-23 RX ADMIN — Medication 325 MILLIGRAM(S): at 11:03

## 2021-08-23 RX ADMIN — Medication 500 MILLIGRAM(S): at 11:03

## 2021-08-23 RX ADMIN — Medication 2: at 11:10

## 2021-08-23 RX ADMIN — Medication 1: at 07:43

## 2021-08-23 RX ADMIN — PANTOPRAZOLE SODIUM 40 MILLIGRAM(S): 20 TABLET, DELAYED RELEASE ORAL at 11:03

## 2021-08-23 RX ADMIN — TRAMADOL HYDROCHLORIDE 50 MILLIGRAM(S): 50 TABLET ORAL at 11:08

## 2021-08-23 NOTE — PROGRESS NOTE ADULT - SUBJECTIVE AND OBJECTIVE BOX
Subjective:    Awake, comfortable in chair. c/o swollen legs.    MEDICATIONS  (STANDING):  allopurinol  Oral Tab/Cap - Peds 200 milliGRAM(s) Oral daily  apixaban 2.5 milliGRAM(s) Oral two times a day  artificial  tears Solution 1 Drop(s) Both EYES three times a day  aspirin  chewable 81 milliGRAM(s) Oral daily  atorvastatin 10 milliGRAM(s) Oral at bedtime  cefuroxime   Tablet 500 milliGRAM(s) Oral every 12 hours  dextrose 40% Gel 15 Gram(s) Oral once  dextrose 5%. 1000 milliLiter(s) (50 mL/Hr) IV Continuous <Continuous>  dextrose 50% Injectable 25 Gram(s) IV Push once  escitalopram 10 milliGRAM(s) Oral daily  ferrous    sulfate 325 milliGRAM(s) Oral two times a day  glucagon  Injectable 1 milliGRAM(s) IntraMuscular once  insulin lispro (ADMELOG) corrective regimen sliding scale   SubCutaneous three times a day before meals  insulin lispro (ADMELOG) corrective regimen sliding scale   SubCutaneous at bedtime  levothyroxine 50 MICROGram(s) Oral daily  midodrine 2.5 milliGRAM(s) Oral every 8 hours  pantoprazole    Tablet 40 milliGRAM(s) Oral daily  polyethylene glycol 3350 17 Gram(s) Oral daily  sildenafil (REVATIO) 20 milliGRAM(s) Oral two times a day  torsemide 20 milliGRAM(s) Oral two times a day    MEDICATIONS  (PRN):  acetaminophen   Tablet .. 650 milliGRAM(s) Oral every 6 hours PRN Temp greater or equal to 38.5C (101.3F), Mild Pain (1 - 3)  aluminum hydroxide/magnesium hydroxide/simethicone Suspension 30 milliLiter(s) Oral every 4 hours PRN Dyspepsia  magnesium hydroxide Suspension 30 milliLiter(s) Oral daily PRN Constipation  melatonin 3 milliGRAM(s) Oral at bedtime PRN Insomnia  traMADol 50 milliGRAM(s) Oral every 8 hours PRN Moderate Pain (4 - 6)      Allergies    penicillin (Rash)  penicillins (Other)  sulfa drugs (Rash; Other)    Intolerances        Vital Signs Last 24 Hrs  T(C): 36.3 (23 Aug 2021 07:35), Max: 36.4 (22 Aug 2021 21:51)  T(F): 97.3 (23 Aug 2021 07:35), Max: 97.5 (22 Aug 2021 21:51)  HR: 62 (23 Aug 2021 07:35) (62 - 96)  BP: 104/80 (23 Aug 2021 07:35) (104/80 - 136/76)  BP(mean): --  RR: 18 (23 Aug 2021 07:35) (17 - 18)  SpO2: 94% (23 Aug 2021 07:35) (93% - 96%)    PHYSICAL EXAMINATION:    NECK:  Supple. No lymphadenopathy. Jugular venous pressure not elevated.   HEART:   The cardiac impulse has a normal quality. Irreg., irreg. Nl S1 and S2.    CHEST:  Chest is clear to auscultation. Normal respiratory effort.  ABDOMEN:  Soft and nontender.   EXTREMITIES:  There is 1-2+ edema.       LABS:                        10.4   6.69  )-----------( 224      ( 23 Aug 2021 06:01 )             31.7     08-23    138  |  104  |  10  ----------------------------<  149<H>  3.5   |  29  |  0.78    Ca    8.5      23 Aug 2021 06:01            RADIOLOGY & ADDITIONAL TESTS:    Assessment and Recommendation:   · Assessment	  Assessment/Plan    - Continue torsemide as per cardiology and monitor lytes carefully   - Continue Ceftin x 14 days  - Monitor I+O's  - Follow CBC, BMP  - Daily weights if possible  - Cecal mass. Await pathology  - Taper midodrine as pancho  - D/C to rehab when stable. Will see Colorectal surgery as outpatient and return for elective colon resection. Discussed with family-son    Problem/Recommendation - 1:  Stage 3 chronic kidney disease.  Problem/Recommendation - 2:  ·  Sepsis due to anaerobes.   Problem/Recommendation - 3:  ·  Chronic atrial fibrillation.   Problem/Recommendation - 4:  ·  Chronic diastolic heart failure.   Problem/Recommendation - 5:  ·  Primary pulmonary hypertension.

## 2021-08-23 NOTE — PROGRESS NOTE ADULT - PROVIDER SPECIALTY LIST ADULT
Cardiology
Gastroenterology
Pulmonology
Pulmonology
Cardiology
Cardiology
Gastroenterology
Hospitalist
Pulmonology
Cardiology
Cardiology
Colorectal Surgery
Hospitalist
Hospitalist
Infectious Disease
Infectious Disease
Pulmonology
Pulmonology
Hospitalist
Infectious Disease
Pulmonology
Pulmonology
Cardiology
Pulmonology

## 2021-08-25 DIAGNOSIS — C18.9 MALIGNANT NEOPLASM OF COLON, UNSPECIFIED: ICD-10-CM

## 2021-08-26 DIAGNOSIS — I44.7 LEFT BUNDLE-BRANCH BLOCK, UNSPECIFIED: ICD-10-CM

## 2021-08-26 DIAGNOSIS — K21.9 GASTRO-ESOPHAGEAL REFLUX DISEASE WITHOUT ESOPHAGITIS: ICD-10-CM

## 2021-08-26 DIAGNOSIS — Z88.0 ALLERGY STATUS TO PENICILLIN: ICD-10-CM

## 2021-08-26 DIAGNOSIS — C18.0 MALIGNANT NEOPLASM OF CECUM: ICD-10-CM

## 2021-08-26 DIAGNOSIS — Z95.3 PRESENCE OF XENOGENIC HEART VALVE: ICD-10-CM

## 2021-08-26 DIAGNOSIS — J18.9 PNEUMONIA, UNSPECIFIED ORGANISM: ICD-10-CM

## 2021-08-26 DIAGNOSIS — E03.9 HYPOTHYROIDISM, UNSPECIFIED: ICD-10-CM

## 2021-08-26 DIAGNOSIS — K57.30 DIVERTICULOSIS OF LARGE INTESTINE WITHOUT PERFORATION OR ABSCESS WITHOUT BLEEDING: ICD-10-CM

## 2021-08-26 DIAGNOSIS — E78.5 HYPERLIPIDEMIA, UNSPECIFIED: ICD-10-CM

## 2021-08-26 DIAGNOSIS — Z95.828 PRESENCE OF OTHER VASCULAR IMPLANTS AND GRAFTS: ICD-10-CM

## 2021-08-26 DIAGNOSIS — I48.20 CHRONIC ATRIAL FIBRILLATION, UNSPECIFIED: ICD-10-CM

## 2021-08-26 DIAGNOSIS — Z88.2 ALLERGY STATUS TO SULFONAMIDES: ICD-10-CM

## 2021-08-26 DIAGNOSIS — N18.30 CHRONIC KIDNEY DISEASE, STAGE 3 UNSPECIFIED: ICD-10-CM

## 2021-08-26 DIAGNOSIS — I13.0 HYPERTENSIVE HEART AND CHRONIC KIDNEY DISEASE WITH HEART FAILURE AND STAGE 1 THROUGH STAGE 4 CHRONIC KIDNEY DISEASE, OR UNSPECIFIED CHRONIC KIDNEY DISEASE: ICD-10-CM

## 2021-08-26 DIAGNOSIS — I27.20 PULMONARY HYPERTENSION, UNSPECIFIED: ICD-10-CM

## 2021-08-26 DIAGNOSIS — E86.0 DEHYDRATION: ICD-10-CM

## 2021-08-26 DIAGNOSIS — K74.60 UNSPECIFIED CIRRHOSIS OF LIVER: ICD-10-CM

## 2021-08-26 DIAGNOSIS — K63.89 OTHER SPECIFIED DISEASES OF INTESTINE: ICD-10-CM

## 2021-08-26 DIAGNOSIS — A40.8 OTHER STREPTOCOCCAL SEPSIS: ICD-10-CM

## 2021-08-26 DIAGNOSIS — Z79.01 LONG TERM (CURRENT) USE OF ANTICOAGULANTS: ICD-10-CM

## 2021-08-26 DIAGNOSIS — J47.9 BRONCHIECTASIS, UNCOMPLICATED: ICD-10-CM

## 2021-08-26 DIAGNOSIS — I25.10 ATHEROSCLEROTIC HEART DISEASE OF NATIVE CORONARY ARTERY WITHOUT ANGINA PECTORIS: ICD-10-CM

## 2021-08-26 DIAGNOSIS — N17.9 ACUTE KIDNEY FAILURE, UNSPECIFIED: ICD-10-CM

## 2021-08-26 DIAGNOSIS — I50.32 CHRONIC DIASTOLIC (CONGESTIVE) HEART FAILURE: ICD-10-CM

## 2021-08-26 DIAGNOSIS — M10.9 GOUT, UNSPECIFIED: ICD-10-CM

## 2021-08-26 DIAGNOSIS — R65.21 SEVERE SEPSIS WITH SEPTIC SHOCK: ICD-10-CM

## 2021-08-26 DIAGNOSIS — M19.90 UNSPECIFIED OSTEOARTHRITIS, UNSPECIFIED SITE: ICD-10-CM

## 2021-08-26 DIAGNOSIS — Z96.652 PRESENCE OF LEFT ARTIFICIAL KNEE JOINT: ICD-10-CM

## 2021-08-26 DIAGNOSIS — E43 UNSPECIFIED SEVERE PROTEIN-CALORIE MALNUTRITION: ICD-10-CM

## 2021-08-26 DIAGNOSIS — I50.812 CHRONIC RIGHT HEART FAILURE: ICD-10-CM

## 2021-08-26 DIAGNOSIS — Z95.5 PRESENCE OF CORONARY ANGIOPLASTY IMPLANT AND GRAFT: ICD-10-CM

## 2021-08-26 DIAGNOSIS — E87.6 HYPOKALEMIA: ICD-10-CM

## 2021-08-30 ENCOUNTER — APPOINTMENT (OUTPATIENT)
Dept: INTERNAL MEDICINE | Facility: CLINIC | Age: 86
End: 2021-08-30

## 2021-09-06 ENCOUNTER — INPATIENT (INPATIENT)
Facility: HOSPITAL | Age: 86
LOS: 15 days | Discharge: NURSING, REHAB OR LONG-TERM CARE | DRG: 388 | End: 2021-09-22
Attending: FAMILY MEDICINE | Admitting: INTERNAL MEDICINE
Payer: MEDICARE

## 2021-09-06 VITALS
HEART RATE: 97 BPM | RESPIRATION RATE: 18 BRPM | HEIGHT: 64 IN | SYSTOLIC BLOOD PRESSURE: 113 MMHG | OXYGEN SATURATION: 96 % | DIASTOLIC BLOOD PRESSURE: 56 MMHG | TEMPERATURE: 98 F | WEIGHT: 134.04 LBS
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
## 2021-09-06 DIAGNOSIS — Z90.49 ACQUIRED ABSENCE OF OTHER SPECIFIED PARTS OF DIGESTIVE TRACT: Chronic | ICD-10-CM

## 2021-09-06 DIAGNOSIS — Z95.828 PRESENCE OF OTHER VASCULAR IMPLANTS AND GRAFTS: Chronic | ICD-10-CM

## 2021-09-06 DIAGNOSIS — Z96.652 PRESENCE OF LEFT ARTIFICIAL KNEE JOINT: Chronic | ICD-10-CM
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
## 2021-09-06 LAB
APPEARANCE UR: CLEAR — SIGNIFICANT CHANGE UP
APTT BLD: 35.4 SEC — SIGNIFICANT CHANGE UP (ref 27.5–35.5)
BASOPHILS # BLD AUTO: 0.03 K/UL — SIGNIFICANT CHANGE UP (ref 0–0.2)
BASOPHILS NFR BLD AUTO: 0.6 % — SIGNIFICANT CHANGE UP (ref 0–2)
BILIRUB UR-MCNC: NEGATIVE — SIGNIFICANT CHANGE UP
COLOR SPEC: YELLOW — SIGNIFICANT CHANGE UP
DIFF PNL FLD: NEGATIVE — SIGNIFICANT CHANGE UP
EOSINOPHIL # BLD AUTO: 0.27 K/UL — SIGNIFICANT CHANGE UP (ref 0–0.5)
EOSINOPHIL NFR BLD AUTO: 5.2 % — SIGNIFICANT CHANGE UP (ref 0–6)
GLUCOSE UR QL: NEGATIVE MG/DL — SIGNIFICANT CHANGE UP
HCT VFR BLD CALC: 31.1 % — LOW (ref 34.5–45)
HGB BLD-MCNC: 10 G/DL — LOW (ref 11.5–15.5)
IMM GRANULOCYTES NFR BLD AUTO: 0.4 % — SIGNIFICANT CHANGE UP (ref 0–1.5)
INR BLD: 1.74 RATIO — HIGH (ref 0.88–1.16)
KETONES UR-MCNC: NEGATIVE — SIGNIFICANT CHANGE UP
LEUKOCYTE ESTERASE UR-ACNC: ABNORMAL
LYMPHOCYTES # BLD AUTO: 0.56 K/UL — LOW (ref 1–3.3)
LYMPHOCYTES # BLD AUTO: 10.7 % — LOW (ref 13–44)
MCHC RBC-ENTMCNC: 32.2 GM/DL — SIGNIFICANT CHANGE UP (ref 32–36)
MCHC RBC-ENTMCNC: 33.1 PG — SIGNIFICANT CHANGE UP (ref 27–34)
MCV RBC AUTO: 103 FL — HIGH (ref 80–100)
MONOCYTES # BLD AUTO: 0.59 K/UL — SIGNIFICANT CHANGE UP (ref 0–0.9)
MONOCYTES NFR BLD AUTO: 11.3 % — SIGNIFICANT CHANGE UP (ref 2–14)
NEUTROPHILS # BLD AUTO: 3.75 K/UL — SIGNIFICANT CHANGE UP (ref 1.8–7.4)
NEUTROPHILS NFR BLD AUTO: 71.8 % — SIGNIFICANT CHANGE UP (ref 43–77)
NITRITE UR-MCNC: NEGATIVE — SIGNIFICANT CHANGE UP
PH UR: 5 — SIGNIFICANT CHANGE UP (ref 5–8)
PLATELET # BLD AUTO: 139 K/UL — LOW (ref 150–400)
PROT UR-MCNC: 15 MG/DL
PROTHROM AB SERPL-ACNC: 19.8 SEC — HIGH (ref 10.6–13.6)
RBC # BLD: 3.02 M/UL — LOW (ref 3.8–5.2)
RBC # FLD: 17.1 % — HIGH (ref 10.3–14.5)
SARS-COV-2 RNA SPEC QL NAA+PROBE: SIGNIFICANT CHANGE UP
SP GR SPEC: 1.01 — SIGNIFICANT CHANGE UP (ref 1.01–1.02)
TROPONIN I SERPL-MCNC: <0.015 NG/ML — SIGNIFICANT CHANGE UP (ref 0.01–0.04)
UROBILINOGEN FLD QL: NEGATIVE MG/DL — SIGNIFICANT CHANGE UP
WBC # BLD: 5.22 K/UL — SIGNIFICANT CHANGE UP (ref 3.8–10.5)
WBC # FLD AUTO: 5.22 K/UL — SIGNIFICANT CHANGE UP (ref 3.8–10.5)

## 2021-09-06 PROCEDURE — 87177 OVA AND PARASITES SMEARS: CPT

## 2021-09-06 PROCEDURE — 87493 C DIFF AMPLIFIED PROBE: CPT

## 2021-09-06 PROCEDURE — 83880 ASSAY OF NATRIURETIC PEPTIDE: CPT

## 2021-09-06 PROCEDURE — 86850 RBC ANTIBODY SCREEN: CPT

## 2021-09-06 PROCEDURE — 82607 VITAMIN B-12: CPT

## 2021-09-06 PROCEDURE — 83690 ASSAY OF LIPASE: CPT

## 2021-09-06 PROCEDURE — 97116 GAIT TRAINING THERAPY: CPT | Mod: GP

## 2021-09-06 PROCEDURE — 85014 HEMATOCRIT: CPT

## 2021-09-06 PROCEDURE — 80053 COMPREHEN METABOLIC PANEL: CPT

## 2021-09-06 PROCEDURE — 85027 COMPLETE CBC AUTOMATED: CPT

## 2021-09-06 PROCEDURE — 85018 HEMOGLOBIN: CPT

## 2021-09-06 PROCEDURE — 84466 ASSAY OF TRANSFERRIN: CPT

## 2021-09-06 PROCEDURE — 83930 ASSAY OF BLOOD OSMOLALITY: CPT

## 2021-09-06 PROCEDURE — 71045 X-RAY EXAM CHEST 1 VIEW: CPT | Mod: 26

## 2021-09-06 PROCEDURE — 84100 ASSAY OF PHOSPHORUS: CPT

## 2021-09-06 PROCEDURE — 82962 GLUCOSE BLOOD TEST: CPT

## 2021-09-06 PROCEDURE — 84484 ASSAY OF TROPONIN QUANT: CPT

## 2021-09-06 PROCEDURE — 87507 IADNA-DNA/RNA PROBE TQ 12-25: CPT

## 2021-09-06 PROCEDURE — 82306 VITAMIN D 25 HYDROXY: CPT

## 2021-09-06 PROCEDURE — 80048 BASIC METABOLIC PNL TOTAL CA: CPT

## 2021-09-06 PROCEDURE — 82728 ASSAY OF FERRITIN: CPT

## 2021-09-06 PROCEDURE — U0005: CPT

## 2021-09-06 PROCEDURE — 84443 ASSAY THYROID STIM HORMONE: CPT

## 2021-09-06 PROCEDURE — C9113: CPT

## 2021-09-06 PROCEDURE — 83735 ASSAY OF MAGNESIUM: CPT

## 2021-09-06 PROCEDURE — 86900 BLOOD TYPING SEROLOGIC ABO: CPT

## 2021-09-06 PROCEDURE — 84439 ASSAY OF FREE THYROXINE: CPT

## 2021-09-06 PROCEDURE — 71045 X-RAY EXAM CHEST 1 VIEW: CPT

## 2021-09-06 PROCEDURE — 74018 RADEX ABDOMEN 1 VIEW: CPT

## 2021-09-06 PROCEDURE — U0003: CPT

## 2021-09-06 PROCEDURE — 86901 BLOOD TYPING SEROLOGIC RH(D): CPT

## 2021-09-06 PROCEDURE — 84134 ASSAY OF PREALBUMIN: CPT

## 2021-09-06 PROCEDURE — 86140 C-REACTIVE PROTEIN: CPT

## 2021-09-06 PROCEDURE — 97162 PT EVAL MOD COMPLEX 30 MIN: CPT | Mod: GP

## 2021-09-06 PROCEDURE — 97530 THERAPEUTIC ACTIVITIES: CPT | Mod: GP

## 2021-09-06 PROCEDURE — 36415 COLL VENOUS BLD VENIPUNCTURE: CPT

## 2021-09-06 PROCEDURE — 82272 OCCULT BLD FECES 1-3 TESTS: CPT

## 2021-09-06 PROCEDURE — 85025 COMPLETE CBC W/AUTO DIFF WBC: CPT

## 2021-09-06 RX ORDER — FERROUS SULFATE 325(65) MG
325 TABLET ORAL
Refills: 0 | Status: DISCONTINUED | OUTPATIENT
Start: 2021-09-06 | End: 2021-09-16

## 2021-09-06 RX ORDER — DEXTROSE 50 % IN WATER 50 %
25 SYRINGE (ML) INTRAVENOUS ONCE
Refills: 0 | Status: DISCONTINUED | OUTPATIENT
Start: 2021-09-06 | End: 2021-09-15

## 2021-09-06 RX ORDER — ASPIRIN/CALCIUM CARB/MAGNESIUM 324 MG
81 TABLET ORAL DAILY
Refills: 0 | Status: DISCONTINUED | OUTPATIENT
Start: 2021-09-06 | End: 2021-09-14

## 2021-09-06 RX ORDER — SODIUM CHLORIDE 9 MG/ML
1000 INJECTION, SOLUTION INTRAVENOUS
Refills: 0 | Status: DISCONTINUED | OUTPATIENT
Start: 2021-09-06 | End: 2021-09-15

## 2021-09-06 RX ORDER — APIXABAN 2.5 MG/1
2.5 TABLET, FILM COATED ORAL
Refills: 0 | Status: DISCONTINUED | OUTPATIENT
Start: 2021-09-06 | End: 2021-09-14

## 2021-09-06 RX ORDER — LEVOTHYROXINE SODIUM 125 MCG
50 TABLET ORAL DAILY
Refills: 0 | Status: DISCONTINUED | OUTPATIENT
Start: 2021-09-06 | End: 2021-09-22

## 2021-09-06 RX ORDER — ACETAMINOPHEN 500 MG
650 TABLET ORAL EVERY 6 HOURS
Refills: 0 | Status: DISCONTINUED | OUTPATIENT
Start: 2021-09-06 | End: 2021-09-22

## 2021-09-06 RX ORDER — ALLOPURINOL 300 MG
200 TABLET ORAL DAILY
Refills: 0 | Status: DISCONTINUED | OUTPATIENT
Start: 2021-09-06 | End: 2021-09-22

## 2021-09-06 RX ORDER — ESCITALOPRAM OXALATE 10 MG/1
10 TABLET, FILM COATED ORAL DAILY
Refills: 0 | Status: DISCONTINUED | OUTPATIENT
Start: 2021-09-06 | End: 2021-09-22

## 2021-09-06 RX ORDER — GLUCAGON INJECTION, SOLUTION 0.5 MG/.1ML
1 INJECTION, SOLUTION SUBCUTANEOUS ONCE
Refills: 0 | Status: DISCONTINUED | OUTPATIENT
Start: 2021-09-06 | End: 2021-09-15

## 2021-09-06 RX ORDER — PANTOPRAZOLE SODIUM 20 MG/1
40 TABLET, DELAYED RELEASE ORAL
Refills: 0 | Status: DISCONTINUED | OUTPATIENT
Start: 2021-09-06 | End: 2021-09-22

## 2021-09-06 RX ORDER — INSULIN LISPRO 100/ML
VIAL (ML) SUBCUTANEOUS
Refills: 0 | Status: DISCONTINUED | OUTPATIENT
Start: 2021-09-06 | End: 2021-09-15

## 2021-09-06 RX ORDER — DEXTROSE 50 % IN WATER 50 %
12.5 SYRINGE (ML) INTRAVENOUS ONCE
Refills: 0 | Status: DISCONTINUED | OUTPATIENT
Start: 2021-09-06 | End: 2021-09-15

## 2021-09-06 RX ORDER — INFLUENZA VIRUS VACCINE 15; 15; 15; 15 UG/.5ML; UG/.5ML; UG/.5ML; UG/.5ML
0.5 SUSPENSION INTRAMUSCULAR ONCE
Refills: 0 | Status: DISCONTINUED | OUTPATIENT
Start: 2021-09-06 | End: 2021-09-22

## 2021-09-06 RX ORDER — PSYLLIUM SEED (WITH DEXTROSE)
1 POWDER (GRAM) ORAL DAILY
Refills: 0 | Status: DISCONTINUED | OUTPATIENT
Start: 2021-09-06 | End: 2021-09-13

## 2021-09-06 RX ORDER — INSULIN LISPRO 100/ML
VIAL (ML) SUBCUTANEOUS AT BEDTIME
Refills: 0 | Status: DISCONTINUED | OUTPATIENT
Start: 2021-09-06 | End: 2021-09-15

## 2021-09-06 RX ORDER — LIDOCAINE 4 G/100G
1 CREAM TOPICAL DAILY
Refills: 0 | Status: DISCONTINUED | OUTPATIENT
Start: 2021-09-06 | End: 2021-09-18

## 2021-09-06 RX ORDER — SIMVASTATIN 20 MG/1
40 TABLET, FILM COATED ORAL AT BEDTIME
Refills: 0 | Status: DISCONTINUED | OUTPATIENT
Start: 2021-09-06 | End: 2021-09-22

## 2021-09-06 RX ORDER — DEXTROSE 50 % IN WATER 50 %
15 SYRINGE (ML) INTRAVENOUS ONCE
Refills: 0 | Status: DISCONTINUED | OUTPATIENT
Start: 2021-09-06 | End: 2021-09-15

## 2021-09-06 RX ORDER — ACETAMINOPHEN 500 MG
650 TABLET ORAL ONCE
Refills: 0 | Status: COMPLETED | OUTPATIENT
Start: 2021-09-06 | End: 2021-09-06

## 2021-09-06 RX ADMIN — LIDOCAINE 1 PATCH: 4 CREAM TOPICAL at 19:35

## 2021-09-06 RX ADMIN — PANTOPRAZOLE SODIUM 40 MILLIGRAM(S): 20 TABLET, DELAYED RELEASE ORAL at 15:57

## 2021-09-06 RX ADMIN — Medication 650 MILLIGRAM(S): at 16:25

## 2021-09-06 RX ADMIN — Medication 200 MILLIGRAM(S): at 15:57

## 2021-09-06 RX ADMIN — Medication 40 MILLIGRAM(S): at 18:30

## 2021-09-06 RX ADMIN — Medication 650 MILLIGRAM(S): at 14:16

## 2021-09-06 RX ADMIN — Medication 81 MILLIGRAM(S): at 15:57

## 2021-09-06 RX ADMIN — Medication 650 MILLIGRAM(S): at 23:02

## 2021-09-06 RX ADMIN — Medication 325 MILLIGRAM(S): at 21:36

## 2021-09-06 RX ADMIN — APIXABAN 2.5 MILLIGRAM(S): 2.5 TABLET, FILM COATED ORAL at 21:36

## 2021-09-06 RX ADMIN — Medication 1 DROP(S): at 21:37

## 2021-09-06 RX ADMIN — ESCITALOPRAM OXALATE 10 MILLIGRAM(S): 10 TABLET, FILM COATED ORAL at 15:57

## 2021-09-06 RX ADMIN — Medication 50 MICROGRAM(S): at 15:57

## 2021-09-06 RX ADMIN — Medication 1 PACKET(S): at 18:31

## 2021-09-06 RX ADMIN — SIMVASTATIN 40 MILLIGRAM(S): 20 TABLET, FILM COATED ORAL at 21:37

## 2021-09-06 RX ADMIN — Medication 2: at 16:28

## 2021-09-06 RX ADMIN — LIDOCAINE 1 PATCH: 4 CREAM TOPICAL at 18:31

## 2021-09-06 NOTE — ED CLERICAL - NS ED CLERK NOTE PRE-ARRIVAL INFORMATION; ADDITIONAL PRE-ARRIVAL INFORMATION
This patient is enrolled in the readmission reduction program and has active care navigation. This patient can be followed up by the care navigation team within 24 hours. To arrange close follow-up or to obtain additional clinical information about this patient, please call the contact number above. Please call the hospitalist as needed to collaborate on further medical management ([phone])

## 2021-09-06 NOTE — ED PROVIDER NOTE - CHPI ED SYMPTOMS POS
+weakness, +bilateral LE edema, +abrasion, +left shoulder pain, +dizziness/PAIN +abrasion, +left shoulder pain, +dizziness, +weakness/PAIN

## 2021-09-06 NOTE — ED ADULT NURSE NOTE - NSIMPLEMENTINTERV_GEN_ALL_ED
Implemented All Fall with Harm Risk Interventions:  [place] to call system. Call bell, personal items and telephone within reach. Instruct patient to call for assistance. Room bathroom lighting operational. Non-slip footwear when patient is off stretcher. Physically safe environment: no spills, clutter or unnecessary equipment. Stretcher in lowest position, wheels locked, appropriate side rails in place. Provide visual cue, wrist band, yellow gown, etc. Monitor gait and stability. Monitor for mental status changes and reorient to person, place, and time. Review medications for side effects contributing to fall risk. Reinforce activity limits and safety measures with patient and family. Provide visual clues: red socks.

## 2021-09-06 NOTE — H&P ADULT - NSHPLABSRESULTS_GEN_ALL_CORE
Lab Results:  CBC  CBC Full  -  ( 06 Sep 2021 11:16 )  WBC Count : 5.22 K/uL  RBC Count : 3.02 M/uL  Hemoglobin : 10.0 g/dL  Hematocrit : 31.1 %  Platelet Count - Automated : 139 K/uL  .		Differential:	[] Automated		[] Manual  Chemistry                        10.0   5.22  )-----------( 139      ( 06 Sep 2021 11:16 )             31.1         138  |  104  |  13  ----------------------------<  166<H>  3.6   |  29  |  0.86    Ca    8.5      06 Sep 2021 11:16    TPro  6.4  /  Alb  2.7<L>  /  TBili  0.6  /  DBili  x   /  AST  14<L>  /  ALT  15  /  AlkPhos  82      LIVER FUNCTIONS - ( 06 Sep 2021 11:16 )  Alb: 2.7 g/dL / Pro: 6.4 gm/dL / ALK PHOS: 82 U/L / ALT: 15 U/L / AST: 14 U/L / GGT: x           PT/INR - ( 06 Sep 2021 11:16 )   PT: 19.8 sec;   INR: 1.74 ratio         PTT - ( 06 Sep 2021 11:16 )  PTT:35.4 sec  Urinalysis Basic - ( 06 Sep 2021 11:09 )    Color: Yellow / Appearance: Clear / S.015 / pH: x  Gluc: x / Ketone: Negative  / Bili: Negative / Urobili: Negative mg/dL   Blood: x / Protein: 15 mg/dL / Nitrite: Negative   Leuk Esterase: Trace / RBC: Negative /HPF / WBC 11-25   Sq Epi: x / Non Sq Epi: Moderate / Bacteria: Few    EKG: a fib     RADIOLOGY RESULTS:    < from: Xray Shoulder 2 Views, Left (21 @ 12:05) >    IMPRESSION:   No fracture.    < end of copied text >  < from: CT Head No Cont (21 @ 10:56) >    IMPRESSION:    CT HEAD: No acute abnormality. Moderate atrophy most prominent in the BILATERAL temporal lobes.    CT cervical spine:   No vertebral fracture is recognized.  Multilevel degenerative disc disease and spondylosis at C3-4 through C6-7 with loss of disc height and associated degenerative endplate changes. There is narrowing of the LEFT C2-3, LEFT C3-4 LEFT C4-5 and RIGHT C5-C6 and C6-7 neural foramina due to uncovertebral spurring and facet osteophytic hypertrophy.    MEDICATIONS  (STANDING):  allopurinol 200 milliGRAM(s) Oral daily  apixaban 2.5 milliGRAM(s) Oral two times a day  artificial  tears Solution 1 Drop(s) Both EYES three times a day  aspirin  chewable 81 milliGRAM(s) Oral daily  dextrose 40% Gel 15 Gram(s) Oral once  dextrose 5%. 1000 milliLiter(s) (50 mL/Hr) IV Continuous <Continuous>  dextrose 5%. 1000 milliLiter(s) (100 mL/Hr) IV Continuous <Continuous>  dextrose 50% Injectable 25 Gram(s) IV Push once  dextrose 50% Injectable 12.5 Gram(s) IV Push once  dextrose 50% Injectable 25 Gram(s) IV Push once  escitalopram 10 milliGRAM(s) Oral daily  ferrous    sulfate 325 milliGRAM(s) Oral two times a day  glucagon  Injectable 1 milliGRAM(s) IntraMuscular once  insulin lispro (ADMELOG) corrective regimen sliding scale   SubCutaneous three times a day before meals  insulin lispro (ADMELOG) corrective regimen sliding scale   SubCutaneous at bedtime  levothyroxine 50 MICROGram(s) Oral daily  lidocaine   4% Patch 1 Patch Transdermal daily  pantoprazole    Tablet 40 milliGRAM(s) Oral before breakfast  psyllium Powder 1 Packet(s) Oral daily  sildenafil (REVATIO) 20 milliGRAM(s) Oral two times a day  simvastatin 40 milliGRAM(s) Oral at bedtime  torsemide 40 milliGRAM(s) Oral two times a day

## 2021-09-06 NOTE — H&P ADULT - HISTORY OF PRESENT ILLNESS
89/F with PMHx of pancreatitis s/p ERCP, cirrhosis, DM2, a-fib on Eliquis, CAD s/p PCI, severe pulm HTN, chronic R heart failure, diastolic dysfunction, AS s/p TAVR, OA, HTN, dyslipidemia, hypothyroidism. cecal mass, recently d/maddy from , sent  to the ED from [facility] for c/o Fall, near syncope. Pt states she was trying to put boxes under the chair, when she fell and hit her head, now has a small abrasion. Also c/o left shoulder pain.  She has been become more weak and unsteady recently.     Denies any chest pain/sob/abd pain.     Also, c/o left shoulder pain.  89/F with PMHx of pancreatitis s/p ERCP, cirrhosis, DM2, a-fib on Eliquis, CAD s/p PCI, severe pulm HTN, chronic R heart failure, diastolic dysfunction, AS s/p TAVR, OA, HTN, dyslipidemia, hypothyroidism. cecal mass, recently d/maddy from , sent  to the ED from [facility] for c/o Fall, near syncope. Pt states she was trying to put boxes under the chair, when she fell and hit her head, now has a small abrasion. Also c/o left shoulder pain.  She has been become more weak and unsteady recently.      Son, states that she has gained 20 Lbs in [facility].     Denies any chest pain/sob/abd pain.     Also, c/o left shoulder pain.

## 2021-09-06 NOTE — CONSULT NOTE ADULT - ASSESSMENT
HPI: 89/F with PMHx of pancreatitis s/p ERCP, cirrhosis, DM2, a-fib on Eliquis, CAD s/p PCI, severe pulm HTN, chronic R heart failure, diastolic dysfunction, AS s/p TAVR, OA, HTN, dyslipidemia, hypothyroidism and cecal adenoCa.    Plan:  -   -       Discussed with Dr. Minor, colorectal surgeon HPI: 89/F with PMHx of pancreatitis s/p ERCP, cirrhosis, DM2, a-fib on Eliquis, CAD s/p PCI, severe pulm HTN, chronic R heart failure, diastolic dysfunction, AS s/p TAVR, OA, HTN, dyslipidemia, hypothyroidism and cecal adenoCa. Metastatic workup negative.    Plan:  - No plan for surgical intervention during this admission.  - Patient to follow up in colorectal surgery clinic for colectomy, pending clearances    Discussed with Dr. Minor, colorectal surgeon.

## 2021-09-06 NOTE — ED PROVIDER NOTE - DR. NAME
[FreeTextEntry1] : Patient with primary hyperparathyroidism and elevated urine calcium and osteopenia. doing well postop, leonardo sent, RTO 6 weeks Mario

## 2021-09-06 NOTE — ED PROVIDER NOTE - ENMT, MLM
Airway patent, Nasal mucosa clear. Mouth with normal mucosa. Throat has no vesicles, no oropharyngeal exudates and uvula is midline.  +Head with small contusion to left occipital area. Neck non-tender.

## 2021-09-06 NOTE — H&P ADULT - ASSESSMENT
89/F with PMHx of pancreatitis s/p ERCP, cirrhosis, DM2, a-fib on Eliquis, CAD s/p PCI, severe pulm HTN, chronic R heart failure, diastolic dysfunction, AS s/p TAVR, OA, HTN, dyslipidemia, hypothyroidism. cecal mass, recently d/maddy from , sent  to the ED from [facility] for c/o Fall, near syncope. Pt states she was trying to put boxes under the chair, when she fell and hit her head, now has a small abrasion. Also c/o left shoulder pain.  She has been become more weak and unsteady recently.     Denies any chest pain/sob/abd pain.     Also, c/o left shoulder pain.     Pt admitted with     1) Fall + ? near syncope :  observe on tele  serial cardiac enz  PT eval  cardio f/u  orthostatic vitals    2) CHF: chronic, diastolic  cont torsemide    3) Cecal Mass:   supportive care    4) DM 2: ISS    5) A fib: permanent  cont eliquis    6) DVT PPX: on eliquis     89/F with PMHx of pancreatitis s/p ERCP, cirrhosis, DM2, a-fib on Eliquis, CAD s/p PCI, severe pulm HTN, chronic R heart failure, diastolic dysfunction, AS s/p TAVR, OA, HTN, dyslipidemia, hypothyroidism. cecal mass, recently d/maddy from , sent  to the ED from [facility] for c/o Fall, near syncope. Pt states she was trying to put boxes under the chair, when she fell and hit her head, now has a small abrasion. Also c/o left shoulder pain.  She has been become more weak and unsteady recently.     Denies any chest pain/sob/abd pain.     Also, c/o left shoulder pain.     Pt admitted with     1) Fall + ? near syncope :  observe on tele  serial cardiac enz  PT eval  cardio f/u  orthostatic vitals    2) CHF exacerbation: chronic, diastolic  cont increased dose of  torsemide 40 mg bid  supportive O2    3) Cecal Mass:   supportive care    4) DM 2: ISS    5) A fib: permanent  cont eliquis    6) DVT PPX: on eliquis    poc discussed with pt, son, Dr Mojica, team    89/F with PMHx of pancreatitis s/p ERCP, cirrhosis, DM2, a-fib on Eliquis, CAD s/p PCI, severe pulm HTN, chronic R heart failure, diastolic dysfunction, AS s/p TAVR, OA, HTN, dyslipidemia, hypothyroidism. cecal mass, recently d/maddy from , sent  to the ED from [facility] for c/o Fall, near syncope. Pt states she was trying to put boxes under the chair, when she fell and hit her head, now has a small abrasion. Also c/o left shoulder pain.  She has been become more weak and unsteady recently.     Denies any chest pain/sob/abd pain.     Also, c/o left shoulder pain.     Pt admitted with     1) Fall + ? near syncope :  observe on tele  serial cardiac enz  PT eval  cardio f/u  orthostatic vitals    2) CHF exacerbation: chronic, diastolic  cont increased dose of  torsemide 40 mg bid  supportive O2    3) Cecal Mass:   as per son, plan is for surgery  consult Dr. Smyth    4) DM 2: ISS    5) A fib: permanent  cont eliquis    6) DVT PPX: on eliquis    poc discussed with pt, son, Dr Mojica, team

## 2021-09-06 NOTE — H&P ADULT - NSHPPHYSICALEXAM_GEN_ALL_CORE
PHYSICAL EXAM:    Daily Height in cm: 162.56 (06 Sep 2021 11:05)    Daily     Vital Signs Last 24 Hrs  T(C): 36.7 (06 Sep 2021 14:20), Max: 36.7 (06 Sep 2021 14:20)  T(F): 98 (06 Sep 2021 14:20), Max: 98 (06 Sep 2021 14:20)  HR: 88 (06 Sep 2021 14:20) (88 - 97)  BP: 96/62 (06 Sep 2021 14:20) (96/62 - 113/56)  BP(mean): 73 (06 Sep 2021 14:20) (73 - 73)  ABP: --  ABP(mean): --  RR: 18 (06 Sep 2021 14:20) (18 - 22)  SpO2: 98% (06 Sep 2021 14:20) (86% - 99%)      Constitutional: Weak appearing  HEENT: Atraumatic, CYNDEE, Normal, No congestion  Respiratory: Breath Sounds normal, no rhonchi/wheeze  Cardiovascular: N S1S2; J LUIS present  Gastrointestinal: Abdomen soft, non tender, Bowel Sounds present  Extremities: 1+ edema, peripheral pulses present  Neurological: AAO x 3, no gross focal motor deficits  Skin: Non cellulitic, no rash, ulcers  Lymph Nodes: No lymphadenopathy noted  Back: No CVA tenderness   Musculoskeletal: non tender  Breasts: Deferred  Genitourinary: deferred  Rectal: Deferred PHYSICAL EXAM:    Daily Height in cm: 162.56 (06 Sep 2021 11:05)    Daily     Vital Signs Last 24 Hrs  T(C): 36.7 (06 Sep 2021 14:20), Max: 36.7 (06 Sep 2021 14:20)  T(F): 98 (06 Sep 2021 14:20), Max: 98 (06 Sep 2021 14:20)  HR: 88 (06 Sep 2021 14:20) (88 - 97)  BP: 96/62 (06 Sep 2021 14:20) (96/62 - 113/56)  BP(mean): 73 (06 Sep 2021 14:20) (73 - 73)  ABP: --  ABP(mean): --  RR: 18 (06 Sep 2021 14:20) (18 - 22)  SpO2: 98% (06 Sep 2021 14:20) (86% - 99%)      Constitutional: Weak appearing  HEENT: Atraumatic, CYNDEE, Normal, No congestion  Respiratory: Breath Sounds normal, no rhonchi/wheeze  Cardiovascular: N S1S2; J LUIS present  Gastrointestinal: Abdomen soft, non tender, Bowel Sounds present  Extremities: 2+ edema, peripheral pulses present  Neurological: AAO x 3, no gross focal motor deficits  Skin: Non cellulitic, no rash, ulcers  Lymph Nodes: No lymphadenopathy noted  Back: No CVA tenderness   Musculoskeletal: non tender  Breasts: Deferred  Genitourinary: deferred  Rectal: Deferred

## 2021-09-06 NOTE — ED PROVIDER NOTE - OBJECTIVE STATEMENT
89 y/o female with PMHx of   BIBEMS from [facility]   Allergic to penicillin, sulfa drugs. 89 y/o female with PMHx of pancreatitis s/p ERCP, cirrhosis, DM2, a-fib on Eliquis, CAD s/p PCI, severe pulm HTN, chronic R heart failure, diastolic dysfunction, AS s/p TAVR, OA, HTN, dyslipidemia, hypothyroidism presents to the ED BIBEMS from [facility]. Pt states she was trying to put boxes on her chair, when she fell. Pt hit her head, now has small contusion. Also c/o left shoulder pain. Per son, pt has been dizzy, and has beem becoming increasingly weak and unsteady. +bilateral LE edema  Allergic to penicillin, sulfa drugs. 89 y/o female with PMHx of pancreatitis s/p ERCP, cirrhosis, DM2, a-fib on Eliquis, CAD s/p PCI, severe pulm HTN, chronic R heart failure, diastolic dysfunction, AS s/p TAVR, OA, HTN, dyslipidemia, hypothyroidism presents to the ED BIBEMS from [facility]. Pt states she was trying to put boxes on her chair, when she fell and hit her head, now has a small abrasion. Also c/o left shoulder pain. Denies LOC. Per son, recently, pt has been increasingly dizzy, and has been becoming increasingly weak and unsteady.  Allergic to penicillin, sulfa drugs.

## 2021-09-06 NOTE — CONSULT NOTE ADULT - ATTENDING COMMENTS
Asked to evaluate patient regarding recent cecal adenocarcinoma. pt underwent colonoscopy for workup of strep bovis bacteremia.  It showed a cecal mass, biopsies showed adenocarcinoma.  metastatic workup on CT imaging was negative but CEA 12.5.    Pt had recent fall at her [facility].  PMH remarkable for pancreatitis s/p ERCP, cirrhosis, DM2, afib on Eliquis, CAD s/p PCI, severe pulmonary hypertension, chronic R heart failure with diastolic dysfunction, aortic stenosis s/p TAVR, HTN, hyperlipidemia, hypothyroidism, osteoarthritis.  Since her discharge from [facility], I have discussed her case with Dr. Camacho and Mary and they both feel that she can be cleared for colectomy for treatment of her cecal cancer.  However, it would be better to do her at a hospital where cardiac anesthesiology was available given her pulmonary hypertension as resources such as Nitric Oxide or ECMO are not available at [facility].  Currently, patient being scheduled for elective colectomy at [facility] where these are available for later this month.  NO acute need for colectomy at this point.  Hgb is stable and patient denies any hematochezia.

## 2021-09-06 NOTE — CONSULT NOTE ADULT - SUBJECTIVE AND OBJECTIVE BOX
HPI: 89/F with PMHx of pancreatitis s/p ERCP, cirrhosis, DM2, a-fib on Eliquis, CAD s/p PCI, severe pulm HTN, chronic R heart failure, diastolic dysfunction, AS s/p TAVR, OA, HTN, dyslipidemia, hypothyroidism. cecal mass, recently d/maddy from , sent  to the ED from [facility] for c/o Fall, near syncope. Pt states she was trying to put boxes under the chair, when she fell and hit her head, now has a small abrasion. Also c/o left shoulder pain.  She has been become more weak and unsteady recently. Admitted for syncope workup. Colorectal surgery consulted to follow-up on cecal ...      PAST MEDICAL & SURGICAL HISTORY:  Diabetes Mellitus Type II    Dyslipidemia    GERD (Gastroesophageal Reflux Disease)    History of Osteoarthritis    Hypertension    CAD (coronary artery disease)    Afib    Hypothyroid    HLD (hyperlipidemia)    History of pancreatitis    Aortic stenosis    H/O: Hysterectomy    H/O: Knee Surgery- right meniscus    History of cholecystectomy    History of appendectomy    H/O total knee replacement, left    S/P IVC filter  Note that Pt does not have  h/o DVT, outPt doppler was read first as +, but after review reported as no DVT. Pt got IVC filer before that        MEDICATIONS  (STANDING):  allopurinol 200 milliGRAM(s) Oral daily  apixaban 2.5 milliGRAM(s) Oral two times a day  artificial  tears Solution 1 Drop(s) Both EYES three times a day  aspirin  chewable 81 milliGRAM(s) Oral daily  dextrose 40% Gel 15 Gram(s) Oral once  dextrose 5%. 1000 milliLiter(s) (50 mL/Hr) IV Continuous <Continuous>  dextrose 5%. 1000 milliLiter(s) (100 mL/Hr) IV Continuous <Continuous>  dextrose 50% Injectable 25 Gram(s) IV Push once  dextrose 50% Injectable 12.5 Gram(s) IV Push once  dextrose 50% Injectable 25 Gram(s) IV Push once  escitalopram 10 milliGRAM(s) Oral daily  ferrous    sulfate 325 milliGRAM(s) Oral two times a day  glucagon  Injectable 1 milliGRAM(s) IntraMuscular once  influenza   Vaccine 0.5 milliLiter(s) IntraMuscular once  insulin lispro (ADMELOG) corrective regimen sliding scale   SubCutaneous three times a day before meals  insulin lispro (ADMELOG) corrective regimen sliding scale   SubCutaneous at bedtime  levothyroxine 50 MICROGram(s) Oral daily  lidocaine   4% Patch 1 Patch Transdermal daily  pantoprazole    Tablet 40 milliGRAM(s) Oral before breakfast  psyllium Powder 1 Packet(s) Oral daily  sildenafil (REVATIO) 20 milliGRAM(s) Oral two times a day  simvastatin 40 milliGRAM(s) Oral at bedtime  torsemide 40 milliGRAM(s) Oral two times a day    MEDICATIONS  (PRN):  acetaminophen   Tablet .. 650 milliGRAM(s) Oral every 6 hours PRN Temp greater or equal to 38C (100.4F), Mild Pain (1 - 3)      Allergies    penicillin (Rash)  penicillins (Other)  sulfa drugs (Rash; Other)    Intolerances        SOCIAL HISTORY:    FAMILY HISTORY:  FH: diabetes mellitus  both parents    FH: heart disease            Physical Exam:  General: NAD, resting comfortably  HEENT: NC/AT, EOMI, normal hearing, no oral lesions, no LAD, neck supple  Pulmonary: normal resp effort, CTA-B  Cardiovascular: NSR, no murmurs  Abdominal: soft, ND/NT, no organomegaly  Extremities: WWP, normal strength, no clubbing/cyanosis/edema  Neuro: A/O x 3, CNs II-XII grossly intact, normal sensation, no focal deficits  Pulses: palpable distal pulses    Vital Signs Last 24 Hrs  T(C): 36.4 (07 Sep 2021 05:36), Max: 36.7 (06 Sep 2021 14:20)  T(F): 97.5 (07 Sep 2021 05:36), Max: 98 (06 Sep 2021 14:20)  HR: 79 (07 Sep 2021 05:36) (79 - 97)  BP: 107/65 (07 Sep 2021 05:36) (96/62 - 113/56)  BP(mean): 77 (06 Sep 2021 15:44) (73 - 77)  RR: 18 (07 Sep 2021 05:36) (16 - 22)  SpO2: 95% (07 Sep 2021 05:36) (86% - 99%)    I&O's Summary          LABS:                        10.0   5.22  )-----------( 139      ( 06 Sep 2021 11:16 )             31.1     09-06    138  |  104  |  13  ----------------------------<  166<H>  3.6   |  29  |  0.86    Ca    8.5      06 Sep 2021 11:16    TPro  6.4  /  Alb  2.7<L>  /  TBili  0.6  /  DBili  x   /  AST  14<L>  /  ALT  15  /  AlkPhos  82  09-06    PT/INR - ( 06 Sep 2021 11:16 )   PT: 19.8 sec;   INR: 1.74 ratio         PTT - ( 06 Sep 2021 11:16 )  PTT:35.4 sec  Urinalysis Basic - ( 06 Sep 2021 11:09 )    Color: Yellow / Appearance: Clear / S.015 / pH: x  Gluc: x / Ketone: Negative  / Bili: Negative / Urobili: Negative mg/dL   Blood: x / Protein: 15 mg/dL / Nitrite: Negative   Leuk Esterase: Trace / RBC: Negative /HPF / WBC 11-25   Sq Epi: x / Non Sq Epi: Moderate / Bacteria: Few      CAPILLARY BLOOD GLUCOSE      POCT Blood Glucose.: 217 mg/dL (06 Sep 2021 21:16)  POCT Blood Glucose.: 157 mg/dL (06 Sep 2021 15:47)    LIVER FUNCTIONS - ( 06 Sep 2021 11:16 )  Alb: 2.7 g/dL / Pro: 6.4 gm/dL / ALK PHOS: 82 U/L / ALT: 15 U/L / AST: 14 U/L / GGT: x             Cultures:      RADIOLOGY & ADDITIONAL STUDIES:      EXAM:  CT ABDOMEN AND PELVIS OC IC                            PROCEDURE DATE:  2021          INTERPRETATION:  CLINICAL INFORMATION: Cecal mass on colonoscopy.    COMPARISON: CT abdomen/pelvis 2020 and MRI abdomen 2017    CONTRAST/COMPLICATIONS:  IV Contrast: Omnipaque 350  90 cc administered   10 cc discarded  Oral Contrast: Omnipaque 300  Complications: None reported at time of study completion    PROCEDURE:  CT of the Abdomen and Pelvis was performed.  Sagittal and coronal reformats were performed.    FINDINGS:  LOWER CHEST: Small bilateral pleural effusions, larger on the right. Interlobular septal thickening at the lung bases with reticular opacities, likely chronic interstitial lung disease. Aortic valve replacement.    LIVER: No focal hepatic lesion. Nodular surface contour again noted which can be seen in the setting of cirrhosis.  BILE DUCTS: Normal caliber.  GALLBLADDER: Not seen.  SPLEEN: Within normal limits.  PANCREAS: Atrophic.  ADRENALS: Within normal limits.  KIDNEYS/URETERS: No hydronephrosis. Bilateral renal cysts.    BLADDER: Unremarkable.  REPRODUCTIVE ORGANS: Hysterectomy.    BOWEL: Mucosal evaluation of the bowel is limited. Colonic diverticulosis without evidence of diverticulitis. Patient's known cecal mass is not well seen. No bowel obstruction. Appendix not seen.  PERITONEUM: Trace pelvic ascites.  VESSELS: IVC filter. Atherosclerotic changes. Abdominal aorta normal in caliber.  RETROPERITONEUM/LYMPH NODES: No lymphadenopathy.  ABDOMINALWALL: Unremarkable.  BONES: Degenerative changes in the spine. Mild anterolisthesis of L4 on L5. Hemangioma in L3.    IMPRESSION:  Patient's known cecal mass not well-seen on this study.    No evidence of metastatic disease in the abdomen or pelvis.    Small bilateral pleural effusions.    --- End of Report ---            SAL GAY MD; Attending Radiologist  This document has been electronically signed. Aug 19 2021  2:52PM     HPI: 89/F with PMHx of pancreatitis s/p ERCP, cirrhosis, DM2, a-fib on Eliquis, CAD s/p PCI, severe pulm HTN, chronic R heart failure, diastolic dysfunction, AS s/p TAVR, OA, HTN, dyslipidemia, hypothyroidism. cecal mass, recently d/maddy from , sent  to the ED from [facility] for c/o Fall, near syncope. Pt states she was trying to put boxes under the chair, when she fell and hit her head, now has a small abrasion. Also c/o left shoulder pain.  She has been become more weak and unsteady recently. Admitted for syncope workup. Colorectal surgery consulted to follow-up on cecal adenoCa on colonoscopic biopsy 21.      PAST MEDICAL & SURGICAL HISTORY:  Diabetes Mellitus Type II    Dyslipidemia    GERD (Gastroesophageal Reflux Disease)    History of Osteoarthritis    Hypertension    CAD (coronary artery disease)    Afib    Hypothyroid    HLD (hyperlipidemia)    History of pancreatitis    Aortic stenosis    H/O: Hysterectomy    H/O: Knee Surgery- right meniscus    History of cholecystectomy    History of appendectomy    H/O total knee replacement, left    S/P IVC filter  Note that Pt does not have  h/o DVT, outPt doppler was read first as +, but after review reported as no DVT. Pt got IVC filer before that        MEDICATIONS  (STANDING):  allopurinol 200 milliGRAM(s) Oral daily  apixaban 2.5 milliGRAM(s) Oral two times a day  artificial  tears Solution 1 Drop(s) Both EYES three times a day  aspirin  chewable 81 milliGRAM(s) Oral daily  dextrose 40% Gel 15 Gram(s) Oral once  dextrose 5%. 1000 milliLiter(s) (50 mL/Hr) IV Continuous <Continuous>  dextrose 5%. 1000 milliLiter(s) (100 mL/Hr) IV Continuous <Continuous>  dextrose 50% Injectable 25 Gram(s) IV Push once  dextrose 50% Injectable 12.5 Gram(s) IV Push once  dextrose 50% Injectable 25 Gram(s) IV Push once  escitalopram 10 milliGRAM(s) Oral daily  ferrous    sulfate 325 milliGRAM(s) Oral two times a day  glucagon  Injectable 1 milliGRAM(s) IntraMuscular once  influenza   Vaccine 0.5 milliLiter(s) IntraMuscular once  insulin lispro (ADMELOG) corrective regimen sliding scale   SubCutaneous three times a day before meals  insulin lispro (ADMELOG) corrective regimen sliding scale   SubCutaneous at bedtime  levothyroxine 50 MICROGram(s) Oral daily  lidocaine   4% Patch 1 Patch Transdermal daily  pantoprazole    Tablet 40 milliGRAM(s) Oral before breakfast  psyllium Powder 1 Packet(s) Oral daily  sildenafil (REVATIO) 20 milliGRAM(s) Oral two times a day  simvastatin 40 milliGRAM(s) Oral at bedtime  torsemide 40 milliGRAM(s) Oral two times a day    MEDICATIONS  (PRN):  acetaminophen   Tablet .. 650 milliGRAM(s) Oral every 6 hours PRN Temp greater or equal to 38C (100.4F), Mild Pain (1 - 3)      Allergies    penicillin (Rash)  penicillins (Other)  sulfa drugs (Rash; Other)    Intolerances        SOCIAL HISTORY:    FAMILY HISTORY:  FH: diabetes mellitus  both parents    FH: heart disease            Physical Exam:  General: NAD, resting comfortably  HEENT: NC/AT, EOMI, normal hearing, no oral lesions, no LAD, neck supple  Pulmonary: normal resp effort, CTA-B  Cardiovascular: NSR, no murmurs  Abdominal: soft, ND/NT, no organomegaly  Extremities: WWP, normal strength, no clubbing/cyanosis/edema  Neuro: A/O x 3, CNs II-XII grossly intact, normal sensation, no focal deficits  Pulses: palpable distal pulses    Vital Signs Last 24 Hrs  T(C): 36.4 (07 Sep 2021 05:36), Max: 36.7 (06 Sep 2021 14:20)  T(F): 97.5 (07 Sep 2021 05:36), Max: 98 (06 Sep 2021 14:20)  HR: 79 (07 Sep 2021 05:36) (79 - 97)  BP: 107/65 (07 Sep 2021 05:36) (96/62 - 113/56)  BP(mean): 77 (06 Sep 2021 15:44) (73 - 77)  RR: 18 (07 Sep 2021 05:36) (16 - 22)  SpO2: 95% (07 Sep 2021 05:36) (86% - 99%)    I&O's Summary          LABS:                        10.0   5.22  )-----------( 139      ( 06 Sep 2021 11:16 )             31.1     09-06    138  |  104  |  13  ----------------------------<  166<H>  3.6   |  29  |  0.86    Ca    8.5      06 Sep 2021 11:16    TPro  6.4  /  Alb  2.7<L>  /  TBili  0.6  /  DBili  x   /  AST  14<L>  /  ALT  15  /  AlkPhos  82  09-06    PT/INR - ( 06 Sep 2021 11:16 )   PT: 19.8 sec;   INR: 1.74 ratio         PTT - ( 06 Sep 2021 11:16 )  PTT:35.4 sec  Urinalysis Basic - ( 06 Sep 2021 11:09 )    Color: Yellow / Appearance: Clear / S.015 / pH: x  Gluc: x / Ketone: Negative  / Bili: Negative / Urobili: Negative mg/dL   Blood: x / Protein: 15 mg/dL / Nitrite: Negative   Leuk Esterase: Trace / RBC: Negative /HPF / WBC 11-25   Sq Epi: x / Non Sq Epi: Moderate / Bacteria: Few      CAPILLARY BLOOD GLUCOSE      POCT Blood Glucose.: 217 mg/dL (06 Sep 2021 21:16)  POCT Blood Glucose.: 157 mg/dL (06 Sep 2021 15:47)    LIVER FUNCTIONS - ( 06 Sep 2021 11:16 )  Alb: 2.7 g/dL / Pro: 6.4 gm/dL / ALK PHOS: 82 U/L / ALT: 15 U/L / AST: 14 U/L / GGT: x             Cultures:      RADIOLOGY & ADDITIONAL STUDIES:      EXAM:  CT ABDOMEN AND PELVIS OC IC                            PROCEDURE DATE:  2021          INTERPRETATION:  CLINICAL INFORMATION: Cecal mass on colonoscopy.    COMPARISON: CT abdomen/pelvis 2020 and MRI abdomen 2017    CONTRAST/COMPLICATIONS:  IV Contrast: Omnipaque 350  90 cc administered   10 cc discarded  Oral Contrast: Omnipaque 300  Complications: None reported at time of study completion    PROCEDURE:  CT of the Abdomen and Pelvis was performed.  Sagittal and coronal reformats were performed.    FINDINGS:  LOWER CHEST: Small bilateral pleural effusions, larger on the right. Interlobular septal thickening at the lung bases with reticular opacities, likely chronic interstitial lung disease. Aortic valve replacement.    LIVER: No focal hepatic lesion. Nodular surface contour again noted which can be seen in the setting of cirrhosis.  BILE DUCTS: Normal caliber.  GALLBLADDER: Not seen.  SPLEEN: Within normal limits.  PANCREAS: Atrophic.  ADRENALS: Within normal limits.  KIDNEYS/URETERS: No hydronephrosis. Bilateral renal cysts.    BLADDER: Unremarkable.  REPRODUCTIVE ORGANS: Hysterectomy.    BOWEL: Mucosal evaluation of the bowel is limited. Colonic diverticulosis without evidence of diverticulitis. Patient's known cecal mass is not well seen. No bowel obstruction. Appendix not seen.  PERITONEUM: Trace pelvic ascites.  VESSELS: IVC filter. Atherosclerotic changes. Abdominal aorta normal in caliber.  RETROPERITONEUM/LYMPH NODES: No lymphadenopathy.  ABDOMINALWALL: Unremarkable.  BONES: Degenerative changes in the spine. Mild anterolisthesis of L4 on L5. Hemangioma in L3.    IMPRESSION:  Patient's known cecal mass not well-seen on this study.    No evidence of metastatic disease in the abdomen or pelvis.    Small bilateral pleural effusions.    --- End of Report ---            SAL GAY MD; Attending Radiologist  This document has been electronically signed. Aug 19 2021  2:52PM

## 2021-09-06 NOTE — H&P ADULT - NSHPLANGLIMITEDENGLISH_GEN_A_CORE
12/11/19 Call plan @ 1 [phone] PA initiated with ROSINA Watson for Contour test strips. Approval given effective 12/11/19 thru 12/10/20. Ref: # [id number]. [facility] pharmacy called @ 2 [phone],[facility] state claim went thru with 0 co pay. Please note No

## 2021-09-06 NOTE — ED ADULT TRIAGE NOTE - CHIEF COMPLAINT QUOTE
pt BIBEMS from [place], s/p unwitnessed trip and fall, +head strike, denies LOC. as per EMS pt is on ASA and Eliquis. pt reports "losing balancing and falling back". pt denies pain/discomfort at this time. neuro alert initiated, pt taken directly to CT scan.

## 2021-09-06 NOTE — ED ADULT NURSE NOTE - OBJECTIVE STATEMENT
Pt comes in from [facility] for unwitnessed fall striking her head and landing on left arm. Denies LOC. Pt states she was putting boxes away and slipped/fell. Arm is tender to touch. No bleeding or ecchymosis seen. EMS placed LAC20G prior to arrival and received about 500ns. AOX3 and very pleasant Pt comes in from [facility] for unwitnessed fall striking her head and landing on left arm. Denies LOC. Pt states she was putting boxes away and slipped/fell. Arm is tender to touch. No bleeding or ecchymosis seen. LE edema to b/l legs EMS placed LAC20G prior to arrival and received about 500ns. AOX3 and very pleasant

## 2021-09-07 DIAGNOSIS — R55 SYNCOPE AND COLLAPSE: ICD-10-CM

## 2021-09-07 PROCEDURE — 99233 SBSQ HOSP IP/OBS HIGH 50: CPT

## 2021-09-07 PROCEDURE — 99223 1ST HOSP IP/OBS HIGH 75: CPT

## 2021-09-07 RX ORDER — MORPHINE SULFATE 50 MG/1
1 CAPSULE, EXTENDED RELEASE ORAL ONCE
Refills: 0 | Status: DISCONTINUED | OUTPATIENT
Start: 2021-09-07 | End: 2021-09-07

## 2021-09-07 RX ADMIN — APIXABAN 2.5 MILLIGRAM(S): 2.5 TABLET, FILM COATED ORAL at 21:30

## 2021-09-07 RX ADMIN — SIMVASTATIN 40 MILLIGRAM(S): 20 TABLET, FILM COATED ORAL at 21:30

## 2021-09-07 RX ADMIN — Medication 1 DROP(S): at 05:13

## 2021-09-07 RX ADMIN — LIDOCAINE 1 PATCH: 4 CREAM TOPICAL at 09:29

## 2021-09-07 RX ADMIN — PANTOPRAZOLE SODIUM 40 MILLIGRAM(S): 20 TABLET, DELAYED RELEASE ORAL at 09:30

## 2021-09-07 RX ADMIN — LIDOCAINE 1 PATCH: 4 CREAM TOPICAL at 05:16

## 2021-09-07 RX ADMIN — Medication 40 MILLIGRAM(S): at 21:29

## 2021-09-07 RX ADMIN — Medication 325 MILLIGRAM(S): at 09:28

## 2021-09-07 RX ADMIN — Medication 10 MILLIGRAM(S): at 13:11

## 2021-09-07 RX ADMIN — Medication 650 MILLIGRAM(S): at 05:15

## 2021-09-07 RX ADMIN — Medication 650 MILLIGRAM(S): at 00:15

## 2021-09-07 RX ADMIN — Medication 40 MILLIGRAM(S): at 09:29

## 2021-09-07 RX ADMIN — Medication 50 MICROGRAM(S): at 05:13

## 2021-09-07 RX ADMIN — LIDOCAINE 1 PATCH: 4 CREAM TOPICAL at 21:43

## 2021-09-07 RX ADMIN — ESCITALOPRAM OXALATE 10 MILLIGRAM(S): 10 TABLET, FILM COATED ORAL at 09:28

## 2021-09-07 RX ADMIN — MORPHINE SULFATE 1 MILLIGRAM(S): 50 CAPSULE, EXTENDED RELEASE ORAL at 13:11

## 2021-09-07 RX ADMIN — Medication 200 MILLIGRAM(S): at 09:28

## 2021-09-07 RX ADMIN — Medication 81 MILLIGRAM(S): at 09:28

## 2021-09-07 RX ADMIN — Medication 20 MILLIGRAM(S): at 09:29

## 2021-09-07 RX ADMIN — Medication 1 PACKET(S): at 09:29

## 2021-09-07 RX ADMIN — Medication 2: at 14:03

## 2021-09-07 RX ADMIN — Medication 1 DROP(S): at 14:01

## 2021-09-07 RX ADMIN — Medication 20 MILLIGRAM(S): at 21:30

## 2021-09-07 RX ADMIN — Medication 325 MILLIGRAM(S): at 21:30

## 2021-09-07 RX ADMIN — Medication 1 DROP(S): at 21:29

## 2021-09-07 RX ADMIN — APIXABAN 2.5 MILLIGRAM(S): 2.5 TABLET, FILM COATED ORAL at 09:28

## 2021-09-07 NOTE — PHYSICAL THERAPY INITIAL EVALUATION ADULT - LEVEL OF INDEPENDENCE: STAND/SIT, REHAB EVAL
contact guard Advancement-Rotation Flap Text: The defect edges were debeveled with a #15 scalpel blade.  Given the location of the defect, shape of the defect and the proximity to free margins an advancement-rotation flap was deemed most appropriate.  Using a sterile surgical marker, an appropriate flap was drawn incorporating the defect and placing the expected incisions within the relaxed skin tension lines where possible. The area thus outlined was incised deep to adipose tissue with a #15 scalpel blade.  The skin margins were undermined to an appropriate distance in all directions utilizing iris scissors.

## 2021-09-07 NOTE — DIETITIAN INITIAL EVALUATION ADULT. - PERTINENT LABORATORY DATA
09-06    138  |  104  |  13  ----------------------------<  166<H>  3.6   |  29  |  0.86    Ca    8.5      06 Sep 2021 11:16    TPro  6.4  /  Alb  2.7<L>  /  TBili  0.6  /  DBili  x   /  AST  14<L>  /  ALT  15  /  AlkPhos  82  09-06

## 2021-09-07 NOTE — CONSULT NOTE ADULT - ASSESSMENT
Fall- I think it is mechanical in nature given history.  She denies any loss of consciousness  She was able to give full history this am.  Orthostasis noted.  WIll hold off IVF and recheck orthostatic BP readings.    Chronic decompensated HFPEF- Echo results as stated above.  Can decrease torsemide for one time a day for one day.   Compression stockings to be placed on legs.  continue rest of current cardiac regimen.  Diuresis with close monitoring of the renal function and electrolytes.  Goal potassium of 4 and magnesium of 2.   Strict I/O and daily wt checks. Low sodium diet. Nutrition education.     Atrial fibrillation- rate controlled.  continue current meds including apixaban    Colon mass- malignancy- Colorectal surgery team following pt.    Other medical issues- Management per primary team.   Thank you for allowing me to participate in the care of this patient. Please feel free to contact me with any questions.     Other medical issues- Management per primary team.   Thank you for allowing me to participate in the care of this patient. Please feel free to contact me with any questions.

## 2021-09-07 NOTE — CONSULT NOTE ADULT - SUBJECTIVE AND OBJECTIVE BOX
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 The patient stated that she was trying to put boxes under the chair, when she fell and hit her head, now has a small abrasion. Of note she was recently hospitalized for severe sepsis and bacteremia discharged August 23, 2021.     Today at the time of my exam she is sitting upright in her chair.  She reports her breathing is fine. She has no cough or shortness of breath.  Her only complaints is pain in her left shoulder and constipation     ROS otherwise negative.       PAST MEDICAL & PAST SURGICAL HISTORY:  ·	Diabetes Mellitus Type II  ·	GERD (Gastroesophageal Reflux Disease)  ·	History of Osteoarthritis  ·	Hypertension  ·	CAD (coronary artery disease)  ·	Afib  ·	Hypothyroid  ·	HLD (hyperlipidemia)  ·	History of pancreatitis  ·	Aortic stenosis  ·	H/O: Hysterectomy  ·	H/O: Knee Surgery- right meniscus  ·	History of cholecystectomy  ·	History of appendectomy  ·	H/O total knee replacement, left  ·	S/P IVC filter      SOCIAL HISTORY:   ·	Denies tobacco    FAMILY HISTORY:  ·	FH: diabetes mellitusboth parents  ·	FH: heart disease    MEDICATIONS  (STANDING):  allopurinol 200 milliGRAM(s) Oral daily  apixaban 2.5 milliGRAM(s) Oral two times a day  artificial  tears Solution 1 Drop(s) Both EYES three times a day  aspirin  chewable 81 milliGRAM(s) Oral daily  bisacodyl Suppository 10 milliGRAM(s) Rectal once  dextrose 40% Gel 15 Gram(s) Oral once  dextrose 5%. 1000 milliLiter(s) (50 mL/Hr) IV Continuous <Continuous>  dextrose 5%. 1000 milliLiter(s) (100 mL/Hr) IV Continuous <Continuous>  dextrose 50% Injectable 25 Gram(s) IV Push once  dextrose 50% Injectable 12.5 Gram(s) IV Push once  dextrose 50% Injectable 25 Gram(s) IV Push once  escitalopram 10 milliGRAM(s) Oral daily  ferrous    sulfate 325 milliGRAM(s) Oral two times a day  glucagon  Injectable 1 milliGRAM(s) IntraMuscular once  influenza   Vaccine 0.5 milliLiter(s) IntraMuscular once  insulin lispro (ADMELOG) corrective regimen sliding scale   SubCutaneous three times a day before meals  insulin lispro (ADMELOG) corrective regimen sliding scale   SubCutaneous at bedtime  levothyroxine 50 MICROGram(s) Oral daily  lidocaine   4% Patch 1 Patch Transdermal daily  pantoprazole    Tablet 40 milliGRAM(s) Oral before breakfast  psyllium Powder 1 Packet(s) Oral daily  sildenafil (REVATIO) 20 milliGRAM(s) Oral two times a day  simvastatin 40 milliGRAM(s) Oral at bedtime  torsemide 40 milliGRAM(s) Oral two times a day    MEDICATIONS  (PRN):  acetaminophen   Tablet .. 650 milliGRAM(s) Oral every 6 hours PRN Temp greater or equal to 38C (100.4F), Mild Pain (1 - 3)  bisacodyl Suppository 10 milliGRAM(s) Rectal daily PRN Constipation      Allergies  ·	penicillin (Rash)  ·	sulfa drugs (Rash; Other)            Vital Signs Last 24 Hrs  T(C): 36.3 (07 Sep 2021 08:16), Max: 36.7 (06 Sep 2021 14:20)  T(F): 97.3 (07 Sep 2021 08:16), Max: 98 (06 Sep 2021 14:20)  HR: 75 (07 Sep 2021 08:16) (75 - 93)  BP: 105/70 (07 Sep 2021 08:16) (96/62 - 111/57)  BP(mean): 77 (06 Sep 2021 15:44) (73 - 77)  RR: 18 (07 Sep 2021 08:16) (16 - 18)  SpO2: 98% (07 Sep 2021 08:16) (94% - 98%)      PHYSICAL EXAMINATION:    PHYSICAL EXAMINATION:  GENERAL: Elderly, frail appearing, no distress.  HEAD: NCAT, no significant lesions from fall  EYES: Pupils are normal. No icterus. Sclera white.   HEENT:  Mucus membranes moist. Oropharynx normal.   NECK:  Supple. No stridor. No JVD.   HEART; S1/S2 irregular rate and rhythm - no murmurs appreciated.   CHEST:  Crackles at bilateral bases in posterior lung fields. No wheezing.   ABDOMEN:  Soft and nontender. Nondistended.   EXTREMITIES:  significant lower extremity edema - no cyanosis No clubbing  PSYCH: Pleasant affect.  NEURO: Alert and oriented. Responds to question appropriate. No focal deficits appreciated.     LABS:                        10.0   5.22  )-----------( 139      ( 06 Sep 2021 11:16 )             31.1     09-06    138  |  104  |  13  ----------------------------<  166<H>  3.6   |  29  |  0.86       PTT - ( 06 Sep 2021 11:16 )  PTT:35.4 sec  CARDIAC MARKERS ( 06 Sep 2021 19:13 )  <0.015 ng/mL / x     / x     / x     / x      CARDIAC MARKERS ( 06 Sep 2021 13:41 )  <0.015 ng/mL / x     / x     / x     / x      CARDIAC MARKERS ( 06 Sep 2021 11:16 )  <0.015 ng/mL / x     / x     / x     / x          Echo 8/13/21   Moderate mitral annular calcification is present.   Moderate mitral stenosis is present.   Mild (1+) mitral regurgitation is present.   Well seated prosthetic valve in the aortic position. suboptimal doppler   interrogation   Severe (4/+) tricuspid valve regurgitation is present.   The tricuspid valve leaflets appear mildly thickened open well.   Severe pulmonary hypertension.   Normal appearing pulmonic valve structure.   Mild pulmonic valvular regurgitation (1+) is present.      RADIOLOGY & ADDITIONAL STUDIES:   *images personally reviewed   IMPRESSION:   Bilateral lower lobe multifocal ill-defined diffuse airspace disease.  Cardiomegaly.  Findings may indicate pulmonary edema of cardiac or noncardiac origin superimposed on chronic interstitial lung disease...    `

## 2021-09-07 NOTE — DIETITIAN INITIAL EVALUATION ADULT. - ADD RECOMMEND
1) Advance po diet to regular. 2) monitor weights and track trends 3) Suggest add MVI w/minerals daily 4) monitor lytes and hydration replete as needed. 5) when diet is advanced add Ensure enlive 8oz po TID- in between meals. 6) monitor BM if none x >3 days initiate bowel meds 7) Maintain aspiration precautions, back of bed >35 degrees. Liberalize diet to regular.  Suggest add Vit C 500 mg BID, add Zinc Sulfate 220 mg x 10 days to promote wound healing. Record PO intake in EMR after each meal (nursing.) Feed assist as needed.  Encourage PO intake. Monitor PO intake, tolerance, labs and weight.

## 2021-09-07 NOTE — DIETITIAN INITIAL EVALUATION ADULT. - NAME AND PHONE
Liane Fleming RDN, CDN, [facility]      [phone]   [email or web address]

## 2021-09-07 NOTE — PHYSICAL THERAPY INITIAL EVALUATION ADULT - PRECAUTIONS/LIMITATIONS, REHAB EVAL
IMAGING- CT HEAD: No acute abnormality. Moderate atrophy most prominent in the BILATERAL temporal lobes.; CT cervical spine: No vertebral fracture is recognized.  Multilevel degenerative disc disease and spondylosis at C3-4 through C6-7 with loss of disc height and associated degenerative endplate changes. There is narrowing of the LEFT C2-3, LEFT C3-4 LEFT C4-5 and RIGHT C5-C6 and C6-7 neural foramina due to uncovertebral spurring and facet osteophytic hypertrophy.; XR LT SHOULDER: No fracture or dislocation. No focal osseous lesion. Underlying degenerative changes are stable.; CXR: Bilateral lower lobe multifocal ill-defined diffuse airspace disease. Cardiomegaly. Findings may indicate pulmonary edema of cardiac or noncardiac origin superimposed on chronic interstitial lung disease./cardiac precautions/fall precautions

## 2021-09-07 NOTE — CONSULT NOTE ADULT - SUBJECTIVE AND OBJECTIVE BOX
Patient is a 89y old  Female who presents with a chief complaint of near syncope, fall (06 Sep 2021 17:50)      HPI:  89/F with PMHx of pancreatitis s/p ERCP, cirrhosis, DM2, a-fib on Eliquis, CAD s/p PCI, severe pulm HTN, chronic R heart failure, diastolic dysfunction, AS s/p TAVR, OA, HTN, dyslipidemia, hypothyroidism. cecal mass, recently d/maddy from , sent  to the ED from [facility] for c/o Fall, near syncope. Pt states she was trying to put boxes under the chair, when she fell and hit her head, now has a small abrasion. Also c/o left shoulder pain.  She has been become more weak and unsteady recently.      Son, states that she has gained 20 Lbs in [facility].     Denies any chest pain/sob/abd pain.     Also, c/o left shoulder pain.  (06 Sep 2021 14:41)      PAST MEDICAL & SURGICAL HISTORY:  Diabetes Mellitus Type II    Dyslipidemia    GERD (Gastroesophageal Reflux Disease)    History of Osteoarthritis    Hypertension    CAD (coronary artery disease)    Afib    Hypothyroid    HLD (hyperlipidemia)    History of pancreatitis    Aortic stenosis    H/O: Hysterectomy    H/O: Knee Surgery- right meniscus    History of cholecystectomy    History of appendectomy    H/O total knee replacement, left    S/P IVC filter  Note that Pt does not have  h/o DVT, outPt doppler was read first as +, but after review reported as no DVT. Pt got IVC filer before that        MEDICATIONS  (STANDING):  allopurinol 200 milliGRAM(s) Oral daily  apixaban 2.5 milliGRAM(s) Oral two times a day  artificial  tears Solution 1 Drop(s) Both EYES three times a day  aspirin  chewable 81 milliGRAM(s) Oral daily  dextrose 40% Gel 15 Gram(s) Oral once  dextrose 5%. 1000 milliLiter(s) (50 mL/Hr) IV Continuous <Continuous>  dextrose 5%. 1000 milliLiter(s) (100 mL/Hr) IV Continuous <Continuous>  dextrose 50% Injectable 25 Gram(s) IV Push once  dextrose 50% Injectable 12.5 Gram(s) IV Push once  dextrose 50% Injectable 25 Gram(s) IV Push once  escitalopram 10 milliGRAM(s) Oral daily  ferrous    sulfate 325 milliGRAM(s) Oral two times a day  glucagon  Injectable 1 milliGRAM(s) IntraMuscular once  influenza   Vaccine 0.5 milliLiter(s) IntraMuscular once  insulin lispro (ADMELOG) corrective regimen sliding scale   SubCutaneous three times a day before meals  insulin lispro (ADMELOG) corrective regimen sliding scale   SubCutaneous at bedtime  levothyroxine 50 MICROGram(s) Oral daily  lidocaine   4% Patch 1 Patch Transdermal daily  pantoprazole    Tablet 40 milliGRAM(s) Oral before breakfast  psyllium Powder 1 Packet(s) Oral daily  sildenafil (REVATIO) 20 milliGRAM(s) Oral two times a day  simvastatin 40 milliGRAM(s) Oral at bedtime  torsemide 40 milliGRAM(s) Oral two times a day    MEDICATIONS  (PRN):  acetaminophen   Tablet .. 650 milliGRAM(s) Oral every 6 hours PRN Temp greater or equal to 38C (100.4F), Mild Pain (1 - 3)      FAMILY HISTORY:  FH: diabetes mellitus  both parents    FH: heart disease        SOCIAL HISTORY:    REVIEW OF SYSTEMS:  CONSTITUTIONAL:    No fatigue, malaise, lethargy.  No fever or chills.  HEENT:  Eyes:  No visual changes.     ENT:  No epistaxis.  No sinus pain.    RESPIRATORY:  No cough.  No wheeze.  No hemoptysis.  No shortness of breath.  CARDIOVASCULAR:  No chest pains.  No palpitations. No shortness of breath, No orthopnea or PND.  GASTROINTESTINAL:  No abdominal pain.  No nausea or vomiting.    GENITOURINARY:    No hematuria.    MUSCULOSKELETAL:  No musculoskeletal pain.  No joint swelling.  No arthritis.  NEUROLOGICAL:  No tingling or numbness or weakness.  PSYCHIATRIC:  No confusion  SKIN:  No rashes.    ENDOCRINE:  No unexplained weight loss.  No polydipsia.   HEMATOLOGIC:  No anemia.  No prolonged or excessive bleeding.   ALLERGIC AND IMMUNOLOGIC:  No pruritus.          Vital Signs Last 24 Hrs  T(C): 36.4 (07 Sep 2021 05:36), Max: 36.7 (06 Sep 2021 14:20)  T(F): 97.5 (07 Sep 2021 05:36), Max: 98 (06 Sep 2021 14:20)  HR: 79 (07 Sep 2021 05:36) (79 - 97)  BP: 107/65 (07 Sep 2021 05:36) (96/62 - 113/56)  BP(mean): 77 (06 Sep 2021 15:44) (73 - 77)  RR: 18 (07 Sep 2021 05:36) (16 - 22)  SpO2: 95% (07 Sep 2021 05:36) (86% - 99%)    PHYSICAL EXAM-    Constitutional: The patient appears to be normal, well developed, well nourished and alert and oriented to time, place and person. The patient does not appear acutely ill.     Head: Head is normocephalic and atraumatic.      Neck: No jugular venous distention. No audible carotid bruits. There are strong carotid pulses bilaterally. No JVD.     Cardiovascular: Regular rate and rhythm without S3, S4. No murmurs or rubs are appreciated.      Respiratory: Breathsounds are normal. No rales. No wheezing.    Abdomen: Soft, nontender, nondistended with positive bowel sounds.      Extremity: No tenderness. No  pitting edema     Neurologic: The patient is alert and oriented.      Skin: No rash, no obvious lesions noted.      Psychiatric: The patient appears to be emotionally stable.      INTERPRETATION OF TELEMETRY:    ECG: Sinus rythm , normal axis, no ST T changes.     I&O's Detail    06 Sep 2021 07:01  -  07 Sep 2021 07:00  --------------------------------------------------------  IN:  Total IN: 0 mL    OUT:    Incontinent per Collection Bag (mL): 900 mL  Total OUT: 900 mL    Total NET: -900 mL          LABS:                        10.0   5.22  )-----------( 139      ( 06 Sep 2021 11:16 )             31.1     09-    138  |  104  |  13  ----------------------------<  166<H>  3.6   |  29  |  0.86    Ca    8.5      06 Sep 2021 11:16    TPro  6.4  /  Alb  2.7<L>  /  TBili  0.6  /  DBili  x   /  AST  14<L>  /  ALT  15  /  AlkPhos  82  09-    CARDIAC MARKERS ( 06 Sep 2021 19:13 )  <0.015 ng/mL / x     / x     / x     / x      CARDIAC MARKERS ( 06 Sep 2021 13:41 )  <0.015 ng/mL / x     / x     / x     / x      CARDIAC MARKERS ( 06 Sep 2021 11:16 )  <0.015 ng/mL / x     / x     / x     / x          PT/INR - ( 06 Sep 2021 11:16 )   PT: 19.8 sec;   INR: 1.74 ratio         PTT - ( 06 Sep 2021 11:16 )  PTT:35.4 sec  Urinalysis Basic - ( 06 Sep 2021 11:09 )    Color: Yellow / Appearance: Clear / S.015 / pH: x  Gluc: x / Ketone: Negative  / Bili: Negative / Urobili: Negative mg/dL   Blood: x / Protein: 15 mg/dL / Nitrite: Negative   Leuk Esterase: Trace / RBC: Negative /HPF / WBC 11-25   Sq Epi: x / Non Sq Epi: Moderate / Bacteria: Few      I&O's Summary    06 Sep 2021 07:01  -  07 Sep 2021 07:00  --------------------------------------------------------  IN: 0 mL / OUT: 900 mL / NET: -900 mL      BNP  RADIOLOGY & ADDITIONAL STUDIES: Patient is a 89y old  Female who presents with a chief complaint of near syncope, fall (06 Sep 2021 17:50)      HPI:  89/F with PMHx of pancreatitis s/p ERCP, cirrhosis, DM2, a-fib on Eliquis, CAD s/p PCI, severe pulm HTN, chronic R heart failure, diastolic dysfunction, AS s/p TAVR, OA, HTN, dyslipidemia, hypothyroidism. cecal mass, recently d/maddy from , sent  to the ED from [facility] for c/o Fall, near syncope. Pt states she was trying to put boxes under the chair, when she fell and hit her head, now has a small abrasion. Also c/o left shoulder pain.  She has been become more weak and unsteady recently.      Son, states that she has gained 20 Lbs in [facility].     Denies any chest pain/sob/abd pain.     Also, c/o left shoulder pain.      Pt states that the aide did not put brakes on her chair and left and when she was trying to put boxes under the chair , the chair kept moving even though she tried to put brakes on by herself and she fell down.  Denies any loss of consciousness.  She denies any other symptoms now.        PAST MEDICAL & SURGICAL HISTORY:  Diabetes Mellitus Type II    Dyslipidemia    GERD (Gastroesophageal Reflux Disease)    History of Osteoarthritis    Hypertension    CAD (coronary artery disease)    Afib    Hypothyroid    HLD (hyperlipidemia)    History of pancreatitis    Aortic stenosis    H/O: Hysterectomy    H/O: Knee Surgery- right meniscus    History of cholecystectomy    History of appendectomy    H/O total knee replacement, left    S/P IVC filter  Note that Pt does not have  h/o DVT, outPt doppler was read first as +, but after review reported as no DVT. Pt got IVC filer before that        MEDICATIONS  (STANDING):  allopurinol 200 milliGRAM(s) Oral daily  apixaban 2.5 milliGRAM(s) Oral two times a day  artificial  tears Solution 1 Drop(s) Both EYES three times a day  aspirin  chewable 81 milliGRAM(s) Oral daily  dextrose 40% Gel 15 Gram(s) Oral once  dextrose 5%. 1000 milliLiter(s) (50 mL/Hr) IV Continuous <Continuous>  dextrose 5%. 1000 milliLiter(s) (100 mL/Hr) IV Continuous <Continuous>  dextrose 50% Injectable 25 Gram(s) IV Push once  dextrose 50% Injectable 12.5 Gram(s) IV Push once  dextrose 50% Injectable 25 Gram(s) IV Push once  escitalopram 10 milliGRAM(s) Oral daily  ferrous    sulfate 325 milliGRAM(s) Oral two times a day  glucagon  Injectable 1 milliGRAM(s) IntraMuscular once  influenza   Vaccine 0.5 milliLiter(s) IntraMuscular once  insulin lispro (ADMELOG) corrective regimen sliding scale   SubCutaneous three times a day before meals  insulin lispro (ADMELOG) corrective regimen sliding scale   SubCutaneous at bedtime  levothyroxine 50 MICROGram(s) Oral daily  lidocaine   4% Patch 1 Patch Transdermal daily  pantoprazole    Tablet 40 milliGRAM(s) Oral before breakfast  psyllium Powder 1 Packet(s) Oral daily  sildenafil (REVATIO) 20 milliGRAM(s) Oral two times a day  simvastatin 40 milliGRAM(s) Oral at bedtime  torsemide 40 milliGRAM(s) Oral two times a day    MEDICATIONS  (PRN):  acetaminophen   Tablet .. 650 milliGRAM(s) Oral every 6 hours PRN Temp greater or equal to 38C (100.4F), Mild Pain (1 - 3)      FAMILY HISTORY:  FH: diabetes mellitus  both parents    FH: heart disease        SOCIAL HISTORY:    REVIEW OF SYSTEMS:  CONSTITUTIONAL:    No fatigue, malaise, lethargy.  No fever or chills.  RESPIRATORY:  No cough.  No wheeze.  No hemoptysis.  No shortness of breath.  CARDIOVASCULAR:  No chest pains.  No palpitations. No shortness of breath, No orthopnea or PND. c/o dizziness   GASTROINTESTINAL:  No abdominal pain.  No nausea or vomiting.    GENITOURINARY:    No hematuria.    MUSCULOSKELETAL:  No musculoskeletal pain.  No joint swelling.  No arthritis.  NEUROLOGICAL:  No tingling or numbness or weakness.  PSYCHIATRIC:  No confusion  SKIN:  No rashes.            Vital Signs Last 24 Hrs  T(C): 36.4 (07 Sep 2021 05:36), Max: 36.7 (06 Sep 2021 14:20)  T(F): 97.5 (07 Sep 2021 05:36), Max: 98 (06 Sep 2021 14:20)  HR: 79 (07 Sep 2021 05:36) (79 - 97)  BP: 107/65 (07 Sep 2021 05:36) (96/62 - 113/56)  BP(mean): 77 (06 Sep 2021 15:44) (73 - 77)  RR: 18 (07 Sep 2021 05:36) (16 - 22)  SpO2: 95% (07 Sep 2021 05:36) (86% - 99%)    PHYSICAL EXAM-    Constitutional: elderly frail female in no acute distress     Head: Head is normocephalic and atraumatic.      Neck: No JVD.     Cardiovascular: Regular rate and rhythm without S3, S4. No murmurs or rubs are appreciated.      Respiratory: Basal rales b/l     Abdomen: Soft, nontender, nondistended with positive bowel sounds.      Extremity: No tenderness. No  pitting edema     Neurologic: The patient is alert and oriented.      Skin: No rash, no obvious lesions noted.      Psychiatric: The patient appears to be emotionally stable.      INTERPRETATION OF TELEMETRY: afib 80/min    ECG: atrial fibrillation.   I&O's Detail    06 Sep 2021 07:01  -  07 Sep 2021 07:00  --------------------------------------------------------  IN:  Total IN: 0 mL    OUT:    Incontinent per Collection Bag (mL): 900 mL  Total OUT: 900 mL    Total NET: -900 mL          LABS:                        10.0   5.22  )-----------( 139      ( 06 Sep 2021 11:16 )             31.1     09-06    138  |  104  |  13  ----------------------------<  166<H>  3.6   |  29  |  0.86    Ca    8.5      06 Sep 2021 11:16    TPro  6.4  /  Alb  2.7<L>  /  TBili  0.6  /  DBili  x   /  AST  14<L>  /  ALT  15  /  AlkPhos  82  09-06    CARDIAC MARKERS ( 06 Sep 2021 19:13 )  <0.015 ng/mL / x     / x     / x     / x      CARDIAC MARKERS ( 06 Sep 2021 13:41 )  <0.015 ng/mL / x     / x     / x     / x      CARDIAC MARKERS ( 06 Sep 2021 11:16 )  <0.015 ng/mL / x     / x     / x     / x          PT/INR - ( 06 Sep 2021 11:16 )   PT: 19.8 sec;   INR: 1.74 ratio         PTT - ( 06 Sep 2021 11:16 )  PTT:35.4 sec  Urinalysis Basic - ( 06 Sep 2021 11:09 )    Color: Yellow / Appearance: Clear / S.015 / pH: x  Gluc: x / Ketone: Negative  / Bili: Negative / Urobili: Negative mg/dL   Blood: x / Protein: 15 mg/dL / Nitrite: Negative   Leuk Esterase: Trace / RBC: Negative /HPF / WBC 11-25   Sq Epi: x / Non Sq Epi: Moderate / Bacteria: Few      I&O's Summary    06 Sep 2021 07:01  -  07 Sep 2021 07:00  --------------------------------------------------------  IN: 0 mL / OUT: 900 mL / NET: -900 mL      BNP  RADIOLOGY & ADDITIONAL STUDIES:  < from: CT Head No Cont (21 @ 10:56) >    IMPRESSION:    CT HEAD: No acute abnormality. Moderate atrophy most prominent in the BILATERAL temporal lobes.    CT cervical spine:   No vertebral fracture is recognized.  Multilevel degenerative disc disease and spondylosis at C3-4 through C6-7 with loss of disc height and associated degenerative endplate changes. There is narrowing of the LEFT C2-3, LEFT C3-4 LEFT C4-5 and RIGHT C5-C6 and C6-7 neural foramina due to uncovertebral spurring and facet osteophytic hypertrophy.    --- End of Report ---            FRANCESCA BOSTON MD; Attending Radiologist  This document has been electronically signed. Sep  6 2021 11:05AM    < end of copied text >  < from: TTE Echo Complete w/ Contrast w/ Doppler (21 @ 16:09) >     Impression     Summary     Moderate mitral annular calcification is present.   Moderate mitral stenosis is present.   Mild (1+) mitral regurgitation is present.   Well seated prosthetic valve in the aortic position. suboptimal doppler   interrogation   Severe (4+) tricuspid valve regurgitation is present.   The tricuspid valve leaflets appear mildly thickened open well.   Severe pulmonary hypertension.   Normal appearing pulmonic valve structure.   Mild pulmonic valvular regurgitation (1+) is present.     technical difficult study     Signature     ----------------------------------------------------------------   Electronically signed by Venugopal Palla, MD(Interpreting   physician) on 2021 05:38 PM   ----------------------------------------------------------------    < end of copied text >

## 2021-09-07 NOTE — DIETITIAN INITIAL EVALUATION ADULT. - PERTINENT MEDS FT
MEDICATIONS  (STANDING):  allopurinol 200 milliGRAM(s) Oral daily  apixaban 2.5 milliGRAM(s) Oral two times a day  artificial  tears Solution 1 Drop(s) Both EYES three times a day  aspirin  chewable 81 milliGRAM(s) Oral daily  dextrose 40% Gel 15 Gram(s) Oral once  dextrose 5%. 1000 milliLiter(s) (50 mL/Hr) IV Continuous <Continuous>  dextrose 5%. 1000 milliLiter(s) (100 mL/Hr) IV Continuous <Continuous>  dextrose 50% Injectable 25 Gram(s) IV Push once  dextrose 50% Injectable 12.5 Gram(s) IV Push once  dextrose 50% Injectable 25 Gram(s) IV Push once  escitalopram 10 milliGRAM(s) Oral daily  ferrous    sulfate 325 milliGRAM(s) Oral two times a day  glucagon  Injectable 1 milliGRAM(s) IntraMuscular once  influenza   Vaccine 0.5 milliLiter(s) IntraMuscular once  insulin lispro (ADMELOG) corrective regimen sliding scale   SubCutaneous three times a day before meals  insulin lispro (ADMELOG) corrective regimen sliding scale   SubCutaneous at bedtime  levothyroxine 50 MICROGram(s) Oral daily  lidocaine   4% Patch 1 Patch Transdermal daily  pantoprazole    Tablet 40 milliGRAM(s) Oral before breakfast  psyllium Powder 1 Packet(s) Oral daily  sildenafil (REVATIO) 20 milliGRAM(s) Oral two times a day  simvastatin 40 milliGRAM(s) Oral at bedtime  torsemide 40 milliGRAM(s) Oral two times a day    MEDICATIONS  (PRN):  acetaminophen   Tablet .. 650 milliGRAM(s) Oral every 6 hours PRN Temp greater or equal to 38C (100.4F), Mild Pain (1 - 3)  bisacodyl Suppository 10 milliGRAM(s) Rectal daily PRN Constipation

## 2021-09-07 NOTE — DIETITIAN NUTRITION RISK NOTIFICATION - TREATMENT: THE FOLLOWING DIET HAS BEEN RECOMMENDED
Diet, DASH/TLC:   Sodium & Cholesterol Restricted  Consistent Carbohydrate {Evening Snack} (CSTCHOSN) (09-06-21 @ 14:41) [Active]

## 2021-09-07 NOTE — PHYSICAL THERAPY INITIAL EVALUATION ADULT - GENERAL OBSERVATIONS, REHAB EVAL
Pt seen for 45min PT Eval. Pt s/p fall. Pt rec'd semi supine in bed in NAD, c/o constipation s/p suppository. Pt trans supine>sit with min Ax1. Pt trans sit<>Stand with CGA at RW. pt amb 15ft with RW and CGA. Pt dem dec step length, dec endurance, and dec vincent. Pt assisted with toilet transfer. Pt left sitting on toilet pull tab in hand, CNA with pt, all needs met.

## 2021-09-07 NOTE — DIETITIAN INITIAL EVALUATION ADULT. - MALNUTRITION
severe protein/calorie malnutrition in context of acute illness severe protein/calorie malnutrition in context of acute illness AEB suboptimal nutrition 2/2 Sepsis. severe protein/calorie malnutrition in context of acute illness  r/t 2/2 Sepsis.

## 2021-09-07 NOTE — DIETITIAN INITIAL EVALUATION ADULT. - PHYSCIAL ASSESSMENT
Nathan scale- 17 (high risk for skin breakdown)  Skin: sacrum stage 1 and B/L heels stage 1 underweight Nathan scale- 17 (high risk for skin breakdown)  Skin: sacrum stage 1 and B/L heels stage 1  No BM since admit, pt constipated

## 2021-09-07 NOTE — DIETITIAN INITIAL EVALUATION ADULT. - OTHER INFO
78 y/o male with a PMHx of PNA 3x, DM2, ileostomy, sleep apnea (CPAP at home), RBBB, Enlarged prostate, depression, HTN, HLD. Presents to the ED BIBA s/p syncopal episode with fall at home. Pt states he woke up at 0930 today and took all his morning meds and around 1030 developed nausea. States he was walking from his bedroom to the kitchen when his wife witnessed pt had a syncopal episode and fell onto the ground. No symptoms prior to episode per patient. Denies hx of syncope, +dizziness in AM occasioanlly. Reports being hypotensive with EMS. Currently, no CP, palp, sob, HA or dizziness. Reports chronic leg swelling. Not on blood thinners.   In ED, found to have frequent PVCs, couplets on tele. SNP lower end of normal. Admit to tele. 78 y/o male with a PMHx of PNA 3x, DM2, ileostomy, sleep apnea (CPAP at home), RBBB, Enlarged prostate, depression, HTN, HLD. Presents to the ED BIBA s/p syncopal episode with fall at home. Pt states he woke up at 0930 today and took all his morning meds and around 1030 developed nausea. States he was walking from his bedroom to the kitchen when his wife witnessed pt had a syncopal episode and fell onto the ground. No symptoms prior to episode per patient. Denies hx of syncope, +dizziness in AM occasioanlly. Reports being hypotensive with EMS. Currently, no CP, palp, sob, HA or dizziness. Reports chronic leg swelling. Not on blood thinners.   In ED, found to have frequent PVCs, couplets on tele. SNP lower end of normal. Admit to tele.  Pt c/o of constipation, prunes ordered  Pt on dulcolax suppository, on Metamucil  Pt on POCT  HgbA1c is 6.8 80 y/o male with a PMHx of PNA 3x, DM2, ileostomy, sleep apnea (CPAP at home), RBBB, Enlarged prostate, depression, HTN, HLD. Presents to the ED BIBA s/p syncopal episode with fall at home. Pt states he woke up at 0930 today and took all his morning meds and around 1030 developed nausea. States he was walking from his bedroom to the kitchen when his wife witnessed pt had a syncopal episode and fell onto the ground. No symptoms prior to episode per patient. Denies hx of syncope, +dizziness in AM occasioanlly. Reports being hypotensive with EMS. Currently, no CP, palp, sob, HA or dizziness. Reports chronic leg swelling. Not on blood thinners.   In ED, found to have frequent PVCs, couplets on tele. SNP lower end of normal. Admit to tele.  Pt c/o of constipation, prunes ordered  Pt on dulcolax suppository, on Metamucil  Pt on POCT  HgbA1c is 6.8, on SSI

## 2021-09-07 NOTE — CONSULT NOTE ADULT - ASSESSMENT
89 year-old woman, presents after fall, pulmonary consulted for shortness of breath.  --Her fall may have been mechanical fall.  --She has bilateral infiltrates on imaging, previous imaging reviewed many of these changes are chronic and when viewed on CT chest represent ground glass and intersitial infiltrates likely related to pulmonary edema   --She clinically appears stable from pulmonary standpoint, she is on room air in no distress.       Recommendations:  --Cardiology rec appreciated, strict I/O, daily weight and change torsemide to every other day.  --She has significant lower extremity edema would benefit from compression stockings.  --No other significant changes to medical management -     will follow

## 2021-09-07 NOTE — DIETITIAN INITIAL EVALUATION ADULT. - MUSCLE MASS (LOSS OF MUSCLE)
Temples.../Clavicles.../Shoulders.../Scapula.../Thigh.../Calf.../Dorsal hand... Temples.../Clavicles.../Shoulders.../Scapula.../Dorsal hand...

## 2021-09-07 NOTE — DIETITIAN NUTRITION RISK NOTIFICATION - ADDITIONAL COMMENTS/DIETITIAN RECOMMENDATIONS
Suggest liberalize diet to regular  Suggest add Vit C 500 mg BID, add Zinc Sulfate 220 mg x 10 days to promote wound healing   Add ensure enlive 8 oz tid  Gelatein bid  Encourage PO intake  Tray set-up  Daily weights  Monitor PO intake, tolerance, labs and weight.  Suggest liberalize diet to regular  Suggest add Vit C 500 mg BID, add Zinc Sulfate 220 mg x 10 days to promote wound healing   Pt has stage 1 and Suspected DTI PUs  pt has edema 3+ in legs, may be masking further muscle wasting, and disguising weight loss  Add ensure enlive 8 oz tid  Gelatein bid  Encourage PO intake  Tray set-up  Daily weights  Monitor PO intake, tolerance, labs and weight.

## 2021-09-07 NOTE — PHYSICAL THERAPY INITIAL EVALUATION ADULT - PERTINENT HX OF CURRENT PROBLEM, REHAB EVAL
89/F with PMHx of pancreatitis s/p ERCP, cirrhosis, DM2, a-fib on Eliquis, CAD s/p PCI, severe pulm HTN, chronic R heart failure, diastolic dysfunction, AS s/p TAVR, OA, HTN, dyslipidemia, hypothyroidism. cecal mass, recently d/maddy from , sent  to the ED from [facility] for c/o Fall, near syncope.

## 2021-09-07 NOTE — DIETITIAN INITIAL EVALUATION ADULT. - REASON
Pt has 3+ edema right and left leg, may be masking further muscle wasting, may be contributing to extra weight.  Pt also has stage 1 and suspected DTI PUs

## 2021-09-07 NOTE — PROGRESS NOTE ADULT - SUBJECTIVE AND OBJECTIVE BOX
Reason for Admission: near syncope, fal  History of Present Illness:   89/F with PMHx of pancreatitis s/p ERCP, cirrhosis, DM2, a-fib on Eliquis, CAD s/p PCI, severe pulm HTN, chronic R heart failure, diastolic dysfunction, AS s/p TAVR, OA, HTN, dyslipidemia, hypothyroidism. cecal mass, recently d/maddy from , sent  to the ED from [facility] for c/o Fall, near syncope. Pt states she was trying to put boxes under the chair, when she fell and hit her head, now has a small abrasion. Also c/o left shoulder pain.  She has been become more weak and unsteady recently.      Son, states that she has gained 20 Lbs in [facility].     Also, c/o left shoulder pain.     Medical progress: very deconditioned and frail (chronically). very uncomfortable. Patient notes of severe constipation, buttock itching, (L) shoulder pain. Patient was moaning this am.   State of mind: regular limited conversation    REVIEW OF SYSTEMS:  - as per HPI    Physical Exam:   GENERAL APPEARANCE:  Deconditioned and frail  T(C): 36.3 (09-07-21 @ 08:16), Max: 36.7 (09-06-21 @ 14:20)  HR: 75 (09-07-21 @ 08:16) (75 - 93)  BP: 105/70 (09-07-21 @ 08:16) (96/62 - 111/57)  RR: 18 (09-07-21 @ 08:16) (16 - 18)  SpO2: 98% (09-07-21 @ 08:16) (94% - 98%)  HEENT:  Head is normocephalic    Skin:  Warm and dry without any rash   NECK:  Supple without lymphadenopathy.   HEART:  Regular rate and rhythm. normal S1 and S2, No M/R/G  LUNGS:  Good ins/exp effort, no W/R/R/C  ABDOMEN:  Soft, nontender, nondistended with good bowel sounds heard  EXTREMITIES:  3+ edema  NEUROLOGICAL:  Gross nonfocal     89/F with PMHx of pancreatitis s/p ERCP, cirrhosis, DM2, a-fib on Eliquis, CAD s/p PCI, severe pulm HTN, chronic R heart failure, diastolic dysfunction, AS s/p TAVR, OA, HTN, dyslipidemia, hypothyroidism. cecal mass, recently d/maddy from , sent  to the ED from [facility] for c/o Fall, near syncope. Pt states she was trying to put boxes under the chair, when she fell and hit her head, now has a small abrasion. Also c/o left shoulder pain.  She has been become more weak and unsteady recently.     Denies any chest pain/sob/abd pain.     Also, c/o left shoulder pain.     # Fall + ? near syncope :  - observe on tele  - serial cardiac enz  - PT eval  - orthostatic vitals    # Severe constipation  - ducolax suppository  - enema  - small dose of morphine as patient is moaning due to rectal pain    #  chronic R heart failure  #  AS s/p TAVR,  #  CHF exacerbation: chronic, diastolic  - cont increased dose of  torsemide 40 mg bid  - supportive O2    # Cecal Mass:   - Patient is anticipated to undergo surgical procedure in a tertiary care facility given patient's advanced age / extensive chronic medical issues  - consult Dr. Smyth    # DM 2:   - ISS    # A fib: permanent  - cont eliquis    #  DVT PPX: on eliquis

## 2021-09-08 ENCOUNTER — TRANSCRIPTION ENCOUNTER (OUTPATIENT)
Age: 86
End: 2021-09-08

## 2021-09-08 LAB
-  AMPICILLIN: SIGNIFICANT CHANGE UP
-  CIPROFLOXACIN: SIGNIFICANT CHANGE UP
-  LEVOFLOXACIN: SIGNIFICANT CHANGE UP
-  NITROFURANTOIN: SIGNIFICANT CHANGE UP
-  TETRACYCLINE: SIGNIFICANT CHANGE UP
-  VANCOMYCIN: SIGNIFICANT CHANGE UP
ALBUMIN SERPL ELPH-MCNC: 2.3 G/DL — LOW (ref 3.3–5)
ALP SERPL-CCNC: 71 U/L — SIGNIFICANT CHANGE UP (ref 40–120)
ALT FLD-CCNC: 12 U/L — SIGNIFICANT CHANGE UP (ref 12–78)
ANION GAP SERPL CALC-SCNC: 3 MMOL/L — LOW (ref 5–17)
AST SERPL-CCNC: 12 U/L — LOW (ref 15–37)
BILIRUB SERPL-MCNC: 0.7 MG/DL — SIGNIFICANT CHANGE UP (ref 0.2–1.2)
BUN SERPL-MCNC: 10 MG/DL — SIGNIFICANT CHANGE UP (ref 7–23)
CALCIUM SERPL-MCNC: 8.2 MG/DL — LOW (ref 8.5–10.1)
CHLORIDE SERPL-SCNC: 103 MMOL/L — SIGNIFICANT CHANGE UP (ref 96–108)
CO2 SERPL-SCNC: 31 MMOL/L — SIGNIFICANT CHANGE UP (ref 22–31)
CREAT SERPL-MCNC: 0.62 MG/DL — SIGNIFICANT CHANGE UP (ref 0.5–1.3)
CULTURE RESULTS: SIGNIFICANT CHANGE UP
GLUCOSE SERPL-MCNC: 143 MG/DL — HIGH (ref 70–99)
HCT VFR BLD CALC: 27.9 % — LOW (ref 34.5–45)
HGB BLD-MCNC: 8.9 G/DL — LOW (ref 11.5–15.5)
MCHC RBC-ENTMCNC: 31.9 GM/DL — LOW (ref 32–36)
MCHC RBC-ENTMCNC: 32.2 PG — SIGNIFICANT CHANGE UP (ref 27–34)
MCV RBC AUTO: 101.1 FL — HIGH (ref 80–100)
METHOD TYPE: SIGNIFICANT CHANGE UP
ORGANISM # SPEC MICROSCOPIC CNT: SIGNIFICANT CHANGE UP
ORGANISM # SPEC MICROSCOPIC CNT: SIGNIFICANT CHANGE UP
PLATELET # BLD AUTO: 128 K/UL — LOW (ref 150–400)
POTASSIUM SERPL-MCNC: 3.1 MMOL/L — LOW (ref 3.5–5.3)
POTASSIUM SERPL-SCNC: 3.1 MMOL/L — LOW (ref 3.5–5.3)
PROT SERPL-MCNC: 5.5 GM/DL — LOW (ref 6–8.3)
RBC # BLD: 2.76 M/UL — LOW (ref 3.8–5.2)
RBC # FLD: 16.7 % — HIGH (ref 10.3–14.5)
SODIUM SERPL-SCNC: 137 MMOL/L — SIGNIFICANT CHANGE UP (ref 135–145)
SPECIMEN SOURCE: SIGNIFICANT CHANGE UP
WBC # BLD: 5.03 K/UL — SIGNIFICANT CHANGE UP (ref 3.8–10.5)
WBC # FLD AUTO: 5.03 K/UL — SIGNIFICANT CHANGE UP (ref 3.8–10.5)

## 2021-09-08 PROCEDURE — 99233 SBSQ HOSP IP/OBS HIGH 50: CPT

## 2021-09-08 RX ORDER — POTASSIUM CHLORIDE 20 MEQ
40 PACKET (EA) ORAL EVERY 4 HOURS
Refills: 0 | Status: COMPLETED | OUTPATIENT
Start: 2021-09-08 | End: 2021-09-08

## 2021-09-08 RX ORDER — ACETAMINOPHEN 500 MG
1000 TABLET ORAL ONCE
Refills: 0 | Status: COMPLETED | OUTPATIENT
Start: 2021-09-08 | End: 2021-09-08

## 2021-09-08 RX ADMIN — Medication 1 DROP(S): at 13:01

## 2021-09-08 RX ADMIN — Medication 1 DROP(S): at 05:35

## 2021-09-08 RX ADMIN — Medication 200 MILLIGRAM(S): at 11:19

## 2021-09-08 RX ADMIN — Medication 20 MILLIGRAM(S): at 11:19

## 2021-09-08 RX ADMIN — Medication 40 MILLIGRAM(S): at 11:19

## 2021-09-08 RX ADMIN — APIXABAN 2.5 MILLIGRAM(S): 2.5 TABLET, FILM COATED ORAL at 11:19

## 2021-09-08 RX ADMIN — Medication 50 MICROGRAM(S): at 05:36

## 2021-09-08 RX ADMIN — Medication 40 MILLIEQUIVALENT(S): at 13:01

## 2021-09-08 RX ADMIN — LIDOCAINE 1 PATCH: 4 CREAM TOPICAL at 11:21

## 2021-09-08 RX ADMIN — MORPHINE SULFATE 1 MILLIGRAM(S): 50 CAPSULE, EXTENDED RELEASE ORAL at 06:39

## 2021-09-08 RX ADMIN — Medication 81 MILLIGRAM(S): at 11:18

## 2021-09-08 RX ADMIN — PANTOPRAZOLE SODIUM 40 MILLIGRAM(S): 20 TABLET, DELAYED RELEASE ORAL at 11:18

## 2021-09-08 RX ADMIN — Medication 20 MILLIGRAM(S): at 21:29

## 2021-09-08 RX ADMIN — Medication 400 MILLIGRAM(S): at 22:05

## 2021-09-08 RX ADMIN — Medication 325 MILLIGRAM(S): at 21:29

## 2021-09-08 RX ADMIN — Medication 325 MILLIGRAM(S): at 11:19

## 2021-09-08 RX ADMIN — Medication 1 PACKET(S): at 11:19

## 2021-09-08 RX ADMIN — LIDOCAINE 1 PATCH: 4 CREAM TOPICAL at 23:27

## 2021-09-08 RX ADMIN — LIDOCAINE 1 PATCH: 4 CREAM TOPICAL at 23:25

## 2021-09-08 RX ADMIN — ESCITALOPRAM OXALATE 10 MILLIGRAM(S): 10 TABLET, FILM COATED ORAL at 11:19

## 2021-09-08 RX ADMIN — Medication 2: at 13:01

## 2021-09-08 RX ADMIN — Medication 2: at 08:25

## 2021-09-08 RX ADMIN — APIXABAN 2.5 MILLIGRAM(S): 2.5 TABLET, FILM COATED ORAL at 21:29

## 2021-09-08 RX ADMIN — Medication 40 MILLIGRAM(S): at 21:30

## 2021-09-08 RX ADMIN — SIMVASTATIN 40 MILLIGRAM(S): 20 TABLET, FILM COATED ORAL at 21:29

## 2021-09-08 RX ADMIN — Medication 650 MILLIGRAM(S): at 05:36

## 2021-09-08 RX ADMIN — Medication 40 MILLIEQUIVALENT(S): at 11:20

## 2021-09-08 RX ADMIN — Medication 1 DROP(S): at 21:29

## 2021-09-08 RX ADMIN — Medication 1000 MILLIGRAM(S): at 23:32

## 2021-09-08 NOTE — PROGRESS NOTE ADULT - SUBJECTIVE AND OBJECTIVE BOX
Subjective:    Awake, comfortable at rest. No chest pain or dyspnea.    MEDICATIONS  (STANDING):  allopurinol 200 milliGRAM(s) Oral daily  apixaban 2.5 milliGRAM(s) Oral two times a day  artificial  tears Solution 1 Drop(s) Both EYES three times a day  aspirin  chewable 81 milliGRAM(s) Oral daily  dextrose 40% Gel 15 Gram(s) Oral once  dextrose 5%. 1000 milliLiter(s) (50 mL/Hr) IV Continuous <Continuous>  dextrose 5%. 1000 milliLiter(s) (100 mL/Hr) IV Continuous <Continuous>  dextrose 50% Injectable 25 Gram(s) IV Push once  dextrose 50% Injectable 12.5 Gram(s) IV Push once  dextrose 50% Injectable 25 Gram(s) IV Push once  escitalopram 10 milliGRAM(s) Oral daily  ferrous    sulfate 325 milliGRAM(s) Oral two times a day  glucagon  Injectable 1 milliGRAM(s) IntraMuscular once  influenza   Vaccine 0.5 milliLiter(s) IntraMuscular once  insulin lispro (ADMELOG) corrective regimen sliding scale   SubCutaneous three times a day before meals  insulin lispro (ADMELOG) corrective regimen sliding scale   SubCutaneous at bedtime  levothyroxine 50 MICROGram(s) Oral daily  lidocaine   4% Patch 1 Patch Transdermal daily  pantoprazole    Tablet 40 milliGRAM(s) Oral before breakfast  potassium chloride    Tablet ER 40 milliEquivalent(s) Oral every 4 hours  psyllium Powder 1 Packet(s) Oral daily  sildenafil (REVATIO) 20 milliGRAM(s) Oral two times a day  simvastatin 40 milliGRAM(s) Oral at bedtime  torsemide 40 milliGRAM(s) Oral two times a day    MEDICATIONS  (PRN):  acetaminophen   Tablet .. 650 milliGRAM(s) Oral every 6 hours PRN Temp greater or equal to 38C (100.4F), Mild Pain (1 - 3)  bisacodyl Suppository 10 milliGRAM(s) Rectal daily PRN Constipation      Allergies    penicillin (Rash)  penicillins (Other)  sulfa drugs (Rash; Other)    Intolerances        Vital Signs Last 24 Hrs  T(C): 36.6 (08 Sep 2021 08:20), Max: 37.3 (07 Sep 2021 23:30)  T(F): 97.9 (08 Sep 2021 08:20), Max: 99.1 (07 Sep 2021 23:30)  HR: 66 (08 Sep 2021 08:20) (66 - 88)  BP: 106/43 (08 Sep 2021 08:20) (99/50 - 117/71)  BP(mean): --  RR: 18 (08 Sep 2021 08:20) (18 - 18)  SpO2: 97% (08 Sep 2021 08:20) (97% - 100%)    PHYSICAL EXAMINATION:    NECK:  Supple. No lymphadenopathy. Jugular venous pressure not elevated.    HEART:   The cardiac impulse has a normal quality. Irreg. irreg, Nl S1 and decreased S2.   CHEST:  Chest with bibasilar ctackles, R>L. Normal respiratory effort.  ABDOMEN:  Soft and nontender.   EXTREMITIES:  There is 1-2+chronic edema.       LABS:                        8.9    5.03  )-----------( 128      ( 08 Sep 2021 07:09 )             27.9     09-08    137  |  103  |  10  ----------------------------<  143<H>  3.1<L>   |  31  |  0.62    Ca    8.2<L>      08 Sep 2021 07:09    TPro  5.5<L>  /  Alb  2.3<L>  /  TBili  0.7  /  DBili  x   /  AST  12<L>  /  ALT  12  /  AlkPhos  71  09-08    PT/INR - ( 06 Sep 2021 11:16 )   PT: 19.8 sec;   INR: 1.74 ratio         PTT - ( 06 Sep 2021 11:16 )  PTT:35.4 sec  Urinalysis Basic - ( 06 Sep 2021 11:09 )    Color: Yellow / Appearance: Clear / S.015 / pH: x  Gluc: x / Ketone: Negative  / Bili: Negative / Urobili: Negative mg/dL   Blood: x / Protein: 15 mg/dL / Nitrite: Negative   Leuk Esterase: Trace / RBC: Negative /HPF / WBC 11-25   Sq Epi: x / Non Sq Epi: Moderate / Bacteria: Few        RADIOLOGY & ADDITIONAL TESTS:    Assessment and Recommendation:   · Assessment      89 year-old woman, presents after fall, pulmonary consulted for shortness of breath.  --Her fall may have been mechanical fall.  --She has bilateral infiltrates on imaging, previous imaging reviewed many of these changes are chronic and when viewed on CT chest represent ground glass and intersitial infiltrates likely related to pulmonary edema-mild decompensation of HFpEF  --She clinically appears stable from pulmonary standpoint-normal O2 sats on 21%         Recommendations:  --Cardiology rec appreciated, strict I/O, daily weight.  Torsemide now BID-monitor lytes carefully. K+ repletion  --She has significant lower extremity edema would benefit from compression stockings.  --No other significant changes to medical management  --To Consider transfer to [facility] for possible colon resection, due to H/O pulmonary HTN (anesthesia will not clear for general anesthesia in this hospital due to lack of cardiac back-up).

## 2021-09-08 NOTE — DISCHARGE NOTE PROVIDER - HOSPITAL COURSE
Reason for Admission: near syncope, fal  History of Present Illness:   89/F with PMHx of pancreatitis s/p ERCP, cirrhosis, DM2, a-fib on Eliquis, CAD s/p PCI, severe pulm HTN, chronic R heart failure, diastolic dysfunction, AS s/p TAVR, OA, HTN, dyslipidemia, hypothyroidism. cecal mass, recently d/maddy from , sent  to the ED from [facility] for c/o Fall, near syncope. Pt states she was trying to put boxes under the chair, when she fell and hit her head, now has a small abrasion. Also c/o left shoulder pain.  She has been become more weak and unsteady recently.      Son, states that she has gained 20 Lbs in [facility].     Also, c/o left shoulder pain.     Medical progress: Patient clinically optimized. Patient's care discussed with Dr. Camacho /  Dr. Epps. Then I followed up with Dr. Lange -  Patient is ok to be transferred with [facility]. Patient's care will be discussed with Laith Dill.   State of mind: regular limited conversation    Physical Exam:   GENERAL APPEARANCE:  Deconditioned, frail, chronically sick  T(C): 36.6 (09-08-21 @ 08:20), Max: 37.3 (09-07-21 @ 23:30)  HR: 66 (09-08-21 @ 08:20) (66 - 88)  BP: 106/43 (09-08-21 @ 08:20) (99/50 - 117/71)  RR: 18 (09-08-21 @ 08:20) (18 - 18)  SpO2: 97% (09-08-21 @ 08:20) (97% - 100%)  HEENT:  temporal muscle wasting  Skin:  thin / dry   NECK:  Supple without lymphadenopathy.   HEART:  J LUIS  LUNGS:  mild crackles  ABDOMEN:  Soft, nontender, nondistended with good bowel sounds heard  EXTREMITIES:  2+ LE edema  NEUROLOGICAL:  Gross nonfocal     # Fall + ? near syncope :  - tele -  no arrhythmias  - serial cardiac enz  - PT eval  - orthostatic vitals    # Severe constipation  - ducolax suppository  - required disimpaction 9/7  - enema (mineral oil)  - small dose of morphine as patient is moaning due to rectal pain    #  chronic R heart failure  #  AS s/p TAVR,  #  CHF exacerbation: chronic, diastolic  - cont increased dose of  torsemide 40 mg bid  - supportive O2    # Cecal Mass:   - Patient is anticipated to undergo surgical procedure in a tertiary care facility given patient's advanced age / extensive chronic medical issues  - consult Dr. Smyth    # DM 2:   - ISS    # A fib: permanent  - cont eliquis    #  DVT PPX: on eliquis     Reason for Admission: near syncope, fal  History of Present Illness:   89/F with PMHx of pancreatitis s/p ERCP, cirrhosis, DM2, a-fib on Eliquis, CAD s/p PCI, severe pulm HTN, chronic R heart failure, diastolic dysfunction, AS s/p TAVR, OA, HTN, dyslipidemia, hypothyroidism. cecal mass, recently d/maddy from , sent  to the ED from [facility] for c/o Fall, near syncope. Pt states she was trying to put boxes under the chair, when she fell and hit her head, now has a small abrasion. Also c/o left shoulder pain.  She has been become more weak and unsteady recently.      Son, states that she has gained 20 Lbs in [facility].     Also, c/o left shoulder pain.     Medical progress: Patient clinically optimized. Patient's care discussed with Dr. Camacho /  Dr. Epps. Then I followed up with Dr. Lange -  Patient is ok to be transferred with [facility]. Patient's care will be discussed with Laith Dill.   State of mind: regular limited conversation  Patient is hemodynamically stable. Transfer has been initiated with [facility]. Care discussed with Dr. Dill (family) / Dr. Camacho /  Dr. Lange    Physical Exam:   GENERAL APPEARANCE:  Deconditioned, frail, chronically sick  T(C): 36.6 (09-08-21 @ 08:20), Max: 37.3 (09-07-21 @ 23:30)  HR: 66 (09-08-21 @ 08:20) (66 - 88)  BP: 106/43 (09-08-21 @ 08:20) (99/50 - 117/71)  RR: 18 (09-08-21 @ 08:20) (18 - 18)  SpO2: 97% (09-08-21 @ 08:20) (97% - 100%)  HEENT:  temporal muscle wasting  Skin:  thin / dry   NECK:  Supple without lymphadenopathy.   HEART:  J LUIS  LUNGS:  mild crackles  ABDOMEN:  Soft, nontender, nondistended with good bowel sounds heard  EXTREMITIES:  2+ LE edema  NEUROLOGICAL:  Gross nonfocal     # Fall + ? near syncope :  - tele -  no arrhythmias  - serial cardiac enz  - PT eval  - orthostatic vitals    # Severe constipation  - ducolax suppository  - required disimpaction 9/7  - enema (mineral oil)  - small dose of morphine as patient is moaning due to rectal pain    #  chronic R heart failure  #  AS s/p TAVR,  #  CHF exacerbation: chronic, diastolic  - cont increased dose of  torsemide 40 mg bid  - supportive O2    # Cecal Mass:   - Patient is anticipated to undergo surgical procedure in a tertiary care facility given patient's advanced age / extensive chronic medical issues  - consult Dr. Smyth    # DM 2:   - ISS    # A fib: permanent  - cont eliquis    #  DVT PPX: on eliquis 89/F with PMHx of pancreatitis s/p ERCP, cirrhosis, DM2, a-fib on Eliquis, CAD s/p PCI, Chronic Respiratory failure on Home O2, ILD,  severe pulm HTN, chronic R heart failure, diastolic dysfunction, AS s/p TAVR, OA, HTN, dyslipidemia, hypothyroidism, Colon CA,  was admitted from  [facility]  after fall, which was likely mechanical fall, no LOC.  Found to have fecal; imaction, s/p desipaction and bowel regiment.   Pt was evaluated by CR Sx, Sx treatment was offered,  was planned for transfer to [facility] due to high cardiac risk, but transfer was canceled as per evaluation by [facility] anesthesia department pt is very high risk for cardiopulmomary complication. Pt was further evaluated by hem/onc but no systemic Tx  was offered  due to possible significant side effects and pts poor Fx status.   Hospital course complicated by diarrhea, Cdiff neg x 2, GI PCR neg. Was started on Questran  QD and Imodium PRN. Now Diarrhea resolved. Also HAd acute diastolic CHF exacerbation, as PO diuretics were hels for low BP and low sodium during diarrhea. Now Pt is back on Torsemide 10mg PO BID. BP and NA stable. Pt was followed by Cardio and Pulm   Pt has acute on chronic neck pain more L neck with stiffness. Managed with Lidoderm patch, low dose flexeril and warm compresses.   Pt was evaluated by Palliative team, discussed GOC and d/c plan. Pt is DNR/DNI plan for [facility] at this time      Vital Signs Last 24 Hrs  T(C): 36.3 (22 Sep 2021 09:12), Max: 36.6 (21 Sep 2021 21:02)  T(F): 97.3 (22 Sep 2021 09:12), Max: 97.9 (21 Sep 2021 21:02)  HR: 75 (22 Sep 2021 09:12) (63 - 85)  BP: 109/55 (22 Sep 2021 09:12) (100/61 - 109/55)  RR: 17 (22 Sep 2021 09:12) (17 - 18)  SpO2: 100% (22 Sep 2021 09:12) (98% - 100%)      PHYSICAL EXAM:  General: Well developed; malnourished; frail elderly female,   on  NC   Eyes: EOMI; conjunctiva and sclera clear  Head: Normocephalic; atraumatic  ENMT: No nasal discharge; airway clear  Neck: Supple; non tender; no masses  Respiratory: Decreased BS,  crepitations and rales at bases b/l  Cardiovascular: Irregular rate and rhythm. S1 and S2 Normal;  Gastrointestinal: Soft non-tender non-distended; Normal bowel sounds.   Genitourinary: No  suprapubic  tenderness  Extremities: +  edema  Vascular: Peripheral pulses palpable 2+ bilaterally  Neurological: Alert and oriented x2-3, non focal   Musculoskeletal: Normal muscle  strength, + L neck  muscle tension  Psychiatric: Cooperative     A/P:   89/F with PMHx of pancreatitis s/p ERCP, cirrhosis, DM2, a-fib on Eliquis, CAD s/p PCI, severe pulm HTN, chronic R heart failure, diastolic dysfunction, AS s/p TAVR, OA, HTN, dyslipidemia, hypothyroidism, cecal mass admitted for:       # S/p fall  - likely mechanical, due to debility, arthritis   -  tele -  no arrhythmias, AFIB rate controlled   - serial cardiac enz neg   - trauma work: no Fx  - neck pain:  c/w Lidoderm, Tylenol  PO,   flexeril PRN  - PT   - orthostatic vitals neg     # Severe constipation on admission, later developed  Diarrhea, now  resolved    -  required disimpaction on 9/7  -  stool GI PCR and CDIFF PCR x 2  neg   - Stool O&P  neg  - c/w Questran Q C/w Imodium PRN,   - followed by Dr Mobley    #  chronic R heart failure. Severe Pulm HTN   #  AS s/p TAVR,  # Acute on chronic diastolic  CHF exacerbation, improved   - Initial CT chest with  chronic changes and CHF   - Repeat CXR with B/l infiltrates chf vs ILD   - C/w torsemide increase to 10mg PO  bid, NA and BP stable now ( Pt was taking torsemide 40mg BID before admission)   - supportive O2 via NC on 5L at baseline   - ECHO: severe pulm HTN, Mod MS, AV prosthesis good Fx, Severe TR. LV mild LVH   - C/w Sildenafil   - Pulm f/u appreciated cleared for d/c     # Cecal Adenocarcinoma    - Patient is anticipated to undergo surgical procedure in a tertiary care facility given patient's advanced age / extensive chronic medical issues including valvular Dz and severe Pulm HTN  -D/w  Dr. Smyth, case was discussed with chief of anesthesia at [facility], Pt is too high risk due to Severe pulm HTN . Not a candidate for Sx   - hem/onc eval appreciated, advises against systemic Tx due to Pts  Fx status and other medical issues and possible adverse effects       # Epistaxis, resolved   Likely 2/2 dry nasal mucosa on NC  C/w  humidified O2   S/p  afrin  now off   Resumed on Eliquis, not ASA,   on hold     # Episode of hematemesis X1  suspect 2/2 nose bleeding and swallowing blood.   No further episodes  Monitor H/H, stable    On PPIs    # Hyponatremia  Resolved   Monitor  closely on diuretics     # DM 2:   -HB A1c 6.8  not on meds outPt   - c/w diabetic diet     # A fib: permanent  - resumed on   eliquis  -started on BB due to few episodes of AFIB with RVR   -Change BB to Toprol 12.5mg, monitor BP  F/u with cardio outPt     Dispo: stable for d/c to [facility]   Total time 60 min

## 2021-09-08 NOTE — DISCHARGE NOTE NURSING/CASE MANAGEMENT/SOCIAL WORK - NSTRANSFEREYEGLASSESPAIRS_GEN_A_NUR
Impression: S/P Cataract Extraction by phacoemulsification with IOL placement OD - 7 Days. Presence of intraocular lens  Z96.1. Excellent post op course   Post operative instructions reviewed - Plan: Discussed. Pt to call with concerns.
1 pair

## 2021-09-08 NOTE — PROGRESS NOTE ADULT - SUBJECTIVE AND OBJECTIVE BOX
Cardiology Progress Note    HPI: 89/F with PMHx of pancreatitis s/p ERCP, cirrhosis, DM2, a-fib on Eliquis, CAD s/p PCI, severe pulm HTN, chronic R heart failure, diastolic dysfunction, AS s/p TAVR, OA, HTN, dyslipidemia, hypothyroidism. cecal mass, recently d/maddy from , sent  to the ED from [facility] for c/o Fall, near syncope. Pt states she was trying to put boxes under the chair, when she fell and hit her head, now has a small abrasion. Also c/o left shoulder pain.  She has been become more weak and unsteady recently.   Pt states that the aide did not put brakes on her chair and left and when she was trying to put boxes under the chair , the chair kept moving even though she tried to put brakes on by herself and she fell down.  Denies any loss of consciousness.  She denies any other symptoms now.    9/8. No events last pm. No CP/SOB.  Rate controlled afib on tele.     PAST MEDICAL & SURGICAL HISTORY:  Diabetes Mellitus Type II    Dyslipidemia    GERD (Gastroesophageal Reflux Disease)    History of Osteoarthritis    Hypertension    CAD (coronary artery disease)    Afib    Hypothyroid    HLD (hyperlipidemia)    History of pancreatitis    Aortic stenosis    H/O: Hysterectomy    H/O: Knee Surgery- right meniscus    History of cholecystectomy    History of appendectomy    H/O total knee replacement, left    S/P IVC filter  Note that Pt does not have  h/o DVT, outPt doppler was read first as +, but after review reported as no DVT. Pt got IVC filer before that        MEDICATIONS  (STANDING):  allopurinol 200 milliGRAM(s) Oral daily  apixaban 2.5 milliGRAM(s) Oral two times a day  artificial  tears Solution 1 Drop(s) Both EYES three times a day  aspirin  chewable 81 milliGRAM(s) Oral daily  dextrose 40% Gel 15 Gram(s) Oral once  dextrose 5%. 1000 milliLiter(s) (50 mL/Hr) IV Continuous <Continuous>  dextrose 5%. 1000 milliLiter(s) (100 mL/Hr) IV Continuous <Continuous>  dextrose 50% Injectable 25 Gram(s) IV Push once  dextrose 50% Injectable 12.5 Gram(s) IV Push once  dextrose 50% Injectable 25 Gram(s) IV Push once  escitalopram 10 milliGRAM(s) Oral daily  ferrous    sulfate 325 milliGRAM(s) Oral two times a day  glucagon  Injectable 1 milliGRAM(s) IntraMuscular once  influenza   Vaccine 0.5 milliLiter(s) IntraMuscular once  insulin lispro (ADMELOG) corrective regimen sliding scale   SubCutaneous three times a day before meals  insulin lispro (ADMELOG) corrective regimen sliding scale   SubCutaneous at bedtime  levothyroxine 50 MICROGram(s) Oral daily  lidocaine   4% Patch 1 Patch Transdermal daily  pantoprazole    Tablet 40 milliGRAM(s) Oral before breakfast  psyllium Powder 1 Packet(s) Oral daily  sildenafil (REVATIO) 20 milliGRAM(s) Oral two times a day  simvastatin 40 milliGRAM(s) Oral at bedtime  torsemide 40 milliGRAM(s) Oral two times a day    MEDICATIONS  (PRN):  acetaminophen   Tablet .. 650 milliGRAM(s) Oral every 6 hours PRN Temp greater or equal to 38C (100.4F), Mild Pain (1 - 3)      FAMILY HISTORY:  FH: diabetes mellitus  both parents    FH: heart disease        SOCIAL HISTORY:    REVIEW OF SYSTEMS:  CONSTITUTIONAL:    No fatigue, malaise, lethargy.  No fever or chills.  RESPIRATORY:  No cough.  No wheeze.  No hemoptysis.  No shortness of breath.  CARDIOVASCULAR:  No chest pains.  No palpitations. No shortness of breath, No orthopnea or PND. c/o dizziness   GASTROINTESTINAL:  No abdominal pain.  No nausea or vomiting.    GENITOURINARY:    No hematuria.    MUSCULOSKELETAL:  No musculoskeletal pain.  No joint swelling.  No arthritis.  NEUROLOGICAL:  No tingling or numbness or weakness.  PSYCHIATRIC:  No confusion  SKIN:  No rashes.            Vital Signs Last 24 Hrs  T(C): 36.4 (07 Sep 2021 05:36), Max: 36.7 (06 Sep 2021 14:20)  T(F): 97.5 (07 Sep 2021 05:36), Max: 98 (06 Sep 2021 14:20)  HR: 79 (07 Sep 2021 05:36) (79 - 97)  BP: 107/65 (07 Sep 2021 05:36) (96/62 - 113/56)  BP(mean): 77 (06 Sep 2021 15:44) (73 - 77)  RR: 18 (07 Sep 2021 05:36) (16 - 22)  SpO2: 95% (07 Sep 2021 05:36) (86% - 99%)    PHYSICAL EXAM-    Constitutional: elderly frail female in no acute distress     Head: Head is normocephalic and atraumatic.      Neck: No JVD.     Cardiovascular: Regular rate and rhythm without S3, S4. No murmurs or rubs are appreciated.      Respiratory: Basal rales b/l     Abdomen: Soft, nontender, nondistended with positive bowel sounds.      Extremity: No tenderness. No  pitting edema     Neurologic: The patient is alert and oriented.      Skin: No rash, no obvious lesions noted.      Psychiatric: The patient appears to be emotionally stable.      INTERPRETATION OF TELEMETRY: afib 80/min    ECG: atrial fibrillation.   I&O's Detail    06 Sep 2021 07:01  -  07 Sep 2021 07:00  --------------------------------------------------------  IN:  Total IN: 0 mL    OUT:    Incontinent per Collection Bag (mL): 900 mL  Total OUT: 900 mL    Total NET: -900 mL          LABS:                        10.0   5.22  )-----------( 139      ( 06 Sep 2021 11:16 )             31.1     09-    138  |  104  |  13  ----------------------------<  166<H>  3.6   |  29  |  0.86    Ca    8.5      06 Sep 2021 11:16    TPro  6.4  /  Alb  2.7<L>  /  TBili  0.6  /  DBili  x   /  AST  14<L>  /  ALT  15  /  AlkPhos  82  09-06    CARDIAC MARKERS ( 06 Sep 2021 19:13 )  <0.015 ng/mL / x     / x     / x     / x      CARDIAC MARKERS ( 06 Sep 2021 13:41 )  <0.015 ng/mL / x     / x     / x     / x      CARDIAC MARKERS ( 06 Sep 2021 11:16 )  <0.015 ng/mL / x     / x     / x     / x          PT/INR - ( 06 Sep 2021 11:16 )   PT: 19.8 sec;   INR: 1.74 ratio         PTT - ( 06 Sep 2021 11:16 )  PTT:35.4 sec  Urinalysis Basic - ( 06 Sep 2021 11:09 )    Color: Yellow / Appearance: Clear / S.015 / pH: x  Gluc: x / Ketone: Negative  / Bili: Negative / Urobili: Negative mg/dL   Blood: x / Protein: 15 mg/dL / Nitrite: Negative   Leuk Esterase: Trace / RBC: Negative /HPF / WBC 11-25   Sq Epi: x / Non Sq Epi: Moderate / Bacteria: Few      I&O's Summary    06 Sep 2021 07:01  -  07 Sep 2021 07:00  --------------------------------------------------------  IN: 0 mL / OUT: 900 mL / NET: -900 mL      BNP  RADIOLOGY & ADDITIONAL STUDIES:  < from: CT Head No Cont (21 @ 10:56) >    IMPRESSION:    CT HEAD: No acute abnormality. Moderate atrophy most prominent in the BILATERAL temporal lobes.    CT cervical spine:   No vertebral fracture is recognized.  Multilevel degenerative disc disease and spondylosis at C3-4 through C6-7 with loss of disc height and associated degenerative endplate changes. There is narrowing of the LEFT C2-3, LEFT C3-4 LEFT C4-5 and RIGHT C5-C6 and C6-7 neural foramina due to uncovertebral spurring and facet osteophytic hypertrophy.    --- End of Report ---            FRANCESCA BOSTON MD; Attending Radiologist  This document has been electronically signed. Sep  6 2021 11:05AM    < end of copied text >  < from: TTE Echo Complete w/ Contrast w/ Doppler (21 @ 16:09) >     Impression     Summary     Moderate mitral annular calcification is present.   Moderate mitral stenosis is present.   Mild (1+) mitral regurgitation is present.   Well seated prosthetic valve in the aortic position. suboptimal doppler   interrogation   Severe (4+) tricuspid valve regurgitation is present.   The tricuspid valve leaflets appear mildly thickened open well.   Severe pulmonary hypertension.   Normal appearing pulmonic valve structure.   Mild pulmonic valvular regurgitation (1+) is present.     technical difficult study     Signature     ----------------------------------------------------------------   Electronically signed by Venugopal Palla, MD(Interpreting   physician) on 2021 05:38 PM   ----------------------------------------------------------------    < end of copied text >

## 2021-09-08 NOTE — DISCHARGE NOTE PROVIDER - CARE PROVIDERS DIRECT ADDRESSES
,[email or web address]

## 2021-09-08 NOTE — DISCHARGE NOTE PROVIDER - NSDCMRMEDTOKEN_GEN_ALL_CORE_FT
allopurinol 100 mg oral tablet: 2 tab(s) orally once a day  Artificial Tears ophthalmic solution: 1 drop(s) to each affected eye 3 times a day  Aspirin Low Dose 81 mg oral tablet, chewable: 1 tab(s) orally once a day  Eliquis 2.5 mg oral tablet: 1 tab(s) orally 2 times a day  escitalopram 10 mg oral tablet: 1 tab(s) orally once a day  ferrous sulfate 325 mg (65 mg elemental iron) oral tablet: 1 tab(s) orally 2 times a day  levothyroxine 50 mcg (0.05 mg) oral tablet: 1 tab(s) orally once a day  lidocaine 5% topical film: Apply topically to affected area once a day  pantoprazole 40 mg oral delayed release tablet: 1 tab(s) orally 2 times a day  Potassium Chloride (Eqv-Klor-Con 10) 10 mEq oral tablet, extended release: 1 tab(s) orally once a day  psyllium 3.4 g/12 g oral powder for reconstitution: 1 packet(s) orally once a day  sildenafil 20 mg oral tablet: 1 tab(s) orally 2 times a day  simvastatin 40 mg oral tablet: 1 tab(s) orally once a day (at bedtime)  torsemide 20 mg oral tablet: 2 tab(s) orally 2 times a day   allopurinol 100 mg oral tablet: 2 tab(s) orally once a day  Artificial Tears ophthalmic solution: 1 drop(s) to each affected eye 3 times a day  Aspirin Low Dose 81 mg oral tablet, chewable: 1 tab(s) orally once a day  cyclobenzaprine 5 mg oral tablet: 1 tab(s) orally 2 times a day, As needed, Muscle Spasm  Eliquis 2.5 mg oral tablet: 1 tab(s) orally 2 times a day  escitalopram 10 mg oral tablet: 1 tab(s) orally once a day  ferrous sulfate 325 mg (65 mg elemental iron) oral tablet: 1 tab(s) orally 2 times a day  levothyroxine 50 mcg (0.05 mg) oral tablet: 1 tab(s) orally once a day  lidocaine 5% topical film: Apply topically to affected area once a day  pantoprazole 40 mg oral delayed release tablet: 1 tab(s) orally 2 times a day  Potassium Chloride (Eqv-Klor-Con 10) 10 mEq oral tablet, extended release: 1 tab(s) orally once a day  psyllium 3.4 g/12 g oral powder for reconstitution: 1 packet(s) orally once a day  sildenafil 20 mg oral tablet: 1 tab(s) orally 2 times a day  simvastatin 40 mg oral tablet: 1 tab(s) orally once a day (at bedtime)  torsemide 20 mg oral tablet: 2 tab(s) orally 2 times a day   acetaminophen 325 mg oral tablet: 2 tab(s) orally every 8 hours, As Needed for mild pain  allopurinol 100 mg oral tablet: 2 tab(s) orally once a day  Artificial Tears ophthalmic solution: 1 drop(s) to each affected eye 3 times a day  Aspirin Low Dose 81 mg oral tablet, chewable: 1 tab(s) orally once a day  clotrimazole 1% topical cream: 1 application topically 2 times a day x 3 days   cyclobenzaprine 5 mg oral tablet: 1 tab(s) orally 2 times a day, As needed, Muscle Spasm  Eliquis 2.5 mg oral tablet: 1 tab(s) orally 2 times a day  escitalopram 10 mg oral tablet: 1 tab(s) orally once a day  ferrous sulfate 325 mg (65 mg elemental iron) oral tablet: 1 tab(s) orally 2 times a day  levothyroxine 50 mcg (0.05 mg) oral tablet: 1 tab(s) orally once a day  lidocaine 4% topical film: Apply topically to affected area once a day, As Needed for L neck pain   lidocaine 5% topical film: Apply topically to affected area once a day  loperamide 2 mg oral capsule: 1 cap(s) orally 3 times a day, As needed, Diarrhea  metoprolol succinate 25 mg oral tablet, extended release: 0.5 tab(s) orally once a day  pantoprazole 40 mg oral delayed release tablet: 1 tab(s) orally 2 times a day  Potassium Chloride (Eqv-Klor-Con 10) 10 mEq oral tablet, extended release: 1 tab(s) orally once a day  Questran 4 g/9 g oral powder for reconstitution: 4 gram(s) orally once a day  sildenafil 20 mg oral tablet: 1 tab(s) orally 2 times a day  simvastatin 40 mg oral tablet: 1 tab(s) orally once a day (at bedtime)  torsemide 10 mg oral tablet: 1 tab(s) orally 2 times a day  traMADol 50 mg oral tablet: 0.5 tab(s) orally every 6 hours, As needed, Severe Pain (7 - 10)

## 2021-09-08 NOTE — DISCHARGE NOTE PROVIDER - CARE PROVIDER_API CALL
Osman Man)  ColonRectal Surgery; Surgery  [address] [address]  Phone: [phone]  Fax: [phone]  Follow Up Time:     Samy Camacho)  Internal Medicine; Pulmonary Disease  [address], [address]  [place] [address]  Phone: [phone]  Fax: [phone]  Follow Up Time:    Samy Camacho)  Internal Medicine; Pulmonary Disease  [address], [address]  [place] [address]  Phone: [phone]  Fax: [phone]  Follow Up Time:     Florentin Hernandez)  Cardiovascular Disease; Critical Care Medicine; Internal Medicine; Interventional Cardiology  [address] [place] [address]  Phone: [phone]  Fax: [phone]  Follow Up Time: 1 week    Stewart Mobley)  Gastroenterology; Internal Medicine  [address], [address]  [place] [address]  Phone: [phone]  Fax: [phone]  Follow Up Time:

## 2021-09-08 NOTE — DISCHARGE NOTE PROVIDER - NSDCCPCAREPLAN_GEN_ALL_CORE_FT
PRINCIPAL DISCHARGE DIAGNOSIS  Diagnosis: Near syncope  Assessment and Plan of Treatment: - resolved      SECONDARY DISCHARGE DIAGNOSES  Diagnosis: Weakness  Assessment and Plan of Treatment: secondary to deconditioned state     PRINCIPAL DISCHARGE DIAGNOSIS  Diagnosis: Colon cancer  Assessment and Plan of Treatment: High risk for Sx procedure   F/u with DR Mobley and DR Coleman outPt         SECONDARY DISCHARGE DIAGNOSES  Diagnosis: Weakness  Assessment and Plan of Treatment: secondary to deconditioned state    Diagnosis: Acute exacerbation of CHF (congestive heart failure)  Assessment and Plan of Treatment: C/w Torsemide   monitor weight   F/u with CArdio    Diagnosis: Afib  Assessment and Plan of Treatment: C/w Eliquis and Low dose Toprol   F/u with CArdio    Diagnosis: Diarrhea  Assessment and Plan of Treatment: resolved   CDIff neg x 2, GI PCR neg   C/w Questran daily and Imodium as needed       Diagnosis: Chronic respiratory failure with hypoxia  Assessment and Plan of Treatment: has H/o ILD and severe Pulm HTN   C/w O2 supplementation on 5L NC  C/w Sildenafil   F/u with Pulm

## 2021-09-08 NOTE — PROGRESS NOTE ADULT - SUBJECTIVE AND OBJECTIVE BOX
Reason for Admission: near syncope, fal  History of Present Illness:   89/F with PMHx of pancreatitis s/p ERCP, cirrhosis, DM2, a-fib on Eliquis, CAD s/p PCI, severe pulm HTN, chronic R heart failure, diastolic dysfunction, AS s/p TAVR, OA, HTN, dyslipidemia, hypothyroidism. cecal mass, recently d/maddy from , sent  to the ED from [facility] for c/o Fall, near syncope. Pt states she was trying to put boxes under the chair, when she fell and hit her head, now has a small abrasion. Also c/o left shoulder pain.  She has been become more weak and unsteady recently.      Son, states that she has gained 20 Lbs in [facility].     Also, c/o left shoulder pain.     Medical progress: vPatient feeling much better this am. Denies any HA, CP, SOB. No fevers, chills or shakes. A lto fo soreness around the buttock region. Patient is medically optimized. Will discuss with Dr. Lange transfer options to [facility].   State of mind: regular limited conversation      REVIEW OF SYSTEMS:  - as per HPI    Physical Exam:   GENERAL APPEARANCE:  Deconditioned and frail  T(C): 36.3 (09-07-21 @ 08:16), Max: 36.7 (09-06-21 @ 14:20)  HR: 75 (09-07-21 @ 08:16) (75 - 93)  BP: 105/70 (09-07-21 @ 08:16) (96/62 - 111/57)  RR: 18 (09-07-21 @ 08:16) (16 - 18)  SpO2: 98% (09-07-21 @ 08:16) (94% - 98%)  HEENT:  Head is normocephalic    Skin:  Warm and dry without any rash   NECK:  Supple without lymphadenopathy.   HEART:  Regular rate and rhythm. normal S1 and S2, No M/R/G  LUNGS:  Good ins/exp effort, no W/R/R/C  ABDOMEN:  Soft, nontender, nondistended with good bowel sounds heard  EXTREMITIES:  3+ edema  NEUROLOGICAL:  Gross nonfocal     89/F with PMHx of pancreatitis s/p ERCP, cirrhosis, DM2, a-fib on Eliquis, CAD s/p PCI, severe pulm HTN, chronic R heart failure, diastolic dysfunction, AS s/p TAVR, OA, HTN, dyslipidemia, hypothyroidism. cecal mass, recently d/maddy from , sent  to the ED from [facility] for c/o Fall, near syncope. Pt states she was trying to put boxes under the chair, when she fell and hit her head, now has a small abrasion. Also c/o left shoulder pain.  She has been become more weak and unsteady recently.     Denies any chest pain/sob/abd pain.     Also, c/o left shoulder pain.     # Fall + ? near syncope :  - observe on tele  - serial cardiac enz  - PT eval  - orthostatic vitals    # Severe constipation  - ducolax suppository  - enema  - small dose of morphine as patient is moaning due to rectal pain    #  chronic R heart failure  #  AS s/p TAVR,  #  CHF exacerbation: chronic, diastolic  - cont increased dose of  torsemide 40 mg bid  - supportive O2    # Cecal Mass:   - Patient is anticipated to undergo surgical procedure in a tertiary care facility given patient's advanced age / extensive chronic medical issues  - consult Dr. Smyth    # DM 2:   - ISS    # A fib: permanent  - cont eliquis    #  DVT PPX: on eliquis

## 2021-09-08 NOTE — DISCHARGE NOTE PROVIDER - DETAILS OF MALNUTRITION DIAGNOSIS/DIAGNOSES
This patient has been assessed with a concern for Malnutrition and was treated during this hospitalization for the following Nutrition diagnosis/diagnoses:     -  09/07/2021: Severe protein-calorie malnutrition

## 2021-09-08 NOTE — DISCHARGE NOTE PROVIDER - PROVIDER TOKENS
PROVIDER:[TOKEN:[[id number]]],PROVIDER:[TOKEN:[[id number]]] PROVIDER:[TOKEN:[[id number]]],PROVIDER:[TOKEN:[[id number]],FOLLOWUP:[1 week]],PROVIDER:[TOKEN:[[id number]]]

## 2021-09-09 LAB
ANION GAP SERPL CALC-SCNC: 4 MMOL/L — LOW (ref 5–17)
BUN SERPL-MCNC: 24 MG/DL — HIGH (ref 7–23)
CALCIUM SERPL-MCNC: 8.8 MG/DL — SIGNIFICANT CHANGE UP (ref 8.5–10.1)
CHLORIDE SERPL-SCNC: 101 MMOL/L — SIGNIFICANT CHANGE UP (ref 96–108)
CO2 SERPL-SCNC: 33 MMOL/L — HIGH (ref 22–31)
CREAT SERPL-MCNC: 0.83 MG/DL — SIGNIFICANT CHANGE UP (ref 0.5–1.3)
GLUCOSE SERPL-MCNC: 217 MG/DL — HIGH (ref 70–99)
HCT VFR BLD CALC: 29.9 % — LOW (ref 34.5–45)
HGB BLD-MCNC: 9.4 G/DL — LOW (ref 11.5–15.5)
MCHC RBC-ENTMCNC: 31.4 GM/DL — LOW (ref 32–36)
MCHC RBC-ENTMCNC: 32.4 PG — SIGNIFICANT CHANGE UP (ref 27–34)
MCV RBC AUTO: 103.1 FL — HIGH (ref 80–100)
PLATELET # BLD AUTO: 135 K/UL — LOW (ref 150–400)
POTASSIUM SERPL-MCNC: 3.5 MMOL/L — SIGNIFICANT CHANGE UP (ref 3.5–5.3)
POTASSIUM SERPL-SCNC: 3.5 MMOL/L — SIGNIFICANT CHANGE UP (ref 3.5–5.3)
RBC # BLD: 2.9 M/UL — LOW (ref 3.8–5.2)
RBC # FLD: 17 % — HIGH (ref 10.3–14.5)
SARS-COV-2 RNA SPEC QL NAA+PROBE: SIGNIFICANT CHANGE UP
SODIUM SERPL-SCNC: 138 MMOL/L — SIGNIFICANT CHANGE UP (ref 135–145)
WBC # BLD: 5.96 K/UL — SIGNIFICANT CHANGE UP (ref 3.8–10.5)
WBC # FLD AUTO: 5.96 K/UL — SIGNIFICANT CHANGE UP (ref 3.8–10.5)

## 2021-09-09 PROCEDURE — 99233 SBSQ HOSP IP/OBS HIGH 50: CPT

## 2021-09-09 PROCEDURE — 99232 SBSQ HOSP IP/OBS MODERATE 35: CPT

## 2021-09-09 RX ORDER — ACETAMINOPHEN 500 MG
1000 TABLET ORAL ONCE
Refills: 0 | Status: COMPLETED | OUTPATIENT
Start: 2021-09-09 | End: 2021-09-09

## 2021-09-09 RX ADMIN — Medication 325 MILLIGRAM(S): at 21:38

## 2021-09-09 RX ADMIN — Medication 1 DROP(S): at 05:46

## 2021-09-09 RX ADMIN — Medication 20 MILLIGRAM(S): at 21:38

## 2021-09-09 RX ADMIN — LIDOCAINE 1 PATCH: 4 CREAM TOPICAL at 09:22

## 2021-09-09 RX ADMIN — Medication 650 MILLIGRAM(S): at 08:07

## 2021-09-09 RX ADMIN — Medication 400 MILLIGRAM(S): at 21:38

## 2021-09-09 RX ADMIN — Medication 40 MILLIGRAM(S): at 17:58

## 2021-09-09 RX ADMIN — Medication 81 MILLIGRAM(S): at 09:21

## 2021-09-09 RX ADMIN — Medication 1000 MILLIGRAM(S): at 22:00

## 2021-09-09 RX ADMIN — Medication 2: at 08:26

## 2021-09-09 RX ADMIN — PANTOPRAZOLE SODIUM 40 MILLIGRAM(S): 20 TABLET, DELAYED RELEASE ORAL at 09:22

## 2021-09-09 RX ADMIN — APIXABAN 2.5 MILLIGRAM(S): 2.5 TABLET, FILM COATED ORAL at 21:38

## 2021-09-09 RX ADMIN — Medication 1 PACKET(S): at 09:27

## 2021-09-09 RX ADMIN — SIMVASTATIN 40 MILLIGRAM(S): 20 TABLET, FILM COATED ORAL at 21:38

## 2021-09-09 RX ADMIN — LIDOCAINE 1 PATCH: 4 CREAM TOPICAL at 21:51

## 2021-09-09 RX ADMIN — ESCITALOPRAM OXALATE 10 MILLIGRAM(S): 10 TABLET, FILM COATED ORAL at 09:22

## 2021-09-09 RX ADMIN — Medication 4: at 18:03

## 2021-09-09 RX ADMIN — Medication 200 MILLIGRAM(S): at 09:21

## 2021-09-09 RX ADMIN — Medication 1 DROP(S): at 14:05

## 2021-09-09 RX ADMIN — APIXABAN 2.5 MILLIGRAM(S): 2.5 TABLET, FILM COATED ORAL at 09:21

## 2021-09-09 RX ADMIN — Medication 50 MICROGRAM(S): at 05:46

## 2021-09-09 RX ADMIN — LIDOCAINE 1 PATCH: 4 CREAM TOPICAL at 21:00

## 2021-09-09 RX ADMIN — Medication 4: at 11:55

## 2021-09-09 RX ADMIN — Medication 40 MILLIGRAM(S): at 09:28

## 2021-09-09 RX ADMIN — Medication 325 MILLIGRAM(S): at 09:22

## 2021-09-09 RX ADMIN — Medication 1 DROP(S): at 21:38

## 2021-09-09 RX ADMIN — Medication 20 MILLIGRAM(S): at 09:23

## 2021-09-09 NOTE — PROGRESS NOTE ADULT - ASSESSMENT
Fall- I think it is mechanical in nature given history.  She denies any loss of consciousness.  She was able to give full history this am.  Orthostasis noted.  Recheck orthostatics today.     Chronic decompensated HFPEF- Echo results as stated above.  good diuresis.  AM labs pending to see renal function trend.   Compression stockings to be placed on legs.  continue rest of current cardiac regimen.  Diuresis with close monitoring of the renal function and electrolytes.  Goal potassium of 4 and magnesium of 2.   Strict I/O and daily wt checks. Low sodium diet. Nutrition education.     Atrial fibrillation- rate controlled.  continue current meds including apixaban    Colon mass- malignancy- Colorectal surgery team following pt.    Other medical issues- Management per primary team.   Thank you for allowing me to participate in the care of this patient. Please feel free to contact me with any questions.     Other medical issues- Management per primary team.   Thank you for allowing me to participate in the care of this patient. Please feel free to contact me with any questions.

## 2021-09-09 NOTE — PROGRESS NOTE ADULT - SUBJECTIVE AND OBJECTIVE BOX
Subjective:    Awake, alert. No dyspnea or sputum. No chest pain.    MEDICATIONS  (STANDING):  allopurinol 200 milliGRAM(s) Oral daily  apixaban 2.5 milliGRAM(s) Oral two times a day  artificial  tears Solution 1 Drop(s) Both EYES three times a day  aspirin  chewable 81 milliGRAM(s) Oral daily  dextrose 40% Gel 15 Gram(s) Oral once  dextrose 5%. 1000 milliLiter(s) (50 mL/Hr) IV Continuous <Continuous>  dextrose 5%. 1000 milliLiter(s) (100 mL/Hr) IV Continuous <Continuous>  dextrose 50% Injectable 25 Gram(s) IV Push once  dextrose 50% Injectable 12.5 Gram(s) IV Push once  dextrose 50% Injectable 25 Gram(s) IV Push once  escitalopram 10 milliGRAM(s) Oral daily  ferrous    sulfate 325 milliGRAM(s) Oral two times a day  glucagon  Injectable 1 milliGRAM(s) IntraMuscular once  influenza   Vaccine 0.5 milliLiter(s) IntraMuscular once  insulin lispro (ADMELOG) corrective regimen sliding scale   SubCutaneous three times a day before meals  insulin lispro (ADMELOG) corrective regimen sliding scale   SubCutaneous at bedtime  levothyroxine 50 MICROGram(s) Oral daily  lidocaine   4% Patch 1 Patch Transdermal daily  pantoprazole    Tablet 40 milliGRAM(s) Oral before breakfast  psyllium Powder 1 Packet(s) Oral daily  sildenafil (REVATIO) 20 milliGRAM(s) Oral two times a day  simvastatin 40 milliGRAM(s) Oral at bedtime  torsemide 40 milliGRAM(s) Oral two times a day    MEDICATIONS  (PRN):  acetaminophen   Tablet .. 650 milliGRAM(s) Oral every 6 hours PRN Temp greater or equal to 38C (100.4F), Mild Pain (1 - 3)  bisacodyl Suppository 10 milliGRAM(s) Rectal daily PRN Constipation      Allergies    penicillin (Rash)  penicillins (Other)  sulfa drugs (Rash; Other)    Intolerances        Vital Signs Last 24 Hrs  T(C): 36.8 (08 Sep 2021 23:27), Max: 36.8 (08 Sep 2021 23:27)  T(F): 98.2 (08 Sep 2021 23:27), Max: 98.2 (08 Sep 2021 23:27)  HR: 82 (08 Sep 2021 23:27) (82 - 94)  BP: 91/56 (08 Sep 2021 23:27) (91/56 - 106/55)  BP(mean): --  RR: 18 (08 Sep 2021 16:38) (18 - 18)  SpO2: 96% (08 Sep 2021 23:27) (96% - 97%)    PHYSICAL EXAMINATION:    NECK:  Supple. No lymphadenopathy. Jugular venous pressure not elevated.    HEART:   The cardiac impulse has a normal quality. Irreg. irreg, Nl S1 and S2.    CHEST:  Chest with bibasilar crackles, R>L. Upper zones clear. Normal respiratory effort.  ABDOMEN:  Soft and nontender.   EXTREMITIES:  There is chronic edema.       LABS:                        8.9    5.03  )-----------( 128      ( 08 Sep 2021 07:09 )             27.9     09-08    137  |  103  |  10  ----------------------------<  143<H>  3.1<L>   |  31  |  0.62    Ca    8.2<L>      08 Sep 2021 07:09    TPro  5.5<L>  /  Alb  2.3<L>  /  TBili  0.7  /  DBili  x   /  AST  12<L>  /  ALT  12  /  AlkPhos  71  09-08          RADIOLOGY & ADDITIONAL TESTS:    Assessment and Recommendation:   · Assessment      89 year-old woman, presents after fall, pulmonary consulted for shortness of breath.  --Her fall may have been mechanical fall.  --She has bilateral infiltrates on imaging, previous imaging reviewed many of these changes are chronic and when viewed on CT chest represent ground glass and intersitial infiltrates likely related to pulmonary edema-mild decompensation of HFpEF  --She clinically appears stable from pulmonary standpoint-normal O2 sats          Recommendations:  --Cardiology rec appreciated, strict I/O, daily weight.  Torsemide now BID-monitor lytes carefully. K+ repletion  --Lower extremity edema is improved- would benefit from compression stockings.  --No other significant changes to medical management  --To Consider transfer to [facility] for possible colon resection, due to H/O pulmonary HTN (anesthesia will not clear for general anesthesia in this hospital due to lack of cardiac back-up).

## 2021-09-09 NOTE — PROGRESS NOTE ADULT - SUBJECTIVE AND OBJECTIVE BOX
Patient is a 89y old  Female who presents with a chief complaint of near syncope, fall (06 Sep 2021 17:50)      HPI:  89/F with PMHx of pancreatitis s/p ERCP, cirrhosis, DM2, a-fib on Eliquis, CAD s/p PCI, severe pulm HTN, chronic R heart failure, diastolic dysfunction, AS s/p TAVR, OA, HTN, dyslipidemia, hypothyroidism. cecal mass, recently d/maddy from , sent  to the ED from [facility] for c/o Fall.     -   Pt states that the aide did not put brakes on her chair and left and when she was trying to put boxes under the chair , the chair kept moving even though she tried to put brakes on by herself and she fell down.  Denies any loss of consciousness.  She denies any other symptoms now.  - pt seen this am.  Pt denies any symptoms this am.       PAST MEDICAL & SURGICAL HISTORY:  Diabetes Mellitus Type II    Dyslipidemia    GERD (Gastroesophageal Reflux Disease)    History of Osteoarthritis    Hypertension    CAD (coronary artery disease)    Afib    Hypothyroid    HLD (hyperlipidemia)    History of pancreatitis    Aortic stenosis    H/O: Hysterectomy    H/O: Knee Surgery- right meniscus    History of cholecystectomy    History of appendectomy    H/O total knee replacement, left    S/P IVC filter  Note that Pt does not have  h/o DVT, outPt doppler was read first as +, but after review reported as no DVT. Pt got IVC filer before that        MEDICATIONS  (STANDING):  allopurinol 200 milliGRAM(s) Oral daily  apixaban 2.5 milliGRAM(s) Oral two times a day  artificial  tears Solution 1 Drop(s) Both EYES three times a day  aspirin  chewable 81 milliGRAM(s) Oral daily  dextrose 40% Gel 15 Gram(s) Oral once  dextrose 5%. 1000 milliLiter(s) (50 mL/Hr) IV Continuous <Continuous>  dextrose 5%. 1000 milliLiter(s) (100 mL/Hr) IV Continuous <Continuous>  dextrose 50% Injectable 25 Gram(s) IV Push once  dextrose 50% Injectable 12.5 Gram(s) IV Push once  dextrose 50% Injectable 25 Gram(s) IV Push once  escitalopram 10 milliGRAM(s) Oral daily  ferrous    sulfate 325 milliGRAM(s) Oral two times a day  glucagon  Injectable 1 milliGRAM(s) IntraMuscular once  influenza   Vaccine 0.5 milliLiter(s) IntraMuscular once  insulin lispro (ADMELOG) corrective regimen sliding scale   SubCutaneous three times a day before meals  insulin lispro (ADMELOG) corrective regimen sliding scale   SubCutaneous at bedtime  levothyroxine 50 MICROGram(s) Oral daily  lidocaine   4% Patch 1 Patch Transdermal daily  pantoprazole    Tablet 40 milliGRAM(s) Oral before breakfast  psyllium Powder 1 Packet(s) Oral daily  sildenafil (REVATIO) 20 milliGRAM(s) Oral two times a day  simvastatin 40 milliGRAM(s) Oral at bedtime  torsemide 40 milliGRAM(s) Oral two times a day    MEDICATIONS  (PRN):  acetaminophen   Tablet .. 650 milliGRAM(s) Oral every 6 hours PRN Temp greater or equal to 38C (100.4F), Mild Pain (1 - 3)      FAMILY HISTORY:  FH: diabetes mellitus  both parents    FH: heart disease        SOCIAL HISTORY: no recent smoking      REVIEW OF SYSTEMS:  CONSTITUTIONAL:    No fatigue, malaise, lethargy.  No fever or chills.  RESPIRATORY:  No cough.  No wheeze.  No hemoptysis.  No shortness of breath.  CARDIOVASCULAR:  No chest pains.  No palpitations. No shortness of breath, No orthopnea or PND. c/o dizziness   GASTROINTESTINAL:  No abdominal pain.  No nausea or vomiting.    GENITOURINARY:    No hematuria.    MUSCULOSKELETAL:  No musculoskeletal pain.  No joint swelling.  No arthritis.  NEUROLOGICAL:  No tingling or numbness or weakness.  PSYCHIATRIC:  No confusion  SKIN:  No rashes.            ICU Vital Signs Last 24 Hrs  T(C): 36.8 (08 Sep 2021 23:27), Max: 36.8 (08 Sep 2021 23:27)  T(F): 98.2 (08 Sep 2021 23:27), Max: 98.2 (08 Sep 2021 23:27)  HR: 82 (08 Sep 2021 23:27) (82 - 94)  BP: 91/56 (08 Sep 2021 23:27) (91/56 - 106/55)  BP(mean): --  ABP: --  ABP(mean): --  RR: 18 (08 Sep 2021 16:38) (18 - 18)  SpO2: 96% (08 Sep 2021 23:27) (96% - 97%)      PHYSICAL EXAM-    Constitutional: elderly frail female in no acute distress     Head: Head is normocephalic and atraumatic.      Neck: No JVD.     Cardiovascular: Regular rate and rhythm without S3, S4. No murmurs or rubs are appreciated.      Respiratory: Basal rales b/l     Abdomen: Soft, nontender, nondistended with positive bowel sounds.      Extremity: No tenderness. trace b/l pedal  pitting edema     Neurologic: The patient is alert and oriented.      Skin: No rash, no obvious lesions noted.      Psychiatric: The patient appears to be emotionally stable.      INTERPRETATION OF TELEMETRY: afib 80/min    ECG: atrial fibrillation.   I&O's Detail    06 Sep 2021 07:01  -  07 Sep 2021 07:00  --------------------------------------------------------  IN:  Total IN: 0 mL    OUT:    Incontinent per Collection Bag (mL): 900 mL  Total OUT: 900 mL    Total NET: -900 mL          LABS:                                   8.9    5.03  )-----------( 128      ( 08 Sep 2021 07:09 )             27.9     09-08    137  |  103  |  10  ----------------------------<  143<H>  3.1<L>   |  31  |  0.62    Ca    8.2<L>      08 Sep 2021 07:09    TPro  5.5<L>  /  Alb  2.3<L>  /  TBili  0.7  /  DBili  x   /  AST  12<L>  /  ALT  12  /  AlkPhos  71  09-08        LIVER FUNCTIONS - ( 08 Sep 2021 07:09 )  Alb: 2.3 g/dL / Pro: 5.5 gm/dL / ALK PHOS: 71 U/L / ALT: 12 U/L / AST: 12 U/L / GGT: x                   PT/INR - ( 06 Sep 2021 11:16 )   PT: 19.8 sec;   INR: 1.74 ratio         PTT - ( 06 Sep 2021 11:16 )  PTT:35.4 sec  Urinalysis Basic - ( 06 Sep 2021 11:09 )    Color: Yellow / Appearance: Clear / S.015 / pH: x  Gluc: x / Ketone: Negative  / Bili: Negative / Urobili: Negative mg/dL   Blood: x / Protein: 15 mg/dL / Nitrite: Negative   Leuk Esterase: Trace / RBC: Negative /HPF / WBC 11-25   Sq Epi: x / Non Sq Epi: Moderate / Bacteria: Few      I&O's Summary    06 Sep 2021 07:01  -  07 Sep 2021 07:00  --------------------------------------------------------  IN: 0 mL / OUT: 900 mL / NET: -900 mL      BNP  RADIOLOGY & ADDITIONAL STUDIES:  < from: CT Head No Cont (21 @ 10:56) >    IMPRESSION:    CT HEAD: No acute abnormality. Moderate atrophy most prominent in the BILATERAL temporal lobes.    CT cervical spine:   No vertebral fracture is recognized.  Multilevel degenerative disc disease and spondylosis at C3-4 through C6-7 with loss of disc height and associated degenerative endplate changes. There is narrowing of the LEFT C2-3, LEFT C3-4 LEFT C4-5 and RIGHT C5-C6 and C6-7 neural foramina due to uncovertebral spurring and facet osteophytic hypertrophy.    --- End of Report ---            FRANCESCA BOSTON MD; Attending Radiologist  This document has been electronically signed. Sep  6 2021 11:05AM    < end of copied text >  < from: TTE Echo Complete w/ Contrast w/ Doppler (21 @ 16:09) >     Impression     Summary     Moderate mitral annular calcification is present.   Moderate mitral stenosis is present.   Mild (1+) mitral regurgitation is present.   Well seated prosthetic valve in the aortic position. suboptimal doppler   interrogation   Severe (4+) tricuspid valve regurgitation is present.   The tricuspid valve leaflets appear mildly thickened open well.   Severe pulmonary hypertension.   Normal appearing pulmonic valve structure.   Mild pulmonic valvular regurgitation (1+) is present.     technical difficult study     Signature     ----------------------------------------------------------------   Electronically signed by Venugopal Palla, MD(Interpreting   physician) on 2021 05:38 PM   ----------------------------------------------------------------    < end of copied text >

## 2021-09-10 PROCEDURE — 99232 SBSQ HOSP IP/OBS MODERATE 35: CPT

## 2021-09-10 RX ORDER — CYCLOBENZAPRINE HYDROCHLORIDE 10 MG/1
1 TABLET, FILM COATED ORAL
Qty: 0 | Refills: 0 | DISCHARGE
Start: 2021-09-10

## 2021-09-10 RX ORDER — ACETAMINOPHEN 500 MG
1000 TABLET ORAL ONCE
Refills: 0 | Status: COMPLETED | OUTPATIENT
Start: 2021-09-10 | End: 2021-09-10

## 2021-09-10 RX ORDER — CYCLOBENZAPRINE HYDROCHLORIDE 10 MG/1
5 TABLET, FILM COATED ORAL ONCE
Refills: 0 | Status: COMPLETED | OUTPATIENT
Start: 2021-09-10 | End: 2021-09-10

## 2021-09-10 RX ORDER — CYCLOBENZAPRINE HYDROCHLORIDE 10 MG/1
5 TABLET, FILM COATED ORAL
Refills: 0 | Status: DISCONTINUED | OUTPATIENT
Start: 2021-09-10 | End: 2021-09-16

## 2021-09-10 RX ADMIN — Medication 325 MILLIGRAM(S): at 22:03

## 2021-09-10 RX ADMIN — Medication 50 MICROGRAM(S): at 06:31

## 2021-09-10 RX ADMIN — Medication 1 DROP(S): at 22:03

## 2021-09-10 RX ADMIN — Medication 4: at 17:31

## 2021-09-10 RX ADMIN — LIDOCAINE 1 PATCH: 4 CREAM TOPICAL at 22:02

## 2021-09-10 RX ADMIN — Medication 1 DROP(S): at 14:11

## 2021-09-10 RX ADMIN — Medication 4: at 12:16

## 2021-09-10 RX ADMIN — ESCITALOPRAM OXALATE 10 MILLIGRAM(S): 10 TABLET, FILM COATED ORAL at 09:10

## 2021-09-10 RX ADMIN — APIXABAN 2.5 MILLIGRAM(S): 2.5 TABLET, FILM COATED ORAL at 22:03

## 2021-09-10 RX ADMIN — Medication 1 DROP(S): at 06:31

## 2021-09-10 RX ADMIN — Medication 20 MILLIGRAM(S): at 09:11

## 2021-09-10 RX ADMIN — LIDOCAINE 1 PATCH: 4 CREAM TOPICAL at 09:12

## 2021-09-10 RX ADMIN — CYCLOBENZAPRINE HYDROCHLORIDE 5 MILLIGRAM(S): 10 TABLET, FILM COATED ORAL at 13:34

## 2021-09-10 RX ADMIN — Medication 400 MILLIGRAM(S): at 06:32

## 2021-09-10 RX ADMIN — LIDOCAINE 1 PATCH: 4 CREAM TOPICAL at 19:02

## 2021-09-10 RX ADMIN — Medication 325 MILLIGRAM(S): at 09:11

## 2021-09-10 RX ADMIN — Medication 81 MILLIGRAM(S): at 09:10

## 2021-09-10 RX ADMIN — Medication 2: at 08:07

## 2021-09-10 RX ADMIN — Medication 650 MILLIGRAM(S): at 17:30

## 2021-09-10 RX ADMIN — APIXABAN 2.5 MILLIGRAM(S): 2.5 TABLET, FILM COATED ORAL at 09:11

## 2021-09-10 RX ADMIN — Medication 200 MILLIGRAM(S): at 09:11

## 2021-09-10 RX ADMIN — Medication 1 PACKET(S): at 09:10

## 2021-09-10 RX ADMIN — SIMVASTATIN 40 MILLIGRAM(S): 20 TABLET, FILM COATED ORAL at 22:04

## 2021-09-10 RX ADMIN — PANTOPRAZOLE SODIUM 40 MILLIGRAM(S): 20 TABLET, DELAYED RELEASE ORAL at 09:11

## 2021-09-10 RX ADMIN — Medication 40 MILLIGRAM(S): at 17:34

## 2021-09-10 RX ADMIN — Medication 40 MILLIGRAM(S): at 09:11

## 2021-09-10 NOTE — PROGRESS NOTE ADULT - ASSESSMENT
1. Fall. Likely mechanical. She denies any syncope.   WIll hold off IVF and recheck orthostatic BP readings.    2. Chronic decompensated HFPEF- Echo results as stated above.  Cont diuretic regimen. Compression stockings to be placed on legs.  continue rest of current cardiac regimen.  Diuresis with close monitoring of the renal function and electrolytes.  Goal potassium of 4 and magnesium of 2.   Strict I/O and daily wt checks. Low sodium diet. Nutrition education.     3. Atrial fibrillation- rate controlled.  continue current meds including apixaban    4. Colon mass- malignancy- Colorectal surgery team following pt.   Pt awaiting transfer to [facility] for colon mass assessment- if no metastatic disease, possible localized surgery.     5. DVT proph.

## 2021-09-10 NOTE — PROGRESS NOTE ADULT - SUBJECTIVE AND OBJECTIVE BOX
CC: near syncope, fall (10 Sep 2021 08:05)    HPI: 89/F with PMHx of pancreatitis s/p ERCP, cirrhosis, DM2, a-fib on Eliquis, CAD s/p PCI, severe pulm HTN, chronic R heart failure, diastolic dysfunction, AS s/p TAVR, OA, HTN, dyslipidemia, hypothyroidism, cecal mass, recently d/maddy from , sent  to the ED from [facility] for c/o Fall, near syncope. Pt states she was trying to put boxes under the chair, when she fell and hit her head, now has a small abrasion. Also c/o left shoulder pain.  She has been become more weak and unsteady recently.   As per Son, she has gained 20 Lbs in [facility].   Her trauma work up neg in ED   Pt is on increased dose torsemide for acute on Chronic diastolic CHF.   Pt was evaluated by CR Sx, plan for transfer to [facility] for possible SX, needs higher level of cardiopulmonary perioperative care    INTERVAL HPI/ OVERNIGHT EVENTS: chart reviewed Pt was seen and examined, c/p L sided neck pain, reports got wors eafter fall.  Denies SOB or CP, no abd pain. Plan discussed     Vital Signs Last 24 Hrs  T(C): 36.4 (10 Sep 2021 08:10), Max: 37.6 (09 Sep 2021 18:24)  T(F): 97.6 (10 Sep 2021 08:10), Max: 99.7 (09 Sep 2021 18:24)  HR: 89 (10 Sep 2021 08:10) (89 - 92)  BP: 124/67 (10 Sep 2021 08:10) (99/65 - 124/67)  BP(mean): 81 (10 Sep 2021 08:10) (74 - 81)  RR: 18 (10 Sep 2021 08:10) (18 - 18)  SpO2: 94% (10 Sep 2021 08:10) (94% - 96%)          REVIEW OF SYSTEMS:  All other review of systems is negative unless indicated above.    PHYSICAL EXAM:  General: Well developed; malnourished; frail elderly female,  in no acute distress  Eyes: EOMI; conjunctiva and sclera clear  Head: Normocephalic; atraumatic  ENMT: No nasal discharge; airway clear  Neck: Supple; non tender; no masses  Respiratory: Decreased BS, No wheezes or  rales   Cardiovascular: Irregular rate and rhythm. S1 and S2 Normal;  Gastrointestinal: Soft non-tender non-distended; Normal bowel sounds  Genitourinary: No  suprapubic  tenderness  Extremities: +  edema  Vascular: Peripheral pulses palpable 2+ bilaterally  Neurological: Alert and oriented x2-3, non focal   Musculoskeletal: Normal muscle  strength, L neck  muscle tension, tender to palpation.   Psychiatric: Cooperative       LABS:                           9.4    5.96  )-----------( 135      ( 09 Sep 2021 12:27 )             29.9     09 Sep 2021 12:27    138    |  101    |  24     ----------------------------<  217    3.5     |  33     |  0.83     Ca    8.8        09 Sep 2021 12:27        CAPILLARY BLOOD GLUCOSE  POCT Blood Glucose.: 202 mg/dL (10 Sep 2021 12:15)  POCT Blood Glucose.: 159 mg/dL (10 Sep 2021 08:04)  POCT Blood Glucose.: 202 mg/dL (09 Sep 2021 21:58)  POCT Blood Glucose.: 218 mg/dL (09 Sep 2021 18:02)          MEDICATIONS  (STANDING):  allopurinol 200 milliGRAM(s) Oral daily  apixaban 2.5 milliGRAM(s) Oral two times a day  artificial  tears Solution 1 Drop(s) Both EYES three times a day  aspirin  chewable 81 milliGRAM(s) Oral daily  dextrose 40% Gel 15 Gram(s) Oral once  dextrose 5%. 1000 milliLiter(s) (50 mL/Hr) IV Continuous <Continuous>  dextrose 5%. 1000 milliLiter(s) (100 mL/Hr) IV Continuous <Continuous>  dextrose 50% Injectable 25 Gram(s) IV Push once  dextrose 50% Injectable 12.5 Gram(s) IV Push once  dextrose 50% Injectable 25 Gram(s) IV Push once  escitalopram 10 milliGRAM(s) Oral daily  ferrous    sulfate 325 milliGRAM(s) Oral two times a day  glucagon  Injectable 1 milliGRAM(s) IntraMuscular once  influenza   Vaccine 0.5 milliLiter(s) IntraMuscular once  insulin lispro (ADMELOG) corrective regimen sliding scale   SubCutaneous three times a day before meals  insulin lispro (ADMELOG) corrective regimen sliding scale   SubCutaneous at bedtime  levothyroxine 50 MICROGram(s) Oral daily  lidocaine   4% Patch 1 Patch Transdermal daily  pantoprazole    Tablet 40 milliGRAM(s) Oral before breakfast  psyllium Powder 1 Packet(s) Oral daily  sildenafil (REVATIO) 20 milliGRAM(s) Oral two times a day  simvastatin 40 milliGRAM(s) Oral at bedtime  torsemide 40 milliGRAM(s) Oral two times a day    MEDICATIONS  (PRN):  acetaminophen   Tablet .. 650 milliGRAM(s) Oral every 6 hours PRN Temp greater or equal to 38C (100.4F), Mild Pain (1 - 3)  bisacodyl Suppository 10 milliGRAM(s) Rectal daily PRN Constipation  cyclobenzaprine 5 milliGRAM(s) Oral two times a day PRN Muscle Spasm      RADIOLOGY & ADDITIONAL TESTS:

## 2021-09-10 NOTE — PROGRESS NOTE ADULT - SUBJECTIVE AND OBJECTIVE BOX
Cardiology Progress Note    HPI: 89/F with PMHx of pancreatitis s/p ERCP, cirrhosis, DM2, a-fib on Eliquis, CAD s/p PCI, severe pulm HTN, chronic R heart failure, diastolic dysfunction, AS s/p TAVR, OA, HTN, dyslipidemia, hypothyroidism. cecal mass, recently d/maddy from , sent  to the ED from [facility] for c/o Fall, near syncope. Pt states she was trying to put boxes under the chair, when she fell and hit her head, now has a small abrasion. Also c/o left shoulder pain.  She has been become more weak and unsteady recently.   Pt states that the aide did not put brakes on her chair and left and when she was trying to put boxes under the chair , the chair kept moving even though she tried to put brakes on by herself and she fell down.  Denies any loss of consciousness.  She denies any other symptoms now.    . No events last pm. No CP/SOB.  Rate controlled afib on tele.     9/10. Awaiting transfer to [facility].   No events last pm. No CP/SOB.     PAST MEDICAL & SURGICAL HISTORY:  Diabetes Mellitus Type II    Dyslipidemia    GERD (Gastroesophageal Reflux Disease)    History of Osteoarthritis    Hypertension    CAD (coronary artery disease)    Afib    Hypothyroid    HLD (hyperlipidemia)    History of pancreatitis    Aortic stenosis    H/O: Hysterectomy    H/O: Knee Surgery- right meniscus    History of cholecystectomy    History of appendectomy    H/O total knee replacement, left    S/P IVC filter  Note that Pt does not have  h/o DVT, outPt doppler was read first as +, but after review reported as no DVT. Pt got IVC filer before that        MEDICATIONS  (STANDING):  allopurinol 200 milliGRAM(s) Oral daily  apixaban 2.5 milliGRAM(s) Oral two times a day  artificial  tears Solution 1 Drop(s) Both EYES three times a day  aspirin  chewable 81 milliGRAM(s) Oral daily  dextrose 40% Gel 15 Gram(s) Oral once  dextrose 5%. 1000 milliLiter(s) (50 mL/Hr) IV Continuous <Continuous>  dextrose 5%. 1000 milliLiter(s) (100 mL/Hr) IV Continuous <Continuous>  dextrose 50% Injectable 25 Gram(s) IV Push once  dextrose 50% Injectable 12.5 Gram(s) IV Push once  dextrose 50% Injectable 25 Gram(s) IV Push once  escitalopram 10 milliGRAM(s) Oral daily  ferrous    sulfate 325 milliGRAM(s) Oral two times a day  glucagon  Injectable 1 milliGRAM(s) IntraMuscular once  influenza   Vaccine 0.5 milliLiter(s) IntraMuscular once  insulin lispro (ADMELOG) corrective regimen sliding scale   SubCutaneous three times a day before meals  insulin lispro (ADMELOG) corrective regimen sliding scale   SubCutaneous at bedtime  levothyroxine 50 MICROGram(s) Oral daily  lidocaine   4% Patch 1 Patch Transdermal daily  pantoprazole    Tablet 40 milliGRAM(s) Oral before breakfast  psyllium Powder 1 Packet(s) Oral daily  sildenafil (REVATIO) 20 milliGRAM(s) Oral two times a day  simvastatin 40 milliGRAM(s) Oral at bedtime  torsemide 40 milliGRAM(s) Oral two times a day    MEDICATIONS  (PRN):  acetaminophen   Tablet .. 650 milliGRAM(s) Oral every 6 hours PRN Temp greater or equal to 38C (100.4F), Mild Pain (1 - 3)      FAMILY HISTORY:  FH: diabetes mellitus  both parents    FH: heart disease        SOCIAL HISTORY:    REVIEW OF SYSTEMS:  CONSTITUTIONAL:    No fatigue, malaise, lethargy.  No fever or chills.  RESPIRATORY:  No cough.  No wheeze.  No hemoptysis.  No shortness of breath.  CARDIOVASCULAR:  No chest pains.  No palpitations. No shortness of breath, No orthopnea or PND. c/o dizziness   GASTROINTESTINAL:  No abdominal pain.  No nausea or vomiting.    GENITOURINARY:    No hematuria.    MUSCULOSKELETAL:  No musculoskeletal pain.  No joint swelling.  No arthritis.  NEUROLOGICAL:  No tingling or numbness or weakness.  PSYCHIATRIC:  No confusion  SKIN:  No rashes.            Vital Signs Last 24 Hrs  T(C): 36.4 (07 Sep 2021 05:36), Max: 36.7 (06 Sep 2021 14:20)  T(F): 97.5 (07 Sep 2021 05:36), Max: 98 (06 Sep 2021 14:20)  HR: 79 (07 Sep 2021 05:36) (79 - 97)  BP: 107/65 (07 Sep 2021 05:36) (96/62 - 113/56)  BP(mean): 77 (06 Sep 2021 15:44) (73 - 77)  RR: 18 (07 Sep 2021 05:36) (16 - 22)  SpO2: 95% (07 Sep 2021 05:36) (86% - 99%)    PHYSICAL EXAM-    Constitutional: elderly frail female in no acute distress     Head: Head is normocephalic and atraumatic.      Neck: No JVD.     Cardiovascular: Regular rate and rhythm without S3, S4. No murmurs or rubs are appreciated.      Respiratory: Basal rales b/l     Abdomen: Soft, nontender, nondistended with positive bowel sounds.      Extremity: No tenderness. No  pitting edema     Neurologic: The patient is alert and oriented.      Skin: No rash, no obvious lesions noted.      Psychiatric: The patient appears to be emotionally stable.      INTERPRETATION OF TELEMETRY: afib 80/min    ECG: atrial fibrillation.   I&O's Detail    06 Sep 2021 07:01  -  07 Sep 2021 07:00  --------------------------------------------------------  IN:  Total IN: 0 mL    OUT:    Incontinent per Collection Bag (mL): 900 mL  Total OUT: 900 mL    Total NET: -900 mL          LABS:                        10.0   5.22  )-----------( 139      ( 06 Sep 2021 11:16 )             31.1     09-06    138  |  104  |  13  ----------------------------<  166<H>  3.6   |  29  |  0.86    Ca    8.5      06 Sep 2021 11:16    TPro  6.4  /  Alb  2.7<L>  /  TBili  0.6  /  DBili  x   /  AST  14<L>  /  ALT  15  /  AlkPhos  82  09-06    CARDIAC MARKERS ( 06 Sep 2021 19:13 )  <0.015 ng/mL / x     / x     / x     / x      CARDIAC MARKERS ( 06 Sep 2021 13:41 )  <0.015 ng/mL / x     / x     / x     / x      CARDIAC MARKERS ( 06 Sep 2021 11:16 )  <0.015 ng/mL / x     / x     / x     / x          PT/INR - ( 06 Sep 2021 11:16 )   PT: 19.8 sec;   INR: 1.74 ratio         PTT - ( 06 Sep 2021 11:16 )  PTT:35.4 sec  Urinalysis Basic - ( 06 Sep 2021 11:09 )    Color: Yellow / Appearance: Clear / S.015 / pH: x  Gluc: x / Ketone: Negative  / Bili: Negative / Urobili: Negative mg/dL   Blood: x / Protein: 15 mg/dL / Nitrite: Negative   Leuk Esterase: Trace / RBC: Negative /HPF / WBC 11-25   Sq Epi: x / Non Sq Epi: Moderate / Bacteria: Few      I&O's Summary    06 Sep 2021 07:01  -  07 Sep 2021 07:00  --------------------------------------------------------  IN: 0 mL / OUT: 900 mL / NET: -900 mL      BNP  RADIOLOGY & ADDITIONAL STUDIES:  < from: CT Head No Cont (21 @ 10:56) >    IMPRESSION:    CT HEAD: No acute abnormality. Moderate atrophy most prominent in the BILATERAL temporal lobes.    CT cervical spine:   No vertebral fracture is recognized.  Multilevel degenerative disc disease and spondylosis at C3-4 through C6-7 with loss of disc height and associated degenerative endplate changes. There is narrowing of the LEFT C2-3, LEFT C3-4 LEFT C4-5 and RIGHT C5-C6 and C6-7 neural foramina due to uncovertebral spurring and facet osteophytic hypertrophy.    --- End of Report ---            FRANCESCA BOSTON MD; Attending Radiologist  This document has been electronically signed. Sep  6 2021 11:05AM    < end of copied text >  < from: TTE Echo Complete w/ Contrast w/ Doppler (21 @ 16:09) >     Impression     Summary     Moderate mitral annular calcification is present.   Moderate mitral stenosis is present.   Mild (1+) mitral regurgitation is present.   Well seated prosthetic valve in the aortic position. suboptimal doppler   interrogation   Severe (4+) tricuspid valve regurgitation is present.   The tricuspid valve leaflets appear mildly thickened open well.   Severe pulmonary hypertension.   Normal appearing pulmonic valve structure.   Mild pulmonic valvular regurgitation (1+) is present.     technical difficult study     Signature     ----------------------------------------------------------------   Electronically signed by Venugopal Palla, MD(Interpreting   physician) on 2021 05:38 PM   ----------------------------------------------------------------    < end of copied text >

## 2021-09-10 NOTE — PROGRESS NOTE ADULT - ASSESSMENT
# S/p fall  -likely mechanical   -  tele -  no arrhythmias, AFIB rate controlled   - serial cardiac enz neg   - trauma work: no Fx  - neck pain:  c/w Lidoderm, Tylenol  PO, add flexeril PRN  - PT   - orthostatic vitals neg     # Severe constipation  - ducolax suppository  - required disimpaction 9/7  - enema (mineral oil)  - small dose of morphine as patient is moaning due to rectal pain    #  chronic R heart failure. Severe Pulm HTN   #  AS s/p TAVR,  # Acute on chronic diastolic  CHF exacerbation  - CT chest noted, chronic changes and and CHF   -  cont increased dose of  torsemide 40 mg bid  - supportive O2 via NC   - ECHO: severe pulm HTN, Mod MS, AV prosthesis good Fx, Severe TR. LV mild LVH   - C/w Sildenafil   - Pulm f/u appreciated     # Cecal Mass:   - Patient is anticipated to undergo surgical procedure in a tertiary care facility given patient's advanced age / extensive chronic medical issues including valvular Dz and severe Pulm HTN  - consult Dr. Smyth    # DM 2:   - ISS    # A fib: permanent  - cont eliquis    #  DVT PPX: on eliquis    Dispo: awaiting for transfer to [facility] when bed available

## 2021-09-11 LAB
ANION GAP SERPL CALC-SCNC: 4 MMOL/L — LOW (ref 5–17)
BUN SERPL-MCNC: 26 MG/DL — HIGH (ref 7–23)
CALCIUM SERPL-MCNC: 8.8 MG/DL — SIGNIFICANT CHANGE UP (ref 8.5–10.1)
CHLORIDE SERPL-SCNC: 100 MMOL/L — SIGNIFICANT CHANGE UP (ref 96–108)
CO2 SERPL-SCNC: 35 MMOL/L — HIGH (ref 22–31)
CREAT SERPL-MCNC: 0.74 MG/DL — SIGNIFICANT CHANGE UP (ref 0.5–1.3)
GLUCOSE SERPL-MCNC: 168 MG/DL — HIGH (ref 70–99)
HCT VFR BLD CALC: 30.2 % — LOW (ref 34.5–45)
HGB BLD-MCNC: 9.7 G/DL — LOW (ref 11.5–15.5)
MCHC RBC-ENTMCNC: 32.1 GM/DL — SIGNIFICANT CHANGE UP (ref 32–36)
MCHC RBC-ENTMCNC: 33.1 PG — SIGNIFICANT CHANGE UP (ref 27–34)
MCV RBC AUTO: 103.1 FL — HIGH (ref 80–100)
PLATELET # BLD AUTO: 147 K/UL — LOW (ref 150–400)
POTASSIUM SERPL-MCNC: 3.5 MMOL/L — SIGNIFICANT CHANGE UP (ref 3.5–5.3)
POTASSIUM SERPL-SCNC: 3.5 MMOL/L — SIGNIFICANT CHANGE UP (ref 3.5–5.3)
RBC # BLD: 2.93 M/UL — LOW (ref 3.8–5.2)
RBC # FLD: 16.8 % — HIGH (ref 10.3–14.5)
SODIUM SERPL-SCNC: 139 MMOL/L — SIGNIFICANT CHANGE UP (ref 135–145)
WBC # BLD: 7.11 K/UL — SIGNIFICANT CHANGE UP (ref 3.8–10.5)
WBC # FLD AUTO: 7.11 K/UL — SIGNIFICANT CHANGE UP (ref 3.8–10.5)

## 2021-09-11 PROCEDURE — 99232 SBSQ HOSP IP/OBS MODERATE 35: CPT

## 2021-09-11 RX ADMIN — APIXABAN 2.5 MILLIGRAM(S): 2.5 TABLET, FILM COATED ORAL at 21:46

## 2021-09-11 RX ADMIN — Medication 2: at 08:14

## 2021-09-11 RX ADMIN — Medication 2: at 17:45

## 2021-09-11 RX ADMIN — Medication 40 MILLIGRAM(S): at 10:04

## 2021-09-11 RX ADMIN — Medication 1 DROP(S): at 05:19

## 2021-09-11 RX ADMIN — Medication 1 DROP(S): at 12:12

## 2021-09-11 RX ADMIN — Medication 4: at 12:11

## 2021-09-11 RX ADMIN — LIDOCAINE 1 PATCH: 4 CREAM TOPICAL at 19:45

## 2021-09-11 RX ADMIN — APIXABAN 2.5 MILLIGRAM(S): 2.5 TABLET, FILM COATED ORAL at 10:04

## 2021-09-11 RX ADMIN — Medication 50 MICROGRAM(S): at 05:19

## 2021-09-11 RX ADMIN — Medication 81 MILLIGRAM(S): at 10:04

## 2021-09-11 RX ADMIN — Medication 40 MILLIGRAM(S): at 17:46

## 2021-09-11 RX ADMIN — LIDOCAINE 1 PATCH: 4 CREAM TOPICAL at 10:06

## 2021-09-11 RX ADMIN — Medication 20 MILLIGRAM(S): at 21:46

## 2021-09-11 RX ADMIN — LIDOCAINE 1 PATCH: 4 CREAM TOPICAL at 21:45

## 2021-09-11 RX ADMIN — Medication 1 DROP(S): at 21:46

## 2021-09-11 RX ADMIN — Medication 1 PACKET(S): at 10:04

## 2021-09-11 RX ADMIN — Medication 20 MILLIGRAM(S): at 10:04

## 2021-09-11 RX ADMIN — Medication 650 MILLIGRAM(S): at 21:49

## 2021-09-11 RX ADMIN — Medication 325 MILLIGRAM(S): at 10:04

## 2021-09-11 RX ADMIN — Medication 325 MILLIGRAM(S): at 21:46

## 2021-09-11 RX ADMIN — PANTOPRAZOLE SODIUM 40 MILLIGRAM(S): 20 TABLET, DELAYED RELEASE ORAL at 10:04

## 2021-09-11 RX ADMIN — SIMVASTATIN 40 MILLIGRAM(S): 20 TABLET, FILM COATED ORAL at 21:47

## 2021-09-11 RX ADMIN — Medication 200 MILLIGRAM(S): at 10:04

## 2021-09-11 RX ADMIN — ESCITALOPRAM OXALATE 10 MILLIGRAM(S): 10 TABLET, FILM COATED ORAL at 10:04

## 2021-09-11 RX ADMIN — Medication 650 MILLIGRAM(S): at 22:24

## 2021-09-11 NOTE — PROGRESS NOTE ADULT - ASSESSMENT
# S/p fall  -likely mechanical   -  tele -  no arrhythmias, AFIB rate controlled   - serial cardiac enz neg   - trauma work: no Fx  - neck pain:  c/w Lidoderm, Tylenol  PO, add flexeril PRN  - PT   - orthostatic vitals neg     # Severe constipation  - ducolax suppository  - required disimpaction 9/7  - enema (mineral oil)  - small dose of morphine as patient is moaning due to rectal pain    #  chronic R heart failure. Severe Pulm HTN   #  AS s/p TAVR,  # Acute on chronic diastolic  CHF exacerbation  - CT chest noted, chronic changes and and CHF   -  cont increased dose of  torsemide 40 mg bid  - supportive O2 via NC   - ECHO: severe pulm HTN, Mod MS, AV prosthesis good Fx, Severe TR. LV mild LVH   - C/w Sildenafil   - Pulm f/u appreciated     # Cecal Mass:   - Patient is anticipated to undergo surgical procedure in a tertiary care facility given patient's advanced age / extensive chronic medical issues including valvular Dz and severe Pulm HTN  - consult Dr. Smyth    # DM 2:   - ISS    # A fib: permanent  - cont eliquis    #  DVT PPX: on eliquis    Dispo: awaiting for transfer to [facility] when bed available  # S/p fall  -likely mechanical   -  tele -  no arrhythmias, AFIB rate controlled   - serial cardiac enz neg   - trauma work: no Fx  - neck pain:  c/w Lidoderm, Tylenol  PO, added  flexeril PRN  - PT   - orthostatic vitals neg     # Severe constipation  - ducolax suppository  - required disimpaction 9/7  - enema (mineral oil)  - small dose of morphine as patient is moaning due to rectal pain    #  chronic R heart failure. Severe Pulm HTN   #  AS s/p TAVR,  # Acute on chronic diastolic  CHF exacerbation  - CT chest noted, chronic changes and and CHF   -  cont increased dose of  torsemide 40 mg bid  - supportive O2 via NC   - ECHO: severe pulm HTN, Mod MS, AV prosthesis good Fx, Severe TR. LV mild LVH   - C/w Sildenafil       # Cecal Adenocarcinoma    - Patient is anticipated to undergo surgical procedure in a tertiary care facility given patient's advanced age / extensive chronic medical issues including valvular Dz and severe Pulm HTN  - consult Dr. Smyth    # DM 2:   - ISS    # A fib: permanent  - cont eliquis    #  DVT PPX: on eliquis    Dispo: awaiting for transfer to [facility] when bed available

## 2021-09-11 NOTE — PROGRESS NOTE ADULT - ASSESSMENT
Fall. Likely mechanical. She denies any syncope.   Chronic decompensated HFPEF- Echo results as stated above.  Cont diuretic regimen. Compression stockings to be placed on legs.  continue rest of current cardiac regimen.  Diuresis with close monitoring of the renal function and electrolytes.  Goal potassium of 4 and magnesium of 2.   Strict I/O and daily wt checks. Low sodium diet. Nutrition education.   Atrial fibrillation- rate controlled.  continue current meds including apixaban  Colon mass- malignancy- Colorectal surgery team following pt.   Pt awaiting transfer to [facility] for colon mass assessment- if no metastatic disease, possible localized surgery.

## 2021-09-11 NOTE — PROGRESS NOTE ADULT - SUBJECTIVE AND OBJECTIVE BOX
Cardiology Progress Note    HPI: 89/F with PMHx of pancreatitis s/p ERCP, cirrhosis, DM2, a-fib on Eliquis, CAD s/p PCI, severe pulm HTN, chronic R heart failure, diastolic dysfunction, AS s/p TAVR, OA, HTN, dyslipidemia, hypothyroidism. cecal mass, recently d/maddy from , sent  to the ED from [facility] for c/o Fall, near syncope. Pt states she was trying to put boxes under the chair, when she fell and hit her head, now has a small abrasion. Also c/o left shoulder pain.  She has been become more weak and unsteady recently.   Pt states that the aide did not put brakes on her chair and left and when she was trying to put boxes under the chair , the chair kept moving even though she tried to put brakes on by herself and she fell down.  Denies any loss of consciousness.  She denies any other symptoms now.    . No events last pm. No CP/SOB.  Rate controlled afib on tele.     9/10. Awaiting transfer to [facility].   No events last pm. No CP/SOB.      Doing ok, no symptoms, no events    PAST MEDICAL & SURGICAL HISTORY:  Diabetes Mellitus Type II    Dyslipidemia    GERD (Gastroesophageal Reflux Disease)    History of Osteoarthritis    Hypertension    CAD (coronary artery disease)    Afib    Hypothyroid    HLD (hyperlipidemia)    History of pancreatitis    Aortic stenosis    H/O: Hysterectomy    H/O: Knee Surgery- right meniscus    History of cholecystectomy    History of appendectomy    H/O total knee replacement, left    S/P IVC filter  Note that Pt does not have  h/o DVT, outPt doppler was read first as +, but after review reported as no DVT. Pt got IVC filer before that        MEDICATIONS  (STANDING):  allopurinol 200 milliGRAM(s) Oral daily  apixaban 2.5 milliGRAM(s) Oral two times a day  artificial  tears Solution 1 Drop(s) Both EYES three times a day  aspirin  chewable 81 milliGRAM(s) Oral daily  dextrose 40% Gel 15 Gram(s) Oral once  dextrose 5%. 1000 milliLiter(s) (50 mL/Hr) IV Continuous <Continuous>  dextrose 5%. 1000 milliLiter(s) (100 mL/Hr) IV Continuous <Continuous>  dextrose 50% Injectable 25 Gram(s) IV Push once  dextrose 50% Injectable 12.5 Gram(s) IV Push once  dextrose 50% Injectable 25 Gram(s) IV Push once  escitalopram 10 milliGRAM(s) Oral daily  ferrous    sulfate 325 milliGRAM(s) Oral two times a day  glucagon  Injectable 1 milliGRAM(s) IntraMuscular once  influenza   Vaccine 0.5 milliLiter(s) IntraMuscular once  insulin lispro (ADMELOG) corrective regimen sliding scale   SubCutaneous three times a day before meals  insulin lispro (ADMELOG) corrective regimen sliding scale   SubCutaneous at bedtime  levothyroxine 50 MICROGram(s) Oral daily  lidocaine   4% Patch 1 Patch Transdermal daily  pantoprazole    Tablet 40 milliGRAM(s) Oral before breakfast  psyllium Powder 1 Packet(s) Oral daily  sildenafil (REVATIO) 20 milliGRAM(s) Oral two times a day  simvastatin 40 milliGRAM(s) Oral at bedtime  torsemide 40 milliGRAM(s) Oral two times a day    MEDICATIONS  (PRN):  acetaminophen   Tablet .. 650 milliGRAM(s) Oral every 6 hours PRN Temp greater or equal to 38C (100.4F), Mild Pain (1 - 3)      FAMILY HISTORY:  FH: diabetes mellitus  both parents    FH: heart disease        SOCIAL HISTORY:    REVIEW OF SYSTEMS:  CONSTITUTIONAL:    No fatigue, malaise, lethargy.  No fever or chills.  RESPIRATORY:  No cough.  No wheeze.  No hemoptysis.  No shortness of breath.  CARDIOVASCULAR:  No chest pains.  No palpitations. No shortness of breath, No orthopnea or PND. c/o dizziness   GASTROINTESTINAL:  No abdominal pain.  No nausea or vomiting.    GENITOURINARY:    No hematuria.    MUSCULOSKELETAL:  No musculoskeletal pain.  No joint swelling.  No arthritis.  NEUROLOGICAL:  No tingling or numbness or weakness.  PSYCHIATRIC:  No confusion  SKIN:  No rashes.            Vital Signs Last 24 Hrs  T(C): 36.4 (07 Sep 2021 05:36), Max: 36.7 (06 Sep 2021 14:20)  T(F): 97.5 (07 Sep 2021 05:36), Max: 98 (06 Sep 2021 14:20)  HR: 79 (07 Sep 2021 05:36) (79 - 97)  BP: 107/65 (07 Sep 2021 05:36) (96/62 - 113/56)  BP(mean): 77 (06 Sep 2021 15:44) (73 - 77)  RR: 18 (07 Sep 2021 05:36) (16 - 22)  SpO2: 95% (07 Sep 2021 05:36) (86% - 99%)    PHYSICAL EXAM-    Constitutional: elderly frail female in no acute distress     Head: Head is normocephalic and atraumatic.      Neck: No JVD.     Cardiovascular: Regular rate and rhythm without S3, S4. No murmurs or rubs are appreciated.      Respiratory: Basal rales b/l     Abdomen: Soft, nontender, nondistended with positive bowel sounds.      Extremity: No tenderness. No  pitting edema     Neurologic: The patient is alert and oriented.      Skin: No rash, no obvious lesions noted.      Psychiatric: The patient appears to be emotionally stable.      INTERPRETATION OF TELEMETRY: afib 80/min    ECG: atrial fibrillation.   I&O's Detail    06 Sep 2021 07:01  -  07 Sep 2021 07:00  --------------------------------------------------------  IN:  Total IN: 0 mL    OUT:    Incontinent per Collection Bag (mL): 900 mL  Total OUT: 900 mL    Total NET: -900 mL          LABS:                        10.0   5.22  )-----------( 139      ( 06 Sep 2021 11:16 )             31.1     09-06    138  |  104  |  13  ----------------------------<  166<H>  3.6   |  29  |  0.86    Ca    8.5      06 Sep 2021 11:16    TPro  6.4  /  Alb  2.7<L>  /  TBili  0.6  /  DBili  x   /  AST  14<L>  /  ALT  15  /  AlkPhos  82  09-06    CARDIAC MARKERS ( 06 Sep 2021 19:13 )  <0.015 ng/mL / x     / x     / x     / x      CARDIAC MARKERS ( 06 Sep 2021 13:41 )  <0.015 ng/mL / x     / x     / x     / x      CARDIAC MARKERS ( 06 Sep 2021 11:16 )  <0.015 ng/mL / x     / x     / x     / x          PT/INR - ( 06 Sep 2021 11:16 )   PT: 19.8 sec;   INR: 1.74 ratio         PTT - ( 06 Sep 2021 11:16 )  PTT:35.4 sec  Urinalysis Basic - ( 06 Sep 2021 11:09 )    Color: Yellow / Appearance: Clear / S.015 / pH: x  Gluc: x / Ketone: Negative  / Bili: Negative / Urobili: Negative mg/dL   Blood: x / Protein: 15 mg/dL / Nitrite: Negative   Leuk Esterase: Trace / RBC: Negative /HPF / WBC 11-25   Sq Epi: x / Non Sq Epi: Moderate / Bacteria: Few      I&O's Summary    06 Sep 2021 07:01  -  07 Sep 2021 07:00  --------------------------------------------------------  IN: 0 mL / OUT: 900 mL / NET: -900 mL      BNP  RADIOLOGY & ADDITIONAL STUDIES:  < from: CT Head No Cont (21 @ 10:56) >    IMPRESSION:    CT HEAD: No acute abnormality. Moderate atrophy most prominent in the BILATERAL temporal lobes.    CT cervical spine:   No vertebral fracture is recognized.  Multilevel degenerative disc disease and spondylosis at C3-4 through C6-7 with loss of disc height and associated degenerative endplate changes. There is narrowing of the LEFT C2-3, LEFT C3-4 LEFT C4-5 and RIGHT C5-C6 and C6-7 neural foramina due to uncovertebral spurring and facet osteophytic hypertrophy.    --- End of Report ---            FRANCESCA BOSTON MD; Attending Radiologist  This document has been electronically signed. Sep  6 2021 11:05AM    < end of copied text >  < from: TTE Echo Complete w/ Contrast w/ Doppler (21 @ 16:09) >     Impression     Summary     Moderate mitral annular calcification is present.   Moderate mitral stenosis is present.   Mild (1+) mitral regurgitation is present.   Well seated prosthetic valve in the aortic position. suboptimal doppler   interrogation   Severe (4+) tricuspid valve regurgitation is present.   The tricuspid valve leaflets appear mildly thickened open well.   Severe pulmonary hypertension.   Normal appearing pulmonic valve structure.   Mild pulmonic valvular regurgitation (1+) is present.     technical difficult study     Signature     ----------------------------------------------------------------   Electronically signed by Venugopal Palla, MD(Interpreting   physician) on 2021 05:38 PM   ----------------------------------------------------------------    < end of copied text >

## 2021-09-11 NOTE — PROGRESS NOTE ADULT - SUBJECTIVE AND OBJECTIVE BOX
CC: near syncope, fall (10 Sep 2021 08:05)    HPI: 89/F with PMHx of pancreatitis s/p ERCP, cirrhosis, DM2, a-fib on Eliquis, CAD s/p PCI, severe pulm HTN, chronic R heart failure, diastolic dysfunction, AS s/p TAVR, OA, HTN, dyslipidemia, hypothyroidism, cecal mass, recently d/maddy from , sent  to the ED from [facility] for c/o Fall, near syncope. Pt states she was trying to put boxes under the chair, when she fell and hit her head, now has a small abrasion. Also c/o left shoulder pain.  She has been become more weak and unsteady recently.   As per Son, she has gained 20 Lbs in [facility].   Her trauma work up neg in ED   Pt is on increased dose torsemide for acute on Chronic diastolic CHF.   Pt was evaluated by CR Sx, plan for transfer to [facility] for possible SX, needs higher level of cardiopulmonary perioperative care    INTERVAL HPI/ OVERNIGHT EVENTS: chart reviewed Pt was seen and examined, c/p L sided neck pain, reports got wors eafter fall.  Denies SOB or CP, no abd pain. Plan discussed     Vital Signs Last 24 Hrs  T(C): 36.6 (11 Sep 2021 09:04), Max: 37 (10 Sep 2021 21:48)  T(F): 97.8 (11 Sep 2021 09:04), Max: 98.6 (10 Sep 2021 21:48)  HR: 88 (11 Sep 2021 09:04) (80 - 98)  BP: 114/64 (11 Sep 2021 09:04) (96/45 - 130/70)  BP(mean): 79 (11 Sep 2021 09:04) (79 - 79)  RR: 18 (11 Sep 2021 09:04) (18 - 18)  SpO2: 95% (11 Sep 2021 09:04) (94% - 100%))        REVIEW OF SYSTEMS:  All other review of systems is negative unless indicated above.    PHYSICAL EXAM:  General: Well developed; malnourished; frail elderly female,  in no acute distress  Eyes: EOMI; conjunctiva and sclera clear  Head: Normocephalic; atraumatic  ENMT: No nasal discharge; airway clear  Neck: Supple; non tender; no masses  Respiratory: Decreased BS, No wheezes or  rales   Cardiovascular: Irregular rate and rhythm. S1 and S2 Normal;  Gastrointestinal: Soft non-tender non-distended; Normal bowel sounds  Genitourinary: No  suprapubic  tenderness  Extremities: +  edema  Vascular: Peripheral pulses palpable 2+ bilaterally  Neurological: Alert and oriented x2-3, non focal   Musculoskeletal: Normal muscle  strength, L neck  muscle tension, tender to palpation.   Psychiatric: Cooperative       LABS:                           9.4    5.96  )-----------( 135      ( 09 Sep 2021 12:27 )             29.9     09 Sep 2021 12:27    138    |  101    |  24     ----------------------------<  217    3.5     |  33     |  0.83     Ca    8.8        09 Sep 2021 12:27      CAPILLARY BLOOD GLUCOSE:  POCT Blood Glucose.: 156 mg/dL (11 Sep 2021 07:38)  POCT Blood Glucose.: 222 mg/dL (10 Sep 2021 22:01)  POCT Blood Glucose.: 214 mg/dL (10 Sep 2021 17:24)  POCT Blood Glucose.: 202 mg/dL (10 Sep 2021 12:15)          MEDICATIONS  (STANDING):  allopurinol 200 milliGRAM(s) Oral daily  apixaban 2.5 milliGRAM(s) Oral two times a day  artificial  tears Solution 1 Drop(s) Both EYES three times a day  aspirin  chewable 81 milliGRAM(s) Oral daily  dextrose 40% Gel 15 Gram(s) Oral once  dextrose 5%. 1000 milliLiter(s) (50 mL/Hr) IV Continuous <Continuous>  dextrose 5%. 1000 milliLiter(s) (100 mL/Hr) IV Continuous <Continuous>  dextrose 50% Injectable 25 Gram(s) IV Push once  dextrose 50% Injectable 12.5 Gram(s) IV Push once  dextrose 50% Injectable 25 Gram(s) IV Push once  escitalopram 10 milliGRAM(s) Oral daily  ferrous    sulfate 325 milliGRAM(s) Oral two times a day  glucagon  Injectable 1 milliGRAM(s) IntraMuscular once  influenza   Vaccine 0.5 milliLiter(s) IntraMuscular once  insulin lispro (ADMELOG) corrective regimen sliding scale   SubCutaneous three times a day before meals  insulin lispro (ADMELOG) corrective regimen sliding scale   SubCutaneous at bedtime  levothyroxine 50 MICROGram(s) Oral daily  lidocaine   4% Patch 1 Patch Transdermal daily  pantoprazole    Tablet 40 milliGRAM(s) Oral before breakfast  psyllium Powder 1 Packet(s) Oral daily  sildenafil (REVATIO) 20 milliGRAM(s) Oral two times a day  simvastatin 40 milliGRAM(s) Oral at bedtime  torsemide 40 milliGRAM(s) Oral two times a day    MEDICATIONS  (PRN):  acetaminophen   Tablet .. 650 milliGRAM(s) Oral every 6 hours PRN Temp greater or equal to 38C (100.4F), Mild Pain (1 - 3)  bisacodyl Suppository 10 milliGRAM(s) Rectal daily PRN Constipation  cyclobenzaprine 5 milliGRAM(s) Oral two times a day PRN Muscle Spasm      RADIOLOGY & ADDITIONAL TESTS: CC: near syncope, fall (10 Sep 2021 08:05)    HPI: 89/F with PMHx of pancreatitis s/p ERCP, cirrhosis, DM2, a-fib on Eliquis, CAD s/p PCI, severe pulm HTN, chronic R heart failure, diastolic dysfunction, AS s/p TAVR, OA, HTN, dyslipidemia, hypothyroidism, cecal mass, recently d/maddy from , sent  to the ED from [facility] for c/o Fall, near syncope. Pt states she was trying to put boxes under the chair, when she fell and hit her head, now has a small abrasion. Also c/o left shoulder pain.  She has been become more weak and unsteady recently.   As per Son, she has gained 20 Lbs in [facility].   Her trauma work up neg in ED   Pt is on increased dose torsemide for acute on Chronic diastolic CHF.   Pt was evaluated by CR Sx, plan for transfer to [facility] for possible SX, needs higher level of cardiopulmonary perioperative care    INTERVAL HPI/ OVERNIGHT EVENTS: Pt was seen and examined, reports still with l neck pain but better after medication and warm pack. Denies SOB, at baseline, no Chest pain. Awaiting for transfer       Vital Signs Last 24 Hrs  T(C): 36.6 (11 Sep 2021 09:04), Max: 37 (10 Sep 2021 21:48)  T(F): 97.8 (11 Sep 2021 09:04), Max: 98.6 (10 Sep 2021 21:48)  HR: 88 (11 Sep 2021 09:04) (80 - 98)  BP: 114/64 (11 Sep 2021 09:04) (96/45 - 130/70)  BP(mean): 79 (11 Sep 2021 09:04) (79 - 79)  RR: 18 (11 Sep 2021 09:04) (18 - 18)  SpO2: 95% (11 Sep 2021 09:04) (94% - 100%))        REVIEW OF SYSTEMS:  All other review of systems is negative unless indicated above.    PHYSICAL EXAM:  General: Well developed; malnourished; frail elderly female,  in no acute distress on NC   Eyes: EOMI; conjunctiva and sclera clear  Head: Normocephalic; atraumatic  ENMT: No nasal discharge; airway clear  Neck: Supple; non tender; no masses  Respiratory: Decreased BS, No wheezes, crepitations at bases b/l  Cardiovascular: Irregular rate and rhythm. S1 and S2 Normal;  Gastrointestinal: Soft non-tender non-distended; Normal bowel sounds  Genitourinary: No  suprapubic  tenderness  Extremities: +  edema  Vascular: Peripheral pulses palpable 2+ bilaterally  Neurological: Alert and oriented x2-3, non focal   Musculoskeletal: Normal muscle  strength, L neck  muscle tension, tender to palpation.   Psychiatric: Cooperative       LABS:                           9.7    7.11  )-----------( 147      ( 11 Sep 2021 09:32 )             30.2     09-11    139  |  100  |  26<H>  ----------------------------<  168<H>  3.5   |  35<H>  |  0.74    Ca    8.8      11 Sep 2021 09:32                              9.4    5.96  )-----------( 135      ( 09 Sep 2021 12:27 )             29.9     09 Sep 2021 12:27    138    |  101    |  24     ----------------------------<  217    3.5     |  33     |  0.83     Ca    8.8        09 Sep 2021 12:27        CAPILLARY BLOOD GLUCOSE:  POCT Blood Glucose.: 209 mg/dL (11 Sep 2021 12:10)  POCT Blood Glucose.: 156 mg/dL (11 Sep 2021 07:38)  POCT Blood Glucose.: 222 mg/dL (10 Sep 2021 22:01)        MEDICATIONS  (STANDING):  allopurinol 200 milliGRAM(s) Oral daily  apixaban 2.5 milliGRAM(s) Oral two times a day  artificial  tears Solution 1 Drop(s) Both EYES three times a day  aspirin  chewable 81 milliGRAM(s) Oral daily  dextrose 40% Gel 15 Gram(s) Oral once  dextrose 5%. 1000 milliLiter(s) (50 mL/Hr) IV Continuous <Continuous>  dextrose 5%. 1000 milliLiter(s) (100 mL/Hr) IV Continuous <Continuous>  dextrose 50% Injectable 25 Gram(s) IV Push once  dextrose 50% Injectable 12.5 Gram(s) IV Push once  dextrose 50% Injectable 25 Gram(s) IV Push once  escitalopram 10 milliGRAM(s) Oral daily  ferrous    sulfate 325 milliGRAM(s) Oral two times a day  glucagon  Injectable 1 milliGRAM(s) IntraMuscular once  influenza   Vaccine 0.5 milliLiter(s) IntraMuscular once  insulin lispro (ADMELOG) corrective regimen sliding scale   SubCutaneous three times a day before meals  insulin lispro (ADMELOG) corrective regimen sliding scale   SubCutaneous at bedtime  levothyroxine 50 MICROGram(s) Oral daily  lidocaine   4% Patch 1 Patch Transdermal daily  pantoprazole    Tablet 40 milliGRAM(s) Oral before breakfast  psyllium Powder 1 Packet(s) Oral daily  sildenafil (REVATIO) 20 milliGRAM(s) Oral two times a day  simvastatin 40 milliGRAM(s) Oral at bedtime  torsemide 40 milliGRAM(s) Oral two times a day    MEDICATIONS  (PRN):  acetaminophen   Tablet .. 650 milliGRAM(s) Oral every 6 hours PRN Temp greater or equal to 38C (100.4F), Mild Pain (1 - 3)  bisacodyl Suppository 10 milliGRAM(s) Rectal daily PRN Constipation  cyclobenzaprine 5 milliGRAM(s) Oral two times a day PRN Muscle Spasm      RADIOLOGY & ADDITIONAL TESTS:

## 2021-09-11 NOTE — PROVIDER CONTACT NOTE (OTHER) - BACKGROUND
Patient admitted with weakness, s/p fall. 9/7 patient was constipated, received manual disimpaction, enema, suppository. BM returned to normal. 9/11 patient received metamucil, diarrhea following.

## 2021-09-12 LAB
ANION GAP SERPL CALC-SCNC: 5 MMOL/L — SIGNIFICANT CHANGE UP (ref 5–17)
BUN SERPL-MCNC: 23 MG/DL — SIGNIFICANT CHANGE UP (ref 7–23)
CALCIUM SERPL-MCNC: 8.4 MG/DL — LOW (ref 8.5–10.1)
CHLORIDE SERPL-SCNC: 97 MMOL/L — SIGNIFICANT CHANGE UP (ref 96–108)
CO2 SERPL-SCNC: 33 MMOL/L — HIGH (ref 22–31)
CREAT SERPL-MCNC: 0.76 MG/DL — SIGNIFICANT CHANGE UP (ref 0.5–1.3)
GLUCOSE SERPL-MCNC: 209 MG/DL — HIGH (ref 70–99)
HCT VFR BLD CALC: 28.1 % — LOW (ref 34.5–45)
HGB BLD-MCNC: 9.2 G/DL — LOW (ref 11.5–15.5)
MCHC RBC-ENTMCNC: 32.7 GM/DL — SIGNIFICANT CHANGE UP (ref 32–36)
MCHC RBC-ENTMCNC: 33 PG — SIGNIFICANT CHANGE UP (ref 27–34)
MCV RBC AUTO: 100.7 FL — HIGH (ref 80–100)
PLATELET # BLD AUTO: 160 K/UL — SIGNIFICANT CHANGE UP (ref 150–400)
POTASSIUM SERPL-MCNC: 3.3 MMOL/L — LOW (ref 3.5–5.3)
POTASSIUM SERPL-SCNC: 3.3 MMOL/L — LOW (ref 3.5–5.3)
RBC # BLD: 2.79 M/UL — LOW (ref 3.8–5.2)
RBC # FLD: 16.3 % — HIGH (ref 10.3–14.5)
SODIUM SERPL-SCNC: 135 MMOL/L — SIGNIFICANT CHANGE UP (ref 135–145)
WBC # BLD: 7.49 K/UL — SIGNIFICANT CHANGE UP (ref 3.8–10.5)
WBC # FLD AUTO: 7.49 K/UL — SIGNIFICANT CHANGE UP (ref 3.8–10.5)

## 2021-09-12 PROCEDURE — 99232 SBSQ HOSP IP/OBS MODERATE 35: CPT

## 2021-09-12 RX ORDER — POTASSIUM CHLORIDE 20 MEQ
40 PACKET (EA) ORAL ONCE
Refills: 0 | Status: COMPLETED | OUTPATIENT
Start: 2021-09-12 | End: 2021-09-12

## 2021-09-12 RX ADMIN — APIXABAN 2.5 MILLIGRAM(S): 2.5 TABLET, FILM COATED ORAL at 21:26

## 2021-09-12 RX ADMIN — Medication 650 MILLIGRAM(S): at 05:00

## 2021-09-12 RX ADMIN — Medication 20 MILLIGRAM(S): at 21:26

## 2021-09-12 RX ADMIN — Medication 50 MICROGRAM(S): at 05:55

## 2021-09-12 RX ADMIN — Medication 200 MILLIGRAM(S): at 11:08

## 2021-09-12 RX ADMIN — APIXABAN 2.5 MILLIGRAM(S): 2.5 TABLET, FILM COATED ORAL at 11:08

## 2021-09-12 RX ADMIN — ESCITALOPRAM OXALATE 10 MILLIGRAM(S): 10 TABLET, FILM COATED ORAL at 11:08

## 2021-09-12 RX ADMIN — CYCLOBENZAPRINE HYDROCHLORIDE 5 MILLIGRAM(S): 10 TABLET, FILM COATED ORAL at 21:31

## 2021-09-12 RX ADMIN — Medication 20 MILLIGRAM(S): at 11:09

## 2021-09-12 RX ADMIN — LIDOCAINE 1 PATCH: 4 CREAM TOPICAL at 11:24

## 2021-09-12 RX ADMIN — Medication 650 MILLIGRAM(S): at 04:23

## 2021-09-12 RX ADMIN — CYCLOBENZAPRINE HYDROCHLORIDE 5 MILLIGRAM(S): 10 TABLET, FILM COATED ORAL at 17:04

## 2021-09-12 RX ADMIN — Medication 325 MILLIGRAM(S): at 21:26

## 2021-09-12 RX ADMIN — Medication 1 DROP(S): at 21:26

## 2021-09-12 RX ADMIN — LIDOCAINE 1 PATCH: 4 CREAM TOPICAL at 11:25

## 2021-09-12 RX ADMIN — Medication 1 DROP(S): at 13:58

## 2021-09-12 RX ADMIN — Medication 4: at 16:59

## 2021-09-12 RX ADMIN — SIMVASTATIN 40 MILLIGRAM(S): 20 TABLET, FILM COATED ORAL at 21:27

## 2021-09-12 RX ADMIN — Medication 40 MILLIGRAM(S): at 19:29

## 2021-09-12 RX ADMIN — PANTOPRAZOLE SODIUM 40 MILLIGRAM(S): 20 TABLET, DELAYED RELEASE ORAL at 11:07

## 2021-09-12 RX ADMIN — Medication 325 MILLIGRAM(S): at 11:07

## 2021-09-12 RX ADMIN — Medication 81 MILLIGRAM(S): at 11:07

## 2021-09-12 RX ADMIN — Medication 40 MILLIEQUIVALENT(S): at 19:54

## 2021-09-12 RX ADMIN — Medication 1 DROP(S): at 05:55

## 2021-09-12 RX ADMIN — Medication 40 MILLIGRAM(S): at 11:08

## 2021-09-12 RX ADMIN — Medication 2: at 08:44

## 2021-09-12 NOTE — PROGRESS NOTE ADULT - SUBJECTIVE AND OBJECTIVE BOX
CC: near syncope, fall (10 Sep 2021 08:05)    HPI: 89/F with PMHx of pancreatitis s/p ERCP, cirrhosis, DM2, a-fib on Eliquis, CAD s/p PCI, severe pulm HTN, chronic R heart failure, diastolic dysfunction, AS s/p TAVR, OA, HTN, dyslipidemia, hypothyroidism, cecal mass, recently d/maddy from , sent  to the ED from [facility] for c/o Fall, near syncope. Pt states she was trying to put boxes under the chair, when she fell and hit her head, now has a small abrasion. Also c/o left shoulder pain.  She has been become more weak and unsteady recently.   As per Son, she has gained 20 Lbs in [facility].   Her trauma work up neg in ED   Pt is on increased dose torsemide for acute on Chronic diastolic CHF.   Pt was evaluated by CR Sx, plan for transfer to [facility] for possible SX, needs higher level of cardiopulmonary perioperative care    INTERVAL HPI/ OVERNIGHT EVENTS: Pt was seen and examined, reports still with l neck pain but better after medication and warm pack. Denies SOB, at baseline, no Chest pain. Awaiting for transfer     Vital Signs Last 24 Hrs  T(C): 36.6 (12 Sep 2021 08:08), Max: 36.9 (11 Sep 2021 21:39)  T(F): 97.8 (12 Sep 2021 08:08), Max: 98.4 (11 Sep 2021 21:39)  HR: 80 (12 Sep 2021 11:25) (76 - 117)  BP: 100/65 (12 Sep 2021 11:25) (100/65 - 112/65)  BP(mean): 78 (11 Sep 2021 21:39) (70 - 78)  RR: 18 (12 Sep 2021 08:08) (18 - 18)  SpO2: 98% (12 Sep 2021 08:08) (94% - 99%)      REVIEW OF SYSTEMS:  All other review of systems is negative unless indicated above.    PHYSICAL EXAM:  General: Well developed; malnourished; frail elderly female,  in no acute distress on NC   Eyes: EOMI; conjunctiva and sclera clear  Head: Normocephalic; atraumatic  ENMT: No nasal discharge; airway clear  Neck: Supple; non tender; no masses  Respiratory: Decreased BS, No wheezes, crepitations at bases b/l  Cardiovascular: Irregular rate and rhythm. S1 and S2 Normal;  Gastrointestinal: Soft non-tender non-distended; Normal bowel sounds  Genitourinary: No  suprapubic  tenderness  Extremities: +  edema  Vascular: Peripheral pulses palpable 2+ bilaterally  Neurological: Alert and oriented x2-3, non focal   Musculoskeletal: Normal muscle  strength, L neck  muscle tension, tender to palpation.   Psychiatric: Cooperative       LABS:                           9.7    7.11  )-----------( 147      ( 11 Sep 2021 09:32 )             30.2     09-11    139  |  100  |  26<H>  ----------------------------<  168<H>  3.5   |  35<H>  |  0.74    Ca    8.8      11 Sep 2021 09:32                              9.4    5.96  )-----------( 135      ( 09 Sep 2021 12:27 )             29.9     09 Sep 2021 12:27    138    |  101    |  24     ----------------------------<  217    3.5     |  33     |  0.83     Ca    8.8        09 Sep 2021 12:27        CAPILLARY BLOOD GLUCOSE:  POCT Blood Glucose.: 209 mg/dL (11 Sep 2021 12:10)  POCT Blood Glucose.: 156 mg/dL (11 Sep 2021 07:38)  POCT Blood Glucose.: 222 mg/dL (10 Sep 2021 22:01)        MEDICATIONS  (STANDING):  allopurinol 200 milliGRAM(s) Oral daily  apixaban 2.5 milliGRAM(s) Oral two times a day  artificial  tears Solution 1 Drop(s) Both EYES three times a day  aspirin  chewable 81 milliGRAM(s) Oral daily  dextrose 40% Gel 15 Gram(s) Oral once  dextrose 5%. 1000 milliLiter(s) (50 mL/Hr) IV Continuous <Continuous>  dextrose 5%. 1000 milliLiter(s) (100 mL/Hr) IV Continuous <Continuous>  dextrose 50% Injectable 25 Gram(s) IV Push once  dextrose 50% Injectable 12.5 Gram(s) IV Push once  dextrose 50% Injectable 25 Gram(s) IV Push once  escitalopram 10 milliGRAM(s) Oral daily  ferrous    sulfate 325 milliGRAM(s) Oral two times a day  glucagon  Injectable 1 milliGRAM(s) IntraMuscular once  influenza   Vaccine 0.5 milliLiter(s) IntraMuscular once  insulin lispro (ADMELOG) corrective regimen sliding scale   SubCutaneous three times a day before meals  insulin lispro (ADMELOG) corrective regimen sliding scale   SubCutaneous at bedtime  levothyroxine 50 MICROGram(s) Oral daily  lidocaine   4% Patch 1 Patch Transdermal daily  pantoprazole    Tablet 40 milliGRAM(s) Oral before breakfast  psyllium Powder 1 Packet(s) Oral daily  sildenafil (REVATIO) 20 milliGRAM(s) Oral two times a day  simvastatin 40 milliGRAM(s) Oral at bedtime  torsemide 40 milliGRAM(s) Oral two times a day    MEDICATIONS  (PRN):  acetaminophen   Tablet .. 650 milliGRAM(s) Oral every 6 hours PRN Temp greater or equal to 38C (100.4F), Mild Pain (1 - 3)  bisacodyl Suppository 10 milliGRAM(s) Rectal daily PRN Constipation  cyclobenzaprine 5 milliGRAM(s) Oral two times a day PRN Muscle Spasm      RADIOLOGY & ADDITIONAL TESTS: CC: near syncope, fall (10 Sep 2021 08:05)    HPI: 89/F with PMHx of pancreatitis s/p ERCP, cirrhosis, DM2, a-fib on Eliquis, CAD s/p PCI, severe pulm HTN, chronic R heart failure, diastolic dysfunction, AS s/p TAVR, OA, HTN, dyslipidemia, hypothyroidism, cecal mass, recently d/maddy from , sent  to the ED from [facility] for c/o Fall, near syncope. Pt states she was trying to put boxes under the chair, when she fell and hit her head, now has a small abrasion. Also c/o left shoulder pain.  She has been become more weak and unsteady recently.   As per Son, she has gained 20 Lbs in [facility].   Her trauma work up neg in ED   Pt is on increased dose torsemide for acute on Chronic diastolic CHF.   Pt was evaluated by CR Sx, plan for transfer to [facility] for possible SX, needs higher level of cardiopulmonary perioperative care    INTERVAL HPI/ OVERNIGHT EVENTS: Pt was seen and examined,  had few episodes of diarrhea overnight and episode of vomiting after breakfast. Pt was seen and examined, reports feels better, no nausea or vomiting, denies abd pain, SOB at baseline. requesting cream cheese and coffee    Vital Signs Last 24 Hrs  T(C): 36.6 (12 Sep 2021 08:08), Max: 36.9 (11 Sep 2021 21:39)  T(F): 97.8 (12 Sep 2021 08:08), Max: 98.4 (11 Sep 2021 21:39)  HR: 80 (12 Sep 2021 11:25) (76 - 117)  BP: 100/65 (12 Sep 2021 11:25) (100/65 - 112/65)  BP(mean): 78 (11 Sep 2021 21:39) (70 - 78)  RR: 18 (12 Sep 2021 08:08) (18 - 18)  SpO2: 98% (12 Sep 2021 08:08) (94% - 99%)      REVIEW OF SYSTEMS:  All other review of systems is negative unless indicated above.    PHYSICAL EXAM:  General: Well developed; malnourished; frail elderly female,   on  NC   Eyes: EOMI; conjunctiva and sclera clear  Head: Normocephalic; atraumatic  ENMT: No nasal discharge; airway clear  Neck: Supple; non tender; no masses  Respiratory: Decreased BS, No wheezes, crepitations at bases b/l  Cardiovascular: Irregular rate and rhythm. S1 and S2 Normal;  Gastrointestinal: Soft non-tender non-distended; Normal bowel sounds  Genitourinary: No  suprapubic  tenderness  Extremities: +  edema  Vascular: Peripheral pulses palpable 2+ bilaterally  Neurological: Alert and oriented x2-3, non focal   Musculoskeletal: Normal muscle  strength, L neck  muscle tension improved  Psychiatric: Cooperative       LABS:                           9.7    7.11  )-----------( 147      ( 11 Sep 2021 09:32 )             30.2     09-11    139  |  100  |  26<H>  ----------------------------<  168<H>  3.5   |  35<H>  |  0.74    Ca    8.8      11 Sep 2021 09:32                              9.4    5.96  )-----------( 135      ( 09 Sep 2021 12:27 )             29.9     09 Sep 2021 12:27    138    |  101    |  24     ----------------------------<  217    3.5     |  33     |  0.83     Ca    8.8        09 Sep 2021 12:27        CAPILLARY BLOOD GLUCOSE  POCT Blood Glucose.: 235 mg/dL (12 Sep 2021 16:26)  POCT Blood Glucose.: 247 mg/dL (12 Sep 2021 12:36)  POCT Blood Glucose.: 154 mg/dL (12 Sep 2021 07:47)  POCT Blood Glucose.: 237 mg/dL (11 Sep 2021 21:40)  POCT Blood Glucose.: 191 mg/dL (11 Sep 2021 17:44)    MEDICATIONS  (STANDING):  allopurinol 200 milliGRAM(s) Oral daily  apixaban 2.5 milliGRAM(s) Oral two times a day  artificial  tears Solution 1 Drop(s) Both EYES three times a day  aspirin  chewable 81 milliGRAM(s) Oral daily  dextrose 40% Gel 15 Gram(s) Oral once  dextrose 5%. 1000 milliLiter(s) (50 mL/Hr) IV Continuous <Continuous>  dextrose 5%. 1000 milliLiter(s) (100 mL/Hr) IV Continuous <Continuous>  dextrose 50% Injectable 25 Gram(s) IV Push once  dextrose 50% Injectable 12.5 Gram(s) IV Push once  dextrose 50% Injectable 25 Gram(s) IV Push once  escitalopram 10 milliGRAM(s) Oral daily  ferrous    sulfate 325 milliGRAM(s) Oral two times a day  glucagon  Injectable 1 milliGRAM(s) IntraMuscular once  influenza   Vaccine 0.5 milliLiter(s) IntraMuscular once  insulin lispro (ADMELOG) corrective regimen sliding scale   SubCutaneous three times a day before meals  insulin lispro (ADMELOG) corrective regimen sliding scale   SubCutaneous at bedtime  levothyroxine 50 MICROGram(s) Oral daily  lidocaine   4% Patch 1 Patch Transdermal daily  pantoprazole    Tablet 40 milliGRAM(s) Oral before breakfast  psyllium Powder 1 Packet(s) Oral daily  sildenafil (REVATIO) 20 milliGRAM(s) Oral two times a day  simvastatin 40 milliGRAM(s) Oral at bedtime  torsemide 40 milliGRAM(s) Oral two times a day    MEDICATIONS  (PRN):  acetaminophen   Tablet .. 650 milliGRAM(s) Oral every 6 hours PRN Temp greater or equal to 38C (100.4F), Mild Pain (1 - 3)  bisacodyl Suppository 10 milliGRAM(s) Rectal daily PRN Constipation  cyclobenzaprine 5 milliGRAM(s) Oral two times a day PRN Muscle Spasm      RADIOLOGY & ADDITIONAL TESTS:      EXAM:  CT ABDOMEN AND PELVIS OC IC                        PROCEDURE DATE:  08/19/2021    FINDINGS:  LOWER CHEST: Small bilateral pleural effusions, larger on the right. Interlobular septal thickening at the lung bases with reticular opacities, likely chronic interstitial lung disease. Aortic valve replacement.    LIVER: No focal hepatic lesion. Nodular surface contour again noted which can be seen in the setting of cirrhosis.  BILE DUCTS: Normal caliber.  GALLBLADDER: Not seen.  SPLEEN: Within normal limits.  PANCREAS: Atrophic.  ADRENALS: Within normal limits.  KIDNEYS/URETERS: No hydronephrosis. Bilateral renal cysts.    BLADDER: Unremarkable.  REPRODUCTIVE ORGANS: Hysterectomy.    BOWEL: Mucosal evaluation of the bowel is limited. Colonic diverticulosis without evidence of diverticulitis. Patient's known cecal mass is not well seen. No bowel obstruction. Appendix not seen.  PERITONEUM: Trace pelvic ascites.  VESSELS: IVC filter. Atherosclerotic changes. Abdominal aorta normal in caliber.  RETROPERITONEUM/LYMPH NODES: No lymphadenopathy.  ABDOMINALWALL: Unremarkable.  BONES: Degenerative changes in the spine. Mild anterolisthesis of L4 on L5. Hemangioma in L3.    IMPRESSION:  Patient's known cecal mass not well-seen on this study.  No evidence of metastatic disease in the abdomen or pelvis.  Small bilateral pleural effusions.

## 2021-09-12 NOTE — PROGRESS NOTE ADULT - ASSESSMENT
# S/p fall  -likely mechanical   -  tele -  no arrhythmias, AFIB rate controlled   - serial cardiac enz neg   - trauma work: no Fx  - neck pain:  c/w Lidoderm, Tylenol  PO, added  flexeril PRN  - PT   - orthostatic vitals neg     # Severe constipation  - ducolax suppository  - required disimpaction 9/7  - enema (mineral oil)  - small dose of morphine as patient is moaning due to rectal pain    #  chronic R heart failure. Severe Pulm HTN   #  AS s/p TAVR,  # Acute on chronic diastolic  CHF exacerbation  - CT chest noted, chronic changes and and CHF   -  cont increased dose of  torsemide 40 mg bid  - supportive O2 via NC   - ECHO: severe pulm HTN, Mod MS, AV prosthesis good Fx, Severe TR. LV mild LVH   - C/w Sildenafil       # Cecal Adenocarcinoma    - Patient is anticipated to undergo surgical procedure in a tertiary care facility given patient's advanced age / extensive chronic medical issues including valvular Dz and severe Pulm HTN  - consult Dr. Smyth    # DM 2:   - ISS    # A fib: permanent  - cont eliquis    #  DVT PPX: on eliquis    Dispo: awaiting for transfer to [facility] when bed available  # S/p fall  -likely mechanical   -  tele -  no arrhythmias, AFIB rate controlled   - serial cardiac enz neg   - trauma work: no Fx  - neck pain:  c/w Lidoderm, Tylenol  PO, added  flexeril PRN  - PT   - orthostatic vitals neg     # Severe constipation  -  required disimpaction 9/7  - On ducolax suppository PRN, Psyllium   - small dose of morphine as patient is moaning due to rectal pain  - Pt with few episodes of diarrhea overnight and vomiting this am x 1, now improved  - If still with loose stools or N/V will do CT abd and stool GI PCR and CDIFF PCR  - Check CBC and BMP     #  chronic R heart failure. Severe Pulm HTN   #  AS s/p TAVR,  # Acute on chronic diastolic  CHF exacerbation, improved   - CT chest noted, chronic changes and and CHF   -  cont increased dose of  torsemide 40 mg bid  - supportive O2 via NC   - ECHO: severe pulm HTN, Mod MS, AV prosthesis good Fx, Severe TR. LV mild LVH   - C/w Sildenafil       # Cecal Adenocarcinoma    - Patient is anticipated to undergo surgical procedure in a tertiary care facility given patient's advanced age / extensive chronic medical issues including valvular Dz and severe Pulm HTN  - consult Dr. Smyth    # DM 2:   - ISS    # A fib: permanent  - cont eliquis    #  DVT PPX: on eliquis    Dispo: awaiting for transfer to [facility] when bed available. Labs ordered.     Pts son Dr Mojica  called, left message

## 2021-09-13 LAB
ANION GAP SERPL CALC-SCNC: 4 MMOL/L — LOW (ref 5–17)
BUN SERPL-MCNC: 21 MG/DL — SIGNIFICANT CHANGE UP (ref 7–23)
C DIFF BY PCR RESULT: SIGNIFICANT CHANGE UP
C DIFF TOX GENS STL QL NAA+PROBE: SIGNIFICANT CHANGE UP
CALCIUM SERPL-MCNC: 8.6 MG/DL — SIGNIFICANT CHANGE UP (ref 8.5–10.1)
CHLORIDE SERPL-SCNC: 97 MMOL/L — SIGNIFICANT CHANGE UP (ref 96–108)
CO2 SERPL-SCNC: 32 MMOL/L — HIGH (ref 22–31)
CREAT SERPL-MCNC: 0.73 MG/DL — SIGNIFICANT CHANGE UP (ref 0.5–1.3)
CULTURE RESULTS: SIGNIFICANT CHANGE UP
GLUCOSE SERPL-MCNC: 191 MG/DL — HIGH (ref 70–99)
HCT VFR BLD CALC: 28 % — LOW (ref 34.5–45)
HGB BLD-MCNC: 9.2 G/DL — LOW (ref 11.5–15.5)
MCHC RBC-ENTMCNC: 32.9 GM/DL — SIGNIFICANT CHANGE UP (ref 32–36)
MCHC RBC-ENTMCNC: 33.1 PG — SIGNIFICANT CHANGE UP (ref 27–34)
MCV RBC AUTO: 100.7 FL — HIGH (ref 80–100)
PLATELET # BLD AUTO: 163 K/UL — SIGNIFICANT CHANGE UP (ref 150–400)
POTASSIUM SERPL-MCNC: 3.5 MMOL/L — SIGNIFICANT CHANGE UP (ref 3.5–5.3)
POTASSIUM SERPL-SCNC: 3.5 MMOL/L — SIGNIFICANT CHANGE UP (ref 3.5–5.3)
RBC # BLD: 2.78 M/UL — LOW (ref 3.8–5.2)
RBC # FLD: 16.4 % — HIGH (ref 10.3–14.5)
SODIUM SERPL-SCNC: 133 MMOL/L — LOW (ref 135–145)
SPECIMEN SOURCE: SIGNIFICANT CHANGE UP
WBC # BLD: 7.85 K/UL — SIGNIFICANT CHANGE UP (ref 3.8–10.5)
WBC # FLD AUTO: 7.85 K/UL — SIGNIFICANT CHANGE UP (ref 3.8–10.5)

## 2021-09-13 PROCEDURE — 74018 RADEX ABDOMEN 1 VIEW: CPT | Mod: 26

## 2021-09-13 PROCEDURE — 99233 SBSQ HOSP IP/OBS HIGH 50: CPT

## 2021-09-13 PROCEDURE — 71045 X-RAY EXAM CHEST 1 VIEW: CPT | Mod: 26

## 2021-09-13 RX ORDER — ACETAMINOPHEN 500 MG
1000 TABLET ORAL ONCE
Refills: 0 | Status: COMPLETED | OUTPATIENT
Start: 2021-09-13 | End: 2021-09-13

## 2021-09-13 RX ORDER — LOPERAMIDE HCL 2 MG
2 TABLET ORAL ONCE
Refills: 0 | Status: COMPLETED | OUTPATIENT
Start: 2021-09-13 | End: 2021-09-13

## 2021-09-13 RX ADMIN — Medication 400 MILLIGRAM(S): at 12:44

## 2021-09-13 RX ADMIN — Medication 200 MILLIGRAM(S): at 10:38

## 2021-09-13 RX ADMIN — Medication 2 MILLIGRAM(S): at 13:47

## 2021-09-13 RX ADMIN — APIXABAN 2.5 MILLIGRAM(S): 2.5 TABLET, FILM COATED ORAL at 10:37

## 2021-09-13 RX ADMIN — Medication 20 MILLIGRAM(S): at 10:40

## 2021-09-13 RX ADMIN — Medication 325 MILLIGRAM(S): at 10:37

## 2021-09-13 RX ADMIN — PANTOPRAZOLE SODIUM 40 MILLIGRAM(S): 20 TABLET, DELAYED RELEASE ORAL at 10:39

## 2021-09-13 RX ADMIN — Medication 1000 MILLIGRAM(S): at 13:44

## 2021-09-13 RX ADMIN — Medication 1 DROP(S): at 16:55

## 2021-09-13 RX ADMIN — Medication 50 MICROGRAM(S): at 05:19

## 2021-09-13 RX ADMIN — Medication 325 MILLIGRAM(S): at 21:29

## 2021-09-13 RX ADMIN — Medication 650 MILLIGRAM(S): at 11:44

## 2021-09-13 RX ADMIN — ESCITALOPRAM OXALATE 10 MILLIGRAM(S): 10 TABLET, FILM COATED ORAL at 10:40

## 2021-09-13 RX ADMIN — Medication 1 DROP(S): at 05:19

## 2021-09-13 RX ADMIN — Medication 40 MILLIGRAM(S): at 10:39

## 2021-09-13 RX ADMIN — SIMVASTATIN 40 MILLIGRAM(S): 20 TABLET, FILM COATED ORAL at 21:29

## 2021-09-13 RX ADMIN — Medication 4: at 12:23

## 2021-09-13 RX ADMIN — CYCLOBENZAPRINE HYDROCHLORIDE 5 MILLIGRAM(S): 10 TABLET, FILM COATED ORAL at 10:42

## 2021-09-13 RX ADMIN — Medication 650 MILLIGRAM(S): at 10:43

## 2021-09-13 RX ADMIN — Medication 81 MILLIGRAM(S): at 10:37

## 2021-09-13 RX ADMIN — Medication 2: at 08:26

## 2021-09-13 RX ADMIN — Medication 2: at 16:47

## 2021-09-13 RX ADMIN — Medication 1 DROP(S): at 21:29

## 2021-09-13 RX ADMIN — APIXABAN 2.5 MILLIGRAM(S): 2.5 TABLET, FILM COATED ORAL at 21:29

## 2021-09-13 NOTE — PROGRESS NOTE ADULT - SUBJECTIVE AND OBJECTIVE BOX
head of anesthesia at [facility] Dr. Boo Cramer has reviwed her case along with 2 cardiac anesthesiologist and they all agree that Mrs. Mojica is too high risk given her right heart failure and pulmonary hypertension. there concern is that she would be unable to get off the ventilator post surgery. i have discussed this with her son Laith. no transfer to [facility] for surgery at this time. recommend palliative care. discussed with Dr. England

## 2021-09-13 NOTE — PROGRESS NOTE ADULT - ASSESSMENT
# S/p fall  -likely mechanical   -  tele -  no arrhythmias, AFIB rate controlled   - serial cardiac enz neg   - trauma work: no Fx  - neck pain:  c/w Lidoderm, Tylenol  PO, added  flexeril PRN  - PT   - orthostatic vitals neg     # Severe constipation  -  required disimpaction 9/7  - On ducolax suppository PRN, Psyllium   - small dose of morphine as patient is moaning due to rectal pain  - Pt with few episodes of diarrhea overnight and vomiting this am x 1, now improved  - If still with loose stools or N/V will do CT abd and stool GI PCR and CDIFF PCR  - Check CBC and BMP     #  chronic R heart failure. Severe Pulm HTN   #  AS s/p TAVR,  # Acute on chronic diastolic  CHF exacerbation, improved   - CT chest noted, chronic changes and and CHF   -  cont increased dose of  torsemide 40 mg bid  - supportive O2 via NC   - ECHO: severe pulm HTN, Mod MS, AV prosthesis good Fx, Severe TR. LV mild LVH   - C/w Sildenafil       # Cecal Adenocarcinoma    - Patient is anticipated to undergo surgical procedure in a tertiary care facility given patient's advanced age / extensive chronic medical issues including valvular Dz and severe Pulm HTN  - consult Dr. Smyth    # DM 2:   - ISS    # A fib: permanent  - cont eliquis    #  DVT PPX: on eliquis    Dispo: awaiting for transfer to [facility] when bed available. Labs ordered.     Pts son Dr Mojica  called, left message  # S/p fall  -likely mechanical   -  tele -  no arrhythmias, AFIB rate controlled   - serial cardiac enz neg   - trauma work: no Fx  - neck pain:  c/w Lidoderm, Tylenol  PO, added  flexeril PRN  - PT   - orthostatic vitals neg     # Severe constipation, Now with Diarrhea   -  required disimpaction on 9/7  - On ducolax suppository PRN, Psyllium on hold now   - Pt with few episodes of diarrhea overnight and vomiting this am x 1, now improved  -  stool GI PCR and CDIFF PCR neg   - check abd XR  - Imodium x 1   - D/w Dr Mobley     #  chronic R heart failure. Severe Pulm HTN   #  AS s/p TAVR,  # Acute on chronic diastolic  CHF exacerbation, improved   - CT chest noted, chronic changes and and CHF   -  On   torsemide 40 mg bid, will hold while has loose stools due to NA and K+ start trending down, will give dose of KCL    - supportive O2 via NC   - ECHO: severe pulm HTN, Mod MS, AV prosthesis good Fx, Severe TR. LV mild LVH   - C/w Sildenafil       # Cecal Adenocarcinoma    - Patient is anticipated to undergo surgical procedure in a tertiary care facility given patient's advanced age / extensive chronic medical issues including valvular Dz and severe Pulm HTN  -D/w  Dr. Smyth, case was discussed with chemarkos od anesthesia at [facility], Pt is too high risk due to Severe pulm HTN . Not a candidate for Sx   - D/w Pts son Dr Dill and Dr Mobley, will ask hem/onc if any options      # DM 2:   -HB A1c 6.8   - not on meds outPt   - Monitor BS and cover with  ISS    # A fib: permanent  - cont eliquis    #  DVT PPX: on eliquis    Dispo: transfer to [facility] canceled, hem/onc eval     D/w Pts Son Dr. Mojica

## 2021-09-13 NOTE — PROGRESS NOTE ADULT - SUBJECTIVE AND OBJECTIVE BOX
CC: near syncope, fall (10 Sep 2021 08:05)    HPI: 89/F with PMHx of pancreatitis s/p ERCP, cirrhosis, DM2, a-fib on Eliquis, CAD s/p PCI, severe pulm HTN, chronic R heart failure, diastolic dysfunction, AS s/p TAVR, OA, HTN, dyslipidemia, hypothyroidism, cecal mass, recently d/maddy from , sent  to the ED from [facility] for c/o Fall, near syncope. Pt states she was trying to put boxes under the chair, when she fell and hit her head, now has a small abrasion. Also c/o left shoulder pain.  She has been become more weak and unsteady recently.   As per Son, she has gained 20 Lbs in [facility].   Her trauma work up neg in ED   Pt is on increased dose torsemide for acute on Chronic diastolic CHF.   Pt was evaluated by CR Sx, plan for transfer to [facility] for possible SX, needs higher level of cardiopulmonary perioperative care    INTERVAL HPI/ OVERNIGHT EVENTS: Pt was seen and examined,  had few episodes of diarrhea overnight and episode of vomiting after breakfast. Pt was seen and examined, reports feels better, no nausea or vomiting, denies abd pain, SOB at baseline. requesting cream cheese and coffee    Vital Signs Last 24 Hrs  T(C): 36.8 (12 Sep 2021 21:18), Max: 36.8 (12 Sep 2021 21:18)  T(F): 98.2 (12 Sep 2021 21:18), Max: 98.2 (12 Sep 2021 21:18)  HR: 112 (12 Sep 2021 21:18) (99 - 112)  BP: 129/79 (12 Sep 2021 21:18) (108/81 - 129/79)  RR: 18 (12 Sep 2021 21:18) (18 - 18)  SpO2: 92% (12 Sep 2021 21:18) (92% - 97%)      REVIEW OF SYSTEMS:  All other review of systems is negative unless indicated above.    PHYSICAL EXAM:  General: Well developed; malnourished; frail elderly female,   on  NC   Eyes: EOMI; conjunctiva and sclera clear  Head: Normocephalic; atraumatic  ENMT: No nasal discharge; airway clear  Neck: Supple; non tender; no masses  Respiratory: Decreased BS, No wheezes, crepitations at bases b/l  Cardiovascular: Irregular rate and rhythm. S1 and S2 Normal;  Gastrointestinal: Soft non-tender non-distended; Normal bowel sounds  Genitourinary: No  suprapubic  tenderness  Extremities: +  edema  Vascular: Peripheral pulses palpable 2+ bilaterally  Neurological: Alert and oriented x2-3, non focal   Musculoskeletal: Normal muscle  strength, L neck  muscle tension improved  Psychiatric: Cooperative       LABS:                           9.7    7.11  )-----------( 147      ( 11 Sep 2021 09:32 )             30.2     09-11    139  |  100  |  26<H>  ----------------------------<  168<H>  3.5   |  35<H>  |  0.74    Ca    8.8      11 Sep 2021 09:32                              9.4    5.96  )-----------( 135      ( 09 Sep 2021 12:27 )             29.9     09 Sep 2021 12:27    138    |  101    |  24     ----------------------------<  217    3.5     |  33     |  0.83     Ca    8.8        09 Sep 2021 12:27        CAPILLARY BLOOD GLUCOSE  POCT Blood Glucose.: 235 mg/dL (12 Sep 2021 16:26)  POCT Blood Glucose.: 247 mg/dL (12 Sep 2021 12:36)  POCT Blood Glucose.: 154 mg/dL (12 Sep 2021 07:47)  POCT Blood Glucose.: 237 mg/dL (11 Sep 2021 21:40)  POCT Blood Glucose.: 191 mg/dL (11 Sep 2021 17:44)    MEDICATIONS  (STANDING):  allopurinol 200 milliGRAM(s) Oral daily  apixaban 2.5 milliGRAM(s) Oral two times a day  artificial  tears Solution 1 Drop(s) Both EYES three times a day  aspirin  chewable 81 milliGRAM(s) Oral daily  dextrose 40% Gel 15 Gram(s) Oral once  dextrose 5%. 1000 milliLiter(s) (50 mL/Hr) IV Continuous <Continuous>  dextrose 5%. 1000 milliLiter(s) (100 mL/Hr) IV Continuous <Continuous>  dextrose 50% Injectable 25 Gram(s) IV Push once  dextrose 50% Injectable 12.5 Gram(s) IV Push once  dextrose 50% Injectable 25 Gram(s) IV Push once  escitalopram 10 milliGRAM(s) Oral daily  ferrous    sulfate 325 milliGRAM(s) Oral two times a day  glucagon  Injectable 1 milliGRAM(s) IntraMuscular once  influenza   Vaccine 0.5 milliLiter(s) IntraMuscular once  insulin lispro (ADMELOG) corrective regimen sliding scale   SubCutaneous three times a day before meals  insulin lispro (ADMELOG) corrective regimen sliding scale   SubCutaneous at bedtime  levothyroxine 50 MICROGram(s) Oral daily  lidocaine   4% Patch 1 Patch Transdermal daily  pantoprazole    Tablet 40 milliGRAM(s) Oral before breakfast  psyllium Powder 1 Packet(s) Oral daily  sildenafil (REVATIO) 20 milliGRAM(s) Oral two times a day  simvastatin 40 milliGRAM(s) Oral at bedtime  torsemide 40 milliGRAM(s) Oral two times a day    MEDICATIONS  (PRN):  acetaminophen   Tablet .. 650 milliGRAM(s) Oral every 6 hours PRN Temp greater or equal to 38C (100.4F), Mild Pain (1 - 3)  bisacodyl Suppository 10 milliGRAM(s) Rectal daily PRN Constipation  cyclobenzaprine 5 milliGRAM(s) Oral two times a day PRN Muscle Spasm      RADIOLOGY & ADDITIONAL TESTS:      EXAM:  CT ABDOMEN AND PELVIS OC IC                        PROCEDURE DATE:  08/19/2021    FINDINGS:  LOWER CHEST: Small bilateral pleural effusions, larger on the right. Interlobular septal thickening at the lung bases with reticular opacities, likely chronic interstitial lung disease. Aortic valve replacement.    LIVER: No focal hepatic lesion. Nodular surface contour again noted which can be seen in the setting of cirrhosis.  BILE DUCTS: Normal caliber.  GALLBLADDER: Not seen.  SPLEEN: Within normal limits.  PANCREAS: Atrophic.  ADRENALS: Within normal limits.  KIDNEYS/URETERS: No hydronephrosis. Bilateral renal cysts.    BLADDER: Unremarkable.  REPRODUCTIVE ORGANS: Hysterectomy.    BOWEL: Mucosal evaluation of the bowel is limited. Colonic diverticulosis without evidence of diverticulitis. Patient's known cecal mass is not well seen. No bowel obstruction. Appendix not seen.  PERITONEUM: Trace pelvic ascites.  VESSELS: IVC filter. Atherosclerotic changes. Abdominal aorta normal in caliber.  RETROPERITONEUM/LYMPH NODES: No lymphadenopathy.  ABDOMINALWALL: Unremarkable.  BONES: Degenerative changes in the spine. Mild anterolisthesis of L4 on L5. Hemangioma in L3.    IMPRESSION:  Patient's known cecal mass not well-seen on this study.  No evidence of metastatic disease in the abdomen or pelvis.  Small bilateral pleural effusions.     CC: near syncope, fall (10 Sep 2021 08:05)    HPI: 89/F with PMHx of pancreatitis s/p ERCP, cirrhosis, DM2, a-fib on Eliquis, CAD s/p PCI, severe pulm HTN, chronic R heart failure, diastolic dysfunction, AS s/p TAVR, OA, HTN, dyslipidemia, hypothyroidism, cecal mass, recently d/maddy from , sent  to the ED from [facility] for c/o Fall, near syncope. Pt states she was trying to put boxes under the chair, when she fell and hit her head, now has a small abrasion. Also c/o left shoulder pain.  She has been become more weak and unsteady recently.   As per Son, she has gained 20 Lbs in [facility].   Her trauma work up neg in ED   Pt is on increased dose torsemide for acute on Chronic diastolic CHF.   Pt was evaluated by CR Sx, plan for transfer to [facility] for possible SX, needs higher level of cardiopulmonary perioperative care    INTERVAL HPI/ OVERNIGHT EVENTS: Pt was seen and examined,   feels tired, had multiple stools overnight,  reports no abd pain, no N/V.     Vital Signs Last 24 Hrs  T(C): 36.8 (12 Sep 2021 21:18), Max: 36.8 (12 Sep 2021 21:18)  T(F): 98.2 (12 Sep 2021 21:18), Max: 98.2 (12 Sep 2021 21:18)  HR: 112 (12 Sep 2021 21:18) (107 - 112)  BP: 129/79 (12 Sep 2021 21:18) (129/79 - 129/79)  RR: 18 (12 Sep 2021 21:18) (18 - 18)  SpO2: 92% (12 Sep 2021 21:18) (92% - 92%)      REVIEW OF SYSTEMS:  All other review of systems is negative unless indicated above.    PHYSICAL EXAM:  General: Well developed; malnourished; frail elderly female,   on  NC   Eyes: EOMI; conjunctiva and sclera clear  Head: Normocephalic; atraumatic  ENMT: No nasal discharge; airway clear  Neck: Supple; non tender; no masses  Respiratory: Decreased BS, No wheezes, crepitations at bases b/l  Cardiovascular: Irregular rate and rhythm. S1 and S2 Normal;  Gastrointestinal: Soft non-tender non-distended; Normal bowel sounds  Genitourinary: No  suprapubic  tenderness  Extremities: +  edema  Vascular: Peripheral pulses palpable 2+ bilaterally  Neurological: Alert and oriented x2-3, non focal   Musculoskeletal: Normal muscle  strength, L neck  muscle tension improved  Psychiatric: Cooperative       LABS:                         9.2    7.85  )-----------( 163      ( 13 Sep 2021 08:52 )             28.0     09-13    133<L>  |  97  |  21  ----------------------------<  191<H>  3.5   |  32<H>  |  0.73    Ca    8.6      13 Sep 2021 08:52                           9.7    7.11  )-----------( 147      ( 11 Sep 2021 09:32 )             30.2     09-11    139  |  100  |  26<H>  ----------------------------<  168<H>  3.5   |  35<H>  |  0.74    Ca    8.8      11 Sep 2021 09:32                              9.4    5.96  )-----------( 135      ( 09 Sep 2021 12:27 )             29.9     09 Sep 2021 12:27    138    |  101    |  24     ----------------------------<  217    3.5     |  33     |  0.83     Ca    8.8        09 Sep 2021 12:27        CAPILLARY BLOOD GLUCOSE:   POCT Blood Glucose.: 173 mg/dL (13 Sep 2021 16:45)  POCT Blood Glucose.: 249 mg/dL (13 Sep 2021 12:13)  POCT Blood Glucose.: 261 mg/dL (13 Sep 2021 12:06)  POCT Blood Glucose.: 193 mg/dL (13 Sep 2021 08:17)  POCT Blood Glucose.: 195 mg/dL (12 Sep 2021 21:19)      MEDICATIONS  (STANDING):  allopurinol 200 milliGRAM(s) Oral daily  apixaban 2.5 milliGRAM(s) Oral two times a day  artificial  tears Solution 1 Drop(s) Both EYES three times a day  aspirin  chewable 81 milliGRAM(s) Oral daily  dextrose 40% Gel 15 Gram(s) Oral once  dextrose 5%. 1000 milliLiter(s) (50 mL/Hr) IV Continuous <Continuous>  dextrose 5%. 1000 milliLiter(s) (100 mL/Hr) IV Continuous <Continuous>  dextrose 50% Injectable 25 Gram(s) IV Push once  dextrose 50% Injectable 12.5 Gram(s) IV Push once  dextrose 50% Injectable 25 Gram(s) IV Push once  escitalopram 10 milliGRAM(s) Oral daily  ferrous    sulfate 325 milliGRAM(s) Oral two times a day  glucagon  Injectable 1 milliGRAM(s) IntraMuscular once  influenza   Vaccine 0.5 milliLiter(s) IntraMuscular once  insulin lispro (ADMELOG) corrective regimen sliding scale   SubCutaneous three times a day before meals  insulin lispro (ADMELOG) corrective regimen sliding scale   SubCutaneous at bedtime  levothyroxine 50 MICROGram(s) Oral daily  lidocaine   4% Patch 1 Patch Transdermal daily  pantoprazole    Tablet 40 milliGRAM(s) Oral before breakfast  psyllium Powder 1 Packet(s) Oral daily  sildenafil (REVATIO) 20 milliGRAM(s) Oral two times a day  simvastatin 40 milliGRAM(s) Oral at bedtime  torsemide 40 milliGRAM(s) Oral two times a day    MEDICATIONS  (PRN):  acetaminophen   Tablet .. 650 milliGRAM(s) Oral every 6 hours PRN Temp greater or equal to 38C (100.4F), Mild Pain (1 - 3)  bisacodyl Suppository 10 milliGRAM(s) Rectal daily PRN Constipation  cyclobenzaprine 5 milliGRAM(s) Oral two times a day PRN Muscle Spasm      RADIOLOGY & ADDITIONAL TESTS:      EXAM:  CT ABDOMEN AND PELVIS OC IC                        PROCEDURE DATE:  08/19/2021    FINDINGS:  LOWER CHEST: Small bilateral pleural effusions, larger on the right. Interlobular septal thickening at the lung bases with reticular opacities, likely chronic interstitial lung disease. Aortic valve replacement.    LIVER: No focal hepatic lesion. Nodular surface contour again noted which can be seen in the setting of cirrhosis.  BILE DUCTS: Normal caliber.  GALLBLADDER: Not seen.  SPLEEN: Within normal limits.  PANCREAS: Atrophic.  ADRENALS: Within normal limits.  KIDNEYS/URETERS: No hydronephrosis. Bilateral renal cysts.    BLADDER: Unremarkable.  REPRODUCTIVE ORGANS: Hysterectomy.    BOWEL: Mucosal evaluation of the bowel is limited. Colonic diverticulosis without evidence of diverticulitis. Patient's known cecal mass is not well seen. No bowel obstruction. Appendix not seen.  PERITONEUM: Trace pelvic ascites.  VESSELS: IVC filter. Atherosclerotic changes. Abdominal aorta normal in caliber.  RETROPERITONEUM/LYMPH NODES: No lymphadenopathy.  ABDOMINALWALL: Unremarkable.  BONES: Degenerative changes in the spine. Mild anterolisthesis of L4 on L5. Hemangioma in L3.    IMPRESSION:  Patient's known cecal mass not well-seen on this study.  No evidence of metastatic disease in the abdomen or pelvis.  Small bilateral pleural effusions.

## 2021-09-13 NOTE — PROGRESS NOTE ADULT - SUBJECTIVE AND OBJECTIVE BOX
Subjective:    Awake, weak appearing. Diarrhea noted, but eating. No dyspnea at rest.    MEDICATIONS  (STANDING):  allopurinol 200 milliGRAM(s) Oral daily  apixaban 2.5 milliGRAM(s) Oral two times a day  artificial  tears Solution 1 Drop(s) Both EYES three times a day  aspirin  chewable 81 milliGRAM(s) Oral daily  dextrose 40% Gel 15 Gram(s) Oral once  dextrose 5%. 1000 milliLiter(s) (50 mL/Hr) IV Continuous <Continuous>  dextrose 5%. 1000 milliLiter(s) (100 mL/Hr) IV Continuous <Continuous>  dextrose 50% Injectable 25 Gram(s) IV Push once  dextrose 50% Injectable 12.5 Gram(s) IV Push once  dextrose 50% Injectable 25 Gram(s) IV Push once  escitalopram 10 milliGRAM(s) Oral daily  ferrous    sulfate 325 milliGRAM(s) Oral two times a day  glucagon  Injectable 1 milliGRAM(s) IntraMuscular once  influenza   Vaccine 0.5 milliLiter(s) IntraMuscular once  insulin lispro (ADMELOG) corrective regimen sliding scale   SubCutaneous three times a day before meals  insulin lispro (ADMELOG) corrective regimen sliding scale   SubCutaneous at bedtime  levothyroxine 50 MICROGram(s) Oral daily  lidocaine   4% Patch 1 Patch Transdermal daily  pantoprazole    Tablet 40 milliGRAM(s) Oral before breakfast  psyllium Powder 1 Packet(s) Oral daily  sildenafil (REVATIO) 20 milliGRAM(s) Oral two times a day  simvastatin 40 milliGRAM(s) Oral at bedtime  torsemide 40 milliGRAM(s) Oral two times a day    MEDICATIONS  (PRN):  acetaminophen   Tablet .. 650 milliGRAM(s) Oral every 6 hours PRN Temp greater or equal to 38C (100.4F), Mild Pain (1 - 3)  bisacodyl Suppository 10 milliGRAM(s) Rectal daily PRN Constipation  cyclobenzaprine 5 milliGRAM(s) Oral two times a day PRN Muscle Spasm      Allergies    penicillin (Rash)  penicillins (Other)  sulfa drugs (Rash; Other)    Intolerances        Vital Signs Last 24 Hrs  T(C): 36.8 (12 Sep 2021 21:18), Max: 36.8 (12 Sep 2021 21:18)  T(F): 98.2 (12 Sep 2021 21:18), Max: 98.2 (12 Sep 2021 21:18)  HR: 112 (12 Sep 2021 21:18) (80 - 112)  BP: 129/79 (12 Sep 2021 21:18) (100/65 - 129/79)  BP(mean): --  RR: 18 (12 Sep 2021 21:18) (18 - 18)  SpO2: 92% (12 Sep 2021 21:18) (92% - 97%)    PHYSICAL EXAMINATION:    NECK:  Supple. No lymphadenopathy. Jugular venous pressure not elevated.    HEART:   The cardiac impulse has a normal quality. Irreg. irreg, Nl S1 and S2.    CHEST:  Chest with bibasilar crackles, sl exp wheeze. Normal respiratory effort.  ABDOMEN:  Soft and nontender.   EXTREMITIES:  There is tr edema.       LABS:                        9.2    7.49  )-----------( 160      ( 12 Sep 2021 17:48 )             28.1     09-12    135  |  97  |  23  ----------------------------<  209<H>  3.3<L>   |  33<H>  |  0.76    Ca    8.4<L>      12 Sep 2021 17:48            RADIOLOGY & ADDITIONAL TESTS:    Assessment and Recommendation:   · Assessment      89 year-old woman, presents after fall, pulmonary consulted for shortness of breath.  --Her fall may have been mechanical fall.  --She has bilateral infiltrates on imaging, previous imaging reviewed many of these changes are chronic and when viewed on CT chest represent ground glass and intersitial infiltrates likely related to pulmonary edema-mild decompensation of HFpEF  --She clinically appears stable from pulmonary standpoint-Sl wheeze noted with persistent basilar crackles. Normal O2 sats          Recommendations:  --Cardiology rec appreciated, strict I/O, daily weight.  Torsemide now BID-monitor lytes carefully. K+ repletion. Concern about diuretics in face of diarrhea. C-Dif being ruled out.  --Lower extremity edema is improved- would benefit from compression stockings.  --No other significant changes to medical management  --To Consider transfer to [facility] for possible colon resection, due to H/O pulmonary HTN (anesthesia will not clear for general anesthesia in this hospital due to lack of cardiac back-up).   --Will obtain f/u CXR today

## 2021-09-13 NOTE — PROGRESS NOTE ADULT - SUBJECTIVE AND OBJECTIVE BOX
Cardiology Progress Note    HPI: 89/F with PMHx of pancreatitis s/p ERCP, cirrhosis, DM2, a-fib on Eliquis, CAD s/p PCI, severe pulm HTN, chronic R heart failure, diastolic dysfunction, AS s/p TAVR, OA, HTN, dyslipidemia, hypothyroidism. cecal mass, recently d/maddy from , sent  to the ED from [facility] for c/o Fall, near syncope. Pt states she was trying to put boxes under the chair, when she fell and hit her head, now has a small abrasion. Also c/o left shoulder pain.  She has been become more weak and unsteady recently.   Pt states that the aide did not put brakes on her chair and left and when she was trying to put boxes under the chair , the chair kept moving even though she tried to put brakes on by herself and she fell down.  Denies any loss of consciousness.  She denies any other symptoms now.    . No events last pm. No CP/SOB.  Rate controlled afib on tele.     9/10. Awaiting transfer to [facility].   No events last pm. No CP/SOB.     . Doing ok, no symptoms, no events    . Ruling out for cdiff. Afib in 90-120s on tele.   No CP/SOB.      PAST MEDICAL & SURGICAL HISTORY:  Diabetes Mellitus Type II    Dyslipidemia    GERD (Gastroesophageal Reflux Disease)    History of Osteoarthritis    Hypertension    CAD (coronary artery disease)    Afib    Hypothyroid    HLD (hyperlipidemia)    History of pancreatitis    Aortic stenosis    H/O: Hysterectomy    H/O: Knee Surgery- right meniscus    History of cholecystectomy    History of appendectomy    H/O total knee replacement, left    S/P IVC filter  Note that Pt does not have  h/o DVT, outPt doppler was read first as +, but after review reported as no DVT. Pt got IVC filer before that        MEDICATIONS  (STANDING):  allopurinol 200 milliGRAM(s) Oral daily  apixaban 2.5 milliGRAM(s) Oral two times a day  artificial  tears Solution 1 Drop(s) Both EYES three times a day  aspirin  chewable 81 milliGRAM(s) Oral daily  dextrose 40% Gel 15 Gram(s) Oral once  dextrose 5%. 1000 milliLiter(s) (50 mL/Hr) IV Continuous <Continuous>  dextrose 5%. 1000 milliLiter(s) (100 mL/Hr) IV Continuous <Continuous>  dextrose 50% Injectable 25 Gram(s) IV Push once  dextrose 50% Injectable 12.5 Gram(s) IV Push once  dextrose 50% Injectable 25 Gram(s) IV Push once  escitalopram 10 milliGRAM(s) Oral daily  ferrous    sulfate 325 milliGRAM(s) Oral two times a day  glucagon  Injectable 1 milliGRAM(s) IntraMuscular once  influenza   Vaccine 0.5 milliLiter(s) IntraMuscular once  insulin lispro (ADMELOG) corrective regimen sliding scale   SubCutaneous three times a day before meals  insulin lispro (ADMELOG) corrective regimen sliding scale   SubCutaneous at bedtime  levothyroxine 50 MICROGram(s) Oral daily  lidocaine   4% Patch 1 Patch Transdermal daily  pantoprazole    Tablet 40 milliGRAM(s) Oral before breakfast  psyllium Powder 1 Packet(s) Oral daily  sildenafil (REVATIO) 20 milliGRAM(s) Oral two times a day  simvastatin 40 milliGRAM(s) Oral at bedtime  torsemide 40 milliGRAM(s) Oral two times a day    MEDICATIONS  (PRN):  acetaminophen   Tablet .. 650 milliGRAM(s) Oral every 6 hours PRN Temp greater or equal to 38C (100.4F), Mild Pain (1 - 3)      FAMILY HISTORY:  FH: diabetes mellitus  both parents  FH: heart disease    REVIEW OF SYSTEMS:  CONSTITUTIONAL:    No fatigue, malaise, lethargy.  No fever or chills.  RESPIRATORY:  No cough.  No wheeze.  No hemoptysis.  No shortness of breath.  CARDIOVASCULAR:  No chest pains.  No palpitations. No shortness of breath, No orthopnea or PND. c/o dizziness   GASTROINTESTINAL:  No abdominal pain.  No nausea or vomiting.    GENITOURINARY:    No hematuria.    MUSCULOSKELETAL:  No musculoskeletal pain.  No joint swelling.  No arthritis.  NEUROLOGICAL:  No tingling or numbness or weakness.    Vital Signs Last 24 Hrs  T(C): 36.4 (07 Sep 2021 05:36), Max: 36.7 (06 Sep 2021 14:20)  T(F): 97.5 (07 Sep 2021 05:36), Max: 98 (06 Sep 2021 14:20)  HR: 79 (07 Sep 2021 05:36) (79 - 97)  BP: 107/65 (07 Sep 2021 05:36) (96/62 - 113/56)  BP(mean): 77 (06 Sep 2021 15:44) (73 - 77)  RR: 18 (07 Sep 2021 05:36) (16 - 22)  SpO2: 95% (07 Sep 2021 05:36) (86% - 99%)    PHYSICAL EXAM-    Constitutional: elderly frail female in no acute distress   Head: Head is normocephalic and atraumatic.    Neck: No JVD.   Cardiovascular: Regular rate and rhythm without S3, S4. No murmurs or rubs are appreciated.    Respiratory: Basal rales b/l   Abdomen: Soft, nontender, nondistended with positive bowel sounds.    Extremity: No tenderness. No  pitting edema   Neurologic: The patient is alert and oriented.      INTERPRETATION OF TELEMETRY: afib 80/min    ECG: atrial fibrillation.   I&O's Detail    06 Sep 2021 07:01  -  07 Sep 2021 07:00  --------------------------------------------------------  IN:  Total IN: 0 mL    OUT:    Incontinent per Collection Bag (mL): 900 mL  Total OUT: 900 mL    Total NET: -900 mL    LABS:                        10.0   5.22  )-----------( 139      ( 06 Sep 2021 11:16 )             31.1     09-06    138  |  104  |  13  ----------------------------<  166<H>  3.6   |  29  |  0.86    Ca    8.5      06 Sep 2021 11:16    TPro  6.4  /  Alb  2.7<L>  /  TBili  0.6  /  DBili  x   /  AST  14<L>  /  ALT  15  /  AlkPhos  82  09-06    CARDIAC MARKERS ( 06 Sep 2021 19:13 )  <0.015 ng/mL / x     / x     / x     / x      CARDIAC MARKERS ( 06 Sep 2021 13:41 )  <0.015 ng/mL / x     / x     / x     / x      CARDIAC MARKERS ( 06 Sep 2021 11:16 )  <0.015 ng/mL / x     / x     / x     / x          PT/INR - ( 06 Sep 2021 11:16 )   PT: 19.8 sec;   INR: 1.74 ratio         PTT - ( 06 Sep 2021 11:16 )  PTT:35.4 sec  Urinalysis Basic - ( 06 Sep 2021 11:09 )    Color: Yellow / Appearance: Clear / S.015 / pH: x  Gluc: x / Ketone: Negative  / Bili: Negative / Urobili: Negative mg/dL   Blood: x / Protein: 15 mg/dL / Nitrite: Negative   Leuk Esterase: Trace / RBC: Negative /HPF / WBC 11-25   Sq Epi: x / Non Sq Epi: Moderate / Bacteria: Few      I&O's Summary    06 Sep 2021 07:01  -  07 Sep 2021 07:00  --------------------------------------------------------  IN: 0 mL / OUT: 900 mL / NET: -900 mL      BNP  RADIOLOGY & ADDITIONAL STUDIES:  < from: CT Head No Cont (21 @ 10:56) >    IMPRESSION:    CT HEAD: No acute abnormality. Moderate atrophy most prominent in the BILATERAL temporal lobes.    CT cervical spine:   No vertebral fracture is recognized.  Multilevel degenerative disc disease and spondylosis at C3-4 through C6-7 with loss of disc height and associated degenerative endplate changes. There is narrowing of the LEFT C2-3, LEFT C3-4 LEFT C4-5 and RIGHT C5-C6 and C6-7 neural foramina due to uncovertebral spurring and facet osteophytic hypertrophy.    --- End of Report ---            FRANCESCA BOSTON MD; Attending Radiologist  This document has been electronically signed. Sep  6 2021 11:05AM    < end of copied text >  < from: TTE Echo Complete w/ Contrast w/ Doppler (21 @ 16:09) >     Impression     Summary     Moderate mitral annular calcification is present.   Moderate mitral stenosis is present.   Mild (1+) mitral regurgitation is present.   Well seated prosthetic valve in the aortic position. suboptimal doppler   interrogation   Severe (4+) tricuspid valve regurgitation is present.   The tricuspid valve leaflets appear mildly thickened open well.   Severe pulmonary hypertension.   Normal appearing pulmonic valve structure.   Mild pulmonic valvular regurgitation (1+) is present.     technical difficult study     Signature     ----------------------------------------------------------------   Electronically signed by Venugopal Palla, MD(Interpreting   physician) on 2021 05:38 PM   ----------------------------------------------------------------    < end of copied text >

## 2021-09-13 NOTE — PROGRESS NOTE ADULT - ASSESSMENT
1. Fall. Likely mechanical. She denies any syncope.     2. Chronic decompensated HFPEF- Echo results as stated above.  Compression stockings to be placed on legs. May need to hold diuretics if diarrhea continues.  continue rest of current cardiac regimen.  Diuresis with close monitoring of the renal function and electrolytes.  Goal potassium of 4 and magnesium of 2.   Strict I/O and daily wt checks. Low sodium diet. Nutrition education.     3. Atrial fibrillation- HR tachy over last 24 hrs. Can start low dose Bbl if BP allows.  continue current meds including apixaban    4. Colon mass- malignancy- Colorectal surgery team following pt.   Pt awaiting transfer to [facility] for colon mass assessment- if no metastatic disease, possible localized surgery.     5. Diarrhea. CDiff pending. Cr stable now- may need to hold diuretics if diarrhea continues.   Replete lytes.     6. DVT proph.

## 2021-09-14 LAB
ADD ON TEST-SPECIMEN IN LAB: SIGNIFICANT CHANGE UP
ANION GAP SERPL CALC-SCNC: 5 MMOL/L — SIGNIFICANT CHANGE UP (ref 5–17)
BUN SERPL-MCNC: 21 MG/DL — SIGNIFICANT CHANGE UP (ref 7–23)
CALCIUM SERPL-MCNC: 8.4 MG/DL — LOW (ref 8.5–10.1)
CHLORIDE SERPL-SCNC: 99 MMOL/L — SIGNIFICANT CHANGE UP (ref 96–108)
CO2 SERPL-SCNC: 31 MMOL/L — SIGNIFICANT CHANGE UP (ref 22–31)
CREAT SERPL-MCNC: 0.78 MG/DL — SIGNIFICANT CHANGE UP (ref 0.5–1.3)
GLUCOSE SERPL-MCNC: 200 MG/DL — HIGH (ref 70–99)
MAGNESIUM SERPL-MCNC: 2.3 MG/DL — SIGNIFICANT CHANGE UP (ref 1.6–2.6)
PHOSPHATE SERPL-MCNC: 3.7 MG/DL — SIGNIFICANT CHANGE UP (ref 2.5–4.5)
POTASSIUM SERPL-MCNC: 3.6 MMOL/L — SIGNIFICANT CHANGE UP (ref 3.5–5.3)
POTASSIUM SERPL-SCNC: 3.6 MMOL/L — SIGNIFICANT CHANGE UP (ref 3.5–5.3)
SODIUM SERPL-SCNC: 135 MMOL/L — SIGNIFICANT CHANGE UP (ref 135–145)

## 2021-09-14 PROCEDURE — 99233 SBSQ HOSP IP/OBS HIGH 50: CPT

## 2021-09-14 RX ORDER — ALPRAZOLAM 0.25 MG
0.25 TABLET ORAL ONCE
Refills: 0 | Status: DISCONTINUED | OUTPATIENT
Start: 2021-09-14 | End: 2021-09-14

## 2021-09-14 RX ORDER — LOPERAMIDE HCL 2 MG
2 TABLET ORAL THREE TIMES A DAY
Refills: 0 | Status: DISCONTINUED | OUTPATIENT
Start: 2021-09-14 | End: 2021-09-16

## 2021-09-14 RX ORDER — SODIUM CHLORIDE 9 MG/ML
500 INJECTION, SOLUTION INTRAVENOUS ONCE
Refills: 0 | Status: COMPLETED | OUTPATIENT
Start: 2021-09-14 | End: 2021-09-14

## 2021-09-14 RX ORDER — LOPERAMIDE HCL 2 MG
4 TABLET ORAL ONCE
Refills: 0 | Status: COMPLETED | OUTPATIENT
Start: 2021-09-14 | End: 2021-09-14

## 2021-09-14 RX ORDER — SODIUM CHLORIDE 0.65 %
2 AEROSOL, SPRAY (ML) NASAL
Refills: 0 | Status: DISCONTINUED | OUTPATIENT
Start: 2021-09-14 | End: 2021-09-22

## 2021-09-14 RX ORDER — ACETAMINOPHEN 500 MG
1000 TABLET ORAL ONCE
Refills: 0 | Status: COMPLETED | OUTPATIENT
Start: 2021-09-14 | End: 2021-09-14

## 2021-09-14 RX ORDER — LIDOCAINE 4 G/100G
1 CREAM TOPICAL THREE TIMES A DAY
Refills: 0 | Status: DISCONTINUED | OUTPATIENT
Start: 2021-09-14 | End: 2021-09-22

## 2021-09-14 RX ORDER — PANTOPRAZOLE SODIUM 20 MG/1
40 TABLET, DELAYED RELEASE ORAL ONCE
Refills: 0 | Status: COMPLETED | OUTPATIENT
Start: 2021-09-14 | End: 2021-09-14

## 2021-09-14 RX ORDER — OXYMETAZOLINE HYDROCHLORIDE 0.5 MG/ML
2 SPRAY NASAL
Refills: 0 | Status: DISCONTINUED | OUTPATIENT
Start: 2021-09-14 | End: 2021-09-16

## 2021-09-14 RX ADMIN — Medication 1 DROP(S): at 06:02

## 2021-09-14 RX ADMIN — Medication 1 DROP(S): at 14:52

## 2021-09-14 RX ADMIN — Medication 325 MILLIGRAM(S): at 11:16

## 2021-09-14 RX ADMIN — Medication 2: at 21:03

## 2021-09-14 RX ADMIN — Medication 20 MILLIGRAM(S): at 11:16

## 2021-09-14 RX ADMIN — Medication 4: at 08:23

## 2021-09-14 RX ADMIN — APIXABAN 2.5 MILLIGRAM(S): 2.5 TABLET, FILM COATED ORAL at 11:16

## 2021-09-14 RX ADMIN — ESCITALOPRAM OXALATE 10 MILLIGRAM(S): 10 TABLET, FILM COATED ORAL at 11:16

## 2021-09-14 RX ADMIN — OXYMETAZOLINE HYDROCHLORIDE 2 SPRAY(S): 0.5 SPRAY NASAL at 21:03

## 2021-09-14 RX ADMIN — Medication 4: at 12:29

## 2021-09-14 RX ADMIN — LIDOCAINE 1 PATCH: 4 CREAM TOPICAL at 19:54

## 2021-09-14 RX ADMIN — PANTOPRAZOLE SODIUM 40 MILLIGRAM(S): 20 TABLET, DELAYED RELEASE ORAL at 23:53

## 2021-09-14 RX ADMIN — Medication 200 MILLIGRAM(S): at 11:16

## 2021-09-14 RX ADMIN — Medication 650 MILLIGRAM(S): at 06:05

## 2021-09-14 RX ADMIN — SIMVASTATIN 40 MILLIGRAM(S): 20 TABLET, FILM COATED ORAL at 21:02

## 2021-09-14 RX ADMIN — Medication 1 DROP(S): at 21:03

## 2021-09-14 RX ADMIN — LIDOCAINE 1 APPLICATION(S): 4 CREAM TOPICAL at 11:16

## 2021-09-14 RX ADMIN — Medication 81 MILLIGRAM(S): at 11:16

## 2021-09-14 RX ADMIN — LIDOCAINE 1 PATCH: 4 CREAM TOPICAL at 11:17

## 2021-09-14 RX ADMIN — PANTOPRAZOLE SODIUM 40 MILLIGRAM(S): 20 TABLET, DELAYED RELEASE ORAL at 11:16

## 2021-09-14 RX ADMIN — Medication 325 MILLIGRAM(S): at 21:03

## 2021-09-14 RX ADMIN — Medication 20 MILLIGRAM(S): at 21:03

## 2021-09-14 RX ADMIN — Medication 50 MICROGRAM(S): at 06:02

## 2021-09-14 RX ADMIN — Medication 0.25 MILLIGRAM(S): at 18:29

## 2021-09-14 RX ADMIN — OXYMETAZOLINE HYDROCHLORIDE 2 SPRAY(S): 0.5 SPRAY NASAL at 17:37

## 2021-09-14 RX ADMIN — Medication 2: at 17:38

## 2021-09-14 RX ADMIN — Medication 4 MILLIGRAM(S): at 11:16

## 2021-09-14 NOTE — PROGRESS NOTE ADULT - ASSESSMENT
Imp:  Cecal mass  Diarrhea, stool samples neg for infection    Rec:  Trial of imodium  Med Onc consult pending regarding options

## 2021-09-14 NOTE — CONSULT NOTE ADULT - SUBJECTIVE AND OBJECTIVE BOX
HPI:  89/F with PMHx of pancreatitis s/p ERCP, cirrhosis, DM2, a-fib on Eliquis, CAD s/p PCI, severe pulm HTN, chronic R heart failure, diastolic dysfunction, AS s/p TAVR, OA, HTN, dyslipidemia, hypothyroidism. cecal mass, recently d/maddy from , sent  to the ED from [facility] for c/o Fall, near syncope. Pt states she was trying to put boxes under the chair, when she fell and hit her head, now has a small abrasion. Also c/o left shoulder pain.  She has been become more weak and unsteady recently.      Son, states that she has gained 20 Lbs in [facility].     Denies any chest pain/sob/abd pain.     Also, c/o left shoulder pain.  (06 Sep 2021 14:41)      PAST MEDICAL & SURGICAL HISTORY:  Diabetes Mellitus Type II    Dyslipidemia    GERD (Gastroesophageal Reflux Disease)    History of Osteoarthritis    Hypertension    CAD (coronary artery disease)    Afib    Hypothyroid    HLD (hyperlipidemia)    History of pancreatitis    Aortic stenosis    H/O: Hysterectomy    H/O: Knee Surgery- right meniscus    History of cholecystectomy    History of appendectomy    H/O total knee replacement, left    S/P IVC filter  Note that Pt does not have  h/o DVT, outPt doppler was read first as +, but after review reported as no DVT. Pt got IVC filer before that        MEDICATIONS  (STANDING):  allopurinol 200 milliGRAM(s) Oral daily  apixaban 2.5 milliGRAM(s) Oral two times a day  artificial  tears Solution 1 Drop(s) Both EYES three times a day  aspirin  chewable 81 milliGRAM(s) Oral daily  dextrose 40% Gel 15 Gram(s) Oral once  dextrose 5%. 1000 milliLiter(s) (50 mL/Hr) IV Continuous <Continuous>  dextrose 5%. 1000 milliLiter(s) (100 mL/Hr) IV Continuous <Continuous>  dextrose 50% Injectable 25 Gram(s) IV Push once  dextrose 50% Injectable 12.5 Gram(s) IV Push once  dextrose 50% Injectable 25 Gram(s) IV Push once  escitalopram 10 milliGRAM(s) Oral daily  ferrous    sulfate 325 milliGRAM(s) Oral two times a day  glucagon  Injectable 1 milliGRAM(s) IntraMuscular once  influenza   Vaccine 0.5 milliLiter(s) IntraMuscular once  insulin lispro (ADMELOG) corrective regimen sliding scale   SubCutaneous three times a day before meals  insulin lispro (ADMELOG) corrective regimen sliding scale   SubCutaneous at bedtime  levothyroxine 50 MICROGram(s) Oral daily  lidocaine   4% Patch 1 Patch Transdermal daily  pantoprazole    Tablet 40 milliGRAM(s) Oral before breakfast  sildenafil (REVATIO) 20 milliGRAM(s) Oral two times a day  simvastatin 40 milliGRAM(s) Oral at bedtime    MEDICATIONS  (PRN):  acetaminophen   Tablet .. 650 milliGRAM(s) Oral every 6 hours PRN Temp greater or equal to 38C (100.4F), Mild Pain (1 - 3)  cyclobenzaprine 5 milliGRAM(s) Oral two times a day PRN Muscle Spasm  lidocaine 2% Gel 1 Application(s) Topical three times a day PRN pain  loperamide 2 milliGRAM(s) Oral three times a day PRN loose stool      Allergies    penicillin (Rash)  penicillins (Other)  sulfa drugs (Rash; Other)    Intolerances        SOCIAL HISTORY:    FAMILY HISTORY:  FH: diabetes mellitus  both parents    FH: heart disease        Vital Signs Last 24 Hrs  T(C): 37 (14 Sep 2021 08:21), Max: 37.1 (13 Sep 2021 23:58)  T(F): 98.6 (14 Sep 2021 08:21), Max: 98.7 (13 Sep 2021 23:58)  HR: 104 (14 Sep 2021 08:21) (57 - 104)  BP: 123/67 (14 Sep 2021 08:21) (100/64 - 123/67)  BP(mean): 67 (13 Sep 2021 18:06) (67 - 67)  RR: 18 (14 Sep 2021 08:21) (18 - 19)  SpO2: 97% (14 Sep 2021 08:21) (96% - 100%)      LABS:                        9.1    7.86  )-----------( 167      ( 14 Sep 2021 08:21 )             28.2     09-14    135  |  99  |  21  ----------------------------<  200<H>  3.6   |  31  |  0.78    Ca    8.4<L>      14 Sep 2021 08:21  Phos  3.7     09-14  Mg     2.3     09-14            RADIOLOGY & ADDITIONAL STUDIES: HPI:    89/F With multiple medical problems including right heart failure, pulmonary hypertension, diastolic dysfunction.  Background history of a cirrhosis, type 2 diabetes, arrhythmia.  She was in [facility] towards the middle of August with sepsis, strep bovis species.  She required pressors fluids antibiotics with improvement.  Because of the strep bovis she underwent colonoscopy revealing a cecal mass associated with a large polyp.  From my understanding no obstructive symptoms, bleeding etc.  Imaging studies without metastases.  Patient was seen by surgery and advised outpatient follow-up.  There had been some discussion of a colectomy.  Now admitted from local nursing home for a  fall/near syncope. On this visit patient has had diarrhea, etiology remains unclear.  On her prior admission, when the diagnosis of colon cancer was made there was no mention of a significant diarrhea.  In any event she was reevaluated by surgery and because of the comorbidities outlined above is not deemed a surgical candidate, palliative care recommended.    In terms of the tumor this is a moderately differentiated adenocarcinoma.  There is loss of MLH1 and PMS2, consistent with microsatellite instability.    Patient's principal complaint at this time is ongoing diarrhea.  Appetite is fair.  She denies abdominal pain cramping nausea emesis, back pain lower extremity paresthesias.  No history of blood in stools  PAST MEDICAL & SURGICAL HISTORY:  Diabetes Mellitus Type II    Dyslipidemia  GERD (Gastroesophageal Reflux Disease)  History of Osteoarthritis  Hypertension  CAD (coronary artery disease)  Afib  Hypothyroid  HLD (hyperlipidemia)  History of pancreatitis  Aortic stenosis  H/O: Hysterectomy  H/O: Knee Surgery- right meniscus  History of cholecystectomy  History of appendectomy  H/O total knee replacement, left  S/P IVC filter  Note that Pt does not have  h/o DVT, outPt doppler was read first as +, but after review reported as no DVT. Pt got IVC filer before that        MEDICATIONS  (STANDING):  allopurinol 200 milliGRAM(s) Oral daily  apixaban 2.5 milliGRAM(s) Oral two times a day  artificial  tears Solution 1 Drop(s) Both EYES three times a day  aspirin  chewable 81 milliGRAM(s) Oral daily  dextrose 40% Gel 15 Gram(s) Oral once  dextrose 5%. 1000 milliLiter(s) (50 mL/Hr) IV Continuous <Continuous>  dextrose 5%. 1000 milliLiter(s) (100 mL/Hr) IV Continuous <Continuous>  dextrose 50% Injectable 25 Gram(s) IV Push once  dextrose 50% Injectable 12.5 Gram(s) IV Push once  dextrose 50% Injectable 25 Gram(s) IV Push once  escitalopram 10 milliGRAM(s) Oral daily  ferrous    sulfate 325 milliGRAM(s) Oral two times a day  glucagon  Injectable 1 milliGRAM(s) IntraMuscular once  influenza   Vaccine 0.5 milliLiter(s) IntraMuscular once  insulin lispro (ADMELOG) corrective regimen sliding scale   SubCutaneous three times a day before meals  insulin lispro (ADMELOG) corrective regimen sliding scale   SubCutaneous at bedtime  levothyroxine 50 MICROGram(s) Oral daily  lidocaine   4% Patch 1 Patch Transdermal daily  pantoprazole    Tablet 40 milliGRAM(s) Oral before breakfast  sildenafil (REVATIO) 20 milliGRAM(s) Oral two times a day  simvastatin 40 milliGRAM(s) Oral at bedtime    MEDICATIONS  (PRN):  acetaminophen   Tablet .. 650 milliGRAM(s) Oral every 6 hours PRN Temp greater or equal to 38C (100.4F), Mild Pain (1 - 3)  cyclobenzaprine 5 milliGRAM(s) Oral two times a day PRN Muscle Spasm  lidocaine 2% Gel 1 Application(s) Topical three times a day PRN pain  loperamide 2 milliGRAM(s) Oral three times a day PRN loose stool      Allergies    penicillin (Rash)  penicillins (Other)  sulfa drugs (Rash; Other)    Intolerances        SOCIAL HISTORY:    FAMILY HISTORY:  FH: diabetes mellitus  both parents    FH: heart disease        Vital Signs Last 24 Hrs  T(C): 37 (14 Sep 2021 08:21), Max: 37.1 (13 Sep 2021 23:58)  T(F): 98.6 (14 Sep 2021 08:21), Max: 98.7 (13 Sep 2021 23:58)  HR: 104 (14 Sep 2021 08:21) (57 - 104)  BP: 123/67 (14 Sep 2021 08:21) (100/64 - 123/67)  BP(mean): 67 (13 Sep 2021 18:06) (67 - 67)  RR: 18 (14 Sep 2021 08:21) (18 - 19)  SpO2: 97% (14 Sep 2021 08:21) (96% - 100%)  Pleasant, frail-appearing female in no acute distress    Alert and oriented  ECOG 3  Neck supple  Lungs crackles at the bases  Cor S1-S2 2/6 systolic murmur  Abdomen soft no guarding rebound.  No masses noted  Extremities 1-2+ edema    LABS:                       9.1    7.86  )-----------( 167      ( 14 Sep 2021 08:21 )             28.2     09-14    135  |  99  |  21  ----------------------------<  200<H>  3.6   |  31  |  0.78    Ca    8.4<L>      14 Sep 2021 08:21  Phos  3.7     09-14  Mg     2.3     09-14      RADIOLOGY & ADDITIONAL STUDIES:    < from: CT Abdomen and Pelvis w/ Oral Cont and w/ IV Cont (08.19.21 @ 14:09) >  EXAM:  CT ABDOMEN AND PELVIS OC IC                            PROCEDURE DATE:  08/19/2021          INTERPRETATION:  CLINICAL INFORMATION: Cecal mass on colonoscopy.    COMPARISON: CT abdomen/pelvis 7/16/2020 and MRI abdomen 12/14/2017    CONTRAST/COMPLICATIONS:  IV Contrast: Omnipaque 350  90 cc administered   10 cc discarded  Oral Contrast: Omnipaque 300  Complications: None reported at time of study completion    PROCEDURE:  CT of the Abdomen and Pelvis was performed.  Sagittal and coronal reformats were performed.    FINDINGS:  LOWER CHEST: Small bilateral pleural effusions, larger on the right. Interlobular septal thickening at the lung bases with reticular opacities, likely chronic interstitial lung disease. Aortic valve replacement.    LIVER: No focal hepatic lesion. Nodular surface contour again noted which can be seen in the setting of cirrhosis.  BILE DUCTS: Normal caliber.  GALLBLADDER: Not seen.  SPLEEN: Within normal limits.  PANCREAS: Atrophic.  ADRENALS: Within normal limits.  KIDNEYS/URETERS: No hydronephrosis. Bilateral renal cysts.    BLADDER: Unremarkable.  REPRODUCTIVE ORGANS: Hysterectomy.    BOWEL: Mucosal evaluation of the bowel is limited. Colonic diverticulosis without evidence of diverticulitis. Patient's known cecal mass is not well seen. No bowel obstruction. Appendix not seen.  PERITONEUM: Trace pelvic ascites.  VESSELS: IVC filter. Atherosclerotic changes. Abdominal aorta normal in caliber.  RETROPERITONEUM/LYMPH NODES: No lymphadenopathy.  ABDOMINALWALL: Unremarkable.  BONES: Degenerative changes in the spine. Mild anterolisthesis of L4 on L5. Hemangioma in L3.    IMPRESSION:  Patient's known cecal mass not well-seen on this study.    No evidence of metastatic disease in the abdomen or pelvis.    Small bilateral pleural effusions.    --- End of Report ---            SAL GAY MD; Attending Radiologist  This document has been electronically signed. Aug 19 202    < end of copied text >

## 2021-09-14 NOTE — CHART NOTE - NSCHARTNOTEFT_GEN_A_CORE
89 year old patient with a first time episode of hematemesis per RN. Pt examined at bedside. Vitals WNL except for mild tachycardia in 110's. Pts. HR is irregularly irregular consistent with hx of a. fib. Pt's AC held since 9/6/21 by colorectal surgery due to possible outpatient procedure.     Constitutional: Pt lying in bed, awake and alert,   Respiratory: CTABL, No wheezing, rales or rhonchi  Cardiovascular: holosystolic murmur appreciated at apex. Irregularly irregular rhythm.   Gastrointestinal: BS+, mildly tender to palpation   Extremities: No peripheral edema  Vascular: 2+ peripheral pulses  Neurological: AAOx3, no focal deficits  Musculoskeletal: 4/5 left UE 3/5 right UE    REVIEW OF SYSTEMS:    CONSTITUTIONAL: Right arm and neck pain consistent with fall.  NECK: + pain   RESPIRATORY: Patient on O2 - no respiratory distress  CARDIOVASCULAR: No chest pain or palpitations  GASTROINTESTINAL: diarrhea, hematemesis   GENITOURINARY: No dysuria, frequency or hematuria  NEUROLOGICAL: No numbness or weakness      ICU Vital Signs Last 24 Hrs  T(C): 37.3 (14 Sep 2021 23:25), Max: 37.3 (14 Sep 2021 23:25)  T(F): 99.2 (14 Sep 2021 23:25), Max: 99.2 (14 Sep 2021 23:25)  HR: 110 (14 Sep 2021 23:25) (96 - 122)  BP: 116/69 (14 Sep 2021 23:25) (99/48 - 123/67)  BP(mean): --  ABP: --  ABP(mean): --  RR: 18 (14 Sep 2021 23:25) (18 - 18)  SpO2: 96% (14 Sep 2021 23:25) (95% - 100%)    Assessment/Plan  89 year old patient with a first time episode of hematemesis per RN. Pt examined at bedside. Vitals WNL except for mild tachycardia in 110's.     #possible upper GI Bleed  - STAT CBC, CMP  - STAT 1x IV Protonix 40   - STAT 500mL LR bolus  - pt. not on AC since 9/6/21  - Type and screen ordered for possible tranfusion pending H/H    #a fib.   - will give low dose metoprolol pending labs

## 2021-09-14 NOTE — PROGRESS NOTE ADULT - SUBJECTIVE AND OBJECTIVE BOX
Subjective:    Awake, comfortable at rest. Sl bloody bowel movement. Stool still loose.    MEDICATIONS  (STANDING):  allopurinol 200 milliGRAM(s) Oral daily  apixaban 2.5 milliGRAM(s) Oral two times a day  artificial  tears Solution 1 Drop(s) Both EYES three times a day  aspirin  chewable 81 milliGRAM(s) Oral daily  dextrose 40% Gel 15 Gram(s) Oral once  dextrose 5%. 1000 milliLiter(s) (50 mL/Hr) IV Continuous <Continuous>  dextrose 5%. 1000 milliLiter(s) (100 mL/Hr) IV Continuous <Continuous>  dextrose 50% Injectable 25 Gram(s) IV Push once  dextrose 50% Injectable 12.5 Gram(s) IV Push once  dextrose 50% Injectable 25 Gram(s) IV Push once  escitalopram 10 milliGRAM(s) Oral daily  ferrous    sulfate 325 milliGRAM(s) Oral two times a day  glucagon  Injectable 1 milliGRAM(s) IntraMuscular once  influenza   Vaccine 0.5 milliLiter(s) IntraMuscular once  insulin lispro (ADMELOG) corrective regimen sliding scale   SubCutaneous three times a day before meals  insulin lispro (ADMELOG) corrective regimen sliding scale   SubCutaneous at bedtime  levothyroxine 50 MICROGram(s) Oral daily  lidocaine   4% Patch 1 Patch Transdermal daily  pantoprazole    Tablet 40 milliGRAM(s) Oral before breakfast  sildenafil (REVATIO) 20 milliGRAM(s) Oral two times a day  simvastatin 40 milliGRAM(s) Oral at bedtime    MEDICATIONS  (PRN):  acetaminophen   Tablet .. 650 milliGRAM(s) Oral every 6 hours PRN Temp greater or equal to 38C (100.4F), Mild Pain (1 - 3)  cyclobenzaprine 5 milliGRAM(s) Oral two times a day PRN Muscle Spasm      Allergies    penicillin (Rash)  penicillins (Other)  sulfa drugs (Rash; Other)    Intolerances        Vital Signs Last 24 Hrs  T(C): 37 (14 Sep 2021 08:21), Max: 37.1 (13 Sep 2021 23:58)  T(F): 98.6 (14 Sep 2021 08:21), Max: 98.7 (13 Sep 2021 23:58)  HR: 104 (14 Sep 2021 08:21) (57 - 104)  BP: 123/67 (14 Sep 2021 08:21) (100/64 - 123/67)  BP(mean): 67 (13 Sep 2021 18:06) (67 - 67)  RR: 18 (14 Sep 2021 08:21) (18 - 19)  SpO2: 97% (14 Sep 2021 08:21) (96% - 100%)    PHYSICAL EXAMINATION:    NECK:  Supple. No lymphadenopathy. Jugular venous pressure not elevated.   HEART:   The cardiac impulse has a normal quality. Irreg. irreg, Nl S1 and S2.    CHEST:  Chest with bibasilar crackles, R>L. Normal respiratory effort.  ABDOMEN:  Soft and nontender.   EXTREMITIES:  There is no edema.       LABS:                        9.2    7.85  )-----------( 163      ( 13 Sep 2021 08:52 )             28.0     09-13    133<L>  |  97  |  21  ----------------------------<  191<H>  3.5   |  32<H>  |  0.73    Ca    8.6      13 Sep 2021 08:52            RADIOLOGY & ADDITIONAL TESTS:    Assessment and Recommendation:   · Assessment      89 year-old woman, presents after fall, pulmonary consulted for shortness of breath.  --Her fall may have been mechanical fall.  --She has bilateral infiltrates on imaging, previous imaging reviewed many of these changes are chronic and when viewed on CT chest represent ground glass and intersitial infiltrates likely related to pulmonary edema-mild decompensation of HFpEF  --She clinically appears stable from pulmonary standpoint-Sl wheeze noted with persistent basilar crackles. Normal O2 sats          Recommendations:  --Cardiology rec appreciated, strict I/O, daily weight.  Torsemide now now on hold-monitor lytes carefully. K+ repletion. Concern about diuretics in face of diarrhea. C-Dif ruled ruled out. GI F/U with Dr. Mobley  --Lower extremity edema is improved- would benefit from compression stockings.  --Await Oncology evaluation regarding treatment options  --Check CXR result

## 2021-09-14 NOTE — PATIENT PROFILE ADULT. - NSALCOHOLFREQ_GEN_A_CORE_SD
Wale Mccurdy,  Your lab tests showed normal PSA-prostate cancer screening test and a negative screening test for HIV and hepatitis C.  These are good and reassuring.   Let me know if you have any questions. Take care.  Jakub Wolff MD   monthly or less

## 2021-09-14 NOTE — PROGRESS NOTE ADULT - ASSESSMENT
# S/p fall  -likely mechanical   -  tele -  no arrhythmias, AFIB rate controlled   - serial cardiac enz neg   - trauma work: no Fx  - neck pain:  c/w Lidoderm, Tylenol  PO, added  flexeril PRN  - PT   - orthostatic vitals neg     # Severe constipation, Now with Diarrhea   -  required disimpaction on 9/7  - On ducolax suppository PRN, Psyllium on hold now   - Pt with few episodes of diarrhea   -  stool GI PCR and CDIFF PCR neg   - and XR reviewed, no obstruction   - C/w Imodium PRN   - D/w Dr Mobley     #  chronic R heart failure. Severe Pulm HTN   #  AS s/p TAVR,  # Acute on chronic diastolic  CHF exacerbation, improved   - CT chest noted, chronic changes and CHF   - torsemide 40 mg bid, on  hold while has loose stools, reeval in am   - supportive O2 via face shield   - ECHO: severe pulm HTN, Mod MS, AV prosthesis good Fx, Severe TR. LV mild LVH   - C/w Sildenafil   - D/w Dr Camacho    # Cecal Adenocarcinoma    - Patient is anticipated to undergo surgical procedure in a tertiary care facility given patient's advanced age / extensive chronic medical issues including valvular Dz and severe Pulm HTN  -D/w  Dr. Smyth, case was discussed with chemarkos od anesthesia at [facility], Pt is too high risk due to Severe pulm HTN . Not a candidate for Sx   - hem/onc eval appreciated, advises against systemic Tx due to Pts  Fx status and other medical issues and possible adverse effects       # Epistaxis, mild   Likely 2/2 dry nasal mucosa on NC  Clanged on humidified face shield   afrin saturated packing x 5 min applied with no further bleeding   Monitor closely , hold eliquis and ASA for now   H/H stable this am, will trend       # DM 2:   -HB A1c 6.8   - not on meds outPt   - Monitor BS and cover with  ISS    # A fib: permanent  - hold  eliquis for now     #  DVT PPX:  eliwuis on hold, will reeval in am       D/w Pts Son Dr. Mojica, updated on events and plan, agrees on Palliative eval

## 2021-09-14 NOTE — PROGRESS NOTE ADULT - SUBJECTIVE AND OBJECTIVE BOX
HPI:  89/F with PMHx of pancreatitis s/p ERCP, cirrhosis, DM2, a-fib on Eliquis, CAD s/p PCI, severe pulm HTN, chronic R heart failure, diastolic dysfunction, AS s/p TAVR, OA, HTN, dyslipidemia, hypothyroidism. cecal mass, recently d/maddy from , sent  to the ED from [facility] for c/o Fall, near syncope. Pt states she was trying to put boxes under the chair, when she fell and hit her head, now has a small abrasion. Also c/o left shoulder pain.  She has been become more weak and unsteady recently.      Son, states that she has gained 20 Lbs in [facility].     Denies any chest pain/sob/abd pain.     Also, c/o left shoulder pain.  (06 Sep 2021 14:41)  -----------------------------------  Patient was deemed not an operative candidate.. She's been having diarrhea, mostly non-bloody, but no abdominal pain.    PAST MEDICAL & SURGICAL HISTORY:  Diabetes Mellitus Type II    Dyslipidemia    GERD (Gastroesophageal Reflux Disease)    History of Osteoarthritis    Hypertension    CAD (coronary artery disease)    Afib    Hypothyroid    HLD (hyperlipidemia)    History of pancreatitis    Aortic stenosis    H/O: Hysterectomy    H/O: Knee Surgery- right meniscus    History of cholecystectomy    History of appendectomy    H/O total knee replacement, left    S/P IVC filter  Note that Pt does not have  h/o DVT, outPt doppler was read first as +, but after review reported as no DVT. Pt got IVC filer before that        Home Medications:  allopurinol 100 mg oral tablet: 2 tab(s) orally once a day (06 Sep 2021 14:54)  Artificial Tears ophthalmic solution: 1 drop(s) to each affected eye 3 times a day (06 Sep 2021 14:54)  Aspirin Low Dose 81 mg oral tablet, chewable: 1 tab(s) orally once a day (06 Sep 2021 14:54)  cyclobenzaprine 5 mg oral tablet: 1 tab(s) orally 2 times a day, As needed, Muscle Spasm (10 Sep 2021 16:49)  Eliquis 2.5 mg oral tablet: 1 tab(s) orally 2 times a day (06 Sep 2021 14:54)  escitalopram 10 mg oral tablet: 1 tab(s) orally once a day (06 Sep 2021 14:54)  ferrous sulfate 325 mg (65 mg elemental iron) oral tablet: 1 tab(s) orally 2 times a day (06 Sep 2021 14:54)  levothyroxine 50 mcg (0.05 mg) oral tablet: 1 tab(s) orally once a day (06 Sep 2021 14:54)  lidocaine 5% topical film: Apply topically to affected area once a day (07 Sep 2021 10:46)  pantoprazole 40 mg oral delayed release tablet: 1 tab(s) orally 2 times a day (06 Sep 2021 14:54)  Potassium Chloride (Eqv-Klor-Con 10) 10 mEq oral tablet, extended release: 1 tab(s) orally once a day (07 Sep 2021 10:46)  psyllium 3.4 g/12 g oral powder for reconstitution: 1 packet(s) orally once a day (07 Sep 2021 10:46)  sildenafil 20 mg oral tablet: 1 tab(s) orally 2 times a day (06 Sep 2021 14:54)  simvastatin 40 mg oral tablet: 1 tab(s) orally once a day (at bedtime) (06 Sep 2021 14:54)  torsemide 20 mg oral tablet: 2 tab(s) orally 2 times a day (07 Sep 2021 10:46)      MEDICATIONS  (STANDING):  allopurinol 200 milliGRAM(s) Oral daily  apixaban 2.5 milliGRAM(s) Oral two times a day  artificial  tears Solution 1 Drop(s) Both EYES three times a day  aspirin  chewable 81 milliGRAM(s) Oral daily  dextrose 40% Gel 15 Gram(s) Oral once  dextrose 5%. 1000 milliLiter(s) (50 mL/Hr) IV Continuous <Continuous>  dextrose 5%. 1000 milliLiter(s) (100 mL/Hr) IV Continuous <Continuous>  dextrose 50% Injectable 25 Gram(s) IV Push once  dextrose 50% Injectable 12.5 Gram(s) IV Push once  dextrose 50% Injectable 25 Gram(s) IV Push once  escitalopram 10 milliGRAM(s) Oral daily  ferrous    sulfate 325 milliGRAM(s) Oral two times a day  glucagon  Injectable 1 milliGRAM(s) IntraMuscular once  influenza   Vaccine 0.5 milliLiter(s) IntraMuscular once  insulin lispro (ADMELOG) corrective regimen sliding scale   SubCutaneous three times a day before meals  insulin lispro (ADMELOG) corrective regimen sliding scale   SubCutaneous at bedtime  levothyroxine 50 MICROGram(s) Oral daily  lidocaine   4% Patch 1 Patch Transdermal daily  loperamide 4 milliGRAM(s) Oral once  pantoprazole    Tablet 40 milliGRAM(s) Oral before breakfast  sildenafil (REVATIO) 20 milliGRAM(s) Oral two times a day  simvastatin 40 milliGRAM(s) Oral at bedtime    MEDICATIONS  (PRN):  acetaminophen   Tablet .. 650 milliGRAM(s) Oral every 6 hours PRN Temp greater or equal to 38C (100.4F), Mild Pain (1 - 3)  cyclobenzaprine 5 milliGRAM(s) Oral two times a day PRN Muscle Spasm  lidocaine 2% Gel 1 Application(s) Topical three times a day PRN pain  loperamide 2 milliGRAM(s) Oral three times a day PRN loose stool      Allergies    penicillin (Rash)  penicillins (Other)  sulfa drugs (Rash; Other)    Intolerances        SOCIAL HISTORY:    FAMILY HISTORY:  FH: diabetes mellitus  both parents    FH: heart disease        ROS  As above  Otherwise unremarkable    Vital Signs Last 24 Hrs  T(C): 37 (14 Sep 2021 08:21), Max: 37.1 (13 Sep 2021 23:58)  T(F): 98.6 (14 Sep 2021 08:21), Max: 98.7 (13 Sep 2021 23:58)  HR: 104 (14 Sep 2021 08:21) (57 - 104)  BP: 123/67 (14 Sep 2021 08:21) (100/64 - 123/67)  BP(mean): 67 (13 Sep 2021 18:06) (67 - 67)  RR: 18 (14 Sep 2021 08:21) (18 - 19)  SpO2: 97% (14 Sep 2021 08:21) (96% - 100%)    Constitutional: NAD  Respiratory: CTAB  Cardiovascular: S1 and S2  Gastrointestinal: BS+, soft, NT/ND  Psychiatric: Normal mood, normal affect  Skin: No rashes  RectaL :No lesions or impaction, loose brown stool    LABS:                        9.1    7.86  )-----------( 167      ( 14 Sep 2021 08:21 )             28.2     09-14    135  |  99  |  21  ----------------------------<  200<H>  3.6   |  31  |  0.78    Ca    8.4<L>      14 Sep 2021 08:21  Phos  3.7     09-14  Mg     2.3     09-14            RADIOLOGY & ADDITIONAL STUDIES:

## 2021-09-14 NOTE — PROGRESS NOTE ADULT - SUBJECTIVE AND OBJECTIVE BOX
CC: near syncope, fall (10 Sep 2021 08:05)    HPI: 89/F with PMHx of pancreatitis s/p ERCP, cirrhosis, DM2, a-fib on Eliquis, CAD s/p PCI, severe pulm HTN, chronic R heart failure, diastolic dysfunction, AS s/p TAVR, OA, HTN, dyslipidemia, hypothyroidism, cecal mass, recently d/maddy from , sent  to the ED from [facility] for c/o Fall, near syncope. Pt states she was trying to put boxes under the chair, when she fell and hit her head, now has a small abrasion. Also c/o left shoulder pain.  She has been become more weak and unsteady recently.   As per Son, she has gained 20 Lbs in [facility].   Her trauma work up neg in ED   Pt is on increased dose torsemide for acute on Chronic diastolic CHF.   Pt was evaluated by CR Sx, plan for transfer to [facility] for possible SX, needs higher level of cardiopulmonary perioperative care    INTERVAL HPI/ OVERNIGHT EVENTS: Pt was seen and examined this am with Dr Mobley and this afternoon. Had  loose BM this am, no further BMs after dose of imodium. Reports no further rectal pain. Now with nose bleed today on/off, reported that has other episodes in the past/ Pt was placed on NRB first but did not tolerate, reports that feels claustrophobic. RT was able to place on face shield., tolerates better now.       Vital Signs Last 24 Hrs  T(C): 36.8 (12 Sep 2021 21:18), Max: 36.8 (12 Sep 2021 21:18)  T(F): 98.2 (12 Sep 2021 21:18), Max: 98.2 (12 Sep 2021 21:18)  HR: 112 (12 Sep 2021 21:18) (107 - 112)  BP: 129/79 (12 Sep 2021 21:18) (129/79 - 129/79)  RR: 18 (12 Sep 2021 21:18) (18 - 18)  SpO2: 92% (12 Sep 2021 21:18) (92% - 92%)      REVIEW OF SYSTEMS:  All other review of systems is negative unless indicated above.    PHYSICAL EXAM:  General: Well developed; malnourished; frail elderly female,   on  O2   Eyes: EOMI; conjunctiva and sclera clear  Head: Normocephalic; atraumatic  ENMT: No nasal discharge; airway clear  Neck: Supple; non tender; no masses  Respiratory: Decreased BS, No wheezes, crepitations at bases b/l  Cardiovascular: Irregular rate and rhythm. S1 and S2 Normal;  Gastrointestinal: Soft non-tender non-distended; Normal bowel sounds. rectal area with no erythema or edema.    Genitourinary: No  suprapubic  tenderness  Extremities: +  edema  Vascular: Peripheral pulses palpable 2+ bilaterally  Neurological: Alert and oriented x2-3, non focal   Musculoskeletal: Normal muscle  strength, L neck  muscle tension improved  Psychiatric: Cooperative       LABS:                         9.2    7.85  )-----------( 163      ( 13 Sep 2021 08:52 )             28.0     09-13    133<L>  |  97  |  21  ----------------------------<  191<H>  3.5   |  32<H>  |  0.73    Ca    8.6      13 Sep 2021 08:52                           9.7    7.11  )-----------( 147      ( 11 Sep 2021 09:32 )             30.2     09-11    139  |  100  |  26<H>  ----------------------------<  168<H>  3.5   |  35<H>  |  0.74    Ca    8.8      11 Sep 2021 09:32                              9.4    5.96  )-----------( 135      ( 09 Sep 2021 12:27 )             29.9     09 Sep 2021 12:27    138    |  101    |  24     ----------------------------<  217    3.5     |  33     |  0.83     Ca    8.8        09 Sep 2021 12:27      CAPILLARY BLOOD GLUCOSE:   POCT Blood Glucose.: 170 mg/dL (14 Sep 2021 17:30)  POCT Blood Glucose.: 248 mg/dL (14 Sep 2021 12:09)  POCT Blood Glucose.: 230 mg/dL (14 Sep 2021 07:59)  POCT Blood Glucose.: 182 mg/dL (13 Sep 2021 21:24)        MEDICATIONS  (STANDING):  allopurinol 200 milliGRAM(s) Oral daily  artificial  tears Solution 1 Drop(s) Both EYES three times a day  dextrose 40% Gel 15 Gram(s) Oral once  dextrose 5%. 1000 milliLiter(s) (50 mL/Hr) IV Continuous <Continuous>  dextrose 5%. 1000 milliLiter(s) (100 mL/Hr) IV Continuous <Continuous>  dextrose 50% Injectable 25 Gram(s) IV Push once  dextrose 50% Injectable 12.5 Gram(s) IV Push once  dextrose 50% Injectable 25 Gram(s) IV Push once  escitalopram 10 milliGRAM(s) Oral daily  ferrous    sulfate 325 milliGRAM(s) Oral two times a day  glucagon  Injectable 1 milliGRAM(s) IntraMuscular once  influenza   Vaccine 0.5 milliLiter(s) IntraMuscular once  insulin lispro (ADMELOG) corrective regimen sliding scale   SubCutaneous three times a day before meals  insulin lispro (ADMELOG) corrective regimen sliding scale   SubCutaneous at bedtime  levothyroxine 50 MICROGram(s) Oral daily  lidocaine   4% Patch 1 Patch Transdermal daily  oxymetazoline 0.05% Nasal Spray 2 Spray(s) Both Nostrils two times a day  pantoprazole    Tablet 40 milliGRAM(s) Oral before breakfast  sildenafil (REVATIO) 20 milliGRAM(s) Oral two times a day  simvastatin 40 milliGRAM(s) Oral at bedtime    MEDICATIONS  (PRN):  acetaminophen   Tablet .. 650 milliGRAM(s) Oral every 6 hours PRN Temp greater or equal to 38C (100.4F), Mild Pain (1 - 3)  cyclobenzaprine 5 milliGRAM(s) Oral two times a day PRN Muscle Spasm  lidocaine 2% Gel 1 Application(s) Topical three times a day PRN pain  loperamide 2 milliGRAM(s) Oral three times a day PRN loose stool      RADIOLOGY & ADDITIONAL TESTS:      EXAM:  CT ABDOMEN AND PELVIS OC IC                        PROCEDURE DATE:  08/19/2021    FINDINGS:  LOWER CHEST: Small bilateral pleural effusions, larger on the right. Interlobular septal thickening at the lung bases with reticular opacities, likely chronic interstitial lung disease. Aortic valve replacement.    LIVER: No focal hepatic lesion. Nodular surface contour again noted which can be seen in the setting of cirrhosis.  BILE DUCTS: Normal caliber.  GALLBLADDER: Not seen.  SPLEEN: Within normal limits.  PANCREAS: Atrophic.  ADRENALS: Within normal limits.  KIDNEYS/URETERS: No hydronephrosis. Bilateral renal cysts.    BLADDER: Unremarkable.  REPRODUCTIVE ORGANS: Hysterectomy.    BOWEL: Mucosal evaluation of the bowel is limited. Colonic diverticulosis without evidence of diverticulitis. Patient's known cecal mass is not well seen. No bowel obstruction. Appendix not seen.  PERITONEUM: Trace pelvic ascites.  VESSELS: IVC filter. Atherosclerotic changes. Abdominal aorta normal in caliber.  RETROPERITONEUM/LYMPH NODES: No lymphadenopathy.  ABDOMINALWALL: Unremarkable.  BONES: Degenerative changes in the spine. Mild anterolisthesis of L4 on L5. Hemangioma in L3.    IMPRESSION:  Patient's known cecal mass not well-seen on this study.  No evidence of metastatic disease in the abdomen or pelvis.  Small bilateral pleural effusions.      < from: Xray Abdomen 1 View PORTABLE -Routine (Xray Abdomen 1 View PORTABLE -Routine .) (09.13.21 @ 22:31) >  EXAM:  XR ABDOMEN PORTABLE ROUTINE 1V                            PROCEDURE DATE:  09/13/2021          INTERPRETATION:  Abdomen one view    HISTORY: Diarrhea    COMPARISON: 8/19/2021    Frontal view of the abdomen shows a normal gas pattern. IVC filter is again noted. No definite free air is identified.    IMPRESSION:  No evidence of bowel obstruction.    Thank you for this referral.

## 2021-09-14 NOTE — CONSULT NOTE ADULT - ASSESSMENT
Patient is a 89-year-old female with significant comorbidities including right heart failure, pulmonary hypertension.  Because of a history of strep bovis she underwent colonoscopy demonstrating a cecal mass.  No signs or symptoms suggesting obstruction or bleeding.  She is not deemed a surgical candidate because of the comorbidities.    Given her age, comorbidities, performance status, It is unlikely that this particular a tumor Patient is a 89-year-old female with significant comorbidities including right heart failure and pulmonary hypertension.  Because of a history of strep bovis she underwent colonoscopy demonstrating a cecal mass.  No signs or symptoms suggesting obstruction or bleeding.  She is not deemed a surgical candidate because of active  comorbidities. 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    Patient is 89 y/o with  a high frailty index and  significant competing morbidities; Therefore any systemic therapy directed against colon cancer will not add  longevity and in fact will likely be associated added morbidity  Her principal complaint at this time is a diarrhea, and its etiology and  management will  be  paramount in improving her clinical well being    In terms of the microsatellite instability, her particular configuration might be because of a BRAF mutation or hyper methylation of the promoter region in which case this would speak against a germline mutation i.e. Curtis syndrome.  Pathology has sent off for the BRAF mutation.  If positive no further work-up required to rule out Curtis syndrome        Assessment and plan  There is no role for systemic therapy at this time for the reasons outlined above  Aggressive management in regards to the diarrhea.  Will discuss with GI  Evaluate BRAF mutation on primary pathology

## 2021-09-15 LAB
ANION GAP SERPL CALC-SCNC: 4 MMOL/L — LOW (ref 5–17)
ANION GAP SERPL CALC-SCNC: 6 MMOL/L — SIGNIFICANT CHANGE UP (ref 5–17)
BUN SERPL-MCNC: 19 MG/DL — SIGNIFICANT CHANGE UP (ref 7–23)
BUN SERPL-MCNC: 21 MG/DL — SIGNIFICANT CHANGE UP (ref 7–23)
CALCIUM SERPL-MCNC: 8.4 MG/DL — LOW (ref 8.5–10.1)
CALCIUM SERPL-MCNC: 8.5 MG/DL — SIGNIFICANT CHANGE UP (ref 8.5–10.1)
CHLORIDE SERPL-SCNC: 95 MMOL/L — LOW (ref 96–108)
CHLORIDE SERPL-SCNC: 97 MMOL/L — SIGNIFICANT CHANGE UP (ref 96–108)
CO2 SERPL-SCNC: 31 MMOL/L — SIGNIFICANT CHANGE UP (ref 22–31)
CO2 SERPL-SCNC: 32 MMOL/L — HIGH (ref 22–31)
CREAT SERPL-MCNC: 0.81 MG/DL — SIGNIFICANT CHANGE UP (ref 0.5–1.3)
CREAT SERPL-MCNC: 0.85 MG/DL — SIGNIFICANT CHANGE UP (ref 0.5–1.3)
GLUCOSE SERPL-MCNC: 215 MG/DL — HIGH (ref 70–99)
GLUCOSE SERPL-MCNC: 243 MG/DL — HIGH (ref 70–99)
HCT VFR BLD CALC: 26.3 % — LOW (ref 34.5–45)
HCT VFR BLD CALC: 27.5 % — LOW (ref 34.5–45)
HCT VFR BLD CALC: 29.6 % — LOW (ref 34.5–45)
HGB BLD-MCNC: 8.6 G/DL — LOW (ref 11.5–15.5)
HGB BLD-MCNC: 9 G/DL — LOW (ref 11.5–15.5)
HGB BLD-MCNC: 9.7 G/DL — LOW (ref 11.5–15.5)
MCHC RBC-ENTMCNC: 32.7 GM/DL — SIGNIFICANT CHANGE UP (ref 32–36)
MCHC RBC-ENTMCNC: 32.7 GM/DL — SIGNIFICANT CHANGE UP (ref 32–36)
MCHC RBC-ENTMCNC: 33.1 PG — SIGNIFICANT CHANGE UP (ref 27–34)
MCHC RBC-ENTMCNC: 33.2 PG — SIGNIFICANT CHANGE UP (ref 27–34)
MCV RBC AUTO: 101.2 FL — HIGH (ref 80–100)
MCV RBC AUTO: 101.5 FL — HIGH (ref 80–100)
NT-PROBNP SERPL-SCNC: 5346 PG/ML — HIGH (ref 0–450)
OB PNL STL: POSITIVE
PLATELET # BLD AUTO: 182 K/UL — SIGNIFICANT CHANGE UP (ref 150–400)
PLATELET # BLD AUTO: 190 K/UL — SIGNIFICANT CHANGE UP (ref 150–400)
POTASSIUM SERPL-MCNC: 3.5 MMOL/L — SIGNIFICANT CHANGE UP (ref 3.5–5.3)
POTASSIUM SERPL-MCNC: 3.7 MMOL/L — SIGNIFICANT CHANGE UP (ref 3.5–5.3)
POTASSIUM SERPL-SCNC: 3.5 MMOL/L — SIGNIFICANT CHANGE UP (ref 3.5–5.3)
POTASSIUM SERPL-SCNC: 3.7 MMOL/L — SIGNIFICANT CHANGE UP (ref 3.5–5.3)
RBC # BLD: 2.6 M/UL — LOW (ref 3.8–5.2)
RBC # BLD: 2.71 M/UL — LOW (ref 3.8–5.2)
RBC # FLD: 16 % — HIGH (ref 10.3–14.5)
RBC # FLD: 16.1 % — HIGH (ref 10.3–14.5)
SODIUM SERPL-SCNC: 132 MMOL/L — LOW (ref 135–145)
SODIUM SERPL-SCNC: 133 MMOL/L — LOW (ref 135–145)
WBC # BLD: 7.54 K/UL — SIGNIFICANT CHANGE UP (ref 3.8–10.5)
WBC # BLD: 8.32 K/UL — SIGNIFICANT CHANGE UP (ref 3.8–10.5)
WBC # FLD AUTO: 7.54 K/UL — SIGNIFICANT CHANGE UP (ref 3.8–10.5)
WBC # FLD AUTO: 8.32 K/UL — SIGNIFICANT CHANGE UP (ref 3.8–10.5)

## 2021-09-15 PROCEDURE — 99233 SBSQ HOSP IP/OBS HIGH 50: CPT

## 2021-09-15 PROCEDURE — 99223 1ST HOSP IP/OBS HIGH 75: CPT

## 2021-09-15 RX ORDER — INSULIN LISPRO 100/ML
VIAL (ML) SUBCUTANEOUS AT BEDTIME
Refills: 0 | Status: DISCONTINUED | OUTPATIENT
Start: 2021-09-15 | End: 2021-09-22

## 2021-09-15 RX ORDER — SODIUM CHLORIDE 9 MG/ML
1000 INJECTION, SOLUTION INTRAVENOUS
Refills: 0 | Status: DISCONTINUED | OUTPATIENT
Start: 2021-09-15 | End: 2021-09-22

## 2021-09-15 RX ORDER — GLUCAGON INJECTION, SOLUTION 0.5 MG/.1ML
1 INJECTION, SOLUTION SUBCUTANEOUS ONCE
Refills: 0 | Status: DISCONTINUED | OUTPATIENT
Start: 2021-09-15 | End: 2021-09-22

## 2021-09-15 RX ORDER — CHOLESTYRAMINE 4 G/9G
4 POWDER, FOR SUSPENSION ORAL DAILY
Refills: 0 | Status: DISCONTINUED | OUTPATIENT
Start: 2021-09-15 | End: 2021-09-16

## 2021-09-15 RX ORDER — INSULIN LISPRO 100/ML
VIAL (ML) SUBCUTANEOUS
Refills: 0 | Status: DISCONTINUED | OUTPATIENT
Start: 2021-09-15 | End: 2021-09-22

## 2021-09-15 RX ORDER — DEXTROSE 50 % IN WATER 50 %
15 SYRINGE (ML) INTRAVENOUS ONCE
Refills: 0 | Status: DISCONTINUED | OUTPATIENT
Start: 2021-09-15 | End: 2021-09-22

## 2021-09-15 RX ORDER — ALPRAZOLAM 0.25 MG
0.25 TABLET ORAL
Refills: 0 | Status: DISCONTINUED | OUTPATIENT
Start: 2021-09-15 | End: 2021-09-16

## 2021-09-15 RX ORDER — DEXTROSE 50 % IN WATER 50 %
25 SYRINGE (ML) INTRAVENOUS ONCE
Refills: 0 | Status: DISCONTINUED | OUTPATIENT
Start: 2021-09-15 | End: 2021-09-22

## 2021-09-15 RX ORDER — DEXTROSE 50 % IN WATER 50 %
12.5 SYRINGE (ML) INTRAVENOUS ONCE
Refills: 0 | Status: DISCONTINUED | OUTPATIENT
Start: 2021-09-15 | End: 2021-09-22

## 2021-09-15 RX ORDER — INSULIN GLARGINE 100 [IU]/ML
6 INJECTION, SOLUTION SUBCUTANEOUS AT BEDTIME
Refills: 0 | Status: DISCONTINUED | OUTPATIENT
Start: 2021-09-15 | End: 2021-09-22

## 2021-09-15 RX ADMIN — LIDOCAINE 1 PATCH: 4 CREAM TOPICAL at 22:04

## 2021-09-15 RX ADMIN — CHOLESTYRAMINE 4 GRAM(S): 4 POWDER, FOR SUSPENSION ORAL at 14:18

## 2021-09-15 RX ADMIN — Medication 50 MICROGRAM(S): at 06:11

## 2021-09-15 RX ADMIN — Medication 1 DROP(S): at 21:37

## 2021-09-15 RX ADMIN — OXYMETAZOLINE HYDROCHLORIDE 2 SPRAY(S): 0.5 SPRAY NASAL at 09:54

## 2021-09-15 RX ADMIN — Medication 20 MILLIGRAM(S): at 09:55

## 2021-09-15 RX ADMIN — LIDOCAINE 1 PATCH: 4 CREAM TOPICAL at 20:04

## 2021-09-15 RX ADMIN — SODIUM CHLORIDE 500 MILLILITER(S): 9 INJECTION, SOLUTION INTRAVENOUS at 00:58

## 2021-09-15 RX ADMIN — Medication 325 MILLIGRAM(S): at 21:37

## 2021-09-15 RX ADMIN — Medication 650 MILLIGRAM(S): at 14:58

## 2021-09-15 RX ADMIN — Medication 40 MILLIGRAM(S): at 16:07

## 2021-09-15 RX ADMIN — LIDOCAINE 1 PATCH: 4 CREAM TOPICAL at 10:03

## 2021-09-15 RX ADMIN — Medication 20 MILLIGRAM(S): at 21:37

## 2021-09-15 RX ADMIN — ESCITALOPRAM OXALATE 10 MILLIGRAM(S): 10 TABLET, FILM COATED ORAL at 09:53

## 2021-09-15 RX ADMIN — SIMVASTATIN 40 MILLIGRAM(S): 20 TABLET, FILM COATED ORAL at 21:37

## 2021-09-15 RX ADMIN — Medication 325 MILLIGRAM(S): at 10:02

## 2021-09-15 RX ADMIN — Medication 4: at 13:30

## 2021-09-15 RX ADMIN — Medication 1 DROP(S): at 14:19

## 2021-09-15 RX ADMIN — Medication 200 MILLIGRAM(S): at 09:53

## 2021-09-15 RX ADMIN — OXYMETAZOLINE HYDROCHLORIDE 2 SPRAY(S): 0.5 SPRAY NASAL at 21:37

## 2021-09-15 RX ADMIN — Medication 6: at 17:36

## 2021-09-15 RX ADMIN — INSULIN GLARGINE 6 UNIT(S): 100 INJECTION, SOLUTION SUBCUTANEOUS at 21:36

## 2021-09-15 RX ADMIN — Medication 400 MILLIGRAM(S): at 00:10

## 2021-09-15 RX ADMIN — Medication 2 SPRAY(S): at 09:53

## 2021-09-15 RX ADMIN — Medication 4: at 09:51

## 2021-09-15 RX ADMIN — PANTOPRAZOLE SODIUM 40 MILLIGRAM(S): 20 TABLET, DELAYED RELEASE ORAL at 10:02

## 2021-09-15 RX ADMIN — Medication 1 DROP(S): at 06:12

## 2021-09-15 RX ADMIN — LIDOCAINE 1 PATCH: 4 CREAM TOPICAL at 00:00

## 2021-09-15 NOTE — PROGRESS NOTE ADULT - ASSESSMENT
Imp:  Hematemesis, likely from nose bleed  H/H relatively stable    Rec:  Protonix  Would not plan EGD without further hematemesis that is unrelated to nose bleed Imp:  Hematemesis, likely from nose bleed  H/H relatively stable  Diarrhea    Rec:  Protonix  Would not plan EGD without further hematemesis that is unrelated to nose bleed  Add questran for diarrhea

## 2021-09-15 NOTE — CHART NOTE - NSCHARTNOTEFT_GEN_A_CORE
HPI:  89/F with PMHx of pancreatitis s/p ERCP, cirrhosis, DM2, a-fib on Eliquis, CAD s/p PCI, severe pulm HTN, chronic R heart failure, diastolic dysfunction, AS s/p TAVR, OA, HTN, dyslipidemia, hypothyroidism. cecal mass, recently d/maddy from , sent  to the ED from [facility] for c/o Fall, near syncope. (06 Sep 2021 14:41)      PERTINENT PMH REVIEWED:  [ X ] YES [ ] NO           Primary Contact:  Pt. defers to her son Dr. Laith Mojica [phone]    HCP [  ] Surrogate [  X ] Guardian [   ]    Mental Status: [ X ] Alert  [ X ] Oriented [  ] Confused [  ] Lethargic [  ]  Concerns of Depression [  ]- expressed feeling depressed at times, situational   Anxiety [   ]- reports feeling anxious to get out of the hospital.     Family Meeting: To be scheduled with Palliative NP Carlie Hoover, Pt. defers to her sons and does not wish to be apart of the meeting.     Anticipated Grief: Patient [ X ] Family [  ]    Caregiver Milner Assessed: Yes [  X  ] No [  ]    Synagogue: [employer]     Spiritual Concerns: Pt. reports she has a very strong dean     Goals of Care:  to be determined     Previous Services: [facility]    ADVANCE DIRECTIVES:  Pt. full code. To be addressed at family meeting     Anticipated D/C Plan: to be further determined                      Summary:    This SW met with pt. at bedside to offer support. Role of Palliative SW explained. Pt. alert and oriented. She reports that she has been at [facility] for a little over a year and prior to that lived in her own apartment. She was able to talk about her medical condition but explained that she has 3 sons that are Doctors and prefers that the team speak with them. She defers to her sons to make medical decisions for her and for a family meeting.     Pt. spent time discussing her family and how they are important to her. She reports that the most important thing to her is that she gets to see her sons before she dies. When asked if pt. had any fears about dying pt. reports that she is not afraid of death as she has a very strong dean. Pts feelings explored and support provided.     Plan is for a family meeting to be scheduled with pts sons. Pt. does not wish to be apart of the meeting and defers decision making to them. Emotional support provided. Our team will continue to follow.

## 2021-09-15 NOTE — PROVIDER CONTACT NOTE (CHANGE IN STATUS NOTIFICATION) - SITUATION
Pt vomited blood x1. MD made aware.  Iv Protonix ,500cc bolus, labs, and stool OB ordered. Will continue to monitor.

## 2021-09-15 NOTE — PROGRESS NOTE ADULT - SUBJECTIVE AND OBJECTIVE BOX
Patient is a 89y old  Female who presents with a chief complaint of near syncope, fal (14 Sep 2021 18:40)      Subective:  Resting now  Had red hematemesis overnight but this was in setting of a nose bleed  Stool guaiac + but per nurses no overt bleeding    PAST MEDICAL & SURGICAL HISTORY:  Diabetes Mellitus Type II    Dyslipidemia    GERD (Gastroesophageal Reflux Disease)    History of Osteoarthritis    Hypertension    CAD (coronary artery disease)    Afib    Hypothyroid    HLD (hyperlipidemia)    History of pancreatitis    Aortic stenosis    H/O: Hysterectomy    H/O: Knee Surgery- right meniscus    History of cholecystectomy    History of appendectomy    H/O total knee replacement, left    S/P IVC filter  Note that Pt does not have  h/o DVT, outPt doppler was read first as +, but after review reported as no DVT. Pt got IVC filer before that        MEDICATIONS  (STANDING):  allopurinol 200 milliGRAM(s) Oral daily  artificial  tears Solution 1 Drop(s) Both EYES three times a day  dextrose 40% Gel 15 Gram(s) Oral once  dextrose 5%. 1000 milliLiter(s) (50 mL/Hr) IV Continuous <Continuous>  dextrose 5%. 1000 milliLiter(s) (100 mL/Hr) IV Continuous <Continuous>  dextrose 50% Injectable 25 Gram(s) IV Push once  dextrose 50% Injectable 12.5 Gram(s) IV Push once  dextrose 50% Injectable 25 Gram(s) IV Push once  escitalopram 10 milliGRAM(s) Oral daily  ferrous    sulfate 325 milliGRAM(s) Oral two times a day  glucagon  Injectable 1 milliGRAM(s) IntraMuscular once  influenza   Vaccine 0.5 milliLiter(s) IntraMuscular once  insulin lispro (ADMELOG) corrective regimen sliding scale   SubCutaneous three times a day before meals  insulin lispro (ADMELOG) corrective regimen sliding scale   SubCutaneous at bedtime  levothyroxine 50 MICROGram(s) Oral daily  lidocaine   4% Patch 1 Patch Transdermal daily  oxymetazoline 0.05% Nasal Spray 2 Spray(s) Both Nostrils two times a day  pantoprazole    Tablet 40 milliGRAM(s) Oral before breakfast  sildenafil (REVATIO) 20 milliGRAM(s) Oral two times a day  simvastatin 40 milliGRAM(s) Oral at bedtime    MEDICATIONS  (PRN):  acetaminophen   Tablet .. 650 milliGRAM(s) Oral every 6 hours PRN Temp greater or equal to 38C (100.4F), Mild Pain (1 - 3)  cyclobenzaprine 5 milliGRAM(s) Oral two times a day PRN Muscle Spasm  lidocaine 2% Gel 1 Application(s) Topical three times a day PRN pain  loperamide 2 milliGRAM(s) Oral three times a day PRN loose stool  sodium chloride 0.65% Nasal 2 Spray(s) Both Nostrils every 3 hours PRN nasal dryness      REVIEW OF SYSTEMS:    RESPIRATORY: No shortness of breath  CARDIOVASCULAR: No chest pain  All other review of systems is negative unless indicated above.    Vital Signs Last 24 Hrs  T(C): 36.2 (15 Sep 2021 08:33), Max: 37.3 (14 Sep 2021 23:25)  T(F): 97.2 (15 Sep 2021 08:33), Max: 99.2 (14 Sep 2021 23:25)  HR: 73 (15 Sep 2021 08:33) (73 - 122)  BP: 111/64 (15 Sep 2021 08:33) (99/48 - 116/69)  BP(mean): --  RR: 17 (15 Sep 2021 08:33) (17 - 18)  SpO2: 93% (15 Sep 2021 08:33) (93% - 100%)    PHYSICAL EXAM:    Constitutional: NAD, resting    LABS:                        8.6    7.54  )-----------( 182      ( 15 Sep 2021 06:05 )             26.3     09-15    133<L>  |  97  |  19  ----------------------------<  215<H>  3.5   |  32<H>  |  0.81    Ca    8.4<L>      15 Sep 2021 06:05  Phos  3.7     09-14  Mg     2.3     09-14            RADIOLOGY & ADDITIONAL STUDIES:

## 2021-09-15 NOTE — PROGRESS NOTE ADULT - SUBJECTIVE AND OBJECTIVE BOX
CC: near syncope, fall (10 Sep 2021 08:05)    HPI: 89/F with PMHx of pancreatitis s/p ERCP, cirrhosis, DM2, a-fib on Eliquis, CAD s/p PCI, severe pulm HTN, chronic R heart failure, diastolic dysfunction, AS s/p TAVR, OA, HTN, dyslipidemia, hypothyroidism, cecal mass, recently d/maddy from , sent  to the ED from [facility] for c/o Fall, near syncope. Pt states she was trying to put boxes under the chair, when she fell and hit her head, now has a small abrasion. Also c/o left shoulder pain.  She has been become more weak and unsteady recently.   As per Son, she has gained 20 Lbs in [facility].   Her trauma work up neg in ED   Pt is on increased dose torsemide for acute on Chronic diastolic CHF.   Pt was evaluated by CR Sx, plan for transfer to [facility] for possible SX, needs higher level of cardiopulmonary perioperative care    INTERVAL HPI/ OVERNIGHT EVENTS: Pt was seen and examined this am, awake and alert, mildly confused, reports feeling tired but no SOB,  having breakfast. No further nasal bleeding, on face shield. Pulse ox stable on o2, but goes down to 70s on RA. As per RN had episode of hematemesis x 1.       Vital Signs Last 24 Hrs  T(C): 36.6 (15 Sep 2021 15:25), Max: 37.3 (14 Sep 2021 23:25)  T(F): 97.9 (15 Sep 2021 15:25), Max: 99.2 (14 Sep 2021 23:25)  HR: 109 (15 Sep 2021 15:25) (73 - 122)  BP: 111/64 (15 Sep 2021 15:25) (102/49 - 116/69)  RR: 18 (15 Sep 2021 15:25) (17 - 18)  SpO2: 99% (15 Sep 2021 15:25) (93% - 99%)      REVIEW OF SYSTEMS:  All other review of systems is negative unless indicated above.    PHYSICAL EXAM:  General: Well developed; malnourished; frail elderly female,   on  O2   Eyes: EOMI; conjunctiva and sclera clear  Head: Normocephalic; atraumatic  ENMT: No nasal discharge; airway clear  Neck: Supple; non tender; no masses  Respiratory: Decreased BS,  crepitations and rales at bases b/l, anterior and posterior  fields   Cardiovascular: Irregular rate and rhythm. S1 and S2 Normal;  Gastrointestinal: Soft non-tender non-distended; Normal bowel sounds.   Genitourinary: No  suprapubic  tenderness  Extremities: +  edema  Vascular: Peripheral pulses palpable 2+ bilaterally  Neurological: Alert and oriented x2-3, non focal   Musculoskeletal: Normal muscle  strength, L neck  muscle tension improved  Psychiatric: Cooperative       LABS:                                      8.6    7.54  )-----------( 182      ( 15 Sep 2021 06:05 )             26.3     09-15    133<L>  |  97  |  19  ----------------------------<  215<H>  3.5   |  32<H>  |  0.81    Ca    8.4<L>      15 Sep 2021 06:05  Phos  3.7     09-14  Mg     2.3     09-14                       9.2    7.85  )-----------( 163      ( 13 Sep 2021 08:52 )             28.0     09-13    133<L>  |  97  |  21  ----------------------------<  191<H>  3.5   |  32<H>  |  0.73    Ca    8.6      13 Sep 2021 08:52                           9.7    7.11  )-----------( 147      ( 11 Sep 2021 09:32 )             30.2     09-11    139  |  100  |  26<H>  ----------------------------<  168<H>  3.5   |  35<H>  |  0.74    Ca    8.8      11 Sep 2021 09:32                              9.4    5.96  )-----------( 135      ( 09 Sep 2021 12:27 )             29.9     09 Sep 2021 12:27    138    |  101    |  24     ----------------------------<  217    3.5     |  33     |  0.83     Ca    8.8        09 Sep 2021 12:27      CAPILLARY BLOOD GLUCOSE  POCT Blood Glucose.: 216 mg/dL (15 Sep 2021 13:04)  POCT Blood Glucose.: 213 mg/dL (15 Sep 2021 08:56)  POCT Blood Glucose.: 289 mg/dL (14 Sep 2021 21:00)  POCT Blood Glucose.: 170 mg/dL (14 Sep 2021 17:30)          MEDICATIONS  (STANDING):  allopurinol 200 milliGRAM(s) Oral daily  artificial  tears Solution 1 Drop(s) Both EYES three times a day  cholestyramine Powder (Sugar-Free) 4 Gram(s) Oral daily  dextrose 40% Gel 15 Gram(s) Oral once  dextrose 5%. 1000 milliLiter(s) (50 mL/Hr) IV Continuous <Continuous>  dextrose 5%. 1000 milliLiter(s) (100 mL/Hr) IV Continuous <Continuous>  dextrose 50% Injectable 25 Gram(s) IV Push once  dextrose 50% Injectable 25 Gram(s) IV Push once  dextrose 50% Injectable 12.5 Gram(s) IV Push once  escitalopram 10 milliGRAM(s) Oral daily  ferrous    sulfate 325 milliGRAM(s) Oral two times a day  glucagon  Injectable 1 milliGRAM(s) IntraMuscular once  influenza   Vaccine 0.5 milliLiter(s) IntraMuscular once  insulin glargine Injectable (LANTUS) 6 Unit(s) SubCutaneous at bedtime  insulin lispro (ADMELOG) corrective regimen sliding scale   SubCutaneous three times a day before meals  insulin lispro (ADMELOG) corrective regimen sliding scale   SubCutaneous at bedtime  levothyroxine 50 MICROGram(s) Oral daily  lidocaine   4% Patch 1 Patch Transdermal daily  oxymetazoline 0.05% Nasal Spray 2 Spray(s) Both Nostrils two times a day  pantoprazole    Tablet 40 milliGRAM(s) Oral before breakfast  sildenafil (REVATIO) 20 milliGRAM(s) Oral two times a day  simvastatin 40 milliGRAM(s) Oral at bedtime  torsemide 40 milliGRAM(s) Oral daily    MEDICATIONS  (PRN):  acetaminophen   Tablet .. 650 milliGRAM(s) Oral every 6 hours PRN Temp greater or equal to 38C (100.4F), Mild Pain (1 - 3)  cyclobenzaprine 5 milliGRAM(s) Oral two times a day PRN Muscle Spasm  lidocaine 2% Gel 1 Application(s) Topical three times a day PRN pain  loperamide 2 milliGRAM(s) Oral three times a day PRN loose stool  sodium chloride 0.65% Nasal 2 Spray(s) Both Nostrils every 3 hours PRN nasal dryness        RADIOLOGY & ADDITIONAL TESTS:    EXAM:  CT ABDOMEN AND PELVIS OC IC                        PROCEDURE DATE:  08/19/2021    FINDINGS:  LOWER CHEST: Small bilateral pleural effusions, larger on the right. Interlobular septal thickening at the lung bases with reticular opacities, likely chronic interstitial lung disease. Aortic valve replacement.    LIVER: No focal hepatic lesion. Nodular surface contour again noted which can be seen in the setting of cirrhosis.  BILE DUCTS: Normal caliber.  GALLBLADDER: Not seen.  SPLEEN: Within normal limits.  PANCREAS: Atrophic.  ADRENALS: Within normal limits.  KIDNEYS/URETERS: No hydronephrosis. Bilateral renal cysts.    BLADDER: Unremarkable.  REPRODUCTIVE ORGANS: Hysterectomy.    BOWEL: Mucosal evaluation of the bowel is limited. Colonic diverticulosis without evidence of diverticulitis. Patient's known cecal mass is not well seen. No bowel obstruction. Appendix not seen.  PERITONEUM: Trace pelvic ascites.  VESSELS: IVC filter. Atherosclerotic changes. Abdominal aorta normal in caliber.  RETROPERITONEUM/LYMPH NODES: No lymphadenopathy.  ABDOMINALWALL: Unremarkable.  BONES: Degenerative changes in the spine. Mild anterolisthesis of L4 on L5. Hemangioma in L3.    IMPRESSION:  Patient's known cecal mass not well-seen on this study.  No evidence of metastatic disease in the abdomen or pelvis.  Small bilateral pleural effusions.        EXAM:  XR ABDOMEN PORTABLE ROUTINE 1V                        PROCEDURE DATE:  09/13/2021      COMPARISON: 8/19/2021    Frontal view of the abdomen shows a normal gas pattern. IVC filter is again noted. No definite free air is identified.    IMPRESSION:  No evidence of bowel obstruction.    Thank you for this referral.

## 2021-09-15 NOTE — PROGRESS NOTE ADULT - SUBJECTIVE AND OBJECTIVE BOX
Subjective:    Awake, comfortable at rest. Sl bloody bowel movement. Stool still loose.    9/15: in bed, no distress, wearing O2 shield.  Brought up blood hematmesis last PM in setting of bloody nose.     MEDICATIONS  (STANDING):  allopurinol 200 milliGRAM(s) Oral daily  apixaban 2.5 milliGRAM(s) Oral two times a day  artificial  tears Solution 1 Drop(s) Both EYES three times a day  aspirin  chewable 81 milliGRAM(s) Oral daily  dextrose 40% Gel 15 Gram(s) Oral once  dextrose 5%. 1000 milliLiter(s) (50 mL/Hr) IV Continuous <Continuous>  dextrose 5%. 1000 milliLiter(s) (100 mL/Hr) IV Continuous <Continuous>  dextrose 50% Injectable 25 Gram(s) IV Push once  dextrose 50% Injectable 12.5 Gram(s) IV Push once  dextrose 50% Injectable 25 Gram(s) IV Push once  escitalopram 10 milliGRAM(s) Oral daily  ferrous    sulfate 325 milliGRAM(s) Oral two times a day  glucagon  Injectable 1 milliGRAM(s) IntraMuscular once  influenza   Vaccine 0.5 milliLiter(s) IntraMuscular once  insulin lispro (ADMELOG) corrective regimen sliding scale   SubCutaneous three times a day before meals  insulin lispro (ADMELOG) corrective regimen sliding scale   SubCutaneous at bedtime  levothyroxine 50 MICROGram(s) Oral daily  lidocaine   4% Patch 1 Patch Transdermal daily  pantoprazole    Tablet 40 milliGRAM(s) Oral before breakfast  sildenafil (REVATIO) 20 milliGRAM(s) Oral two times a day  simvastatin 40 milliGRAM(s) Oral at bedtime    MEDICATIONS  (PRN):  acetaminophen   Tablet .. 650 milliGRAM(s) Oral every 6 hours PRN Temp greater or equal to 38C (100.4F), Mild Pain (1 - 3)  cyclobenzaprine 5 milliGRAM(s) Oral two times a day PRN Muscle Spasm      Allergies    penicillin (Rash)  penicillins (Other)  sulfa drugs (Rash; Other)    Intolerances        Vital Signs Last 24 Hrs  T(C): 37 (14 Sep 2021 08:21), Max: 37.1 (13 Sep 2021 23:58)  T(F): 98.6 (14 Sep 2021 08:21), Max: 98.7 (13 Sep 2021 23:58)  HR: 104 (14 Sep 2021 08:21) (57 - 104)  BP: 123/67 (14 Sep 2021 08:21) (100/64 - 123/67)  BP(mean): 67 (13 Sep 2021 18:06) (67 - 67)  RR: 18 (14 Sep 2021 08:21) (18 - 19)  SpO2: 97% (14 Sep 2021 08:21) (96% - 100%)    PHYSICAL EXAMINATION:    NECK:  Supple. No lymphadenopathy. Jugular venous pressure not elevated.   HEART:   The cardiac impulse has a normal quality. Irreg. irreg, Nl S1 and S2.    CHEST:  Chest with bibasilar crackles. Normal respiratory effort.  ABDOMEN:  Soft and nontender.   EXTREMITIES:  There is trace ankle edema.       LABS:                        9.2    7.85  )-----------( 163      ( 13 Sep 2021 08:52 )             28.0     09-13    133<L>  |  97  |  21  ----------------------------<  191<H>  3.5   |  32<H>  |  0.73    Ca    8.6      13 Sep 2021 08:52            RADIOLOGY & ADDITIONAL TESTS:    Assessment and Recommendation:   · Assessment      89 year-old woman, presents after fall, pulmonary consulted for shortness of breath.  --Her fall may have been mechanical fall.  --She has bilateral infiltrates on imaging, previous imaging reviewed many of these changes are chronic and when viewed on CT chest represent ground glass and intersitial infiltrates likely related to pulmonary edema-mild decompensation of HFpEF  --She clinically appears stable from pulmonary standpoint- persistent basilar crackles. Normal O2 sats   --Hematemesis likely from nose bleed, H/H stable. No EGD, unless further hematemesis not related to nose bleed.          Recommendations:  --Cardiology rec appreciated, strict I/O, daily weight.  Torsemide now now on hold-monitor lytes carefully. K+ repletion. Concern about diuretics in face of diarrhea. C-Dif ruled ruled out. GI F/U with Dr. Mobley- add hayesran for diarrhea.   --Lower extremity edema is improved- would benefit from compression stockings.  --Await Oncology evaluation regarding treatment options-  systemic therapy not recommended.   --CXR 9/13- CHF changes similar to 9/6.      Subjective:    Awake, comfortable at rest. Sl bloody bowel movement. Stool still loose.    9/15: in bed, no distress, wearing O2 shield.  Brought up blood hematmesis last PM in setting of bloody nose.     MEDICATIONS  (STANDING):  allopurinol 200 milliGRAM(s) Oral daily  apixaban 2.5 milliGRAM(s) Oral two times a day  artificial  tears Solution 1 Drop(s) Both EYES three times a day  aspirin  chewable 81 milliGRAM(s) Oral daily  dextrose 40% Gel 15 Gram(s) Oral once  dextrose 5%. 1000 milliLiter(s) (50 mL/Hr) IV Continuous <Continuous>  dextrose 5%. 1000 milliLiter(s) (100 mL/Hr) IV Continuous <Continuous>  dextrose 50% Injectable 25 Gram(s) IV Push once  dextrose 50% Injectable 12.5 Gram(s) IV Push once  dextrose 50% Injectable 25 Gram(s) IV Push once  escitalopram 10 milliGRAM(s) Oral daily  ferrous    sulfate 325 milliGRAM(s) Oral two times a day  glucagon  Injectable 1 milliGRAM(s) IntraMuscular once  influenza   Vaccine 0.5 milliLiter(s) IntraMuscular once  insulin lispro (ADMELOG) corrective regimen sliding scale   SubCutaneous three times a day before meals  insulin lispro (ADMELOG) corrective regimen sliding scale   SubCutaneous at bedtime  levothyroxine 50 MICROGram(s) Oral daily  lidocaine   4% Patch 1 Patch Transdermal daily  pantoprazole    Tablet 40 milliGRAM(s) Oral before breakfast  sildenafil (REVATIO) 20 milliGRAM(s) Oral two times a day  simvastatin 40 milliGRAM(s) Oral at bedtime    MEDICATIONS  (PRN):  acetaminophen   Tablet .. 650 milliGRAM(s) Oral every 6 hours PRN Temp greater or equal to 38C (100.4F), Mild Pain (1 - 3)  cyclobenzaprine 5 milliGRAM(s) Oral two times a day PRN Muscle Spasm      Allergies    penicillin (Rash)  penicillins (Other)  sulfa drugs (Rash; Other)    Intolerances        Vital Signs Last 24 Hrs  T(C): 37 (14 Sep 2021 08:21), Max: 37.1 (13 Sep 2021 23:58)  T(F): 98.6 (14 Sep 2021 08:21), Max: 98.7 (13 Sep 2021 23:58)  HR: 104 (14 Sep 2021 08:21) (57 - 104)  BP: 123/67 (14 Sep 2021 08:21) (100/64 - 123/67)  BP(mean): 67 (13 Sep 2021 18:06) (67 - 67)  RR: 18 (14 Sep 2021 08:21) (18 - 19)  SpO2: 97% (14 Sep 2021 08:21) (96% - 100%)    PHYSICAL EXAMINATION:    NECK:  Supple. No lymphadenopathy. Jugular venous pressure not elevated.   HEART:   The cardiac impulse has a normal quality. Irreg. irreg, Nl S1 and S2.    CHEST:  Chest with bibasilar crackles. Normal respiratory effort.  ABDOMEN:  Soft and nontender.   EXTREMITIES:  There is trace ankle edema.       LABS:                        9.2    7.85  )-----------( 163      ( 13 Sep 2021 08:52 )             28.0     09-13    133<L>  |  97  |  21  ----------------------------<  191<H>  3.5   |  32<H>  |  0.73    Ca    8.6      13 Sep 2021 08:52            RADIOLOGY & ADDITIONAL TESTS:    Assessment and Recommendation:   · Assessment      89 year-old woman, presents after fall, pulmonary consulted for shortness of breath.  --Her fall may have been mechanical fall.  --She has bilateral infiltrates on imaging, previous imaging reviewed many of these changes are chronic and when viewed on CT chest represent ground glass and intersitial infiltrates likely related to pulmonary edema-mild decompensation of HFpEF  --She clinically appears stable from pulmonary standpoint- persistent basilar crackles. Normal O2 sats   --Hematemesis likely from nose bleed, H/H stable. No EGD, unless further hematemesis not related to nose bleed.          Recommendations:  --Cardiology rec appreciated, strict I/O, daily weight.  Torsemide now on hold-monitor lytes carefully. K+ repletion. Concern about diuretics in face of diarrhea. C-Dif ruled ruled out.  Pt received 550 cc NS last PM, crackles increased on exam today,  d/w'ed primary physician, to restart Torsemide for CHF, to monitor closely while on.   --GI F/U with Dr. Mobley- add questran for diarrhea.   --Lower extremity edema is improved- would benefit from compression stockings.  --Await Oncology evaluation regarding treatment options-  systemic therapy not recommended.   --CXR 9/13- CHF changes similar to 9/6.      Subjective:    Awake, comfortable at rest. Sl bloody bowel movement. Stool still loose.    9/15: in bed, no distress, wearing O2 shield.  Brought up blood hematmesis last PM in setting of bloody nose.     MEDICATIONS  (STANDING):  allopurinol 200 milliGRAM(s) Oral daily  apixaban 2.5 milliGRAM(s) Oral two times a day  artificial  tears Solution 1 Drop(s) Both EYES three times a day  aspirin  chewable 81 milliGRAM(s) Oral daily  dextrose 40% Gel 15 Gram(s) Oral once  dextrose 5%. 1000 milliLiter(s) (50 mL/Hr) IV Continuous <Continuous>  dextrose 5%. 1000 milliLiter(s) (100 mL/Hr) IV Continuous <Continuous>  dextrose 50% Injectable 25 Gram(s) IV Push once  dextrose 50% Injectable 12.5 Gram(s) IV Push once  dextrose 50% Injectable 25 Gram(s) IV Push once  escitalopram 10 milliGRAM(s) Oral daily  ferrous    sulfate 325 milliGRAM(s) Oral two times a day  glucagon  Injectable 1 milliGRAM(s) IntraMuscular once  influenza   Vaccine 0.5 milliLiter(s) IntraMuscular once  insulin lispro (ADMELOG) corrective regimen sliding scale   SubCutaneous three times a day before meals  insulin lispro (ADMELOG) corrective regimen sliding scale   SubCutaneous at bedtime  levothyroxine 50 MICROGram(s) Oral daily  lidocaine   4% Patch 1 Patch Transdermal daily  pantoprazole    Tablet 40 milliGRAM(s) Oral before breakfast  sildenafil (REVATIO) 20 milliGRAM(s) Oral two times a day  simvastatin 40 milliGRAM(s) Oral at bedtime    MEDICATIONS  (PRN):  acetaminophen   Tablet .. 650 milliGRAM(s) Oral every 6 hours PRN Temp greater or equal to 38C (100.4F), Mild Pain (1 - 3)  cyclobenzaprine 5 milliGRAM(s) Oral two times a day PRN Muscle Spasm      Allergies    penicillin (Rash)  penicillins (Other)  sulfa drugs (Rash; Other)    Intolerances        Vital Signs Last 24 Hrs  T(C): 37 (14 Sep 2021 08:21), Max: 37.1 (13 Sep 2021 23:58)  T(F): 98.6 (14 Sep 2021 08:21), Max: 98.7 (13 Sep 2021 23:58)  HR: 104 (14 Sep 2021 08:21) (57 - 104)  BP: 123/67 (14 Sep 2021 08:21) (100/64 - 123/67)  BP(mean): 67 (13 Sep 2021 18:06) (67 - 67)  RR: 18 (14 Sep 2021 08:21) (18 - 19)  SpO2: 97% (14 Sep 2021 08:21) (96% - 100%)    PHYSICAL EXAMINATION:    NECK:  Supple. No lymphadenopathy. Jugular venous pressure not elevated.   HEART:   The cardiac impulse has a normal quality. Irreg. irreg, Nl S1 and S2.    CHEST:  Chest with bibasilar crackles. Normal respiratory effort.  ABDOMEN:  Soft and nontender.   EXTREMITIES:  There is trace ankle edema.       LABS:                        9.2    7.85  )-----------( 163      ( 13 Sep 2021 08:52 )             28.0     09-13    133<L>  |  97  |  21  ----------------------------<  191<H>  3.5   |  32<H>  |  0.73    Ca    8.6      13 Sep 2021 08:52            RADIOLOGY & ADDITIONAL TESTS:    Assessment and Recommendation:   · Assessment      89 year-old woman, presents after fall, pulmonary consulted for shortness of breath.  --Her fall may have been mechanical fall.  --She has bilateral infiltrates on imaging, previous imaging reviewed many of these changes are chronic and when viewed on CT chest represent ground glass and intersitial infiltrates likely related to pulmonary edema-mild decompensation of HFpEF  --She clinically appears stable from pulmonary standpoint- persistent basilar crackles. Normal O2 sats   --Hematemesis likely from nose bleed, H/H stable. No EGD, unless further hematemesis not related to nose bleed.          Recommendations:  --Cardiology rec appreciated, strict I/O, daily weight.  Torsemide now on hold-monitor lytes carefully. K+ repletion. Concern about diuretics in face of diarrhea. C-Dif ruled ruled out.  Pt received 500 cc NS last PM, crackles increased on exam today,  d/w'ed primary physician, to restart Torsemide for CHF, to monitor closely while on.   --GI F/U with Dr. Mobley- add questran for diarrhea.   --Lower extremity edema is improved- would benefit from compression stockings.  --Await Oncology evaluation regarding treatment options-  systemic therapy not recommended.   --CXR 9/13- CHF changes similar to 9/6.      Subjective:    Awake, comfortable at rest. Sl bloody bowel movement. Stool still loose.    9/15: in bed, no distress, wearing O2 shield.  Brought up blood hematmesis last PM in setting of bloody nose.     MEDICATIONS  (STANDING):  allopurinol 200 milliGRAM(s) Oral daily  apixaban 2.5 milliGRAM(s) Oral two times a day  artificial  tears Solution 1 Drop(s) Both EYES three times a day  aspirin  chewable 81 milliGRAM(s) Oral daily  dextrose 40% Gel 15 Gram(s) Oral once  dextrose 5%. 1000 milliLiter(s) (50 mL/Hr) IV Continuous <Continuous>  dextrose 5%. 1000 milliLiter(s) (100 mL/Hr) IV Continuous <Continuous>  dextrose 50% Injectable 25 Gram(s) IV Push once  dextrose 50% Injectable 12.5 Gram(s) IV Push once  dextrose 50% Injectable 25 Gram(s) IV Push once  escitalopram 10 milliGRAM(s) Oral daily  ferrous    sulfate 325 milliGRAM(s) Oral two times a day  glucagon  Injectable 1 milliGRAM(s) IntraMuscular once  influenza   Vaccine 0.5 milliLiter(s) IntraMuscular once  insulin lispro (ADMELOG) corrective regimen sliding scale   SubCutaneous three times a day before meals  insulin lispro (ADMELOG) corrective regimen sliding scale   SubCutaneous at bedtime  levothyroxine 50 MICROGram(s) Oral daily  lidocaine   4% Patch 1 Patch Transdermal daily  pantoprazole    Tablet 40 milliGRAM(s) Oral before breakfast  sildenafil (REVATIO) 20 milliGRAM(s) Oral two times a day  simvastatin 40 milliGRAM(s) Oral at bedtime    MEDICATIONS  (PRN):  acetaminophen   Tablet .. 650 milliGRAM(s) Oral every 6 hours PRN Temp greater or equal to 38C (100.4F), Mild Pain (1 - 3)  cyclobenzaprine 5 milliGRAM(s) Oral two times a day PRN Muscle Spasm      Allergies    penicillin (Rash)  penicillins (Other)  sulfa drugs (Rash; Other)    Intolerances        Vital Signs Last 24 Hrs  T(C): 37 (14 Sep 2021 08:21), Max: 37.1 (13 Sep 2021 23:58)  T(F): 98.6 (14 Sep 2021 08:21), Max: 98.7 (13 Sep 2021 23:58)  HR: 104 (14 Sep 2021 08:21) (57 - 104)  BP: 123/67 (14 Sep 2021 08:21) (100/64 - 123/67)  BP(mean): 67 (13 Sep 2021 18:06) (67 - 67)  RR: 18 (14 Sep 2021 08:21) (18 - 19)  SpO2: 97% (14 Sep 2021 08:21) (96% - 100%)    PHYSICAL EXAMINATION:    NECK:  Supple. No lymphadenopathy. Jugular venous pressure not elevated.   HEART:   The cardiac impulse has a normal quality. Irreg. irreg, Nl S1 and S2.    CHEST:  Chest with bibasilar crackles. Normal respiratory effort.  ABDOMEN:  Soft and nontender.   EXTREMITIES:  There is trace ankle edema.       LABS:                        9.2    7.85  )-----------( 163      ( 13 Sep 2021 08:52 )             28.0     09-13    133<L>  |  97  |  21  ----------------------------<  191<H>  3.5   |  32<H>  |  0.73    Ca    8.6      13 Sep 2021 08:52            RADIOLOGY & ADDITIONAL TESTS:    Assessment and Recommendation:   · Assessment      89 year-old woman, presents after fall, pulmonary consulted for shortness of breath.  --Her fall may have been mechanical fall.  --She has bilateral infiltrates on imaging, previous imaging reviewed many of these changes are chronic and when viewed on CT chest represent ground glass and intersitial infiltrates likely related to pulmonary edema-mild decompensation of HFpEF  --She clinically appears stable from pulmonary standpoint- persistent basilar crackles. Normal O2 sats   --Hematemesis likely from nose bleed, H/H stable. No EGD, unless further hematemesis not related to nose bleed.          Recommendations:  --Cardiology rec appreciated, strict I/O, daily weight.  Torsemide now on hold-monitor lytes carefully. K+ repletion. Concern about diuretics in face of diarrhea. C-Dif ruled ruled out.  Pt received 500 cc LR last PM, crackles increased on exam today,  d/w'ed primary physician, to restart Torsemide for CHF, to monitor closely while on.   --GI F/U with Dr. Mobley- add questran for diarrhea.   --Lower extremity edema is improved- would benefit from compression stockings.  --Await Oncology evaluation regarding treatment options-  systemic therapy not recommended.   --CXR 9/13- CHF changes similar to 9/6.

## 2021-09-15 NOTE — CONSULT NOTE ADULT - ASSESSMENT
Pt is a 89y old Female with hx of pancreatitis s/p ERCP, cirrhosis, DM2, a-fib on Eliquis, CAD s/p PCI, severe pulm HTN, chronic R heart failure, diastolic dysfunction, AS s/p TAVR, OA, HTN, dyslipidemia, hypothyroidism, cecal mass,    1) S/P fall  - likely mechanical   - c/w Lidoderm, Tylenol  PO, added  flexeril PRN  - PT consult appreciated   - Fall risk maintained   - supportive care   - orthostatic vitals neg           # DM 2:   -HB A1c 6.8   - not on meds outPt   - Monitor BS and cover with  ISS    # A fib: permanent  - hold  eliquis for now    Pt is a 89y old Female with hx of pancreatitis s/p ERCP, cirrhosis, DM2, a-fib on Eliquis, CAD s/p PCI, severe pulm HTN, chronic R heart failure, diastolic dysfunction, AS s/p TAVR, OA, HTN, dyslipidemia, hypothyroidism, cecal mass,    1) S/P fall  - likely mechanical   - c/w Lidoderm, Tylenol  PO, added  flexeril PRN  - PT consult appreciated   - Fall risk maintained   - supportive care   - orthostatic vitals neg     2) adenocarcinoma of the cecum   - not a surgical candidate due to comorbidities   - oncology consult appreciated - no role for systemic treatment as it will not add longevity  - GI consult appreciated   - x-ray abdomen - No evidence of bowel obstruction.    3) Severe Pulm HTN,  Acute on chronic diastolic  CHF exacerbation   - history of AS s/p TAVR  - X-ray chest  from 9/9 - The lungs show slight increase in pulmonary congestion with small pleural effusions and there is no evidence of pneumothorax   - supportive O2 via face shield PRN   - ECHO: severe pulm HTN, Mod MS, AV prosthesis good Fx, Severe TR. LV mild LVH   - C/w Sildenafil   - pulmonology consult appreciated    4)  Afib   - Eliquis on hold at this time due to nose bleed and hematosis  - cardiology consult appreciated   - monitor labs to see if able to restart AC     5) Diarrhea   - when patient admitted was c/o constipation   - GI consult appreciated   - Questran added as per GI     6) Advance Care Planning   - patient appears to have capacity but is deferring decisions to her son Dr. Laith Mojica   - Full code will address in meeting   - patient states she has 3 sons 2 of whom are chiropractors and Laith Leonardo who is pulmonologist - patient is requesting that her son Laithleidy Mojica be called and updated   - Left message for patients son to please call back to schedule GOC meeting    Pt is a 89y old Female with hx of pancreatitis s/p ERCP, cirrhosis, DM2, a-fib on Eliquis, CAD s/p PCI, severe pulm HTN, chronic R heart failure, diastolic dysfunction, AS s/p TAVR, OA, HTN, dyslipidemia, hypothyroidism, cecal mass,    1) S/P fall  - likely mechanical   - c/w Lidoderm, Tylenol  PO, added  flexeril PRN  - PT consult appreciated   - Fall risk maintained   - supportive care   - orthostatic vitals neg     2) adenocarcinoma of the cecum   - not a surgical candidate due to comorbidities   - oncology consult appreciated - no role for systemic treatment as it will not add longevity  - GI consult appreciated   - x-ray abdomen - No evidence of bowel obstruction.    3) Severe Pulm HTN,  Acute on chronic diastolic  CHF exacerbation   - history of AS s/p TAVR  - X-ray chest  from 9/9 - The lungs show slight increase in pulmonary congestion with small pleural effusions and there is no evidence of pneumothorax   - supportive O2 via face shield PRN   - ECHO: severe pulm HTN, Mod MS, AV prosthesis good Fx, Severe TR. LV mild LVH   - C/w Sildenafil   - pulmonology consult appreciated    4)  Afib   - Eliquis on hold at this time due to nose bleed and hematosis  - cardiology consult appreciated   - monitor labs to see if able to restart AC     5) Diarrhea   - when patient admitted was c/o constipation   - GI consult appreciated   - Questran added as per GI     6) Advance Care Planning   - patient appears to have capacity but is deferring decisions to her son Dr. Laith Mojica   - Full code will address in meeting   - patient states she has 3 sons 2 of whom are chiropractors and Laith Leonardo who is pulmonologist - patient is requesting that her son Laith Mojica be called and updated   - [facility] meeting for tomorrow at 2pm on telephone

## 2021-09-15 NOTE — CONSULT NOTE ADULT - SUBJECTIVE AND OBJECTIVE BOX
HPI: Pt is a 89y old Female with hx of pancreatitis s/p ERCP, cirrhosis, DM2, a-fib on Eliquis, CAD s/p PCI, severe pulm HTN, chronic R heart failure, diastolic dysfunction, AS s/p TAVR, OA, HTN, dyslipidemia, hypothyroidism, cecal mass, recently d/maddy from [facility] towards the middle of August with sepsis, strep bovis species.  She required pressors fluids antibiotics with improvement.  Because of the strep bovis she underwent colonoscopy revealing a cecal mass associated with a large polyp.   Now admitted [facility] for a  fall/near syncope. Pt states she was trying to put boxes under the chair, when she fell and hit her head, now has a small abrasion. Palliative medicine consulted to help establish GOC and advance care planning     9/15/2021 patient seen and examined with no family at bedside, patient was able to describe since being in the hospital that she was suppose to go to [facility] for colon resection for cecal mass but due to severe cardiac and lung issues patient states it is too risky and was told she will not be having the procedure.  Patient denies any pain at this time but states that she is still having some SOB.  Patient agreeable to GOC meeting, asked to please reach out to her son  Laith Careycarlos.      PAIN: ( )Yes   (x )No  patient denies     DYSPNEA: (x ) Yes  ( ) No  Level: as per patient worse with excretion - has supplemental O2 in place      PAST MEDICAL & SURGICAL HISTORY:  Diabetes Mellitus Type II  Dyslipidemia  GERD (Gastroesophageal Reflux Disease)  History of Osteoarthritis  Hypertension  CAD (coronary artery disease)  Afib  Hypothyroid  HLD (hyperlipidemia)  History of pancreatitis  Aortic stenosis  H/O: Hysterectomy  H/O: Knee Surgery- right meniscus  History of cholecystectomy  History of appendectomy  H/O total knee replacement, left  S/P IVC filter  Note that Pt does not have  h/o DVT, outPt doppler was read first as +, but after review reported as no DVT. Pt got IVC filer before that    SOCIAL HX: lives at [facility]     Hx opiate tolerance ( )YES  (x )NO    Baseline ADLs  (Prior to Admission)  ( ) Independent   ( x)Dependent    FAMILY HISTORY:  FH: diabetes mellitus  both parents  FH: heart disease    Review of Systems:    Anxiety- no   Depression- no   Physical Discomfort- no   Dyspnea- yes  Constipation- no   Diarrhea-  yes   Nausea- no   Vomiting- no   Anorexia- no   Weight Loss- no    Cough- at times   Secretions- no   Fatigue- yes   Weakness- yes   Delirium- no     All other systems reviewed and negative    PHYSICAL EXAM:    Vital Signs Last 24 Hrs  T(C): 36.2 (15 Sep 2021 08:33), Max: 37.3 (14 Sep 2021 23:25)  T(F): 97.2 (15 Sep 2021 08:33), Max: 99.2 (14 Sep 2021 23:25)  HR: 73 (15 Sep 2021 08:33) (73 - 122)  BP: 111/64 (15 Sep 2021 08:33) (99/48 - 116/69)  RR: 17 (15 Sep 2021 08:33) (17 - 18)  SpO2: 93% (15 Sep 2021 08:33) (93% - 100%)    PPSV2: 20-30  %    General: pleasant elderly female in bed   Mental Status: alert and oriented  HEENT: Nasal cannula in place, mmm   Lungs: diminished breath sounds   Cardiac: S1S2 +   GI: Soft non-tender non-distended; Normal bowel sounds  : No  suprapubic  tenderness  Ext: mild edema present   Neuro: intact       LABS:                        8.6    7.54  )-----------( 182      ( 15 Sep 2021 06:05 )             26.3     09-15    133<L>  |  97  |  19  ----------------------------<  215<H>  3.5   |  32<H>  |  0.81    Ca    8.4<L>      15 Sep 2021 06:05  Phos  3.7     09-14  Mg     2.3     09-14    Albumin:     Allergies    penicillin (Rash)  penicillins (Other)  sulfa drugs (Rash; Other)    Intolerances    MEDICATIONS  (STANDING):  allopurinol 200 milliGRAM(s) Oral daily  artificial  tears Solution 1 Drop(s) Both EYES three times a day  dextrose 40% Gel 15 Gram(s) Oral once  dextrose 5%. 1000 milliLiter(s) (50 mL/Hr) IV Continuous <Continuous>  dextrose 5%. 1000 milliLiter(s) (100 mL/Hr) IV Continuous <Continuous>  dextrose 50% Injectable 25 Gram(s) IV Push once  dextrose 50% Injectable 12.5 Gram(s) IV Push once  dextrose 50% Injectable 25 Gram(s) IV Push once  escitalopram 10 milliGRAM(s) Oral daily  ferrous    sulfate 325 milliGRAM(s) Oral two times a day  glucagon  Injectable 1 milliGRAM(s) IntraMuscular once  influenza   Vaccine 0.5 milliLiter(s) IntraMuscular once  insulin lispro (ADMELOG) corrective regimen sliding scale   SubCutaneous three times a day before meals  insulin lispro (ADMELOG) corrective regimen sliding scale   SubCutaneous at bedtime  levothyroxine 50 MICROGram(s) Oral daily  lidocaine   4% Patch 1 Patch Transdermal daily  oxymetazoline 0.05% Nasal Spray 2 Spray(s) Both Nostrils two times a day  pantoprazole    Tablet 40 milliGRAM(s) Oral before breakfast  sildenafil (REVATIO) 20 milliGRAM(s) Oral two times a day  simvastatin 40 milliGRAM(s) Oral at bedtime    MEDICATIONS  (PRN):  acetaminophen   Tablet .. 650 milliGRAM(s) Oral every 6 hours PRN Temp greater or equal to 38C (100.4F), Mild Pain (1 - 3)  cyclobenzaprine 5 milliGRAM(s) Oral two times a day PRN Muscle Spasm  lidocaine 2% Gel 1 Application(s) Topical three times a day PRN pain  loperamide 2 milliGRAM(s) Oral three times a day PRN loose stool  sodium chloride 0.65% Nasal 2 Spray(s) Both Nostrils every 3 hours PRN nasal dryness      RADIOLOGY/ADDITIONAL STUDIES:    EXAM:  XR ABDOMEN PORTABLE ROUTINE 1V                        PROCEDURE DATE:  09/13/2021    INTERPRETATION:  Abdomen one view  HISTORY: Diarrhea  COMPARISON: 8/19/2021  Frontal view of the abdomen shows a normal gas pattern. IVC filter is again noted. No definite free air is identified.    IMPRESSION:  No evidence of bowel obstruction.  Thank you for this referral.       HPI: Pt is a 89y old Female with hx of pancreatitis s/p ERCP, cirrhosis, DM2, a-fib on Eliquis, CAD s/p PCI, severe pulm HTN, chronic R heart failure, diastolic dysfunction, AS s/p TAVR, OA, HTN, dyslipidemia, hypothyroidism, cecal mass, recently d/maddy from [facility] towards the middle of August with sepsis, strep bovis species.  She required pressors fluids antibiotics with improvement.  Because of the strep bovis she underwent colonoscopy revealing a cecal mass associated with a large polyp.   Now admitted [facility] for a  fall/near syncope. Pt states she was trying to put boxes under the chair, when she fell and hit her head, now has a small abrasion. Palliative medicine consulted to help establish GOC and advance care planning     9/15/2021 patient seen and examined with no family at bedside, patient was able to describe since being in the hospital that she was suppose to go to [facility] for colon resection for cecal mass but due to severe cardiac and lung issues patient states it is too risky and was told she will not be having the procedure.  Patient denies any pain at this time but states that she is still having some SOB.  Patient agreeable to GOC meeting, asked to please reach out to her son  Laith Careycarlos.      PAIN: ( )Yes   (x )No  patient denies     DYSPNEA: (x ) Yes  ( ) No  Level: as per patient worse with excretion - has supplemental O2 in place      PAST MEDICAL & SURGICAL HISTORY:  Diabetes Mellitus Type II  Dyslipidemia  GERD (Gastroesophageal Reflux Disease)  History of Osteoarthritis  Hypertension  CAD (coronary artery disease)  Afib  Hypothyroid  HLD (hyperlipidemia)  History of pancreatitis  Aortic stenosis  H/O: Hysterectomy  H/O: Knee Surgery- right meniscus  History of cholecystectomy  History of appendectomy  H/O total knee replacement, left  S/P IVC filter  Note that Pt does not have  h/o DVT, outPt doppler was read first as +, but after review reported as no DVT. Pt got IVC filer before that    SOCIAL HX: lives at [facility]     Hx opiate tolerance ( )YES  (x )NO    Baseline ADLs  (Prior to Admission)  ( ) Independent   ( x)Dependent    FAMILY HISTORY:  FH: diabetes mellitus  both parents  FH: heart disease    Review of Systems:    Anxiety- no   Depression- no   Physical Discomfort- no   Dyspnea- yes  Constipation- no   Diarrhea-  yes   Nausea- no   Vomiting- no   Anorexia- no   Weight Loss- no    Cough- at times   Secretions- no   Fatigue- yes   Weakness- yes   Delirium- no     All other systems reviewed and negative    PHYSICAL EXAM:    Vital Signs Last 24 Hrs  T(C): 36.2 (15 Sep 2021 08:33), Max: 37.3 (14 Sep 2021 23:25)  T(F): 97.2 (15 Sep 2021 08:33), Max: 99.2 (14 Sep 2021 23:25)  HR: 73 (15 Sep 2021 08:33) (73 - 122)  BP: 111/64 (15 Sep 2021 08:33) (99/48 - 116/69)  RR: 17 (15 Sep 2021 08:33) (17 - 18)  SpO2: 93% (15 Sep 2021 08:33) (93% - 100%)    PPSV2: 20-30  %    General: pleasant elderly female in bed   Mental Status: alert and oriented  HEENT: Nasal cannula in place, mmm   Lungs: diminished breath sounds   Cardiac: S1S2 +   GI: Soft non-tender non-distended; Normal bowel sounds  : No  suprapubic  tenderness  Ext: mild edema present   Neuro: intact       LABS:                        8.6    7.54  )-----------( 182      ( 15 Sep 2021 06:05 )             26.3     09-15    133<L>  |  97  |  19  ----------------------------<  215<H>  3.5   |  32<H>  |  0.81    Ca    8.4<L>      15 Sep 2021 06:05  Phos  3.7     09-14  Mg     2.3     09-14    Albumin:     Allergies    penicillin (Rash)  penicillins (Other)  sulfa drugs (Rash; Other)    Intolerances    MEDICATIONS  (STANDING):  allopurinol 200 milliGRAM(s) Oral daily  artificial  tears Solution 1 Drop(s) Both EYES three times a day  dextrose 40% Gel 15 Gram(s) Oral once  dextrose 5%. 1000 milliLiter(s) (50 mL/Hr) IV Continuous <Continuous>  dextrose 5%. 1000 milliLiter(s) (100 mL/Hr) IV Continuous <Continuous>  dextrose 50% Injectable 25 Gram(s) IV Push once  dextrose 50% Injectable 12.5 Gram(s) IV Push once  dextrose 50% Injectable 25 Gram(s) IV Push once  escitalopram 10 milliGRAM(s) Oral daily  ferrous    sulfate 325 milliGRAM(s) Oral two times a day  glucagon  Injectable 1 milliGRAM(s) IntraMuscular once  influenza   Vaccine 0.5 milliLiter(s) IntraMuscular once  insulin lispro (ADMELOG) corrective regimen sliding scale   SubCutaneous three times a day before meals  insulin lispro (ADMELOG) corrective regimen sliding scale   SubCutaneous at bedtime  levothyroxine 50 MICROGram(s) Oral daily  lidocaine   4% Patch 1 Patch Transdermal daily  oxymetazoline 0.05% Nasal Spray 2 Spray(s) Both Nostrils two times a day  pantoprazole    Tablet 40 milliGRAM(s) Oral before breakfast  sildenafil (REVATIO) 20 milliGRAM(s) Oral two times a day  simvastatin 40 milliGRAM(s) Oral at bedtime    MEDICATIONS  (PRN):  acetaminophen   Tablet .. 650 milliGRAM(s) Oral every 6 hours PRN Temp greater or equal to 38C (100.4F), Mild Pain (1 - 3)  cyclobenzaprine 5 milliGRAM(s) Oral two times a day PRN Muscle Spasm  lidocaine 2% Gel 1 Application(s) Topical three times a day PRN pain  loperamide 2 milliGRAM(s) Oral three times a day PRN loose stool  sodium chloride 0.65% Nasal 2 Spray(s) Both Nostrils every 3 hours PRN nasal dryness      RADIOLOGY/ADDITIONAL STUDIES:    EXAM:  XR ABDOMEN PORTABLE ROUTINE 1V                        PROCEDURE DATE:  09/13/2021    INTERPRETATION:  Abdomen one view  HISTORY: Diarrhea  COMPARISON: 8/19/2021  Frontal view of the abdomen shows a normal gas pattern. IVC filter is again noted. No definite free air is identified.    IMPRESSION:  No evidence of bowel obstruction.  Thank you for this referral.      EXAM:  XR CHEST PORTABLE ROUTINE 1V                        PROCEDURE DATE:  09/13/2021    INTERPRETATION:  Chest one view  HISTORY: Dyspnea  COMPARISON STUDY: 9/6/2021  Frontal expiratory view of the chest shows the heart to be similar in size. Aortic valve stent is again noted.  The lungs show slight increase in pulmonary congestion with small pleural effusions and there is no evidence of pneumothorax.      IMPRESSION:  Congestive changes as noted.  Thank you for the courtesy of this referral.      EXAM:  XR SHOULDER COMP MIN 2V LT                        PROCEDURE DATE:  09/06/2021    INTERPRETATION:  clinical history: Trauma  Left shoulder 3 views  No fracture or dislocation. No focal osseous lesion. Underlying degenerative changes are stable    IMPRESSION:   No fracture.      EXAM:  CT CERVICAL SPINE                        EXAM:  CT BRAIN                        PROCEDURE DATE:  09/06/2021    INTERPRETATION:  CT head without IV contrast  CLINICAL INFORMATION:  Fall   Intracranial hemorrhage.  TECHNIQUE: Contiguous axial 5 mm sections were obtained through the head. Sagittal and coronal 2-D reformatted images were also obtained.   This scan was performed using automatic exposure control (radiation dose reduction software) to obtain a diagnostic image quality scan with patient dose as low as reasonably achievable.  FINDINGS:   No previous examinations are available for review.  The brain demonstrates no abnormal attenuation.   No acute cerebral cortical infarct is seen.  No intracranial hemorrhage is found.  No mass effect is found in the brain.  The ventricles, sulci and basal cisterns show moderate atrophy most prominent within the BILATERAL temporal lobes  The orbits are unremarkable.  The paranasal sinuses are clear.  The nasal cavity appears intact.  The nasopharynx is symmetric.  The central skull base, petrous temporal bones and calvarium remain intact.  CT cervical spine without IV contrast  CLINICAL INFORMATION: Fracture, trauma, neck pain.  Neck pain, spinal stenosis, spondylosis. fall neuro alert  TECHNIQUE:  Contiguous axial 2.0 mm sections were obtained through the cervical spine using a single helical acquisition.  Additional 2 mm sagittal and coronal reformatted reconstructions of the spine were obtained. These additional reformatted images were used to evaluate the spine for alignment, vertebral fractures and the integrity of the the posterior elements.   This scan was performed using automatic exposure control (radiation dose reduction software) toobtain a diagnostic image quality scan with patient dose as low as reasonably achievable.  FINDINGS:   No prior similar studies are available for review  Cervical vertebral body heights are maintained. No vertebral fracture is seen. No destructive bone lesion is found.  Alignment is significant for straightening on a degenerative basis.  Facet joints appear intact and aligned.  Cervical intervertebral disc spaces show degenerative disc disease and spondylosis at C3-4 through C6-7 with loss of disc height and associated degenerative endplate changes. There is narrowing of the LEFT C2-3, LEFT C3-4 LEFT C4-5 and RIGHT C5-C6 and C6-7 neural foramina due to uncovertebral spurring and facet osteophytic hypertrophy.   Scratch  The skull base appears intact.  No neck mass is recognized.  Paraspinal soft tissues appear intact. Visualized lymph nodes appear to be within physiologic size limits.      IMPRESSION:  CT HEAD: No acute abnormality. Moderate atrophy most prominent in the BILATERAL temporal lobes.  CT cervical spine:   No vertebral fracture is recognized.  Multilevel degenerative disc disease and spondylosis at C3-4 through C6-7 with loss of disc height and associated degenerative endplate changes. There is narrowing of the LEFT C2-3, LEFT C3-4 LEFT C4-5 and RIGHT C5-C6 and C6-7 neural foramina due to uncovertebral spurring and facet osteophytic hypertrophy.

## 2021-09-15 NOTE — PROGRESS NOTE ADULT - ASSESSMENT
# S/p fall  -likely mechanical   -  tele -  no arrhythmias, AFIB rate controlled   - serial cardiac enz neg   - trauma work: no Fx  - neck pain:  c/w Lidoderm, Tylenol  PO, added  flexeril PRN  - PT   - orthostatic vitals neg     # Severe constipation, Now with Diarrhea, improved   -  required disimpaction on 9/7  - On ducolax suppository PRN, Psyllium on hold now   -  stool GI PCR and CDIFF PCR neg   - and XR reviewed, no obstruction   - C/w Imodium PRN , started on Questran   - D/w Dr Mobley     #  chronic R heart failure. Severe Pulm HTN   #  AS s/p TAVR,  # Acute on chronic diastolic  CHF exacerbation, improved   - CT chest noted, chronic changes and CHF   - torsemide 40 mg bid, on  hold while has loose stools, restart now at 40mg PO QD, adjust dose as needed   - supportive O2 via face shield, change to NC when having meals   - ECHO: severe pulm HTN, Mod MS, AV prosthesis good Fx, Severe TR. LV mild LVH   - C/w Sildenafil   - D/w Avery KRAUSE     # Cecal Adenocarcinoma    - Patient is anticipated to undergo surgical procedure in a tertiary care facility given patient's advanced age / extensive chronic medical issues including valvular Dz and severe Pulm HTN  -D/w  Dr. Smyth, case was discussed with carolyn torres anesthesia at [facility], Pt is too high risk due to Severe pulm HTN . Not a candidate for Sx   - hem/onc eval appreciated, advises against systemic Tx due to Pts  Fx status and other medical issues and possible adverse effects       # Epistaxis, resolved   Likely 2/2 dry nasal mucosa on NC  C/w  humidified face shield   c/w afrin BID x 24-48  Monitor closely , hold eliquis and ASA for now     # Hematemesis x 1  suspect 2/2 nose bleeding and swallowing blood   No further episodes  Monitor H/H, repeat this afternoon   On PPIs  Dw Dr Mobley       # DM 2:   -HB A1c 6.8   - not on meds outPt   - Monitor BS and cover with  ISS, add lantus low dose     # A fib: permanent  - hold  eliquis for now   Not on HR meds     #  DVT PPX:  Eliquis on hold,  reeval in am       D/w  Palliative team, will arrange family meeting

## 2021-09-16 LAB
ADD ON TEST-SPECIMEN IN LAB: SIGNIFICANT CHANGE UP
ANION GAP SERPL CALC-SCNC: 7 MMOL/L — SIGNIFICANT CHANGE UP (ref 5–17)
BUN SERPL-MCNC: 18 MG/DL — SIGNIFICANT CHANGE UP (ref 7–23)
C DIFF BY PCR RESULT: SIGNIFICANT CHANGE UP
C DIFF TOX GENS STL QL NAA+PROBE: SIGNIFICANT CHANGE UP
CALCIUM SERPL-MCNC: 8.5 MG/DL — SIGNIFICANT CHANGE UP (ref 8.5–10.1)
CHLORIDE SERPL-SCNC: 94 MMOL/L — LOW (ref 96–108)
CO2 SERPL-SCNC: 30 MMOL/L — SIGNIFICANT CHANGE UP (ref 22–31)
CREAT SERPL-MCNC: 0.73 MG/DL — SIGNIFICANT CHANGE UP (ref 0.5–1.3)
GLUCOSE SERPL-MCNC: 206 MG/DL — HIGH (ref 70–99)
HCT VFR BLD CALC: 28.2 % — LOW (ref 34.5–45)
HGB BLD-MCNC: 9.2 G/DL — LOW (ref 11.5–15.5)
MCHC RBC-ENTMCNC: 32.6 GM/DL — SIGNIFICANT CHANGE UP (ref 32–36)
MCHC RBC-ENTMCNC: 32.6 PG — SIGNIFICANT CHANGE UP (ref 27–34)
MCV RBC AUTO: 100 FL — SIGNIFICANT CHANGE UP (ref 80–100)
PLATELET # BLD AUTO: 207 K/UL — SIGNIFICANT CHANGE UP (ref 150–400)
POTASSIUM SERPL-MCNC: 3.7 MMOL/L — SIGNIFICANT CHANGE UP (ref 3.5–5.3)
POTASSIUM SERPL-SCNC: 3.7 MMOL/L — SIGNIFICANT CHANGE UP (ref 3.5–5.3)
RBC # BLD: 2.82 M/UL — LOW (ref 3.8–5.2)
RBC # FLD: 15.6 % — HIGH (ref 10.3–14.5)
SODIUM SERPL-SCNC: 131 MMOL/L — LOW (ref 135–145)
WBC # BLD: 8.59 K/UL — SIGNIFICANT CHANGE UP (ref 3.8–10.5)
WBC # FLD AUTO: 8.59 K/UL — SIGNIFICANT CHANGE UP (ref 3.8–10.5)

## 2021-09-16 PROCEDURE — 99233 SBSQ HOSP IP/OBS HIGH 50: CPT

## 2021-09-16 PROCEDURE — 99232 SBSQ HOSP IP/OBS MODERATE 35: CPT

## 2021-09-16 RX ORDER — DIPHENOXYLATE HCL/ATROPINE 2.5-.025MG
1 TABLET ORAL THREE TIMES A DAY
Refills: 0 | Status: DISCONTINUED | OUTPATIENT
Start: 2021-09-16 | End: 2021-09-17

## 2021-09-16 RX ORDER — LACTOBACILLUS ACIDOPHILUS 100MM CELL
1 CAPSULE ORAL
Refills: 0 | Status: DISCONTINUED | OUTPATIENT
Start: 2021-09-16 | End: 2021-09-22

## 2021-09-16 RX ORDER — ONDANSETRON 8 MG/1
4 TABLET, FILM COATED ORAL EVERY 6 HOURS
Refills: 0 | Status: DISCONTINUED | OUTPATIENT
Start: 2021-09-16 | End: 2021-09-22

## 2021-09-16 RX ORDER — ONDANSETRON 8 MG/1
4 TABLET, FILM COATED ORAL EVERY 6 HOURS
Refills: 0 | Status: DISCONTINUED | OUTPATIENT
Start: 2021-09-16 | End: 2021-09-16

## 2021-09-16 RX ORDER — LIDOCAINE 4 G/100G
1 CREAM TOPICAL
Refills: 0 | Status: DISCONTINUED | OUTPATIENT
Start: 2021-09-16 | End: 2021-09-22

## 2021-09-16 RX ORDER — CHOLESTYRAMINE 4 G/9G
4 POWDER, FOR SUSPENSION ORAL
Refills: 0 | Status: DISCONTINUED | OUTPATIENT
Start: 2021-09-16 | End: 2021-09-17

## 2021-09-16 RX ORDER — APIXABAN 2.5 MG/1
2.5 TABLET, FILM COATED ORAL EVERY 12 HOURS
Refills: 0 | Status: DISCONTINUED | OUTPATIENT
Start: 2021-09-16 | End: 2021-09-22

## 2021-09-16 RX ADMIN — ONDANSETRON 4 MILLIGRAM(S): 8 TABLET, FILM COATED ORAL at 22:56

## 2021-09-16 RX ADMIN — Medication 1 TABLET(S): at 21:07

## 2021-09-16 RX ADMIN — LIDOCAINE 1 PATCH: 4 CREAM TOPICAL at 11:17

## 2021-09-16 RX ADMIN — Medication 40 MILLIGRAM(S): at 11:15

## 2021-09-16 RX ADMIN — CHOLESTYRAMINE 4 GRAM(S): 4 POWDER, FOR SUSPENSION ORAL at 22:37

## 2021-09-16 RX ADMIN — ESCITALOPRAM OXALATE 10 MILLIGRAM(S): 10 TABLET, FILM COATED ORAL at 11:15

## 2021-09-16 RX ADMIN — Medication 1 TABLET(S): at 17:30

## 2021-09-16 RX ADMIN — SIMVASTATIN 40 MILLIGRAM(S): 20 TABLET, FILM COATED ORAL at 21:07

## 2021-09-16 RX ADMIN — Medication 4: at 08:11

## 2021-09-16 RX ADMIN — Medication 1 DROP(S): at 14:21

## 2021-09-16 RX ADMIN — PANTOPRAZOLE SODIUM 40 MILLIGRAM(S): 20 TABLET, DELAYED RELEASE ORAL at 11:16

## 2021-09-16 RX ADMIN — Medication 2: at 18:07

## 2021-09-16 RX ADMIN — Medication 1 DROP(S): at 06:28

## 2021-09-16 RX ADMIN — Medication 50 MICROGRAM(S): at 06:27

## 2021-09-16 RX ADMIN — Medication 1 DROP(S): at 21:07

## 2021-09-16 RX ADMIN — Medication 650 MILLIGRAM(S): at 17:31

## 2021-09-16 RX ADMIN — APIXABAN 2.5 MILLIGRAM(S): 2.5 TABLET, FILM COATED ORAL at 21:07

## 2021-09-16 RX ADMIN — CHOLESTYRAMINE 4 GRAM(S): 4 POWDER, FOR SUSPENSION ORAL at 11:16

## 2021-09-16 RX ADMIN — Medication 20 MILLIGRAM(S): at 11:15

## 2021-09-16 RX ADMIN — Medication 200 MILLIGRAM(S): at 11:15

## 2021-09-16 RX ADMIN — INSULIN GLARGINE 6 UNIT(S): 100 INJECTION, SOLUTION SUBCUTANEOUS at 21:07

## 2021-09-16 RX ADMIN — Medication 6: at 12:30

## 2021-09-16 RX ADMIN — OXYMETAZOLINE HYDROCHLORIDE 2 SPRAY(S): 0.5 SPRAY NASAL at 11:16

## 2021-09-16 RX ADMIN — Medication 2 MILLIGRAM(S): at 11:15

## 2021-09-16 RX ADMIN — Medication 325 MILLIGRAM(S): at 11:15

## 2021-09-16 RX ADMIN — Medication 20 MILLIGRAM(S): at 21:07

## 2021-09-16 NOTE — PROGRESS NOTE ADULT - ASSESSMENT
Pt is a 89y old Female with hx of pancreatitis s/p ERCP, cirrhosis, DM2, a-fib on Eliquis, CAD s/p PCI, severe pulm HTN, chronic R heart failure, diastolic dysfunction, AS s/p TAVR, OA, HTN, dyslipidemia, hypothyroidism, cecal mass,    1) S/P fall  - likely mechanical   - c/w Lidoderm, Tylenol  PO, added  flexeril PRN  - PT consult appreciated   - Fall risk maintained   - supportive care   - orthostatic vitals neg     2) adenocarcinoma of the cecum   - not a surgical candidate due to comorbidities   - oncology consult appreciated - no role for systemic treatment as it will not add longevity  - GI consult appreciated   - x-ray abdomen - No evidence of bowel obstruction.    3) Severe Pulm HTN,  Acute on chronic diastolic  CHF exacerbation   - history of AS s/p TAVR  - X-ray chest  from 9/9 - The lungs show slight increase in pulmonary congestion with small pleural effusions and there is no evidence of pneumothorax   - supportive O2 via face shield PRN   - ECHO: severe pulm HTN, Mod MS, AV prosthesis good Fx, Severe TR. LV mild LVH   - C/w Sildenafil   - pulmonology consult appreciated    4)  Afib   - Eliquis on hold at this time due to nose bleed and hematosis  - cardiology consult appreciated   - monitor labs to see if able to restart AC     5) Diarrhea   - when patient admitted was c/o constipation   - GI consult appreciated   - Questran added as per GI   - c/w imodium     6) Advance Care Planning   - patient appears to have capacity but is deferring decisions to her son Dr. Laith Mojica   - Full code will address in meeting   - patient states she has 3 sons 2 of whom are chiropractors and Laith Leonardo who is pulmonologist - patient is requesting that her son Laithleidy Mojica be called and updated   - [facility] meeting for tomorrow at 2pm on telephone

## 2021-09-16 NOTE — PROGRESS NOTE ADULT - SUBJECTIVE AND OBJECTIVE BOX
HPI: pt seen and examined with no family at bedside. Patient denies any pain but states that diarrhea is getting worse. Pt tearful about this stating that she feels that she cant eat and feels so weak because of it.  Asked Meena MAY to give dose imodium. [facility] meeting scheduled for today at 2pm     PAIN: ( )Yes   (x )No  patient denies     DYSPNEA: (x ) Yes  ( ) No  Level: as per patient worse with excretion - has supplemental O2 in place      Review of Systems:    Anxiety- no   Depression- no   Physical Discomfort- no   Dyspnea- yes  Constipation- no   Diarrhea-  yes   Nausea- no   Vomiting- no   Anorexia- no   Weight Loss- no    Cough- at times   Secretions- no   Fatigue- yes   Weakness- yes   Delirium- no     All other systems reviewed and negative    PHYSICAL EXAM:    Vital Signs Last 24 Hrs  T(C): 36.7 (16 Sep 2021 09:10), Max: 36.7 (16 Sep 2021 09:10)  T(F): 98 (16 Sep 2021 09:10), Max: 98 (16 Sep 2021 09:10)  HR: 88 (16 Sep 2021 09:10) (88 - 109)  BP: 112/63 (16 Sep 2021 09:10) (111/64 - 131/81)  RR: 17 (16 Sep 2021 09:10) (17 - 18)  SpO2: 99% (16 Sep 2021 09:10) (97% - 99%)    PPSV2: 20-30  %    General: pleasant elderly female in bed   Mental Status: alert and oriented  HEENT: Nasal cannula in place, mmm   Lungs: diminished breath sounds   Cardiac: S1S2 +   GI: Soft non-tender non-distended; Normal bowel sounds  : No  suprapubic  tenderness  Ext: mild edema present   Neuro: intact       LABS:                        9.2    8.59  )-----------( 207      ( 16 Sep 2021 08:12 )             28.2     09-16    131<L>  |  94<L>  |  18  ----------------------------<  206<H>  3.7   |  30  |  0.73    Ca    8.5      16 Sep 2021 08:12    Albumin:     Allergies    penicillin (Rash)  penicillins (Other)  sulfa drugs (Rash; Other)    Intolerances    MEDICATIONS  (STANDING):  allopurinol 200 milliGRAM(s) Oral daily  artificial  tears Solution 1 Drop(s) Both EYES three times a day  cholestyramine Powder (Sugar-Free) 4 Gram(s) Oral daily  dextrose 40% Gel 15 Gram(s) Oral once  dextrose 5%. 1000 milliLiter(s) (50 mL/Hr) IV Continuous <Continuous>  dextrose 5%. 1000 milliLiter(s) (100 mL/Hr) IV Continuous <Continuous>  dextrose 50% Injectable 25 Gram(s) IV Push once  dextrose 50% Injectable 12.5 Gram(s) IV Push once  dextrose 50% Injectable 25 Gram(s) IV Push once  escitalopram 10 milliGRAM(s) Oral daily  ferrous    sulfate 325 milliGRAM(s) Oral two times a day  glucagon  Injectable 1 milliGRAM(s) IntraMuscular once  influenza   Vaccine 0.5 milliLiter(s) IntraMuscular once  insulin glargine Injectable (LANTUS) 6 Unit(s) SubCutaneous at bedtime  insulin lispro (ADMELOG) corrective regimen sliding scale   SubCutaneous three times a day before meals  insulin lispro (ADMELOG) corrective regimen sliding scale   SubCutaneous at bedtime  levothyroxine 50 MICROGram(s) Oral daily  lidocaine   4% Patch 1 Patch Transdermal daily  oxymetazoline 0.05% Nasal Spray 2 Spray(s) Both Nostrils two times a day  pantoprazole    Tablet 40 milliGRAM(s) Oral before breakfast  sildenafil (REVATIO) 20 milliGRAM(s) Oral two times a day  simvastatin 40 milliGRAM(s) Oral at bedtime  torsemide 40 milliGRAM(s) Oral daily    MEDICATIONS  (PRN):  acetaminophen   Tablet .. 650 milliGRAM(s) Oral every 6 hours PRN Temp greater or equal to 38C (100.4F), Mild Pain (1 - 3)  ALPRAZolam 0.25 milliGRAM(s) Oral two times a day PRN anxiety  cyclobenzaprine 5 milliGRAM(s) Oral two times a day PRN Muscle Spasm  lidocaine 2% Gel 1 Application(s) Topical three times a day PRN pain  loperamide 2 milliGRAM(s) Oral three times a day PRN loose stool  sodium chloride 0.65% Nasal 2 Spray(s) Both Nostrils every 3 hours PRN nasal dryness

## 2021-09-16 NOTE — PROGRESS NOTE ADULT - ASSESSMENT
89/F with PMHx of pancreatitis s/p ERCP, cirrhosis, DM2, a-fib on Eliquis, CAD s/p PCI, severe pulm HTN, chronic R heart failure, diastolic dysfunction, AS s/p TAVR, OA, HTN, dyslipidemia, hypothyroidism, cecal mass, recently d/maddy from , sent  to the ED on 9/6/21  from [facility] for c/o Fall, near syncope.     Fall multifactorial , gout, OA, gait disorder dyspnea   - CT head - no acute pathology, tele -  no arrhythmias, AFIB rate controlled ,  serial cardiac enz neg ,  trauma work: no Fx orthostatic vitals neg ,  neck pain, resolved :  c/w Lidoderm, Tylenol  PO  - PT reevaluate , OOB to the chair bid d/w RN  - polypharmacy - stop xanax, flexeril - centrally acting medications will increase risk of falls   Severe constipation, Now with Diarrhea, with rectal pain   -  required disimpaction on 9/7 , laxatives  on hold now due to diarrhea  -  stool GI PCR and CDIFF PCR neg , send stool for ova and parasites,  XR reviewed, no obstruction   - stop imodium - not effective ,  trial of lomotil tid  with increased dose of Questran  bid,  monitor stool count  - GI consult D/w Dr Mobley   Leg edema due to  chronic Right sided heart failure. Severe Pulm HTN ,   AS s/p TAVR,   Acute on chronic diastolic  CHF exacerbation, improved   Permanent  A fib on Eliquis   - Eliquis was held due to bleeding restart Eliquis 9/16 ,  monitor H/H    - CXR 9/6, 9/13 - showing CHF changes,  ECHO: severe pulm HTN, Mod MS, AV prosthesis good Fx, Severe TR. LV mild LVH   - BNP trending down 5000--> 4000, daily weight   - torsemide 40 mg bid was on hold due to loose stools,  restarted at 40mg PO QD on 9/15   - supportive O2 via 4 L  ,  C/w Sildenafil  , cardiology and pulmonology consult Dr Camacho and  Dr. Hwang  Cecal Adenocarcinoma    - Patient is anticipated to undergo surgical procedure in a tertiary care facility given patient's advanced age / extensive chronic medical issues including valvular Dz and severe Pulm HTN , D/w  Dr. Smyth, case was discussed with carolyn od anesthesia at [facility], Pt is too high risk due to Severe pulm HTN . Not a candidate for Sx   - hem/onc eval appreciated, advises against systemic Tx due to Pts  Fx status and other medical issues and possible adverse effects   Mild normocytic anemia - stop iron po , check ferritin, B12, folate  Hypovolemic hyponatremia- monitor for improvements on diuretics  Epistaxis, resolved   Hematemesis 1 episode due tp  nose bleeding and swallowing blood , resolved  Likely 2/2 dry nasal mucosa on NC, C/w  humidified oxygen  s/p  afrin BID  will stop , may increase BP in elderly   hold asa, restart eliquis 2.5 bid   FOBT positive due to nose bleed and colon cancer  Monitor H/H stable, c/w  PPIs  d/w Dr. Mobley ok to restart Eliquis , not ASA   DM 2 with A1C 6.8   - Monitor BS and cover with  ISS,  lantus 6 units hs   Generalized weakness - check vitamin D, B12, folate, TSH, PT daily     DVT PPX:  Eliquis     Advanced directives  HCP Laith goldman updated [phone]   MOLST completed  DNR/DNI  palliative team consult appretiated       D/w  Palliative team, will arrange family meeting

## 2021-09-16 NOTE — PROGRESS NOTE ADULT - SUBJECTIVE AND OBJECTIVE BOX
Subjective:    Awake, alert. C/O diarrhea. No dyspnea or chest pain    MEDICATIONS  (STANDING):  allopurinol 200 milliGRAM(s) Oral daily  artificial  tears Solution 1 Drop(s) Both EYES three times a day  cholestyramine Powder (Sugar-Free) 4 Gram(s) Oral daily  dextrose 40% Gel 15 Gram(s) Oral once  dextrose 5%. 1000 milliLiter(s) (50 mL/Hr) IV Continuous <Continuous>  dextrose 5%. 1000 milliLiter(s) (100 mL/Hr) IV Continuous <Continuous>  dextrose 50% Injectable 25 Gram(s) IV Push once  dextrose 50% Injectable 12.5 Gram(s) IV Push once  dextrose 50% Injectable 25 Gram(s) IV Push once  escitalopram 10 milliGRAM(s) Oral daily  ferrous    sulfate 325 milliGRAM(s) Oral two times a day  glucagon  Injectable 1 milliGRAM(s) IntraMuscular once  influenza   Vaccine 0.5 milliLiter(s) IntraMuscular once  insulin glargine Injectable (LANTUS) 6 Unit(s) SubCutaneous at bedtime  insulin lispro (ADMELOG) corrective regimen sliding scale   SubCutaneous three times a day before meals  insulin lispro (ADMELOG) corrective regimen sliding scale   SubCutaneous at bedtime  levothyroxine 50 MICROGram(s) Oral daily  lidocaine   4% Patch 1 Patch Transdermal daily  oxymetazoline 0.05% Nasal Spray 2 Spray(s) Both Nostrils two times a day  pantoprazole    Tablet 40 milliGRAM(s) Oral before breakfast  sildenafil (REVATIO) 20 milliGRAM(s) Oral two times a day  simvastatin 40 milliGRAM(s) Oral at bedtime  torsemide 40 milliGRAM(s) Oral daily    MEDICATIONS  (PRN):  acetaminophen   Tablet .. 650 milliGRAM(s) Oral every 6 hours PRN Temp greater or equal to 38C (100.4F), Mild Pain (1 - 3)  ALPRAZolam 0.25 milliGRAM(s) Oral two times a day PRN anxiety  cyclobenzaprine 5 milliGRAM(s) Oral two times a day PRN Muscle Spasm  lidocaine 2% Gel 1 Application(s) Topical three times a day PRN pain  loperamide 2 milliGRAM(s) Oral three times a day PRN loose stool  sodium chloride 0.65% Nasal 2 Spray(s) Both Nostrils every 3 hours PRN nasal dryness      Allergies    penicillin (Rash)  penicillins (Other)  sulfa drugs (Rash; Other)    Intolerances        Vital Signs Last 24 Hrs  T(C): 36.6 (15 Sep 2021 21:27), Max: 36.6 (15 Sep 2021 15:25)  T(F): 97.9 (15 Sep 2021 21:27), Max: 97.9 (15 Sep 2021 15:25)  HR: 102 (15 Sep 2021 21:27) (102 - 109)  BP: 131/81 (15 Sep 2021 21:27) (111/64 - 131/81)  BP(mean): --  RR: 18 (15 Sep 2021 21:27) (18 - 18)  SpO2: 97% (15 Sep 2021 21:27) (97% - 99%)    PHYSICAL EXAMINATION:    NECK:  Supple. No lymphadenopathy. Jugular venous pressure not elevated.   HEART:   The cardiac impulse has a normal quality. Irreg., irreg. Nl S1 and S2.    CHEST:  Chest with bibasilar crackles, R>L. Normal respiratory effort.  ABDOMEN:  Soft and nontender.   EXTREMITIES:  There is tr chronic edema.       LABS:                        9.2    8.59  )-----------( 207      ( 16 Sep 2021 08:12 )             28.2     09-16    131<L>  |  94<L>  |  18  ----------------------------<  206<H>  3.7   |  30  |  0.73    Ca    8.5      16 Sep 2021 08:12            RADIOLOGY & ADDITIONAL TESTS:    Assessment and Recommendation:   · Assessment      89 year-old woman, presents after fall, pulmonary consulted for shortness of breath.  --Her fall may have been mechanical fall.  --She has bilateral infiltrates on imaging, previous imaging reviewed many of these changes are chronic and when viewed on CT chest represent ground glass and intersitial infiltrates likely related to pulmonary edema-mild decompensation of HFpEF  --She clinically appears stable from pulmonary standpoint- persistent basilar crackles. Normal O2 sats   --Hematemesis likely from nose bleed, H/H stable. No EGD, unless further hematemesis not related to nose bleed.   --Diarrhea persists       Recommendations:  --Cardiology rec appreciated, strict I/O, daily weight.  Torsemide restarted QD-monitor lytes carefully. K+ repletion. Concern about diuretics in face of diarrhea.      --GI F/U with Dr. Mobley- added questran for diarrhea, but still persistent.   --Lower extremity edema is improved- would benefit from compression stockings.  --Oncology evaluation noted-systemic therapy not recommended.   --CXR 9/13- CHF changes similar to 9/6.

## 2021-09-16 NOTE — GOALS OF CARE CONVERSATION - ADVANCED CARE PLANNING - TREATMENT GUIDELINE COMMENT
Due to the patient’s health status and restrictions on visitation during the Public Health Emergency, the Advance Care Planning service was performed via telephone with patient’s son

## 2021-09-16 NOTE — PROGRESS NOTE ADULT - SUBJECTIVE AND OBJECTIVE BOX
CC: rectal irritation and pain, watery diarrhea     HPI: 89/F with PMHx of pancreatitis s/p ERCP, cirrhosis, DM2, a-fib on Eliquis, CAD s/p PCI, severe pulm HTN, chronic R heart failure, diastolic dysfunction, AS s/p TAVR, OA, HTN, dyslipidemia, hypothyroidism, cecal mass, recently d/maddy from , sent  to the ED on 9/6/21  from [facility] for c/o Fall, near syncope. Pt states she was trying to put boxes under the chair, when she fell and hit her head, now has a small abrasion. Also c/o left shoulder pain.  She has been become more weak and unsteady recently.  As per Son, she has gained 20 Lbs in [facility]. Her trauma work up neg in ED    9/16  Pt was seen and examined this am,  + rectal imitation pain after multiple episodes of diarrhea , no nose bleed, no hematemesis, denies cp, dyspnea, on O2 supplementation 5 L , afebrile, + leg edema, + gen weakness    Review of system- Rest of the review of system are negative except mentioned in HPI    Vital Signs Last 24 Hrs  T(C): 37.1 (09-16-21 @ 16:14), Max: 37.1 (09-16-21 @ 16:14)  T(F): 98.7 (09-16-21 @ 16:14), Max: 98.7 (09-16-21 @ 16:14)  HR: 93 (09-16-21 @ 16:14) (88 - 102)  BP: 108/54 (09-16-21 @ 16:14) (108/54 - 131/81)  RR: 18 (09-16-21 @ 16:14) (17 - 18)  SpO2: 94% (09-16-21 @ 16:14) (94% - 99%)  Wt(kg): --  CAPILLARY BLOOD GLUCOSE  POCT Blood Glucose.: 267 mg/dL (16 Sep 2021 12:22)  POCT Blood Glucose.: 208 mg/dL (16 Sep 2021 08:02)  POCT Blood Glucose.: 230 mg/dL (15 Sep 2021 20:47)  POCT Blood Glucose.: 254 mg/dL (15 Sep 2021 17:21)    PHYSICAL EXAM:  General:  malnourished; frail elderly female   Eyes: EOMI; conjunctiva and sclera clear, dry mucosa   Head: Normocephalic; atraumatic  ENMT: No nasal discharge; airway clear  Neck: Supple; non tender; no masses  Respiratory: Decreased BS,  crepitations and rales at bases b/l, anterior and posterior  fields   Cardiovascular: Irregular rate and rhythm. S1 and S2 Normal;  Gastrointestinal: Soft non-tender non-distended; Normal bowel sounds.   Genitourinary: No  suprapubic  tenderness  Extremities: +2 b/l LE   edema  Vascular: Peripheral pulses palpable 2+ bilaterally  Neurological: Alert and oriented x2-3, non focal   Musculoskeletal: Normal muscle  strength, L neck  muscle tension improved  Psychiatric: Cooperative       LABS and radiology studies reviewed :            09-16    131<L>  |  94<L>  |  18  ----------------------------<  206<H>  3.7   |  30  |  0.73    Ca    8.5      16 Sep 2021 08:12   Serum Pro-Brain Natriuretic Peptide (09.16.21 @ 08:12)    Serum Pro-Brain Natriuretic Peptide: 4416 pg/mL    Serum Pro-Brain Natriuretic Peptide (09.15.21 @ 06:05)    Serum Pro-Brain Natriuretic Peptide: 5346 pg/mL                         9.2    8.59  )-----------( 207      ( 16 Sep 2021 08:12 )             28.2   Iron with Total Binding Capacity in AM (08.18.21 @ 06:44)    Iron - Total Binding Capacity.: 281 ug/dL    % Saturation, Iron: 20 %    Iron Total, Serum: 56 ug/dL    Unsaturated Iron Binding Capacity: 226 ug/dL    Ferritin, Serum in AM (08.18.21 @ 06:44)    Ferritin, Serum: 82 ng/mL    Vitamin B12, Serum in AM (07.16.20 @ 10:02)    Vitamin B12, Serum: 578 pg/mL             Troponin I, Serum Repeat 3 hours x 2 occurrences (09.06.21 @ 19:13)    Troponin I, Serum: <0.015: High Sensitivity Troponin and new reference  range effective 7/6/2016 ng/mL    < from: 12 Lead ECG (09.06.21 @ 11:03) >  Ventricular Rate 96 BPM    Atrial Rate 92 BPM    QRS Duration 130 ms    Q-T Interval 356 ms    QTC Calculation(Bazett) 449 ms    R Axis -76 degrees    T Axis 62 degrees    Diagnosis Line Atrial fibrillation  Left axis deviation  Non-specific intra-ventricular conduction block  Possible Anterolateral infarct , age undetermined  Abnormal ECG  When compared with ECG of 11-AUG-2021 12:09,  Nonspecific T wave abnormality, improved in Lateral leads  QT has shortened    < end of copied text >  GI PCR Panel, Stool (09.12.21 @ 21:10)    Culture Results:   GI PCR Results: NOT detected    Culture - Urine (09.06.21 @ 11:09)    -  Vancomycin: S 4    -  Levofloxacin: S 2    -  Nitrofurantoin: S <=32 Should not be used to treat pyelonephritis.    -  Tetra/Doxy: R >8    -  Ampicillin: S <=2 Predicts results to ampicillin/sulbactam, amoxacillin-clavulanate and  piperacillin-tazobactam.    -  Ciprofloxacin: S <=1    Specimen Source: Clean Catch Clean Catch (Midstream)    Culture Results:   10,000 - 49,000 CFU/mL Enterococcus faecalis  <10,000 CFU/ml Normal Urogenital abel present    Organism Identification: Enterococcus faecalis    Organism: Enterococcus faecalis    Method Type: [facility]      RADIOLOGY & ADDITIONAL TESTS:  < from: Xray Abdomen 1 View PORTABLE -Routine (Xray Abdomen 1 View PORTABLE -Routine .) (09.13.21 @ 22:31) >  Frontal view of the abdomen shows a normal gas pattern. IVC filter is again noted. No definite free air is identified.    IMPRESSION:  No evidence of bowel obstruction.    < from: Xray Chest 1 View- PORTABLE-Routine (Xray Chest 1 View- PORTABLE-Routine .) (09.13.21 @ 10:08) >  Frontal expiratory view of the chest shows the heart to be similar in size. Aortic valve stent is again noted.    The lungs show slight increase in pulmonary congestion with small pleural effusions and there is no evidence of pneumothorax.  IMPRESSION:  Congestive changes as noted.    < from: Xray Shoulder 2 Views, Left (09.06.21 @ 12:05) >  No fracture or dislocation. No focal osseous lesion. Underlying degenerative changes are stable    IMPRESSION:   No fracture.    < from: CT Head No Cont (09.06.21 @ 10:56) >  IMPRESSION:    CT HEAD: No acute abnormality. Moderate atrophy most prominent in the BILATERAL temporal lobes.    CT cervical spine:   No vertebral fracture is recognized.  Multilevel degenerative disc disease and spondylosis at C3-4 through C6-7 with loss of disc height and associated degenerative endplate changes. There is narrowing of the LEFT C2-3, LEFT C3-4 LEFT C4-5 and RIGHT C5-C6 and C6-7 neural foramina due to uncovertebral spurring and facet osteophytic hypertrophy.      MEDICATIONS  (STANDING):  allopurinol 200 milliGRAM(s) Oral daily  apixaban 2.5 milliGRAM(s) Oral every 12 hours  artificial  tears Solution 1 Drop(s) Both EYES three times a day  cholestyramine Powder (Sugar-Free) 4 Gram(s) Oral two times a day  dextrose 40% Gel 15 Gram(s) Oral once  dextrose 5%. 1000 milliLiter(s) (50 mL/Hr) IV Continuous <Continuous>  dextrose 5%. 1000 milliLiter(s) (100 mL/Hr) IV Continuous <Continuous>  dextrose 50% Injectable 25 Gram(s) IV Push once  dextrose 50% Injectable 12.5 Gram(s) IV Push once  dextrose 50% Injectable 25 Gram(s) IV Push once  diphenoxylate/atropine 1 Tablet(s) Oral three times a day  escitalopram 10 milliGRAM(s) Oral daily  glucagon  Injectable 1 milliGRAM(s) IntraMuscular once  influenza   Vaccine 0.5 milliLiter(s) IntraMuscular once  insulin glargine Injectable (LANTUS) 6 Unit(s) SubCutaneous at bedtime  insulin lispro (ADMELOG) corrective regimen sliding scale   SubCutaneous three times a day before meals  insulin lispro (ADMELOG) corrective regimen sliding scale   SubCutaneous at bedtime  lactobacillus acidophilus 1 Tablet(s) Oral three times a day with meals  levothyroxine 50 MICROGram(s) Oral daily  lidocaine   4% Patch 1 Patch Transdermal daily  oxymetazoline 0.05% Nasal Spray 2 Spray(s) Both Nostrils two times a day  pantoprazole    Tablet 40 milliGRAM(s) Oral before breakfast  sildenafil (REVATIO) 20 milliGRAM(s) Oral two times a day  simvastatin 40 milliGRAM(s) Oral at bedtime  torsemide 40 milliGRAM(s) Oral daily    MEDICATIONS  (PRN):  acetaminophen   Tablet .. 650 milliGRAM(s) Oral every 6 hours PRN Temp greater or equal to 38C (100.4F), Mild Pain (1 - 3)  lidocaine 2% Gel 1 Application(s) Topical three times a day PRN pain  lidocaine 2% Gel 1 Application(s) Topical five times a day PRN rectal pain  loperamide 2 milliGRAM(s) Oral three times a day PRN loose stool  ondansetron Injectable 4 milliGRAM(s) IV Push every 6 hours PRN Nausea and/or Vomiting  sodium chloride 0.65% Nasal 2 Spray(s) Both Nostrils every 3 hours PRN nasal dryness

## 2021-09-17 LAB
24R-OH-CALCIDIOL SERPL-MCNC: 30.9 NG/ML — SIGNIFICANT CHANGE UP (ref 30–80)
ALBUMIN SERPL ELPH-MCNC: 2 G/DL — LOW (ref 3.3–5)
ALP SERPL-CCNC: 75 U/L — SIGNIFICANT CHANGE UP (ref 40–120)
ALT FLD-CCNC: 10 U/L — LOW (ref 12–78)
ANION GAP SERPL CALC-SCNC: 6 MMOL/L — SIGNIFICANT CHANGE UP (ref 5–17)
AST SERPL-CCNC: 8 U/L — LOW (ref 15–37)
BILIRUB SERPL-MCNC: 0.6 MG/DL — SIGNIFICANT CHANGE UP (ref 0.2–1.2)
BUN SERPL-MCNC: 14 MG/DL — SIGNIFICANT CHANGE UP (ref 7–23)
CALCIUM SERPL-MCNC: 7.9 MG/DL — LOW (ref 8.5–10.1)
CHLORIDE SERPL-SCNC: 95 MMOL/L — LOW (ref 96–108)
CO2 SERPL-SCNC: 30 MMOL/L — SIGNIFICANT CHANGE UP (ref 22–31)
CREAT SERPL-MCNC: 0.76 MG/DL — SIGNIFICANT CHANGE UP (ref 0.5–1.3)
CRP SERPL-MCNC: 63 MG/L — HIGH
FERRITIN SERPL-MCNC: 97 NG/ML — SIGNIFICANT CHANGE UP (ref 15–150)
GLUCOSE SERPL-MCNC: 147 MG/DL — HIGH (ref 70–99)
HCT VFR BLD CALC: 25.8 % — LOW (ref 34.5–45)
HGB BLD-MCNC: 8.4 G/DL — LOW (ref 11.5–15.5)
LIDOCAIN IGE QN: <10 U/L — LOW (ref 73–393)
MAGNESIUM SERPL-MCNC: 2 MG/DL — SIGNIFICANT CHANGE UP (ref 1.6–2.6)
MCHC RBC-ENTMCNC: 32.3 PG — SIGNIFICANT CHANGE UP (ref 27–34)
MCHC RBC-ENTMCNC: 32.6 GM/DL — SIGNIFICANT CHANGE UP (ref 32–36)
MCV RBC AUTO: 99.2 FL — SIGNIFICANT CHANGE UP (ref 80–100)
NT-PROBNP SERPL-SCNC: 3641 PG/ML — HIGH (ref 0–450)
PHOSPHATE SERPL-MCNC: 3.4 MG/DL — SIGNIFICANT CHANGE UP (ref 2.5–4.5)
PLATELET # BLD AUTO: 214 K/UL — SIGNIFICANT CHANGE UP (ref 150–400)
POTASSIUM SERPL-MCNC: 3.5 MMOL/L — SIGNIFICANT CHANGE UP (ref 3.5–5.3)
POTASSIUM SERPL-SCNC: 3.5 MMOL/L — SIGNIFICANT CHANGE UP (ref 3.5–5.3)
PREALB SERPL-MCNC: 9 MG/DL — LOW (ref 20–40)
PROT SERPL-MCNC: 5.7 GM/DL — LOW (ref 6–8.3)
RBC # BLD: 2.6 M/UL — LOW (ref 3.8–5.2)
RBC # FLD: 15.5 % — HIGH (ref 10.3–14.5)
SODIUM SERPL-SCNC: 131 MMOL/L — LOW (ref 135–145)
T4 FREE SERPL-MCNC: 1.26 NG/DL — SIGNIFICANT CHANGE UP (ref 0.76–1.46)
TRANSFERRIN SERPL-MCNC: 220 MG/DL — SIGNIFICANT CHANGE UP (ref 200–360)
TSH SERPL-MCNC: 2.21 UU/ML — SIGNIFICANT CHANGE UP (ref 0.34–4.82)
VIT B12 SERPL-MCNC: 681 PG/ML — SIGNIFICANT CHANGE UP (ref 232–1245)
WBC # BLD: 8.15 K/UL — SIGNIFICANT CHANGE UP (ref 3.8–10.5)
WBC # FLD AUTO: 8.15 K/UL — SIGNIFICANT CHANGE UP (ref 3.8–10.5)

## 2021-09-17 PROCEDURE — 99232 SBSQ HOSP IP/OBS MODERATE 35: CPT | Mod: 25

## 2021-09-17 PROCEDURE — 99233 SBSQ HOSP IP/OBS HIGH 50: CPT

## 2021-09-17 RX ORDER — DIPHENOXYLATE HCL/ATROPINE 2.5-.025MG
1 TABLET ORAL THREE TIMES A DAY
Refills: 0 | Status: DISCONTINUED | OUTPATIENT
Start: 2021-09-17 | End: 2021-09-20

## 2021-09-17 RX ORDER — LIDOCAINE 4 G/100G
1 CREAM TOPICAL
Qty: 0 | Refills: 0 | DISCHARGE

## 2021-09-17 RX ORDER — POTASSIUM CHLORIDE 20 MEQ
1 PACKET (EA) ORAL
Qty: 0 | Refills: 0 | DISCHARGE

## 2021-09-17 RX ORDER — FERROUS SULFATE 325(65) MG
1 TABLET ORAL
Qty: 0 | Refills: 0 | DISCHARGE

## 2021-09-17 RX ORDER — METOPROLOL TARTRATE 50 MG
25 TABLET ORAL
Refills: 0 | Status: DISCONTINUED | OUTPATIENT
Start: 2021-09-17 | End: 2021-09-17

## 2021-09-17 RX ORDER — FERROUS SULFATE 325(65) MG
325 TABLET ORAL DAILY
Refills: 0 | Status: DISCONTINUED | OUTPATIENT
Start: 2021-09-17 | End: 2021-09-22

## 2021-09-17 RX ORDER — FUROSEMIDE 40 MG
40 TABLET ORAL
Refills: 0 | Status: DISCONTINUED | OUTPATIENT
Start: 2021-09-17 | End: 2021-09-17

## 2021-09-17 RX ORDER — ASCORBIC ACID 60 MG
500 TABLET,CHEWABLE ORAL DAILY
Refills: 0 | Status: DISCONTINUED | OUTPATIENT
Start: 2021-09-17 | End: 2021-09-22

## 2021-09-17 RX ORDER — APIXABAN 2.5 MG/1
1 TABLET, FILM COATED ORAL
Qty: 0 | Refills: 0 | DISCHARGE

## 2021-09-17 RX ORDER — FUROSEMIDE 40 MG
20 TABLET ORAL DAILY
Refills: 0 | Status: DISCONTINUED | OUTPATIENT
Start: 2021-09-17 | End: 2021-09-18

## 2021-09-17 RX ORDER — CHOLESTYRAMINE 4 G/9G
4 POWDER, FOR SUSPENSION ORAL ONCE
Refills: 0 | Status: DISCONTINUED | OUTPATIENT
Start: 2021-09-18 | End: 2021-09-18

## 2021-09-17 RX ORDER — ACETAMINOPHEN 500 MG
2 TABLET ORAL
Qty: 0 | Refills: 0 | DISCHARGE

## 2021-09-17 RX ORDER — METOPROLOL TARTRATE 50 MG
12.5 TABLET ORAL
Refills: 0 | Status: DISCONTINUED | OUTPATIENT
Start: 2021-09-17 | End: 2021-09-19

## 2021-09-17 RX ORDER — FUROSEMIDE 40 MG
40 TABLET ORAL DAILY
Refills: 0 | Status: DISCONTINUED | OUTPATIENT
Start: 2021-09-17 | End: 2021-09-17

## 2021-09-17 RX ADMIN — Medication 1 TABLET(S): at 11:58

## 2021-09-17 RX ADMIN — Medication 1 DROP(S): at 22:11

## 2021-09-17 RX ADMIN — ESCITALOPRAM OXALATE 10 MILLIGRAM(S): 10 TABLET, FILM COATED ORAL at 10:49

## 2021-09-17 RX ADMIN — CHOLESTYRAMINE 4 GRAM(S): 4 POWDER, FOR SUSPENSION ORAL at 10:46

## 2021-09-17 RX ADMIN — Medication 500 MILLIGRAM(S): at 11:58

## 2021-09-17 RX ADMIN — Medication 1 TABLET(S): at 17:44

## 2021-09-17 RX ADMIN — APIXABAN 2.5 MILLIGRAM(S): 2.5 TABLET, FILM COATED ORAL at 22:10

## 2021-09-17 RX ADMIN — Medication 50 MICROGRAM(S): at 05:58

## 2021-09-17 RX ADMIN — APIXABAN 2.5 MILLIGRAM(S): 2.5 TABLET, FILM COATED ORAL at 10:46

## 2021-09-17 RX ADMIN — Medication 1 TABLET(S): at 14:58

## 2021-09-17 RX ADMIN — Medication 20 MILLIGRAM(S): at 22:12

## 2021-09-17 RX ADMIN — INSULIN GLARGINE 6 UNIT(S): 100 INJECTION, SOLUTION SUBCUTANEOUS at 22:11

## 2021-09-17 RX ADMIN — Medication 325 MILLIGRAM(S): at 11:58

## 2021-09-17 RX ADMIN — Medication 1 DROP(S): at 14:58

## 2021-09-17 RX ADMIN — Medication 1 DROP(S): at 05:58

## 2021-09-17 RX ADMIN — Medication 650 MILLIGRAM(S): at 22:23

## 2021-09-17 RX ADMIN — Medication 650 MILLIGRAM(S): at 22:55

## 2021-09-17 RX ADMIN — Medication 2: at 08:12

## 2021-09-17 RX ADMIN — Medication 20 MILLIGRAM(S): at 10:50

## 2021-09-17 RX ADMIN — Medication 25 MILLIGRAM(S): at 08:50

## 2021-09-17 RX ADMIN — Medication 650 MILLIGRAM(S): at 08:12

## 2021-09-17 RX ADMIN — Medication 1 TABLET(S): at 08:50

## 2021-09-17 RX ADMIN — SIMVASTATIN 40 MILLIGRAM(S): 20 TABLET, FILM COATED ORAL at 22:12

## 2021-09-17 RX ADMIN — Medication 200 MILLIGRAM(S): at 10:46

## 2021-09-17 RX ADMIN — PANTOPRAZOLE SODIUM 40 MILLIGRAM(S): 20 TABLET, DELAYED RELEASE ORAL at 10:46

## 2021-09-17 RX ADMIN — Medication 4: at 12:19

## 2021-09-17 RX ADMIN — Medication 1 TABLET(S): at 05:58

## 2021-09-17 RX ADMIN — Medication 12.5 MILLIGRAM(S): at 22:10

## 2021-09-17 NOTE — PROGRESS NOTE ADULT - SUBJECTIVE AND OBJECTIVE BOX
Subjective:    Awake, alert. States diarrhea has slowed down a bit. No dyspnea at rest.    MEDICATIONS  (STANDING):  allopurinol 200 milliGRAM(s) Oral daily  apixaban 2.5 milliGRAM(s) Oral every 12 hours  artificial  tears Solution 1 Drop(s) Both EYES three times a day  cholestyramine Powder (Sugar-Free) 4 Gram(s) Oral two times a day  dextrose 40% Gel 15 Gram(s) Oral once  dextrose 5%. 1000 milliLiter(s) (50 mL/Hr) IV Continuous <Continuous>  dextrose 5%. 1000 milliLiter(s) (100 mL/Hr) IV Continuous <Continuous>  dextrose 50% Injectable 25 Gram(s) IV Push once  dextrose 50% Injectable 12.5 Gram(s) IV Push once  dextrose 50% Injectable 25 Gram(s) IV Push once  diphenoxylate/atropine 1 Tablet(s) Oral three times a day  escitalopram 10 milliGRAM(s) Oral daily  glucagon  Injectable 1 milliGRAM(s) IntraMuscular once  influenza   Vaccine 0.5 milliLiter(s) IntraMuscular once  insulin glargine Injectable (LANTUS) 6 Unit(s) SubCutaneous at bedtime  insulin lispro (ADMELOG) corrective regimen sliding scale   SubCutaneous three times a day before meals  insulin lispro (ADMELOG) corrective regimen sliding scale   SubCutaneous at bedtime  lactobacillus acidophilus 1 Tablet(s) Oral three times a day with meals  levothyroxine 50 MICROGram(s) Oral daily  lidocaine   4% Patch 1 Patch Transdermal daily  pantoprazole    Tablet 40 milliGRAM(s) Oral before breakfast  sildenafil (REVATIO) 20 milliGRAM(s) Oral two times a day  simvastatin 40 milliGRAM(s) Oral at bedtime  torsemide 40 milliGRAM(s) Oral daily    MEDICATIONS  (PRN):  acetaminophen   Tablet .. 650 milliGRAM(s) Oral every 6 hours PRN Temp greater or equal to 38C (100.4F), Mild Pain (1 - 3)  lidocaine 2% Gel 1 Application(s) Topical three times a day PRN pain  lidocaine 2% Gel 1 Application(s) Topical five times a day PRN rectal pain  ondansetron Injectable 4 milliGRAM(s) IV Push every 6 hours PRN Nausea and/or Vomiting  sodium chloride 0.65% Nasal 2 Spray(s) Both Nostrils every 3 hours PRN nasal dryness      Allergies    penicillin (Rash)  penicillins (Other)  sulfa drugs (Rash; Other)    Intolerances        Vital Signs Last 24 Hrs  T(C): 36.5 (16 Sep 2021 21:53), Max: 37.1 (16 Sep 2021 16:14)  T(F): 97.7 (16 Sep 2021 21:53), Max: 98.7 (16 Sep 2021 16:14)  HR: 90 (16 Sep 2021 21:53) (88 - 93)  BP: 107/63 (16 Sep 2021 21:53) (107/63 - 112/63)  BP(mean): --  RR: 18 (16 Sep 2021 21:53) (17 - 18)  SpO2: 95% (16 Sep 2021 21:53) (94% - 99%)    PHYSICAL EXAMINATION:    NECK:  Supple. No lymphadenopathy. Jugular venous pressure not elevated.   HEART:   The cardiac impulse has a normal quality. Irreg., irreg Nl S1 and S2.    CHEST:  Chest with bibasilar crackles, R>L. Normal respiratory effort.  ABDOMEN:  Soft and nontender.   EXTREMITIES:  There is tr chronic edema.       LABS:                        9.2    8.59  )-----------( 207      ( 16 Sep 2021 08:12 )             28.2     09-16    131<L>  |  94<L>  |  18  ----------------------------<  206<H>  3.7   |  30  |  0.73    Ca    8.5      16 Sep 2021 08:12            RADIOLOGY & ADDITIONAL TESTS:    Assessment and Recommendation:   · Assessment      89 year-old woman, presents after fall, pulmonary consulted for shortness of breath.  --Her fall may have been mechanical fall.  --She has bilateral infiltrates on imaging, previous imaging reviewed many of these changes are chronic and when viewed on CT chest represent ground glass and intersitial infiltrates likely related to pulmonary edema-mild decompensation of HFpEF  --She clinically appears stable from pulmonary standpoint- persistent basilar crackles. Normal O2 sats   --Hematemesis likely from nose bleed, H/H stable. No EGD, unless further hematemesis not related to nose bleed.   --Diarrhea persists but sl improvement noted.      Recommendations:  --Cardiology rec appreciated, strict I/O, daily weight.  Torsemide restarted QD-monitor lytes carefully. K+ repletion. Concern about diuretics in face of diarrhea.      --GI F/U with Dr. Mobley- added questran for diarrhea, but still persistent.   --Lower extremity edema is stable  --Oncology evaluation noted-systemic therapy not recommended.   --Continue with cardiology follow-up

## 2021-09-17 NOTE — PROGRESS NOTE ADULT - SUBJECTIVE AND OBJECTIVE BOX
HPI: pt seen and examined with no family at the bedside, pt states diarrhea improving patient states was able to eat her breakfast this AM. Patient at this time sitting in chair denies any other complaints.     PAIN: ( )Yes   (x )No  patient denies     DYSPNEA: (x ) Yes  ( ) No  Level: as per patient worse with excretion - has supplemental O2 in place      Review of Systems:    Anxiety- no   Depression- no   Physical Discomfort- no   Dyspnea- yes  Constipation- no   Diarrhea-  yes   Nausea- no   Vomiting- no   Anorexia- no   Weight Loss- no    Cough- at times   Secretions- no   Fatigue- yes   Weakness- yes   Delirium- no     All other systems reviewed and negative    PHYSICAL EXAM:    Vital Signs Last 24 Hrs  T(C): 36.7 (17 Sep 2021 08:25), Max: 37.1 (16 Sep 2021 16:14)  T(F): 98.1 (17 Sep 2021 08:25), Max: 98.7 (16 Sep 2021 16:14)  HR: 108 (17 Sep 2021 08:25) (90 - 108)  BP: 108/53 (17 Sep 2021 08:25) (107/63 - 108/54)  RR: 18 (17 Sep 2021 08:25) (18 - 18)  SpO2: 95% (17 Sep 2021 08:25) (94% - 95%)    PPSV2: 20-30  %    General: pleasant elderly female in bed   Mental Status: alert and oriented  HEENT: Nasal cannula in place, mmm   Lungs: diminished breath sounds   Cardiac: S1S2 +   GI: Soft non-tender non-distended; Normal bowel sounds  : No  suprapubic  tenderness  Ext: mild edema present   Neuro: intact       LABS:                        8.4    8.15  )-----------( 214      ( 17 Sep 2021 08:54 )             25.8     09-17    131<L>  |  95<L>  |  14  ----------------------------<  147<H>  3.5   |  30  |  0.76    Ca    7.9<L>      17 Sep 2021 08:54  Phos  3.4     09-17  Mg     2.0     09-17    TPro  5.7<L>  /  Alb  2.0<L>  /  TBili  0.6  /  DBili  x   /  AST  8<L>  /  ALT  10<L>  /  AlkPhos  75  09-17      Albumin: Albumin, Serum: 2.0 g/dL (09-17 @ 08:54)      Allergies    penicillin (Rash)  penicillins (Other)  sulfa drugs (Rash; Other)    Intolerances      MEDICATIONS  (STANDING):  allopurinol 200 milliGRAM(s) Oral daily  apixaban 2.5 milliGRAM(s) Oral every 12 hours  artificial  tears Solution 1 Drop(s) Both EYES three times a day  ascorbic acid 500 milliGRAM(s) Oral daily  cholestyramine Powder (Sugar-Free) 4 Gram(s) Oral two times a day  dextrose 40% Gel 15 Gram(s) Oral once  dextrose 5%. 1000 milliLiter(s) (50 mL/Hr) IV Continuous <Continuous>  dextrose 5%. 1000 milliLiter(s) (100 mL/Hr) IV Continuous <Continuous>  dextrose 50% Injectable 25 Gram(s) IV Push once  dextrose 50% Injectable 12.5 Gram(s) IV Push once  dextrose 50% Injectable 25 Gram(s) IV Push once  diphenoxylate/atropine 1 Tablet(s) Oral three times a day  escitalopram 10 milliGRAM(s) Oral daily  ferrous    sulfate 325 milliGRAM(s) Oral daily  furosemide   Injectable 20 milliGRAM(s) IV Push daily  glucagon  Injectable 1 milliGRAM(s) IntraMuscular once  influenza   Vaccine 0.5 milliLiter(s) IntraMuscular once  insulin glargine Injectable (LANTUS) 6 Unit(s) SubCutaneous at bedtime  insulin lispro (ADMELOG) corrective regimen sliding scale   SubCutaneous three times a day before meals  insulin lispro (ADMELOG) corrective regimen sliding scale   SubCutaneous at bedtime  lactobacillus acidophilus 1 Tablet(s) Oral three times a day with meals  levothyroxine 50 MICROGram(s) Oral daily  lidocaine   4% Patch 1 Patch Transdermal daily  metoprolol tartrate 12.5 milliGRAM(s) Oral two times a day  pantoprazole    Tablet 40 milliGRAM(s) Oral before breakfast  sildenafil (REVATIO) 20 milliGRAM(s) Oral two times a day  simvastatin 40 milliGRAM(s) Oral at bedtime    MEDICATIONS  (PRN):  acetaminophen   Tablet .. 650 milliGRAM(s) Oral every 6 hours PRN Temp greater or equal to 38C (100.4F), Mild Pain (1 - 3)  lidocaine 2% Gel 1 Application(s) Topical three times a day PRN pain  lidocaine 2% Gel 1 Application(s) Topical five times a day PRN rectal pain  ondansetron Injectable 4 milliGRAM(s) IV Push every 6 hours PRN Nausea and/or Vomiting  sodium chloride 0.65% Nasal 2 Spray(s) Both Nostrils every 3 hours PRN nasal dryness

## 2021-09-17 NOTE — PROGRESS NOTE ADULT - SUBJECTIVE AND OBJECTIVE BOX
Patient is a 89y old  Female who presents with a chief complaint of near syncope, fall (06 Sep 2021 17:50)      HPI:  89/F with PMHx of pancreatitis s/p ERCP, cirrhosis, DM2, a-fib on Eliquis, CAD s/p PCI, severe pulm HTN, chronic R heart failure, diastolic dysfunction, AS s/p TAVR, OA, HTN, dyslipidemia, hypothyroidism. cecal mass, recently d/maddy from , sent  to the ED from [facility] for c/o Fall.     -   Pt states that the aide did not put brakes on her chair and left and when she was trying to put boxes under the chair , the chair kept moving even though she tried to put brakes on by herself and she fell down.  Denies any loss of consciousness.  She denies any other symptoms now.  - pt seen this am.  Pt denies any symptoms this am.       PAST MEDICAL & SURGICAL HISTORY:  Diabetes Mellitus Type II    Dyslipidemia    GERD (Gastroesophageal Reflux Disease)    History of Osteoarthritis    Hypertension    CAD (coronary artery disease)    Afib    Hypothyroid    HLD (hyperlipidemia)    History of pancreatitis    Aortic stenosis    H/O: Hysterectomy    H/O: Knee Surgery- right meniscus    History of cholecystectomy    History of appendectomy    H/O total knee replacement, left    S/P IVC filter  Note that Pt does not have  h/o DVT, outPt doppler was read first as +, but after review reported as no DVT. Pt got IVC filer before that        MEDICATIONS  (STANDING):  allopurinol 200 milliGRAM(s) Oral daily  apixaban 2.5 milliGRAM(s) Oral two times a day  artificial  tears Solution 1 Drop(s) Both EYES three times a day  aspirin  chewable 81 milliGRAM(s) Oral daily  dextrose 40% Gel 15 Gram(s) Oral once  dextrose 5%. 1000 milliLiter(s) (50 mL/Hr) IV Continuous <Continuous>  dextrose 5%. 1000 milliLiter(s) (100 mL/Hr) IV Continuous <Continuous>  dextrose 50% Injectable 25 Gram(s) IV Push once  dextrose 50% Injectable 12.5 Gram(s) IV Push once  dextrose 50% Injectable 25 Gram(s) IV Push once  escitalopram 10 milliGRAM(s) Oral daily  ferrous    sulfate 325 milliGRAM(s) Oral two times a day  glucagon  Injectable 1 milliGRAM(s) IntraMuscular once  influenza   Vaccine 0.5 milliLiter(s) IntraMuscular once  insulin lispro (ADMELOG) corrective regimen sliding scale   SubCutaneous three times a day before meals  insulin lispro (ADMELOG) corrective regimen sliding scale   SubCutaneous at bedtime  levothyroxine 50 MICROGram(s) Oral daily  lidocaine   4% Patch 1 Patch Transdermal daily  pantoprazole    Tablet 40 milliGRAM(s) Oral before breakfast  psyllium Powder 1 Packet(s) Oral daily  sildenafil (REVATIO) 20 milliGRAM(s) Oral two times a day  simvastatin 40 milliGRAM(s) Oral at bedtime  torsemide 40 milliGRAM(s) Oral two times a day    MEDICATIONS  (PRN):  acetaminophen   Tablet .. 650 milliGRAM(s) Oral every 6 hours PRN Temp greater or equal to 38C (100.4F), Mild Pain (1 - 3)      FAMILY HISTORY:  FH: diabetes mellitus  both parents    FH: heart disease        SOCIAL HISTORY: no recent smoking      REVIEW OF SYSTEMS:  CONSTITUTIONAL:    No fatigue, malaise, lethargy.  No fever or chills.  RESPIRATORY:  No cough.  No wheeze.  No hemoptysis.  No shortness of breath.  CARDIOVASCULAR:  No chest pains.  No palpitations. No shortness of breath, No orthopnea or PND. c/o dizziness   GASTROINTESTINAL:  No abdominal pain.  No nausea or vomiting.    GENITOURINARY:    No hematuria.    MUSCULOSKELETAL:  No musculoskeletal pain.  No joint swelling.  No arthritis.  NEUROLOGICAL:  No tingling or numbness or weakness.  PSYCHIATRIC:  No confusion  SKIN:  No rashes.            ICU Vital Signs Last 24 Hrs  T(C): 36.8 (08 Sep 2021 23:27), Max: 36.8 (08 Sep 2021 23:27)  T(F): 98.2 (08 Sep 2021 23:27), Max: 98.2 (08 Sep 2021 23:27)  HR: 82 (08 Sep 2021 23:27) (82 - 94)  BP: 91/56 (08 Sep 2021 23:27) (91/56 - 106/55)  BP(mean): --  ABP: --  ABP(mean): --  RR: 18 (08 Sep 2021 16:38) (18 - 18)  SpO2: 96% (08 Sep 2021 23:27) (96% - 97%)      PHYSICAL EXAM-    Constitutional: elderly frail female in no acute distress     Head: Head is normocephalic and atraumatic.      Neck: No JVD.     Cardiovascular: Regular rate and rhythm without S3, S4. No murmurs or rubs are appreciated.      Respiratory: Basal rales b/l     Abdomen: Soft, nontender, nondistended with positive bowel sounds.      Extremity: No tenderness. trace b/l pedal  pitting edema     Neurologic: The patient is alert and oriented.      Skin: No rash, no obvious lesions noted.      Psychiatric: The patient appears to be emotionally stable.      INTERPRETATION OF TELEMETRY: afib 80/min    ECG: atrial fibrillation.   I&O's Detail    06 Sep 2021 07:01  -  07 Sep 2021 07:00  --------------------------------------------------------  IN:  Total IN: 0 mL    OUT:    Incontinent per Collection Bag (mL): 900 mL  Total OUT: 900 mL    Total NET: -900 mL          LABS:                                   8.9    5.03  )-----------( 128      ( 08 Sep 2021 07:09 )             27.9     09-08    137  |  103  |  10  ----------------------------<  143<H>  3.1<L>   |  31  |  0.62    Ca    8.2<L>      08 Sep 2021 07:09    TPro  5.5<L>  /  Alb  2.3<L>  /  TBili  0.7  /  DBili  x   /  AST  12<L>  /  ALT  12  /  AlkPhos  71  09-08        LIVER FUNCTIONS - ( 08 Sep 2021 07:09 )  Alb: 2.3 g/dL / Pro: 5.5 gm/dL / ALK PHOS: 71 U/L / ALT: 12 U/L / AST: 12 U/L / GGT: x                   PT/INR - ( 06 Sep 2021 11:16 )   PT: 19.8 sec;   INR: 1.74 ratio         PTT - ( 06 Sep 2021 11:16 )  PTT:35.4 sec  Urinalysis Basic - ( 06 Sep 2021 11:09 )    Color: Yellow / Appearance: Clear / S.015 / pH: x  Gluc: x / Ketone: Negative  / Bili: Negative / Urobili: Negative mg/dL   Blood: x / Protein: 15 mg/dL / Nitrite: Negative   Leuk Esterase: Trace / RBC: Negative /HPF / WBC 11-25   Sq Epi: x / Non Sq Epi: Moderate / Bacteria: Few      I&O's Summary    06 Sep 2021 07:01  -  07 Sep 2021 07:00  --------------------------------------------------------  IN: 0 mL / OUT: 900 mL / NET: -900 mL      BNP  RADIOLOGY & ADDITIONAL STUDIES:  < from: CT Head No Cont (21 @ 10:56) >    IMPRESSION:    CT HEAD: No acute abnormality. Moderate atrophy most prominent in the BILATERAL temporal lobes.    CT cervical spine:   No vertebral fracture is recognized.  Multilevel degenerative disc disease and spondylosis at C3-4 through C6-7 with loss of disc height and associated degenerative endplate changes. There is narrowing of the LEFT C2-3, LEFT C3-4 LEFT C4-5 and RIGHT C5-C6 and C6-7 neural foramina due to uncovertebral spurring and facet osteophytic hypertrophy.    --- End of Report ---            FRANCESCA BOSTON MD; Attending Radiologist  This document has been electronically signed. Sep  6 2021 11:05AM    < end of copied text >  < from: TTE Echo Complete w/ Contrast w/ Doppler (21 @ 16:09) >     Impression     Summary     Moderate mitral annular calcification is present.   Moderate mitral stenosis is present.   Mild (1+) mitral regurgitation is present.   Well seated prosthetic valve in the aortic position. suboptimal doppler   interrogation   Severe (4+) tricuspid valve regurgitation is present.   The tricuspid valve leaflets appear mildly thickened open well.   Severe pulmonary hypertension.   Normal appearing pulmonic valve structure.   Mild pulmonic valvular regurgitation (1+) is present.     technical difficult study     Signature     ----------------------------------------------------------------   Electronically signed by Venugopal Palla, MD(Interpreting   physician) on 2021 05:38 PM   ----------------------------------------------------------------    < end of copied text >   Patient is a 89y old  Female who presents with a chief complaint of near syncope, fall (06 Sep 2021 17:50)      HPI:  89/F with PMHx of pancreatitis s/p ERCP, cirrhosis, DM2, a-fib on Eliquis, CAD s/p PCI, severe pulm HTN, chronic R heart failure, diastolic dysfunction, AS s/p TAVR, OA, HTN, dyslipidemia, hypothyroidism. cecal mass, recently d/maddy from , sent  to the ED from [facility] for c/o Fall.     -   Pt states that the aide did not put brakes on her chair and left and when she was trying to put boxes under the chair , the chair kept moving even though she tried to put brakes on by herself and she fell down.  Denies any loss of consciousness.  She denies any other symptoms now.  - pt seen this am.  Pt denies any symptoms this am.     - pt seen this am.  She denies any SOB at rest.       PAST MEDICAL & SURGICAL HISTORY:  Diabetes Mellitus Type II    Dyslipidemia    GERD (Gastroesophageal Reflux Disease)    History of Osteoarthritis    Hypertension    CAD (coronary artery disease)    Afib    Hypothyroid    HLD (hyperlipidemia)    History of pancreatitis    Aortic stenosis    H/O: Hysterectomy    H/O: Knee Surgery- right meniscus    History of cholecystectomy    History of appendectomy    H/O total knee replacement, left    S/P IVC filter  Note that Pt does not have  h/o DVT, outPt doppler was read first as +, but after review reported as no DVT. Pt got IVC filer before that        MEDICATIONS  (STANDING):  allopurinol 200 milliGRAM(s) Oral daily  apixaban 2.5 milliGRAM(s) Oral two times a day  artificial  tears Solution 1 Drop(s) Both EYES three times a day  aspirin  chewable 81 milliGRAM(s) Oral daily  dextrose 40% Gel 15 Gram(s) Oral once  dextrose 5%. 1000 milliLiter(s) (50 mL/Hr) IV Continuous <Continuous>  dextrose 5%. 1000 milliLiter(s) (100 mL/Hr) IV Continuous <Continuous>  dextrose 50% Injectable 25 Gram(s) IV Push once  dextrose 50% Injectable 12.5 Gram(s) IV Push once  dextrose 50% Injectable 25 Gram(s) IV Push once  escitalopram 10 milliGRAM(s) Oral daily  ferrous    sulfate 325 milliGRAM(s) Oral two times a day  glucagon  Injectable 1 milliGRAM(s) IntraMuscular once  influenza   Vaccine 0.5 milliLiter(s) IntraMuscular once  insulin lispro (ADMELOG) corrective regimen sliding scale   SubCutaneous three times a day before meals  insulin lispro (ADMELOG) corrective regimen sliding scale   SubCutaneous at bedtime  levothyroxine 50 MICROGram(s) Oral daily  lidocaine   4% Patch 1 Patch Transdermal daily  pantoprazole    Tablet 40 milliGRAM(s) Oral before breakfast  psyllium Powder 1 Packet(s) Oral daily  sildenafil (REVATIO) 20 milliGRAM(s) Oral two times a day  simvastatin 40 milliGRAM(s) Oral at bedtime  torsemide 40 milliGRAM(s) Oral two times a day    MEDICATIONS  (PRN):  acetaminophen   Tablet .. 650 milliGRAM(s) Oral every 6 hours PRN Temp greater or equal to 38C (100.4F), Mild Pain (1 - 3)      FAMILY HISTORY:  FH: diabetes mellitus  both parents    FH: heart disease        SOCIAL HISTORY: no recent smoking      REVIEW OF SYSTEMS:  CONSTITUTIONAL:    No fatigue, malaise, lethargy.  No fever or chills.  RESPIRATORY:  No cough.  No wheeze.  No hemoptysis.  No shortness of breath.  CARDIOVASCULAR:  No chest pains.  No palpitations. No shortness of breath, No orthopnea or PND.    GASTROINTESTINAL:  No abdominal pain.  No nausea or vomiting.    GENITOURINARY:    No hematuria.    MUSCULOSKELETAL:  No musculoskeletal pain.  No joint swelling.  No arthritis.  NEUROLOGICAL:  No tingling or numbness or weakness.  PSYCHIATRIC:  No confusion  SKIN:  No rashes.            ICU Vital Signs Last 24 Hrs  T(C): 36.7 (17 Sep 2021 08:25), Max: 37.1 (16 Sep 2021 16:14)  T(F): 98.1 (17 Sep 2021 08:25), Max: 98.7 (16 Sep 2021 16:14)  HR: 108 (17 Sep 2021 08:25) (88 - 108)  BP: 108/53 (17 Sep 2021 08:25) (107/63 - 112/63)  BP(mean): --  ABP: --  ABP(mean): --  RR: 18 (17 Sep 2021 08:25) (17 - 18)  SpO2: 95% (17 Sep 2021 08:25) (94% - 99%)      PHYSICAL EXAM-    Constitutional: elderly frail female in no acute distress     Head: Head is normocephalic and atraumatic.      Neck: No JVD.     Cardiovascular: irregular rate and tachycardic    Respiratory: Basal rales b/l     Abdomen: Soft, nontender, nondistended with positive bowel sounds.      Extremity: No tenderness. 1-2 + b/l pedal  pitting edema     Neurologic: The patient is alert and oriented.      Skin: No rash, no obvious lesions noted.      Psychiatric: The patient appears to be emotionally stable.      INTERPRETATION OF TELEMETRY: afib 80/min    ECG: atrial fibrillation.   I&O's Detail    06 Sep 2021 07:01  -  07 Sep 2021 07:00  --------------------------------------------------------  IN:  Total IN: 0 mL    OUT:    Incontinent per Collection Bag (mL): 900 mL  Total OUT: 900 mL    Total NET: -900 mL          LABS:                                              9.2    8.59  )-----------( 207      ( 16 Sep 2021 08:12 )             28.2     09-16    131<L>  |  94<L>  |  18  ----------------------------<  206<H>  3.7   |  30  |  0.73    Ca    8.5      16 Sep 2021 08:12                            PT/INR - ( 06 Sep 2021 11:16 )   PT: 19.8 sec;   INR: 1.74 ratio         PTT - ( 06 Sep 2021 11:16 )  PTT:35.4 sec  Urinalysis Basic - ( 06 Sep 2021 11:09 )    Color: Yellow / Appearance: Clear / S.015 / pH: x  Gluc: x / Ketone: Negative  / Bili: Negative / Urobili: Negative mg/dL   Blood: x / Protein: 15 mg/dL / Nitrite: Negative   Leuk Esterase: Trace / RBC: Negative /HPF / WBC 11-25   Sq Epi: x / Non Sq Epi: Moderate / Bacteria: Few      I&O's Summary    06 Sep 2021 07:01  -  07 Sep 2021 07:00  --------------------------------------------------------  IN: 0 mL / OUT: 900 mL / NET: -900 mL      BNP  RADIOLOGY & ADDITIONAL STUDIES:  < from: CT Head No Cont (21 @ 10:56) >    IMPRESSION:    CT HEAD: No acute abnormality. Moderate atrophy most prominent in the BILATERAL temporal lobes.    CT cervical spine:   No vertebral fracture is recognized.  Multilevel degenerative disc disease and spondylosis at C3-4 through C6-7 with loss of disc height and associated degenerative endplate changes. There is narrowing of the LEFT C2-3, LEFT C3-4 LEFT C4-5 and RIGHT C5-C6 and C6-7 neural foramina due to uncovertebral spurring and facet osteophytic hypertrophy.    --- End of Report ---            FRANCESCA BOSTON MD; Attending Radiologist  This document has been electronically signed. Sep  6 2021 11:05AM    < end of copied text >  < from: TTE Echo Complete w/ Contrast w/ Doppler (21 @ 16:09) >     Impression     Summary     Moderate mitral annular calcification is present.   Moderate mitral stenosis is present.   Mild (1+) mitral regurgitation is present.   Well seated prosthetic valve in the aortic position. suboptimal doppler   interrogation   Severe (4+) tricuspid valve regurgitation is present.   The tricuspid valve leaflets appear mildly thickened open well.   Severe pulmonary hypertension.   Normal appearing pulmonic valve structure.   Mild pulmonic valvular regurgitation (1+) is present.     technical difficult study     Signature     ----------------------------------------------------------------   Electronically signed by Venugopal Palla, MD(Interpreting   physician) on 2021 05:38 PM   ----------------------------------------------------------------    < end of copied text >

## 2021-09-17 NOTE — PROGRESS NOTE ADULT - ASSESSMENT
Fall- I think it is mechanical in nature given history.  She denies any loss of consciousness.  She was able to give full history this am.  Orthostasis noted.  Recheck orthostatics today.     Chronic decompensated HFPEF- Echo results as stated above.  good diuresis.  AM labs pending to see renal function trend.   Compression stockings to be placed on legs.  continue rest of current cardiac regimen.  Diuresis with close monitoring of the renal function and electrolytes.  Goal potassium of 4 and magnesium of 2.   Strict I/O and daily wt checks. Low sodium diet. Nutrition education.     Atrial fibrillation- rate controlled.  continue current meds including apixaban    Colon mass- malignancy- Colorectal surgery team following pt.    Other medical issues- Management per primary team.   Thank you for allowing me to participate in the care of this patient. Please feel free to contact me with any questions.     Other medical issues- Management per primary team.   Thank you for allowing me to participate in the care of this patient. Please feel free to contact me with any questions.        Acute on Chronic decompensated HFPEF- hypervolemic, worsening, Will start iv diuresis with lasix . In the next two days if good diuresis then will switch to po torsemide.  AM labs pending - monitor sodium and renal function closely with diuresis.    continue rest of current cardiac regimen.  Diuresis with close monitoring of the renal function and electrolytes.  Goal potassium of 4 and magnesium of 2.   Strict I/O and daily wt checks. Low sodium diet. Nutrition education.     Atrial fibrillation- poorly rate controlled. /min , will give metoprolol 25mg BID- if rate not controlled in 2 hrs please transfer to tele.  ? etiology   continue current meds including apixaban    Colon mass- malignancy- Colorectal surgery team following pt.    Other medical issues- Management per primary team.   Thank you for allowing me to participate in the care of this patient. Please feel free to contact me with any questions.     Other medical issues- Management per primary team.   Thank you for allowing me to participate in the care of this patient. Please feel free to contact me with any questions.

## 2021-09-17 NOTE — PROGRESS NOTE ADULT - ASSESSMENT
Pt is a 89y old Female with hx of pancreatitis s/p ERCP, cirrhosis, DM2, a-fib on Eliquis, CAD s/p PCI, severe pulm HTN, chronic R heart failure, diastolic dysfunction, AS s/p TAVR, OA, HTN, dyslipidemia, hypothyroidism, cecal mass,    1) S/P fall  - likely mechanical   - c/w Lidoderm, Tylenol  PO, added  flexeril PRN  - PT consult appreciated   - Fall risk maintained   - supportive care   - orthostatic vitals neg     2) adenocarcinoma of the cecum   - not a surgical candidate due to comorbidities   - oncology consult appreciated - no role for systemic treatment as it will not add longevity  - GI consult appreciated   - x-ray abdomen - No evidence of bowel obstruction.    3) Severe Pulm HTN,  Acute on chronic diastolic  CHF exacerbation   - history of AS s/p TAVR  - X-ray chest  from 9/9 - The lungs show slight increase in pulmonary congestion with small pleural effusions and there is no evidence of pneumothorax   - supportive O2 via face shield PRN   - ECHO: severe pulm HTN, Mod MS, AV prosthesis good Fx, Severe TR. LV mild LVH   - C/w Sildenafil   - pulmonology consult appreciated    4)  Afib   - Eliquis on hold at this time due to nose bleed and hematosis  - cardiology consult appreciated   - monitor labs to see if able to restart AC     5) Diarrhea   - when patient admitted was c/o constipation s/p disimpaction   - GI consult appreciated   - Questran added as per GI   - c/w Lomotil and Questran   - decreased BM over night   - monitor for constipation     6) Advance Care Planning   - patient appears to have capacity but is deferring decisions to her son Dr. Laith Mojica   - [facility]OLST: DNR/I   - patient states she has 3 sons 2 of whom are chiropractors and Laith Leonardo who is pulmonologist - patient is requesting that her son Laithleidy Careycarlos be called and updated   - [facility] meeting held on 9/16   - will follow up with family on Monday

## 2021-09-17 NOTE — PROGRESS NOTE ADULT - SUBJECTIVE AND OBJECTIVE BOX
CC: rectal irritation and pain, watery diarrhea     HPI: 89/F with PMHx of pancreatitis s/p ERCP, cirrhosis, DM2, a-fib on Eliquis, CAD s/p PCI, severe pulm HTN, chronic R heart failure, diastolic dysfunction, AS s/p TAVR, OA, HTN, dyslipidemia, hypothyroidism, cecal mass, recently d/maddy from , sent  to the ED on 9/6/21  from [facility] for c/o Fall, near syncope. Pt states she was trying to put boxes under the chair, when she fell and hit her head, now has a small abrasion. Also c/o left shoulder pain.  She has been become more weak and unsteady recently.  As per Son, she has gained 20 Lbs in [facility]. Her trauma work up neg in ED    9/16  Pt was seen and examined this am,  + rectal imitation pain after multiple episodes of diarrhea , no nose bleed, no hematemesis, denies cp, dyspnea, on O2 supplementation 5 L , afebrile, + leg edema, + gen weakness  9/17 - pt seen and examined earlier today , + 1 bm since last night, feels better, noted elevated HR , started on metoprolol by cardiologist, tolerating po intake, OOB in the chair    Review of system- Rest of the review of system are negative except mentioned in HPI    Vital Signs Last 24 Hrs  T(C): 36.8 (09-17-21 @ 14:58), Max: 36.8 (09-17-21 @ 14:58)  T(F): 98.2 (09-17-21 @ 14:58), Max: 98.2 (09-17-21 @ 14:58)  HR: 76 (09-17-21 @ 14:58) (76 - 108)  BP: 89/47 (09-17-21 @ 14:58) (86/51 - 108/53)  RR: 18 (09-17-21 @ 14:58) (18 - 18)  SpO2: 97% (09-17-21 @ 14:58) (95% - 97%)  Wt(kg): --    CAPILLARY BLOOD GLUCOSE  CAPILLARY BLOOD GLUCOSE  POCT Blood Glucose.: 231 mg/dL (17 Sep 2021 12:14)  POCT Blood Glucose.: 156 mg/dL (17 Sep 2021 08:07)  POCT Blood Glucose.: 190 mg/dL (16 Sep 2021 21:03)  POCT Blood Glucose.: 168 mg/dL (16 Sep 2021 18:01)      PHYSICAL EXAM:  General:  malnourished; frail elderly female   Eyes: EOMI; conjunctiva and sclera clear, dry mucosa   Head: Normocephalic; atraumatic  ENMT: No nasal discharge; airway clear  Neck: Supple; non tender; no masses  Respiratory: Decreased BS,  crepitations and rales at bases b/l, anterior and posterior  fields   Cardiovascular: Irregular rate and rhythm. S1 and S2 Normal;  Gastrointestinal: Soft non-tender non-distended; Normal bowel sounds.   Genitourinary: No  suprapubic  tenderness  Extremities: +2 b/l LE   edema  Vascular: Peripheral pulses palpable 2+ bilaterally  Neurological: Alert and oriented x2-3, non focal   Musculoskeletal: Normal muscle  strength, L neck  muscle tension improved  Psychiatric: Cooperative       LABS and radiology studies reviewed :   09-17    131<L>  |  95<L>  |  14  ----------------------------<  147<H>  3.5   |  30  |  0.76    Ca    7.9<L>      17 Sep 2021 08:54  Phos  3.4     09-17  Mg     2.0     09-17    TPro  5.7<L>  /  Alb  2.0<L>  /  TBili  0.6  /  DBili  x   /  AST  8<L>  /  ALT  10<L>  /  AlkPhos  75  09-17                        8.4    8.15  )-----------( 214      ( 17 Sep 2021 08:54 )             25.8     LIVER FUNCTIONS - ( 17 Sep 2021 08:54 )  Alb: 2.0 g/dL / Pro: 5.7 gm/dL / ALK PHOS: 75 U/L / ALT: 10 U/L / AST: 8 U/L / GGT: x                    09-16    131<L>  |  94<L>  |  18  ----------------------------<  206<H>  3.7   |  30  |  0.73    Ca    8.5      16 Sep 2021 08:12   Serum Pro-Brain Natriuretic Peptide (09.16.21 @ 08:12)    Serum Pro-Brain Natriuretic Peptide: 4416 pg/mL    Serum Pro-Brain Natriuretic Peptide (09.15.21 @ 06:05)    Serum Pro-Brain Natriuretic Peptide: 5346 pg/mL                         9.2    8.59  )-----------( 207      ( 16 Sep 2021 08:12 )             28.2   Iron with Total Binding Capacity in AM (08.18.21 @ 06:44)    Iron - Total Binding Capacity.: 281 ug/dL    % Saturation, Iron: 20 %    Iron Total, Serum: 56 ug/dL    Unsaturated Iron Binding Capacity: 226 ug/dL    Ferritin, Serum in AM (08.18.21 @ 06:44)    Ferritin, Serum: 82 ng/mL    Vitamin B12, Serum in AM (07.16.20 @ 10:02)    Vitamin B12, Serum: 578 pg/mL             Troponin I, Serum Repeat 3 hours x 2 occurrences (09.06.21 @ 19:13)    Troponin I, Serum: <0.015: High Sensitivity Troponin and new reference  range effective 7/6/2016 ng/mL    < from: 12 Lead ECG (09.06.21 @ 11:03) >  Ventricular Rate 96 BPM    Atrial Rate 92 BPM    QRS Duration 130 ms    Q-T Interval 356 ms    QTC Calculation(Bazett) 449 ms    R Axis -76 degrees    T Axis 62 degrees    Diagnosis Line Atrial fibrillation  Left axis deviation  Non-specific intra-ventricular conduction block  Possible Anterolateral infarct , age undetermined  Abnormal ECG  When compared with ECG of 11-AUG-2021 12:09,  Nonspecific T wave abnormality, improved in Lateral leads  QT has shortened    < end of copied text >  GI PCR Panel, Stool (09.12.21 @ 21:10)    Culture Results:   GI PCR Results: NOT detected    Culture - Urine (09.06.21 @ 11:09)    -  Vancomycin: S 4    -  Levofloxacin: S 2    -  Nitrofurantoin: S <=32 Should not be used to treat pyelonephritis.    -  Tetra/Doxy: R >8    -  Ampicillin: S <=2 Predicts results to ampicillin/sulbactam, amoxacillin-clavulanate and  piperacillin-tazobactam.    -  Ciprofloxacin: S <=1    Specimen Source: Clean Catch Clean Catch (Midstream)    Culture Results:   10,000 - 49,000 CFU/mL Enterococcus faecalis  <10,000 CFU/ml Normal Urogenital abel present    Organism Identification: Enterococcus faecalis    Organism: Enterococcus faecalis    Method Type: NIGEL      RADIOLOGY & ADDITIONAL TESTS:  < from: Xray Abdomen 1 View PORTABLE -Routine (Xray Abdomen 1 View PORTABLE -Routine .) (09.13.21 @ 22:31) >  Frontal view of the abdomen shows a normal gas pattern. IVC filter is again noted. No definite free air is identified.    IMPRESSION:  No evidence of bowel obstruction.    < from: Xray Chest 1 View- PORTABLE-Routine (Xray Chest 1 View- PORTABLE-Routine .) (09.13.21 @ 10:08) >  Frontal expiratory view of the chest shows the heart to be similar in size. Aortic valve stent is again noted.    The lungs show slight increase in pulmonary congestion with small pleural effusions and there is no evidence of pneumothorax.  IMPRESSION:  Congestive changes as noted.    < from: Xray Shoulder 2 Views, Left (09.06.21 @ 12:05) >  No fracture or dislocation. No focal osseous lesion. Underlying degenerative changes are stable    IMPRESSION:   No fracture.    < from: CT Head No Cont (09.06.21 @ 10:56) >  IMPRESSION:    CT HEAD: No acute abnormality. Moderate atrophy most prominent in the BILATERAL temporal lobes.    CT cervical spine:   No vertebral fracture is recognized.  Multilevel degenerative disc disease and spondylosis at C3-4 through C6-7 with loss of disc height and associated degenerative endplate changes. There is narrowing of the LEFT C2-3, LEFT C3-4 LEFT C4-5 and RIGHT C5-C6 and C6-7 neural foramina due to uncovertebral spurring and facet osteophytic hypertrophy.      MEDICATIONS  (STANDING):  allopurinol 200 milliGRAM(s) Oral daily  apixaban 2.5 milliGRAM(s) Oral every 12 hours  artificial  tears Solution 1 Drop(s) Both EYES three times a day  cholestyramine Powder (Sugar-Free) 4 Gram(s) Oral two times a day  dextrose 40% Gel 15 Gram(s) Oral once  dextrose 5%. 1000 milliLiter(s) (50 mL/Hr) IV Continuous <Continuous>  dextrose 5%. 1000 milliLiter(s) (100 mL/Hr) IV Continuous <Continuous>  dextrose 50% Injectable 25 Gram(s) IV Push once  dextrose 50% Injectable 12.5 Gram(s) IV Push once  dextrose 50% Injectable 25 Gram(s) IV Push once  diphenoxylate/atropine 1 Tablet(s) Oral three times a day  escitalopram 10 milliGRAM(s) Oral daily  glucagon  Injectable 1 milliGRAM(s) IntraMuscular once  influenza   Vaccine 0.5 milliLiter(s) IntraMuscular once  insulin glargine Injectable (LANTUS) 6 Unit(s) SubCutaneous at bedtime  insulin lispro (ADMELOG) corrective regimen sliding scale   SubCutaneous three times a day before meals  insulin lispro (ADMELOG) corrective regimen sliding scale   SubCutaneous at bedtime  lactobacillus acidophilus 1 Tablet(s) Oral three times a day with meals  levothyroxine 50 MICROGram(s) Oral daily  lidocaine   4% Patch 1 Patch Transdermal daily  oxymetazoline 0.05% Nasal Spray 2 Spray(s) Both Nostrils two times a day  pantoprazole    Tablet 40 milliGRAM(s) Oral before breakfast  sildenafil (REVATIO) 20 milliGRAM(s) Oral two times a day  simvastatin 40 milliGRAM(s) Oral at bedtime  torsemide 40 milliGRAM(s) Oral daily    MEDICATIONS  (PRN):  acetaminophen   Tablet .. 650 milliGRAM(s) Oral every 6 hours PRN Temp greater or equal to 38C (100.4F), Mild Pain (1 - 3)  lidocaine 2% Gel 1 Application(s) Topical three times a day PRN pain  lidocaine 2% Gel 1 Application(s) Topical five times a day PRN rectal pain  loperamide 2 milliGRAM(s) Oral three times a day PRN loose stool  ondansetron Injectable 4 milliGRAM(s) IV Push every 6 hours PRN Nausea and/or Vomiting  sodium chloride 0.65% Nasal 2 Spray(s) Both Nostrils every 3 hours PRN nasal dryness

## 2021-09-17 NOTE — PROGRESS NOTE ADULT - ASSESSMENT
Imp:  Diarrhea, unclear how it relates to the colon CA    Rec:  Cont questran and lomotil for now -- combination seems to be working -- watch for constipation

## 2021-09-17 NOTE — PROGRESS NOTE ADULT - SUBJECTIVE AND OBJECTIVE BOX
Patient is a 89y old  Female who presents with a chief complaint of near syncope, fal (17 Sep 2021 08:25)      Subective:  Resting  Had one BM over night starting to get some form  no gross blood.    PAST MEDICAL & SURGICAL HISTORY:  Diabetes Mellitus Type II    Dyslipidemia    GERD (Gastroesophageal Reflux Disease)    History of Osteoarthritis    Hypertension    CAD (coronary artery disease)    Afib    Hypothyroid    HLD (hyperlipidemia)    History of pancreatitis    Aortic stenosis    H/O: Hysterectomy    H/O: Knee Surgery- right meniscus    History of cholecystectomy    History of appendectomy    H/O total knee replacement, left    S/P IVC filter  Note that Pt does not have  h/o DVT, outPt doppler was read first as +, but after review reported as no DVT. Pt got IVC filer before that        MEDICATIONS  (STANDING):  allopurinol 200 milliGRAM(s) Oral daily  apixaban 2.5 milliGRAM(s) Oral every 12 hours  artificial  tears Solution 1 Drop(s) Both EYES three times a day  ascorbic acid 500 milliGRAM(s) Oral daily  cholestyramine Powder (Sugar-Free) 4 Gram(s) Oral two times a day  dextrose 40% Gel 15 Gram(s) Oral once  dextrose 5%. 1000 milliLiter(s) (50 mL/Hr) IV Continuous <Continuous>  dextrose 5%. 1000 milliLiter(s) (100 mL/Hr) IV Continuous <Continuous>  dextrose 50% Injectable 25 Gram(s) IV Push once  dextrose 50% Injectable 12.5 Gram(s) IV Push once  dextrose 50% Injectable 25 Gram(s) IV Push once  diphenoxylate/atropine 1 Tablet(s) Oral three times a day  escitalopram 10 milliGRAM(s) Oral daily  ferrous    sulfate 325 milliGRAM(s) Oral daily  furosemide   Injectable 20 milliGRAM(s) IV Push daily  glucagon  Injectable 1 milliGRAM(s) IntraMuscular once  influenza   Vaccine 0.5 milliLiter(s) IntraMuscular once  insulin glargine Injectable (LANTUS) 6 Unit(s) SubCutaneous at bedtime  insulin lispro (ADMELOG) corrective regimen sliding scale   SubCutaneous three times a day before meals  insulin lispro (ADMELOG) corrective regimen sliding scale   SubCutaneous at bedtime  lactobacillus acidophilus 1 Tablet(s) Oral three times a day with meals  levothyroxine 50 MICROGram(s) Oral daily  lidocaine   4% Patch 1 Patch Transdermal daily  metoprolol tartrate 12.5 milliGRAM(s) Oral two times a day  pantoprazole    Tablet 40 milliGRAM(s) Oral before breakfast  sildenafil (REVATIO) 20 milliGRAM(s) Oral two times a day  simvastatin 40 milliGRAM(s) Oral at bedtime    MEDICATIONS  (PRN):  acetaminophen   Tablet .. 650 milliGRAM(s) Oral every 6 hours PRN Temp greater or equal to 38C (100.4F), Mild Pain (1 - 3)  lidocaine 2% Gel 1 Application(s) Topical three times a day PRN pain  lidocaine 2% Gel 1 Application(s) Topical five times a day PRN rectal pain  ondansetron Injectable 4 milliGRAM(s) IV Push every 6 hours PRN Nausea and/or Vomiting  sodium chloride 0.65% Nasal 2 Spray(s) Both Nostrils every 3 hours PRN nasal dryness      REVIEW OF SYSTEMS:    NA    Vital Signs Last 24 Hrs  T(C): 36.7 (17 Sep 2021 08:25), Max: 37.1 (16 Sep 2021 16:14)  T(F): 98.1 (17 Sep 2021 08:25), Max: 98.7 (16 Sep 2021 16:14)  HR: 108 (17 Sep 2021 08:25) (90 - 108)  BP: 108/53 (17 Sep 2021 08:25) (107/63 - 108/54)  BP(mean): --  RR: 18 (17 Sep 2021 08:25) (18 - 18)  SpO2: 95% (17 Sep 2021 08:25) (94% - 95%)    PHYSICAL EXAM:    Constitutional: NAD, resting    LABS:                        8.4    8.15  )-----------( 214      ( 17 Sep 2021 08:54 )             25.8     09-17    131<L>  |  95<L>  |  14  ----------------------------<  147<H>  3.5   |  30  |  0.76    Ca    7.9<L>      17 Sep 2021 08:54  Phos  3.4     09-17  Mg     2.0     09-17    TPro  5.7<L>  /  Alb  2.0<L>  /  TBili  0.6  /  DBili  x   /  AST  8<L>  /  ALT  10<L>  /  AlkPhos  75  09-17      LIVER FUNCTIONS - ( 17 Sep 2021 08:54 )  Alb: 2.0 g/dL / Pro: 5.7 gm/dL / ALK PHOS: 75 U/L / ALT: 10 U/L / AST: 8 U/L / GGT: x             RADIOLOGY & ADDITIONAL STUDIES:

## 2021-09-17 NOTE — PROGRESS NOTE ADULT - ASSESSMENT
89/F with PMHx of pancreatitis s/p ERCP, cirrhosis, DM2, a-fib on Eliquis, CAD s/p PCI, severe pulm HTN, chronic R heart failure, diastolic dysfunction, AS s/p TAVR, OA, HTN, dyslipidemia, hypothyroidism, cecal mass, recently d/maddy from , sent  to the ED on 9/6/21  from [facility] for c/o Fall, near syncope.     Fall multifactorial , gout, OA, gait disorder dyspnea   - CT head - no acute pathology, tele -  no arrhythmias, AFIB rate controlled ,  serial cardiac enz neg ,  trauma work: no Fx orthostatic vitals neg ,  neck pain, resolved :  c/w Lidoderm, Tylenol  PO  - PT reevaluate , OOB to the chair bid d/w RN  - polypharmacy - stop xanax, flexeril - centrally acting medications will increase risk of falls   Severe constipation, Now with Diarrhea, with rectal pain   -  required disimpaction on 9/7 , laxatives  on hold now due to diarrhea  -  stool GI PCR and CDIFF PCR neg , send stool for ova and parasites,  XR reviewed, no obstruction   - stop imodium - not effective   - c/w  lomotil tid  with increased dose of Questran  bid,  monitor stool count   - GI consult D/w Dr Mobley   Leg edema due to  chronic Right sided heart failure. Severe Pulm HTN ,   AS s/p TAVR,  Acute on chronic diastolic  CHF exacerbation, improved   Permanent  A fib on Eliquis   - Eliquis was held due to bleeding restart Eliquis 9/16 ,  monitor H/H    - CXR 9/6, 9/13 - showing CHF changes,  ECHO: severe pulm HTN, Mod MS, AV prosthesis good Fx, Severe TR. LV mild LVH   - BNP trending down 5000--> 4000--> 3641, daily weight 71 kg   - torsemide 40 mg bid was on hold due to loose stools,  restarted at 40mg PO QD on 9/15 -9/16  - 9/17- seen by cardiologist Dr. Hwang started on metorpolol 25 bid, drop her BP , dose lowered to 12.5 bid as well as lasix 20 mg - not given due to low BP   - supportive O2 via 4 L  ,  C/w Sildenafil  , cardiology and pulmonology consult Dr Camacho and  Dr. Hwang  Cecal Adenocarcinoma    - Patient is anticipated to undergo surgical procedure in a tertiary care facility given patient's advanced age / extensive chronic medical issues including valvular Dz and severe Pulm HTN , D/w  Dr. Smyth, case was discussed with carolyn torres anesthesia at [facility], Pt is too high risk due to Severe pulm HTN . Not a candidate for Sx   - hem/onc eval appreciated, advises against systemic Tx due to Pts  Fx status and other medical issues and possible adverse effects   Mild normocytic anemia - lower iron po from vid--> qd with vitamin C  , check ferritin, B12, folate  Hypovolemic hyponatremia- monitor for improvements on diuretics, Na 131   Epistaxis, resolved , Hematemesis 1 episode due tp  nose bleeding and swallowing blood , resolved  Likely 2/2 dry nasal mucosa on NC, C/w  humidified oxygen  s/p  afrin BID  will stop , may increase BP in elderly   hold asa, restarted  eliquis 2.5 bid 9/16 , monitor H/H   FOBT positive due to nose bleed and colon cancer  Monitor H/H stable, c/w  PPIs  d/w Dr. Mobley ok to restart Eliquis , not ASA   DM 2 with A1C 6.8   - Monitor BS and cover with  ISS,  lantus 6 units hs   Generalized weakness - check vitamin D 30 , B12, folate wnl , TSH 1.26 , PT daily     DVT PPX:  Eliquis     Advanced directives  HCP Laith goldman updated [phone] on 9/16, 9/17   MOLST completed  DNR/DNI  palliative team consult appreciated       D/w  Palliative team, will arrange family meeting

## 2021-09-18 LAB
ADD ON TEST-SPECIMEN IN LAB: SIGNIFICANT CHANGE UP
ALBUMIN SERPL ELPH-MCNC: 2.2 G/DL — LOW (ref 3.3–5)
ALP SERPL-CCNC: 78 U/L — SIGNIFICANT CHANGE UP (ref 40–120)
ALT FLD-CCNC: 28 U/L — SIGNIFICANT CHANGE UP (ref 12–78)
ANION GAP SERPL CALC-SCNC: 6 MMOL/L — SIGNIFICANT CHANGE UP (ref 5–17)
ANION GAP SERPL CALC-SCNC: 8 MMOL/L — SIGNIFICANT CHANGE UP (ref 5–17)
AST SERPL-CCNC: 40 U/L — HIGH (ref 15–37)
BASOPHILS # BLD AUTO: 0.03 K/UL — SIGNIFICANT CHANGE UP (ref 0–0.2)
BASOPHILS NFR BLD AUTO: 0.4 % — SIGNIFICANT CHANGE UP (ref 0–2)
BILIRUB SERPL-MCNC: 0.6 MG/DL — SIGNIFICANT CHANGE UP (ref 0.2–1.2)
BUN SERPL-MCNC: 23 MG/DL — SIGNIFICANT CHANGE UP (ref 7–23)
BUN SERPL-MCNC: 23 MG/DL — SIGNIFICANT CHANGE UP (ref 7–23)
CALCIUM SERPL-MCNC: 8.1 MG/DL — LOW (ref 8.5–10.1)
CALCIUM SERPL-MCNC: 8.6 MG/DL — SIGNIFICANT CHANGE UP (ref 8.5–10.1)
CHLORIDE SERPL-SCNC: 92 MMOL/L — LOW (ref 96–108)
CHLORIDE SERPL-SCNC: 93 MMOL/L — LOW (ref 96–108)
CO2 SERPL-SCNC: 28 MMOL/L — SIGNIFICANT CHANGE UP (ref 22–31)
CO2 SERPL-SCNC: 30 MMOL/L — SIGNIFICANT CHANGE UP (ref 22–31)
CREAT SERPL-MCNC: 0.99 MG/DL — SIGNIFICANT CHANGE UP (ref 0.5–1.3)
CREAT SERPL-MCNC: 1.04 MG/DL — SIGNIFICANT CHANGE UP (ref 0.5–1.3)
CRP SERPL-MCNC: 73 MG/L — HIGH
EOSINOPHIL # BLD AUTO: 0.54 K/UL — HIGH (ref 0–0.5)
EOSINOPHIL NFR BLD AUTO: 7.5 % — HIGH (ref 0–6)
GLUCOSE SERPL-MCNC: 105 MG/DL — HIGH (ref 70–99)
GLUCOSE SERPL-MCNC: 197 MG/DL — HIGH (ref 70–99)
HCT VFR BLD CALC: 27.2 % — LOW (ref 34.5–45)
HGB BLD-MCNC: 8.8 G/DL — LOW (ref 11.5–15.5)
IMM GRANULOCYTES NFR BLD AUTO: 0.6 % — SIGNIFICANT CHANGE UP (ref 0–1.5)
LYMPHOCYTES # BLD AUTO: 0.73 K/UL — LOW (ref 1–3.3)
LYMPHOCYTES # BLD AUTO: 10.1 % — LOW (ref 13–44)
MCHC RBC-ENTMCNC: 32.2 PG — SIGNIFICANT CHANGE UP (ref 27–34)
MCHC RBC-ENTMCNC: 32.4 GM/DL — SIGNIFICANT CHANGE UP (ref 32–36)
MCV RBC AUTO: 99.6 FL — SIGNIFICANT CHANGE UP (ref 80–100)
MONOCYTES # BLD AUTO: 0.89 K/UL — SIGNIFICANT CHANGE UP (ref 0–0.9)
MONOCYTES NFR BLD AUTO: 12.3 % — SIGNIFICANT CHANGE UP (ref 2–14)
NEUTROPHILS # BLD AUTO: 5.01 K/UL — SIGNIFICANT CHANGE UP (ref 1.8–7.4)
NEUTROPHILS NFR BLD AUTO: 69.1 % — SIGNIFICANT CHANGE UP (ref 43–77)
NT-PROBNP SERPL-SCNC: 5606 PG/ML — HIGH (ref 0–450)
PLATELET # BLD AUTO: 233 K/UL — SIGNIFICANT CHANGE UP (ref 150–400)
POTASSIUM SERPL-MCNC: 3.5 MMOL/L — SIGNIFICANT CHANGE UP (ref 3.5–5.3)
POTASSIUM SERPL-MCNC: 3.7 MMOL/L — SIGNIFICANT CHANGE UP (ref 3.5–5.3)
POTASSIUM SERPL-SCNC: 3.5 MMOL/L — SIGNIFICANT CHANGE UP (ref 3.5–5.3)
POTASSIUM SERPL-SCNC: 3.7 MMOL/L — SIGNIFICANT CHANGE UP (ref 3.5–5.3)
PROT SERPL-MCNC: 6 GM/DL — SIGNIFICANT CHANGE UP (ref 6–8.3)
RBC # BLD: 2.73 M/UL — LOW (ref 3.8–5.2)
RBC # FLD: 15.4 % — HIGH (ref 10.3–14.5)
SODIUM SERPL-SCNC: 128 MMOL/L — LOW (ref 135–145)
SODIUM SERPL-SCNC: 129 MMOL/L — LOW (ref 135–145)
WBC # BLD: 7.24 K/UL — SIGNIFICANT CHANGE UP (ref 3.8–10.5)
WBC # FLD AUTO: 7.24 K/UL — SIGNIFICANT CHANGE UP (ref 3.8–10.5)

## 2021-09-18 PROCEDURE — 99232 SBSQ HOSP IP/OBS MODERATE 35: CPT

## 2021-09-18 PROCEDURE — 71045 X-RAY EXAM CHEST 1 VIEW: CPT | Mod: 26

## 2021-09-18 RX ORDER — TRAMADOL HYDROCHLORIDE 50 MG/1
25 TABLET ORAL EVERY 6 HOURS
Refills: 0 | Status: DISCONTINUED | OUTPATIENT
Start: 2021-09-18 | End: 2021-09-22

## 2021-09-18 RX ORDER — CHOLECALCIFEROL (VITAMIN D3) 125 MCG
2000 CAPSULE ORAL DAILY
Refills: 0 | Status: DISCONTINUED | OUTPATIENT
Start: 2021-09-18 | End: 2021-09-22

## 2021-09-18 RX ORDER — LIDOCAINE 4 G/100G
2 CREAM TOPICAL DAILY
Refills: 0 | Status: DISCONTINUED | OUTPATIENT
Start: 2021-09-18 | End: 2021-09-22

## 2021-09-18 RX ORDER — NYSTATIN CREAM 100000 [USP'U]/G
1 CREAM TOPICAL
Refills: 0 | Status: DISCONTINUED | OUTPATIENT
Start: 2021-09-18 | End: 2021-09-21

## 2021-09-18 RX ORDER — LOPERAMIDE HCL 2 MG
2 TABLET ORAL THREE TIMES A DAY
Refills: 0 | Status: DISCONTINUED | OUTPATIENT
Start: 2021-09-18 | End: 2021-09-22

## 2021-09-18 RX ORDER — TRAMADOL HYDROCHLORIDE 50 MG/1
25 TABLET ORAL ONCE
Refills: 0 | Status: DISCONTINUED | OUTPATIENT
Start: 2021-09-18 | End: 2021-09-18

## 2021-09-18 RX ORDER — SODIUM CHLORIDE 9 MG/ML
1000 INJECTION INTRAMUSCULAR; INTRAVENOUS; SUBCUTANEOUS
Refills: 0 | Status: DISCONTINUED | OUTPATIENT
Start: 2021-09-18 | End: 2021-09-18

## 2021-09-18 RX ADMIN — APIXABAN 2.5 MILLIGRAM(S): 2.5 TABLET, FILM COATED ORAL at 21:25

## 2021-09-18 RX ADMIN — Medication 4: at 18:13

## 2021-09-18 RX ADMIN — Medication 2000 UNIT(S): at 18:14

## 2021-09-18 RX ADMIN — Medication 12.5 MILLIGRAM(S): at 21:24

## 2021-09-18 RX ADMIN — Medication 1 TABLET(S): at 18:14

## 2021-09-18 RX ADMIN — LIDOCAINE 1 PATCH: 4 CREAM TOPICAL at 13:06

## 2021-09-18 RX ADMIN — Medication 2: at 13:05

## 2021-09-18 RX ADMIN — LIDOCAINE 2 PATCH: 4 CREAM TOPICAL at 21:29

## 2021-09-18 RX ADMIN — Medication 1 DROP(S): at 05:01

## 2021-09-18 RX ADMIN — Medication 1 TABLET(S): at 13:05

## 2021-09-18 RX ADMIN — Medication 20 MILLIGRAM(S): at 21:25

## 2021-09-18 RX ADMIN — TRAMADOL HYDROCHLORIDE 25 MILLIGRAM(S): 50 TABLET ORAL at 07:35

## 2021-09-18 RX ADMIN — Medication 650 MILLIGRAM(S): at 05:30

## 2021-09-18 RX ADMIN — Medication 325 MILLIGRAM(S): at 10:37

## 2021-09-18 RX ADMIN — NYSTATIN CREAM 1 APPLICATION(S): 100000 CREAM TOPICAL at 21:25

## 2021-09-18 RX ADMIN — LIDOCAINE 1 PATCH: 4 CREAM TOPICAL at 21:30

## 2021-09-18 RX ADMIN — ESCITALOPRAM OXALATE 10 MILLIGRAM(S): 10 TABLET, FILM COATED ORAL at 10:36

## 2021-09-18 RX ADMIN — Medication 20 MILLIGRAM(S): at 10:37

## 2021-09-18 RX ADMIN — Medication 12.5 MILLIGRAM(S): at 10:36

## 2021-09-18 RX ADMIN — APIXABAN 2.5 MILLIGRAM(S): 2.5 TABLET, FILM COATED ORAL at 10:37

## 2021-09-18 RX ADMIN — TRAMADOL HYDROCHLORIDE 25 MILLIGRAM(S): 50 TABLET ORAL at 21:24

## 2021-09-18 RX ADMIN — Medication 1 DROP(S): at 13:05

## 2021-09-18 RX ADMIN — TRAMADOL HYDROCHLORIDE 25 MILLIGRAM(S): 50 TABLET ORAL at 22:24

## 2021-09-18 RX ADMIN — PANTOPRAZOLE SODIUM 40 MILLIGRAM(S): 20 TABLET, DELAYED RELEASE ORAL at 10:37

## 2021-09-18 RX ADMIN — Medication 50 MICROGRAM(S): at 05:01

## 2021-09-18 RX ADMIN — Medication 200 MILLIGRAM(S): at 10:37

## 2021-09-18 RX ADMIN — Medication 2 MILLIGRAM(S): at 21:25

## 2021-09-18 RX ADMIN — LIDOCAINE 1 APPLICATION(S): 4 CREAM TOPICAL at 05:01

## 2021-09-18 RX ADMIN — SODIUM CHLORIDE 50 MILLILITER(S): 9 INJECTION INTRAMUSCULAR; INTRAVENOUS; SUBCUTANEOUS at 10:37

## 2021-09-18 RX ADMIN — Medication 1 TABLET(S): at 10:37

## 2021-09-18 RX ADMIN — SIMVASTATIN 40 MILLIGRAM(S): 20 TABLET, FILM COATED ORAL at 21:25

## 2021-09-18 RX ADMIN — Medication 650 MILLIGRAM(S): at 05:02

## 2021-09-18 RX ADMIN — Medication 500 MILLIGRAM(S): at 10:37

## 2021-09-18 RX ADMIN — INSULIN GLARGINE 6 UNIT(S): 100 INJECTION, SOLUTION SUBCUTANEOUS at 21:29

## 2021-09-18 RX ADMIN — Medication 1 DROP(S): at 21:25

## 2021-09-18 NOTE — PROGRESS NOTE ADULT - SUBJECTIVE AND OBJECTIVE BOX
Patient is a 89y old  Female who presents with a chief complaint of near syncope, fall (06 Sep 2021 17:50)      HPI:  89/F with PMHx of pancreatitis s/p ERCP, cirrhosis, DM2, a-fib on Eliquis, CAD s/p PCI, severe pulm HTN, chronic R heart failure, diastolic dysfunction, AS s/p TAVR, OA, HTN, dyslipidemia, hypothyroidism. cecal mass, recently d/maddy from , sent  to the ED from [facility] for c/o Fall.     -   Pt states that the aide did not put brakes on her chair and left and when she was trying to put boxes under the chair , the chair kept moving even though she tried to put brakes on by herself and she fell down.  Denies any loss of consciousness.  She denies any other symptoms now.  - pt seen this am.  Pt denies any symptoms this am.     - pt seen this am.  She denies any SOB at rest.     - pt seen this am. denies any symptoms.   PAST MEDICAL & SURGICAL HISTORY:  Diabetes Mellitus Type II    Dyslipidemia    GERD (Gastroesophageal Reflux Disease)    History of Osteoarthritis    Hypertension    CAD (coronary artery disease)    Afib    Hypothyroid    HLD (hyperlipidemia)    History of pancreatitis    Aortic stenosis    H/O: Hysterectomy    H/O: Knee Surgery- right meniscus    History of cholecystectomy    History of appendectomy    H/O total knee replacement, left    S/P IVC filter  Note that Pt does not have  h/o DVT, outPt doppler was read first as +, but after review reported as no DVT. Pt got IVC filer before that        MEDICATIONS  (STANDING):  allopurinol 200 milliGRAM(s) Oral daily  apixaban 2.5 milliGRAM(s) Oral two times a day  artificial  tears Solution 1 Drop(s) Both EYES three times a day  aspirin  chewable 81 milliGRAM(s) Oral daily  dextrose 40% Gel 15 Gram(s) Oral once  dextrose 5%. 1000 milliLiter(s) (50 mL/Hr) IV Continuous <Continuous>  dextrose 5%. 1000 milliLiter(s) (100 mL/Hr) IV Continuous <Continuous>  dextrose 50% Injectable 25 Gram(s) IV Push once  dextrose 50% Injectable 12.5 Gram(s) IV Push once  dextrose 50% Injectable 25 Gram(s) IV Push once  escitalopram 10 milliGRAM(s) Oral daily  ferrous    sulfate 325 milliGRAM(s) Oral two times a day  glucagon  Injectable 1 milliGRAM(s) IntraMuscular once  influenza   Vaccine 0.5 milliLiter(s) IntraMuscular once  insulin lispro (ADMELOG) corrective regimen sliding scale   SubCutaneous three times a day before meals  insulin lispro (ADMELOG) corrective regimen sliding scale   SubCutaneous at bedtime  levothyroxine 50 MICROGram(s) Oral daily  lidocaine   4% Patch 1 Patch Transdermal daily  pantoprazole    Tablet 40 milliGRAM(s) Oral before breakfast  psyllium Powder 1 Packet(s) Oral daily  sildenafil (REVATIO) 20 milliGRAM(s) Oral two times a day  simvastatin 40 milliGRAM(s) Oral at bedtime  torsemide 40 milliGRAM(s) Oral two times a day    MEDICATIONS  (PRN):  acetaminophen   Tablet .. 650 milliGRAM(s) Oral every 6 hours PRN Temp greater or equal to 38C (100.4F), Mild Pain (1 - 3)      FAMILY HISTORY:  FH: diabetes mellitus  both parents    FH: heart disease        SOCIAL HISTORY: no recent smoking      REVIEW OF SYSTEMS:  CONSTITUTIONAL:    No fatigue, malaise, lethargy.  No fever or chills.  RESPIRATORY:  No cough.  No wheeze.  No hemoptysis.  No shortness of breath.  CARDIOVASCULAR:  No chest pains.  No palpitations. No shortness of breath, No orthopnea or PND.    GASTROINTESTINAL:  No abdominal pain.  No nausea or vomiting.    GENITOURINARY:    No hematuria.    MUSCULOSKELETAL:  No musculoskeletal pain.  No joint swelling.  No arthritis.  NEUROLOGICAL:  No tingling or numbness or weakness.  PSYCHIATRIC:  No confusion  SKIN:  No rashes.            ICU Vital Signs Last 24 Hrs  T(C): 36.4 (18 Sep 2021 15:10), Max: 36.7 (17 Sep 2021 21:21)  T(F): 97.6 (18 Sep 2021 15:10), Max: 98 (17 Sep 2021 21:21)  HR: 66 (18 Sep 2021 15:10) (66 - 107)  BP: 102/55 (18 Sep 2021 15:10) (98/56 - 114/59)  BP(mean): --  ABP: --  ABP(mean): --  RR: 18 (18 Sep 2021 15:10) (17 - 18)  SpO2: 100% (18 Sep 2021 15:10) (93% - 100%)        PHYSICAL EXAM-    Constitutional: elderly frail female in no acute distress     Head: Head is normocephalic and atraumatic.      Neck: No JVD.     Cardiovascular: irregular rate    Respiratory: Basal rales b/l     Abdomen: Soft, nontender, nondistended with positive bowel sounds.      Extremity: No tenderness. 1-2 + b/l pedal  pitting edema     Neurologic: The patient is confused    Skin: No rash, no obvious lesions noted.      Psychiatric: The patient appears to be emotionally stable.      INTERPRETATION OF TELEMETRY: not on tele    ECG: atrial fibrillation.   I&O's Detail    06 Sep 2021 07:01  -  07 Sep 2021 07:00  --------------------------------------------------------  IN:  Total IN: 0 mL    OUT:    Incontinent per Collection Bag (mL): 900 mL  Total OUT: 900 mL    Total NET: -900 mL          LABS:                                                           8.8    7.24  )-----------( 233      ( 18 Sep 2021 07:00 )             27.2     09-18    129<L>  |  93<L>  |  23  ----------------------------<  197<H>  3.5   |  30  |  1.04    Ca    8.1<L>      18 Sep 2021 14:27  Phos  3.4     09-17  Mg     2.0     09-17    TPro  6.0  /  Alb  2.2<L>  /  TBili  0.6  /  DBili  x   /  AST  40<H>  /  ALT  28  /  AlkPhos  78  09-18        LIVER FUNCTIONS - ( 18 Sep 2021 07:00 )  Alb: 2.2 g/dL / Pro: 6.0 gm/dL / ALK PHOS: 78 U/L / ALT: 28 U/L / AST: 40 U/L / GGT: x                                           PT/INR - ( 06 Sep 2021 11:16 )   PT: 19.8 sec;   INR: 1.74 ratio         PTT - ( 06 Sep 2021 11:16 )  PTT:35.4 sec  Urinalysis Basic - ( 06 Sep 2021 11:09 )    Color: Yellow / Appearance: Clear / S.015 / pH: x  Gluc: x / Ketone: Negative  / Bili: Negative / Urobili: Negative mg/dL   Blood: x / Protein: 15 mg/dL / Nitrite: Negative   Leuk Esterase: Trace / RBC: Negative /HPF / WBC 11-25   Sq Epi: x / Non Sq Epi: Moderate / Bacteria: Few      I&O's Summary    06 Sep 2021 07:01  -  07 Sep 2021 07:00  --------------------------------------------------------  IN: 0 mL / OUT: 900 mL / NET: -900 mL      BNP  RADIOLOGY & ADDITIONAL STUDIES:  < from: CT Head No Cont (21 @ 10:56) >    IMPRESSION:    CT HEAD: No acute abnormality. Moderate atrophy most prominent in the BILATERAL temporal lobes.    CT cervical spine:   No vertebral fracture is recognized.  Multilevel degenerative disc disease and spondylosis at C3-4 through C6-7 with loss of disc height and associated degenerative endplate changes. There is narrowing of the LEFT C2-3, LEFT C3-4 LEFT C4-5 and RIGHT C5-C6 and C6-7 neural foramina due to uncovertebral spurring and facet osteophytic hypertrophy.    --- End of Report ---            FRANCESCA BOSTON MD; Attending Radiologist  This document has been electronically signed. Sep  6 2021 11:05AM    < end of copied text >  < from: TTE Echo Complete w/ Contrast w/ Doppler (21 @ 16:09) >     Impression     Summary     Moderate mitral annular calcification is present.   Moderate mitral stenosis is present.   Mild (1+) mitral regurgitation is present.   Well seated prosthetic valve in the aortic position. suboptimal doppler   interrogation   Severe (4+) tricuspid valve regurgitation is present.   The tricuspid valve leaflets appear mildly thickened open well.   Severe pulmonary hypertension.   Normal appearing pulmonic valve structure.   Mild pulmonic valvular regurgitation (1+) is present.     technical difficult study     Signature     ----------------------------------------------------------------   Electronically signed by Venugopal Palla, MD(Interpreting   physician) on 2021 05:38 PM   ----------------------------------------------------------------    < end of copied text >

## 2021-09-18 NOTE — PROGRESS NOTE ADULT - ASSESSMENT
Acute on Chronic decompensated HFPEF- hypervolemic.  Pedal edema persistent.  NO repiratory distress.  Trending down sodium.  will hold off diuresis for now.  She did not have any diarrhea yesterday but BP borderline low.    Atrial fibrillation-rate controlled   metoprolol low dose as tolerated 12.5mg bid   continue current meds including apixaban    d/w Dr Smith    Colon mass- malignancy- Colorectal surgery team following pt.    Other medical issues- Management per primary team.   Thank you for allowing me to participate in the care of this patient. Please feel free to contact me with any questions.     Other medical issues- Management per primary team.   Thank you for allowing me to participate in the care of this patient. Please feel free to contact me with any questions.

## 2021-09-18 NOTE — PROGRESS NOTE ADULT - SUBJECTIVE AND OBJECTIVE BOX
CC: rectal irritation and pain, watery diarrhea     HPI: 89/F with PMHx of pancreatitis s/p ERCP, cirrhosis, DM2, a-fib on Eliquis, CAD s/p PCI, severe pulm HTN, chronic R heart failure, diastolic dysfunction, AS s/p TAVR, OA, HTN, dyslipidemia, hypothyroidism, cecal mass, recently d/maddy from , sent  to the ED on 9/6/21  from [facility] for c/o Fall, near syncope. Pt states she was trying to put boxes under the chair, when she fell and hit her head, now has a small abrasion. Also c/o left shoulder pain.  She has been become more weak and unsteady recently.  As per Son, she has gained 20 Lbs in [facility]. Her trauma work up neg in ED    9/16  Pt was seen and examined this am,  + rectal imitation pain after multiple episodes of diarrhea , no nose bleed, no hematemesis, denies cp, dyspnea, on O2 supplementation 5 L , afebrile, + leg edema, + gen weakness  9/17 - pt seen and examined earlier today , + 1 bm since last night, feels better, noted elevated HR , started on metoprolol by cardiologist, tolerating po intake, OOB in the chair  9/18 - pt seen and examined, + gen weakness, no bm since Thursday night, BP slightly better, reports feeling thirsty, afebrile , in the bed , denies dyspnea, abdominal pain, leg edema stable    Review of system- Rest of the review of system are negative except mentioned in HPI    Vital Signs Last 24 Hrs  T(C): 36.4 (09-18-21 @ 15:10), Max: 36.7 (09-17-21 @ 21:21)  T(F): 97.6 (09-18-21 @ 15:10), Max: 98 (09-17-21 @ 21:21)  HR: 66 (09-18-21 @ 15:10) (66 - 107)  BP: 102/55 (09-18-21 @ 15:10) (98/56 - 114/59)  RR: 18 (09-18-21 @ 15:10) (17 - 18)  SpO2: 100% (09-18-21 @ 15:10) (93% - 100%)  Wt(kg): --  CAPILLARY BLOOD GLUCOSE  CAPILLARY BLOOD GLUCOSE  POCT Blood Glucose.: 188 mg/dL (18 Sep 2021 12:58)  POCT Blood Glucose.: 115 mg/dL (18 Sep 2021 08:40)  POCT Blood Glucose.: 156 mg/dL (17 Sep 2021 21:33)  POCT Blood Glucose.: 117 mg/dL (17 Sep 2021 17:31)      PHYSICAL EXAM:  General:  malnourished; frail elderly female   Eyes: EOMI; conjunctiva and sclera clear, dry mucosa   Head: Normocephalic; atraumatic  ENMT: No nasal discharge; airway clear  Neck: Supple; non tender; no masses  Respiratory: Decreased BS,  crepitations and rales at bases b/l, anterior and posterior  fields   Cardiovascular: Irregular rate and rhythm. S1 and S2 Normal;  Gastrointestinal: Soft non-tender non-distended; Normal bowel sounds.   Genitourinary: No  suprapubic  tenderness  Extremities: +2 b/l LE   edema  Vascular: Peripheral pulses palpable 2+ bilaterally  Neurological: Alert and oriented x2-3, non focal   Musculoskeletal: Normal muscle  strength, L neck  muscle tension improved  Psychiatric: Cooperative       LABS and radiology studies reviewed :   09-18    129<L>  |  93<L>  |  23  ----------------------------<  197<H>  3.5   |  30  |  1.04    Ca    8.1<L>      18 Sep 2021 14:27  Phos  3.4     09-17  Mg     2.0     09-17    TPro  6.0  /  Alb  2.2<L>  /  TBili  0.6  /  DBili  x   /  AST  40<H>  /  ALT  28  /  AlkPhos  78  09-18                        8.8    7.24  )-----------( 233      ( 18 Sep 2021 07:00 )             27.2     LIVER FUNCTIONS - ( 18 Sep 2021 07:00 )  Alb: 2.2 g/dL / Pro: 6.0 gm/dL / ALK PHOS: 78 U/L / ALT: 28 U/L / AST: 40 U/L / GGT: x           09-17    131<L>  |  95<L>  |  14  ----------------------------<  147<H>  3.5   |  30  |  0.76    Ca    7.9<L>      17 Sep 2021 08:54  Phos  3.4     09-17  Mg     2.0     09-17    TPro  5.7<L>  /  Alb  2.0<L>  /  TBili  0.6  /  DBili  x   /  AST  8<L>  /  ALT  10<L>  /  AlkPhos  75  09-17                        8.4    8.15  )-----------( 214      ( 17 Sep 2021 08:54 )             25.8     LIVER FUNCTIONS - ( 17 Sep 2021 08:54 )  Alb: 2.0 g/dL / Pro: 5.7 gm/dL / ALK PHOS: 75 U/L / ALT: 10 U/L / AST: 8 U/L / GGT: x                    09-16    131<L>  |  94<L>  |  18  ----------------------------<  206<H>  3.7   |  30  |  0.73    Ca    8.5      16 Sep 2021 08:12   Serum Pro-Brain Natriuretic Peptide (09.18.21 @ 07:00)    Serum Pro-Brain Natriuretic Peptide: 5606 pg/mL      Serum Pro-Brain Natriuretic Peptide (09.16.21 @ 08:12)    Serum Pro-Brain Natriuretic Peptide: 4416 pg/mL    Serum Pro-Brain Natriuretic Peptide (09.15.21 @ 06:05)    Serum Pro-Brain Natriuretic Peptide: 5346 pg/mL                         9.2    8.59  )-----------( 207      ( 16 Sep 2021 08:12 )             28.2   Iron with Total Binding Capacity in AM (08.18.21 @ 06:44)    Iron - Total Binding Capacity.: 281 ug/dL    % Saturation, Iron: 20 %    Iron Total, Serum: 56 ug/dL    Unsaturated Iron Binding Capacity: 226 ug/dL    Ferritin, Serum in AM (08.18.21 @ 06:44)    Ferritin, Serum: 82 ng/mL    Vitamin B12, Serum in AM (07.16.20 @ 10:02)    Vitamin B12, Serum: 578 pg/mL             Troponin I, Serum Repeat 3 hours x 2 occurrences (09.06.21 @ 19:13)    Troponin I, Serum: <0.015: High Sensitivity Troponin and new reference  range effective 7/6/2016 ng/mL    < from: 12 Lead ECG (09.06.21 @ 11:03) >  Ventricular Rate 96 BPM    Atrial Rate 92 BPM    QRS Duration 130 ms    Q-T Interval 356 ms    QTC Calculation(Bazett) 449 ms    R Axis -76 degrees    T Axis 62 degrees    Diagnosis Line Atrial fibrillation  Left axis deviation  Non-specific intra-ventricular conduction block  Possible Anterolateral infarct , age undetermined  Abnormal ECG  When compared with ECG of 11-AUG-2021 12:09,  Nonspecific T wave abnormality, improved in Lateral leads  QT has shortened    < end of copied text >  GI PCR Panel, Stool (09.12.21 @ 21:10)    Culture Results:   GI PCR Results: NOT detected    Culture - Urine (09.06.21 @ 11:09)    -  Vancomycin: S 4    -  Levofloxacin: S 2    -  Nitrofurantoin: S <=32 Should not be used to treat pyelonephritis.    -  Tetra/Doxy: R >8    -  Ampicillin: S <=2 Predicts results to ampicillin/sulbactam, amoxacillin-clavulanate and  piperacillin-tazobactam.    -  Ciprofloxacin: S <=1    Specimen Source: Clean Catch Clean Catch (Midstream)    Culture Results:   10,000 - 49,000 CFU/mL Enterococcus faecalis  <10,000 CFU/ml Normal Urogenital abel present    Organism Identification: Enterococcus faecalis    Organism: Enterococcus faecalis    Method Type: [facility]      RADIOLOGY & ADDITIONAL TESTS:  < from: Xray Abdomen 1 View PORTABLE -Routine (Xray Abdomen 1 View PORTABLE -Routine .) (09.13.21 @ 22:31) >  Frontal view of the abdomen shows a normal gas pattern. IVC filter is again noted. No definite free air is identified.    IMPRESSION:  No evidence of bowel obstruction.    < from: Xray Chest 1 View- PORTABLE-Routine (Xray Chest 1 View- PORTABLE-Routine .) (09.13.21 @ 10:08) >  Frontal expiratory view of the chest shows the heart to be similar in size. Aortic valve stent is again noted.    The lungs show slight increase in pulmonary congestion with small pleural effusions and there is no evidence of pneumothorax.  IMPRESSION:  Congestive changes as noted.    < from: Xray Shoulder 2 Views, Left (09.06.21 @ 12:05) >  No fracture or dislocation. No focal osseous lesion. Underlying degenerative changes are stable    IMPRESSION:   No fracture.    < from: CT Head No Cont (09.06.21 @ 10:56) >  IMPRESSION:    CT HEAD: No acute abnormality. Moderate atrophy most prominent in the BILATERAL temporal lobes.    CT cervical spine:   No vertebral fracture is recognized.  Multilevel degenerative disc disease and spondylosis at C3-4 through C6-7 with loss of disc height and associated degenerative endplate changes. There is narrowing of the LEFT C2-3, LEFT C3-4 LEFT C4-5 and RIGHT C5-C6 and C6-7 neural foramina due to uncovertebral spurring and facet osteophytic hypertrophy.    MEDICATIONS  (STANDING):  allopurinol 200 milliGRAM(s) Oral daily  apixaban 2.5 milliGRAM(s) Oral every 12 hours  artificial  tears Solution 1 Drop(s) Both EYES three times a day  ascorbic acid 500 milliGRAM(s) Oral daily  cholecalciferol 2000 Unit(s) Oral daily  dextrose 40% Gel 15 Gram(s) Oral once  dextrose 5%. 1000 milliLiter(s) (50 mL/Hr) IV Continuous <Continuous>  dextrose 5%. 1000 milliLiter(s) (100 mL/Hr) IV Continuous <Continuous>  dextrose 50% Injectable 25 Gram(s) IV Push once  dextrose 50% Injectable 12.5 Gram(s) IV Push once  dextrose 50% Injectable 25 Gram(s) IV Push once  escitalopram 10 milliGRAM(s) Oral daily  ferrous    sulfate 325 milliGRAM(s) Oral daily  glucagon  Injectable 1 milliGRAM(s) IntraMuscular once  influenza   Vaccine 0.5 milliLiter(s) IntraMuscular once  insulin glargine Injectable (LANTUS) 6 Unit(s) SubCutaneous at bedtime  insulin lispro (ADMELOG) corrective regimen sliding scale   SubCutaneous three times a day before meals  insulin lispro (ADMELOG) corrective regimen sliding scale   SubCutaneous at bedtime  lactobacillus acidophilus 1 Tablet(s) Oral three times a day with meals  levothyroxine 50 MICROGram(s) Oral daily  lidocaine   4% Patch 2 Patch Transdermal daily  metoprolol tartrate 12.5 milliGRAM(s) Oral two times a day  nystatin Powder 1 Application(s) Topical two times a day  pantoprazole    Tablet 40 milliGRAM(s) Oral before breakfast  sildenafil (REVATIO) 20 milliGRAM(s) Oral two times a day  simvastatin 40 milliGRAM(s) Oral at bedtime    MEDICATIONS  (PRN):  acetaminophen   Tablet .. 650 milliGRAM(s) Oral every 6 hours PRN Temp greater or equal to 38C (100.4F), Mild Pain (1 - 3)  diphenoxylate/atropine 1 Tablet(s) Oral three times a day PRN Diarrhea  lidocaine 2% Gel 1 Application(s) Topical three times a day PRN pain  lidocaine 2% Gel 1 Application(s) Topical five times a day PRN rectal pain  ondansetron Injectable 4 milliGRAM(s) IV Push every 6 hours PRN Nausea and/or Vomiting  sodium chloride 0.65% Nasal 2 Spray(s) Both Nostrils every 3 hours PRN nasal dryness

## 2021-09-18 NOTE — PROGRESS NOTE ADULT - ASSESSMENT
89/F with PMHx of pancreatitis s/p ERCP, cirrhosis, DM2, a-fib on Eliquis, CAD s/p PCI, severe pulm HTN, chronic R heart failure, diastolic dysfunction, AS s/p TAVR, OA, HTN, dyslipidemia, hypothyroidism, cecal mass, recently d/maddy from , sent  to the ED on 9/6/21  from [facility] for c/o Fall, near syncope.     Fall multifactorial , gout, OA, gait disorder dyspnea , generalized weakness  - CT head - no acute pathology, tele -  no arrhythmias, AFIB rate controlled ,  serial cardiac enz neg ,  trauma work: no Fx orthostatic vitals neg ,  neck pain, resolved :  c/w Lidoderm, Tylenol  PO  - PT reevaluate , OOB to the chair bid d/w RN  - polypharmacy - stop xanax, flexeril - centrally acting medications will increase risk of falls   - start vitamin D daily   Severe constipation, Now with Diarrhea, with rectal pain   -  required disimpaction on 9/7 , laxatives  on hold now due to diarrhea  -  stool GI PCR and CDIFF PCR neg , send stool for ova and parasites,  XR reviewed, no obstruction   - stop imodium - not effective  , s/p  lomotil with resolved diarrhea,  will use both meds only prn   - hold  Questran - no diarrhea ,   monitor stool count   - stool for ova and parasites - pending, noted eosinophilia  - GI consult D/w Dr Mobley   Leg edema due to  chronic Right sided heart failure. Severe Pulm HTN ,   AS s/p TAVR,  Acute on chronic diastolic  CHF exacerbation, improved   Permanent  A fib on Eliquis   - Eliquis was held due to bleeding restart Eliquis 9/16 ,  monitor H/H    - CXR 9/6, 9/13 - showing CHF changes,  ECHO: severe pulm HTN, Mod MS, AV prosthesis good Fx, Severe TR. LV mild LVH   - BNP trending down 5000--> 4000--> 3641, daily weight 71 kg   - torsemide 40 mg bid was on hold due to loose stools,  restarted at 40mg PO QD on 9/15 -9/16   - 9/17- seen by cardiologist Dr. Hwang started on metorpolol 25 bid, drop her BP , dose lowered to 12.5 bid as well as lasix 20 mg - not given due to low BP   - supportive O2 via 4 L  ,  C/w Sildenafil  , cardiology and pulmonology consult Dr Camacho and  Dr. Hwang  - 9/18 - BP still bordeline low , HR better, s/p 500 cc slow fluids , will hold lasix or torsemide due to low BP and hyponatremia  - check urine osmolality, bladder scan to r/o retention  Cecal Adenocarcinoma    - Patient is anticipated to undergo surgical procedure in a tertiary care facility given patient's advanced age / extensive chronic medical issues including valvular Dz and severe Pulm HTN , D/w  Dr. Smyth, case was discussed with chief od anesthesia at [facility], Pt is too high risk due to Severe pulm HTN . Not a candidate for Sx   - hem/onc eval appreciated, advises against systemic Tx due to Pts  Fx status and other medical issues and possible adverse effects   Mild normocytic anemia - lower iron po from vid--> qd with vitamin C  , check ferritin, B12, folate  Hypovolemic hyponatremia- monitor for improvements on diuretics, Na 131 --> 128--> 129 ( recalculated for Glu 131)   Epistaxis, resolved , Hematemesis 1 episode due tp  nose bleeding and swallowing blood , resolved  Likely 2/2 dry nasal mucosa on NC, C/w  humidified oxygen  s/p  afrin BID  will stop , may increase BP in elderly   hold asa, restarted  eliquis 2.5 bid 9/16 , monitor H/H   FOBT positive due to nose bleed and colon cancer  Monitor H/H stable, c/w  PPIs  d/w Dr. Mobley ok to restart Eliquis , not ASA   DM 2 with A1C 6.8  - Monitor BS and cover with  ISS,  lantus 6 units hs   Generalized weakness - check vitamin D 30- start vit D  , B12, folate wnl , TSH 1.26 , PT daily     DVT PPX:  Eliquis     Advanced directives  HCP Laith goldman updated [phone] on 9/16, 9/17, 9/18   MOLST completed  DNR/DNI  palliative team consult appreciated       D/w  Palliative team, will arrange family meeting

## 2021-09-19 LAB
ADD ON TEST-SPECIMEN IN LAB: SIGNIFICANT CHANGE UP
ALBUMIN SERPL ELPH-MCNC: 2.3 G/DL — LOW (ref 3.3–5)
ALP SERPL-CCNC: 82 U/L — SIGNIFICANT CHANGE UP (ref 40–120)
ALT FLD-CCNC: 27 U/L — SIGNIFICANT CHANGE UP (ref 12–78)
ANION GAP SERPL CALC-SCNC: 6 MMOL/L — SIGNIFICANT CHANGE UP (ref 5–17)
AST SERPL-CCNC: 28 U/L — SIGNIFICANT CHANGE UP (ref 15–37)
BASOPHILS # BLD AUTO: 0.05 K/UL — SIGNIFICANT CHANGE UP (ref 0–0.2)
BASOPHILS NFR BLD AUTO: 0.7 % — SIGNIFICANT CHANGE UP (ref 0–2)
BILIRUB SERPL-MCNC: 0.5 MG/DL — SIGNIFICANT CHANGE UP (ref 0.2–1.2)
BUN SERPL-MCNC: 21 MG/DL — SIGNIFICANT CHANGE UP (ref 7–23)
CALCIUM SERPL-MCNC: 8.1 MG/DL — LOW (ref 8.5–10.1)
CHLORIDE SERPL-SCNC: 95 MMOL/L — LOW (ref 96–108)
CO2 SERPL-SCNC: 31 MMOL/L — SIGNIFICANT CHANGE UP (ref 22–31)
CREAT SERPL-MCNC: 0.8 MG/DL — SIGNIFICANT CHANGE UP (ref 0.5–1.3)
EOSINOPHIL # BLD AUTO: 0.56 K/UL — HIGH (ref 0–0.5)
EOSINOPHIL NFR BLD AUTO: 8 % — HIGH (ref 0–6)
GLUCOSE SERPL-MCNC: 120 MG/DL — HIGH (ref 70–99)
HCT VFR BLD CALC: 26.7 % — LOW (ref 34.5–45)
HGB BLD-MCNC: 8.8 G/DL — LOW (ref 11.5–15.5)
IMM GRANULOCYTES NFR BLD AUTO: 0.6 % — SIGNIFICANT CHANGE UP (ref 0–1.5)
LYMPHOCYTES # BLD AUTO: 0.78 K/UL — LOW (ref 1–3.3)
LYMPHOCYTES # BLD AUTO: 11.2 % — LOW (ref 13–44)
MAGNESIUM SERPL-MCNC: 2.4 MG/DL — SIGNIFICANT CHANGE UP (ref 1.6–2.6)
MCHC RBC-ENTMCNC: 32.2 PG — SIGNIFICANT CHANGE UP (ref 27–34)
MCHC RBC-ENTMCNC: 33 GM/DL — SIGNIFICANT CHANGE UP (ref 32–36)
MCV RBC AUTO: 97.8 FL — SIGNIFICANT CHANGE UP (ref 80–100)
MONOCYTES # BLD AUTO: 0.78 K/UL — SIGNIFICANT CHANGE UP (ref 0–0.9)
MONOCYTES NFR BLD AUTO: 11.2 % — SIGNIFICANT CHANGE UP (ref 2–14)
NEUTROPHILS # BLD AUTO: 4.76 K/UL — SIGNIFICANT CHANGE UP (ref 1.8–7.4)
NEUTROPHILS NFR BLD AUTO: 68.3 % — SIGNIFICANT CHANGE UP (ref 43–77)
PHOSPHATE SERPL-MCNC: 3.1 MG/DL — SIGNIFICANT CHANGE UP (ref 2.5–4.5)
PLATELET # BLD AUTO: 251 K/UL — SIGNIFICANT CHANGE UP (ref 150–400)
POTASSIUM SERPL-MCNC: 3.3 MMOL/L — LOW (ref 3.5–5.3)
POTASSIUM SERPL-SCNC: 3.3 MMOL/L — LOW (ref 3.5–5.3)
PROT SERPL-MCNC: 6 GM/DL — SIGNIFICANT CHANGE UP (ref 6–8.3)
RBC # BLD: 2.73 M/UL — LOW (ref 3.8–5.2)
RBC # FLD: 15.3 % — HIGH (ref 10.3–14.5)
SODIUM SERPL-SCNC: 132 MMOL/L — LOW (ref 135–145)
WBC # BLD: 6.97 K/UL — SIGNIFICANT CHANGE UP (ref 3.8–10.5)
WBC # FLD AUTO: 6.97 K/UL — SIGNIFICANT CHANGE UP (ref 3.8–10.5)

## 2021-09-19 PROCEDURE — 99232 SBSQ HOSP IP/OBS MODERATE 35: CPT

## 2021-09-19 RX ORDER — CHOLESTYRAMINE 4 G/9G
4 POWDER, FOR SUSPENSION ORAL DAILY
Refills: 0 | Status: DISCONTINUED | OUTPATIENT
Start: 2021-09-19 | End: 2021-09-20

## 2021-09-19 RX ORDER — SODIUM CHLORIDE 9 MG/ML
250 INJECTION INTRAMUSCULAR; INTRAVENOUS; SUBCUTANEOUS ONCE
Refills: 0 | Status: COMPLETED | OUTPATIENT
Start: 2021-09-19 | End: 2021-09-19

## 2021-09-19 RX ORDER — POTASSIUM CHLORIDE 20 MEQ
10 PACKET (EA) ORAL
Refills: 0 | Status: COMPLETED | OUTPATIENT
Start: 2021-09-19 | End: 2021-09-19

## 2021-09-19 RX ORDER — METOPROLOL TARTRATE 50 MG
12.5 TABLET ORAL DAILY
Refills: 0 | Status: DISCONTINUED | OUTPATIENT
Start: 2021-09-20 | End: 2021-09-21

## 2021-09-19 RX ADMIN — LIDOCAINE 2 PATCH: 4 CREAM TOPICAL at 09:35

## 2021-09-19 RX ADMIN — Medication 10 MILLIEQUIVALENT(S): at 09:32

## 2021-09-19 RX ADMIN — SIMVASTATIN 40 MILLIGRAM(S): 20 TABLET, FILM COATED ORAL at 20:08

## 2021-09-19 RX ADMIN — Medication 10 MILLIEQUIVALENT(S): at 13:27

## 2021-09-19 RX ADMIN — APIXABAN 2.5 MILLIGRAM(S): 2.5 TABLET, FILM COATED ORAL at 20:08

## 2021-09-19 RX ADMIN — Medication 325 MILLIGRAM(S): at 09:33

## 2021-09-19 RX ADMIN — NYSTATIN CREAM 1 APPLICATION(S): 100000 CREAM TOPICAL at 09:30

## 2021-09-19 RX ADMIN — Medication 1 DROP(S): at 13:27

## 2021-09-19 RX ADMIN — Medication 500 MILLIGRAM(S): at 09:34

## 2021-09-19 RX ADMIN — APIXABAN 2.5 MILLIGRAM(S): 2.5 TABLET, FILM COATED ORAL at 09:34

## 2021-09-19 RX ADMIN — Medication 50 MICROGRAM(S): at 05:11

## 2021-09-19 RX ADMIN — LIDOCAINE 2 PATCH: 4 CREAM TOPICAL at 20:49

## 2021-09-19 RX ADMIN — Medication 2 MILLIGRAM(S): at 10:38

## 2021-09-19 RX ADMIN — TRAMADOL HYDROCHLORIDE 25 MILLIGRAM(S): 50 TABLET ORAL at 04:14

## 2021-09-19 RX ADMIN — Medication 1 TABLET(S): at 09:33

## 2021-09-19 RX ADMIN — Medication 2: at 12:42

## 2021-09-19 RX ADMIN — Medication 200 MILLIGRAM(S): at 09:33

## 2021-09-19 RX ADMIN — ESCITALOPRAM OXALATE 10 MILLIGRAM(S): 10 TABLET, FILM COATED ORAL at 09:32

## 2021-09-19 RX ADMIN — LIDOCAINE 1 APPLICATION(S): 4 CREAM TOPICAL at 00:03

## 2021-09-19 RX ADMIN — Medication 1 DROP(S): at 05:11

## 2021-09-19 RX ADMIN — LIDOCAINE 1 PATCH: 4 CREAM TOPICAL at 05:37

## 2021-09-19 RX ADMIN — Medication 1 DROP(S): at 20:08

## 2021-09-19 RX ADMIN — PANTOPRAZOLE SODIUM 40 MILLIGRAM(S): 20 TABLET, DELAYED RELEASE ORAL at 09:33

## 2021-09-19 RX ADMIN — CHOLESTYRAMINE 4 GRAM(S): 4 POWDER, FOR SUSPENSION ORAL at 12:42

## 2021-09-19 RX ADMIN — Medication 20 MILLIGRAM(S): at 20:08

## 2021-09-19 RX ADMIN — Medication 1 TABLET(S): at 13:27

## 2021-09-19 RX ADMIN — LIDOCAINE 2 PATCH: 4 CREAM TOPICAL at 08:10

## 2021-09-19 RX ADMIN — TRAMADOL HYDROCHLORIDE 25 MILLIGRAM(S): 50 TABLET ORAL at 20:07

## 2021-09-19 RX ADMIN — INSULIN GLARGINE 6 UNIT(S): 100 INJECTION, SOLUTION SUBCUTANEOUS at 20:22

## 2021-09-19 RX ADMIN — Medication 1 TABLET(S): at 17:50

## 2021-09-19 RX ADMIN — Medication 20 MILLIGRAM(S): at 09:33

## 2021-09-19 RX ADMIN — NYSTATIN CREAM 1 APPLICATION(S): 100000 CREAM TOPICAL at 20:08

## 2021-09-19 RX ADMIN — Medication 2000 UNIT(S): at 09:32

## 2021-09-19 RX ADMIN — Medication 10 MILLIEQUIVALENT(S): at 18:28

## 2021-09-19 RX ADMIN — SODIUM CHLORIDE 250 MILLILITER(S): 9 INJECTION INTRAMUSCULAR; INTRAVENOUS; SUBCUTANEOUS at 17:50

## 2021-09-19 RX ADMIN — TRAMADOL HYDROCHLORIDE 25 MILLIGRAM(S): 50 TABLET ORAL at 03:14

## 2021-09-19 RX ADMIN — TRAMADOL HYDROCHLORIDE 25 MILLIGRAM(S): 50 TABLET ORAL at 21:07

## 2021-09-19 NOTE — PROGRESS NOTE ADULT - ASSESSMENT
Acute on Chronic decompensated HFPEF- hypervolemic.  Pedal edema persistent.  NO respiratory distress. increased rales on physical exam but will monitor. if there is any deterioration in respiration signs she will need diuretics.   slight improvement in sodium.  poor po intake   will hold off diuresis for now.    Atrial fibrillation-rate controlled   metoprolol low dose as tolerated 12.5mg bid   continue current meds including apixaban    d/w Dr Smith    Colon mass- malignancy- Colorectal surgery team following pt.    Other medical issues- Management per primary team.   Thank you for allowing me to participate in the care of this patient. Please feel free to contact me with any questions.     Other medical issues- Management per primary team.   Thank you for allowing me to participate in the care of this patient. Please feel free to contact me with any questions.

## 2021-09-19 NOTE — PROGRESS NOTE ADULT - SUBJECTIVE AND OBJECTIVE BOX
CC: rectal irritation and pain, watery diarrhea     HPI: 89/F with PMHx of pancreatitis s/p ERCP, cirrhosis, DM2, a-fib on Eliquis, CAD s/p PCI, severe pulm HTN, chronic R heart failure, diastolic dysfunction, AS s/p TAVR, OA, HTN, dyslipidemia, hypothyroidism, cecal mass, recently d/maddy from , sent  to the ED on 9/6/21  from [facility] for c/o Fall, near syncope. Pt states she was trying to put boxes under the chair, when she fell and hit her head, now has a small abrasion. Also c/o left shoulder pain.  She has been become more weak and unsteady recently.  As per Son, she has gained 20 Lbs in [facility]. Her trauma work up neg in ED    9/16  Pt was seen and examined this am,  + rectal imitation pain after multiple episodes of diarrhea , no nose bleed, no hematemesis, denies cp, dyspnea, on O2 supplementation 5 L , afebrile, + leg edema, + gen weakness  9/17 - pt seen and examined earlier today , + 1 bm since last night, feels better, noted elevated HR , started on metoprolol by cardiologist, tolerating po intake, OOB in the chair  9/18 - pt seen and examined, + gen weakness, no bm since Thursday night, BP slightly better, reports feeling thirsty, afebrile , in the bed , denies dyspnea, abdominal pain, leg edema stable  9/19 - pt seen and examined earlier in am , feels weak, denies cp, dyspnea, abdominal pain , plan discussed     Review of system- Rest of the review of system are negative except mentioned in HPI    Vital Signs Last 24 Hrs  T(C): 36.4 (09-19-21 @ 15:29), Max: 36.6 (09-19-21 @ 03:02)  T(F): 97.5 (09-19-21 @ 15:29), Max: 97.9 (09-19-21 @ 03:02)  HR: 85 (09-19-21 @ 15:29) (68 - 85)  BP: 87/52 (09-19-21 @ 15:29) (87/52 - 105/47)  RR: 18 (09-19-21 @ 15:29) (18 - 18)  SpO2: 97% (09-19-21 @ 15:29) (97% - 99%)  Wt(kg): --  CAPILLARY BLOOD GLUCOSE  POCT Blood Glucose.: 126 mg/dL (19 Sep 2021 17:23)  POCT Blood Glucose.: 184 mg/dL (19 Sep 2021 12:29)  POCT Blood Glucose.: 130 mg/dL (19 Sep 2021 08:15)  POCT Blood Glucose.: 145 mg/dL (18 Sep 2021 21:18)    PHYSICAL EXAM:  General:  malnourished; frail elderly female   Eyes: EOMI; conjunctiva and sclera clear, dry mucosa   Head: Normocephalic; atraumatic  ENMT: No nasal discharge; airway clear  Neck: Supple; non tender; no masses  Respiratory: Decreased BS,  crepitations and rales at bases b/l, anterior and posterior  fields   Cardiovascular: Irregular rate and rhythm. S1 and S2 Normal;  Gastrointestinal: Soft non-tender non-distended; Normal bowel sounds.   Genitourinary: No  suprapubic  tenderness  Extremities: +2 b/l LE   edema  Vascular: Peripheral pulses palpable 2+ bilaterally  Neurological: Alert and oriented x2-3, non focal   Musculoskeletal: Normal muscle  strength, L neck  muscle tension improved  Psychiatric: Cooperative       LABS and radiology studies reviewed :   09-19    132<L>  |  95<L>  |  21  ----------------------------<  120<H>  3.3<L>   |  31  |  0.80    Ca    8.1<L>      19 Sep 2021 06:01  Phos  3.1     09-19  Mg     2.4     09-19    TPro  6.0  /  Alb  2.3<L>  /  TBili  0.5  /  DBili  x   /  AST  28  /  ALT  27  /  AlkPhos  82  09-19              8.8    6.97  )-----------( 251      ( 19 Sep 2021 06:01 )             26.7     LIVER FUNCTIONS - ( 19 Sep 2021 06:01 )  Alb: 2.3 g/dL / Pro: 6.0 gm/dL / ALK PHOS: 82 U/L / ALT: 27 U/L / AST: 28 U/L / GGT: x                    09-16    131<L>  |  94<L>  |  18  ----------------------------<  206<H>  3.7   |  30  |  0.73    Ca    8.5      16 Sep 2021 08:12   Serum Pro-Brain Natriuretic Peptide (09.18.21 @ 07:00)    Serum Pro-Brain Natriuretic Peptide: 5606 pg/mL      Serum Pro-Brain Natriuretic Peptide (09.16.21 @ 08:12)    Serum Pro-Brain Natriuretic Peptide: 4416 pg/mL    Serum Pro-Brain Natriuretic Peptide (09.15.21 @ 06:05)    Serum Pro-Brain Natriuretic Peptide: 5346 pg/mL    Iron with Total Binding Capacity in AM (08.18.21 @ 06:44)    Iron - Total Binding Capacity.: 281 ug/dL    % Saturation, Iron: 20 %    Iron Total, Serum: 56 ug/dL    Unsaturated Iron Binding Capacity: 226 ug/dL    Ferritin, Serum in AM (08.18.21 @ 06:44)    Ferritin, Serum: 82 ng/mL    Vitamin B12, Serum in AM (07.16.20 @ 10:02)    Vitamin B12, Serum: 578 pg/mL             Troponin I, Serum Repeat 3 hours x 2 occurrences (09.06.21 @ 19:13)    Troponin I, Serum: <0.015: High Sensitivity Troponin and new reference  range effective 7/6/2016 ng/mL    < from: 12 Lead ECG (09.06.21 @ 11:03) >  Ventricular Rate 96 BPM    Atrial Rate 92 BPM    QRS Duration 130 ms    Q-T Interval 356 ms    QTC Calculation(Bazett) 449 ms    R Axis -76 degrees    T Axis 62 degrees    Diagnosis Line Atrial fibrillation  Left axis deviation  Non-specific intra-ventricular conduction block  Possible Anterolateral infarct , age undetermined  Abnormal ECG  When compared with ECG of 11-AUG-2021 12:09,  Nonspecific T wave abnormality, improved in Lateral leads  QT has shortened    < end of copied text >  GI PCR Panel, Stool (09.12.21 @ 21:10)    Culture Results:   GI PCR Results: NOT detected    Culture - Urine (09.06.21 @ 11:09)    -  Vancomycin: S 4    -  Levofloxacin: S 2    -  Nitrofurantoin: S <=32 Should not be used to treat pyelonephritis.    -  Tetra/Doxy: R >8    -  Ampicillin: S <=2 Predicts results to ampicillin/sulbactam, amoxacillin-clavulanate and  piperacillin-tazobactam.    -  Ciprofloxacin: S <=1    Specimen Source: Clean Catch Clean Catch (Midstream)    Culture Results:   10,000 - 49,000 CFU/mL Enterococcus faecalis  <10,000 CFU/ml Normal Urogenital abel present    Organism Identification: Enterococcus faecalis    Organism: Enterococcus faecalis    Method Type: [facility]    RADIOLOGY & ADDITIONAL TESTS:  < from: Xray Abdomen 1 View PORTABLE -Routine (Xray Abdomen 1 View PORTABLE -Routine .) (09.13.21 @ 22:31) >  Frontal view of the abdomen shows a normal gas pattern. IVC filter is again noted. No definite free air is identified.    IMPRESSION:  No evidence of bowel obstruction.    < from: Xray Chest 1 View- PORTABLE-Routine (Xray Chest 1 View- PORTABLE-Routine .) (09.13.21 @ 10:08) >  Frontal expiratory view of the chest shows the heart to be similar in size. Aortic valve stent is again noted.    The lungs show slight increase in pulmonary congestion with small pleural effusions and there is no evidence of pneumothorax.  IMPRESSION:  Congestive changes as noted.    < from: Xray Shoulder 2 Views, Left (09.06.21 @ 12:05) >  No fracture or dislocation. No focal osseous lesion. Underlying degenerative changes are stable    IMPRESSION:   No fracture.    < from: CT Head No Cont (09.06.21 @ 10:56) >  IMPRESSION:    CT HEAD: No acute abnormality. Moderate atrophy most prominent in the BILATERAL temporal lobes.    CT cervical spine:   No vertebral fracture is recognized.  Multilevel degenerative disc disease and spondylosis at C3-4 through C6-7 with loss of disc height and associated degenerative endplate changes. There is narrowing of the LEFT C2-3, LEFT C3-4 LEFT C4-5 and RIGHT C5-C6 and C6-7 neural foramina due to uncovertebral spurring and facet osteophytic hypertrophy.    MEDICATIONS  (STANDING):  allopurinol 200 milliGRAM(s) Oral daily  apixaban 2.5 milliGRAM(s) Oral every 12 hours  artificial  tears Solution 1 Drop(s) Both EYES three times a day  ascorbic acid 500 milliGRAM(s) Oral daily  cholecalciferol 2000 Unit(s) Oral daily  dextrose 40% Gel 15 Gram(s) Oral once  dextrose 5%. 1000 milliLiter(s) (50 mL/Hr) IV Continuous <Continuous>  dextrose 5%. 1000 milliLiter(s) (100 mL/Hr) IV Continuous <Continuous>  dextrose 50% Injectable 25 Gram(s) IV Push once  dextrose 50% Injectable 12.5 Gram(s) IV Push once  dextrose 50% Injectable 25 Gram(s) IV Push once  escitalopram 10 milliGRAM(s) Oral daily  ferrous    sulfate 325 milliGRAM(s) Oral daily  glucagon  Injectable 1 milliGRAM(s) IntraMuscular once  influenza   Vaccine 0.5 milliLiter(s) IntraMuscular once  insulin glargine Injectable (LANTUS) 6 Unit(s) SubCutaneous at bedtime  insulin lispro (ADMELOG) corrective regimen sliding scale   SubCutaneous three times a day before meals  insulin lispro (ADMELOG) corrective regimen sliding scale   SubCutaneous at bedtime  lactobacillus acidophilus 1 Tablet(s) Oral three times a day with meals  levothyroxine 50 MICROGram(s) Oral daily  lidocaine   4% Patch 2 Patch Transdermal daily  metoprolol tartrate 12.5 milliGRAM(s) Oral two times a day  nystatin Powder 1 Application(s) Topical two times a day  pantoprazole    Tablet 40 milliGRAM(s) Oral before breakfast  sildenafil (REVATIO) 20 milliGRAM(s) Oral two times a day  simvastatin 40 milliGRAM(s) Oral at bedtime    MEDICATIONS  (PRN):  acetaminophen   Tablet .. 650 milliGRAM(s) Oral every 6 hours PRN Temp greater or equal to 38C (100.4F), Mild Pain (1 - 3)  diphenoxylate/atropine 1 Tablet(s) Oral three times a day PRN Diarrhea  lidocaine 2% Gel 1 Application(s) Topical three times a day PRN pain  lidocaine 2% Gel 1 Application(s) Topical five times a day PRN rectal pain  ondansetron Injectable 4 milliGRAM(s) IV Push every 6 hours PRN Nausea and/or Vomiting  sodium chloride 0.65% Nasal 2 Spray(s) Both Nostrils every 3 hours PRN nasal dryness

## 2021-09-19 NOTE — PROGRESS NOTE ADULT - SUBJECTIVE AND OBJECTIVE BOX
Patient is a 89y old  Female who presents with a chief complaint of near syncope, fall.       HPI:  89/F with PMHx of pancreatitis s/p ERCP, cirrhosis, DM2, a-fib on Eliquis, CAD s/p PCI, severe pulm HTN, chronic R heart failure, diastolic dysfunction, AS s/p TAVR, OA, HTN, dyslipidemia, hypothyroidism. cecal mass, recently d/maddy from , sent  to the ED from [facility] for c/o Fall.     -   Pt states that the aide did not put brakes on her chair and left and when she was trying to put boxes under the chair , the chair kept moving even though she tried to put brakes on by herself and she fell down.  Denies any loss of consciousness.  She denies any other symptoms now.  - pt seen this am.  Pt denies any symptoms this am.     - pt seen this am.  She denies any SOB at rest.     - pt seen this am. denies any symptoms.     - pt seen this am.  she appears confused.   PAST MEDICAL & SURGICAL HISTORY:  Diabetes Mellitus Type II    Dyslipidemia    GERD (Gastroesophageal Reflux Disease)    History of Osteoarthritis    Hypertension    CAD (coronary artery disease)    Afib    Hypothyroid    HLD (hyperlipidemia)    History of pancreatitis    Aortic stenosis    H/O: Hysterectomy    H/O: Knee Surgery- right meniscus    History of cholecystectomy    History of appendectomy    H/O total knee replacement, left    S/P IVC filter  Note that Pt does not have  h/o DVT, outPt doppler was read first as +, but after review reported as no DVT. Pt got IVC filer before that        MEDICATIONS  (STANDING):  allopurinol 200 milliGRAM(s) Oral daily  apixaban 2.5 milliGRAM(s) Oral two times a day  artificial  tears Solution 1 Drop(s) Both EYES three times a day  aspirin  chewable 81 milliGRAM(s) Oral daily  dextrose 40% Gel 15 Gram(s) Oral once  dextrose 5%. 1000 milliLiter(s) (50 mL/Hr) IV Continuous <Continuous>  dextrose 5%. 1000 milliLiter(s) (100 mL/Hr) IV Continuous <Continuous>  dextrose 50% Injectable 25 Gram(s) IV Push once  dextrose 50% Injectable 12.5 Gram(s) IV Push once  dextrose 50% Injectable 25 Gram(s) IV Push once  escitalopram 10 milliGRAM(s) Oral daily  ferrous    sulfate 325 milliGRAM(s) Oral two times a day  glucagon  Injectable 1 milliGRAM(s) IntraMuscular once  influenza   Vaccine 0.5 milliLiter(s) IntraMuscular once  insulin lispro (ADMELOG) corrective regimen sliding scale   SubCutaneous three times a day before meals  insulin lispro (ADMELOG) corrective regimen sliding scale   SubCutaneous at bedtime  levothyroxine 50 MICROGram(s) Oral daily  lidocaine   4% Patch 1 Patch Transdermal daily  pantoprazole    Tablet 40 milliGRAM(s) Oral before breakfast  psyllium Powder 1 Packet(s) Oral daily  sildenafil (REVATIO) 20 milliGRAM(s) Oral two times a day  simvastatin 40 milliGRAM(s) Oral at bedtime  torsemide 40 milliGRAM(s) Oral two times a day    MEDICATIONS  (PRN):  acetaminophen   Tablet .. 650 milliGRAM(s) Oral every 6 hours PRN Temp greater or equal to 38C (100.4F), Mild Pain (1 - 3)      FAMILY HISTORY:  FH: diabetes mellitus  both parents    FH: heart disease        SOCIAL HISTORY: no recent smoking      REVIEW OF SYSTEMS:  CONSTITUTIONAL:    No fatigue, malaise, lethargy.  No fever or chills.  RESPIRATORY:  No cough.  No wheeze.  No hemoptysis.  No shortness of breath.  CARDIOVASCULAR:  No chest pains.  No palpitations. No shortness of breath, No orthopnea or PND.    GASTROINTESTINAL:  No abdominal pain.  No nausea or vomiting.    GENITOURINARY:    No hematuria.    MUSCULOSKELETAL:  No musculoskeletal pain.  No joint swelling.  No arthritis.  NEUROLOGICAL:  No tingling or numbness or weakness.  PSYCHIATRIC:  No confusion  SKIN:  No rashes.          ICU Vital Signs Last 24 Hrs  T(C): 36.4 (19 Sep 2021 08:57), Max: 36.6 (19 Sep 2021 03:02)  T(F): 97.5 (19 Sep 2021 08:57), Max: 97.9 (19 Sep 2021 03:02)  HR: 68 (19 Sep 2021 08:57) (66 - 83)  BP: 92/45 (19 Sep 2021 08:57) (92/45 - 105/47)  BP(mean): --  ABP: --  ABP(mean): --  RR: 18 (19 Sep 2021 08:57) (18 - 18)  SpO2: 97% (19 Sep 2021 08:57) (97% - 100%)          PHYSICAL EXAM-    Constitutional: elderly frail female in no acute distress     Head: Head is normocephalic and atraumatic.      Neck: No JVD.     Cardiovascular: irregular rate    Respiratory: Basal rales b/l     Abdomen: Soft, nontender, nondistended with positive bowel sounds.      Extremity: No tenderness. 1-2 + b/l pedal  pitting edema     Neurologic: The patient is confused    Skin: No rash, no obvious lesions noted.      Psychiatric: The patient appears to be emotionally stable.      INTERPRETATION OF TELEMETRY: not on tele    ECG: atrial fibrillation.   I&O's Detail    06 Sep 2021 07:01  -  07 Sep 2021 07:00  --------------------------------------------------------  IN:  Total IN: 0 mL    OUT:    Incontinent per Collection Bag (mL): 900 mL  Total OUT: 900 mL    Total NET: -900 mL          LABS:                        8.8    6.97  )-----------( 251      ( 19 Sep 2021 06:01 )             26.7     09-19    132<L>  |  95<L>  |  21  ----------------------------<  120<H>  3.3<L>   |  31  |  0.80    Ca    8.1<L>      19 Sep 2021 06:01  Phos  3.1       Mg     2.4         TPro  6.0  /  Alb  2.3<L>  /  TBili  0.5  /  DBili  x   /  AST  28  /  ALT  27  /  AlkPhos  82  09-19        LIVER FUNCTIONS - ( 19 Sep 2021 06:01 )  Alb: 2.3 g/dL / Pro: 6.0 gm/dL / ALK PHOS: 82 U/L / ALT: 27 U/L / AST: 28 U/L / GGT: x                           PT/INR - ( 06 Sep 2021 11:16 )   PT: 19.8 sec;   INR: 1.74 ratio         PTT - ( 06 Sep 2021 11:16 )  PTT:35.4 sec  Urinalysis Basic - ( 06 Sep 2021 11:09 )    Color: Yellow / Appearance: Clear / S.015 / pH: x  Gluc: x / Ketone: Negative  / Bili: Negative / Urobili: Negative mg/dL   Blood: x / Protein: 15 mg/dL / Nitrite: Negative   Leuk Esterase: Trace / RBC: Negative /HPF / WBC 11-25   Sq Epi: x / Non Sq Epi: Moderate / Bacteria: Few      I&O's Summary    06 Sep 2021 07:01  -  07 Sep 2021 07:00  --------------------------------------------------------  IN: 0 mL / OUT: 900 mL / NET: -900 mL      BNP  RADIOLOGY & ADDITIONAL STUDIES:  < from: CT Head No Cont (21 @ 10:56) >    IMPRESSION:    CT HEAD: No acute abnormality. Moderate atrophy most prominent in the BILATERAL temporal lobes.    CT cervical spine:   No vertebral fracture is recognized.  Multilevel degenerative disc disease and spondylosis at C3-4 through C6-7 with loss of disc height and associated degenerative endplate changes. There is narrowing of the LEFT C2-3, LEFT C3-4 LEFT C4-5 and RIGHT C5-C6 and C6-7 neural foramina due to uncovertebral spurring and facet osteophytic hypertrophy.    --- End of Report ---            FRANCESCA BOSTON MD; Attending Radiologist  This document has been electronically signed. Sep  6 2021 11:05AM    < end of copied text >  < from: TTE Echo Complete w/ Contrast w/ Doppler (21 @ 16:09) >     Impression     Summary     Moderate mitral annular calcification is present.   Moderate mitral stenosis is present.   Mild (1+) mitral regurgitation is present.   Well seated prosthetic valve in the aortic position. suboptimal doppler   interrogation   Severe (4+) tricuspid valve regurgitation is present.   The tricuspid valve leaflets appear mildly thickened open well.   Severe pulmonary hypertension.   Normal appearing pulmonic valve structure.   Mild pulmonic valvular regurgitation (1+) is present.     technical difficult study     Signature     ----------------------------------------------------------------   Electronically signed by Venugopal Palla, MD([facility]   physician) on 2021 05:38 PM   ----------------------------------------------------------------    < end of copied text >

## 2021-09-19 NOTE — PROGRESS NOTE ADULT - ASSESSMENT
89/F with PMHx of pancreatitis s/p ERCP, cirrhosis, DM2, a-fib on Eliquis, CAD s/p PCI, severe pulm HTN, chronic R heart failure, diastolic dysfunction, AS s/p TAVR, OA, HTN, dyslipidemia, hypothyroidism, cecal mass, recently d/maddy from , sent  to the ED on 9/6/21  from [facility] for c/o Fall, near syncope.     Fall multifactorial , gout, OA, gait disorder dyspnea , generalized weakness  - CT head - no acute pathology, tele -  no arrhythmias, AFIB rate controlled ,  serial cardiac enz neg ,  trauma work: no Fx orthostatic vitals neg ,  neck pain, resolved :  c/w Lidoderm, Tylenol  PO  - PT reevaluate , OOB to the chair bid d/w RN  - polypharmacy - stop xanax, flexeril - centrally acting medications will increase risk of falls   - start vitamin D daily  , vit D 30   Severe constipation, Now with Diarrhea, with rectal pain   -  required disimpaction on 9/7 , laxatives  on hold now due to diarrhea  -  stool GI PCR and CDIFF PCR neg , send stool for ova and parasites,  XR reviewed, no obstruction   - stop imodium - not effective  , s/p  lomotil with resolved diarrhea,  will use both meds only prn   - c/w daily Questran , imodium prn  ,   monitor stool count   - stool for ova and parasites - pending, noted eosinophilia  - GI consult D/w Dr Mobley   Leg edema due to  chronic Right sided heart failure. Severe Pulm HTN ,   AS s/p TAVR,  Acute on chronic diastolic  CHF exacerbation, improved   Permanent  A fib on Eliquis   - Eliquis was held due to bleeding restart Eliquis 9/16 ,  monitor H/H    - CXR 9/6, 9/13 - showing CHF changes,  ECHO: severe pulm HTN, Mod MS, AV prosthesis good Fx, Severe TR. LV mild LVH   - BNP trending down 5000--> 4000--> 3641--> 7000, daily weight 71 kg   - torsemide 40 mg bid was on hold due to loose stools,  restarted at 40mg PO QD on 9/15 -9/16   - 9/17- seen by cardiologist Dr. Hwang started on metorpolol 25 bid, drop her BP , dose lowered to 12.5 bid as well as lasix 20 mg - not given due to low BP   - supportive O2 via 4 L  ,  C/w Sildenafil  , cardiology and pulmonology consult Dr Camacho and  Dr. Hwang  - 9/18 - BP still bordeline low , HR better, s/p 500 cc slow fluids , will hold lasix or torsemide due to low BP and hyponatremia  - check urine osmolality, bladder scan to r/o retention  Cecal Adenocarcinoma    - Patient is anticipated to undergo surgical procedure in a tertiary care facility given patient's advanced age / extensive chronic medical issues including valvular Dz and severe Pulm HTN , D/w  Dr. Smyth, case was discussed with chief od anesthesia at [facility], Pt is too high risk due to Severe pulm HTN . Not a candidate for Sx   - hem/onc eval appreciated, advises against systemic Tx due to Pts  Fx status and other medical issues and possible adverse effects   Mild normocytic anemia - lower iron po from vid--> qd with vitamin C  , check ferritin, B12, folate  Hypovolemic hyponatremia- monitor for improvements on diuretics, Na 131 --> 128--> 129 ( recalculated for Glu 131) --> 132  Epistaxis, resolved , Hematemesis 1 episode due tp  nose bleeding and swallowing blood , resolved  Likely 2/2 dry nasal mucosa on NC, C/w  humidified oxygen   s/p  afrin BID  will stop , may increase BP in elderly    hold asa, restarted  eliquis 2.5 bid 9/16 , monitor H/H  stable  FOBT positive due to nose bleed and colon cancer   Monitor H/H stable, c/w  PPIs  d/w Dr. Mobley ok to restart Eliquis , not ASA   DM 2 with A1C 6.8  - Monitor BS and cover with  ISS,  lantus 6 units hs   Generalized weakness - check vitamin D 30- start vit D  , B12, folate wnl , TSH 1.26 , PT daily     DVT PPX:  Eliquis     Advanced directives  HCP Laith goldman updated [phone] on 9/16, 9/17, 9/18   MOLST completed  DNR/DNI  palliative team consult appreciated       D/w  Palliative team, will arrange family meeting

## 2021-09-20 LAB
ALBUMIN SERPL ELPH-MCNC: 2.2 G/DL — LOW (ref 3.3–5)
ALP SERPL-CCNC: 84 U/L — SIGNIFICANT CHANGE UP (ref 40–120)
ALT FLD-CCNC: 23 U/L — SIGNIFICANT CHANGE UP (ref 12–78)
ANION GAP SERPL CALC-SCNC: 5 MMOL/L — SIGNIFICANT CHANGE UP (ref 5–17)
AST SERPL-CCNC: 19 U/L — SIGNIFICANT CHANGE UP (ref 15–37)
BILIRUB SERPL-MCNC: 0.4 MG/DL — SIGNIFICANT CHANGE UP (ref 0.2–1.2)
BUN SERPL-MCNC: 13 MG/DL — SIGNIFICANT CHANGE UP (ref 7–23)
CALCIUM SERPL-MCNC: 8.5 MG/DL — SIGNIFICANT CHANGE UP (ref 8.5–10.1)
CHLORIDE SERPL-SCNC: 102 MMOL/L — SIGNIFICANT CHANGE UP (ref 96–108)
CO2 SERPL-SCNC: 31 MMOL/L — SIGNIFICANT CHANGE UP (ref 22–31)
CREAT SERPL-MCNC: 0.7 MG/DL — SIGNIFICANT CHANGE UP (ref 0.5–1.3)
CULTURE RESULTS: SIGNIFICANT CHANGE UP
GLUCOSE SERPL-MCNC: 125 MG/DL — HIGH (ref 70–99)
HCT VFR BLD CALC: 28.2 % — LOW (ref 34.5–45)
HGB BLD-MCNC: 9 G/DL — LOW (ref 11.5–15.5)
MCHC RBC-ENTMCNC: 31.9 GM/DL — LOW (ref 32–36)
MCHC RBC-ENTMCNC: 32.4 PG — SIGNIFICANT CHANGE UP (ref 27–34)
MCV RBC AUTO: 101.4 FL — HIGH (ref 80–100)
NT-PROBNP SERPL-SCNC: 5939 PG/ML — HIGH (ref 0–450)
PLATELET # BLD AUTO: 260 K/UL — SIGNIFICANT CHANGE UP (ref 150–400)
POTASSIUM SERPL-MCNC: 3.7 MMOL/L — SIGNIFICANT CHANGE UP (ref 3.5–5.3)
POTASSIUM SERPL-SCNC: 3.7 MMOL/L — SIGNIFICANT CHANGE UP (ref 3.5–5.3)
PROT SERPL-MCNC: 6.2 GM/DL — SIGNIFICANT CHANGE UP (ref 6–8.3)
RBC # BLD: 2.78 M/UL — LOW (ref 3.8–5.2)
RBC # FLD: 15.6 % — HIGH (ref 10.3–14.5)
SARS-COV-2 RNA SPEC QL NAA+PROBE: SIGNIFICANT CHANGE UP
SODIUM SERPL-SCNC: 138 MMOL/L — SIGNIFICANT CHANGE UP (ref 135–145)
SPECIMEN SOURCE: SIGNIFICANT CHANGE UP
WBC # BLD: 6.43 K/UL — SIGNIFICANT CHANGE UP (ref 3.8–10.5)
WBC # FLD AUTO: 6.43 K/UL — SIGNIFICANT CHANGE UP (ref 3.8–10.5)

## 2021-09-20 PROCEDURE — 99232 SBSQ HOSP IP/OBS MODERATE 35: CPT

## 2021-09-20 PROCEDURE — 99232 SBSQ HOSP IP/OBS MODERATE 35: CPT | Mod: 25

## 2021-09-20 PROCEDURE — 71045 X-RAY EXAM CHEST 1 VIEW: CPT | Mod: 26

## 2021-09-20 RX ORDER — CHOLESTYRAMINE 4 G/9G
4 POWDER, FOR SUSPENSION ORAL DAILY
Refills: 0 | Status: DISCONTINUED | OUTPATIENT
Start: 2021-09-20 | End: 2021-09-22

## 2021-09-20 RX ADMIN — Medication 1 TABLET(S): at 12:44

## 2021-09-20 RX ADMIN — Medication 1 DROP(S): at 21:26

## 2021-09-20 RX ADMIN — Medication 200 MILLIGRAM(S): at 10:25

## 2021-09-20 RX ADMIN — APIXABAN 2.5 MILLIGRAM(S): 2.5 TABLET, FILM COATED ORAL at 10:24

## 2021-09-20 RX ADMIN — Medication 650 MILLIGRAM(S): at 22:56

## 2021-09-20 RX ADMIN — PANTOPRAZOLE SODIUM 40 MILLIGRAM(S): 20 TABLET, DELAYED RELEASE ORAL at 10:24

## 2021-09-20 RX ADMIN — TRAMADOL HYDROCHLORIDE 25 MILLIGRAM(S): 50 TABLET ORAL at 21:57

## 2021-09-20 RX ADMIN — Medication 500 MILLIGRAM(S): at 10:24

## 2021-09-20 RX ADMIN — Medication 1 DROP(S): at 06:00

## 2021-09-20 RX ADMIN — TRAMADOL HYDROCHLORIDE 25 MILLIGRAM(S): 50 TABLET ORAL at 06:00

## 2021-09-20 RX ADMIN — Medication 1 TABLET(S): at 10:23

## 2021-09-20 RX ADMIN — Medication 6: at 17:51

## 2021-09-20 RX ADMIN — TRAMADOL HYDROCHLORIDE 25 MILLIGRAM(S): 50 TABLET ORAL at 22:57

## 2021-09-20 RX ADMIN — Medication 20 MILLIGRAM(S): at 21:25

## 2021-09-20 RX ADMIN — APIXABAN 2.5 MILLIGRAM(S): 2.5 TABLET, FILM COATED ORAL at 21:25

## 2021-09-20 RX ADMIN — Medication 50 MICROGRAM(S): at 06:00

## 2021-09-20 RX ADMIN — Medication 650 MILLIGRAM(S): at 23:56

## 2021-09-20 RX ADMIN — INSULIN GLARGINE 6 UNIT(S): 100 INJECTION, SOLUTION SUBCUTANEOUS at 21:26

## 2021-09-20 RX ADMIN — NYSTATIN CREAM 1 APPLICATION(S): 100000 CREAM TOPICAL at 10:26

## 2021-09-20 RX ADMIN — Medication 5 MILLIGRAM(S): at 12:44

## 2021-09-20 RX ADMIN — Medication 325 MILLIGRAM(S): at 10:24

## 2021-09-20 RX ADMIN — TRAMADOL HYDROCHLORIDE 25 MILLIGRAM(S): 50 TABLET ORAL at 07:00

## 2021-09-20 RX ADMIN — CHOLESTYRAMINE 4 GRAM(S): 4 POWDER, FOR SUSPENSION ORAL at 12:44

## 2021-09-20 RX ADMIN — Medication 2: at 12:43

## 2021-09-20 RX ADMIN — LIDOCAINE 2 PATCH: 4 CREAM TOPICAL at 10:25

## 2021-09-20 RX ADMIN — Medication 2000 UNIT(S): at 10:23

## 2021-09-20 RX ADMIN — LIDOCAINE 1 APPLICATION(S): 4 CREAM TOPICAL at 21:52

## 2021-09-20 RX ADMIN — Medication 1 DROP(S): at 14:00

## 2021-09-20 RX ADMIN — Medication 12.5 MILLIGRAM(S): at 10:22

## 2021-09-20 RX ADMIN — ESCITALOPRAM OXALATE 10 MILLIGRAM(S): 10 TABLET, FILM COATED ORAL at 10:23

## 2021-09-20 RX ADMIN — Medication 5 MILLIGRAM(S): at 22:52

## 2021-09-20 RX ADMIN — Medication 1 TABLET(S): at 17:51

## 2021-09-20 RX ADMIN — Medication 20 MILLIGRAM(S): at 10:23

## 2021-09-20 RX ADMIN — Medication 650 MILLIGRAM(S): at 02:54

## 2021-09-20 RX ADMIN — Medication 650 MILLIGRAM(S): at 03:54

## 2021-09-20 RX ADMIN — SIMVASTATIN 40 MILLIGRAM(S): 20 TABLET, FILM COATED ORAL at 21:26

## 2021-09-20 RX ADMIN — NYSTATIN CREAM 1 APPLICATION(S): 100000 CREAM TOPICAL at 21:26

## 2021-09-20 NOTE — PROGRESS NOTE ADULT - SUBJECTIVE AND OBJECTIVE BOX
Patient is a 89y old  Female who presents with a chief complaint of near syncope, fal (20 Sep 2021 12:53)      Subective:  Feels tired and weak  No diarrhea    PAST MEDICAL & SURGICAL HISTORY:  Diabetes Mellitus Type II    Dyslipidemia    GERD (Gastroesophageal Reflux Disease)    History of Osteoarthritis    Hypertension    CAD (coronary artery disease)    Afib    Hypothyroid    HLD (hyperlipidemia)    History of pancreatitis    Aortic stenosis    H/O: Hysterectomy    H/O: Knee Surgery- right meniscus    History of cholecystectomy    History of appendectomy    H/O total knee replacement, left    S/P IVC filter  Note that Pt does not have  h/o DVT, outPt doppler was read first as +, but after review reported as no DVT. Pt got IVC filer before that        MEDICATIONS  (STANDING):  allopurinol 200 milliGRAM(s) Oral daily  apixaban 2.5 milliGRAM(s) Oral every 12 hours  artificial  tears Solution 1 Drop(s) Both EYES three times a day  ascorbic acid 500 milliGRAM(s) Oral daily  cholecalciferol 2000 Unit(s) Oral daily  dextrose 40% Gel 15 Gram(s) Oral once  dextrose 5%. 1000 milliLiter(s) (50 mL/Hr) IV Continuous <Continuous>  dextrose 5%. 1000 milliLiter(s) (100 mL/Hr) IV Continuous <Continuous>  dextrose 50% Injectable 25 Gram(s) IV Push once  dextrose 50% Injectable 12.5 Gram(s) IV Push once  dextrose 50% Injectable 25 Gram(s) IV Push once  escitalopram 10 milliGRAM(s) Oral daily  ferrous    sulfate 325 milliGRAM(s) Oral daily  glucagon  Injectable 1 milliGRAM(s) IntraMuscular once  influenza   Vaccine 0.5 milliLiter(s) IntraMuscular once  insulin glargine Injectable (LANTUS) 6 Unit(s) SubCutaneous at bedtime  insulin lispro (ADMELOG) corrective regimen sliding scale   SubCutaneous three times a day before meals  insulin lispro (ADMELOG) corrective regimen sliding scale   SubCutaneous at bedtime  lactobacillus acidophilus 1 Tablet(s) Oral three times a day with meals  levothyroxine 50 MICROGram(s) Oral daily  lidocaine   4% Patch 2 Patch Transdermal daily  metoprolol tartrate 12.5 milliGRAM(s) Oral daily  nystatin Powder 1 Application(s) Topical two times a day  pantoprazole    Tablet 40 milliGRAM(s) Oral before breakfast  sildenafil (REVATIO) 20 milliGRAM(s) Oral two times a day  simvastatin 40 milliGRAM(s) Oral at bedtime  torsemide 5 milliGRAM(s) Oral daily    MEDICATIONS  (PRN):  acetaminophen   Tablet .. 650 milliGRAM(s) Oral every 6 hours PRN Temp greater or equal to 38C (100.4F), Mild Pain (1 - 3)  lidocaine 2% Gel 1 Application(s) Topical five times a day PRN rectal pain  lidocaine 2% Gel 1 Application(s) Topical three times a day PRN pain  loperamide 2 milliGRAM(s) Oral three times a day PRN Diarrhea  ondansetron Injectable 4 milliGRAM(s) IV Push every 6 hours PRN Nausea and/or Vomiting  sodium chloride 0.65% Nasal 2 Spray(s) Both Nostrils every 3 hours PRN nasal dryness  traMADol 25 milliGRAM(s) Oral every 6 hours PRN Severe Pain (7 - 10)      REVIEW OF SYSTEMS:    RESPIRATORY: No shortness of breath  CARDIOVASCULAR: No chest pain  All other review of systems is negative unless indicated above.    Vital Signs Last 24 Hrs  T(C): 36.4 (20 Sep 2021 15:06), Max: 36.6 (19 Sep 2021 20:15)  T(F): 97.6 (20 Sep 2021 15:06), Max: 97.9 (20 Sep 2021 10:15)  HR: 68 (20 Sep 2021 15:06) (68 - 105)  BP: 96/51 (20 Sep 2021 15:06) (96/51 - 122/64)  BP(mean): --  RR: 17 (20 Sep 2021 15:06) (16 - 18)  SpO2: 99% (20 Sep 2021 15:06) (97% - 99%)    PHYSICAL EXAM:    Constitutional: NAD, chronically ill appearing  Respiratory: CTAB  Cardiovascular: S1 and S2,  Gastrointestinal: BS+, soft, NT/ND  Psychiatric: Normal mood, normal affect    LABS:                        9.0    6.43  )-----------( 260      ( 20 Sep 2021 09:07 )             28.2     09-20    138  |  102  |  13  ----------------------------<  125<H>  3.7   |  31  |  0.70    Ca    8.5      20 Sep 2021 09:07  Phos  3.1     09-19  Mg     2.4     09-19    TPro  6.2  /  Alb  2.2<L>  /  TBili  0.4  /  DBili  x   /  AST  19  /  ALT  23  /  AlkPhos  84  09-20      LIVER FUNCTIONS - ( 20 Sep 2021 09:07 )  Alb: 2.2 g/dL / Pro: 6.2 gm/dL / ALK PHOS: 84 U/L / ALT: 23 U/L / AST: 19 U/L / GGT: x             RADIOLOGY & ADDITIONAL STUDIES:

## 2021-09-20 NOTE — PROGRESS NOTE ADULT - SUBJECTIVE AND OBJECTIVE BOX
HPI: pt seen and examined with no family at bedside, patient states that she is feeling better today was sitting up in the chair. Stated that decreased amounts of Diarrhea no BM today 2 in the last 24 hours.  Patient requesting to not return to [facility].     PAIN: ( )Yes   (x )No  patient denies     DYSPNEA: (x ) Yes  ( ) No  Level: as per patient worse with excretion - has supplemental O2 in place      Review of Systems:    Anxiety- no   Depression- no   Physical Discomfort- no   Dyspnea- yes  Constipation- no   Diarrhea-  yes   Nausea- no   Vomiting- no   Anorexia- no   Weight Loss- no    Cough- at times   Secretions- no   Fatigue- yes   Weakness- yes   Delirium- no     All other systems reviewed and negative    PHYSICAL EXAM:    Vital Signs Last 24 Hrs  T(C): 36.6 (20 Sep 2021 10:15), Max: 36.6 (19 Sep 2021 20:15)  T(F): 97.9 (20 Sep 2021 10:15), Max: 97.9 (20 Sep 2021 10:15)  HR: 80 (20 Sep 2021 10:15) (80 - 105)  BP: 122/64 (20 Sep 2021 10:15) (87/52 - 122/64)  RR: 16 (20 Sep 2021 10:15) (16 - 18)  SpO2: 98% (20 Sep 2021 10:15) (97% - 98%)  Daily Weight in k.3 (20 Sep 2021 05:51)    PPSV2: 20-30  %    General: pleasant elderly female in bed   Mental Status: alert and oriented  HEENT: Nasal cannula in place, mmm   Lungs: diminished breath sounds   Cardiac: S1S2 +   GI: Soft non-tender non-distended; Normal bowel sounds  : No  suprapubic  tenderness  Ext: mild edema present   Neuro: intact     LABS:                        9.0    6.43  )-----------( 260      ( 20 Sep 2021 09:07 )             28.2     09-20    138  |  102  |  13  ----------------------------<  125<H>  3.7   |  31  |  0.70    Ca    8.5      20 Sep 2021 09:07  Phos  3.1     09-19  Mg     2.4     09-19    TPro  6.2  /  Alb  2.2<L>  /  TBili  0.4  /  DBili  x   /  AST  19  /  ALT  23  /  AlkPhos  84      Albumin: Albumin, Serum: 2.2 g/dL ( @ 09:07)    Allergies    penicillin (Rash)  penicillins (Other)  sulfa drugs (Rash; Other)    Intolerances      MEDICATIONS  (STANDING):  allopurinol 200 milliGRAM(s) Oral daily  apixaban 2.5 milliGRAM(s) Oral every 12 hours  artificial  tears Solution 1 Drop(s) Both EYES three times a day  ascorbic acid 500 milliGRAM(s) Oral daily  cholecalciferol 2000 Unit(s) Oral daily  cholestyramine Powder (Sugar-Free) 4 Gram(s) Oral daily  dextrose 40% Gel 15 Gram(s) Oral once  dextrose 5%. 1000 milliLiter(s) (50 mL/Hr) IV Continuous <Continuous>  dextrose 5%. 1000 milliLiter(s) (100 mL/Hr) IV Continuous <Continuous>  dextrose 50% Injectable 25 Gram(s) IV Push once  dextrose 50% Injectable 12.5 Gram(s) IV Push once  dextrose 50% Injectable 25 Gram(s) IV Push once  escitalopram 10 milliGRAM(s) Oral daily  ferrous    sulfate 325 milliGRAM(s) Oral daily  glucagon  Injectable 1 milliGRAM(s) IntraMuscular once  influenza   Vaccine 0.5 milliLiter(s) IntraMuscular once  insulin glargine Injectable (LANTUS) 6 Unit(s) SubCutaneous at bedtime  insulin lispro (ADMELOG) corrective regimen sliding scale   SubCutaneous three times a day before meals  insulin lispro (ADMELOG) corrective regimen sliding scale   SubCutaneous at bedtime  lactobacillus acidophilus 1 Tablet(s) Oral three times a day with meals  levothyroxine 50 MICROGram(s) Oral daily  lidocaine   4% Patch 2 Patch Transdermal daily  metoprolol tartrate 12.5 milliGRAM(s) Oral daily  nystatin Powder 1 Application(s) Topical two times a day  pantoprazole    Tablet 40 milliGRAM(s) Oral before breakfast  sildenafil (REVATIO) 20 milliGRAM(s) Oral two times a day  simvastatin 40 milliGRAM(s) Oral at bedtime  torsemide 5 milliGRAM(s) Oral daily    MEDICATIONS  (PRN):  acetaminophen   Tablet .. 650 milliGRAM(s) Oral every 6 hours PRN Temp greater or equal to 38C (100.4F), Mild Pain (1 - 3)  diphenoxylate/atropine 1 Tablet(s) Oral three times a day PRN Diarrhea  lidocaine 2% Gel 1 Application(s) Topical five times a day PRN rectal pain  lidocaine 2% Gel 1 Application(s) Topical three times a day PRN pain  loperamide 2 milliGRAM(s) Oral three times a day PRN Diarrhea  ondansetron Injectable 4 milliGRAM(s) IV Push every 6 hours PRN Nausea and/or Vomiting  sodium chloride 0.65% Nasal 2 Spray(s) Both Nostrils every 3 hours PRN nasal dryness  traMADol 25 milliGRAM(s) Oral every 6 hours PRN Severe Pain (7 - 10)

## 2021-09-20 NOTE — PROGRESS NOTE ADULT - ASSESSMENT
89/F with PMHx of pancreatitis s/p ERCP, cirrhosis, DM2, a-fib on Eliquis, CAD s/p PCI, severe pulm HTN, chronic R heart failure, diastolic dysfunction, AS s/p TAVR, OA, HTN, dyslipidemia, hypothyroidism, cecal mass, recently d/maddy from , sent  to the ED on 9/6/21  from [facility] for c/o Fall, near syncope.     Fall multifactorial , gout, OA, gait disorder dyspnea , generalized weakness  - CT head - no acute pathology, tele -  no arrhythmias, AFIB rate controlled ,  serial cardiac enz neg ,  trauma work: no Fx orthostatic vitals neg ,  neck pain, resolved :  c/w Lidoderm, Tylenol  PO  - PT reevaluate , OOB to the chair bid d/w RN  - polypharmacy - stop xanax, flexeril - centrally acting medications will increase risk of falls   - start vitamin D daily  , vit D 30   Severe constipation POA , than developed  Diarrhea and  rectal pain , improved   -  required disimpaction on 9/7 , laxatives  on hold now due to diarrhea  -  stool GI PCR and CDIFF PCR neg , send stool for ova and parasites,  XR reviewed, no obstruction   - stop imodium - not effective  , s/p  lomotil with resolved diarrhea,  will use both meds only prn   - c/w daily Questran , imodium prn  ,   monitor stool count   - stool for ova and parasites - pending, noted eosinophilia  - GI consult D/w Dr Mobley   Leg edema due to  chronic Right sided heart failure. Severe Pulm HTN ,   AS s/p TAVR,  Acute on chronic diastolic  CHF exacerbation, improved   Permanent  A fib on Eliquis   - Eliquis was held due to bleeding restart Eliquis 9/16 ,  monitor H/H    - CXR 9/6, 9/13 - showing CHF changes,  ECHO: severe pulm HTN, Mod MS, AV prosthesis good Fx, Severe TR. LV mild LVH   - BNP trending down 5000--> 4000--> 3641--> 7000--> 5900, daily weight 71 kg --> 65 kg   - torsemide 40 mg bid was on hold due to loose stools,  restarted at 40mg PO QD on 9/15 -9/16   - 9/17- seen by cardiologist Dr. Hwang started on metorpolol 25 bid, drop her BP , dose lowered to 12.5 bid as well as lasix 20 mg - not given due to low BP   - supportive O2 via 4 L  ,  C/w Sildenafil 20 bid  , cardiology and pulmonology consult Dr Camacho and  Dr. Hwang  - 9/18 - BP still bordeline low , HR better, s/p 500 cc slow fluids , will hold lasix or torsemide due to low BP and hyponatremia  - CXR 9/20 worsening of CHF , restart low dose of torsemide 5, c/w low dose of metoprolol 12.5 daily , HR well controlled  , daily weight trending down , needs gentle diuresis  - pulmonology reconsulted   Cecal Adenocarcinoma    - Patient is anticipated to undergo surgical procedure in a tertiary care facility given patient's advanced age / extensive chronic medical issues including valvular Dz and severe Pulm HTN , D/w  Dr. Smyth, case was discussed with chief od anesthesia at [facility], Pt is too high risk due to Severe pulm HTN . Not a candidate for Sx   - hem/onc eval appreciated, advises against systemic Tx due to Pts  Fx status and other medical issues and possible adverse effects   Mild normocytic anemia - lower iron po from vid--> qd with vitamin C  , check ferritin, B12, folate  Hypovolemic hyponatremia, resolved - monitor for improvements on diuretics, Na 131 --> 128--> 129 ( recalculated for Glu 131) --> 132--> 138  Epistaxis, resolved , Hematemesis 1 episode due tp  nose bleeding and swallowing blood , resolved  Likely 2/2 dry nasal mucosa on NC, C/w  humidified oxygen   s/p  afrin BID  will stop , may increase BP in elderly    hold asa, restarted  eliquis 2.5 bid 9/16 , monitor H/H  stable  FOBT positive due to nose bleed and colon cancer   Monitor H/H stable, c/w  PPIs  d/w Dr. Mobley ok to restart Eliquis , not ASA   DM 2 with A1C 6.8  - Monitor BS and cover with  ISS,  lantus 6 units hs   Generalized weakness - check vitamin D 30- start vit D  , B12, folate wnl , TSH 1.26 , PT daily     DVT PPX:  Eliquis     Advanced directives  HCP Laith goldman updated [phone] on 9/16, 9/17, 9/18 , 9/20   MOLST completed  DNR/DNI  palliative team consult appreciated       D/w  Palliative team, will arrange family meeting

## 2021-09-20 NOTE — PROGRESS NOTE ADULT - SUBJECTIVE AND OBJECTIVE BOX
CC: rectal irritation and pain, watery diarrhea     HPI: 89/F with PMHx of pancreatitis s/p ERCP, cirrhosis, DM2, a-fib on Eliquis, CAD s/p PCI, severe pulm HTN, chronic R heart failure, diastolic dysfunction, AS s/p TAVR, OA, HTN, dyslipidemia, hypothyroidism, cecal mass, recently d/maddy from , sent  to the ED on 9/6/21  from [facility] for c/o Fall, near syncope. Pt states she was trying to put boxes under the chair, when she fell and hit her head, now has a small abrasion. Also c/o left shoulder pain.  She has been become more weak and unsteady recently.  As per Son, she has gained 20 Lbs in [facility]. Her trauma work up neg in ED    9/16  Pt was seen and examined this am,  + rectal imitation pain after multiple episodes of diarrhea , no nose bleed, no hematemesis, denies cp, dyspnea, on O2 supplementation 5 L , afebrile, + leg edema, + gen weakness  9/17 - pt seen and examined earlier today , + 1 bm since last night, feels better, noted elevated HR , started on metoprolol by cardiologist, tolerating po intake, OOB in the chair  9/18 - pt seen and examined, + gen weakness, no bm since Thursday night, BP slightly better, reports feeling thirsty, afebrile , in the bed , denies dyspnea, abdominal pain, leg edema stable  9/19 - pt seen and examined earlier in am , feels weak, denies cp, dyspnea, abdominal pain , plan discussed   9/20 - pt seen and examined earlier today, feels ok, no diarrhea, tolerating po intake, afebrile, denies cp, dyspnea , plan discussed     Review of system- Rest of the review of system are negative except mentioned in HPI    Vital Signs Last 24 Hrs  T(C): 36.4 (09-20-21 @ 15:06), Max: 36.6 (09-20-21 @ 10:15)  T(F): 97.6 (09-20-21 @ 15:06), Max: 97.9 (09-20-21 @ 10:15)  HR: 68 (09-20-21 @ 15:06) (68 - 105)  BP: 96/51 (09-20-21 @ 15:06) (96/51 - 122/64)  RR: 17 (09-20-21 @ 15:06) (16 - 17)  SpO2: 99% (09-20-21 @ 15:06) (98% - 99%)  Wt(kg): --  CAPILLARY BLOOD GLUCOSE  CAPILLARY BLOOD GLUCOSE  POCT Blood Glucose.: 205 mg/dL (20 Sep 2021 21:26)  POCT Blood Glucose.: 271 mg/dL (20 Sep 2021 17:07)  POCT Blood Glucose.: 178 mg/dL (20 Sep 2021 12:37)  POCT Blood Glucose.: 143 mg/dL (20 Sep 2021 08:18)      PHYSICAL EXAM:  General:  malnourished; frail elderly female   Eyes: EOMI; conjunctiva and sclera clear, dry mucosa   Head: Normocephalic; atraumatic  ENMT: No nasal discharge; airway clear  Neck: Supple; non tender; no masses  Respiratory: Decreased BS,  crepitations and rales at bases b/l, anterior and posterior  fields   Cardiovascular: Irregular rate and rhythm. S1 and S2 Normal;  Gastrointestinal: Soft non-tender non-distended; Normal bowel sounds.   Genitourinary: No  suprapubic  tenderness  Extremities: +2 b/l LE   edema  Vascular: Peripheral pulses palpable 2+ bilaterally  Neurological: Alert and oriented x2-3, non focal   Musculoskeletal: Normal muscle  strength, L neck  muscle tension improved  Psychiatric: Cooperative       LABS and radiology studies reviewed :   09-20    138  |  102  |  13  ----------------------------<  125<H>  3.7   |  31  |  0.70    Ca    8.5      20 Sep 2021 09:07  Phos  3.1     09-19  Mg     2.4     09-19    TPro  6.2  /  Alb  2.2<L>  /  TBili  0.4  /  DBili  x   /  AST  19  /  ALT  23  /  AlkPhos  84  09-20                         9.0    6.43  )-----------( 260      ( 20 Sep 2021 09:07 )             28.2       LIVER FUNCTIONS - ( 20 Sep 2021 09:07 )  Alb: 2.2 g/dL / Pro: 6.2 gm/dL / ALK PHOS: 84 U/L / ALT: 23 U/L / AST: 19 U/L / GGT: x                                    09-16    131<L>  |  94<L>  |  18  ----------------------------<  206<H>  3.7   |  30  |  0.73    Ca    8.5      16 Sep 2021 08:12   Serum Pro-Brain Natriuretic Peptide (09.18.21 @ 07:00)    Serum Pro-Brain Natriuretic Peptide: 5606 pg/mL      Serum Pro-Brain Natriuretic Peptide (09.16.21 @ 08:12)    Serum Pro-Brain Natriuretic Peptide: 4416 pg/mL    Serum Pro-Brain Natriuretic Peptide (09.15.21 @ 06:05)    Serum Pro-Brain Natriuretic Peptide: 5346 pg/mL    Iron with Total Binding Capacity in AM (08.18.21 @ 06:44)    Iron - Total Binding Capacity.: 281 ug/dL    % Saturation, Iron: 20 %    Iron Total, Serum: 56 ug/dL    Unsaturated Iron Binding Capacity: 226 ug/dL    Ferritin, Serum in AM (08.18.21 @ 06:44)    Ferritin, Serum: 82 ng/mL    Vitamin B12, Serum in AM (07.16.20 @ 10:02)    Vitamin B12, Serum: 578 pg/mL             Troponin I, Serum Repeat 3 hours x 2 occurrences (09.06.21 @ 19:13)    Troponin I, Serum: <0.015: High Sensitivity Troponin and new reference  range effective 7/6/2016 ng/mL    < from: 12 Lead ECG (09.06.21 @ 11:03) >  Ventricular Rate 96 BPM    Atrial Rate 92 BPM    QRS Duration 130 ms    Q-T Interval 356 ms    QTC Calculation(Bazett) 449 ms    R Axis -76 degrees    T Axis 62 degrees    Diagnosis Line Atrial fibrillation  Left axis deviation  Non-specific intra-ventricular conduction block  Possible Anterolateral infarct , age undetermined  Abnormal ECG  When compared with ECG of 11-AUG-2021 12:09,  Nonspecific T wave abnormality, improved in Lateral leads  QT has shortened    < end of copied text >  GI PCR Panel, Stool (09.12.21 @ 21:10)    Culture Results:   GI PCR Results: NOT detected    Culture - Urine (09.06.21 @ 11:09)    -  Vancomycin: S 4    -  Levofloxacin: S 2    -  Nitrofurantoin: S <=32 Should not be used to treat pyelonephritis.    -  Tetra/Doxy: R >8    -  Ampicillin: S <=2 Predicts results to ampicillin/sulbactam, amoxacillin-clavulanate and  piperacillin-tazobactam.    -  Ciprofloxacin: S <=1    Specimen Source: Clean Catch Clean Catch (Midstream)    Culture Results:   10,000 - 49,000 CFU/mL Enterococcus faecalis  <10,000 CFU/ml Normal Urogenital abel present    Organism Identification: Enterococcus faecalis    Organism: Enterococcus faecalis    Method Type: [facility]    RADIOLOGY & ADDITIONAL TESTS:  < from: Xray Abdomen 1 View PORTABLE -Routine (Xray Abdomen 1 View PORTABLE -Routine .) (09.13.21 @ 22:31) >  Frontal view of the abdomen shows a normal gas pattern. IVC filter is again noted. No definite free air is identified.    IMPRESSION:  No evidence of bowel obstruction.    < from: Xray Chest 1 View- PORTABLE-Routine (Xray Chest 1 View- PORTABLE-Routine .) (09.13.21 @ 10:08) >  Frontal expiratory view of the chest shows the heart to be similar in size. Aortic valve stent is again noted.    The lungs show slight increase in pulmonary congestion with small pleural effusions and there is no evidence of pneumothorax.  IMPRESSION:  Congestive changes as noted.    < from: Xray Shoulder 2 Views, Left (09.06.21 @ 12:05) >  No fracture or dislocation. No focal osseous lesion. Underlying degenerative changes are stable    IMPRESSION:   No fracture.    < from: CT Head No Cont (09.06.21 @ 10:56) >  IMPRESSION:    CT HEAD: No acute abnormality. Moderate atrophy most prominent in the BILATERAL temporal lobes.    CT cervical spine:   No vertebral fracture is recognized.  Multilevel degenerative disc disease and spondylosis at C3-4 through C6-7 with loss of disc height and associated degenerative endplate changes. There is narrowing of the LEFT C2-3, LEFT C3-4 LEFT C4-5 and RIGHT C5-C6 and C6-7 neural foramina due to uncovertebral spurring and facet osteophytic hypertrophy.    MEDICATIONS  (STANDING):  allopurinol 200 milliGRAM(s) Oral daily  apixaban 2.5 milliGRAM(s) Oral every 12 hours  artificial  tears Solution 1 Drop(s) Both EYES three times a day  ascorbic acid 500 milliGRAM(s) Oral daily  cholecalciferol 2000 Unit(s) Oral daily  dextrose 40% Gel 15 Gram(s) Oral once  dextrose 5%. 1000 milliLiter(s) (50 mL/Hr) IV Continuous <Continuous>  dextrose 5%. 1000 milliLiter(s) (100 mL/Hr) IV Continuous <Continuous>  dextrose 50% Injectable 25 Gram(s) IV Push once  dextrose 50% Injectable 12.5 Gram(s) IV Push once  dextrose 50% Injectable 25 Gram(s) IV Push once  escitalopram 10 milliGRAM(s) Oral daily  ferrous    sulfate 325 milliGRAM(s) Oral daily  glucagon  Injectable 1 milliGRAM(s) IntraMuscular once  influenza   Vaccine 0.5 milliLiter(s) IntraMuscular once  insulin glargine Injectable (LANTUS) 6 Unit(s) SubCutaneous at bedtime  insulin lispro (ADMELOG) corrective regimen sliding scale   SubCutaneous three times a day before meals  insulin lispro (ADMELOG) corrective regimen sliding scale   SubCutaneous at bedtime  lactobacillus acidophilus 1 Tablet(s) Oral three times a day with meals  levothyroxine 50 MICROGram(s) Oral daily  lidocaine   4% Patch 2 Patch Transdermal daily  metoprolol tartrate 12.5 milliGRAM(s) Oral two times a day  nystatin Powder 1 Application(s) Topical two times a day  pantoprazole    Tablet 40 milliGRAM(s) Oral before breakfast  sildenafil (REVATIO) 20 milliGRAM(s) Oral two times a day  simvastatin 40 milliGRAM(s) Oral at bedtime    MEDICATIONS  (PRN):  acetaminophen   Tablet .. 650 milliGRAM(s) Oral every 6 hours PRN Temp greater or equal to 38C (100.4F), Mild Pain (1 - 3)  diphenoxylate/atropine 1 Tablet(s) Oral three times a day PRN Diarrhea  lidocaine 2% Gel 1 Application(s) Topical three times a day PRN pain  lidocaine 2% Gel 1 Application(s) Topical five times a day PRN rectal pain  ondansetron Injectable 4 milliGRAM(s) IV Push every 6 hours PRN Nausea and/or Vomiting  sodium chloride 0.65% Nasal 2 Spray(s) Both Nostrils every 3 hours PRN nasal dryness

## 2021-09-20 NOTE — PROGRESS NOTE ADULT - SUBJECTIVE AND OBJECTIVE BOX
Patient is a 89y old  Female who presents with a chief complaint of near syncope, fall.       HPI:  89/F with PMHx of pancreatitis s/p ERCP, cirrhosis, DM2, a-fib on Eliquis, CAD s/p PCI, severe pulm HTN, chronic R heart failure, diastolic dysfunction, AS s/p TAVR, OA, HTN, dyslipidemia, hypothyroidism. cecal mass, recently d/maddy from , sent  to the ED from [facility] for c/o Fall.     -   Pt states that the aide did not put brakes on her chair and left and when she was trying to put boxes under the chair , the chair kept moving even though she tried to put brakes on by herself and she fell down.  Denies any loss of consciousness.  She denies any other symptoms now.  - pt seen this am.  Pt denies any symptoms this am.     - pt seen this am.  She denies any SOB at rest.     - pt seen this am. denies any symptoms.     - pt seen this am.  she appears confused.     - pt seen this am.  Pt comfortable in bed.  no respiratory distress   PAST MEDICAL & SURGICAL HISTORY:  Diabetes Mellitus Type II    Dyslipidemia    GERD (Gastroesophageal Reflux Disease)    History of Osteoarthritis    Hypertension    CAD (coronary artery disease)    Afib    Hypothyroid    HLD (hyperlipidemia)    History of pancreatitis    Aortic stenosis    H/O: Hysterectomy    H/O: Knee Surgery- right meniscus    History of cholecystectomy    History of appendectomy    H/O total knee replacement, left    S/P IVC filter  Note that Pt does not have  h/o DVT, outPt doppler was read first as +, but after review reported as no DVT. Pt got IVC filer before that        MEDICATIONS  (STANDING):  allopurinol 200 milliGRAM(s) Oral daily  apixaban 2.5 milliGRAM(s) Oral two times a day  artificial  tears Solution 1 Drop(s) Both EYES three times a day  aspirin  chewable 81 milliGRAM(s) Oral daily  dextrose 40% Gel 15 Gram(s) Oral once  dextrose 5%. 1000 milliLiter(s) (50 mL/Hr) IV Continuous <Continuous>  dextrose 5%. 1000 milliLiter(s) (100 mL/Hr) IV Continuous <Continuous>  dextrose 50% Injectable 25 Gram(s) IV Push once  dextrose 50% Injectable 12.5 Gram(s) IV Push once  dextrose 50% Injectable 25 Gram(s) IV Push once  escitalopram 10 milliGRAM(s) Oral daily  ferrous    sulfate 325 milliGRAM(s) Oral two times a day  glucagon  Injectable 1 milliGRAM(s) IntraMuscular once  influenza   Vaccine 0.5 milliLiter(s) IntraMuscular once  insulin lispro (ADMELOG) corrective regimen sliding scale   SubCutaneous three times a day before meals  insulin lispro (ADMELOG) corrective regimen sliding scale   SubCutaneous at bedtime  levothyroxine 50 MICROGram(s) Oral daily  lidocaine   4% Patch 1 Patch Transdermal daily  pantoprazole    Tablet 40 milliGRAM(s) Oral before breakfast  psyllium Powder 1 Packet(s) Oral daily  sildenafil (REVATIO) 20 milliGRAM(s) Oral two times a day  simvastatin 40 milliGRAM(s) Oral at bedtime  torsemide 40 milliGRAM(s) Oral two times a day    MEDICATIONS  (PRN):  acetaminophen   Tablet .. 650 milliGRAM(s) Oral every 6 hours PRN Temp greater or equal to 38C (100.4F), Mild Pain (1 - 3)      FAMILY HISTORY:  FH: diabetes mellitus  both parents    FH: heart disease        SOCIAL HISTORY: no recent smoking      REVIEW OF SYSTEMS:  CONSTITUTIONAL:    No fatigue, malaise, lethargy.  No fever or chills.  RESPIRATORY:  No cough.  No wheeze.  No hemoptysis.  No shortness of breath.  CARDIOVASCULAR:  No chest pains.  No palpitations. No shortness of breath, No orthopnea or PND.    GASTROINTESTINAL:  No abdominal pain.  No nausea or vomiting.    GENITOURINARY:    No hematuria.    MUSCULOSKELETAL:  No musculoskeletal pain.  No joint swelling.  No arthritis.  NEUROLOGICAL:  No tingling or numbness or weakness.  PSYCHIATRIC:  No confusion  SKIN:  No rashes.          ICU Vital Signs Last 24 Hrs  T(C): 36.6 (19 Sep 2021 20:15), Max: 36.6 (19 Sep 2021 20:15)  T(F): 97.8 (19 Sep 2021 20:15), Max: 97.8 (19 Sep 2021 20:15)  HR: 105 (20 Sep 2021 06:09) (68 - 105)  BP: 105/65 (20 Sep 2021 06:09) (87/52 - 117/72)  BP(mean): --  ABP: --  ABP(mean): --  RR: 18 (19 Sep 2021 20:15) (18 - 18)  SpO2: 97% (19 Sep 2021 20:15) (97% - 97%)          PHYSICAL EXAM-    Constitutional: elderly frail female in no acute distress     Head: Head is normocephalic and atraumatic.      Neck: No JVD.     Cardiovascular: irregular rate    Respiratory: Basal rales b/l     Abdomen: Soft, nontender, nondistended with positive bowel sounds.      Extremity: No tenderness. 1 + b/l pedal  pitting edema     Neurologic: The patient is confused    Skin: No rash, no obvious lesions noted.      Psychiatric: The patient appears to be emotionally stable.      INTERPRETATION OF TELEMETRY: not on tele    ECG: atrial fibrillation.   I&O's Detail    06 Sep 2021 07:01  -  07 Sep 2021 07:00  --------------------------------------------------------  IN:  Total IN: 0 mL    OUT:    Incontinent per Collection Bag (mL): 900 mL  Total OUT: 900 mL    Total NET: -900 mL          LABS:                                   8.8    6.97  )-----------( 251      ( 19 Sep 2021 06:01 )             26.7     19    132<L>  |  95<L>  |  21  ----------------------------<  120<H>  3.3<L>   |  31  |  0.80    Ca    8.1<L>      19 Sep 2021 06:01  Phos  3.1       Mg     2.4         TPro  6.0  /  Alb  2.3<L>  /  TBili  0.5  /  DBili  x   /  AST  28  /  ALT  27  /  AlkPhos  82          LIVER FUNCTIONS - ( 19 Sep 2021 06:01 )  Alb: 2.3 g/dL / Pro: 6.0 gm/dL / ALK PHOS: 82 U/L / ALT: 27 U/L / AST: 28 U/L / GGT: x                                 PT/INR - ( 06 Sep 2021 11:16 )   PT: 19.8 sec;   INR: 1.74 ratio         PTT - ( 06 Sep 2021 11:16 )  PTT:35.4 sec  Urinalysis Basic - ( 06 Sep 2021 11:09 )    Color: Yellow / Appearance: Clear / S.015 / pH: x  Gluc: x / Ketone: Negative  / Bili: Negative / Urobili: Negative mg/dL   Blood: x / Protein: 15 mg/dL / Nitrite: Negative   Leuk Esterase: Trace / RBC: Negative /HPF / WBC 11-25   Sq Epi: x / Non Sq Epi: Moderate / Bacteria: Few      I&O's Summary    06 Sep 2021 07:01  -  07 Sep 2021 07:00  --------------------------------------------------------  IN: 0 mL / OUT: 900 mL / NET: -900 mL      BNP  RADIOLOGY & ADDITIONAL STUDIES:  < from: CT Head No Cont (21 @ 10:56) >    IMPRESSION:    CT HEAD: No acute abnormality. Moderate atrophy most prominent in the BILATERAL temporal lobes.    CT cervical spine:   No vertebral fracture is recognized.  Multilevel degenerative disc disease and spondylosis at C3-4 through C6-7 with loss of disc height and associated degenerative endplate changes. There is narrowing of the LEFT C2-3, LEFT C3-4 LEFT C4-5 and RIGHT C5-C6 and C6-7 neural foramina due to uncovertebral spurring and facet osteophytic hypertrophy.    --- End of Report ---            FRANCESCA BOSTON MD; Attending Radiologist  This document has been electronically signed. Sep  6 2021 11:05AM    < end of copied text >  < from: TTE Echo Complete w/ Contrast w/ Doppler (21 @ 16:09) >     Impression     Summary     Moderate mitral annular calcification is present.   Moderate mitral stenosis is present.   Mild (1+) mitral regurgitation is present.   Well seated prosthetic valve in the aortic position. suboptimal doppler   interrogation   Severe (4+) tricuspid valve regurgitation is present.   The tricuspid valve leaflets appear mildly thickened open well.   Severe pulmonary hypertension.   Normal appearing pulmonic valve structure.   Mild pulmonic valvular regurgitation (1+) is present.     technical difficult study     Signature     ----------------------------------------------------------------   Electronically signed by Venugopal Palla, MD(Interpreting   physician) on 2021 05:38 PM   ----------------------------------------------------------------    < end of copied text >

## 2021-09-20 NOTE — PROGRESS NOTE ADULT - ASSESSMENT
Acute on Chronic decompensated HFPEF- hypervolemic.  Pedal edema persistent but slightly better than yesterday.  NO respiratory distress. iv diuretics to be held for now.  SHe had significant worsening of sodium when used.  but she had diarrhea day before as well  slight improvement in sodium.  poor po intake   Can resume torsemide 20mg po daily if no diarrhea and po intake improving.     Atrial fibrillation-rate controlled   metoprolol low dose as tolerated 12.5mg bid   continue current meds including apixaban    d/w Dr Smith    Colon mass- malignancy- Colorectal surgery team following pt.    Other medical issues- Management per primary team.   Thank you for allowing me to participate in the care of this patient. Please feel free to contact me with any questions.     Other medical issues- Management per primary team.   Thank you for allowing me to participate in the care of this patient. Please feel free to contact me with any questions.

## 2021-09-20 NOTE — PROGRESS NOTE ADULT - ASSESSMENT
Pt is a 89y old Female with hx of pancreatitis s/p ERCP, cirrhosis, DM2, a-fib on Eliquis, CAD s/p PCI, severe pulm HTN, chronic R heart failure, diastolic dysfunction, AS s/p TAVR, OA, HTN, dyslipidemia, hypothyroidism, cecal mass,    1) S/P fall  - likely mechanical   - c/w Lidoderm, Tylenol  PO, added  flexeril PRN  - PT consult appreciated   - Fall risk maintained   - supportive care   - orthostatic vitals neg     2) adenocarcinoma of the cecum   - not a surgical candidate due to comorbidities   - oncology consult appreciated - no role for systemic treatment as it will not add longevity  - GI consult appreciated   - x-ray abdomen - No evidence of bowel obstruction.    3) Severe Pulm HTN,  Acute on chronic diastolic  CHF exacerbation   - history of AS s/p TAVR  - X-ray chest  from 9/9 - The lungs show slight increase in pulmonary congestion with small pleural effusions and there is no evidence of pneumothorax   - supportive O2 via face shield PRN   - ECHO: severe pulm HTN, Mod MS, AV prosthesis good Fx, Severe TR. LV mild LVH   - C/w Sildenafil   - pulmonology consult appreciated    4)  Afib   - Eliquis on hold at this time due to nose bleed and hematosis  - cardiology consult appreciated   - monitor labs to see if able to restart AC     5) Diarrhea   - when patient admitted was c/o constipation s/p disimpaction   - GI consult appreciated   - Questran added as per GI   - c/w Lomotil and Questran   - decreased BM over night   - monitor for constipation     6) Advance Care Planning   - patient appears to have capacity but is deferring decisions to her son Dr. Laith Mojica   - MOLST: DNR/I   - patient states she has 3 sons 2 of whom are chiropractors and Laith Leonardo who is pulmonologist - patient is requesting that her son Laith Mojica be called and updated   - [facility] meeting held on 9/16   - Spoke with patient son Dr. Dill who is requesting that patient not return to [facility] and will reach out to patients family

## 2021-09-20 NOTE — PROGRESS NOTE ADULT - ASSESSMENT
Imp:  Colon cancer, not a surgical canddiate  Diarrhea, controlled on questran    Rec:  Cont questran daily  Reconsult prn

## 2021-09-21 ENCOUNTER — TRANSCRIPTION ENCOUNTER (OUTPATIENT)
Age: 86
End: 2021-09-21

## 2021-09-21 LAB
ANION GAP SERPL CALC-SCNC: 5 MMOL/L — SIGNIFICANT CHANGE UP (ref 5–17)
BUN SERPL-MCNC: 13 MG/DL — SIGNIFICANT CHANGE UP (ref 7–23)
CALCIUM SERPL-MCNC: 8.8 MG/DL — SIGNIFICANT CHANGE UP (ref 8.5–10.1)
CHLORIDE SERPL-SCNC: 101 MMOL/L — SIGNIFICANT CHANGE UP (ref 96–108)
CO2 SERPL-SCNC: 31 MMOL/L — SIGNIFICANT CHANGE UP (ref 22–31)
CREAT SERPL-MCNC: 0.69 MG/DL — SIGNIFICANT CHANGE UP (ref 0.5–1.3)
GLUCOSE SERPL-MCNC: 101 MG/DL — HIGH (ref 70–99)
MAGNESIUM SERPL-MCNC: 2.2 MG/DL — SIGNIFICANT CHANGE UP (ref 1.6–2.6)
NT-PROBNP SERPL-SCNC: 5800 PG/ML — HIGH (ref 0–450)
PHOSPHATE SERPL-MCNC: 2.7 MG/DL — SIGNIFICANT CHANGE UP (ref 2.5–4.5)
POTASSIUM SERPL-MCNC: 3.7 MMOL/L — SIGNIFICANT CHANGE UP (ref 3.5–5.3)
POTASSIUM SERPL-SCNC: 3.7 MMOL/L — SIGNIFICANT CHANGE UP (ref 3.5–5.3)
SODIUM SERPL-SCNC: 137 MMOL/L — SIGNIFICANT CHANGE UP (ref 135–145)

## 2021-09-21 PROCEDURE — 99232 SBSQ HOSP IP/OBS MODERATE 35: CPT

## 2021-09-21 PROCEDURE — 99233 SBSQ HOSP IP/OBS HIGH 50: CPT

## 2021-09-21 RX ORDER — METOPROLOL TARTRATE 50 MG
12.5 TABLET ORAL DAILY
Refills: 0 | Status: DISCONTINUED | OUTPATIENT
Start: 2021-09-21 | End: 2021-09-22

## 2021-09-21 RX ORDER — CYCLOBENZAPRINE HYDROCHLORIDE 10 MG/1
5 TABLET, FILM COATED ORAL
Refills: 0 | Status: DISCONTINUED | OUTPATIENT
Start: 2021-09-21 | End: 2021-09-22

## 2021-09-21 RX ADMIN — APIXABAN 2.5 MILLIGRAM(S): 2.5 TABLET, FILM COATED ORAL at 22:09

## 2021-09-21 RX ADMIN — Medication 20 MILLIGRAM(S): at 22:09

## 2021-09-21 RX ADMIN — Medication 5 MILLIGRAM(S): at 10:41

## 2021-09-21 RX ADMIN — CHOLESTYRAMINE 4 GRAM(S): 4 POWDER, FOR SUSPENSION ORAL at 10:40

## 2021-09-21 RX ADMIN — ESCITALOPRAM OXALATE 10 MILLIGRAM(S): 10 TABLET, FILM COATED ORAL at 10:40

## 2021-09-21 RX ADMIN — Medication 4: at 17:09

## 2021-09-21 RX ADMIN — PANTOPRAZOLE SODIUM 40 MILLIGRAM(S): 20 TABLET, DELAYED RELEASE ORAL at 10:40

## 2021-09-21 RX ADMIN — Medication 1 TABLET(S): at 10:40

## 2021-09-21 RX ADMIN — Medication 325 MILLIGRAM(S): at 10:40

## 2021-09-21 RX ADMIN — Medication 1 APPLICATION(S): at 22:09

## 2021-09-21 RX ADMIN — Medication 1 APPLICATION(S): at 03:42

## 2021-09-21 RX ADMIN — LIDOCAINE 2 PATCH: 4 CREAM TOPICAL at 22:23

## 2021-09-21 RX ADMIN — Medication 1 TABLET(S): at 17:54

## 2021-09-21 RX ADMIN — Medication 1 DROP(S): at 14:00

## 2021-09-21 RX ADMIN — Medication 2000 UNIT(S): at 10:40

## 2021-09-21 RX ADMIN — Medication 1 DROP(S): at 22:10

## 2021-09-21 RX ADMIN — Medication 20 MILLIGRAM(S): at 10:40

## 2021-09-21 RX ADMIN — LIDOCAINE 1 APPLICATION(S): 4 CREAM TOPICAL at 10:41

## 2021-09-21 RX ADMIN — Medication 500 MILLIGRAM(S): at 10:40

## 2021-09-21 RX ADMIN — Medication 650 MILLIGRAM(S): at 11:37

## 2021-09-21 RX ADMIN — Medication 200 MILLIGRAM(S): at 10:40

## 2021-09-21 RX ADMIN — TRAMADOL HYDROCHLORIDE 25 MILLIGRAM(S): 50 TABLET ORAL at 22:08

## 2021-09-21 RX ADMIN — Medication 12.5 MILLIGRAM(S): at 10:40

## 2021-09-21 RX ADMIN — Medication 50 MICROGRAM(S): at 05:16

## 2021-09-21 RX ADMIN — Medication 650 MILLIGRAM(S): at 17:10

## 2021-09-21 RX ADMIN — INSULIN GLARGINE 6 UNIT(S): 100 INJECTION, SOLUTION SUBCUTANEOUS at 22:10

## 2021-09-21 RX ADMIN — Medication 1 DROP(S): at 05:16

## 2021-09-21 RX ADMIN — LIDOCAINE 2 PATCH: 4 CREAM TOPICAL at 10:41

## 2021-09-21 RX ADMIN — Medication 1 APPLICATION(S): at 10:41

## 2021-09-21 RX ADMIN — Medication 4: at 12:47

## 2021-09-21 RX ADMIN — Medication 10 MILLIGRAM(S): at 22:08

## 2021-09-21 RX ADMIN — APIXABAN 2.5 MILLIGRAM(S): 2.5 TABLET, FILM COATED ORAL at 10:40

## 2021-09-21 RX ADMIN — SIMVASTATIN 40 MILLIGRAM(S): 20 TABLET, FILM COATED ORAL at 22:11

## 2021-09-21 RX ADMIN — Medication 12.5 MILLIGRAM(S): at 22:09

## 2021-09-21 RX ADMIN — CYCLOBENZAPRINE HYDROCHLORIDE 5 MILLIGRAM(S): 10 TABLET, FILM COATED ORAL at 22:12

## 2021-09-21 RX ADMIN — Medication 1 TABLET(S): at 12:47

## 2021-09-21 NOTE — DISCHARGE NOTE NURSING/CASE MANAGEMENT/SOCIAL WORK - PATIENT PORTAL LINK FT
You can access the FollowMyHealth Patient Portal offered by [facility] [place] by registering at the following website: [email or web address]. By joining [facility]’s FollowMyHealth portal, you will also be able to view your health information using other applications (apps) compatible with our system.

## 2021-09-21 NOTE — PROVIDER CONTACT NOTE (OTHER) - REASON
Consultant - Cardiology
Consultant - Surgery
Cardiology consult
Consult- Oncology
Consultant - Pulmonary
Diarrhea
Patient had a bloody BM

## 2021-09-21 NOTE — PROGRESS NOTE ADULT - SUBJECTIVE AND OBJECTIVE BOX
Subjective:  Awake, sitting upright in bed.   She has no complaints, breathing is "fine"  She is on NC 4L  She still has swelling in lower extremities.  Diarrhea has resolved.     Vital Signs Last 24 Hrs  T(C): 36.3 (21 Sep 2021 09:53), Max: 36.6 (20 Sep 2021 21:50)  T(F): 97.4 (21 Sep 2021 09:53), Max: 97.8 (20 Sep 2021 21:50)  HR: 67 (21 Sep 2021 09:53) (67 - 85)  BP: 103/57 (21 Sep 2021 09:53) (96/51 - 120/65)  BP(mean): --  RR: 16 (21 Sep 2021 09:53) (16 - 18)  SpO2: 100% (21 Sep 2021 09:53) (97% - 100%)    PHYSICAL EXAMINATION:  GENERAL: Elderly, frail appearing, no distress.  HEAD: NCAT, no significant lesions from fall  EYES: Pupils are normal. No icterus. Sclera white.   HEENT:  Mucus membranes moist. Oropharynx normal.   NECK:  Supple. No stridor. No JVD.   HEART; S1/S2 irregular rate and rhythm - no murmurs appreciated.   CHEST:  Crackles at bilateral bases in posterior lung fields. No wheezing.   ABDOMEN:  Soft and nontender. Nondistended.   EXTREMITIES:  significant lower extremity edema - no cyanosis No clubbing  PSYCH: Pleasant affect.  NEURO: Alert and oriented. Responds to question appropriate.       MEDICATIONS  (STANDING):  allopurinol 200 milliGRAM(s) Oral daily  apixaban 2.5 milliGRAM(s) Oral every 12 hours  artificial  tears Solution 1 Drop(s) Both EYES three times a day  ascorbic acid 500 milliGRAM(s) Oral daily  cholecalciferol 2000 Unit(s) Oral daily  cholestyramine Powder (Sugar-Free) 4 Gram(s) Oral daily  clotrimazole 1% Cream 1 Application(s) Topical two times a day  dextrose 40% Gel 15 Gram(s) Oral once  dextrose 5%. 1000 milliLiter(s) (50 mL/Hr) IV Continuous <Continuous>  dextrose 5%. 1000 milliLiter(s) (100 mL/Hr) IV Continuous <Continuous>  dextrose 50% Injectable 25 Gram(s) IV Push once  dextrose 50% Injectable 12.5 Gram(s) IV Push once  dextrose 50% Injectable 25 Gram(s) IV Push once  escitalopram 10 milliGRAM(s) Oral daily  ferrous    sulfate 325 milliGRAM(s) Oral daily  glucagon  Injectable 1 milliGRAM(s) IntraMuscular once  influenza   Vaccine 0.5 milliLiter(s) IntraMuscular once  insulin glargine Injectable (LANTUS) 6 Unit(s) SubCutaneous at bedtime  insulin lispro (ADMELOG) corrective regimen sliding scale   SubCutaneous three times a day before meals  insulin lispro (ADMELOG) corrective regimen sliding scale   SubCutaneous at bedtime  lactobacillus acidophilus 1 Tablet(s) Oral three times a day with meals  levothyroxine 50 MICROGram(s) Oral daily  lidocaine   4% Patch 2 Patch Transdermal daily  metoprolol tartrate 12.5 milliGRAM(s) Oral daily  pantoprazole    Tablet 40 milliGRAM(s) Oral before breakfast  sildenafil (REVATIO) 20 milliGRAM(s) Oral two times a day  simvastatin 40 milliGRAM(s) Oral at bedtime  torsemide 10 milliGRAM(s) Oral two times a day    MEDICATIONS  (PRN):  acetaminophen   Tablet .. 650 milliGRAM(s) Oral every 6 hours PRN Temp greater or equal to 38C (100.4F), Mild Pain (1 - 3)  cyclobenzaprine 5 milliGRAM(s) Oral two times a day PRN Muscle Spasm  lidocaine 2% Gel 1 Application(s) Topical five times a day PRN rectal pain  lidocaine 2% Gel 1 Application(s) Topical three times a day PRN pain  loperamide 2 milliGRAM(s) Oral three times a day PRN Diarrhea  ondansetron Injectable 4 milliGRAM(s) IV Push every 6 hours PRN Nausea and/or Vomiting  sodium chloride 0.65% Nasal 2 Spray(s) Both Nostrils every 3 hours PRN nasal dryness  traMADol 25 milliGRAM(s) Oral every 6 hours PRN Severe Pain (7 - 10)        LABS:                        9.0    6.43  )-----------( 260      ( 20 Sep 2021 09:07 )             28.2     09-21    137  |  101  |  13  ----------------------------<  101<H>  3.7   |  31  |  0.69    < from: Xray Chest 1 View- PORTABLE-Routine (Xray Chest 1 View- PORTABLE-Routine .) (09.13.21 @ 10:08) >    EXAM:  XR CHEST PORTABLE ROUTINE 1V                            PROCEDURE DATE:  09/13/2021          INTERPRETATION:  Chest one view    HISTORY: Dyspnea    COMPARISON STUDY: 9/6/2021    Frontal expiratory view of the chest shows the heart to be similar in size. Aortic valve stent is again noted.    The lungs show slight increase in pulmonary congestion with small pleural effusions and there is no evidence of pneumothorax.      IMPRESSION:  Congestive changes as noted.    < end of copied text >

## 2021-09-21 NOTE — PROGRESS NOTE ADULT - ASSESSMENT
# S/p fall  -likely mechanical   -  tele -  no arrhythmias, AFIB rate controlled   - serial cardiac enz neg   - trauma work: no Fx  - neck pain:  c/w Lidoderm, Tylenol  PO, added  flexeril PRN  - PT   - orthostatic vitals neg     # Severe constipation, Now with Diarrhea, improved   -  required disimpaction on 9/7  - On ducolax suppository PRN, Psyllium on hold now   -  stool GI PCR and CDIFF PCR neg   - and XR reviewed, no obstruction   - C/w Imodium PRN , started on Questran   - D/w Dr Mobley     #  chronic R heart failure. Severe Pulm HTN   #  AS s/p TAVR,  # Acute on chronic diastolic  CHF exacerbation, improved   - CT chest noted, chronic changes and CHF   - torsemide 40 mg bid, on  hold while has loose stools, restart now at 40mg PO QD, adjust dose as needed   - supportive O2 via face shield, change to NC when having meals   - ECHO: severe pulm HTN, Mod MS, AV prosthesis good Fx, Severe TR. LV mild LVH   - C/w Sildenafil   - D/w Avery KRAUSE     # Cecal Adenocarcinoma    - Patient is anticipated to undergo surgical procedure in a tertiary care facility given patient's advanced age / extensive chronic medical issues including valvular Dz and severe Pulm HTN  -D/w  Dr. Smyth, case was discussed with carolyn torres anesthesia at [facility], Pt is too high risk due to Severe pulm HTN . Not a candidate for Sx   - hem/onc eval appreciated, advises against systemic Tx due to Pts  Fx status and other medical issues and possible adverse effects       # Epistaxis, resolved   Likely 2/2 dry nasal mucosa on NC  C/w  humidified face shield   c/w afrin BID x 24-48  Monitor closely , hold eliquis and ASA for now     # Hematemesis x 1  suspect 2/2 nose bleeding and swallowing blood   No further episodes  Monitor H/H, repeat this afternoon   On PPIs  Dw Dr Mobley       # DM 2:   -HB A1c 6.8   - not on meds outPt   - Monitor BS and cover with  ISS, add lantus low dose     # A fib: permanent  - hold  eliquis for now   Not on HR meds     #  DVT PPX:  Eliquis on hold,  reeval in am       D/w  Palliative team, will arrange family meeting 89/F with PMHx of pancreatitis s/p ERCP, cirrhosis, DM2, a-fib on Eliquis, CAD s/p PCI, severe pulm HTN, chronic R heart failure, diastolic dysfunction, AS s/p TAVR, OA, HTN, dyslipidemia, hypothyroidism, cecal mass admitted for:       # S/p fall  - likely mechanical, due to debility, arthritis   -  tele -  no arrhythmias, AFIB rate controlled   - serial cardiac enz neg   - trauma work: no Fx  - neck pain:  c/w Lidoderm, Tylenol  PO,   flexeril PRN  - PT   - orthostatic vitals neg     # Severe constipation on admission, later developed  Diarrhea, now  resolved    -  required disimpaction on 9/7  -  stool GI PCR and CDIFF PCR x 2  neg   - Stool O&P  neg  - c/w Questran Q C/w Imodium PRN,   - followed by Dr Mobley    #  chronic R heart failure. Severe Pulm HTN   #  AS s/p TAVR,  # Acute on chronic diastolic  CHF exacerbation, improved   - CT chest noted, chronic changes and CHF   - C/w torsemide increase to 10mg PO  bid, NA and BP improved now  - supportive O2 via NC   - ECHO: severe pulm HTN, Mod MS, AV prosthesis good Fx, Severe TR. LV mild LVH   - C/w Sildenafil   - Pulm f/u appreciated     # Cecal Adenocarcinoma    - Patient is anticipated to undergo surgical procedure in a tertiary care facility given patient's advanced age / extensive chronic medical issues including valvular Dz and severe Pulm HTN  -D/w  Dr. Smyth, case was discussed with carolyn torres anesthesia at [facility], Pt is too high risk due to Severe pulm HTN . Not a candidate for Sx   - hem/onc eval appreciated, advises against systemic Tx due to Pts  Fx status and other medical issues and possible adverse effects       # Epistaxis, resolved   Likely 2/2 dry nasal mucosa on NC  C/w  humidified O2   S/p  afrin BID   Resumed on Eliquis, not ASA  on hold     # Hematemesis x 1, no further episodes   suspect 2/2 nose bleeding and swallowing blood.   No further episodes  Monitor H/H, stable    On PPIs    # Hyponatremia  Resolved   Monitor  closely on diuretics       # DM 2:   -HB A1c 6.8   - not on meds outPt   - Monitor BS and cover with  ISS, , lantus     # A fib: permanent  - resumed on   eliquis  -started on BB due to few episodes of AFIB with RVR   -Change BB to Toprol 12.5mg, monitor BP    #  DVT PPX:  Eliquis      D/w  Palliative team, Pt is DNR/DNI, but not ready for Hospice, want to c/w available Tx       D/w Dr Mojica,  updated on Pts condition and possible D/c to [facility] tomorrow if continues to improve   D/w RN, CM, cardio

## 2021-09-21 NOTE — PROVIDER CONTACT NOTE (OTHER) - SITUATION
Office made aware of consult.
Surgical resident made aware of consult.
Office made aware of consult.
spoke with Oksana regarding consult
Patient having loose bowel movements. Aid reported 5 Loose bowel movements since 3pm.
Notified MD that patient had a large loose black BM this AM with streaks of blood

## 2021-09-21 NOTE — ED ADULT TRIAGE NOTE - NS ED NURSE AMBULANCES
[address] VISIT      Patient Name: Adilene Gore  Medical Record Number:  [id number]  Primary Care Physician:  Dillon Crow DO    ChiefComplaint     Chief Complaint   Patient presents with    Follow-up     recheck the left ear having hearing eval after, patient still gets some pain here and there        History of Present Illness     Adilene Gore is an 77 y.o. female previously seen for otitis externa left ear, hearing loss, cerumen impaction, oral fibroma. Last seen approximately 4 weeks ago. Interval History:   Occasional mild shooting pain through left ear. No longer using ear drops. Feels like hearing is back to normal. No otorrhea, no tinnitus.      Past Medical History     Past Medical History:   Diagnosis Date    Arthritis     GERD (gastroesophageal reflux disease)     Hx of cholecystectomy     Hyperlipidemia     Hypertension     Hypothyroidism     Kidney stone     Urinary incontinence        Past Surgical History     Past Surgical History:   Procedure Laterality Date    CARPAL TUNNEL RELEASE      left wrist    CHOLECYSTECTOMY      KNEE SURGERY      left (meniscus tear)    TUBAL LIGATION         Family History     Family History   Problem Relation Age of Onset    Heart Failure Mother     High Blood Pressure Mother     Heart Disease Mother     Cancer Father         Liver Cancer     Mental Retardation Sister         \"born normal but was given a shot when she was 2\"    Cancer Brother         pancreatic cancer    Diabetes Maternal Grandmother     Substance Abuse Son        Social History     Social History     Tobacco Use    Smoking status: Never Smoker    Smokeless tobacco: Never Used   Vaping Use    Vaping Use: Never used   Substance Use Topics    Alcohol use: No    Drug use: Never        Allergies     Allergies   Allergen Reactions    Darvon [Propoxyphene]      hives       Medications     Current Outpatient Medications   Medication Sig Dispense Refill    ibuprofen (ADVIL;MOTRIN) 800 MG tablet TAKE ONE TABLET BY MOUTH EVERY 8 HOUR AS NEEDED FOR PAIN 90 tablet 2    atenolol (TENORMIN) 50 MG tablet TAKE ONE TABLET BY MOUTH DAILY 90 tablet 1    ofloxacin (OCUFLOX) 0.3 % solution Place 4 drops in left ear twice a day for the next 5 days. 1 Bottle 1    oxybutynin (DITROPAN-XL) 10 MG extended release tablet TAKE ONE TABLET BY MOUTH DAILY 30 tablet 4    omeprazole (PRILOSEC) 20 MG delayed release capsule TAKE ONE CAPSULE BY MOUTH DAILY 90 capsule 1    levothyroxine (SYNTHROID) 50 MCG tablet TAKE ONE TABLET BY MOUTH DAILY 30 tablet 4    rosuvastatin (CRESTOR) 10 MG tablet Take 1 tablet by mouth daily 90 tablet 3    aspirin EC 81 MG EC tablet Take 1 tablet by mouth daily 30 tablet 0    Cyanocobalamin (VITAMIN B 12) 500 MCG TABS Take 1,000 mg by mouth daily      vitamin D (CHOLECALCIFEROL) 25 MCG (1000 UT) TABS tablet Take 1,000 Units by mouth daily       No current facility-administered medications for this visit.        Review of Systems     REVIEW OF SYSTEMS  The following systems were reviewed and revealed the following in addition to any already discussed in the HPI:    CONSTITUTIONAL: no weight loss, no fever, no night sweats, no chills  EYES: no vision changes, no blurry vision  EARS: no changes in hearing, no otalgia  NOSE: no epistaxis, no rhinorrhea  THROAT: No voice changes, no sore throat, no dysphagia    PhysicalExam     Vitals:    09/21/21 1256   BP: (!) 157/75   Site: Left Upper Arm   Position: Sitting   Cuff Size: Medium Adult   Pulse: (!) 49   Temp: 97.5 °F (36.4 °C)   TempSrc: Temporal       PHYSICAL EXAM  BP (!) 157/75 (Site: Left Upper Arm, Position: Sitting, Cuff Size: Medium Adult)   Pulse (!) 49   Temp 97.5 °F (36.4 °C) (Temporal)     GENERAL: No acute distress, alert and oriented, no hoarseness  EYES: EOMI, Anti-icteric  NOSE: On anterior rhinoscopy there is no epistaxis, nasal mucosa moist and normal appearing, no purulent drainage. EARS: Normal external appearance; on portable otomicroscopy b/l EACs patent, TM intact without perforation, retraction, or otorrhea. Pneumatic otoscopy: Bilateral tympanic membranes mobile pneumatic otoscopy  FACE: HB 1/6 bilaterally, symmetric appearing, sensation equal bilaterally  ORAL CAVITY: There is a small 7 mm exophytic what appears to be a benign fibroma on the mucosal surface of her lower lip unchanged from prior exam, uvula is midline, moist mucous membranes edentulous with dentures in place. NECK: Normal range of motion, no thyromegaly, trachea is midline, no palpable lymphadenopathy or neck masses, no crepitus  NEURO: Cranial Nerves 2, 3, 4, 5, 6, 7, 11, 12 grossly intact bilaterally     I have performed a head and neck physical exam personally or was physically present during the key or critical portions of the service. Data/Imaging Review     Comprehensive audiological evaluation performed in the office today by Óscar SMITH was independently reviewed by myself which demonstrates: Au: Normal to mild SNHL. % right, % left. Type A tympanogram right. Type A tympanogram left. Assessment and Plan     1. Sensorineural hearing loss (SNHL) of both ears  - Otitis resolved. Reviewed audiogram with patient - normal to mild SNHL  - Encouraged loud noise avoidance and wearing of hearing protection when exposed. - Recommend surveillance of hearing with yearly audiogram.    2. Fibroma of mouth  - Benign appearing  - Continue to self monitor. If grows or becomes bothersome can removed in office under local anesthesia      Follow-Up     Return if symptoms worsen or fail to improve. Dr. De La [address]ega [place], [place] [address]  Department of Otolaryngology/Head and Neck Surgery  9/22/21    Medical Decision Making:   The following items were considered in medical decision making:  Independent review of images  Review / order clinical lab tests  Review / order radiology tests  Decision to obtain old records    Portions of this note were dictated using Dragon.  There may be linguistic errors secondary to the use of this program. [facility]

## 2021-09-21 NOTE — PROGRESS NOTE ADULT - SUBJECTIVE AND OBJECTIVE BOX
Patient is a 89y old  Female who presents with a chief complaint of near syncope, fall.       HPI:  89/F with PMHx of pancreatitis s/p ERCP, cirrhosis, DM2, a-fib on Eliquis, CAD s/p PCI, severe pulm HTN, chronic R heart failure, diastolic dysfunction, AS s/p TAVR, OA, HTN, dyslipidemia, hypothyroidism. cecal mass, recently d/maddy from , sent  to the ED from [facility] for c/o Fall.     -   Pt states that the aide did not put brakes on her chair and left and when she was trying to put boxes under the chair , the chair kept moving even though she tried to put brakes on by herself and she fell down.  Denies any loss of consciousness.  She denies any other symptoms now.  - pt seen this am.  Pt denies any symptoms this am.     - pt seen this am.  She denies any SOB at rest.     - pt seen this am. denies any symptoms.     - pt seen this am.  she appears confused.     - pt seen this am.  Pt comfortable in bed.  no respiratory distress     - pt seen this am.  She is confused.  Denies any SOB.   PAST MEDICAL & SURGICAL HISTORY:  Diabetes Mellitus Type II    Dyslipidemia    GERD (Gastroesophageal Reflux Disease)    History of Osteoarthritis    Hypertension    CAD (coronary artery disease)    Afib    Hypothyroid    HLD (hyperlipidemia)    History of pancreatitis    Aortic stenosis    H/O: Hysterectomy    H/O: Knee Surgery- right meniscus    History of cholecystectomy    History of appendectomy    H/O total knee replacement, left    S/P IVC filter  Note that Pt does not have  h/o DVT, outPt doppler was read first as +, but after review reported as no DVT. Pt got IVC filer before that        MEDICATIONS  (STANDING):  allopurinol 200 milliGRAM(s) Oral daily  apixaban 2.5 milliGRAM(s) Oral two times a day  artificial  tears Solution 1 Drop(s) Both EYES three times a day  aspirin  chewable 81 milliGRAM(s) Oral daily  dextrose 40% Gel 15 Gram(s) Oral once  dextrose 5%. 1000 milliLiter(s) (50 mL/Hr) IV Continuous <Continuous>  dextrose 5%. 1000 milliLiter(s) (100 mL/Hr) IV Continuous <Continuous>  dextrose 50% Injectable 25 Gram(s) IV Push once  dextrose 50% Injectable 12.5 Gram(s) IV Push once  dextrose 50% Injectable 25 Gram(s) IV Push once  escitalopram 10 milliGRAM(s) Oral daily  ferrous    sulfate 325 milliGRAM(s) Oral two times a day  glucagon  Injectable 1 milliGRAM(s) IntraMuscular once  influenza   Vaccine 0.5 milliLiter(s) IntraMuscular once  insulin lispro (ADMELOG) corrective regimen sliding scale   SubCutaneous three times a day before meals  insulin lispro (ADMELOG) corrective regimen sliding scale   SubCutaneous at bedtime  levothyroxine 50 MICROGram(s) Oral daily  lidocaine   4% Patch 1 Patch Transdermal daily  pantoprazole    Tablet 40 milliGRAM(s) Oral before breakfast  psyllium Powder 1 Packet(s) Oral daily  sildenafil (REVATIO) 20 milliGRAM(s) Oral two times a day  simvastatin 40 milliGRAM(s) Oral at bedtime  torsemide 40 milliGRAM(s) Oral two times a day    MEDICATIONS  (PRN):  acetaminophen   Tablet .. 650 milliGRAM(s) Oral every 6 hours PRN Temp greater or equal to 38C (100.4F), Mild Pain (1 - 3)      FAMILY HISTORY:  FH: diabetes mellitus  both parents    FH: heart disease        SOCIAL HISTORY: no recent smoking      REVIEW OF SYSTEMS:  CONSTITUTIONAL:    No fatigue, malaise, lethargy.  No fever or chills.  RESPIRATORY:  No cough.  No wheeze.  No hemoptysis.  No shortness of breath.  CARDIOVASCULAR:  No chest pains.  No palpitations. No shortness of breath, No orthopnea or PND.    GASTROINTESTINAL:  No abdominal pain.  No nausea or vomiting.    GENITOURINARY:    No hematuria.    MUSCULOSKELETAL:  No musculoskeletal pain.  No joint swelling.  No arthritis.  NEUROLOGICAL:  No tingling or numbness or weakness.  PSYCHIATRIC:  No confusion  SKIN:  No rashes.        ICU Vital Signs Last 24 Hrs  T(C): 36.6 (20 Sep 2021 21:50), Max: 36.6 (20 Sep 2021 10:15)  T(F): 97.8 (20 Sep 2021 21:50), Max: 97.9 (20 Sep 2021 10:15)  HR: 85 (20 Sep 2021 21:50) (68 - 85)  BP: 120/65 (20 Sep 2021 21:50) (96/51 - 122/64)  BP(mean): --  ABP: --  ABP(mean): --  RR: 18 (20 Sep 2021 21:50) (16 - 18)  SpO2: 97% (20 Sep 2021 21:50) (97% - 99%)          PHYSICAL EXAM-    Constitutional: elderly frail female in no acute distress     Head: Head is normocephalic and atraumatic.      Neck: No JVD.     Cardiovascular: irregular rate    Respiratory: Basal rales b/l     Abdomen: Soft, nontender, nondistended with positive bowel sounds.      Extremity: No tenderness. 1 + b/l pedal  pitting edema     Neurologic: The patient is confused    Skin: No rash, no obvious lesions noted.      Psychiatric: The patient appears to be emotionally stable.      INTERPRETATION OF TELEMETRY: not on tele    ECG: atrial fibrillation.   I&O's Detail    06 Sep 2021 07:01  -  07 Sep 2021 07:00  --------------------------------------------------------  IN:  Total IN: 0 mL    OUT:    Incontinent per Collection Bag (mL): 900 mL  Total OUT: 900 mL    Total NET: -900 mL          LABS:                                   9.0    6.43  )-----------( 260      ( 20 Sep 2021 09:07 )             28.2     09-21    137  |  101  |  13  ----------------------------<  101<H>  3.7   |  31  |  0.69    Ca    8.8      21 Sep 2021 08:09  Phos  2.7     09-  Mg     2.2     -    TPro  6.2  /  Alb  2.2<L>  /  TBili  0.4  /  DBili  x   /  AST  19  /  ALT  23  /  AlkPhos  84  09-20        LIVER FUNCTIONS - ( 20 Sep 2021 09:07 )  Alb: 2.2 g/dL / Pro: 6.2 gm/dL / ALK PHOS: 84 U/L / ALT: 23 U/L / AST: 19 U/L / GGT: x               PT/INR - ( 06 Sep 2021 11:16 )   PT: 19.8 sec;   INR: 1.74 ratio         PTT - ( 06 Sep 2021 11:16 )  PTT:35.4 sec  Urinalysis Basic - ( 06 Sep 2021 11:09 )    Color: Yellow / Appearance: Clear / S.015 / pH: x  Gluc: x / Ketone: Negative  / Bili: Negative / Urobili: Negative mg/dL   Blood: x / Protein: 15 mg/dL / Nitrite: Negative   Leuk Esterase: Trace / RBC: Negative /HPF / WBC 11-25   Sq Epi: x / Non Sq Epi: Moderate / Bacteria: Few      I&O's Summary    06 Sep 2021 07:01  -  07 Sep 2021 07:00  --------------------------------------------------------  IN: 0 mL / OUT: 900 mL / NET: -900 mL      BNP  RADIOLOGY & ADDITIONAL STUDIES:  < from: CT Head No Cont (21 @ 10:56) >    IMPRESSION:    CT HEAD: No acute abnormality. Moderate atrophy most prominent in the BILATERAL temporal lobes.    CT cervical spine:   No vertebral fracture is recognized.  Multilevel degenerative disc disease and spondylosis at C3-4 through C6-7 with loss of disc height and associated degenerative endplate changes. There is narrowing of the LEFT C2-3, LEFT C3-4 LEFT C4-5 and RIGHT C5-C6 and C6-7 neural foramina due to uncovertebral spurring and facet osteophytic hypertrophy.    --- End of Report ---            FRANCESCA BOSTON MD; Attending Radiologist  This document has been electronically signed. Sep  6 2021 11:05AM    < end of copied text >  < from: TTE Echo Complete w/ Contrast w/ Doppler (21 @ 16:09) >     Impression     Summary     Moderate mitral annular calcification is present.   Moderate mitral stenosis is present.   Mild (1+) mitral regurgitation is present.   Well seated prosthetic valve in the aortic position. suboptimal doppler   interrogation   Severe (4+) tricuspid valve regurgitation is present.   The tricuspid valve leaflets appear mildly thickened open well.   Severe pulmonary hypertension.   Normal appearing pulmonic valve structure.   Mild pulmonic valvular regurgitation (1+) is present.     technical difficult study     Signature     ----------------------------------------------------------------   Electronically signed by Venugopal Palla, MD(Interpreting   physician) on 2021 05:38 PM   ----------------------------------------------------------------    < end of copied text >

## 2021-09-21 NOTE — PROGRESS NOTE ADULT - ASSESSMENT
89 year-old woman, presents after fall, mechanical fall, pulmonary consulted for shortness of breath which is likely multifactorial    1. Pulmonary edema/CHF  2. Interstitial infiltrates on CT questionable ILD  3. Sever pulmonary hypotension on sildenafil  4. Diarrhea resolved      Recommendations:  --Cardiology rec appreciated, torsemide as tolerated.   --Lower extremity edema is stable, consider ace bandage if tolerated by the patient.   --c/w sildenafil for now she is on a low dose  --wean oxgyen, goal should be >92%  --Optimize nutrition  Ambulation and OOB as toelrated.     Notify me with any questions or concners.

## 2021-09-21 NOTE — PROVIDER CONTACT NOTE (OTHER) - DATE AND TIME:
13-Sep-2021 22:13
21-Sep-2021 15:13
06-Sep-2021 19:46
06-Sep-2021 20:09
11-Sep-2021 19:56
14-Sep-2021 06:45
06-Sep-2021 20:10

## 2021-09-21 NOTE — PROVIDER CONTACT NOTE (OTHER) - NAME OF MD/NP/PA/DO NOTIFIED:
Cons[place] in [place] Dr. Coleman. [place] [facility] [place]
Dr Smyth
Gars[place]
Dr Hwang
Dr. Barrett
Dr. Gee

## 2021-09-21 NOTE — PROGRESS NOTE ADULT - SUBJECTIVE AND OBJECTIVE BOX
CC: near syncope, fall (10 Sep 2021 08:05)    HPI: 89/F with PMHx of pancreatitis s/p ERCP, cirrhosis, DM2, a-fib on Eliquis, CAD s/p PCI, severe pulm HTN, chronic R heart failure, diastolic dysfunction, AS s/p TAVR, OA, HTN, dyslipidemia, hypothyroidism, cecal mass, recently d/maddy from , sent  to the ED from [facility] for c/o Fall, near syncope. Pt states she was trying to put boxes under the chair, when she fell and hit her head, now has a small abrasion. Also c/o left shoulder pain.  She has been become more weak and unsteady recently.   As per Son, she has gained 20 Lbs in [facility].   Her trauma work up neg in ED   Pt is on increased dose torsemide for acute on Chronic diastolic CHF.   Pt was evaluated by CR Sx, plan for transfer to [facility] for possible SX, needs higher level of cardiopulmonary perioperative care    INTERVAL HPI/ OVERNIGHT EVENTS: Pt was seen and examined this am, awake and alert, mildly confused, reports feeling tired but no SOB,  having breakfast. No further nasal bleeding, on face shield. Pulse ox stable on o2, but goes down to 70s on RA. As per RN had episode of hematemesis x 1.       Vital Signs Last 24 Hrs  T(C): 36.6 (15 Sep 2021 15:25), Max: 37.3 (14 Sep 2021 23:25)  T(F): 97.9 (15 Sep 2021 15:25), Max: 99.2 (14 Sep 2021 23:25)  HR: 109 (15 Sep 2021 15:25) (73 - 122)  BP: 111/64 (15 Sep 2021 15:25) (102/49 - 116/69)  RR: 18 (15 Sep 2021 15:25) (17 - 18)  SpO2: 99% (15 Sep 2021 15:25) (93% - 99%)      REVIEW OF SYSTEMS:  All other review of systems is negative unless indicated above.    PHYSICAL EXAM:  General: Well developed; malnourished; frail elderly female,   on  O2   Eyes: EOMI; conjunctiva and sclera clear  Head: Normocephalic; atraumatic  ENMT: No nasal discharge; airway clear  Neck: Supple; non tender; no masses  Respiratory: Decreased BS,  crepitations and rales at bases b/l, anterior and posterior  fields   Cardiovascular: Irregular rate and rhythm. S1 and S2 Normal;  Gastrointestinal: Soft non-tender non-distended; Normal bowel sounds.   Genitourinary: No  suprapubic  tenderness  Extremities: +  edema  Vascular: Peripheral pulses palpable 2+ bilaterally  Neurological: Alert and oriented x2-3, non focal   Musculoskeletal: Normal muscle  strength, L neck  muscle tension improved  Psychiatric: Cooperative       LABS:                                      8.6    7.54  )-----------( 182      ( 15 Sep 2021 06:05 )             26.3     09-15    133<L>  |  97  |  19  ----------------------------<  215<H>  3.5   |  32<H>  |  0.81    Ca    8.4<L>      15 Sep 2021 06:05  Phos  3.7     09-14  Mg     2.3     09-14                       9.2    7.85  )-----------( 163      ( 13 Sep 2021 08:52 )             28.0     09-13    133<L>  |  97  |  21  ----------------------------<  191<H>  3.5   |  32<H>  |  0.73    Ca    8.6      13 Sep 2021 08:52                           9.7    7.11  )-----------( 147      ( 11 Sep 2021 09:32 )             30.2     09-11    139  |  100  |  26<H>  ----------------------------<  168<H>  3.5   |  35<H>  |  0.74    Ca    8.8      11 Sep 2021 09:32                              9.4    5.96  )-----------( 135      ( 09 Sep 2021 12:27 )             29.9     09 Sep 2021 12:27    138    |  101    |  24     ----------------------------<  217    3.5     |  33     |  0.83     Ca    8.8        09 Sep 2021 12:27      CAPILLARY BLOOD GLUCOSE  POCT Blood Glucose.: 216 mg/dL (15 Sep 2021 13:04)  POCT Blood Glucose.: 213 mg/dL (15 Sep 2021 08:56)  POCT Blood Glucose.: 289 mg/dL (14 Sep 2021 21:00)  POCT Blood Glucose.: 170 mg/dL (14 Sep 2021 17:30)          MEDICATIONS  (STANDING):  allopurinol 200 milliGRAM(s) Oral daily  artificial  tears Solution 1 Drop(s) Both EYES three times a day  cholestyramine Powder (Sugar-Free) 4 Gram(s) Oral daily  dextrose 40% Gel 15 Gram(s) Oral once  dextrose 5%. 1000 milliLiter(s) (50 mL/Hr) IV Continuous <Continuous>  dextrose 5%. 1000 milliLiter(s) (100 mL/Hr) IV Continuous <Continuous>  dextrose 50% Injectable 25 Gram(s) IV Push once  dextrose 50% Injectable 25 Gram(s) IV Push once  dextrose 50% Injectable 12.5 Gram(s) IV Push once  escitalopram 10 milliGRAM(s) Oral daily  ferrous    sulfate 325 milliGRAM(s) Oral two times a day  glucagon  Injectable 1 milliGRAM(s) IntraMuscular once  influenza   Vaccine 0.5 milliLiter(s) IntraMuscular once  insulin glargine Injectable (LANTUS) 6 Unit(s) SubCutaneous at bedtime  insulin lispro (ADMELOG) corrective regimen sliding scale   SubCutaneous three times a day before meals  insulin lispro (ADMELOG) corrective regimen sliding scale   SubCutaneous at bedtime  levothyroxine 50 MICROGram(s) Oral daily  lidocaine   4% Patch 1 Patch Transdermal daily  oxymetazoline 0.05% Nasal Spray 2 Spray(s) Both Nostrils two times a day  pantoprazole    Tablet 40 milliGRAM(s) Oral before breakfast  sildenafil (REVATIO) 20 milliGRAM(s) Oral two times a day  simvastatin 40 milliGRAM(s) Oral at bedtime  torsemide 40 milliGRAM(s) Oral daily    MEDICATIONS  (PRN):  acetaminophen   Tablet .. 650 milliGRAM(s) Oral every 6 hours PRN Temp greater or equal to 38C (100.4F), Mild Pain (1 - 3)  cyclobenzaprine 5 milliGRAM(s) Oral two times a day PRN Muscle Spasm  lidocaine 2% Gel 1 Application(s) Topical three times a day PRN pain  loperamide 2 milliGRAM(s) Oral three times a day PRN loose stool  sodium chloride 0.65% Nasal 2 Spray(s) Both Nostrils every 3 hours PRN nasal dryness        RADIOLOGY & ADDITIONAL TESTS:    EXAM:  CT ABDOMEN AND PELVIS OC IC                        PROCEDURE DATE:  08/19/2021    FINDINGS:  LOWER CHEST: Small bilateral pleural effusions, larger on the right. Interlobular septal thickening at the lung bases with reticular opacities, likely chronic interstitial lung disease. Aortic valve replacement.    LIVER: No focal hepatic lesion. Nodular surface contour again noted which can be seen in the setting of cirrhosis.  BILE DUCTS: Normal caliber.  GALLBLADDER: Not seen.  SPLEEN: Within normal limits.  PANCREAS: Atrophic.  ADRENALS: Within normal limits.  KIDNEYS/URETERS: No hydronephrosis. Bilateral renal cysts.    BLADDER: Unremarkable.  REPRODUCTIVE ORGANS: Hysterectomy.    BOWEL: Mucosal evaluation of the bowel is limited. Colonic diverticulosis without evidence of diverticulitis. Patient's known cecal mass is not well seen. No bowel obstruction. Appendix not seen.  PERITONEUM: Trace pelvic ascites.  VESSELS: IVC filter. Atherosclerotic changes. Abdominal aorta normal in caliber.  RETROPERITONEUM/LYMPH NODES: No lymphadenopathy.  ABDOMINALWALL: Unremarkable.  BONES: Degenerative changes in the spine. Mild anterolisthesis of L4 on L5. Hemangioma in L3.    IMPRESSION:  Patient's known cecal mass not well-seen on this study.  No evidence of metastatic disease in the abdomen or pelvis.  Small bilateral pleural effusions.        EXAM:  XR ABDOMEN PORTABLE ROUTINE 1V                        PROCEDURE DATE:  09/13/2021      COMPARISON: 8/19/2021    Frontal view of the abdomen shows a normal gas pattern. IVC filter is again noted. No definite free air is identified.    IMPRESSION:  No evidence of bowel obstruction.    Thank you for this referral.       CC: near syncope, fall     HPI: 89/F with PMHx of pancreatitis s/p ERCP, cirrhosis, DM2, a-fib on Eliquis, CAD s/p PCI, severe pulm HTN, chronic R heart failure, diastolic dysfunction, AS s/p TAVR, OA, HTN, dyslipidemia, hypothyroidism, cecal mass, recently d/maddy from , sent  to the ED from [facility] for c/o Fall, near syncope. Pt states she was trying to put boxes under the chair, when she fell and hit her head, now has a small abrasion. Also c/o left shoulder pain.  She has been become more weak and unsteady recently.   As per Son, she has gained 20 Lbs in [facility].   Her trauma work up neg in ED   Pt is on increased dose torsemide for acute on Chronic diastolic CHF.   Pt was evaluated by CR Sx, plan for transfer to [facility] for possible SX, needs higher level of cardiopulmonary perioperative care    INTERVAL HPI/ OVERNIGHT EVENTS: Pt was seen and examined this am,  OOB to chair, awake, alert, reports L neck pain, denies any SOB or CP or abd pain,  states " I am fine". No diarrhea. Just had good breakfast     Vital Signs Last 24 Hrs  T(C): 36.3 (21 Sep 2021 09:53), Max: 36.6 (20 Sep 2021 21:50)  T(F): 97.4 (21 Sep 2021 09:53), Max: 97.8 (20 Sep 2021 21:50)  HR: 67 (21 Sep 2021 09:53) (67 - 85)  BP: 103/57 (21 Sep 2021 09:53) (96/51 - 120/65)  RR: 16 (21 Sep 2021 09:53) (16 - 18)  SpO2: 100% (21 Sep 2021 09:53) (97% - 100%)      REVIEW OF SYSTEMS:  All other review of systems is negative unless indicated above.    PHYSICAL EXAM:  General: Well developed; malnourished; frail elderly female,   on  NC   Eyes: EOMI; conjunctiva and sclera clear  Head: Normocephalic; atraumatic  ENMT: No nasal discharge; airway clear  Neck: Supple; non tender; no masses  Respiratory: Decreased BS,  crepitations and rales at bases b/l  Cardiovascular: Irregular rate and rhythm. S1 and S2 Normal;  Gastrointestinal: Soft non-tender non-distended; Normal bowel sounds.   Genitourinary: No  suprapubic  tenderness  Extremities: +  edema  Vascular: Peripheral pulses palpable 2+ bilaterally  Neurological: Alert and oriented x2-3, non focal   Musculoskeletal: Normal muscle  strength, + L neck  muscle tension  Psychiatric: Cooperative       LABS:                                      8.6    7.54  )-----------( 182      ( 15 Sep 2021 06:05 )             26.3     09-15    133<L>  |  97  |  19  ----------------------------<  215<H>  3.5   |  32<H>  |  0.81    Ca    8.4<L>      15 Sep 2021 06:05  Phos  3.7     09-14  Mg     2.3     09-14                       9.2    7.85  )-----------( 163      ( 13 Sep 2021 08:52 )             28.0     09-13    133<L>  |  97  |  21  ----------------------------<  191<H>  3.5   |  32<H>  |  0.73    Ca    8.6      13 Sep 2021 08:52                           9.7    7.11  )-----------( 147      ( 11 Sep 2021 09:32 )             30.2     09-11    139  |  100  |  26<H>  ----------------------------<  168<H>  3.5   |  35<H>  |  0.74    Ca    8.8      11 Sep 2021 09:32                              9.4    5.96  )-----------( 135      ( 09 Sep 2021 12:27 )             29.9     09 Sep 2021 12:27    138    |  101    |  24     ----------------------------<  217    3.5     |  33     |  0.83     Ca    8.8        09 Sep 2021 12:27      CAPILLARY BLOOD GLUCOSE  POCT Blood Glucose.: 201 mg/dL (21 Sep 2021 12:22)  POCT Blood Glucose.: 101 mg/dL (21 Sep 2021 08:34)  POCT Blood Glucose.: 205 mg/dL (20 Sep 2021 21:26)  POCT Blood Glucose.: 271 mg/dL (20 Sep 2021 17:07)          MEDICATIONS  (STANDING):  allopurinol 200 milliGRAM(s) Oral daily  apixaban 2.5 milliGRAM(s) Oral every 12 hours  artificial  tears Solution 1 Drop(s) Both EYES three times a day  ascorbic acid 500 milliGRAM(s) Oral daily  cholecalciferol 2000 Unit(s) Oral daily  cholestyramine Powder (Sugar-Free) 4 Gram(s) Oral daily  clotrimazole 1% Cream 1 Application(s) Topical two times a day  dextrose 40% Gel 15 Gram(s) Oral once  dextrose 5%. 1000 milliLiter(s) (50 mL/Hr) IV Continuous <Continuous>  dextrose 5%. 1000 milliLiter(s) (100 mL/Hr) IV Continuous <Continuous>  dextrose 50% Injectable 25 Gram(s) IV Push once  dextrose 50% Injectable 12.5 Gram(s) IV Push once  dextrose 50% Injectable 25 Gram(s) IV Push once  escitalopram 10 milliGRAM(s) Oral daily  ferrous    sulfate 325 milliGRAM(s) Oral daily  glucagon  Injectable 1 milliGRAM(s) IntraMuscular once  influenza   Vaccine 0.5 milliLiter(s) IntraMuscular once  insulin glargine Injectable (LANTUS) 6 Unit(s) SubCutaneous at bedtime  insulin lispro (ADMELOG) corrective regimen sliding scale   SubCutaneous three times a day before meals  insulin lispro (ADMELOG) corrective regimen sliding scale   SubCutaneous at bedtime  lactobacillus acidophilus 1 Tablet(s) Oral three times a day with meals  levothyroxine 50 MICROGram(s) Oral daily  lidocaine   4% Patch 2 Patch Transdermal daily  metoprolol succinate ER 12.5 milliGRAM(s) Oral daily  pantoprazole    Tablet 40 milliGRAM(s) Oral before breakfast  sildenafil (REVATIO) 20 milliGRAM(s) Oral two times a day  simvastatin 40 milliGRAM(s) Oral at bedtime  torsemide 10 milliGRAM(s) Oral two times a day    MEDICATIONS  (PRN):  acetaminophen   Tablet .. 650 milliGRAM(s) Oral every 6 hours PRN Temp greater or equal to 38C (100.4F), Mild Pain (1 - 3)  cyclobenzaprine 5 milliGRAM(s) Oral two times a day PRN Muscle Spasm  lidocaine 2% Gel 1 Application(s) Topical three times a day PRN pain  lidocaine 2% Gel 1 Application(s) Topical five times a day PRN rectal pain  loperamide 2 milliGRAM(s) Oral three times a day PRN Diarrhea  ondansetron Injectable 4 milliGRAM(s) IV Push every 6 hours PRN Nausea and/or Vomiting  sodium chloride 0.65% Nasal 2 Spray(s) Both Nostrils every 3 hours PRN nasal dryness  traMADol 25 milliGRAM(s) Oral every 6 hours PRN Severe Pain (7 - 10)          RADIOLOGY & ADDITIONAL TESTS:    EXAM:  CT ABDOMEN AND PELVIS OC IC                        PROCEDURE DATE:  08/19/2021    FINDINGS:  LOWER CHEST: Small bilateral pleural effusions, larger on the right. Interlobular septal thickening at the lung bases with reticular opacities, likely chronic interstitial lung disease. Aortic valve replacement.    LIVER: No focal hepatic lesion. Nodular surface contour again noted which can be seen in the setting of cirrhosis.  BILE DUCTS: Normal caliber.  GALLBLADDER: Not seen.  SPLEEN: Within normal limits.  PANCREAS: Atrophic.  ADRENALS: Within normal limits.  KIDNEYS/URETERS: No hydronephrosis. Bilateral renal cysts.    BLADDER: Unremarkable.  REPRODUCTIVE ORGANS: Hysterectomy.    BOWEL: Mucosal evaluation of the bowel is limited. Colonic diverticulosis without evidence of diverticulitis. Patient's known cecal mass is not well seen. No bowel obstruction. Appendix not seen.  PERITONEUM: Trace pelvic ascites.  VESSELS: IVC filter. Atherosclerotic changes. Abdominal aorta normal in caliber.  RETROPERITONEUM/LYMPH NODES: No lymphadenopathy.  ABDOMINALWALL: Unremarkable.  BONES: Degenerative changes in the spine. Mild anterolisthesis of L4 on L5. Hemangioma in L3.    IMPRESSION:  Patient's known cecal mass not well-seen on this study.  No evidence of metastatic disease in the abdomen or pelvis.  Small bilateral pleural effusions.        EXAM:  XR ABDOMEN PORTABLE ROUTINE 1V                        PROCEDURE DATE:  09/13/2021      COMPARISON: 8/19/2021    Frontal view of the abdomen shows a normal gas pattern. IVC filter is again noted. No definite free air is identified.    IMPRESSION:  No evidence of bowel obstruction.    Thank you for this referral.

## 2021-09-21 NOTE — CHART NOTE - NSCHARTNOTEFT_GEN_A_CORE
HPI: 89/F with PMHx of pancreatitis s/p ERCP, cirrhosis, DM2, a-fib on Eliquis, CAD s/p PCI, severe pulm HTN, chronic R heart failure, diastolic dysfunction, AS s/p TAVR, OA, HTN, dyslipidemia, hypothyroidism. cecal mass, recently d/maddy from , sent  to the ED from [facility] for c/o Fall, near syncope. Pt states she was trying to put boxes under the chair, when she fell and hit her head, now has a small abrasion. Also c/o left shoulder pain.  She has been become more weak and unsteady recently.     Subjective: Called overnight for vulvar itching. Patient was found to be in bed, crying over "itching and pain" in the vulvar area that began on Saturday that has increased in intensity and is currently unrelieved by Nystatin powder.  She reports no pelvic pain, discharge or blood. No new changes in urination or bm. No other new associated symptoms or complaints.    ICU Vital Signs Last 24 Hrs  T(C): 36.6 (20 Sep 2021 21:50), Max: 36.6 (20 Sep 2021 10:15)  T(F): 97.8 (20 Sep 2021 21:50), Max: 97.9 (20 Sep 2021 10:15)  HR: 85 (20 Sep 2021 21:50) (68 - 105)  BP: 120/65 (20 Sep 2021 21:50) (96/51 - 122/64)  BP(mean): --  ABP: --  ABP(mean): --  RR: 18 (20 Sep 2021 21:50) (16 - 18)  SpO2: 97% (20 Sep 2021 21:50) (97% - 99%)    Physical Exam  GENERAL: Patient lying in bed, crying  HEENT: Head atraumatic, normocephalic, normal hearing  Respiratory: Normal WOB, no use of accessory muscles of respiration  Genitourinary: Bilateral labia major erythematous and edematous, with excoriations. No discharge or bleeding noted.  Neurological: AAO x 3, speech clear  Extremities: normal with no edema HPI: 89/F with PMHx of pancreatitis s/p ERCP, cirrhosis, DM2, a-fib on Eliquis, CAD s/p PCI, severe pulm HTN, chronic R heart failure, diastolic dysfunction, AS s/p TAVR, OA, HTN, dyslipidemia, hypothyroidism. cecal mass, recently d/maddy from , sent  to the ED from [facility] for c/o Fall, near syncope. Pt states she was trying to put boxes under the chair, when she fell and hit her head, now has a small abrasion. Also c/o left shoulder pain.  She has been become more weak and unsteady recently.     Subjective: Called overnight for vulvar itching. Patient was found to be in bed, crying over "itching and pain" in the vulvar area that began on Saturday that has increased in intensity and is currently unrelieved by Nystatin powder.  She reports no pelvic pain, discharge or blood. No new changes in urination or bm. No other new associated symptoms or complaints.    ICU Vital Signs Last 24 Hrs  T(C): 36.6 (20 Sep 2021 21:50), Max: 36.6 (20 Sep 2021 10:15)  T(F): 97.8 (20 Sep 2021 21:50), Max: 97.9 (20 Sep 2021 10:15)  HR: 85 (20 Sep 2021 21:50) (68 - 105)  BP: 120/65 (20 Sep 2021 21:50) (96/51 - 122/64)  BP(mean): --  ABP: --  ABP(mean): --  RR: 18 (20 Sep 2021 21:50) (16 - 18)  SpO2: 97% (20 Sep 2021 21:50) (97% - 99%)    Physical Exam  GENERAL: Patient lying in bed, crying d/t itching/pain  HEENT: Head atraumatic, normocephalic, normal hearing  Respiratory: Normal WOB, no use of accessory muscles of respiration  Genitourinary: Erythematous and edematous lesion on bilateral labia majora folds, with well-demarcated margins and excoriations. No satellitosis, folliculitis, papules or pustules. Abscesses, discharge, and bleeding not present.  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

## 2021-09-21 NOTE — PROGRESS NOTE ADULT - ASSESSMENT
Acute on Chronic decompensated HFPEF- hypervolemic.  Pedal edema persistent but slightly better than yesterday.  NO respiratory distress. iv diuretics to be held for now.  SHe had significant worsening of sodium when used.   Hyponatremia resolved.  per staff she is eating 50% of her food.  poor po intake   Can resume torsemide as tolerated.  d/w Dr Smith at length yesterday.  Ambulation will her her pedal edema to some extent.      Atrial fibrillation-rate controlled   metoprolol low dose as tolerated 12.5mg bid   continue current meds including apixaban    d/w Dr Smith    Colon mass- malignancy- Colorectal surgery team following pt.    Other medical issues- Management per primary team.   Thank you for allowing me to participate in the care of this patient. Please feel free to contact me with any questions.     Other medical issues- Management per primary team.   Thank you for allowing me to participate in the care of this patient. Please feel free to contact me with any questions.

## 2021-09-21 NOTE — PROGRESS NOTE ADULT - NUTRITIONAL ASSESSMENT
This patient has been assessed with a concern for Malnutrition and has been determined to have a diagnosis/diagnoses of Severe protein-calorie malnutrition.    This patient is being managed with:   Diet DASH/TLC-  Sodium & Cholesterol Restricted  Consistent Carbohydrate {Evening Snack} (CSTCHOSN)  Entered: Sep  6 2021  2:40PM    
This patient has been assessed with a concern for Malnutrition and has been determined to have a diagnosis/diagnoses of Severe protein-calorie malnutrition.    This patient is being managed with:   Diet DASH/TLC-  Sodium & Cholesterol Restricted  Lactose Restricted (Milk Sugar Intoler.)  Prosource Gelatein Plus     Qty per Day:  3  Entered: Sep 17 2021  9:50AM    
This patient has been assessed with a concern for Malnutrition and has been determined to have a diagnosis/diagnoses of Severe protein-calorie malnutrition.    This patient is being managed with:   Diet DASH/TLC-  Sodium & Cholesterol Restricted  Consistent Carbohydrate {Evening Snack} (CSTCHOSN)  Entered: Sep  6 2021  2:40PM    
This patient has been assessed with a concern for Malnutrition and has been determined to have a diagnosis/diagnoses of Severe protein-calorie malnutrition.    This patient is being managed with:   Diet DASH/TLC-  Sodium & Cholesterol Restricted  Consistent Carbohydrate {Evening Snack} (CSTCHOSN)  Lactose Restricted (Milk Sugar Intoler.)  Prosource Gelatein Plus     Qty per Day:  3  Entered: Sep 16 2021  4:32PM    
This patient has been assessed with a concern for Malnutrition and has been determined to have a diagnosis/diagnoses of Severe protein-calorie malnutrition.    This patient is being managed with:   Diet DASH/TLC-  Sodium & Cholesterol Restricted  Consistent Carbohydrate {Evening Snack} (CSTCHOSN)  Lactose Restricted (Milk Sugar Intoler.)  Prosource Gelatein Plus     Qty per Day:  3  Entered: Sep 16 2021  4:32PM    
This patient has been assessed with a concern for Malnutrition and has been determined to have a diagnosis/diagnoses of Severe protein-calorie malnutrition.    This patient is being managed with:   Diet DASH/TLC-  Sodium & Cholesterol Restricted  Lactose Restricted (Milk Sugar Intoler.)  Prosource Gelatein Plus     Qty per Day:  3  Entered: Sep 17 2021  9:50AM    
This patient has been assessed with a concern for Malnutrition and has been determined to have a diagnosis/diagnoses of Severe protein-calorie malnutrition.    This patient is being managed with:   Diet DASH/TLC-  Sodium & Cholesterol Restricted  Consistent Carbohydrate {Evening Snack} (CSTCHOSN)  Entered: Sep  6 2021  2:40PM    
This patient has been assessed with a concern for Malnutrition and has been determined to have a diagnosis/diagnoses of Severe protein-calorie malnutrition.    This patient is being managed with:   Diet DASH/TLC-  Sodium & Cholesterol Restricted  Lactose Restricted (Milk Sugar Intoler.)  Prosource Gelatein Plus     Qty per Day:  3  Entered: Sep 17 2021  9:50AM

## 2021-09-22 VITALS
HEART RATE: 75 BPM | DIASTOLIC BLOOD PRESSURE: 55 MMHG | RESPIRATION RATE: 17 BRPM | SYSTOLIC BLOOD PRESSURE: 109 MMHG | TEMPERATURE: 97 F | OXYGEN SATURATION: 100 %

## 2021-09-22 LAB
ANION GAP SERPL CALC-SCNC: 4 MMOL/L — LOW (ref 5–17)
BUN SERPL-MCNC: 13 MG/DL — SIGNIFICANT CHANGE UP (ref 7–23)
CALCIUM SERPL-MCNC: 8.8 MG/DL — SIGNIFICANT CHANGE UP (ref 8.5–10.1)
CHLORIDE SERPL-SCNC: 102 MMOL/L — SIGNIFICANT CHANGE UP (ref 96–108)
CO2 SERPL-SCNC: 31 MMOL/L — SIGNIFICANT CHANGE UP (ref 22–31)
CREAT SERPL-MCNC: 0.8 MG/DL — SIGNIFICANT CHANGE UP (ref 0.5–1.3)
GLUCOSE SERPL-MCNC: 128 MG/DL — HIGH (ref 70–99)
HCT VFR BLD CALC: 29.4 % — LOW (ref 34.5–45)
HGB BLD-MCNC: 9.2 G/DL — LOW (ref 11.5–15.5)
MCHC RBC-ENTMCNC: 31.3 GM/DL — LOW (ref 32–36)
MCHC RBC-ENTMCNC: 31.9 PG — SIGNIFICANT CHANGE UP (ref 27–34)
MCV RBC AUTO: 102.1 FL — HIGH (ref 80–100)
PLATELET # BLD AUTO: 278 K/UL — SIGNIFICANT CHANGE UP (ref 150–400)
POTASSIUM SERPL-MCNC: 4 MMOL/L — SIGNIFICANT CHANGE UP (ref 3.5–5.3)
POTASSIUM SERPL-SCNC: 4 MMOL/L — SIGNIFICANT CHANGE UP (ref 3.5–5.3)
RBC # BLD: 2.88 M/UL — LOW (ref 3.8–5.2)
RBC # FLD: 15.5 % — HIGH (ref 10.3–14.5)
SODIUM SERPL-SCNC: 137 MMOL/L — SIGNIFICANT CHANGE UP (ref 135–145)
WBC # BLD: 6.37 K/UL — SIGNIFICANT CHANGE UP (ref 3.8–10.5)
WBC # FLD AUTO: 6.37 K/UL — SIGNIFICANT CHANGE UP (ref 3.8–10.5)

## 2021-09-22 PROCEDURE — 99239 HOSP IP/OBS DSCHRG MGMT >30: CPT

## 2021-09-22 PROCEDURE — 99233 SBSQ HOSP IP/OBS HIGH 50: CPT

## 2021-09-22 RX ORDER — LOPERAMIDE HCL 2 MG
1 TABLET ORAL
Qty: 0 | Refills: 0 | DISCHARGE
Start: 2021-09-22

## 2021-09-22 RX ORDER — LIDOCAINE 4 G/100G
1 CREAM TOPICAL
Qty: 0 | Refills: 0 | DISCHARGE
Start: 2021-09-22

## 2021-09-22 RX ORDER — PANTOPRAZOLE SODIUM 20 MG/1
1 TABLET, DELAYED RELEASE ORAL
Qty: 0 | Refills: 0 | DISCHARGE

## 2021-09-22 RX ORDER — LACTOBACILLUS ACIDOPHILUS 100MM CELL
1 CAPSULE ORAL
Qty: 0 | Refills: 0 | DISCHARGE

## 2021-09-22 RX ORDER — METOPROLOL TARTRATE 50 MG
0.5 TABLET ORAL
Qty: 0 | Refills: 0 | DISCHARGE
Start: 2021-09-22

## 2021-09-22 RX ORDER — CHOLESTYRAMINE 4 G/9G
4 POWDER, FOR SUSPENSION ORAL
Qty: 0 | Refills: 0 | DISCHARGE
Start: 2021-09-22

## 2021-09-22 RX ORDER — LACTOBACILLUS ACIDOPHILUS 100MM CELL
2 CAPSULE ORAL
Qty: 0 | Refills: 0 | DISCHARGE

## 2021-09-22 RX ORDER — CYCLOBENZAPRINE HYDROCHLORIDE 10 MG/1
1 TABLET, FILM COATED ORAL
Qty: 0 | Refills: 0 | DISCHARGE
Start: 2021-09-22

## 2021-09-22 RX ORDER — PSYLLIUM SEED (WITH DEXTROSE)
1 POWDER (GRAM) ORAL
Qty: 0 | Refills: 0 | DISCHARGE

## 2021-09-22 RX ORDER — TRAMADOL HYDROCHLORIDE 50 MG/1
0.5 TABLET ORAL
Qty: 0 | Refills: 0 | DISCHARGE
Start: 2021-09-22

## 2021-09-22 RX ORDER — TRAMADOL HYDROCHLORIDE 50 MG/1
1 TABLET ORAL
Qty: 0 | Refills: 0 | DISCHARGE

## 2021-09-22 RX ADMIN — Medication 1 TABLET(S): at 09:55

## 2021-09-22 RX ADMIN — Medication 2000 UNIT(S): at 09:55

## 2021-09-22 RX ADMIN — LIDOCAINE 2 PATCH: 4 CREAM TOPICAL at 09:56

## 2021-09-22 RX ADMIN — PANTOPRAZOLE SODIUM 40 MILLIGRAM(S): 20 TABLET, DELAYED RELEASE ORAL at 09:55

## 2021-09-22 RX ADMIN — Medication 1 DROP(S): at 05:49

## 2021-09-22 RX ADMIN — Medication 325 MILLIGRAM(S): at 09:55

## 2021-09-22 RX ADMIN — Medication 1 TABLET(S): at 12:51

## 2021-09-22 RX ADMIN — Medication 4: at 12:14

## 2021-09-22 RX ADMIN — APIXABAN 2.5 MILLIGRAM(S): 2.5 TABLET, FILM COATED ORAL at 09:55

## 2021-09-22 RX ADMIN — Medication 20 MILLIGRAM(S): at 09:57

## 2021-09-22 RX ADMIN — ESCITALOPRAM OXALATE 10 MILLIGRAM(S): 10 TABLET, FILM COATED ORAL at 09:56

## 2021-09-22 RX ADMIN — Medication 500 MILLIGRAM(S): at 09:55

## 2021-09-22 RX ADMIN — Medication 12.5 MILLIGRAM(S): at 09:55

## 2021-09-22 RX ADMIN — Medication 10 MILLIGRAM(S): at 09:58

## 2021-09-22 RX ADMIN — Medication 200 MILLIGRAM(S): at 09:56

## 2021-09-22 RX ADMIN — CHOLESTYRAMINE 4 GRAM(S): 4 POWDER, FOR SUSPENSION ORAL at 09:56

## 2021-09-22 RX ADMIN — Medication 50 MICROGRAM(S): at 05:47

## 2021-09-22 RX ADMIN — Medication 1 APPLICATION(S): at 09:57

## 2021-09-22 NOTE — PROGRESS NOTE ADULT - SUBJECTIVE AND OBJECTIVE BOX
Subjective:  Awake, sitting upright in bed.   She has no respiratory complaints  She is on NC 2LNC (Decreased from 4L yesterday).   She still has swelling in lower extremities.    ROS otherwise negative.     Vital Signs Last 24 Hrs  T(C): 36.3 (22 Sep 2021 09:12), Max: 36.6 (21 Sep 2021 21:02)  T(F): 97.3 (22 Sep 2021 09:12), Max: 97.9 (21 Sep 2021 21:02)  HR: 75 (22 Sep 2021 09:12) (63 - 85)  BP: 109/55 (22 Sep 2021 09:12) (100/61 - 109/55)  BP(mean): --  RR: 17 (22 Sep 2021 09:12) (17 - 18)  SpO2: 100% (22 Sep 2021 09:12) (98% - 100%)    PHYSICAL EXAMINATION:  GENERAL: Elderly, frail appearing, no distress.  HEAD: NCAT, no significant lesions from fall  EYES: Pupils are normal. No icterus. Sclera white.   HEENT:  Mucus membranes moist. Oropharynx normal.   NECK:  Supple. No stridor. No JVD.   HEART; S1/S2 irregular rate and rhythm - no murmurs appreciated.   CHEST:  Crackles at bilateral bases in posterior lung fields. No wheezing.   ABDOMEN:  Soft and nontender. Nondistended.   EXTREMITIES:  significant lower extremity edema - no cyanosis No clubbing  PSYCH: Pleasant affect.    MEDICATIONS  (STANDING):  allopurinol 200 milliGRAM(s) Oral daily  apixaban 2.5 milliGRAM(s) Oral every 12 hours  artificial  tears Solution 1 Drop(s) Both EYES three times a day  ascorbic acid 500 milliGRAM(s) Oral daily  cholecalciferol 2000 Unit(s) Oral daily  cholestyramine Powder (Sugar-Free) 4 Gram(s) Oral daily  clotrimazole 1% Cream 1 Application(s) Topical two times a day  dextrose 40% Gel 15 Gram(s) Oral once  dextrose 5%. 1000 milliLiter(s) (50 mL/Hr) IV Continuous <Continuous>  dextrose 5%. 1000 milliLiter(s) (100 mL/Hr) IV Continuous <Continuous>  dextrose 50% Injectable 25 Gram(s) IV Push once  dextrose 50% Injectable 12.5 Gram(s) IV Push once  dextrose 50% Injectable 25 Gram(s) IV Push once  escitalopram 10 milliGRAM(s) Oral daily  ferrous    sulfate 325 milliGRAM(s) Oral daily  glucagon  Injectable 1 milliGRAM(s) IntraMuscular once  influenza   Vaccine 0.5 milliLiter(s) IntraMuscular once  insulin glargine Injectable (LANTUS) 6 Unit(s) SubCutaneous at bedtime  insulin lispro (ADMELOG) corrective regimen sliding scale   SubCutaneous three times a day before meals  insulin lispro (ADMELOG) corrective regimen sliding scale   SubCutaneous at bedtime  lactobacillus acidophilus 1 Tablet(s) Oral three times a day with meals  levothyroxine 50 MICROGram(s) Oral daily  lidocaine   4% Patch 2 Patch Transdermal daily  metoprolol succinate ER 12.5 milliGRAM(s) Oral daily  pantoprazole    Tablet 40 milliGRAM(s) Oral before breakfast  sildenafil (REVATIO) 20 milliGRAM(s) Oral two times a day  simvastatin 40 milliGRAM(s) Oral at bedtime  torsemide 10 milliGRAM(s) Oral two times a day    MEDICATIONS  (PRN):  acetaminophen   Tablet .. 650 milliGRAM(s) Oral every 6 hours PRN Temp greater or equal to 38C (100.4F), Mild Pain (1 - 3)  cyclobenzaprine 5 milliGRAM(s) Oral two times a day PRN Muscle Spasm  lidocaine 2% Gel 1 Application(s) Topical three times a day PRN pain  lidocaine 2% Gel 1 Application(s) Topical five times a day PRN rectal pain  loperamide 2 milliGRAM(s) Oral three times a day PRN Diarrhea  ondansetron Injectable 4 milliGRAM(s) IV Push every 6 hours PRN Nausea and/or Vomiting  sodium chloride 0.65% Nasal 2 Spray(s) Both Nostrils every 3 hours PRN nasal dryness  traMADol 25 milliGRAM(s) Oral every 6 hours PRN Severe Pain (7 - 10)        LABS:                        9.2    6.37  )-----------( 278      ( 22 Sep 2021 06:29 )             29.4     09-22    137  |  102  |  13  ----------------------------<  128<H>  4.0   |  31  |  0.80    Ca    8.8      22 Sep 2021 06:29  Phos  2.7     09-21  Mg     2.2     09-21      RADIOLOGY & ADDITIONAL STUDIES:   *Imaging personally reviewed.                       PROCEDURE DATE:  09/18/2021    FINDINGS:    The lungs show persistent acute on chronic, RIGHT greater than LEFT perihilar/basilar diffuse airspace disease. No pneumothorax.

## 2021-09-22 NOTE — PROGRESS NOTE ADULT - ASSESSMENT
89 year-old woman, presents after fall, mechanical fall, pulmonary consulted for shortness of breath which is likely multifactorial.   Active Issues:   1. Pulmonary edema/CHF  2. Interstitial infiltrates on CT possible pulmonary edema, questionable ILD  3. Severe pulmonary hypotension on sildenafil      Recommendations:  --Cardiology rec appreciated, torsemide as tolerated.   --Lower extremity edema is stable  --c/w sildenafil for now she is on a low dose  --wean oxygen goal should be >92%  --Optimize nutrition  --Ambulation and OOB as tolerated     Notify me with any questions or concners.

## 2021-09-22 NOTE — PROGRESS NOTE ADULT - PROVIDER SPECIALTY LIST ADULT
Cardiology
Hospitalist
Hospitalist
Palliative Care
Palliative Care
Pulmonology
Cardiology
Colorectal Surgery
Gastroenterology
Hospitalist
Palliative Care
Pulmonology
Cardiology
Cardiology
Gastroenterology
Hospitalist
Pulmonology
Pulmonology
Cardiology
Cardiology
Gastroenterology
Gastroenterology
Hospitalist
Pulmonology
Hospitalist

## 2021-09-22 NOTE — PROGRESS NOTE ADULT - REASON FOR ADMISSION
near syncope, fal
near syncope, fal  colon cancer
near syncope, fall
near syncope, fall
near syncope, fal
near syncope, fall
near syncope, fall
near syncope, fal
near syncope, fall
near syncope, fal
near syncope, fall
near syncope, fal

## 2021-09-23 NOTE — CONSULT NOTE ADULT - CONSULT REQUESTED BY NAME
eMERGENCY dEPARTMENT eNCOUnter      CHIEF COMPLAINT    Chief Complaint   Patient presents with   • Fall       HPI    Jazmyne Osei is a 89 year old female who presents with a chief complaint of ground level fall.  Patient states that she was walking in her kitchen and lost her balance and fell.  No syncope.  She struck her head on the right shoulder and she has local pain in both places.  She was able to crawl to the phone and called 911. GCS was 15. She takes a thyroid medication and aspirin.  Otherwise no other medications.  She lives alone.      ALLERGIES    ALLERGIES:  No Known Allergies    CURRENT MEDICATIONS:    No current facility-administered medications on file prior to encounter.  levothyroxine 25 MCG tablet  calcium, oyster shell, 500 MG tablet  Multiple Vitamins-Minerals (PRESERVISION AREDS 2) Cap  aspirin 325 MG EC tablet  DISPENSE  cholecalciferol (VITAMIN D3) 1000 UNITS tablet  Polyethyl Glycol-Propyl Glycol (LUBRICANT EYE DROPS) 0.4-0.3 % SOLN        PAST MEDICAL HISTORY    Past Medical History:   Diagnosis Date   • Varicose veins    • Wrist fracture, closed, right, sequela        SURGICAL HISTORY    No past surgical history on file.    SOCIAL HISTORY    Social History     Tobacco Use   • Smoking status: Never Smoker   • Smokeless tobacco: Never Used   Vaping Use   • Vaping Use: never used   Substance Use Topics   • Alcohol use: Not Currently     Comment: sociol   • Drug use: No       FAMILY HISTORY    Family History   Problem Relation Age of Onset   • Heart disease Mother    • Clotting Disorder Father    • Early death Brother    • Heart disease Brother    • COPD Brother        REVIEW OF SYSTEMS    All systems reviewed and are negative except for the pertinent positives and negatives in HPI.    PHYSICAL EXAM    ED Triage Vitals [09/22/21 2058]   BP (!) 214/116   Heart Rate (!) 110   Resp 18   Temp 98.3 °F (36.8 °C)   SpO2 96 %     Pulse Ox Interpretation:  96% room air  Constitutional:  Thin. No  Maria Guadalupe Germain acute distress, non-toxic appearance.   Eyes:  Pupils equal, round, reactive to light. Conjunctivae normal.   HENT:  Large hematoma to the right frontal aspect of the scalp involving the supraorbital soft tissues as well.  Small abrasion overlying the apex of the hematoma.  No epistaxis.  Tongue appears normal.  No or [place] or rhinorrhea.  External ears normal. Nose normal. Oropharynx moist.  Neck: Normal range of motion. No tenderness. Supple.   Respiratory:  No respiratory distress, normal breath sounds. No rales. No wheezing.   Cardiovascular:  Normal rate, normal rhythm. No murmurs, gallops, or rubs. Good brachial and radial pulses right upper extremity.  GI:  Soft, nondistended. Normal bowel sounds, nontender. No hepatomegaly, splenomegaly, mass, rebound or guarding.   :  No costovertebral angle tenderness.   Musculoskeletal:  Patient has deformity and hematoma of the right shoulder.  Hematoma is most prominent anteriorly.  Range of motion is limited due to pain and swelling.  No pain or deformity more distal towards the elbow forearm or wrist.  Chest wall is nontender.  Spine is nontender.  Pelvis is nontender with normal range of motion.  No knee or ankle deformity or pain.  Integument:  Well hydrated, no rash.   Neurologic:  GCS equals 15, sensation grossly intact distal to the right shoulder.  Psychiatric:  Speech and behavior appropriate.     RADIOLOGY    CT head is negative for acute intracranial pathology.  She has a sizable frontal hematoma noted on CT.    XR shoulder shows slightly impacted and angulated proximal humerus fracture.    LABS    WBC 15  covid negative    PROCEDURES    None    ED COURSE & MEDICAL DECISION MAKING    Treatment included IV Fentanyl.  Right upper extremity eventually placed in sling.  Little pain if not moving.  CT head and XR shoulder.  Basic labs.    Patient admitted to medical by Dr. Grier given her fracture in context of her living alone, dependent on walker normally, pain  control concerns and ortho consult.  Dr. Cosby consulted and will see tomorrow.        FINAL IMPRESSION    Ground level fall  Right proximal humerus fracture, closed  Scalp hematoma         Sanjay Cuellar MD  09/23/21 1319

## 2021-09-24 DIAGNOSIS — I50.33 ACUTE ON CHRONIC DIASTOLIC (CONGESTIVE) HEART FAILURE: ICD-10-CM

## 2021-09-24 DIAGNOSIS — I48.21 PERMANENT ATRIAL FIBRILLATION: ICD-10-CM
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
## 2021-09-24 DIAGNOSIS — M54.2 CERVICALGIA: ICD-10-CM

## 2021-09-24 DIAGNOSIS — Z95.2 PRESENCE OF PROSTHETIC HEART VALVE: ICD-10-CM

## 2021-09-24 DIAGNOSIS — Z79.01 LONG TERM (CURRENT) USE OF ANTICOAGULANTS: ICD-10-CM

## 2021-09-24 DIAGNOSIS — G89.29 OTHER CHRONIC PAIN: ICD-10-CM

## 2021-09-24 DIAGNOSIS — J84.9 INTERSTITIAL PULMONARY DISEASE, UNSPECIFIED: ICD-10-CM

## 2021-09-24 DIAGNOSIS — E03.9 HYPOTHYROIDISM, UNSPECIFIED: ICD-10-CM

## 2021-09-24 DIAGNOSIS — I11.0 HYPERTENSIVE HEART DISEASE WITH HEART FAILURE: ICD-10-CM

## 2021-09-24 DIAGNOSIS — I25.10 ATHEROSCLEROTIC HEART DISEASE OF NATIVE CORONARY ARTERY WITHOUT ANGINA PECTORIS: ICD-10-CM

## 2021-09-24 DIAGNOSIS — E43 UNSPECIFIED SEVERE PROTEIN-CALORIE MALNUTRITION: ICD-10-CM

## 2021-09-24 DIAGNOSIS — I35.0 NONRHEUMATIC AORTIC (VALVE) STENOSIS: ICD-10-CM

## 2021-09-24 DIAGNOSIS — I27.20 PULMONARY HYPERTENSION, UNSPECIFIED: ICD-10-CM

## 2021-09-24 DIAGNOSIS — Z66 DO NOT RESUSCITATE: ICD-10-CM

## 2021-09-24 DIAGNOSIS — Z95.818 PRESENCE OF OTHER CARDIAC IMPLANTS AND GRAFTS: ICD-10-CM

## 2021-09-24 DIAGNOSIS — R04.0 EPISTAXIS: ICD-10-CM

## 2021-09-24 DIAGNOSIS — E11.9 TYPE 2 DIABETES MELLITUS WITHOUT COMPLICATIONS: ICD-10-CM

## 2021-09-24 DIAGNOSIS — M25.512 PAIN IN LEFT SHOULDER: ICD-10-CM

## 2021-09-24 DIAGNOSIS — J96.11 CHRONIC RESPIRATORY FAILURE WITH HYPOXIA: ICD-10-CM

## 2021-09-24 DIAGNOSIS — Z90.49 ACQUIRED ABSENCE OF OTHER SPECIFIED PARTS OF DIGESTIVE TRACT: ICD-10-CM

## 2021-09-24 DIAGNOSIS — E87.1 HYPO-OSMOLALITY AND HYPONATREMIA: ICD-10-CM

## 2021-09-24 DIAGNOSIS — Z20.822 CONTACT WITH AND (SUSPECTED) EXPOSURE TO COVID-19: ICD-10-CM

## 2021-09-24 DIAGNOSIS — K56.41 FECAL IMPACTION: ICD-10-CM

## 2021-09-24 DIAGNOSIS — C18.0 MALIGNANT NEOPLASM OF CECUM: ICD-10-CM

## 2021-09-24 DIAGNOSIS — Z99.81 DEPENDENCE ON SUPPLEMENTAL OXYGEN: ICD-10-CM

## 2021-09-24 DIAGNOSIS — S00.91XA ABRASION OF UNSPECIFIED PART OF HEAD, INITIAL ENCOUNTER: ICD-10-CM

## 2021-09-24 DIAGNOSIS — Z96.652 PRESENCE OF LEFT ARTIFICIAL KNEE JOINT: ICD-10-CM

## 2021-09-24 DIAGNOSIS — M19.90 UNSPECIFIED OSTEOARTHRITIS, UNSPECIFIED SITE: ICD-10-CM

## 2021-09-24 DIAGNOSIS — I50.812 CHRONIC RIGHT HEART FAILURE: ICD-10-CM

## 2021-09-24 DIAGNOSIS — R19.7 DIARRHEA, UNSPECIFIED: ICD-10-CM

## 2021-09-24 DIAGNOSIS — K74.60 UNSPECIFIED CIRRHOSIS OF LIVER: ICD-10-CM

## 2021-09-24 DIAGNOSIS — E78.5 HYPERLIPIDEMIA, UNSPECIFIED: ICD-10-CM

## 2021-09-24 DIAGNOSIS — I49.9 CARDIAC ARRHYTHMIA, UNSPECIFIED: ICD-10-CM

## 2021-09-24 DIAGNOSIS — Z95.5 PRESENCE OF CORONARY ANGIOPLASTY IMPLANT AND GRAFT: ICD-10-CM

## 2021-11-02 NOTE — PATIENT PROFILE ADULT. - NS PRO TALK SOMEONE YN
1) Spep and upep without an abnormal protein that could account for worsening bone health.  2) calcium, pth, phosphorus, alkaline phosphatase ok  3) celiac negative  4) 25 hydroxy vitamin D low - start 4000 international units (100 mcg) daily with repeat 25 hydroxy vitamin D in 6 weeks.  5) awaiting 24 hour urine    
no

## 2021-11-10 NOTE — DISCHARGE NOTE ADULT - PHYSICIAN SECTION COMPLETE
Outreach attempt was made to schedule a Medicare Wellness Visit. This was the first attempt. Contact was not made, left message. Writer left message on voicemail for pt to call back to schedule appointment.   
Yes

## 2022-01-25 NOTE — GOALS OF CARE CONVERSATION - ADVANCED CARE PLANNING - CONVERSATION DETAILS
I met this patient this AM. She stated that she did not want to participate in the [facility] meeting and deferred decision-making to her son   Laith Aureliocarlos.    I spoke over the phone with the patient's son and explained the role of palliative medicine.  The patient's son gave the patient a history stating she has been living at [facility], whose family has had minimal visitation due to Covid-19.  The night before coming to the hospital, the patient was out to dinner with one of her sons and was doing well. However, she was trying to move a box, and she fell, which is why she was sent to the hospital. 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  Since being in the hospital, the patient was experiencing constipation which required disimpaction; the patient was now having multiple episodes of Diarrhea, resulting in needing to hold her torsemide—confirmed that the patient's C-Diff PCR was negative x2.    The patient's son also shared that at [facility] patient and the family felt that she was not getting enough individual care waiting hours for someone to help her. Was inquiring about higher levels of care, explained placed a call to [facility] this AM and awaiting a call back to see if there were palliative units. But encourage the family to also reach out to [facility] to see if a higher level of nursing care is available.  We also spoke about the philosophy of hospice, which at this time patient's son verbalized understanding but does not feel that the patient is at that point yet. The family would like to see if able to get symptoms of Diarrhea under control and then be able to assess better how the patient is doing.   Did review MOLST form; at this time, patient son place DNR/I on MOLST. We will continue to monitor the patient through the weekend, hoping we can get symptoms better managed and that the patient will tolerate more PO and get stronger.  I will follow up with the patient's family on Monday. [[id number]]

## 2022-03-17 NOTE — ED ADULT NURSE NOTE - BOWEL SOUNDS RLQ
present Azelaic Acid Counseling: Patient counseled that medicine may cause skin irritation and to avoid applying near the eyes.  In the event of skin irritation, the patient was advised to reduce the amount of the drug applied or use it less frequently.   The patient verbalized understanding of the proper use and possible adverse effects of azelaic acid.  All of the patient's questions and concerns were addressed.

## 2022-07-11 NOTE — CONSULT NOTE ADULT - PROBLEM SELECTOR PROBLEM 4
Chronic diastolic heart failure Topical Sulfur Applications Counseling: Topical Sulfur Counseling: Patient counseled that this medication may cause skin irritation or allergic reactions.  In the event of skin irritation, the patient was advised to reduce the amount of the drug applied or use it less frequently.   The patient verbalized understanding of the proper use and possible adverse effects of topical sulfur application.  All of the patient's questions and concerns were addressed.

## 2022-09-13 NOTE — ED ADULT NURSE NOTE - NS ED NURSE TRANSPORT WITH
Kilograms Preamble Statement (Weight Entered In Details Tab): Reported Weight in kilograms: oxygen/IV/cardiac monitor/IV pump

## 2022-10-06 NOTE — ED ADULT NURSE NOTE - NS ED NURSE RECORD ANOTHER HT AND WT
10/06/22 0961   Team Meeting   Meeting Type Daily Rounds   Team Members Present   Team Members Present Physician;Nurse;   Physician Team Member [id number]   Care Management Team Member Harsha   Patient/Family Present   Patient Present No   Patient's Family Present No   Pt is a new admit here on a [address] from [facility] medical floor  While on the medical floor Pt was observed vaping in her room, attempted to cheek klonopin and was observed filling her coffee cup with hand   Pt demanding and disorganized upon arrival to unit  Readmit score is 22  Discharge to be determined  Yes

## 2023-02-15 NOTE — PATIENT PROFILE ADULT. - REASON FOR ADMISSION
diarrhea yes Drysol Counseling:  I discussed with the patient the risks of drysol/aluminum chloride including but not limited to skin rash, itching, irritation, burning.

## 2023-08-08 NOTE — DISCHARGE NOTE ADULT - DATE:
03/12/2018 Prednisone Counseling:  I discussed with the patient the risks of prolonged use of prednisone including but not limited to weight gain, insomnia, osteoporosis, mood changes, diabetes, susceptibility to infection, glaucoma and high blood pressure.  In cases where prednisone use is prolonged, patients should be monitored with blood pressure checks, serum glucose levels and an eye exam.  Additionally, the patient may need to be placed on GI prophylaxis, PCP prophylaxis, and calcium and vitamin D supplementation and/or a bisphosphonate.  The patient verbalized understanding of the proper use and the possible adverse effects of prednisone.  All of the patient's questions and concerns were addressed.

## 2023-12-15 NOTE — H&P ADULT - ASSESSMENT
89 year old female with past medical history of CAD s/p stent, Atrial Fibrillation, HFpEF, T2DM, HLD, PHTN, presenting with abdominal pain x 1 day and found to have acute pancreatitis    #Pancreatitis  - History of presumed drug-induced pancreatitis secondary to Metalozone and Januvia  - s/p Cholecystectomy and denies ETOH use  - Hold Lexapro, Zocor, Lasix, and Nexium secondary to possibility triggering agents  - Lipid Profile r/o hypertriglyceridemia  - Start IVF; will start at lower rate given history of HFpEF  - GI Consult - Dr. Mobley    #CAD s/p Stent  - Stable  - Continue ASA, Metoprolol  - Hold Zocor for now    #HFpEF  - Cardiac Cath (02/17): Normal LV, EF 65%, CAD, Severe PHTN  - Hold Lasix; will start IVF    #Atrial Fibrillation  - Continue Metoprolol for rate-control  - Continue Eliquis for AC  - [id number]    #T2DM  - Hold oral meds  - Start ISS  - Diabetic Diet    #HLD  - Lipid profile for AM  - Hold Zocor    #Pulmonary Hypertension  - Stable  - Continue Sildenafil 89 year old female with past medical history of CAD s/p stent, Atrial Fibrillation, HFpEF, T2DM, HLD, PHTN, presenting with abdominal pain x 1 day and found to have acute pancreatitis    #Pancreatitis  - History of presumed drug-induced pancreatitis secondary to Metalozone and Januvia  - s/p Cholecystectomy and denies ETOH use  - Hold Lexapro, Zocor, Lasix, and Nexium secondary to possibility of triggering agents  - Lipid Profile r/o hypertriglyceridemia  - Start IVF; will start at lower rate given history of HFpEF  - MRCP for AM  - GI Consult - Dr. Mobley    #CAD s/p Stent  - Stable  - Continue ASA, Metoprolol  - Hold Zocor for now    #HFpEF  - Cardiac Cath (02/17): Normal LV, EF 65%, CAD, Severe PHTN  - Hold Lasix; will start IVF  - Monitor for volume overload    #Atrial Fibrillation  - Continue Metoprolol for rate-control  - Continue Eliquis for AC  - [id number]    #T2DM  - Hold oral meds  - Start ISS  - Diabetic Diet    #HLD  - Lipid profile for AM  - Hold Zocor    #Pulmonary Hypertension  - Stable  - Continue Sildenafil 89 year old female with past medical history of CAD s/p stent, Atrial Fibrillation, HFpEF, T2DM, HLD, PHTN, presenting with abdominal pain x 1 day and found to have acute pancreatitis    # acute Pancreatitis  - History of presumed drug-induced pancreatitis secondary to Metalozone and Januvia  - s/p Cholecystectomy and denies ETOH use  - Hold Lexapro, Zocor, Lasix, and Nexium secondary to possibility of triggering agents  - Lipid Profile r/o hypertriglyceridemia  - Start IVF; will start at lower rate given history of HFpEF  - MRCP for AM  - slow IVF due to hx of diastolic heart failure   - GI Consult - Dr. Mobley    #CAD s/p Stent  - Stable  - Continue ASA, Metoprolol  - Hold Zocor for now    #HFpEF hx of chronic diastolic heart failure  - Cardiac Cath (02/17): Normal LV, EF 65%, CAD, Severe PHTN  - Hold Lasix; while on IVF  - Monitor for volume overload closely   -admit to tele  -cardio consult    #Atrial Fibrillation  - Continue Metoprolol for rate-control  - Continue Eliquis for AC  - [id number]    #T2DM  - Hold oral meds  - Start ISS  - Diabetic Diet    #HLD  - Lipid profile for AM  - Hold Zocor    #Pulmonary Hypertension  - Stable  - Continue Sildenafil 85.3

## 2024-07-01 NOTE — PROGRESS NOTE ADULT - MINUTES
[facility]: Follow-up visit  [address]  [place] [address] [phone]       Patient Name:  Anne Kauffman    YOB: 1952            Date of Service:  7/2/2024    Chief Complaint   Patient presents with    Follow-up    Sleep Apnea    Device Check   Inspire Implant 12/21/23  Activation-1/25/24  PSG- 11/23/22 AHI-65 lowest SaO2- 75%  CPAP - 5-10cm  Nasal mask     History of Present Illness:    This pleasant lady came in today to get her inspire device -Seen With [employer] representative Mario Alberto    To recap:  Patient has sleep apnea AHI 65.  Lowest sat 77%.  Saturation less than 89% 58.8 minutes.  Patient was intolerant of PAP therapy.  DISE done in June 2023. Implanted on 12/21/23. Activated on 1/25 and adjusted last time to:          At this time  Currently she reports she is doing well.  She reports that she is still sleeping every night and feels refreshed every morning.  May not be sleeping 10 hours that she was in the past, but her quality of sleep is much better.  Her [place] score today is 0/24.    She does report that her  is still reporting she snores and every now and then still nudges her.  She is wondering if we can check that out.    Compliance data currently shows she has used it 30 out of 30 days all 30 days more than 4 hours average sleep now is 7 hours and 44 minutes.  He is currently on a voltage of 2.1.              7/2/2024     9:26 AM 3/26/2024     9:26 AM 1/25/2024    10:01 AM 12/19/2023     2:52 PM   Sleep Medicine   Sitting and reading 0 0 0 0   Watching TV 0 0 0 0   Sitting, inactive in a public place (e.g. a theatre or a meeting) 0 0 0 0   As a passenger in a car for an hour without a break 0 0 0 0   Lying down to rest in the afternoon when circumstances permit 0 0 1 0   Sitting and talking to someone 0 0 0 0   Sitting quietly after a lunch without alcohol 0 0 0 0   In a car, while stopped for a few minutes in traffic 0 0 0 0   [place] 
45
35

## 2025-02-05 NOTE — DISCHARGE NOTE ADULT - REASON FOR ADMISSION
----- Message from Tomas Ayala MD sent at 2/5/2025  7:28 AM CST -----  Please inform patient that labs overall are reassuring.  No signs of infection.  Overall this and CT scan is reassuring.    Please see if patient is still having pain and discomfort.  If so, recommend ultrasound of the right upper quadrant in the next couple days to assess for gallbladder disease.  
Called patient and informed patient of below. Patient verbalized understanding and had no questions.      
"pain on my left lower leg"

## 2025-03-26 NOTE — ED ADULT NURSE NOTE - PAIN: BODY LOCATION
General Sunscreen Counseling: I recommended a broad spectrum sunscreen with a SPF of 30 or higher. Sunscreens should be applied at least 15 minutes prior to expected sun exposure and then every 2 hours after that as long as sun exposure continues. If swimming or exercising, consider applying every 45 minutes to an hour after getting wet or sweating.   I also recommended sun protective clothing and shade when possible. Detail Level: Detailed epigastric

## 2025-07-24 NOTE — ED ADULT NURSE NOTE - SEPSIS REFERENCE DATA CRITERIA 1
Abormal VS: Temp > 100F or < 96.8F; SBP < 90 mmHG; HR > 120bpm; Resp > 24/min IV discontinued, cath removed intact